# Patient Record
Sex: MALE | Race: WHITE | NOT HISPANIC OR LATINO | Employment: OTHER | URBAN - METROPOLITAN AREA
[De-identification: names, ages, dates, MRNs, and addresses within clinical notes are randomized per-mention and may not be internally consistent; named-entity substitution may affect disease eponyms.]

---

## 2017-12-11 ENCOUNTER — OFFICE VISIT (OUTPATIENT)
Dept: CARDIOLOGY | Facility: CLINIC | Age: 82
End: 2017-12-11
Payer: MEDICARE

## 2017-12-11 VITALS
WEIGHT: 173.31 LBS | BODY MASS INDEX: 24.81 KG/M2 | DIASTOLIC BLOOD PRESSURE: 70 MMHG | HEART RATE: 72 BPM | SYSTOLIC BLOOD PRESSURE: 138 MMHG | HEIGHT: 70 IN

## 2017-12-11 DIAGNOSIS — I10 HYPERTENSION, UNSPECIFIED TYPE: ICD-10-CM

## 2017-12-11 DIAGNOSIS — E78.00 HYPERCHOLESTEREMIA: Primary | ICD-10-CM

## 2017-12-11 DIAGNOSIS — E11.8 TYPE 2 DIABETES MELLITUS WITH COMPLICATION, WITHOUT LONG-TERM CURRENT USE OF INSULIN: ICD-10-CM

## 2017-12-11 PROCEDURE — 99213 OFFICE O/P EST LOW 20 MIN: CPT | Mod: S$GLB,,, | Performed by: INTERNAL MEDICINE

## 2017-12-11 PROCEDURE — 99999 PR PBB SHADOW E&M-EST. PATIENT-LVL II: CPT | Mod: PBBFAC,,, | Performed by: INTERNAL MEDICINE

## 2017-12-11 PROCEDURE — 93000 ELECTROCARDIOGRAM COMPLETE: CPT | Mod: S$GLB,,, | Performed by: INTERNAL MEDICINE

## 2017-12-11 RX ORDER — GLIMEPIRIDE 2 MG/1
2 TABLET ORAL
COMMUNITY
End: 2018-06-14 | Stop reason: ALTCHOICE

## 2017-12-11 NOTE — PROGRESS NOTES
Subjective:   Patient ID:  Husam Connolly is a 86 y.o. male     Chief Complaint   Patient presents with    Follow-up       HPI: Exercises for 35 minutes 3 days a week.  Maximum heart rat 120 to 138  bpm.  Keeps heart rate in range for 25 minutes each session.  Still has chronic easy fatiguability. Fingernails split easily.     Review of Systems   Cardiovascular: Positive for dyspnea on exertion (Mild, when climbing stairs. ). Negative for chest pain, claudication, irregular heartbeat, leg swelling, near-syncope, orthopnea, palpitations and syncope.     Recent     Occasional to rare epigastric cramping attributed to diverticulosis.  BM;'s OK with fiber tabs from [facility]'s    Saw endocrinologist--Dr Mcginnis.     C/o post nasal drip causing a raspy voice  Objective:   Physical Exam   Constitutional: He is oriented to person, place, and time. He appears well-developed and well-nourished. No distress.   HENT:   Head: Normocephalic.   Eyes: No scleral icterus.   Neck: No JVD present.   Cardiovascular: Normal rate, regular rhythm and normal heart sounds.  Exam reveals no gallop and no friction rub.    No murmur heard.  Pulmonary/Chest: Effort normal and breath sounds normal. No stridor.   Musculoskeletal: He exhibits no edema.   Neurological: He is alert and oriented to person, place, and time.   Skin: Skin is warm and dry. He is not diaphoretic.   Psychiatric: He has a normal mood and affect. His behavior is normal. Judgment and thought content normal.   Vitals reviewed.    ECG: NSR, WNL    Wt down 4.5 lbs  Assessment:     1. Hypercholesteremia    2. Hypertension, unspecified type    3. Type 2 diabetes mellitus with complication, without long-term current use of insulin         Plan:   Continue same medical regimen.  Return to clinic in 6 months with ECG.  F/u with Dr Ivy  C/o raspy voice--pt referred to ENT Dr Bettina Romero.  Discussed trial of fluticasone nasal spray. .

## 2018-04-11 ENCOUNTER — OFFICE VISIT (OUTPATIENT)
Dept: OTOLARYNGOLOGY | Facility: CLINIC | Age: 83
End: 2018-04-11
Payer: MEDICARE

## 2018-04-11 VITALS
HEART RATE: 74 BPM | DIASTOLIC BLOOD PRESSURE: 71 MMHG | HEIGHT: 70 IN | WEIGHT: 173.06 LBS | SYSTOLIC BLOOD PRESSURE: 139 MMHG | BODY MASS INDEX: 24.78 KG/M2 | TEMPERATURE: 98 F

## 2018-04-11 DIAGNOSIS — J34.2 NASAL SEPTAL DEVIATION: ICD-10-CM

## 2018-04-11 DIAGNOSIS — R49.0 HOARSENESS OF VOICE: ICD-10-CM

## 2018-04-11 DIAGNOSIS — J30.89 CHRONIC NON-SEASONAL ALLERGIC RHINITIS, UNSPECIFIED TRIGGER: ICD-10-CM

## 2018-04-11 DIAGNOSIS — R09.A2 GLOBUS SENSATION: ICD-10-CM

## 2018-04-11 DIAGNOSIS — K21.9 LARYNGOPHARYNGEAL REFLUX (LPR): Primary | ICD-10-CM

## 2018-04-11 PROCEDURE — 99203 OFFICE O/P NEW LOW 30 MIN: CPT | Mod: 25,S$GLB,, | Performed by: OTOLARYNGOLOGY

## 2018-04-11 PROCEDURE — 99999 PR PBB SHADOW E&M-EST. PATIENT-LVL III: CPT | Mod: PBBFAC,,, | Performed by: OTOLARYNGOLOGY

## 2018-04-11 PROCEDURE — 31575 DIAGNOSTIC LARYNGOSCOPY: CPT | Mod: S$GLB,,, | Performed by: OTOLARYNGOLOGY

## 2018-04-11 RX ORDER — FLUTICASONE PROPIONATE 50 MCG
2 SPRAY, SUSPENSION (ML) NASAL DAILY
Qty: 1 BOTTLE | Refills: 12 | Status: SHIPPED | OUTPATIENT
Start: 2018-04-11 | End: 2019-05-14

## 2018-04-11 NOTE — PROCEDURES
"Husam Connolly is a 86 y.o. male patient.    Temp: 97.7 °F (36.5 °C) (04/11/18 0859)  Pulse: 74 (04/11/18 0859)  BP: 139/71 (04/11/18 0859)  Weight: 78.5 kg (173 lb 1 oz) (04/11/18 0859)  Height: 5' 10" (177.8 cm) (04/11/18 0859)       Procedures     Due to indication in patient's history, presentation or risk factors,  a fiber optic exam was performed.    SEPARATE PROCEDURE NOTE:    ANESTHESIA:  Topical xylocaine with salma-synephrine    FINDINGS:  Moderate inaterarytenoid erythema and edema    PROCEDURE:  After verbal consent was obtained, the flexible scope was passed through the patient's nasal cavity without difficulty.  The nasopharynx (adenoid pad) and eustachian tube orifices were first visualized and were found to be normal, without masses or irregularity.  The posterior pharyngeal wall and base of tongue were then examined and no mass or irregular tissue was seen.  The scope was then advanced to the larynx, and the epiglottis, valleculae, and piriform sinuses were normal, without masses or mucosal irregularity.  The false vocal folds and true vocal folds were then examined and were found to have normal mobility (full abduction and adduction) and no masses or mucosal irregularity was seen.  The interartyenoid area had moderate edema and erythema consistent with reflux.    Bettina Byers  4/11/2018    "

## 2018-04-11 NOTE — LETTER
April 11, 2018      Daren Lassiter MD  [address] [place] [address]  [address]  [facility] [address]           [facility]  [address] [place] [address] [address]  [facility] [address]  Phone: [phone]  Fax: [phone]          Patient: Husam Connolly   MR Number: [id number]   YOB: 1931   Date of Visit: 4/11/2018       Dear Dr. Daren Lassiter:    Thank you for referring Husam Connolly to me for evaluation. Attached you will find relevant portions of my assessment and plan of care.    If you have questions, please do not hesitate to call me. I look forward to following Husam Connolly along with you.    Sincerely,    Bettina Byers MD    Enclosure  CC:  No Recipients    If you would like to receive this communication electronically, please contact [email or web address] or [phone] to request more information on [employer] Link access.    For providers and/or their staff who would like to refer a patient to [facility], please contact us through our one-stop-shop provider referral line, [facility], at [phone].    If you feel you have received this communication in error or would no longer like to receive these types of communications, please e-mail [email or web address]

## 2018-04-11 NOTE — PROGRESS NOTES
Chief Complaint   Patient presents with    Hoarse     pt reports in the morning he gets post nasal drip    Nasal Lacrimal Obstruction     right nasal   .     HPI:Husam Connolly is a 86 y.o. male who has been referred by Dr. Cox for a 6 months history of hoarseness. His voice is progressively worsening over this time. There are pitch breaks or cracks. There is vocal fatigue. He denies dysphagia, odynophagia, throat pain, and otalgia.  There is no hemoptysis or hematemesis. He is breathing well.     He admits to throat clearing and cough. He denies heartburn and reflux.    He notes that he has post-nasal drip and nasal congestion.  This has been present for years. He states that he has not been on medication for this.  He states that he has not been on medication for this as of yet.     Past Medical History:   Diagnosis Date    Anticoagulant long-term use     Bowel obstruction     BPH (benign prostatic hyperplasia)     Diabetes mellitus     Diverticulitis     Hyperlipidemia     Hypertension      Social History     Social History    Marital status:      Spouse name: N/A    Number of children: N/A    Years of education: N/A     Occupational History    Not on file.     Social History Main Topics    Smoking status: Former Smoker     Packs/day: 1.00     Years: 40.00    Smokeless tobacco: Never Used    Alcohol use 1.2 oz/week     2 Glasses of wine per week      Comment: drinks occassionally    Drug use: No    Sexual activity: Yes     Partners: Female     Other Topics Concern    Not on file     Social History Narrative    No narrative on file     Past Surgical History:   Procedure Laterality Date    EYE SURGERY      TONSILLECTOMY       Family History   Problem Relation Age of Onset    Heart disease Father     Hypertension Father     Cancer Mother     Diabetes Paternal Aunt            Review of Systems  General: negative for chills, fever or weight loss  Psychological: negative for  mood changes or depression  Ophthalmic: negative for blurry vision, photophobia or eye pain  ENT: see HPI  Respiratory: no cough, shortness of breath, or wheezing  Cardiovascular: no chest pain or dyspnea on exertion  Gastrointestinal: no abdominal pain, change in bowel habits, or black/ bloody stools  Musculoskeletal: negative for gait disturbance or muscular weakness  Neurological: no syncope or seizures; no ataxia  Dermatological: negative for puritis,  rash and jaundice  Hematologic/lymphatic: no easy bruising, no new lumps or bumps      Physical Exam:    Vitals:    04/11/18 0859   BP: 139/71   Pulse: 74   Temp: 97.7 °F (36.5 °C)       Constitutional: Well appearing / communicating without difficutly.  NAD.  Eyes: EOM I Bilaterally  Head/Face: Normocephalic.  Negative paranasal sinus pressure/tenderness.  Salivary glands WNL.  House Brackmann I Bilaterally.    Right Ear: Auricle normal appearance. External Auditory Canal within normal limits no lesions or masses,TM w/o masses/lesions/perforations. TM mobility noted.   Left Ear: Auricle normal appearance. External Auditory Canal within normal limits no lesions or masses,TM w/o masses/lesions/perforations. TM mobility noted.  Nose: Nasal septal deviation to the right with nasal septal spur. Inferior Turbinates 3+ bilaterally. No septal perforation. No masses/lesions. External nasal skin appears normal without masses/lesions.  Oral Cavity: Gingiva/lips within normal limits.  Dentition/gingiva healthy appearing. Mucus membranes moist. Floor of mouth soft, no masses palpated. Oral Tongue mobile. Hard Palate appears normal.    Oropharynx: Base of tongue appears normal. No masses/lesions noted. Tonsillar fossa/pharyngeal wall without lesions. Posterior oropharynx WNL.  Soft palate without masses. Midline uvula.   Neck/Lymphatic: No LAD I-VI bilaterally.  No thyromegaly.  No masses noted on exam.    Mirror laryngoscopy/nasopharyngoscopy: Active gag reflex.  Unable to  perform.    Neuro/Psychiatric: AOx3.  Normal mood and affect.   Cardiovascular: Normal carotid pulses bilaterally, no increasing jugular venous distention noted at cervical region bilaterally.    Respiratory: Normal respiratory effort, no stridor, no retractions noted.    See separate procedure note for FFL.     Assessment:    ICD-10-CM ICD-9-CM    1. Laryngopharyngeal reflux (LPR) K21.9 478.79    2. Hoarseness of voice R49.0 784.42    3. Globus sensation F45.8 306.4    4. Nasal septal deviation J34.2 470    5. Chronic non-seasonal allergic rhinitis, unspecified trigger J30.89 477.9      The primary encounter diagnosis was Laryngopharyngeal reflux (LPR). Diagnoses of Hoarseness of voice, Globus sensation, Nasal septal deviation, and Chronic non-seasonal allergic rhinitis, unspecified trigger were also pertinent to this visit.      Plan:  No orders of the defined types were placed in this encounter.    AR:  Flonase daily.       The patient has the physical findings of chronic laryngopharyngeal reflux, which I believe is the cause of the hoarseness. I explained to the patient how it is common to experience throat discomfort, a foreign body sensation, problems swallowing, chronic cough and hoarseness among other things due to reflux, even in the absence of heartburn. Smaller volumes of gastric contents are required to irritate the pharynx than the lower esophagus. Often patients with significant reflux and heartburn will seek medical treatment earlier.     I recommended that the patient start taking Zantac 300mg qhs and provided a prescription.     I also recommended that the patient refrain from eating within 3 hours of going to bed at night and that the patient place a 2 x 4 underneath the head board of the bed to elevate the head of bed very subtly and optimize the impact of gravity on the potential reflux.  I recommended that the patient avoid alcohol, caffeine, tobacco, tomato sauce, spicy foods, fried food, and  chocolate.     There is a potential long-term risk of reflux for the development of esophageal cancer, and the data on head and neck malignancy is unclear. Therefore it is important for the patient to be compliant with these recommendations. The patient may benefit from an evaluation by Gastroenterology if the symptoms fail to improve or worsen.     Follow up in 6 weeks to reassess progress with treatment regimen.     Bettina Byers MD

## 2018-05-07 ENCOUNTER — TELEPHONE (OUTPATIENT)
Dept: OTOLARYNGOLOGY | Facility: CLINIC | Age: 83
End: 2018-05-07

## 2018-05-07 NOTE — TELEPHONE ENCOUNTER
"Patient came into clinic today he states " I tried calling the call center to schedule an appointment today with Dr Romero, I was told she doesn't have anything available today until Wednesday. I said I can't wait until then and they would not transfer me to her office or left a message for me to talk to someone in Dr Romero's office. I feel someone needs to let [facility] know how the switchboard is working if they don't correct this problem [facility] will lose their patients". I told patient Dr Romero is in surgery today and the best way to contact us is through patient portal this message goes straight to us and you wont have to worry about calling through the call center. Patient states " I forgot my password to the patient portal". I provided the technical support card with the phone number he can call to get his portal working again. Told patient call center should have left a message for the nurse to call patient back. Offered a sooner appointment for the patient but he states "i've seen my PCP for my symptoms everything is taking care of". I let the patient know to contact us through the portal, also if patient has anymore questions or concerns to let the office know. Patient verbalized understanding.   "

## 2018-05-23 ENCOUNTER — OFFICE VISIT (OUTPATIENT)
Dept: OTOLARYNGOLOGY | Facility: CLINIC | Age: 83
End: 2018-05-23
Payer: MEDICARE

## 2018-05-23 ENCOUNTER — PATIENT MESSAGE (OUTPATIENT)
Dept: OTOLARYNGOLOGY | Facility: CLINIC | Age: 83
End: 2018-05-23

## 2018-05-23 VITALS
BODY MASS INDEX: 23.74 KG/M2 | HEART RATE: 71 BPM | WEIGHT: 165.44 LBS | TEMPERATURE: 97 F | SYSTOLIC BLOOD PRESSURE: 124 MMHG | DIASTOLIC BLOOD PRESSURE: 69 MMHG

## 2018-05-23 DIAGNOSIS — R49.0 HOARSENESS OF VOICE: ICD-10-CM

## 2018-05-23 DIAGNOSIS — K21.9 LARYNGOPHARYNGEAL REFLUX (LPR): Primary | ICD-10-CM

## 2018-05-23 DIAGNOSIS — J34.2 NASAL SEPTAL DEVIATION: ICD-10-CM

## 2018-05-23 DIAGNOSIS — J30.89 NON-SEASONAL ALLERGIC RHINITIS DUE TO OTHER ALLERGIC TRIGGER: ICD-10-CM

## 2018-05-23 PROCEDURE — 99214 OFFICE O/P EST MOD 30 MIN: CPT | Mod: 25,S$GLB,, | Performed by: OTOLARYNGOLOGY

## 2018-05-23 PROCEDURE — 31575 DIAGNOSTIC LARYNGOSCOPY: CPT | Mod: S$GLB,,, | Performed by: OTOLARYNGOLOGY

## 2018-05-23 PROCEDURE — 99999 PR PBB SHADOW E&M-EST. PATIENT-LVL III: CPT | Mod: PBBFAC,,, | Performed by: OTOLARYNGOLOGY

## 2018-05-23 RX ORDER — OMEPRAZOLE 20 MG/1
20 CAPSULE, DELAYED RELEASE ORAL EVERY MORNING
Qty: 30 CAPSULE | Refills: 11 | Status: SHIPPED | OUTPATIENT
Start: 2018-05-23 | End: 2018-11-29

## 2018-05-23 RX ORDER — LEVOCETIRIZINE DIHYDROCHLORIDE 5 MG/1
5 TABLET, FILM COATED ORAL NIGHTLY
Qty: 30 TABLET | Refills: 11 | Status: SHIPPED | OUTPATIENT
Start: 2018-05-23 | End: 2019-03-18 | Stop reason: SDUPTHER

## 2018-05-23 NOTE — PROGRESS NOTES
Chief Complaint   Patient presents with    Other     having to blow his nose frequently   .     HPI:Husam Connolly is a 86 y.o. male who has been referred by Dr. Cox for a 6 months history of hoarseness. His voice is progressively worsening over this time. There are pitch breaks or cracks. There is vocal fatigue. He denies dysphagia, odynophagia, throat pain, and otalgia.  There is no hemoptysis or hematemesis. He is breathing well.     He admits to throat clearing and cough. He denies heartburn and reflux.    He notes that he has post-nasal drip and nasal congestion.  This has been present for years. He states that he has not been on medication for this.  He states that he has not been on medication for this as of yet.     Interval HPI:  F/u LPR/chronic rhinitis.  He states that he has been doing somewhat better since last visit but does note increased rhinorrhea and nasal congestion. He states it is worse when outdoors. He notes that his hoarseness is improved but still present intermittently and worse in the mornings. He states that the throat clearing and cough are improved.     Past Medical History:   Diagnosis Date    Anticoagulant long-term use     Bowel obstruction     BPH (benign prostatic hyperplasia)     Diabetes mellitus     Diverticulitis     Hyperlipidemia     Hypertension      Social History     Social History    Marital status:      Spouse name: N/A    Number of children: N/A    Years of education: N/A     Occupational History    Not on file.     Social History Main Topics    Smoking status: Former Smoker     Packs/day: 1.00     Years: 40.00    Smokeless tobacco: Never Used    Alcohol use 1.2 oz/week     2 Glasses of wine per week      Comment: drinks occassionally    Drug use: No    Sexual activity: Yes     Partners: Female     Other Topics Concern    Not on file     Social History Narrative    No narrative on file     Past Surgical History:   Procedure Laterality  Date    EYE SURGERY      TONSILLECTOMY       Family History   Problem Relation Age of Onset    Heart disease Father     Hypertension Father     Cancer Mother     Diabetes Paternal Aunt            Review of Systems  General: negative for chills, fever or weight loss  Psychological: negative for mood changes or depression  Ophthalmic: negative for blurry vision, photophobia or eye pain  ENT: see HPI  Respiratory: no cough, shortness of breath, or wheezing  Cardiovascular: no chest pain or dyspnea on exertion  Gastrointestinal: no abdominal pain, change in bowel habits, or black/ bloody stools  Musculoskeletal: negative for gait disturbance or muscular weakness  Neurological: no syncope or seizures; no ataxia  Dermatological: negative for puritis,  rash and jaundice  Hematologic/lymphatic: no easy bruising, no new lumps or bumps      Physical Exam:    Vitals:    05/23/18 1019   BP: 124/69   Pulse: 71   Temp: 97.1 °F (36.2 °C)       Constitutional: Well appearing / communicating without difficutly.  NAD.  Eyes: EOM I Bilaterally  Head/Face: Normocephalic.  Negative paranasal sinus pressure/tenderness.  Salivary glands WNL.  House Brackmann I Bilaterally.    Right Ear: Auricle normal appearance. External Auditory Canal within normal limits no lesions or masses,TM w/o masses/lesions/perforations. TM mobility noted.   Left Ear: Auricle normal appearance. External Auditory Canal within normal limits no lesions or masses,TM w/o masses/lesions/perforations. TM mobility noted.  Nose: Nasal septal deviation to the right with nasal septal spur. Inferior Turbinates 3+ bilaterally. No septal perforation. No masses/lesions. External nasal skin appears normal without masses/lesions.  Oral Cavity: Gingiva/lips within normal limits.  Dentition/gingiva healthy appearing. Mucus membranes moist. Floor of mouth soft, no masses palpated. Oral Tongue mobile. Hard Palate appears normal.    Oropharynx: Base of tongue appears normal. No  masses/lesions noted. Tonsillar fossa/pharyngeal wall without lesions. Posterior oropharynx WNL.  Soft palate without masses. Midline uvula.   Neck/Lymphatic: No LAD I-VI bilaterally.  No thyromegaly.  No masses noted on exam.    Mirror laryngoscopy/nasopharyngoscopy: Active gag reflex.  Unable to perform.    Neuro/Psychiatric: AOx3.  Normal mood and affect.   Cardiovascular: Normal carotid pulses bilaterally, no increasing jugular venous distention noted at cervical region bilaterally.    Respiratory: Normal respiratory effort, no stridor, no retractions noted.    See separate procedure note for FFL.     Assessment:    ICD-10-CM ICD-9-CM    1. Laryngopharyngeal reflux (LPR) K21.9 478.79    2. Hoarseness of voice R49.0 784.42    3. Nasal septal deviation J34.2 470    4. Non-seasonal allergic rhinitis due to other allergic trigger J30.89 477.8      The primary encounter diagnosis was Laryngopharyngeal reflux (LPR). Diagnoses of Hoarseness of voice, Nasal septal deviation, and Non-seasonal allergic rhinitis due to other allergic trigger were also pertinent to this visit.      Plan:  No orders of the defined types were placed in this encounter.    AR:  Continue Flonase and Astelin; add Xyzal.      I recommended that the patient start taking Zantac 300mg qhs add Prilosec 20mg every morning and provided a prescription.  I also recommended that the patient refrain from eating within 3 hours of going to bed at night and that the patient place a 2 x 4 underneath the head board of the bed to elevate the head of bed very subtly and optimize the impact of gravity on the potential reflux.  I recommended that the patient avoid alcohol, caffeine, tobacco, tomato sauce, spicy foods, fried food, and chocolate.     Follow up in 6 weeks to reassess progress with treatment regimen.     Bettina Byers MD

## 2018-05-25 NOTE — PROCEDURES
Husam Connolly is a 86 y.o. male patient.    Temp: 97.1 °F (36.2 °C) (05/23/18 1019)  Pulse: 71 (05/23/18 1019)  BP: 124/69 (05/23/18 1019)  Weight: 75 kg (165 lb 7.3 oz) (05/23/18 1019)       Procedures       Due to indication in patient's history, presentation or risk factors,  a fiber optic exam was performed.    SEPARATE PROCEDURE NOTE:    ANESTHESIA:  Topical xylocaine with salma-synephrine    FINDINGS:  Moderate inaterarytenoid erythema and edema    PROCEDURE:  After verbal consent was obtained, the flexible scope was passed through the patient's nasal cavity without difficulty.  The nasopharynx (adenoid pad) and eustachian tube orifices were first visualized and were found to be normal, without masses or irregularity.  The posterior pharyngeal wall and base of tongue were then examined and no mass or irregular tissue was seen.  The scope was then advanced to the larynx, and the epiglottis, valleculae, and piriform sinuses were normal, without masses or mucosal irregularity.  The false vocal folds and true vocal folds were then examined and were found to have normal mobility (full abduction and adduction) and no masses or mucosal irregularity was seen.  The interartyenoid area had moderate edema and erythema consistent with reflux. Slight improvement compared to last examination.     Bettina Byers  5/25/2018

## 2018-06-14 ENCOUNTER — OFFICE VISIT (OUTPATIENT)
Dept: CARDIOLOGY | Facility: CLINIC | Age: 83
End: 2018-06-14
Payer: MEDICARE

## 2018-06-14 VITALS
WEIGHT: 168.63 LBS | HEIGHT: 70 IN | BODY MASS INDEX: 24.14 KG/M2 | HEART RATE: 68 BPM | SYSTOLIC BLOOD PRESSURE: 138 MMHG | DIASTOLIC BLOOD PRESSURE: 70 MMHG

## 2018-06-14 DIAGNOSIS — J30.1 ALLERGIC RHINITIS DUE TO POLLEN, UNSPECIFIED SEASONALITY: ICD-10-CM

## 2018-06-14 DIAGNOSIS — E11.8 TYPE 2 DIABETES MELLITUS WITH COMPLICATION, WITHOUT LONG-TERM CURRENT USE OF INSULIN: ICD-10-CM

## 2018-06-14 DIAGNOSIS — I10 ESSENTIAL HYPERTENSION: Primary | ICD-10-CM

## 2018-06-14 DIAGNOSIS — E78.5 HYPERLIPIDEMIA, UNSPECIFIED HYPERLIPIDEMIA TYPE: ICD-10-CM

## 2018-06-14 DIAGNOSIS — R06.2 WHEEZING: ICD-10-CM

## 2018-06-14 PROCEDURE — 93000 ELECTROCARDIOGRAM COMPLETE: CPT | Mod: S$GLB,,, | Performed by: INTERNAL MEDICINE

## 2018-06-14 PROCEDURE — 99999 PR PBB SHADOW E&M-EST. PATIENT-LVL III: CPT | Mod: PBBFAC,,, | Performed by: INTERNAL MEDICINE

## 2018-06-14 PROCEDURE — 99213 OFFICE O/P EST LOW 20 MIN: CPT | Mod: S$GLB,,, | Performed by: INTERNAL MEDICINE

## 2018-06-14 RX ORDER — ALBUTEROL SULFATE 90 UG/1
2 AEROSOL, METERED RESPIRATORY (INHALATION) EVERY 6 HOURS PRN
Qty: 1 INHALER | Refills: 11 | Status: SHIPPED | OUTPATIENT
Start: 2018-06-14 | End: 2019-07-01

## 2018-06-14 NOTE — PROGRESS NOTES
Subjective:      Patient ID: Husam Connolly is a 87 y.o. male.    Chief Complaint: Follow-up (Hypertension)    HPI:  Stopped exercising in May due to cough and insomnia.  Back to exercising at the gym.  Pt sustains heart rate at about 135 bpm      Had labs recently with Dr Ivy.  Glimeperide was discontinued.     Review of Systems   Cardiovascular: Positive for dyspnea on exertion. Negative for chest pain, claudication, irregular heartbeat, leg swelling, near-syncope, orthopnea, palpitations and syncope.      Sees Dr Romero    Some easy fatiguability    Gets an annual eye exam with Dr tam  Past Medical History:   Diagnosis Date    Anticoagulant long-term use     Bowel obstruction     BPH (benign prostatic hyperplasia)     Diabetes mellitus     Diverticulitis     Hyperlipidemia     Hypertension         Past Surgical History:   Procedure Laterality Date    EYE SURGERY      TONSILLECTOMY         Family History   Problem Relation Age of Onset    Heart disease Father     Hypertension Father     Cancer Mother     Diabetes Paternal Aunt        Social History     Social History    Marital status:      Spouse name: N/A    Number of children: N/A    Years of education: N/A     Social History Main Topics    Smoking status: Former Smoker     Packs/day: 1.00     Years: 40.00    Smokeless tobacco: Never Used    Alcohol use 1.2 oz/week     2 Glasses of wine per week      Comment: drinks occassionally    Drug use: No    Sexual activity: Yes     Partners: Female     Other Topics Concern    None     Social History Narrative    None       Current Outpatient Prescriptions on File Prior to Visit   Medication Sig Dispense Refill    aspirin (ECOTRIN) 81 MG EC tablet Take 81 mg by mouth once daily.      atorvastatin (LIPITOR) 40 MG tablet TAKE 1 TABLET BY MOUTH DAILY 90 tablet 3    finasteride (PROSCAR) 5 mg tablet TAKE 1 TABLET(5 MG) BY MOUTH EVERY DAY 90 tablet 3    fluticasone (FLONASE) 50  "mcg/actuation nasal spray 2 sprays (100 mcg total) by Each Nare route once daily. 1 Bottle 12    levocetirizine (XYZAL) 5 MG tablet Take 1 tablet (5 mg total) by mouth every evening. 30 tablet 11    multivitamin capsule Take 1 capsule by mouth once daily.      omeprazole (PRILOSEC) 20 MG capsule Take 1 capsule (20 mg total) by mouth every morning. 30 capsule 11    ranitidine (ZANTAC) 300 MG tablet Take 1 tablet (300 mg total) by mouth every evening. 30 tablet 11    valsartan-hydrochlorothiazide (DIOVAN-HCT) 320-12.5 mg per tablet TAKE 1 TABLET BY MOUTH EVERY DAY 90 tablet 3    [DISCONTINUED] glimepiride (AMARYL) 2 MG tablet Take 2 mg by mouth before breakfast.       No current facility-administered medications on file prior to visit.        Review of patient's allergies indicates:   Allergen Reactions    Solarcaine [benzocaine-triclosan]     Sulfa (sulfonamide antibiotics)     Hytrin [terazosin] Rash    Smz-tmp ds [sulfamethoxazole-trimethoprim] Rash     Objective:     Vitals:    06/14/18 1323 06/14/18 1346   BP: (!) 145/74 138/70   BP Location: Left arm Left arm   Patient Position: Sitting Sitting   BP Method: Medium (Automatic)    Pulse: 68    Weight: 76.5 kg (168 lb 9.6 oz)    Height: 5' 10" (1.778 m)         Physical Exam   Constitutional: He is oriented to person, place, and time. He appears well-developed and well-nourished. No distress.   Eyes: No scleral icterus.   Neck: No JVD present. Carotid bruit is not present.   Cardiovascular: Regular rhythm and normal heart sounds.  Exam reveals no gallop and no friction rub.    No murmur heard.  Pulmonary/Chest: Effort normal. No respiratory distress. He has wheezes (few scattered).   Musculoskeletal: He exhibits no edema.   Neurological: He is alert and oriented to person, place, and time.   Skin: Skin is warm and dry. He is not diaphoretic.   Psychiatric: He has a normal mood and affect. His behavior is normal. Judgment and thought content normal. "   Vitals reviewed.     ECG: NSR, slightly flattened ST segment V6, similar to old tracing  Assessment:     1. Essential hypertension    2. Hyperlipidemia, unspecified hyperlipidemia type    3. Type 2 diabetes mellitus with complication, without long-term current use of insulin    4. Allergic rhinitis due to pollen, unspecified seasonality    5. Wheezing      Plan:   Husam was seen today for follow-up.    Diagnoses and all orders for this visit:    Essential hypertension  -     EKG 12-lead    Hyperlipidemia, unspecified hyperlipidemia type    Type 2 diabetes mellitus with complication, without long-term current use of insulin    Allergic rhinitis due to pollen, unspecified seasonality    Wheezing    Other orders  -     albuterol 90 mcg/actuation inhaler; Inhale 2 puffs into the lungs every 6 (six) hours as needed for Wheezing. Rescue     Wheezing following recent respiratory tract infection.  Will add albuterol MDI prn    Rest of meds the same    RTC 6 months  Follow-up in about 6 months (around 12/14/2018).

## 2018-07-24 ENCOUNTER — OFFICE VISIT (OUTPATIENT)
Dept: OTOLARYNGOLOGY | Facility: CLINIC | Age: 83
End: 2018-07-24
Payer: MEDICARE

## 2018-07-24 VITALS
TEMPERATURE: 97 F | HEART RATE: 65 BPM | HEIGHT: 70 IN | SYSTOLIC BLOOD PRESSURE: 146 MMHG | WEIGHT: 170.63 LBS | DIASTOLIC BLOOD PRESSURE: 66 MMHG | BODY MASS INDEX: 24.43 KG/M2

## 2018-07-24 DIAGNOSIS — R49.0 HOARSENESS OF VOICE: ICD-10-CM

## 2018-07-24 DIAGNOSIS — K21.9 LARYNGOPHARYNGEAL REFLUX (LPR): Primary | ICD-10-CM

## 2018-07-24 DIAGNOSIS — J30.89 NON-SEASONAL ALLERGIC RHINITIS DUE TO OTHER ALLERGIC TRIGGER: ICD-10-CM

## 2018-07-24 DIAGNOSIS — J34.2 NASAL SEPTAL DEVIATION: ICD-10-CM

## 2018-07-24 PROCEDURE — 31575 DIAGNOSTIC LARYNGOSCOPY: CPT | Mod: S$GLB,,, | Performed by: OTOLARYNGOLOGY

## 2018-07-24 PROCEDURE — 99213 OFFICE O/P EST LOW 20 MIN: CPT | Mod: 25,S$GLB,, | Performed by: OTOLARYNGOLOGY

## 2018-07-24 PROCEDURE — 99999 PR PBB SHADOW E&M-EST. PATIENT-LVL III: CPT | Mod: PBBFAC,,, | Performed by: OTOLARYNGOLOGY

## 2018-07-24 RX ORDER — FAMCICLOVIR 500 MG/1
TABLET ORAL
Refills: 11 | COMMUNITY
Start: 2018-07-07 | End: 2018-07-24

## 2018-07-24 NOTE — PROCEDURES
Procedures       Due to indication in patient's history, presentation or risk factors,  a fiber optic exam was performed.    SEPARATE PROCEDURE NOTE:    ANESTHESIA:  Topical xylocaine with salma-synephrine    FINDINGS:  Mild inaterarytenoid erythema and edema    PROCEDURE:  After verbal consent was obtained, the flexible scope was passed through the patient's nasal cavity without difficulty.  The nasopharynx (adenoid pad) and eustachian tube orifices were first visualized and were found to be normal, without masses or irregularity.  The posterior pharyngeal wall and base of tongue were then examined and no mass or irregular tissue was seen.  The scope was then advanced to the larynx, and the epiglottis, valleculae, and piriform sinuses were normal, without masses or mucosal irregularity.  The false vocal folds and true vocal folds were then examined and were found to have normal mobility (full abduction and adduction) and no masses or mucosal irregularity was seen.  The interartyenoid area had mild edema and erythema consistent with reflux. Improvement compared to last examination.     Bettina Byers  7/24/2018

## 2018-07-24 NOTE — PROGRESS NOTES
Chief Complaint   Patient presents with    Follow-up    Cough     dry cough    Hoarse         .     HPI:Husam Connolly is a 87 y.o. male who has been referred by Dr. Cox for a 6 months history of hoarseness. His voice is progressively worsening over this time. There are pitch breaks or cracks. There is vocal fatigue. He denies dysphagia, odynophagia, throat pain, and otalgia.  There is no hemoptysis or hematemesis. He is breathing well.     He admits to throat clearing and cough. He denies heartburn and reflux.    He notes that he has post-nasal drip and nasal congestion.  This has been present for years. He states that he has not been on medication for this.  He states that he has not been on medication for this as of yet.     Interval HPI 7/24/2018:  F/u LPR/chronic rhinitis.  He states that many of his symptoms have been improved since last visit.  He has had less nasal congestion and rhinorrhea but still notes some in the morning.  He states it is worse when outdoors. He notes that his hoarseness is improved but still present intermittently and worse in the mornings. He states that the throat clearing and cough continue to improved.He states that since last visit, he was diagnosed with shingles in the left superior orbital region.      Past Medical History:   Diagnosis Date    Anticoagulant long-term use     Bowel obstruction     BPH (benign prostatic hyperplasia)     Diabetes mellitus     Diverticulitis     Hyperlipidemia     Hypertension      Social History     Social History    Marital status:      Spouse name: N/A    Number of children: N/A    Years of education: N/A     Occupational History    Not on file.     Social History Main Topics    Smoking status: Former Smoker     Packs/day: 1.00     Years: 40.00    Smokeless tobacco: Never Used    Alcohol use 1.2 oz/week     2 Glasses of wine per week      Comment: drinks occassionally    Drug use: No    Sexual activity: Yes      Partners: Female     Other Topics Concern    Not on file     Social History Narrative    No narrative on file     Past Surgical History:   Procedure Laterality Date    EYE SURGERY      TONSILLECTOMY       Family History   Problem Relation Age of Onset    Heart disease Father     Hypertension Father     Cancer Mother     Diabetes Paternal Aunt            Review of Systems  General: negative for chills, fever or weight loss  Psychological: negative for mood changes or depression  Ophthalmic: negative for blurry vision, photophobia or eye pain  ENT: see HPI  Respiratory: no cough, shortness of breath, or wheezing  Cardiovascular: no chest pain or dyspnea on exertion  Gastrointestinal: no abdominal pain, change in bowel habits, or black/ bloody stools  Musculoskeletal: negative for gait disturbance or muscular weakness  Neurological: no syncope or seizures; no ataxia  Dermatological: negative for puritis,  rash and jaundice  Hematologic/lymphatic: no easy bruising, no new lumps or bumps      Physical Exam:    Vitals:    07/24/18 0920   BP: (!) 146/66   Pulse: 65   Temp: 97 °F (36.1 °C)       Constitutional: Well appearing / communicating without difficutly.  NAD.  Eyes: EOM I Bilaterally  Head/Face: Normocephalic.  Negative paranasal sinus pressure/tenderness.  Salivary glands WNL.  House Brackmann I Bilaterally.    Right Ear: Auricle normal appearance. External Auditory Canal within normal limits no lesions or masses,TM w/o masses/lesions/perforations. TM mobility noted.   Left Ear: Auricle normal appearance. External Auditory Canal within normal limits no lesions or masses,TM w/o masses/lesions/perforations. TM mobility noted.  Nose: Nasal septal deviation to the right with nasal septal spur. Inferior Turbinates 3+ bilaterally. No septal perforation. No masses/lesions. External nasal skin appears normal without masses/lesions.  Oral Cavity: Gingiva/lips within normal limits.  Dentition/gingiva healthy  appearing. Mucus membranes moist. Floor of mouth soft, no masses palpated. Oral Tongue mobile. Hard Palate appears normal.    Oropharynx: Base of tongue appears normal. No masses/lesions noted. Tonsillar fossa/pharyngeal wall without lesions. Posterior oropharynx WNL.  Soft palate without masses. Midline uvula.   Neck/Lymphatic: No LAD I-VI bilaterally.  No thyromegaly.  No masses noted on exam.    Mirror laryngoscopy/nasopharyngoscopy: Active gag reflex.  Unable to perform.    Neuro/Psychiatric: AOx3.  Normal mood and affect.   Cardiovascular: Normal carotid pulses bilaterally, no increasing jugular venous distention noted at cervical region bilaterally.    Respiratory: Normal respiratory effort, no stridor, no retractions noted.    See separate procedure note for FFL.     Assessment:    ICD-10-CM ICD-9-CM    1. Laryngopharyngeal reflux (LPR) K21.9 478.79    2. Hoarseness of voice R49.0 784.42    3. Nasal septal deviation J34.2 470    4. Non-seasonal allergic rhinitis due to other allergic trigger J30.89 477.8      The primary encounter diagnosis was Laryngopharyngeal reflux (LPR). Diagnoses of Hoarseness of voice, Nasal septal deviation, and Non-seasonal allergic rhinitis due to other allergic trigger were also pertinent to this visit.      Plan:  No orders of the defined types were placed in this encounter.    AR:  Continue Flonase and Astelin; add Xyzal.   Continue taking Zantac 300mg qhs add Prilosec 20mg every morning and provided a prescription.  I also reitereated that the patient avoid eating within 3 hours of going to bed at night and that the patient place a 2 x 4 underneath the head board of the bed to elevate the head of bed very subtly and optimize the impact of gravity on the potential reflux. Avoid alcohol, caffeine, tobacco, tomato sauce, spicy foods, fried food, and chocolate.     Follow up in 6 weeks to reassess progress with treatment regimen.     Bettina Byers MD

## 2018-10-25 DIAGNOSIS — N40.0 BENIGN NON-NODULAR PROSTATIC HYPERPLASIA WITHOUT LOWER URINARY TRACT SYMPTOMS: ICD-10-CM

## 2018-10-25 RX ORDER — FINASTERIDE 5 MG/1
TABLET, FILM COATED ORAL
Qty: 90 TABLET | Refills: 1 | Status: SHIPPED | OUTPATIENT
Start: 2018-10-25 | End: 2019-04-15 | Stop reason: SDUPTHER

## 2018-11-29 ENCOUNTER — OFFICE VISIT (OUTPATIENT)
Dept: OTOLARYNGOLOGY | Facility: CLINIC | Age: 83
End: 2018-11-29
Payer: MEDICARE

## 2018-11-29 VITALS
HEART RATE: 81 BPM | SYSTOLIC BLOOD PRESSURE: 139 MMHG | WEIGHT: 175.69 LBS | HEIGHT: 70 IN | DIASTOLIC BLOOD PRESSURE: 76 MMHG | BODY MASS INDEX: 25.15 KG/M2

## 2018-11-29 DIAGNOSIS — J38.7 PRESBYLARYNX: Primary | ICD-10-CM

## 2018-11-29 DIAGNOSIS — K21.9 LARYNGOPHARYNGEAL REFLUX (LPR): ICD-10-CM

## 2018-11-29 DIAGNOSIS — J34.2 NASAL SEPTAL DEVIATION: ICD-10-CM

## 2018-11-29 DIAGNOSIS — J30.89 NON-SEASONAL ALLERGIC RHINITIS DUE TO OTHER ALLERGIC TRIGGER: ICD-10-CM

## 2018-11-29 DIAGNOSIS — R49.0 HOARSENESS OF VOICE: ICD-10-CM

## 2018-11-29 PROCEDURE — 31575 DIAGNOSTIC LARYNGOSCOPY: CPT | Mod: S$GLB,,, | Performed by: OTOLARYNGOLOGY

## 2018-11-29 PROCEDURE — 99214 OFFICE O/P EST MOD 30 MIN: CPT | Mod: 25,S$GLB,, | Performed by: OTOLARYNGOLOGY

## 2018-11-29 PROCEDURE — 99999 PR PBB SHADOW E&M-EST. PATIENT-LVL III: CPT | Mod: PBBFAC,,, | Performed by: OTOLARYNGOLOGY

## 2018-11-29 PROCEDURE — 1101F PT FALLS ASSESS-DOCD LE1/YR: CPT | Mod: CPTII,S$GLB,, | Performed by: OTOLARYNGOLOGY

## 2018-11-29 RX ORDER — IPRATROPIUM BROMIDE 21 UG/1
2 SPRAY, METERED NASAL 2 TIMES DAILY
Qty: 30 ML | Refills: 3 | Status: SHIPPED | OUTPATIENT
Start: 2018-11-29 | End: 2018-12-13

## 2018-11-29 NOTE — PROGRESS NOTES
Chief Complaint   Patient presents with    Follow-up    Hoarse     comes and goes   .     HPI:Husam oCnnolly is a 87 y.o. male who has been referred by Dr. Cox for a 6 months history of hoarseness. His voice is progressively worsening over this time. There are pitch breaks or cracks. There is vocal fatigue. He denies dysphagia, odynophagia, throat pain, and otalgia.  There is no hemoptysis or hematemesis. He is breathing well.     He admits to throat clearing and cough. He denies heartburn and reflux.    He notes that he has post-nasal drip and nasal congestion.  This has been present for years. He states that he has not been on medication for this.  He states that he has not been on medication for this as of yet.       Interval HPI 11/29/2018:   F/u LPR/Chronic rhinitis. He has been on Flonase, Astelin, Xyzal. He has been using Prilosec and Ranitidine as prescribed. He states that he has intermittent nasal congestion and rhinorrhea which is worse in the mornings when he wakes.  He states that he does have continued intermittent hoarseness.  He notes that he has diminished throat clearing and cough.       Past Medical History:   Diagnosis Date    Anticoagulant long-term use     Bowel obstruction     BPH (benign prostatic hyperplasia)     Diabetes mellitus     Diverticulitis     Hyperlipidemia     Hypertension      Social History     Socioeconomic History    Marital status:      Spouse name: Not on file    Number of children: Not on file    Years of education: Not on file    Highest education level: Not on file   Social Needs    Financial resource strain: Not on file    Food insecurity - worry: Not on file    Food insecurity - inability: Not on file    Transportation needs - medical: Not on file    Transportation needs - non-medical: Not on file   Occupational History    Not on file   Tobacco Use    Smoking status: Former Smoker     Packs/day: 1.00     Years: 40.00     Pack years:  40.00    Smokeless tobacco: Never Used   Substance and Sexual Activity    Alcohol use: Yes     Alcohol/week: 1.2 oz     Types: 2 Glasses of wine per week     Comment: drinks occassionally    Drug use: No    Sexual activity: Yes     Partners: Female   Other Topics Concern    Not on file   Social History Narrative    Not on file     Past Surgical History:   Procedure Laterality Date    EYE SURGERY      TONSILLECTOMY       Family History   Problem Relation Age of Onset    Heart disease Father     Hypertension Father     Cancer Mother     Diabetes Paternal Aunt            Review of Systems  General: negative for chills, fever or weight loss  Psychological: negative for mood changes or depression  Ophthalmic: negative for blurry vision, photophobia or eye pain  ENT: see HPI  Respiratory: no cough, shortness of breath, or wheezing  Cardiovascular: no chest pain or dyspnea on exertion  Gastrointestinal: no abdominal pain, change in bowel habits, or black/ bloody stools  Musculoskeletal: negative for gait disturbance or muscular weakness  Neurological: no syncope or seizures; no ataxia  Dermatological: negative for puritis,  rash and jaundice  Hematologic/lymphatic: no easy bruising, no new lumps or bumps      Physical Exam:    Vitals:    11/29/18 1046   BP: 139/76   Pulse: 81       Constitutional: Well appearing / communicating without difficutly.  NAD.  Eyes: EOM I Bilaterally  Head/Face: Normocephalic.  Negative paranasal sinus pressure/tenderness.  Salivary glands WNL.  House Brackmann I Bilaterally.    Right Ear: Auricle normal appearance. External Auditory Canal within normal limits no lesions or masses,TM w/o masses/lesions/perforations. TM mobility noted.   Left Ear: Auricle normal appearance. External Auditory Canal within normal limits no lesions or masses,TM w/o masses/lesions/perforations. TM mobility noted.  Nose: Nasal septal deviation to the right with nasal septal spur. Inferior Turbinates 3+  bilaterally. No septal perforation. No masses/lesions. External nasal skin appears normal without masses/lesions.  Oral Cavity: Gingiva/lips within normal limits.  Dentition/gingiva healthy appearing. Mucus membranes moist. Floor of mouth soft, no masses palpated. Oral Tongue mobile. Hard Palate appears normal.    Oropharynx: Base of tongue appears normal. No masses/lesions noted. Tonsillar fossa/pharyngeal wall without lesions. Posterior oropharynx WNL.  Soft palate without masses. Midline uvula.   Neck/Lymphatic: No LAD I-VI bilaterally.  No thyromegaly.  No masses noted on exam.    Mirror laryngoscopy/nasopharyngoscopy: Active gag reflex.  Unable to perform.    Neuro/Psychiatric: AOx3.  Normal mood and affect.   Cardiovascular: Normal carotid pulses bilaterally, no increasing jugular venous distention noted at cervical region bilaterally.    Respiratory: Normal respiratory effort, no stridor, no retractions noted.    See separate procedure note for FFL.     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 Hoarseness of voice R49.0 784.42    3. Laryngopharyngeal reflux (LPR) K21.9 478.79    4. Nasal septal deviation J34.2 470    5. 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 Diagnoses of Hoarseness of voice, Laryngopharyngeal reflux (LPR), Nasal septal deviation, and Non-seasonal allergic rhinitis due to other allergic trigger were also pertinent to this visit.      Plan:  No orders of the defined types were placed in this encounter.    AR:  Continue Flonase and Xyzal. Add Atrovent nasal prn.     Discontinue Prilosec 20 mg qam. Continue taking Zantac 300mg qhs .  I also reitereated that the patient avoid eating within 3 hours of going to bed at night and that the patient place a 2 x 4 underneath the head board of the bed to elevate the head of bed very subtly and optimize the impact of gravity on the  potential reflux. Avoid alcohol, caffeine, tobacco, tomato sauce, spicy foods, fried food, and chocolate.     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  Presbylarynx describes age-related changes to the soft tissues of the larynx including the loss of vocal fold tone and elasticity and bowing of the vocal cords.  This can affect voice quality including hoarseness, breathy voice, reduced voice volume, and unstable pitch.  I would recommend speech therapy when the patient is motivated.    Follow up in 3 months to reassess progress with treatment regimen.     This visit was 25 minutes in duration, with over 50% of the time spent in direct face-to-face counseling regarding the issues outlined above.      Bettina Byers MD

## 2018-11-29 NOTE — PROCEDURES
Procedures       Due to indication in patient's history, presentation or risk factors,  a fiber optic exam was performed.    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     PROCEDURE:  After verbal consent was obtained, the flexible scope was passed through the patient's nasal cavity without difficulty.  The nasopharynx (adenoid pad) and eustachian tube orifices were first visualized and were found to be normal, without masses or irregularity.  The posterior pharyngeal wall and base of tongue were then examined and no mass or irregular tissue was seen.  The scope was then advanced to the larynx, and the epiglottis, valleculae, and piriform sinuses were normal, without masses or mucosal irregularity.  The false vocal folds and true vocal folds were then examined and were found to have normal mobility (full abduction and adduction) and no masses or mucosal irregularity was seen. 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  The interartyenoid area had mild edema and erythema consistent with reflux.     Bettina Byers  11/29/2018

## 2018-12-13 ENCOUNTER — OFFICE VISIT (OUTPATIENT)
Dept: CARDIOLOGY | Facility: CLINIC | Age: 83
End: 2018-12-13
Payer: MEDICARE

## 2018-12-13 VITALS
WEIGHT: 174.19 LBS | HEART RATE: 65 BPM | BODY MASS INDEX: 24.94 KG/M2 | DIASTOLIC BLOOD PRESSURE: 65 MMHG | HEIGHT: 70 IN | SYSTOLIC BLOOD PRESSURE: 131 MMHG

## 2018-12-13 DIAGNOSIS — E78.00 HYPERCHOLESTEREMIA: ICD-10-CM

## 2018-12-13 DIAGNOSIS — E11.8 TYPE 2 DIABETES MELLITUS WITH COMPLICATION, WITHOUT LONG-TERM CURRENT USE OF INSULIN: ICD-10-CM

## 2018-12-13 DIAGNOSIS — I10 ESSENTIAL HYPERTENSION: Primary | ICD-10-CM

## 2018-12-13 DIAGNOSIS — N18.30 CKD (CHRONIC KIDNEY DISEASE) STAGE 3, GFR 30-59 ML/MIN: ICD-10-CM

## 2018-12-13 PROCEDURE — 99213 OFFICE O/P EST LOW 20 MIN: CPT | Mod: S$GLB,,, | Performed by: INTERNAL MEDICINE

## 2018-12-13 PROCEDURE — 99999 PR PBB SHADOW E&M-EST. PATIENT-LVL II: CPT | Mod: PBBFAC,,, | Performed by: INTERNAL MEDICINE

## 2018-12-13 PROCEDURE — 93000 ELECTROCARDIOGRAM COMPLETE: CPT | Mod: S$GLB,,, | Performed by: INTERNAL MEDICINE

## 2018-12-13 PROCEDURE — 1101F PT FALLS ASSESS-DOCD LE1/YR: CPT | Mod: CPTII,S$GLB,, | Performed by: INTERNAL MEDICINE

## 2018-12-13 NOTE — PROGRESS NOTES
Subjective:      Patient ID: Husam Connolly is a 87 y.o. male.    Chief Complaint: Follow-up (Hypertension) and Shortness of Breath (SOB with little activity)    HPI:  No exercise for the past 2 or 3 months.    Has a family reunion scheduled    Review of Systems   Cardiovascular: Positive for dyspnea on exertion (chronic, stable,  infrequently uses MDI prn). Negative for chest pain, claudication, irregular heartbeat, leg swelling, near-syncope, orthopnea, palpitations and syncope.      Has a little shortness of breath after climbing 16 steps chronically    Glucose 100-130    Urinates frequently  Nocturia times one.  Past Medical History:   Diagnosis Date    Anticoagulant long-term use     Bowel obstruction     BPH (benign prostatic hyperplasia)     Diabetes mellitus     Diverticulitis     Hyperlipidemia     Hypertension         Past Surgical History:   Procedure Laterality Date    EYE SURGERY      TONSILLECTOMY         Family History   Problem Relation Age of Onset    Heart disease Father     Hypertension Father     Cancer Mother     Diabetes Paternal Aunt        Social History     Socioeconomic History    Marital status:      Spouse name: None    Number of children: None    Years of education: None    Highest education level: None   Social Needs    Financial resource strain: None    Food insecurity - worry: None    Food insecurity - inability: None    Transportation needs - medical: None    Transportation needs - non-medical: None   Occupational History    None   Tobacco Use    Smoking status: Former Smoker     Packs/day: 1.00     Years: 40.00     Pack years: 40.00    Smokeless tobacco: Never Used   Substance and Sexual Activity    Alcohol use: Yes     Alcohol/week: 1.2 oz     Types: 2 Glasses of wine per week     Comment: drinks occassionally    Drug use: No    Sexual activity: Yes     Partners: Female   Other Topics Concern    None   Social History Narrative    None  "      Current Outpatient Medications on File Prior to Visit   Medication Sig Dispense Refill    albuterol 90 mcg/actuation inhaler Inhale 2 puffs into the lungs every 6 (six) hours as needed for Wheezing. Rescue 1 Inhaler 11    aspirin (ECOTRIN) 81 MG EC tablet Take 81 mg by mouth once daily.      atorvastatin (LIPITOR) 40 MG tablet TAKE 1 TABLET BY MOUTH DAILY 90 tablet 3    finasteride (PROSCAR) 5 mg tablet TAKE 1 TABLET BY MOUTH EVERY DAY 90 tablet 1    fluticasone (FLONASE) 50 mcg/actuation nasal spray 2 sprays (100 mcg total) by Each Nare route once daily. 1 Bottle 12    levocetirizine (XYZAL) 5 MG tablet Take 1 tablet (5 mg total) by mouth every evening. 30 tablet 11    multivitamin capsule Take 1 capsule by mouth once daily.      ranitidine (ZANTAC) 300 MG tablet Take 1 tablet (300 mg total) by mouth every evening. 30 tablet 11    valsartan-hydrochlorothiazide (DIOVAN-HCT) 320-12.5 mg per tablet TAKE 1 TABLET BY MOUTH EVERY DAY 90 tablet 3    [DISCONTINUED] ipratropium (ATROVENT) 0.03 % nasal spray 2 sprays by Nasal route 2 (two) times daily. 30 mL 3     No current facility-administered medications on file prior to visit.        Review of patient's allergies indicates:   Allergen Reactions    Solarcaine [benzocaine-triclosan]     Sulfa (sulfonamide antibiotics)     Hytrin [terazosin] Rash    Smz-tmp ds [sulfamethoxazole-trimethoprim] Rash     Objective:     Vitals:    12/13/18 1338   BP: 131/65   BP Location: Right arm   Patient Position: Sitting   BP Method: Large (Automatic)   Pulse: 65   Weight: 79 kg (174 lb 3.2 oz)   Height: 5' 10" (1.778 m)        Physical Exam   Constitutional: He is oriented to person, place, and time. He appears well-developed and well-nourished. No distress.   Eyes: No scleral icterus.   Neck: No JVD present. Carotid bruit is not present.   Cardiovascular: Regular rhythm. Exam reveals no gallop and no friction rub.   Murmur (II/VI systolic) heard.  Pulmonary/Chest: " Effort normal and breath sounds normal. No respiratory distress.   Musculoskeletal: He exhibits no edema.   Neurological: He is alert and oriented to person, place, and time.   Skin: Skin is warm and dry. He is not diaphoretic.   Psychiatric: He has a normal mood and affect. His behavior is normal. Judgment and thought content normal.   Vitals reviewed.     March lab--creat 1.48; HgbA1C 5.8    Wt down 1 lb    HDL 70      LDL 81  In March    ECG--NSR, WNL  Assessment:     1. Essential hypertension    2. Hypercholesteremia    3. Type 2 diabetes mellitus with complication, without long-term current use of insulin    4. CKD (chronic kidney disease) stage 3, GFR 30-59 ml/min      Plan:   Husam was seen today for follow-up and shortness of breath.    Diagnoses and all orders for this visit:    Essential hypertension  -     IN OFFICE EKG 12-LEAD (to Muse)    Hypercholesteremia    Type 2 diabetes mellitus with complication, without long-term current use of insulin    CKD (chronic kidney disease) stage 3, GFR 30-59 ml/min     Same meds    F/u with urologist    RTC 6 months    Consider adding tamsulosin    Consider repeating ultrasound of abdomen    Follow-up in about 6 months (around 6/13/2019).

## 2019-03-06 ENCOUNTER — OFFICE VISIT (OUTPATIENT)
Dept: OTOLARYNGOLOGY | Facility: CLINIC | Age: 84
End: 2019-03-06
Payer: MEDICARE

## 2019-03-06 VITALS
DIASTOLIC BLOOD PRESSURE: 76 MMHG | SYSTOLIC BLOOD PRESSURE: 157 MMHG | BODY MASS INDEX: 24.67 KG/M2 | HEART RATE: 74 BPM | TEMPERATURE: 97 F | WEIGHT: 171.94 LBS

## 2019-03-06 DIAGNOSIS — J38.7 PRESBYLARYNX: Primary | ICD-10-CM

## 2019-03-06 DIAGNOSIS — R49.0 HOARSENESS OF VOICE: ICD-10-CM

## 2019-03-06 DIAGNOSIS — J31.0 CHRONIC RHINITIS: ICD-10-CM

## 2019-03-06 DIAGNOSIS — K21.9 LARYNGOPHARYNGEAL REFLUX (LPR): ICD-10-CM

## 2019-03-06 PROCEDURE — 31575 PR LARYNGOSCOPY, FLEXIBLE; DIAGNOSTIC: ICD-10-PCS | Mod: S$GLB,,, | Performed by: OTOLARYNGOLOGY

## 2019-03-06 PROCEDURE — 99999 PR PBB SHADOW E&M-EST. PATIENT-LVL IV: CPT | Mod: PBBFAC,,, | Performed by: OTOLARYNGOLOGY

## 2019-03-06 PROCEDURE — 99213 OFFICE O/P EST LOW 20 MIN: CPT | Mod: 25,S$GLB,, | Performed by: OTOLARYNGOLOGY

## 2019-03-06 PROCEDURE — 1101F PR PT FALLS ASSESS DOC 0-1 FALLS W/OUT INJ PAST YR: ICD-10-PCS | Mod: CPTII,S$GLB,, | Performed by: OTOLARYNGOLOGY

## 2019-03-06 PROCEDURE — 99999 PR PBB SHADOW E&M-EST. PATIENT-LVL IV: ICD-10-PCS | Mod: PBBFAC,,, | Performed by: OTOLARYNGOLOGY

## 2019-03-06 PROCEDURE — 31575 DIAGNOSTIC LARYNGOSCOPY: CPT | Mod: S$GLB,,, | Performed by: OTOLARYNGOLOGY

## 2019-03-06 PROCEDURE — 99213 PR OFFICE/OUTPT VISIT, EST, LEVL III, 20-29 MIN: ICD-10-PCS | Mod: 25,S$GLB,, | Performed by: OTOLARYNGOLOGY

## 2019-03-06 PROCEDURE — 1101F PT FALLS ASSESS-DOCD LE1/YR: CPT | Mod: CPTII,S$GLB,, | Performed by: OTOLARYNGOLOGY

## 2019-03-06 RX ORDER — FAMCICLOVIR 500 MG/1
500 TABLET ORAL 3 TIMES DAILY PRN
Refills: 11 | COMMUNITY
Start: 2019-01-20 | End: 2020-03-16

## 2019-03-06 NOTE — PROGRESS NOTES
Chief Complaint   Patient presents with    Follow-up   .     HPI:Husam Connolly is a 87 y.o. male who has been referred by Dr. Cox for a 6 months history of hoarseness. His voice is progressively worsening over this time. There are pitch breaks or cracks. There is vocal fatigue. He denies dysphagia, odynophagia, throat pain, and otalgia.  There is no hemoptysis or hematemesis. He is breathing well.     He admits to throat clearing and cough. He denies heartburn and reflux.    He notes that he has post-nasal drip and nasal congestion.  This has been present for years. He states that he has not been on medication for this.  He states that he has not been on medication for this as of yet.       Interval HPI 3/6/2019:   F/u LPR/Chronic rhinitis. He has been on Flonase, Xyzal, and atrovent. He finds that this has helped improved his rhinorrhea.   He states that he has improved nasal congestion and rhinorrhea which used to be worse in the mornings. This is now much better.   He has been using Ranitidine as prescribed. He notes  that he does have continued intermittent hoarseness.  He notes that he has diminished throat clearing and cough.       Past Medical History:   Diagnosis Date    Anticoagulant long-term use     Bowel obstruction     BPH (benign prostatic hyperplasia)     Diabetes mellitus     Diverticulitis     Hyperlipidemia     Hypertension      Social History     Socioeconomic History    Marital status:      Spouse name: Not on file    Number of children: Not on file    Years of education: Not on file    Highest education level: Not on file   Social Needs    Financial resource strain: Not on file    Food insecurity - worry: Not on file    Food insecurity - inability: Not on file    Transportation needs - medical: Not on file    Transportation needs - non-medical: Not on file   Occupational History    Not on file   Tobacco Use    Smoking status: Former Smoker     Packs/day: 1.00      Years: 40.00     Pack years: 40.00    Smokeless tobacco: Never Used   Substance and Sexual Activity    Alcohol use: Yes     Alcohol/week: 1.2 oz     Types: 2 Glasses of wine per week     Comment: drinks occassionally    Drug use: No    Sexual activity: Yes     Partners: Female   Other Topics Concern    Not on file   Social History Narrative    Not on file     Past Surgical History:   Procedure Laterality Date    EYE SURGERY      TONSILLECTOMY       Family History   Problem Relation Age of Onset    Heart disease Father     Hypertension Father     Cancer Mother     Diabetes Paternal Aunt            Review of Systems  General: negative for chills, fever or weight loss  Psychological: negative for mood changes or depression  Ophthalmic: negative for blurry vision, photophobia or eye pain  ENT: see HPI  Respiratory: no cough, shortness of breath, or wheezing  Cardiovascular: no chest pain or dyspnea on exertion  Gastrointestinal: no abdominal pain, change in bowel habits, or black/ bloody stools  Musculoskeletal: negative for gait disturbance or muscular weakness  Neurological: no syncope or seizures; no ataxia  Dermatological: negative for puritis,  rash and jaundice  Hematologic/lymphatic: no easy bruising, no new lumps or bumps      Physical Exam:    Vitals:    03/06/19 1001   BP: (!) 157/76   Pulse: 74   Temp: 97 °F (36.1 °C)       Constitutional: Well appearing / communicating without difficutly.  NAD.  Eyes: EOM I Bilaterally  Head/Face: Normocephalic.  Negative paranasal sinus pressure/tenderness.  Salivary glands WNL.  House Brackmann I Bilaterally.    Right Ear: Auricle normal appearance. External Auditory Canal within normal limits no lesions or masses,TM w/o masses/lesions/perforations. TM mobility noted.   Left Ear: Auricle normal appearance. External Auditory Canal within normal limits no lesions or masses,TM w/o masses/lesions/perforations. TM mobility noted.  Nose: Nasal septal deviation to  the right with nasal septal spur. Inferior Turbinates 3+ bilaterally. No septal perforation. No masses/lesions. External nasal skin appears normal without masses/lesions.  Oral Cavity: Gingiva/lips within normal limits.  Dentition/gingiva healthy appearing. Mucus membranes moist. Floor of mouth soft, no masses palpated. Oral Tongue mobile. Hard Palate appears normal.    Oropharynx: Base of tongue appears normal. No masses/lesions noted. Tonsillar fossa/pharyngeal wall without lesions. Posterior oropharynx WNL.  Soft palate without masses. Midline uvula.   Neck/Lymphatic: No LAD I-VI bilaterally.  No thyromegaly.  No masses noted on exam.    Mirror laryngoscopy/nasopharyngoscopy: Active gag reflex.  Unable to perform.    Neuro/Psychiatric: AOx3.  Normal mood and affect.   Cardiovascular: Normal carotid pulses bilaterally, no increasing jugular venous distention noted at cervical region bilaterally.    Respiratory: Normal respiratory effort, no stridor, no retractions noted.    See separate procedure note for FFL.     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 Hoarseness of voice R49.0 784.42    4. Chronic rhinitis J31.0 472.0      The primary encounter diagnosis was Presbylarynx. Diagnoses of Laryngopharyngeal reflux (LPR), Hoarseness of voice, and Chronic rhinitis were also pertinent to this visit.      Plan:  No orders of the defined types were placed in this encounter.    Continue Flonase, Xyzal, and Atrovent nasal prn.     Continue taking Zantac 300mg qhs .  I also reitereated that the patient avoid eating within 3 hours of going to bed at night and that the patient place a 2 x 4 underneath the head board of the bed to elevate the head of bed very subtly and optimize the impact of gravity on the potential reflux. Avoid alcohol, caffeine, tobacco, tomato sauce, spicy foods, fried food, and chocolate.     We reviewed the findings from  his recent scope examination and that the findings are consistent with presbylarynx.  .  I would recommend speech therapy when the patient is motivated. He declines at this time.     Follow up in 4-6 months to reassess progress with treatment regimen.     This visit was 25 minutes in duration, with over 50% of the time spent in direct face-to-face counseling regarding the issues outlined above.      Bettina Byers MD

## 2019-03-06 NOTE — PROCEDURES
Procedures       Due to indication in patient's history, presentation or risk factors,  a fiber optic exam was performed.    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     PROCEDURE:  After verbal consent was obtained, the flexible scope was passed through the patient's nasal cavity without difficulty.  The nasopharynx (adenoid pad) and eustachian tube orifices were first visualized and were found to be normal, without masses or irregularity.  The posterior pharyngeal wall and base of tongue were then examined and no mass or irregular tissue was seen.  The scope was then advanced to the larynx, and the epiglottis, valleculae, and piriform sinuses were normal, without masses or mucosal irregularity.  The false vocal folds and true vocal folds were then examined and were found to have normal mobility (full abduction and adduction) and no masses or mucosal irregularity was seen. 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  The interartyenoid area had mild edema and erythema consistent with reflux.     Bettina Byers  3/6/2019

## 2019-03-18 RX ORDER — LEVOCETIRIZINE DIHYDROCHLORIDE 5 MG/1
5 TABLET, FILM COATED ORAL NIGHTLY
Qty: 30 TABLET | Refills: 11 | Status: SHIPPED | OUTPATIENT
Start: 2019-03-18 | End: 2019-07-29

## 2019-03-18 NOTE — TELEPHONE ENCOUNTER
----- Message from Keri Navarro sent at 3/18/2019  2:36 PM CDT -----  Contact: [phone]/self  Patient is requesting to have a refill of   ranitidine (ZANTAC) 300 MG tablet. Take 1 tablet (300 mg total) by mouth every evening. - Oral  levocetirizine (XYZAL) 5 MG tablet.  Take 1 tablet (5 mg total) by mouth every evening. - Oral    sent to [id number] - [address] [address] AT [facility]. Please advise.      Your strep test is negative. Your symptoms are likely caused by a viral syndrome.  Increase your water intake in order to keep the secretions/mucous in your upper respiratory tract thin. Get plenty of rest and wash your hands well. Follow up if symptoms fail to improve or worsen.

## 2019-04-07 DIAGNOSIS — E78.5 HLD (HYPERLIPIDEMIA): ICD-10-CM

## 2019-04-08 RX ORDER — ATORVASTATIN CALCIUM 40 MG/1
TABLET, FILM COATED ORAL
Qty: 90 TABLET | Refills: 3 | Status: SHIPPED | OUTPATIENT
Start: 2019-04-08 | End: 2020-04-23

## 2019-04-15 DIAGNOSIS — N40.0 BENIGN NON-NODULAR PROSTATIC HYPERPLASIA WITHOUT LOWER URINARY TRACT SYMPTOMS: ICD-10-CM

## 2019-04-15 RX ORDER — FINASTERIDE 5 MG/1
TABLET, FILM COATED ORAL
Qty: 90 TABLET | Refills: 1 | Status: SHIPPED | OUTPATIENT
Start: 2019-04-15 | End: 2019-10-12 | Stop reason: SDUPTHER

## 2019-04-29 DIAGNOSIS — I10 ESSENTIAL HYPERTENSION: ICD-10-CM

## 2019-04-29 RX ORDER — VALSARTAN AND HYDROCHLOROTHIAZIDE 320; 12.5 MG/1; MG/1
TABLET, FILM COATED ORAL
Qty: 90 TABLET | Refills: 3 | Status: SHIPPED | OUTPATIENT
Start: 2019-04-29 | End: 2019-11-19 | Stop reason: DRUGHIGH

## 2019-05-14 ENCOUNTER — OFFICE VISIT (OUTPATIENT)
Dept: CARDIOLOGY | Facility: CLINIC | Age: 84
End: 2019-05-14
Payer: MEDICARE

## 2019-05-14 VITALS
DIASTOLIC BLOOD PRESSURE: 65 MMHG | HEIGHT: 70 IN | HEART RATE: 73 BPM | WEIGHT: 175.63 LBS | BODY MASS INDEX: 25.14 KG/M2 | SYSTOLIC BLOOD PRESSURE: 130 MMHG

## 2019-05-14 DIAGNOSIS — E78.00 HYPERCHOLESTEREMIA: ICD-10-CM

## 2019-05-14 DIAGNOSIS — N18.30 CKD (CHRONIC KIDNEY DISEASE) STAGE 3, GFR 30-59 ML/MIN: ICD-10-CM

## 2019-05-14 DIAGNOSIS — R60.0 LOCALIZED EDEMA: ICD-10-CM

## 2019-05-14 DIAGNOSIS — I10 ESSENTIAL HYPERTENSION: Primary | ICD-10-CM

## 2019-05-14 PROCEDURE — 93000 ELECTROCARDIOGRAM COMPLETE: CPT | Mod: S$GLB,,, | Performed by: INTERNAL MEDICINE

## 2019-05-14 PROCEDURE — 99213 PR OFFICE/OUTPT VISIT, EST, LEVL III, 20-29 MIN: ICD-10-PCS | Mod: S$GLB,,, | Performed by: INTERNAL MEDICINE

## 2019-05-14 PROCEDURE — 99999 PR PBB SHADOW E&M-EST. PATIENT-LVL III: CPT | Mod: PBBFAC,,, | Performed by: INTERNAL MEDICINE

## 2019-05-14 PROCEDURE — 93000 EKG 12-LEAD: ICD-10-PCS | Mod: S$GLB,,, | Performed by: INTERNAL MEDICINE

## 2019-05-14 PROCEDURE — 99213 OFFICE O/P EST LOW 20 MIN: CPT | Mod: S$GLB,,, | Performed by: INTERNAL MEDICINE

## 2019-05-14 PROCEDURE — 1101F PT FALLS ASSESS-DOCD LE1/YR: CPT | Mod: CPTII,S$GLB,, | Performed by: INTERNAL MEDICINE

## 2019-05-14 PROCEDURE — 1101F PR PT FALLS ASSESS DOC 0-1 FALLS W/OUT INJ PAST YR: ICD-10-PCS | Mod: CPTII,S$GLB,, | Performed by: INTERNAL MEDICINE

## 2019-05-14 PROCEDURE — 99999 PR PBB SHADOW E&M-EST. PATIENT-LVL III: ICD-10-PCS | Mod: PBBFAC,,, | Performed by: INTERNAL MEDICINE

## 2019-05-14 RX ORDER — TAMSULOSIN HYDROCHLORIDE 0.4 MG/1
0.4 CAPSULE ORAL NIGHTLY
Refills: 3 | COMMUNITY
Start: 2019-04-07 | End: 2024-03-04

## 2019-05-14 NOTE — PROGRESS NOTES
Subjective:      Patient ID: Husam Connolly is a 87 y.o. male.    Chief Complaint: Follow-up (Hypertension)    HPI:  Chronically short of breath with exertion such as climbing stairs.    Dr Ivy changed the MDI to [place] with some improvement.    Review of Systems   Cardiovascular: Positive for dyspnea on exertion and leg swelling (mild, intermittent). Negative for chest pain, claudication, irregular heartbeat, near-syncope, orthopnea, palpitations and syncope.      Pt feels a little dizziness when moving his head quicly.    Pt saw a urologist who prescribed tamsulosin which has helped    Past Medical History:   Diagnosis Date    Anticoagulant long-term use     Bowel obstruction     BPH (benign prostatic hyperplasia)     Diabetes mellitus     Diverticulitis     Hyperlipidemia     Hypertension         Past Surgical History:   Procedure Laterality Date    EYE SURGERY      TONSILLECTOMY         Family History   Problem Relation Age of Onset    Heart disease Father     Hypertension Father     Cancer Mother     Diabetes Paternal Aunt        Social History     Socioeconomic History    Marital status:      Spouse name: Not on file    Number of children: Not on file    Years of education: Not on file    Highest education level: Not on file   Occupational History    Not on file   Social Needs    Financial resource strain: Not on file    Food insecurity:     Worry: Not on file     Inability: Not on file    Transportation needs:     Medical: Not on file     Non-medical: Not on file   Tobacco Use    Smoking status: Former Smoker     Packs/day: 1.00     Years: 40.00     Pack years: 40.00    Smokeless tobacco: Never Used   Substance and Sexual Activity    Alcohol use: Yes     Alcohol/week: 1.2 oz     Types: 2 Glasses of wine per week     Comment: drinks occassionally    Drug use: No    Sexual activity: Yes     Partners: Female   Lifestyle    Physical activity:     Days per week: Not on file      Minutes per session: Not on file    Stress: Not on file   Relationships    Social connections:     Talks on phone: Not on file     Gets together: Not on file     Attends [employer] service: Not on file     Active member of club or organization: Not on file     Attends meetings of clubs or organizations: Not on file     Relationship status: Not on file   Other Topics Concern    Not on file   Social History Narrative    Not on file       Current Outpatient Medications on File Prior to Visit   Medication Sig Dispense Refill    albuterol 90 mcg/actuation inhaler Inhale 2 puffs into the lungs every 6 (six) hours as needed for Wheezing. Rescue 1 Inhaler 11    aspirin (ECOTRIN) 81 MG EC tablet Take 81 mg by mouth once daily.      atorvastatin (LIPITOR) 40 MG tablet TAKE 1 TABLET BY MOUTH EVERY DAY 90 tablet 3    famciclovir (FAMVIR) 500 MG tablet Take 500 mg by mouth 3 (three) times daily as needed.  11    finasteride (PROSCAR) 5 mg tablet TAKE 1 TABLET BY MOUTH EVERY DAY 90 tablet 1    ipratropium (ATROVENT HFA) 17 mcg/actuation inhaler Inhale 2 sprays into the lungs once daily.      levocetirizine (XYZAL) 5 MG tablet Take 1 tablet (5 mg total) by mouth every evening. 30 tablet 11    multivitamin capsule Take 1 capsule by mouth once daily.      ranitidine (ZANTAC) 300 MG tablet Take 1 tablet (300 mg total) by mouth every evening. 30 tablet 11    tamsulosin (FLOMAX) 0.4 mg Cap Take 1 capsule by mouth nightly.  3    umeclidinium-vilanterol (ANORO ELLIPTA) 62.5-25 mcg/actuation DsDv       valsartan-hydrochlorothiazide (DIOVAN-HCT) 320-12.5 mg per tablet TAKE 1 TABLET BY MOUTH EVERY DAY 90 tablet 3    [DISCONTINUED] fluticasone (FLONASE) 50 mcg/actuation nasal spray 2 sprays (100 mcg total) by Each Nare route once daily. 1 Bottle 12     No current facility-administered medications on file prior to visit.        Review of patient's allergies indicates:   Allergen Reactions    Solarcaine  "[benzocaine-triclosan]     Sulfa (sulfonamide antibiotics)     Hytrin [terazosin] Rash    Smz-tmp ds [sulfamethoxazole-trimethoprim] Rash     Objective:     Vitals:    05/14/19 1305 05/14/19 1337   BP: (!) 150/75 130/65   BP Location: Left arm Left arm   Patient Position: Sitting Sitting   BP Method: Medium (Automatic)    Pulse: 73    Weight: 79.6 kg (175 lb 9.5 oz)    Height: 5' 10" (1.778 m)         Physical Exam   Constitutional: He is oriented to person, place, and time. He appears well-developed and well-nourished. No distress.   Eyes: No scleral icterus.   Neck: No JVD present. Carotid bruit is not present.   Cardiovascular: Regular rhythm and normal heart sounds. Exam reveals no gallop and no friction rub.   No murmur heard.  Pulmonary/Chest: Effort normal and breath sounds normal. No respiratory distress.   Musculoskeletal: He exhibits edema (trace bilateral).   Neurological: He is alert and oriented to person, place, and time.   Skin: Skin is warm and dry. He is not diaphoretic.   Psychiatric: He has a normal mood and affect. His behavior is normal. Judgment and thought content normal.   Vitals reviewed.   wt up 10 lbs over past year    ECG: NSR, WNL    LDL 93  HDL 64  glu 136  Creat 1.49  BUN 28  Na 140  K 4.3  Assessment:     1. Essential hypertension    2. Hypercholesteremia    3. Localized edema    4. CKD (chronic kidney disease) stage 3, GFR 30-59 ml/min      Plan:   Husam was seen today for follow-up.    Diagnoses and all orders for this visit:    Essential hypertension  -     IN OFFICE EKG 12-LEAD (to Muse)    Hypercholesteremia    Localized edema    CKD (chronic kidney disease) stage 3, GFR 30-59 ml/min     Same meds    F/u with Dr Biju BRYSON 6 months    ADA diet discussed    Walk more    Follow up in about 6 months (around 11/14/2019).  "

## 2019-06-13 ENCOUNTER — HOSPITAL ENCOUNTER (INPATIENT)
Facility: HOSPITAL | Age: 84
LOS: 11 days | Discharge: NURSING, REHAB OR LONG-TERM CARE | DRG: 330 | End: 2019-06-24
Attending: EMERGENCY MEDICINE | Admitting: SURGERY
Payer: MEDICARE

## 2019-06-13 DIAGNOSIS — E11.8 TYPE 2 DIABETES MELLITUS WITH COMPLICATION, WITHOUT LONG-TERM CURRENT USE OF INSULIN: ICD-10-CM

## 2019-06-13 DIAGNOSIS — K56.609 INTESTINAL OBSTRUCTION, UNSPECIFIED CAUSE, UNSPECIFIED WHETHER PARTIAL OR COMPLETE: ICD-10-CM

## 2019-06-13 DIAGNOSIS — K57.32 DIVERTICULITIS OF LARGE INTESTINE WITHOUT PERFORATION OR ABSCESS WITHOUT BLEEDING: ICD-10-CM

## 2019-06-13 DIAGNOSIS — R06.2 WHEEZING: ICD-10-CM

## 2019-06-13 DIAGNOSIS — J30.1 ALLERGIC RHINITIS DUE TO POLLEN, UNSPECIFIED SEASONALITY: ICD-10-CM

## 2019-06-13 DIAGNOSIS — K56.609 SMALL BOWEL OBSTRUCTION: Primary | ICD-10-CM

## 2019-06-13 DIAGNOSIS — Z79.01 ANTICOAGULANT LONG-TERM USE: ICD-10-CM

## 2019-06-13 DIAGNOSIS — K57.92 DIVERTICULITIS: ICD-10-CM

## 2019-06-13 DIAGNOSIS — R60.0 LOCALIZED EDEMA: ICD-10-CM

## 2019-06-13 DIAGNOSIS — R10.84 GENERALIZED ABDOMINAL PAIN: ICD-10-CM

## 2019-06-13 DIAGNOSIS — S43.422A SPRAIN OF LEFT ROTATOR CUFF CAPSULE, INITIAL ENCOUNTER: ICD-10-CM

## 2019-06-13 DIAGNOSIS — N40.0 BENIGN PROSTATIC HYPERPLASIA, UNSPECIFIED WHETHER LOWER URINARY TRACT SYMPTOMS PRESENT: ICD-10-CM

## 2019-06-13 DIAGNOSIS — N18.30 CKD (CHRONIC KIDNEY DISEASE) STAGE 3, GFR 30-59 ML/MIN: ICD-10-CM

## 2019-06-13 DIAGNOSIS — E78.00 HYPERCHOLESTEREMIA: ICD-10-CM

## 2019-06-13 DIAGNOSIS — I10 ESSENTIAL HYPERTENSION: ICD-10-CM

## 2019-06-13 LAB
ALBUMIN SERPL BCP-MCNC: 4.2 G/DL (ref 3.5–5.2)
ALP SERPL-CCNC: 75 U/L (ref 55–135)
ALT SERPL W/O P-5'-P-CCNC: 19 U/L (ref 10–44)
ANION GAP SERPL CALC-SCNC: 16 MMOL/L (ref 8–16)
AST SERPL-CCNC: 23 U/L (ref 10–40)
BACTERIA #/AREA URNS HPF: ABNORMAL /HPF
BASOPHILS # BLD AUTO: 0.01 K/UL (ref 0–0.2)
BASOPHILS NFR BLD: 0.1 % (ref 0–1.9)
BILIRUB SERPL-MCNC: 1.3 MG/DL (ref 0.1–1)
BILIRUB UR QL STRIP: ABNORMAL
BUN SERPL-MCNC: 22 MG/DL (ref 8–23)
CALCIUM SERPL-MCNC: 10.5 MG/DL (ref 8.7–10.5)
CHLORIDE SERPL-SCNC: 99 MMOL/L (ref 95–110)
CLARITY UR: CLEAR
CO2 SERPL-SCNC: 25 MMOL/L (ref 23–29)
COLOR UR: YELLOW
CREAT SERPL-MCNC: 1.7 MG/DL (ref 0.5–1.4)
DIFFERENTIAL METHOD: ABNORMAL
EOSINOPHIL # BLD AUTO: 0.1 K/UL (ref 0–0.5)
EOSINOPHIL NFR BLD: 0.5 % (ref 0–8)
ERYTHROCYTE [DISTWIDTH] IN BLOOD BY AUTOMATED COUNT: 13.4 % (ref 11.5–14.5)
EST. GFR  (AFRICAN AMERICAN): 41 ML/MIN/1.73 M^2
EST. GFR  (NON AFRICAN AMERICAN): 35 ML/MIN/1.73 M^2
GLUCOSE SERPL-MCNC: 168 MG/DL (ref 70–110)
GLUCOSE UR QL STRIP: NEGATIVE
HCT VFR BLD AUTO: 50.3 % (ref 40–54)
HGB BLD-MCNC: 16.7 G/DL (ref 14–18)
HGB UR QL STRIP: ABNORMAL
HYALINE CASTS #/AREA URNS LPF: ABNORMAL /LPF
KETONES UR QL STRIP: ABNORMAL
LEUKOCYTE ESTERASE UR QL STRIP: NEGATIVE
LIPASE SERPL-CCNC: 13 U/L (ref 4–60)
LYMPHOCYTES # BLD AUTO: 1.3 K/UL (ref 1–4.8)
LYMPHOCYTES NFR BLD: 11.7 % (ref 18–48)
MCH RBC QN AUTO: 28.8 PG (ref 27–31)
MCHC RBC AUTO-ENTMCNC: 33.2 G/DL (ref 32–36)
MCV RBC AUTO: 87 FL (ref 82–98)
MICROSCOPIC COMMENT: ABNORMAL
MONOCYTES # BLD AUTO: 0.8 K/UL (ref 0.3–1)
MONOCYTES NFR BLD: 6.7 % (ref 4–15)
NEUTROPHILS # BLD AUTO: 9.3 K/UL (ref 1.8–7.7)
NEUTROPHILS NFR BLD: 80.8 % (ref 38–73)
NITRITE UR QL STRIP: NEGATIVE
PH UR STRIP: 5 [PH] (ref 5–8)
PLATELET # BLD AUTO: 220 K/UL (ref 150–350)
PMV BLD AUTO: 11.2 FL (ref 9.2–12.9)
POTASSIUM SERPL-SCNC: 4 MMOL/L (ref 3.5–5.1)
PROT SERPL-MCNC: 7.7 G/DL (ref 6–8.4)
PROT UR QL STRIP: ABNORMAL
RBC # BLD AUTO: 5.8 M/UL (ref 4.6–6.2)
RBC #/AREA URNS HPF: 1 /HPF (ref 0–4)
SODIUM SERPL-SCNC: 140 MMOL/L (ref 136–145)
SP GR UR STRIP: >=1.03 (ref 1–1.03)
SQUAMOUS #/AREA URNS HPF: 2 /HPF
URN SPEC COLLECT METH UR: ABNORMAL
UROBILINOGEN UR STRIP-ACNC: NEGATIVE EU/DL
WBC # BLD AUTO: 11.48 K/UL (ref 3.9–12.7)
WBC #/AREA URNS HPF: 3 /HPF (ref 0–5)

## 2019-06-13 PROCEDURE — 96361 HYDRATE IV INFUSION ADD-ON: CPT

## 2019-06-13 PROCEDURE — 99285 EMERGENCY DEPT VISIT HI MDM: CPT | Mod: 25

## 2019-06-13 PROCEDURE — 83690 ASSAY OF LIPASE: CPT

## 2019-06-13 PROCEDURE — 25000003 PHARM REV CODE 250: Performed by: EMERGENCY MEDICINE

## 2019-06-13 PROCEDURE — 11000001 HC ACUTE MED/SURG PRIVATE ROOM

## 2019-06-13 PROCEDURE — 81000 URINALYSIS NONAUTO W/SCOPE: CPT

## 2019-06-13 PROCEDURE — 94761 N-INVAS EAR/PLS OXIMETRY MLT: CPT

## 2019-06-13 PROCEDURE — 80053 COMPREHEN METABOLIC PANEL: CPT

## 2019-06-13 PROCEDURE — 63600175 PHARM REV CODE 636 W HCPCS: Performed by: EMERGENCY MEDICINE

## 2019-06-13 PROCEDURE — 96374 THER/PROPH/DIAG INJ IV PUSH: CPT

## 2019-06-13 PROCEDURE — 85025 COMPLETE CBC W/AUTO DIFF WBC: CPT

## 2019-06-13 PROCEDURE — 25000003 PHARM REV CODE 250: Performed by: SURGERY

## 2019-06-13 RX ORDER — SODIUM CHLORIDE, SODIUM LACTATE, POTASSIUM CHLORIDE, CALCIUM CHLORIDE 600; 310; 30; 20 MG/100ML; MG/100ML; MG/100ML; MG/100ML
1000 INJECTION, SOLUTION INTRAVENOUS
Status: COMPLETED | OUTPATIENT
Start: 2019-06-13 | End: 2019-06-13

## 2019-06-13 RX ORDER — SODIUM CHLORIDE 0.9 % (FLUSH) 0.9 %
10 SYRINGE (ML) INJECTION
Status: DISCONTINUED | OUTPATIENT
Start: 2019-06-13 | End: 2019-06-24 | Stop reason: HOSPADM

## 2019-06-13 RX ORDER — METOCLOPRAMIDE HYDROCHLORIDE 5 MG/ML
10 INJECTION INTRAMUSCULAR; INTRAVENOUS
Status: COMPLETED | OUTPATIENT
Start: 2019-06-13 | End: 2019-06-13

## 2019-06-13 RX ORDER — MORPHINE SULFATE 2 MG/ML
2 INJECTION, SOLUTION INTRAMUSCULAR; INTRAVENOUS EVERY 4 HOURS PRN
Status: DISCONTINUED | OUTPATIENT
Start: 2019-06-13 | End: 2019-06-24 | Stop reason: HOSPADM

## 2019-06-13 RX ORDER — LIDOCAINE HYDROCHLORIDE 20 MG/ML
JELLY TOPICAL
Status: COMPLETED | OUTPATIENT
Start: 2019-06-13 | End: 2019-06-13

## 2019-06-13 RX ORDER — SODIUM CHLORIDE, SODIUM LACTATE, POTASSIUM CHLORIDE, CALCIUM CHLORIDE 600; 310; 30; 20 MG/100ML; MG/100ML; MG/100ML; MG/100ML
INJECTION, SOLUTION INTRAVENOUS CONTINUOUS
Status: DISCONTINUED | OUTPATIENT
Start: 2019-06-13 | End: 2019-06-20

## 2019-06-13 RX ORDER — METOCLOPRAMIDE HYDROCHLORIDE 5 MG/ML
10 INJECTION INTRAMUSCULAR; INTRAVENOUS EVERY 6 HOURS PRN
Status: DISCONTINUED | OUTPATIENT
Start: 2019-06-13 | End: 2019-06-24 | Stop reason: HOSPADM

## 2019-06-13 RX ORDER — MORPHINE SULFATE 2 MG/ML
4 INJECTION, SOLUTION INTRAMUSCULAR; INTRAVENOUS EVERY 4 HOURS PRN
Status: DISCONTINUED | OUTPATIENT
Start: 2019-06-13 | End: 2019-06-24 | Stop reason: HOSPADM

## 2019-06-13 RX ORDER — BUTALBITAL, ACETAMINOPHEN AND CAFFEINE 50; 325; 40 MG/1; MG/1; MG/1
1 TABLET ORAL
Status: DISCONTINUED | OUTPATIENT
Start: 2019-06-13 | End: 2019-06-13

## 2019-06-13 RX ADMIN — SODIUM CHLORIDE, SODIUM LACTATE, POTASSIUM CHLORIDE, AND CALCIUM CHLORIDE: .6; .31; .03; .02 INJECTION, SOLUTION INTRAVENOUS at 09:06

## 2019-06-13 RX ADMIN — METOCLOPRAMIDE 10 MG: 5 INJECTION, SOLUTION INTRAMUSCULAR; INTRAVENOUS at 11:06

## 2019-06-13 RX ADMIN — SODIUM CHLORIDE 1000 ML: 0.9 INJECTION, SOLUTION INTRAVENOUS at 11:06

## 2019-06-13 RX ADMIN — LIDOCAINE HYDROCHLORIDE 5 ML: 20 JELLY TOPICAL at 06:06

## 2019-06-13 RX ADMIN — SODIUM CHLORIDE, SODIUM LACTATE, POTASSIUM CHLORIDE, AND CALCIUM CHLORIDE 1000 ML: .6; .31; .03; .02 INJECTION, SOLUTION INTRAVENOUS at 02:06

## 2019-06-13 NOTE — ED NOTES
Pt. Reports abdomen feels less distended and denies pain or nausea presently. Wife at bedside with pt..

## 2019-06-13 NOTE — ED PROVIDER NOTES
Encounter Date: 6/13/2019    SCRIBE #1 NOTE: I, Kwame Zheng, am scribing for, and in the presence of,  Dr. Gaines. I have scribed the entire note.       History     Chief Complaint   Patient presents with    Abdominal Cramping     c/o abdominal discomfort, intermittent cramping, and abdominal distention. States he last ate yesterday morning and last had a bm yesterday morning. Concerned for obstruction. History of obstruction in 2016. Called Dr. Gardner's office and was told to come to the ER     89 y/o M presents to the ER c/o abdominal pain, nausea. Onset yesterday morning, notes some distension and discomfort to his mid abdomen. States his symptoms have worsened, and had one episode of nonbloody, nonbilious emesis last night. None today. Reports he has had some pain and discomfort, as well as somewhat worsening distension today. No exacerbating, eliciting, or alleviating factors noted. Denies any fever. Reports last BM was yesterday morning, small. Reports decreased appetite. States the symptoms are similar to a prior SBO that was managed with an NG tube, has not had prior abdominal surgeries.     The history is provided by the patient.     Review of patient's allergies indicates:   Allergen Reactions    Solarcaine [benzocaine-triclosan]     Sulfa (sulfonamide antibiotics)     Hytrin [terazosin] Rash    Smz-tmp ds [sulfamethoxazole-trimethoprim] Rash     Past Medical History:   Diagnosis Date    Anticoagulant long-term use     Bowel obstruction     BPH (benign prostatic hyperplasia)     Diabetes mellitus     Diverticulitis     Hyperlipidemia     Hypertension      Past Surgical History:   Procedure Laterality Date    EYE SURGERY      TONSILLECTOMY       Family History   Problem Relation Age of Onset    Heart disease Father     Hypertension Father     Cancer Mother     Diabetes Paternal Aunt      Social History     Tobacco Use    Smoking status: Former Smoker     Packs/day: 1.00     Years:  40.00     Pack years: 40.00    Smokeless tobacco: Never Used   Substance Use Topics    Alcohol use: Yes     Alcohol/week: 1.2 oz     Types: 2 Glasses of wine per week     Comment: drinks occassionally    Drug use: No     Review of Systems   Constitutional: Negative for chills, fatigue and fever.   HENT: Negative for facial swelling, trouble swallowing and voice change.    Eyes: Negative for photophobia, pain and redness.   Respiratory: Negative for cough, choking and shortness of breath.    Cardiovascular: Negative for chest pain, palpitations and leg swelling.   Gastrointestinal: Positive for abdominal distention, abdominal pain, nausea and vomiting. Negative for diarrhea.   Genitourinary: Negative for dysuria, frequency and urgency.   Musculoskeletal: Negative for back pain, neck pain and neck stiffness.   Neurological: Negative for seizures, speech difficulty, light-headedness, numbness and headaches.   All other systems reviewed and are negative.      Physical Exam     Initial Vitals [06/13/19 1059]   BP Pulse Resp Temp SpO2   129/64 102 20 97.2 °F (36.2 °C) 95 %      MAP       --         Physical Exam    Nursing note and vitals reviewed.  Constitutional: He appears well-developed and well-nourished. No distress.   HENT:   Head: Normocephalic and atraumatic.   Dry mucous membranes. Oropharynx clear;    Eyes: Conjunctivae and EOM are normal. Pupils are equal, round, and reactive to light.   Neck: Normal range of motion. Neck supple.   Cardiovascular: Normal rate, regular rhythm, normal heart sounds and intact distal pulses.   Pulmonary/Chest: Breath sounds normal. No respiratory distress. He has no wheezes. He has no rhonchi. He has no rales.   Abdominal: Soft. He exhibits no distension. There is tenderness (Diffuse). There is no rebound and no guarding.   Hyperactive bowel sounds.    Musculoskeletal: Normal range of motion. He exhibits no edema or tenderness.   Neurological: He is alert and oriented to  person, place, and time. He has normal strength. No cranial nerve deficit.   Skin: Skin is warm and dry. Capillary refill takes less than 2 seconds.         ED Course   Procedures  Labs Reviewed   CBC W/ AUTO DIFFERENTIAL - Abnormal; Notable for the following components:       Result Value    Gran # (ANC) 9.3 (*)     Gran% 80.8 (*)     Lymph% 11.7 (*)     All other components within normal limits   COMPREHENSIVE METABOLIC PANEL - Abnormal; Notable for the following components:    Glucose 168 (*)     Creatinine 1.7 (*)     Total Bilirubin 1.3 (*)     eGFR if  41 (*)     eGFR if non  35 (*)     All other components within normal limits   URINALYSIS - Abnormal; Notable for the following components:    Specific Gravity, UA >=1.030 (*)     Protein, UA 2+ (*)     Ketones, UA 1+ (*)     Bilirubin (UA) 1+ (*)     Occult Blood UA Trace (*)     All other components within normal limits   URINALYSIS MICROSCOPIC - Abnormal; Notable for the following components:    Hyaline Casts, UA MANY (*)     All other components within normal limits   LIPASE            X-Rays:   Independently Interpreted Readings:   Other Readings:  Imaging interpreted by radiologist and visualized by me    Imaging Results          CT Abdomen Pelvis  Without Contrast (Final result)  Result time 06/13/19 13:13:52    Final result by Rc Liu MD (06/13/19 13:13:52)                 Impression:      1. Small bowel obstruction, possibly progressing to high-grade noting degree of transition, findings suggest adhesions as mechanism although correlation is advised.  No pneumatosis or free air at this time.  2. Bilateral renal cysts, ultrasound could be performed for further evaluation on a nonemergent basis as warranted.  3. Colonic diverticulosis without findings to suggest diverticulitis.  4. Several additional findings above.      Electronically signed by: Rc Liu MD  Date:    06/13/2019  Time:    13:13              Narrative:    EXAMINATION:  CT ABDOMEN PELVIS WITHOUT CONTRAST    CLINICAL HISTORY:  Abdominal pain, unspecified;Bowel obstruction, low-grade;    TECHNIQUE:  Low dose axial images, sagittal and coronal reformations were obtained from the lung bases to the pubic symphysis.  Oral contrast was not administered.    COMPARISON:  Images of the lower thorax are remarkable for bilateral dependent atelectasis.    The spleen, pancreas, gallbladder and adrenal glands have a grossly unremarkable noncontrast appearance.  There is a low attenuating lesion within the left hepatic dome, too small for characterization although attenuation suggests cyst.  The pancreatic duct is not dilated.  The stomach is relatively decompressed.  No significant abdominal lymphadenopathy.    There is cortical thinning of the left kidney, left kidney is smaller than the right.  A cyst arises from the interpolar region of the left kidney measuring 3.4 cm.  A 1.4 cm cyst arises from the interpolar region of the left kidney.  An additional subcentimeter lesion is noted within the interpolar region, too small for characterization.  There is left renal vascular calcification, no left hydronephrosis, the left ureter is unremarkable without calculi seen.  Several cysts arise from the right kidney, largest arising from the lower pole measures 6.8 cm.  There is a cyst arising from the interpolar region measuring 4.9 cm with peripheral calcification.  There is right renal vascular calcification.  The right ureter is unremarkable without calculi seen.  The urinary bladder is unremarkable.  The prostate is enlarged.    There are several scattered colonic diverticula without inflammation.  The terminal ileum is decompressed, the appendix is unremarkable.    There are several dilated proximal to mid small bowel loops, fluid-filled, without significant wall thickening.  No pneumatosis.  There is transition to decompressed bowel within the right lower quadrant,  suggesting adhesion.  The suspected transition point is noted on series 2, image 81 anteriorly.  Distally, the small bowel is decompressed to the level of the terminal ileum.  No focal organized pelvic fluid collection.  There are bilateral fat containing inguinal hernias.    There is osteopenia.  Degenerative changes are noted of the sacroiliac joints, spine, and pubic symphysis.  There is atherosclerotic calcification of the aorta and its branches.  No significant inguinal lymphadenopathy.                                X-Ray Abdomen Flat And Erect (Final result)  Result time 06/13/19 11:31:14    Final result by Heladio Knight MD (06/13/19 11:31:14)                 Impression:      Findings suggestive partial small bowel obstruction or ileus.    This report was flagged in Epic as abnormal.      Electronically signed by: Heladio Knight MD  Date:    06/13/2019  Time:    11:31             Narrative:    EXAMINATION:  XR ABDOMEN FLAT AND ERECT    CLINICAL HISTORY:  Abdominal pain;    TECHNIQUE:  Flat and erect AP views of the abdomen were performed.    COMPARISON:  None    FINDINGS:  Differential air-fluid levels with mildly dilated loops of air-filled small bowel suggestive of small bowel obstruction or ileus.  Air and stool within the colon.  No free air under the diaphragm.  Lung bases are clear.  Degenerative changes of the spine.                               X-Ray Chest PA And Lateral (Final result)  Result time 06/13/19 11:31:35    Final result by Heladio Knight MD (06/13/19 11:31:35)                 Impression:      No acute abnormality.      Electronically signed by: Heladio Knight MD  Date:    06/13/2019  Time:    11:31             Narrative:    EXAMINATION:  XR CHEST PA AND LATERAL    CLINICAL HISTORY:  Abdominal pain;    TECHNIQUE:  PA and lateral views of the chest were performed.    COMPARISON:  Chest radiograph from 01/07/2016    FINDINGS:  The lungs are clear, with normal appearance of  pulmonary vasculature and no pleural effusion or pneumothorax.    The cardiac silhouette is normal in size. The hilar and mediastinal contours are unremarkable.    Bones are intact.                                Medical Decision Making:   Initial Assessment:   88 y.o male presents to the ED due to abdominal pain.   Differential Diagnosis:   Diverticulitis, cholecystitis, pancreatitis, appendicitis, obstruction, constipation, UTI, pyelonephritis, kidney stone, gastritis  Independently Interpreted Test(s):   I have ordered and independently interpreted X-rays - see prior notes.  Clinical Tests:   Lab Tests: Reviewed       <> Summary of Lab: Benign  ED Management:  Patient given IV fluid and Reglan.  Vital signs stable, reports some improvement in symptoms. Discussed with Dr. Gardner, who agrees with admission for observation at this time, request admit orders and IV fluid and NPO status.  Informed patient and family of plan and they are comfortable with plan at this time. I have placed courtesy admit orders                      Clinical Impression:       ICD-10-CM ICD-9-CM   1. Small bowel obstruction K56.609 560.9   2. Generalized abdominal pain R10.84 789.07         Disposition:   Disposition: Placed in Observation  Condition: Stable    Scribe Attestation I, Dr. Manuel Gaines, personally performed the services described in this documentation. All medical record entries made by the scribe were at my direction and in my presence.  I have reviewed the chart and agree that the record reflects my personal performance and is accurate and complete. Manuel Gaines MD.  2:17 PM 06/13/2019                        Manuel Gaines MD  06/13/19 1412

## 2019-06-13 NOTE — ED NOTES
Pt. Resting quietly without distress. No complaints presently. Updated on awaiting ready room. Pt. Given pillow and repositioned in bed for comfort.

## 2019-06-13 NOTE — ED NOTES
Pt. Tolerated ntg insertion well. Placement confirmed via auscultation and gastric ph testing. Immediate return of approx. 150cc green bile stomach contents.

## 2019-06-13 NOTE — ED NOTES
Pt. Reports onset of abdominal distention, bloating, nausea since yesterday with hx. Of SBO. Describes as an uncomfortable feeling but essentially no pain. Intermittent mild nausea, no vomiting. Decreased appetite and last p.o. Intake yesterday morning. Last bm yesterday morning and describes as small and firm. Abdomen is distended and  Moderately firm. No visible distress. Pt. Is also currently being treated for uti with cipro, denies urinary symptoms.

## 2019-06-14 LAB
ANION GAP SERPL CALC-SCNC: 11 MMOL/L (ref 8–16)
BASOPHILS # BLD AUTO: 0.01 K/UL (ref 0–0.2)
BASOPHILS NFR BLD: 0.1 % (ref 0–1.9)
BUN SERPL-MCNC: 20 MG/DL (ref 8–23)
CALCIUM SERPL-MCNC: 9.9 MG/DL (ref 8.7–10.5)
CHLORIDE SERPL-SCNC: 101 MMOL/L (ref 95–110)
CO2 SERPL-SCNC: 29 MMOL/L (ref 23–29)
CREAT SERPL-MCNC: 1.5 MG/DL (ref 0.5–1.4)
DIFFERENTIAL METHOD: ABNORMAL
EOSINOPHIL # BLD AUTO: 0.3 K/UL (ref 0–0.5)
EOSINOPHIL NFR BLD: 2.8 % (ref 0–8)
ERYTHROCYTE [DISTWIDTH] IN BLOOD BY AUTOMATED COUNT: 13.3 % (ref 11.5–14.5)
EST. GFR  (AFRICAN AMERICAN): 47 ML/MIN/1.73 M^2
EST. GFR  (NON AFRICAN AMERICAN): 41 ML/MIN/1.73 M^2
GLUCOSE SERPL-MCNC: 134 MG/DL (ref 70–110)
HCT VFR BLD AUTO: 47.5 % (ref 40–54)
HGB BLD-MCNC: 15.9 G/DL (ref 14–18)
LYMPHOCYTES # BLD AUTO: 1.1 K/UL (ref 1–4.8)
LYMPHOCYTES NFR BLD: 11.3 % (ref 18–48)
MCH RBC QN AUTO: 28.7 PG (ref 27–31)
MCHC RBC AUTO-ENTMCNC: 33.5 G/DL (ref 32–36)
MCV RBC AUTO: 86 FL (ref 82–98)
MONOCYTES # BLD AUTO: 1.1 K/UL (ref 0.3–1)
MONOCYTES NFR BLD: 11.9 % (ref 4–15)
NEUTROPHILS # BLD AUTO: 6.9 K/UL (ref 1.8–7.7)
NEUTROPHILS NFR BLD: 73.8 % (ref 38–73)
PLATELET # BLD AUTO: 186 K/UL (ref 150–350)
PMV BLD AUTO: 11.2 FL (ref 9.2–12.9)
POCT GLUCOSE: 147 MG/DL (ref 70–110)
POTASSIUM SERPL-SCNC: 3.8 MMOL/L (ref 3.5–5.1)
RBC # BLD AUTO: 5.54 M/UL (ref 4.6–6.2)
SODIUM SERPL-SCNC: 141 MMOL/L (ref 136–145)
WBC # BLD AUTO: 9.35 K/UL (ref 3.9–12.7)

## 2019-06-14 PROCEDURE — 63600175 PHARM REV CODE 636 W HCPCS: Performed by: SURGERY

## 2019-06-14 PROCEDURE — 11000001 HC ACUTE MED/SURG PRIVATE ROOM

## 2019-06-14 PROCEDURE — 94761 N-INVAS EAR/PLS OXIMETRY MLT: CPT

## 2019-06-14 PROCEDURE — 80048 BASIC METABOLIC PNL TOTAL CA: CPT

## 2019-06-14 PROCEDURE — 36415 COLL VENOUS BLD VENIPUNCTURE: CPT

## 2019-06-14 PROCEDURE — 25500020 PHARM REV CODE 255: Performed by: SURGERY

## 2019-06-14 PROCEDURE — 63600175 PHARM REV CODE 636 W HCPCS: Performed by: EMERGENCY MEDICINE

## 2019-06-14 PROCEDURE — 85025 COMPLETE CBC W/AUTO DIFF WBC: CPT

## 2019-06-14 PROCEDURE — 25000003 PHARM REV CODE 250: Performed by: EMERGENCY MEDICINE

## 2019-06-14 RX ORDER — HYDRALAZINE HYDROCHLORIDE 20 MG/ML
10 INJECTION INTRAMUSCULAR; INTRAVENOUS EVERY 6 HOURS PRN
Status: DISCONTINUED | OUTPATIENT
Start: 2019-06-14 | End: 2019-06-24 | Stop reason: HOSPADM

## 2019-06-14 RX ADMIN — DIATRIZOATE MEGLUMINE AND DIATRIZOATE SODIUM 100 ML: 660; 100 LIQUID ORAL; RECTAL at 04:06

## 2019-06-14 RX ADMIN — SODIUM CHLORIDE, SODIUM LACTATE, POTASSIUM CHLORIDE, AND CALCIUM CHLORIDE: .6; .31; .03; .02 INJECTION, SOLUTION INTRAVENOUS at 05:06

## 2019-06-14 RX ADMIN — SODIUM CHLORIDE, SODIUM LACTATE, POTASSIUM CHLORIDE, AND CALCIUM CHLORIDE: .6; .31; .03; .02 INJECTION, SOLUTION INTRAVENOUS at 06:06

## 2019-06-14 RX ADMIN — DIATRIZOATE MEGLUMINE AND DIATRIZOATE SODIUM 50 ML: 660; 100 LIQUID ORAL; RECTAL at 06:06

## 2019-06-14 RX ADMIN — METOCLOPRAMIDE 10 MG: 5 INJECTION, SOLUTION INTRAMUSCULAR; INTRAVENOUS at 09:06

## 2019-06-14 RX ADMIN — SODIUM CHLORIDE, SODIUM LACTATE, POTASSIUM CHLORIDE, AND CALCIUM CHLORIDE: .6; .31; .03; .02 INJECTION, SOLUTION INTRAVENOUS at 09:06

## 2019-06-14 RX ADMIN — HYDRALAZINE HYDROCHLORIDE 10 MG: 20 INJECTION INTRAMUSCULAR; INTRAVENOUS at 09:06

## 2019-06-14 NOTE — H&P
Chief Complaint   Patient presents with    Abdominal Cramping       c/o abdominal discomfort, intermittent cramping, and abdominal distention. States he last ate yesterday morning and last had a bm yesterday morning. Concerned for obstruction. History of obstruction in 2016.       87 y/o M presents to the ER c/o abdominal pain, nausea. Onset yesterday morning, notes some distension and discomfort to his mid abdomen. States his symptoms have worsened, and had one episode of nonbloody, nonbilious emesis last night. None today. Reports he has had some pain and discomfort, as well as somewhat worsening distension today. No exacerbating, eliciting, or alleviating factors noted. Denies any fever. Reports last BM was yesterday morning, small. Reports decreased appetite. States the symptoms are similar to a prior SBO that was managed with an NG tube, has not had prior abdominal surgeries.      The history is provided by the patient.           Review of patient's allergies indicates:   Allergen Reactions    Solarcaine [benzocaine-triclosan]      Sulfa (sulfonamide antibiotics)      Hytrin [terazosin] Rash    Smz-tmp ds [sulfamethoxazole-trimethoprim] Rash           Past Medical History:   Diagnosis Date    Anticoagulant long-term use      Bowel obstruction      BPH (benign prostatic hyperplasia)      Diabetes mellitus      Diverticulitis      Hyperlipidemia      Hypertension              Past Surgical History:   Procedure Laterality Date    EYE SURGERY        TONSILLECTOMY                Family History   Problem Relation Age of Onset    Heart disease Father      Hypertension Father      Cancer Mother      Diabetes Paternal Aunt        Social History            Tobacco Use    Smoking status: Former Smoker       Packs/day: 1.00       Years: 40.00       Pack years: 40.00    Smokeless tobacco: Never Used   Substance Use Topics    Alcohol use: Yes       Alcohol/week: 1.2 oz       Types: 2 Glasses of wine per  week       Comment: drinks occassionally    Drug use: No      Review of Systems   Constitutional: Negative for chills, fatigue and fever.   HENT: Negative for facial swelling, trouble swallowing and voice change.    Eyes: Negative for photophobia, pain and redness.   Respiratory: Negative for cough, choking and shortness of breath.    Cardiovascular: Negative for chest pain, palpitations and leg swelling.   Gastrointestinal: Positive for abdominal distention, abdominal pain, nausea and vomiting. Negative for diarrhea.   Genitourinary: Negative for dysuria, frequency and urgency.   Musculoskeletal: Negative for back pain, neck pain and neck stiffness.   Neurological: Negative for seizures, speech difficulty, light-headedness, numbness and headaches.   All other systems reviewed and are negative.        Physical Exam             Initial Vitals [06/13/19 1059]   BP Pulse Resp Temp SpO2   129/64 102 20 97.2 °F (36.2 °C) 95 %       MAP           --              Physical Exam     Nursing note and vitals reviewed.  Constitutional: He appears well-developed and well-nourished. No distress.   HENT:   Head: Normocephalic and atraumatic.   Dry mucous membranes. Oropharynx clear;    Eyes: Conjunctivae and EOM are normal. Pupils are equal, round, and reactive to light.   Neck: Normal range of motion. Neck supple.   Cardiovascular: Normal rate, regular rhythm, normal heart sounds and intact distal pulses.   Pulmonary/Chest: Breath sounds normal. No respiratory distress. He has no wheezes. He has no rhonchi. He has no rales.   Abdominal: Soft. He exhibits no distension. There is tenderness (Diffuse). There is no rebound and no guarding.   Hyperactive bowel sounds.    Musculoskeletal: Normal range of motion. He exhibits no edema or tenderness.   Neurological: He is alert and oriented to person, place, and time. He has normal strength. No cranial nerve deficit.   Skin: Skin is warm and dry. Capillary refill takes less than 2  seconds.       Imp: Small Bowel Partial obstruction, obesity, htn    Plan: npo , ng tube to low wall suction.,iv fluids  Repeat xrays, cbc, bmp daily

## 2019-06-14 NOTE — PLAN OF CARE
This  put name on white board and explained blue discharge folder to patient. Discharge planning brochure and/or business card given to patient.  Patient verbalized understanding.    TN met with patient. Wife currently in room at bedside. Pt states prior to arrival he was living at home with wife. Pt denies any previous needs for HH or DME. Wife states that they have a rolling walker in home from one of her previous surgeries but pt does not use it. Pt able to drive self to and from appts but wife will bring him home. Will continue to monitor needs.    Future Appointments   Date Time Provider Department Center   11/19/2019  1:00 PM Daren Lassiter MD [facility]        06/14/19 4005   Discharge Assessment   Assessment Type Discharge Planning Assessment   Confirmed/corrected address and phone number on facesheet? Yes   Assessment information obtained from? Patient;Caregiver;Medical Record   Expected Length of Stay (days) 3   Communicated expected length of stay with patient/caregiver yes   Prior to hospitilization cognitive status: Alert/Oriented   Prior to hospitalization functional status: Independent   Current cognitive status: Alert/Oriented   Current Functional Status: Independent   Facility Arrived From: ED   Lives With spouse   Able to Return to Prior Arrangements yes   Is patient able to care for self after discharge? Yes   Who are your caregiver(s) and their phone number(s)? Aicha (wife) [phone]   Patient's perception of discharge disposition home or selfcare   Readmission Within the Last 30 Days no previous admission in last 30 days   Patient currently being followed by outpatient case management? No   Patient currently receives any other outside agency services? No   Equipment Currently Used at Home walker, rolling   Do you have any problems affording any of your prescribed medications? No   Is the patient taking medications as prescribed? yes   Does the patient have  transportation home? Yes   Transportation Anticipated family or friend will provide   Does the patient receive services at the [facility]? No   Discharge Plan A Home;Home with family   DME Needed Upon Discharge  none   Patient/Family in Agreement with Plan yes

## 2019-06-14 NOTE — PLAN OF CARE
Pt AAO x 4.  VSS.  Pt remained afebrile throughout this shift.   IV fluids administered per order.   Pt remained free of falls this shift.   Pt had no c/o pain this shift.  L nare NG tube c/d/i, connected to low intermittent wall suction.  Pt educated on NPO status and verbalizes understanding.   Plan of care reviewed. Patient verbalizes understanding.   Pt moving/turing independently. Frequent weight shifting encouraged. Pt voiding per urinal.   Bed low, side rails up x 2, wheels locked, call light in reach.   Bed alarm maintained for safety.   Patient instructed to call for assistance.   Hourly rounding completed.   24 hour chart check completed.  Will continue to monitor.

## 2019-06-14 NOTE — PROGRESS NOTES
"Surgery follow up  BP (!) 145/65   Pulse 68   Temp 98 °F (36.7 °C)   Resp 20   Ht 5' 10" (1.778 m)   Wt 76 kg (167 lb 8.8 oz)   SpO2 (!) 92%   BMI 24.04 kg/m²   I/O last 3 completed shifts:  In: 2004.2 [I.V.:1004.2; IV Piggyback:1000]  Out: 1700 [Urine:800; Drains:900]  I/O this shift:  In: 743.8 [I.V.:743.8]  Out: 450 [Urine:450]      Recent Results (from the past 336 hour(s))   CBC auto differential    Collection Time: 06/14/19  6:40 AM   Result Value Ref Range    WBC 9.35 3.90 - 12.70 K/uL    Hemoglobin 15.9 14.0 - 18.0 g/dL    Hematocrit 47.5 40.0 - 54.0 %    Platelets 186 150 - 350 K/uL   CBC auto differential    Collection Time: 06/13/19 11:41 AM   Result Value Ref Range    WBC 11.48 3.90 - 12.70 K/uL    Hemoglobin 16.7 14.0 - 18.0 g/dL    Hematocrit 50.3 40.0 - 54.0 %    Platelets 220 150 - 350 K/uL     Recent Results (from the past 336 hour(s))   Basic metabolic panel    Collection Time: 06/14/19  6:40 AM   Result Value Ref Range    Sodium 141 136 - 145 mmol/L    Potassium 3.8 3.5 - 5.1 mmol/L    Chloride 101 95 - 110 mmol/L    CO2 29 23 - 29 mmol/L    BUN, Bld 20 8 - 23 mg/dL    Creatinine 1.5 (H) 0.5 - 1.4 mg/dL    Calcium 9.9 8.7 - 10.5 mg/dL    Anion Gap 11 8 - 16 mmol/L   Abdomen softly distended , passing some gas , no bowel move,ment   Plan : check Gastrogaffin enema.  "

## 2019-06-14 NOTE — PLAN OF CARE
Problem: Adult Inpatient Plan of Care  Goal: Plan of Care Review  Outcome: Ongoing (interventions implemented as appropriate)  Pt is AAOx3 with occasional complaints of abd cramping with no pain medicine needed. Pt received gastrograffin through NG tube with xrays being taken. Pt had small formed BM today. Pt receiving IV fluids as ordered.

## 2019-06-14 NOTE — ED NOTES
Pt. Transferred to rm [id number] per wheelchair with wife and daughter. Pt. Has no complaints and no visible distress.

## 2019-06-14 NOTE — NURSING
Pt arrived to floor via wheelchair. Not distress noted. Elevated blood pressure. Other vitals stable. Continuous IV fluids restarted. NG tube suction placed in room. Will continue to monitor.

## 2019-06-15 LAB
ANION GAP SERPL CALC-SCNC: 13 MMOL/L (ref 8–16)
BASOPHILS # BLD AUTO: 0.01 K/UL (ref 0–0.2)
BASOPHILS NFR BLD: 0.1 % (ref 0–1.9)
BUN SERPL-MCNC: 21 MG/DL (ref 8–23)
CALCIUM SERPL-MCNC: 9.7 MG/DL (ref 8.7–10.5)
CHLORIDE SERPL-SCNC: 101 MMOL/L (ref 95–110)
CO2 SERPL-SCNC: 29 MMOL/L (ref 23–29)
CREAT SERPL-MCNC: 1.4 MG/DL (ref 0.5–1.4)
DIFFERENTIAL METHOD: NORMAL
EOSINOPHIL # BLD AUTO: 0.1 K/UL (ref 0–0.5)
EOSINOPHIL NFR BLD: 1.1 % (ref 0–8)
ERYTHROCYTE [DISTWIDTH] IN BLOOD BY AUTOMATED COUNT: 13.4 % (ref 11.5–14.5)
EST. GFR  (AFRICAN AMERICAN): 51 ML/MIN/1.73 M^2
EST. GFR  (NON AFRICAN AMERICAN): 45 ML/MIN/1.73 M^2
GLUCOSE SERPL-MCNC: 137 MG/DL (ref 70–110)
HCT VFR BLD AUTO: 47 % (ref 40–54)
HGB BLD-MCNC: 15.9 G/DL (ref 14–18)
LYMPHOCYTES # BLD AUTO: 2 K/UL (ref 1–4.8)
LYMPHOCYTES NFR BLD: 21.7 % (ref 18–48)
MCH RBC QN AUTO: 28.9 PG (ref 27–31)
MCHC RBC AUTO-ENTMCNC: 33.8 G/DL (ref 32–36)
MCV RBC AUTO: 86 FL (ref 82–98)
MONOCYTES # BLD AUTO: 0.5 K/UL (ref 0.3–1)
MONOCYTES NFR BLD: 5.8 % (ref 4–15)
NEUTROPHILS # BLD AUTO: 6.4 K/UL (ref 1.8–7.7)
NEUTROPHILS NFR BLD: 71.1 % (ref 38–73)
PLATELET # BLD AUTO: 189 K/UL (ref 150–350)
PMV BLD AUTO: 11.2 FL (ref 9.2–12.9)
POTASSIUM SERPL-SCNC: 3.6 MMOL/L (ref 3.5–5.1)
RBC # BLD AUTO: 5.5 M/UL (ref 4.6–6.2)
SODIUM SERPL-SCNC: 143 MMOL/L (ref 136–145)
WBC # BLD AUTO: 8.97 K/UL (ref 3.9–12.7)

## 2019-06-15 PROCEDURE — 11000001 HC ACUTE MED/SURG PRIVATE ROOM

## 2019-06-15 PROCEDURE — 80048 BASIC METABOLIC PNL TOTAL CA: CPT

## 2019-06-15 PROCEDURE — 85025 COMPLETE CBC W/AUTO DIFF WBC: CPT

## 2019-06-15 PROCEDURE — 36415 COLL VENOUS BLD VENIPUNCTURE: CPT

## 2019-06-15 PROCEDURE — 94761 N-INVAS EAR/PLS OXIMETRY MLT: CPT

## 2019-06-15 PROCEDURE — 25000003 PHARM REV CODE 250: Performed by: EMERGENCY MEDICINE

## 2019-06-15 PROCEDURE — 63600175 PHARM REV CODE 636 W HCPCS: Performed by: EMERGENCY MEDICINE

## 2019-06-15 RX ADMIN — SODIUM CHLORIDE, SODIUM LACTATE, POTASSIUM CHLORIDE, AND CALCIUM CHLORIDE: .6; .31; .03; .02 INJECTION, SOLUTION INTRAVENOUS at 05:06

## 2019-06-15 RX ADMIN — SODIUM CHLORIDE, SODIUM LACTATE, POTASSIUM CHLORIDE, AND CALCIUM CHLORIDE: .6; .31; .03; .02 INJECTION, SOLUTION INTRAVENOUS at 09:06

## 2019-06-15 RX ADMIN — METOCLOPRAMIDE 10 MG: 5 INJECTION, SOLUTION INTRAMUSCULAR; INTRAVENOUS at 12:06

## 2019-06-15 NOTE — PLAN OF CARE
Problem: Adult Inpatient Plan of Care  Goal: Plan of Care Review  Outcome: Ongoing (interventions implemented as appropriate)  Pt is AAOx3 with no complaints of pain. Pt had an episode of emesis this am. Intermittent nausea throughout shift. NG tube to LCWS. NG tube advanced about 1 and a half inches. Xray done after advancement. Frequent flushing of NG tube required to get tube to pull drainage. 1500cc green drainage from NG this shift. Pt up ad eloisa in room.

## 2019-06-15 NOTE — PROGRESS NOTES
Pt nauseated, 200cc brown emesis noted. Notified irina Aguilar to hook pt's NGT back up to wall suction. Also notified of SBP in 180s. Order to give hydralazine PRN. Will reassess.

## 2019-06-15 NOTE — PLAN OF CARE
Problem: Adult Inpatient Plan of Care  Goal: Plan of Care Review  Outcome: Ongoing (interventions implemented as appropriate)  Pt AAOx4, no family members at bedside throughout shift. Pt complains of intermittent nausea with one episode of emesis. No complaints of pain or SOB. No BM on shift. Pt remains NPO per order. NGT put back to wall suction per MD d/t nausea, initial output of 550cc upon initiation of suction. IVF infusing per MAR to PIV. Safety maintained, bed alarm on - will cont to monitor.

## 2019-06-16 ENCOUNTER — ANESTHESIA EVENT (OUTPATIENT)
Dept: SURGERY | Facility: HOSPITAL | Age: 84
DRG: 330 | End: 2019-06-16
Payer: MEDICARE

## 2019-06-16 ENCOUNTER — ANESTHESIA (OUTPATIENT)
Dept: SURGERY | Facility: HOSPITAL | Age: 84
DRG: 330 | End: 2019-06-16
Payer: MEDICARE

## 2019-06-16 LAB
ANION GAP SERPL CALC-SCNC: 14 MMOL/L (ref 8–16)
ANION GAP SERPL CALC-SCNC: 15 MMOL/L (ref 8–16)
BASOPHILS # BLD AUTO: 0.01 K/UL (ref 0–0.2)
BASOPHILS # BLD AUTO: 0.01 K/UL (ref 0–0.2)
BASOPHILS NFR BLD: 0.1 % (ref 0–1.9)
BASOPHILS NFR BLD: 0.1 % (ref 0–1.9)
BUN SERPL-MCNC: 28 MG/DL (ref 8–23)
BUN SERPL-MCNC: 34 MG/DL (ref 8–23)
CALCIUM SERPL-MCNC: 10 MG/DL (ref 8.7–10.5)
CALCIUM SERPL-MCNC: 9.5 MG/DL (ref 8.7–10.5)
CHLORIDE SERPL-SCNC: 97 MMOL/L (ref 95–110)
CHLORIDE SERPL-SCNC: 99 MMOL/L (ref 95–110)
CO2 SERPL-SCNC: 29 MMOL/L (ref 23–29)
CO2 SERPL-SCNC: 33 MMOL/L (ref 23–29)
CREAT SERPL-MCNC: 1.5 MG/DL (ref 0.5–1.4)
CREAT SERPL-MCNC: 1.6 MG/DL (ref 0.5–1.4)
DIFFERENTIAL METHOD: ABNORMAL
DIFFERENTIAL METHOD: ABNORMAL
EOSINOPHIL # BLD AUTO: 0 K/UL (ref 0–0.5)
EOSINOPHIL # BLD AUTO: 0.1 K/UL (ref 0–0.5)
EOSINOPHIL NFR BLD: 0.1 % (ref 0–8)
EOSINOPHIL NFR BLD: 1 % (ref 0–8)
ERYTHROCYTE [DISTWIDTH] IN BLOOD BY AUTOMATED COUNT: 13.5 % (ref 11.5–14.5)
ERYTHROCYTE [DISTWIDTH] IN BLOOD BY AUTOMATED COUNT: 13.5 % (ref 11.5–14.5)
EST. GFR  (AFRICAN AMERICAN): 44 ML/MIN/1.73 M^2
EST. GFR  (AFRICAN AMERICAN): 47 ML/MIN/1.73 M^2
EST. GFR  (NON AFRICAN AMERICAN): 38 ML/MIN/1.73 M^2
EST. GFR  (NON AFRICAN AMERICAN): 41 ML/MIN/1.73 M^2
GLUCOSE SERPL-MCNC: 148 MG/DL (ref 70–110)
GLUCOSE SERPL-MCNC: 207 MG/DL (ref 70–110)
HCT VFR BLD AUTO: 47.7 % (ref 40–54)
HCT VFR BLD AUTO: 49.2 % (ref 40–54)
HGB BLD-MCNC: 15.5 G/DL (ref 14–18)
HGB BLD-MCNC: 16.4 G/DL (ref 14–18)
LYMPHOCYTES # BLD AUTO: 1 K/UL (ref 1–4.8)
LYMPHOCYTES # BLD AUTO: 1.1 K/UL (ref 1–4.8)
LYMPHOCYTES NFR BLD: 14.6 % (ref 18–48)
LYMPHOCYTES NFR BLD: 16.9 % (ref 18–48)
MCH RBC QN AUTO: 28.9 PG (ref 27–31)
MCH RBC QN AUTO: 28.9 PG (ref 27–31)
MCHC RBC AUTO-ENTMCNC: 32.5 G/DL (ref 32–36)
MCHC RBC AUTO-ENTMCNC: 33.3 G/DL (ref 32–36)
MCV RBC AUTO: 87 FL (ref 82–98)
MCV RBC AUTO: 89 FL (ref 82–98)
MONOCYTES # BLD AUTO: 0.4 K/UL (ref 0.3–1)
MONOCYTES # BLD AUTO: 1.8 K/UL (ref 0.3–1)
MONOCYTES NFR BLD: 25.6 % (ref 4–15)
MONOCYTES NFR BLD: 5.6 % (ref 4–15)
NEUTROPHILS # BLD AUTO: 4 K/UL (ref 1.8–7.7)
NEUTROPHILS # BLD AUTO: 5.2 K/UL (ref 1.8–7.7)
NEUTROPHILS NFR BLD: 58.6 % (ref 38–73)
NEUTROPHILS NFR BLD: 77.2 % (ref 38–73)
PLATELET # BLD AUTO: 180 K/UL (ref 150–350)
PLATELET # BLD AUTO: 189 K/UL (ref 150–350)
PMV BLD AUTO: 10.9 FL (ref 9.2–12.9)
PMV BLD AUTO: 11.8 FL (ref 9.2–12.9)
POTASSIUM SERPL-SCNC: 3.8 MMOL/L (ref 3.5–5.1)
POTASSIUM SERPL-SCNC: 3.9 MMOL/L (ref 3.5–5.1)
RBC # BLD AUTO: 5.37 M/UL (ref 4.6–6.2)
RBC # BLD AUTO: 5.68 M/UL (ref 4.6–6.2)
SODIUM SERPL-SCNC: 143 MMOL/L (ref 136–145)
SODIUM SERPL-SCNC: 144 MMOL/L (ref 136–145)
WBC # BLD AUTO: 6.75 K/UL (ref 3.9–12.7)
WBC # BLD AUTO: 6.83 K/UL (ref 3.9–12.7)

## 2019-06-16 PROCEDURE — 36000709 HC OR TIME LEV III EA ADD 15 MIN: Performed by: SURGERY

## 2019-06-16 PROCEDURE — 63600175 PHARM REV CODE 636 W HCPCS: Performed by: SURGERY

## 2019-06-16 PROCEDURE — 88307 TISSUE EXAM BY PATHOLOGIST: CPT | Performed by: PATHOLOGY

## 2019-06-16 PROCEDURE — 25000003 PHARM REV CODE 250: Performed by: STUDENT IN AN ORGANIZED HEALTH CARE EDUCATION/TRAINING PROGRAM

## 2019-06-16 PROCEDURE — 25000003 PHARM REV CODE 250: Performed by: SURGERY

## 2019-06-16 PROCEDURE — 37000009 HC ANESTHESIA EA ADD 15 MINS: Performed by: SURGERY

## 2019-06-16 PROCEDURE — 85025 COMPLETE CBC W/AUTO DIFF WBC: CPT | Mod: 91

## 2019-06-16 PROCEDURE — 20000000 HC ICU ROOM

## 2019-06-16 PROCEDURE — 88307 TISSUE SPECIMEN TO PATHOLOGY - SURGERY: ICD-10-PCS | Mod: 26,,, | Performed by: PATHOLOGY

## 2019-06-16 PROCEDURE — 27000221 HC OXYGEN, UP TO 24 HOURS

## 2019-06-16 PROCEDURE — 27201423 OPTIME MED/SURG SUP & DEVICES STERILE SUPPLY: Performed by: SURGERY

## 2019-06-16 PROCEDURE — 36415 COLL VENOUS BLD VENIPUNCTURE: CPT

## 2019-06-16 PROCEDURE — 88307 TISSUE EXAM BY PATHOLOGIST: CPT | Mod: 26,,, | Performed by: PATHOLOGY

## 2019-06-16 PROCEDURE — 93005 ELECTROCARDIOGRAM TRACING: CPT

## 2019-06-16 PROCEDURE — 36000708 HC OR TIME LEV III 1ST 15 MIN: Performed by: SURGERY

## 2019-06-16 PROCEDURE — 25000003 PHARM REV CODE 250: Performed by: EMERGENCY MEDICINE

## 2019-06-16 PROCEDURE — 94761 N-INVAS EAR/PLS OXIMETRY MLT: CPT

## 2019-06-16 PROCEDURE — P9045 ALBUMIN (HUMAN), 5%, 250 ML: HCPCS | Mod: JG | Performed by: STUDENT IN AN ORGANIZED HEALTH CARE EDUCATION/TRAINING PROGRAM

## 2019-06-16 PROCEDURE — 80048 BASIC METABOLIC PNL TOTAL CA: CPT | Mod: 91

## 2019-06-16 PROCEDURE — 63600175 PHARM REV CODE 636 W HCPCS: Performed by: STUDENT IN AN ORGANIZED HEALTH CARE EDUCATION/TRAINING PROGRAM

## 2019-06-16 PROCEDURE — 37000008 HC ANESTHESIA 1ST 15 MINUTES: Performed by: SURGERY

## 2019-06-16 RX ORDER — PHENYLEPHRINE HYDROCHLORIDE 10 MG/ML
INJECTION INTRAVENOUS
Status: DISCONTINUED | OUTPATIENT
Start: 2019-06-16 | End: 2019-06-16

## 2019-06-16 RX ORDER — BACITRACIN 50000 [IU]/1
INJECTION, POWDER, FOR SOLUTION INTRAMUSCULAR
Status: DISCONTINUED | OUTPATIENT
Start: 2019-06-16 | End: 2019-06-16 | Stop reason: HOSPADM

## 2019-06-16 RX ORDER — GLYCOPYRROLATE 0.2 MG/ML
INJECTION INTRAMUSCULAR; INTRAVENOUS
Status: DISCONTINUED | OUTPATIENT
Start: 2019-06-16 | End: 2019-06-16

## 2019-06-16 RX ORDER — BUPIVACAINE HYDROCHLORIDE 2.5 MG/ML
INJECTION, SOLUTION EPIDURAL; INFILTRATION; INTRACAUDAL
Status: DISCONTINUED | OUTPATIENT
Start: 2019-06-16 | End: 2019-06-16 | Stop reason: HOSPADM

## 2019-06-16 RX ORDER — CEFAZOLIN SODIUM 2 G/50ML
2 SOLUTION INTRAVENOUS
Status: COMPLETED | OUTPATIENT
Start: 2019-06-16 | End: 2019-06-16

## 2019-06-16 RX ORDER — HYDROMORPHONE HYDROCHLORIDE 2 MG/ML
0.5 INJECTION, SOLUTION INTRAMUSCULAR; INTRAVENOUS; SUBCUTANEOUS EVERY 5 MIN PRN
Status: DISCONTINUED | OUTPATIENT
Start: 2019-06-16 | End: 2019-06-18

## 2019-06-16 RX ORDER — ONDANSETRON 2 MG/ML
INJECTION INTRAMUSCULAR; INTRAVENOUS
Status: DISCONTINUED | OUTPATIENT
Start: 2019-06-16 | End: 2019-06-16

## 2019-06-16 RX ORDER — FENTANYL CITRATE 50 UG/ML
INJECTION, SOLUTION INTRAMUSCULAR; INTRAVENOUS
Status: DISCONTINUED | OUTPATIENT
Start: 2019-06-16 | End: 2019-06-16

## 2019-06-16 RX ORDER — HYDROMORPHONE HYDROCHLORIDE 2 MG/ML
INJECTION, SOLUTION INTRAMUSCULAR; INTRAVENOUS; SUBCUTANEOUS
Status: DISCONTINUED | OUTPATIENT
Start: 2019-06-16 | End: 2019-06-16

## 2019-06-16 RX ORDER — ROCURONIUM BROMIDE 10 MG/ML
INJECTION, SOLUTION INTRAVENOUS
Status: DISCONTINUED | OUTPATIENT
Start: 2019-06-16 | End: 2019-06-16

## 2019-06-16 RX ORDER — SODIUM CHLORIDE, SODIUM LACTATE, POTASSIUM CHLORIDE, CALCIUM CHLORIDE 600; 310; 30; 20 MG/100ML; MG/100ML; MG/100ML; MG/100ML
INJECTION, SOLUTION INTRAVENOUS CONTINUOUS PRN
Status: DISCONTINUED | OUTPATIENT
Start: 2019-06-16 | End: 2019-06-16

## 2019-06-16 RX ORDER — NEOSTIGMINE METHYLSULFATE 1 MG/ML
INJECTION, SOLUTION INTRAVENOUS
Status: DISCONTINUED | OUTPATIENT
Start: 2019-06-16 | End: 2019-06-16

## 2019-06-16 RX ORDER — CEFAZOLIN SODIUM 2 G/50ML
2 SOLUTION INTRAVENOUS
Status: COMPLETED | OUTPATIENT
Start: 2019-06-16 | End: 2019-06-18

## 2019-06-16 RX ORDER — DIPHENHYDRAMINE HYDROCHLORIDE 50 MG/ML
25 INJECTION INTRAMUSCULAR; INTRAVENOUS EVERY 6 HOURS PRN
Status: DISCONTINUED | OUTPATIENT
Start: 2019-06-16 | End: 2019-06-18

## 2019-06-16 RX ORDER — SUCCINYLCHOLINE CHLORIDE 20 MG/ML
INJECTION INTRAMUSCULAR; INTRAVENOUS
Status: DISCONTINUED | OUTPATIENT
Start: 2019-06-16 | End: 2019-06-16

## 2019-06-16 RX ORDER — HYDRALAZINE HYDROCHLORIDE 20 MG/ML
INJECTION INTRAMUSCULAR; INTRAVENOUS
Status: DISCONTINUED | OUTPATIENT
Start: 2019-06-16 | End: 2019-06-16

## 2019-06-16 RX ORDER — OXYCODONE AND ACETAMINOPHEN 5; 325 MG/1; MG/1
1 TABLET ORAL
Status: DISCONTINUED | OUTPATIENT
Start: 2019-06-16 | End: 2019-06-18

## 2019-06-16 RX ORDER — ACETAMINOPHEN 10 MG/ML
INJECTION, SOLUTION INTRAVENOUS
Status: DISCONTINUED | OUTPATIENT
Start: 2019-06-16 | End: 2019-06-16

## 2019-06-16 RX ORDER — HYDROMORPHONE HYDROCHLORIDE 2 MG/ML
0.2 INJECTION, SOLUTION INTRAMUSCULAR; INTRAVENOUS; SUBCUTANEOUS EVERY 5 MIN PRN
Status: DISCONTINUED | OUTPATIENT
Start: 2019-06-16 | End: 2019-06-18

## 2019-06-16 RX ORDER — PROPOFOL 10 MG/ML
VIAL (ML) INTRAVENOUS
Status: DISCONTINUED | OUTPATIENT
Start: 2019-06-16 | End: 2019-06-16

## 2019-06-16 RX ORDER — DEXAMETHASONE SODIUM PHOSPHATE 4 MG/ML
INJECTION, SOLUTION INTRA-ARTICULAR; INTRALESIONAL; INTRAMUSCULAR; INTRAVENOUS; SOFT TISSUE
Status: DISCONTINUED | OUTPATIENT
Start: 2019-06-16 | End: 2019-06-16

## 2019-06-16 RX ORDER — LIDOCAINE HYDROCHLORIDE 10 MG/ML
1 INJECTION INFILTRATION; PERINEURAL ONCE
Status: DISCONTINUED | OUTPATIENT
Start: 2019-06-16 | End: 2019-06-16

## 2019-06-16 RX ORDER — LIDOCAINE HCL/PF 100 MG/5ML
SYRINGE (ML) INTRAVENOUS
Status: DISCONTINUED | OUTPATIENT
Start: 2019-06-16 | End: 2019-06-16

## 2019-06-16 RX ORDER — ALBUMIN HUMAN 50 G/1000ML
SOLUTION INTRAVENOUS CONTINUOUS PRN
Status: DISCONTINUED | OUTPATIENT
Start: 2019-06-16 | End: 2019-06-16

## 2019-06-16 RX ADMIN — MORPHINE SULFATE 2 MG: 2 INJECTION, SOLUTION INTRAMUSCULAR; INTRAVENOUS at 05:06

## 2019-06-16 RX ADMIN — NEOSTIGMINE METHYLSULFATE 3.5 MG: 1 INJECTION INTRAVENOUS at 03:06

## 2019-06-16 RX ADMIN — DEXAMETHASONE SODIUM PHOSPHATE 4 MG: 4 INJECTION, SOLUTION INTRAMUSCULAR; INTRAVENOUS at 02:06

## 2019-06-16 RX ADMIN — PROPOFOL 100 MG: 10 INJECTION, EMULSION INTRAVENOUS at 02:06

## 2019-06-16 RX ADMIN — SUCCINYLCHOLINE CHLORIDE 160 MG: 20 INJECTION, SOLUTION INTRAMUSCULAR; INTRAVENOUS at 02:06

## 2019-06-16 RX ADMIN — FENTANYL CITRATE 50 MCG: 50 INJECTION, SOLUTION INTRAMUSCULAR; INTRAVENOUS at 02:06

## 2019-06-16 RX ADMIN — PHENYLEPHRINE HYDROCHLORIDE 200 MCG: 10 INJECTION INTRAVENOUS at 02:06

## 2019-06-16 RX ADMIN — ROCURONIUM BROMIDE 10 MG: 10 INJECTION, SOLUTION INTRAVENOUS at 02:06

## 2019-06-16 RX ADMIN — HYDROMORPHONE HYDROCHLORIDE 0.5 MG: 2 INJECTION INTRAMUSCULAR; INTRAVENOUS; SUBCUTANEOUS at 04:06

## 2019-06-16 RX ADMIN — CEFAZOLIN SODIUM 2 G: 2 SOLUTION INTRAVENOUS at 02:06

## 2019-06-16 RX ADMIN — HYDROMORPHONE HYDROCHLORIDE 0.5 MG: 2 INJECTION INTRAMUSCULAR; INTRAVENOUS; SUBCUTANEOUS at 03:06

## 2019-06-16 RX ADMIN — LIDOCAINE HYDROCHLORIDE 80 MG: 20 INJECTION, SOLUTION INTRAVENOUS at 02:06

## 2019-06-16 RX ADMIN — GLYCOPYRROLATE 0.7 MG: 0.2 INJECTION, SOLUTION INTRAMUSCULAR; INTRAVENOUS at 03:06

## 2019-06-16 RX ADMIN — PHENYLEPHRINE HYDROCHLORIDE 100 MCG: 10 INJECTION INTRAVENOUS at 02:06

## 2019-06-16 RX ADMIN — HYDRALAZINE HYDROCHLORIDE 6 MG: 20 INJECTION INTRAMUSCULAR; INTRAVENOUS at 03:06

## 2019-06-16 RX ADMIN — CALCIUM CHLORIDE 200 MG: 100 INJECTION, SOLUTION INTRAVENOUS at 02:06

## 2019-06-16 RX ADMIN — FENTANYL CITRATE 150 MCG: 50 INJECTION, SOLUTION INTRAMUSCULAR; INTRAVENOUS at 02:06

## 2019-06-16 RX ADMIN — SODIUM CHLORIDE, SODIUM LACTATE, POTASSIUM CHLORIDE, AND CALCIUM CHLORIDE: .6; .31; .03; .02 INJECTION, SOLUTION INTRAVENOUS at 05:06

## 2019-06-16 RX ADMIN — SODIUM CHLORIDE, SODIUM LACTATE, POTASSIUM CHLORIDE, AND CALCIUM CHLORIDE: .6; .31; .03; .02 INJECTION, SOLUTION INTRAVENOUS at 11:06

## 2019-06-16 RX ADMIN — CEFAZOLIN SODIUM 2 G: 2 SOLUTION INTRAVENOUS at 11:06

## 2019-06-16 RX ADMIN — ACETAMINOPHEN 1000 MG: 10 INJECTION, SOLUTION INTRAVENOUS at 02:06

## 2019-06-16 RX ADMIN — SODIUM CHLORIDE, SODIUM LACTATE, POTASSIUM CHLORIDE, AND CALCIUM CHLORIDE: .6; .31; .03; .02 INJECTION, SOLUTION INTRAVENOUS at 03:06

## 2019-06-16 RX ADMIN — ALBUMIN (HUMAN): 2.5 SOLUTION INTRAVENOUS at 02:06

## 2019-06-16 RX ADMIN — ONDANSETRON 4 MG: 2 INJECTION, SOLUTION INTRAMUSCULAR; INTRAVENOUS at 03:06

## 2019-06-16 RX ADMIN — SODIUM CHLORIDE, SODIUM LACTATE, POTASSIUM CHLORIDE, AND CALCIUM CHLORIDE: .6; .31; .03; .02 INJECTION, SOLUTION INTRAVENOUS at 04:06

## 2019-06-16 RX ADMIN — CALCIUM CHLORIDE 100 MG: 100 INJECTION, SOLUTION INTRAVENOUS at 02:06

## 2019-06-16 RX ADMIN — SODIUM CHLORIDE, SODIUM LACTATE, POTASSIUM CHLORIDE, AND CALCIUM CHLORIDE: .6; .31; .03; .02 INJECTION, SOLUTION INTRAVENOUS at 02:06

## 2019-06-16 NOTE — ANESTHESIA PREPROCEDURE EVALUATION
06/16/2019  Husam Connolly is a 88 y.o., male with SBO here for ex-lap    Past Medical History:   Diagnosis Date    Anticoagulant long-term use     Bowel obstruction     BPH (benign prostatic hyperplasia)     Diabetes mellitus     Diverticulitis     Hyperlipidemia     Hypertension      Past Surgical History:   Procedure Laterality Date    EYE SURGERY      TONSILLECTOMY       Review of patient's allergies indicates:   Allergen Reactions    Solarcaine [benzocaine-triclosan]     Sulfa (sulfonamide antibiotics)     Hytrin [terazosin] Rash    Smz-tmp ds [sulfamethoxazole-trimethoprim] Rash       Lab Results   Component Value Date    WBC 6.83 06/16/2019    HGB 16.4 06/16/2019    HCT 49.2 06/16/2019     06/16/2019    CHOL 121 05/31/2013    TRIG 77 05/31/2013    HDL 53 05/31/2013    ALT 19 06/13/2019    AST 23 06/13/2019     06/16/2019    K 3.8 06/16/2019    CL 97 06/16/2019    CREATININE 1.5 (H) 06/16/2019    BUN 28 (H) 06/16/2019    CO2 33 (H) 06/16/2019    TSH 0.452 05/31/2013    PSA 2.2 10/12/2011    INR 1.1 04/18/2015    HGBA1C 6.3 (H) 05/31/2013         Anesthesia Evaluation    I have reviewed the Patient Summary Reports.    I have reviewed the Nursing Notes.   I have reviewed the Medications.     Review of Systems  Anesthesia Hx:  No problems with previous Anesthesia Denies Hx of Anesthetic complications  Denies Family Hx of Anesthesia complications.   Denies Personal Hx of Anesthesia complications.   Social:  Non-Smoker, No Alcohol Use    Hematology/Oncology:     Oncology Normal     EENT/Dental:EENT/Dental Normal   Cardiovascular:  Cardiovascular Normal Exercise tolerance: good     Pulmonary:  Pulmonary Normal    Renal/:  Renal/ Normal     Hepatic/GI:  Hepatic/GI Normal    Neurological:  Neurology Normal    Endocrine:  Endocrine Normal    Psych:  Psychiatric Normal            Physical Exam  General:  Well nourished    Airway/Jaw/Neck:  Airway Findings: Mouth Opening: Normal Tongue: Normal  Mallampati: III  TM Distance: Normal, at least 6 cm  Jaw/Neck Findings:  Neck ROM: Normal ROM      Dental:  Dental Findings: Periodontal disease, Mild   Chest/Lungs:  Chest/Lungs Findings: Clear to auscultation     Heart/Vascular:  Heart Findings: Rate: Normal  Rhythm: Regular Rhythm  Sounds: Normal        Mental Status:  Mental Status Findings:  Alert and Oriented, Cooperative         Anesthesia Plan  Type of Anesthesia, risks & benefits discussed:  Anesthesia Type:  general  Patient's Preference:   Intra-op Monitoring Plan: standard ASA monitors  Intra-op Monitoring Plan Comments:   Post Op Pain Control Plan: per primary service following discharge from PACU  Post Op Pain Control Plan Comments:   Induction:   IV  Beta Blocker:  Patient is not currently on a Beta-Blocker (No further documentation required).       Informed Consent: Patient understands risks and agrees with Anesthesia plan.  Questions answered. Anesthesia consent signed with patient.  ASA Score: 2     Day of Surgery Review of History & Physical:            Ready For Surgery From Anesthesia Perspective.         No current facility-administered medications on file prior to encounter.      Current Outpatient Medications on File Prior to Encounter   Medication Sig Dispense Refill    albuterol 90 mcg/actuation inhaler Inhale 2 puffs into the lungs every 6 (six) hours as needed for Wheezing. Rescue 1 Inhaler 11    aspirin (ECOTRIN) 81 MG EC tablet Take 81 mg by mouth once daily.      atorvastatin (LIPITOR) 40 MG tablet TAKE 1 TABLET BY MOUTH EVERY DAY 90 tablet 3    famciclovir (FAMVIR) 500 MG tablet Take 500 mg by mouth 3 (three) times daily as needed.  11    finasteride (PROSCAR) 5 mg tablet TAKE 1 TABLET BY MOUTH EVERY DAY 90 tablet 1    ipratropium (ATROVENT HFA) 17 mcg/actuation inhaler Inhale 2 sprays into the lungs once  daily.      levocetirizine (XYZAL) 5 MG tablet Take 1 tablet (5 mg total) by mouth every evening. 30 tablet 11    multivitamin capsule Take 1 capsule by mouth once daily.      ranitidine (ZANTAC) 300 MG tablet Take 1 tablet (300 mg total) by mouth every evening. 30 tablet 11    tamsulosin (FLOMAX) 0.4 mg Cap Take 1 capsule by mouth nightly.  3    umeclidinium-vilanterol (ANORO ELLIPTA) 62.5-25 mcg/actuation DsDv       valsartan-hydrochlorothiazide (DIOVAN-HCT) 320-12.5 mg per tablet TAKE 1 TABLET BY MOUTH EVERY DAY 90 tablet 3

## 2019-06-16 NOTE — OP NOTE
[facility]  Brief Operative Note         Surgery Date: 6/16/2019     Surgeon(s) and Role:     * Fouzia Gardner MD - Primary    Assisting Surgeon: None    Pre-op Diagnosis:  Small bowel obstruction [K56.609]  Generalized abdominal pain [R10.84]  Diverticulitis of large intestine without perforation or abscess without bleeding [K57.32]  Essential hypertension [I10]  Diverticulitis [K57.92]  Benign prostatic hyperplasia, unspecified whether lower urinary tract symptoms present [N40.0]    Post-op Diagnosis:  Post-Op Diagnosis Codes:     * Small bowel obstruction [K56.609]     * Generalized abdominal pain [R10.84]     * Diverticulitis of large intestine without perforation or abscess without bleeding [K57.32]     * Essential hypertension [I10]     * Diverticulitis [K57.92]     * Benign prostatic hyperplasia, unspecified whether lower urinary tract symptoms present [N40.0]    Procedure(s) (LRB):  LAPAROTOMY, EXPLORATORY (N/A)  GISELA PROCEDURE  CREATION, COLOSTOMY    Anesthesia: General    Description of Procedure: dictated DATE OF PROCEDURE:  06/16/2019    PREOPERATIVE DIAGNOSES:  Small bowel obstruction, diverticulitis, hypertension   and diabetes.    POSTOPERATIVE DIAGNOSES:  Small bowel obstruction, diverticulitis, hypertension   and diabetes.    OPERATION:  Exploratory lap, lysis of adhesions, release small-bowel obstruction   and left colon segment resection of the strictured colon with colostomy and   Gisela procedure.    SURGEON:  Fouzia Gardner M.D.    ANESTHESIA:  General.    PROCEDURE IN DETAIL:  After satisfactory general anesthesia, the patient in   supine position, timeout was called.  The patient's identity was confirmed.    Abdomen was prepped and draped in normal sterile manner using ChloraPrep.    Midline incision was made from midway between the umbilicus and epigastric area   up to the hypogastric area, was taken down deep subcutaneous tissues.    Subcutaneous bleeders  clamped and bovied.  Linea alba was incised.  Abdominal   cavity was entered.  The patient was found to have omental adhesion to the   abdominal wall and a section of upper jejunal area where dense adhesion and   scarring were noted along with the omentum to the small bowel.  Extensive lysis   of adhesion was performed releasing the point of obstruction at that site and   releasing the obstruction.  Care was taken not to injure the bowel.  The patient   had good flow of the intestinal contents into the small bowel.  Then, small   bowel was run up to the ileocecal valve.  Extensive lysis of remaining adhesion   was performed up to the ileocecal valve area.  Then, the area of the left side   of the abdominal wall was examined, the patient was found to have a dense   adhesion in the left flank area where left colon and omentum was stuck to one   area of diffuse diverticulitis with a small mass was palpated.  Complete   mobilization of the left colon was performed starting at the white line.  The   obstructing area was found to be strictured because of recurrent diverticulitis.    Appropriate area was then selected above and below the area of stricture with   a huge palate of stool palpable at the area.  ANA was applied in both area and   mesentery of the bowel was treated with LigaSure.  Since it was unprepped bowel and    it was considered dirty case, it was decided not to do primary anastomosis at   this point then do a temporary colostomy and a David procedure and then come   back after six weeks a couple of months to the anastomosed area after the   patient heels well.  Abdominal cavity was thoroughly irrigated with normal   saline solution.  A small colostomy incision was made in the left upper   quadrant.  The proximal colon was then brought into the left upper quadrant   area.  Abdominal cavity was then thoroughly irrigated with antibiotic solution   and the distal segment was left as a David procedure and  was marked with   Prolene sutures.  Abdomen was then closed using running #1 Prolene suture and   subcutaneous tissue approximated with 3-0 Vicryl, skin was closed using skin   staple.  Sterile gauze dressing was applied.  Then, maturation of the colostomy   was performed.  The colon was attached at 3, 9, 6, and 12 o'clock position.    Colostomy was then opened and edges were closed with the colon using interrupted   2-0 chromic catgut.  No stricture was noted.  Hemostasis satisfactorily   maintained.  Colostomy dressing was applied and instrument count, sponge count   and needle count was correct.  Estimated blood loss was 50 mL.  Specimen removed   was left colonic segment with stricture.    POSTOPERATIVE DIAGNOSES:  Small bowel obstruction, diverticulitis, hypertension   and diabetes.    No intraoperative complications.  Intraoperative finding was then small   adhesions, small-bowel obstruction and chronic diverticulitis with a stricture,   left upper colonic area.        MS/HN  dd: 06/16/2019 16:05:29 (CDT)  td: 06/16/2019 19:22:31 (CDT)  Doc ID   #[id number]  Job ID #[id number]    CC:     #[id number]    Description of the findings of the procedure: Release small bowel obstruction , colostomy and heartman procedure  Done under general anaesthesia , Patient tolerated well,    Estimated Blood Loss: 50 cc       Specimens: colon segment  Specimen (12h ago, onward)    Start     Ordered    06/16/19 1531  Specimen to Pathology - Surgery  Once     Comments:  Pre-op Diagnosis: Small bowel obstruction [K56.609]Generalized abdominal pain [R10.84]Diverticulitis of large intestine without perforation or abscess without bleeding [K57.32]Essential hypertension [I10]Diverticulitis [K57.92]Benign prostatic hyperplasia, unspecified whether lower urinary tract symptoms present [N40.0]Procedure(s):LAPAROTOMY, EXPLORATORYEXCISION, SMALL INTESTINE Number of specimens: 1.Name of specimens: left colon     Start Status     06/16/19 1531 Collected  (06/16/19 1531) Order ID: [id number]       06/16/19 1531    06/16/19 1514  Specimen to Pathology - Surgery  Once     Comments:  Pre-op Diagnosis: Small bowel obstruction [K56.609]Generalized abdominal pain [R10.84]Diverticulitis of large intestine without perforation or abscess without bleeding [K57.32]Essential hypertension [I10]Diverticulitis [K57.92]Benign prostatic hyperplasia, unspecified whether lower urinary tract symptoms present [N40.0]Procedure(s):LAPAROTOMY, EXPLORATORYEXCISION, SMALL INTESTINE Number of specimens: 1.Name of specimens: 1. Left colon     Start Status     06/16/19 1514 Collected (06/16/19 1514) Order ID: [id number]       06/16/19 1514

## 2019-06-16 NOTE — PLAN OF CARE
Problem: Adult Inpatient Plan of Care  Goal: Plan of Care Review  Outcome: Ongoing (interventions implemented as appropriate)  Pt is resting and AAOx4 with no complaints of pain N/V during shift. NG tube to LCWS. . Frequent flushing of NG tube required to get tube to pull drainage. 1500cc green drainage from NG this shift. Safety maintained. Instructed patient to call about concerns, needs, or assistance. Will continue to monitor.

## 2019-06-16 NOTE — PLAN OF CARE
"Cued into patient's room.  Permission received per patient to turn camera to view patient.  Introduced as VN for night shift that will be working with floor nurse and nursing assistant.  Family member at bedside.  Educated patient on VN's role in patient care. Plan of care reviewed with patient. Education per flowsheet.  Opportunity given for questions and questions answered.  Denies pain, n/v, and sob.  States having a very small bowel movement today; "about the size of the tip of my pinkie finger".  Instructed to call for assistance.  Will cont to monitor.  "

## 2019-06-16 NOTE — ANESTHESIA POSTPROCEDURE EVALUATION
Anesthesia Post Evaluation    Patient: Husam Cononlly    Procedure(s) Performed: Procedure(s) (LRB):  LAPAROTOMY, EXPLORATORY (N/A)  GISELA PROCEDURE  CREATION, COLOSTOMY    Final Anesthesia Type: general  Patient location during evaluation: ICU  Patient participation: Yes- Able to Participate  Level of consciousness: awake and alert  Post-procedure vital signs: reviewed and stable  Pain management: adequate  Airway patency: patent  PONV status at discharge: No PONV  Anesthetic complications: no      Cardiovascular status: blood pressure returned to baseline  Respiratory status: unassisted  Hydration status: euvolemic  Follow-up not needed.          Vitals Value Taken Time   /60 6/16/2019  5:16 PM   Temp 36.5 °C (97.7 °F) 6/16/2019  4:30 PM   Pulse 84 6/16/2019  5:30 PM   Resp 15 6/16/2019  5:30 PM   SpO2 98 % 6/16/2019  5:30 PM   Vitals shown include unvalidated device data.      No case tracking events are documented in the log.      Pain/Keanu Score: Pain Rating Prior to Med Admin: 8 (6/16/2019  5:23 PM)

## 2019-06-16 NOTE — PLAN OF CARE
Problem: Adult Inpatient Plan of Care  Goal: Plan of Care Review  Pt s/p exploratory lap with release of SBO w/ colostomy placed to LUQ. Pt stable and NAD noted. Colostomy bag in place with scant amount of bright red blood. Stoma pink/moist. Pt stable and vss.  Safety maintained, will continue to monitor.

## 2019-06-16 NOTE — TRANSFER OF CARE
"Anesthesia Transfer of Care Note    Patient: Husam Connolly    Procedure(s) Performed: Procedure(s) (LRB):  LAPAROTOMY, EXPLORATORY (N/A)  GISELA PROCEDURE  CREATION, COLOSTOMY    Patient location: ICU    Anesthesia Type: general    Transport from OR: Transported from OR on 6-10 L/min O2 by face mask with adequate spontaneous ventilation    Post pain: adequate analgesia    Post assessment: no apparent anesthetic complications    Post vital signs: stable    Level of consciousness: awake, alert and oriented    Nausea/Vomiting: no nausea/vomiting    Complications: none          Last vitals:   Visit Vitals  /60   Pulse 76   Temp 36.5 °C (97.7 °F) (Oral)   Resp 19   Ht 5' 10" (1.778 m)   Wt 77.2 kg (170 lb 3.1 oz)   SpO2 98%   BMI 24.42 kg/m²     "

## 2019-06-16 NOTE — PROGRESS NOTES
"Surgery follow up  /62 (Patient Position: Lying)   Pulse 89   Temp 96.6 °F (35.9 °C) (Oral)   Resp 20   Ht 5' 10" (1.778 m)   Wt 73.4 kg (161 lb 13.1 oz)   SpO2 (!) 92%   BMI 23.22 kg/m²   I/O last 3 completed shifts:  In: 4475 [I.V.:4375; NG/GT:100]  Out: 6650 [Urine:1900; Emesis/NG output:500; Drains:4250]  No intake/output data recorded.  Recent Results (from the past 336 hour(s))   CBC auto differential    Collection Time: 06/16/19  5:23 AM   Result Value Ref Range    WBC 6.83 3.90 - 12.70 K/uL    Hemoglobin 16.4 14.0 - 18.0 g/dL    Hematocrit 49.2 40.0 - 54.0 %    Platelets 180 150 - 350 K/uL   CBC auto differential    Collection Time: 06/15/19  5:50 AM   Result Value Ref Range    WBC 8.97 3.90 - 12.70 K/uL    Hemoglobin 15.9 14.0 - 18.0 g/dL    Hematocrit 47.0 40.0 - 54.0 %    Platelets 189 150 - 350 K/uL   CBC auto differential    Collection Time: 06/14/19  6:40 AM   Result Value Ref Range    WBC 9.35 3.90 - 12.70 K/uL    Hemoglobin 15.9 14.0 - 18.0 g/dL    Hematocrit 47.5 40.0 - 54.0 %    Platelets 186 150 - 350 K/uL     Recent Results (from the past 336 hour(s))   Basic metabolic panel    Collection Time: 06/16/19  5:23 AM   Result Value Ref Range    Sodium 144 136 - 145 mmol/L    Potassium 3.8 3.5 - 5.1 mmol/L    Chloride 97 95 - 110 mmol/L    CO2 33 (H) 23 - 29 mmol/L    BUN, Bld 28 (H) 8 - 23 mg/dL    Creatinine 1.5 (H) 0.5 - 1.4 mg/dL    Calcium 10.0 8.7 - 10.5 mg/dL    Anion Gap 14 8 - 16 mmol/L   Basic metabolic panel    Collection Time: 06/15/19  5:50 AM   Result Value Ref Range    Sodium 143 136 - 145 mmol/L    Potassium 3.6 3.5 - 5.1 mmol/L    Chloride 101 95 - 110 mmol/L    CO2 29 23 - 29 mmol/L    BUN, Bld 21 8 - 23 mg/dL    Creatinine 1.4 0.5 - 1.4 mg/dL    Calcium 9.7 8.7 - 10.5 mg/dL    Anion Gap 13 8 - 16 mmol/L   Basic metabolic panel    Collection Time: 06/14/19  6:40 AM   Result Value Ref Range    Sodium 141 136 - 145 mmol/L    Potassium 3.8 3.5 - 5.1 mmol/L    Chloride 101 95 " - 110 mmol/L    CO2 29 23 - 29 mmol/L    BUN, Bld 20 8 - 23 mg/dL    Creatinine 1.5 (H) 0.5 - 1.4 mg/dL    Calcium 9.9 8.7 - 10.5 mg/dL    Anion Gap 11 8 - 16 mmol/L   abdomen still distended and tender ,xrays still distended small bowel  Hypoactive bowel sound  Discussed with patient , he is not opening up will do exploratory lap today, explained the procedure and possible complications to the patient he consents and agrees , along with the family.

## 2019-06-17 LAB
ANION GAP SERPL CALC-SCNC: 13 MMOL/L (ref 8–16)
BASOPHILS # BLD AUTO: 0 K/UL (ref 0–0.2)
BASOPHILS NFR BLD: 0 % (ref 0–1.9)
BUN SERPL-MCNC: 36 MG/DL (ref 8–23)
CALCIUM SERPL-MCNC: 9 MG/DL (ref 8.7–10.5)
CHLORIDE SERPL-SCNC: 100 MMOL/L (ref 95–110)
CO2 SERPL-SCNC: 30 MMOL/L (ref 23–29)
CREAT SERPL-MCNC: 1.6 MG/DL (ref 0.5–1.4)
DIFFERENTIAL METHOD: NORMAL
EOSINOPHIL # BLD AUTO: 0 K/UL (ref 0–0.5)
EOSINOPHIL NFR BLD: 0 % (ref 0–8)
ERYTHROCYTE [DISTWIDTH] IN BLOOD BY AUTOMATED COUNT: 13.6 % (ref 11.5–14.5)
EST. GFR  (AFRICAN AMERICAN): 44 ML/MIN/1.73 M^2
EST. GFR  (NON AFRICAN AMERICAN): 38 ML/MIN/1.73 M^2
GLUCOSE SERPL-MCNC: 187 MG/DL (ref 70–110)
HCT VFR BLD AUTO: 47.8 % (ref 40–54)
HGB BLD-MCNC: 15.7 G/DL (ref 14–18)
LYMPHOCYTES # BLD AUTO: 1.5 K/UL (ref 1–4.8)
LYMPHOCYTES NFR BLD: 19.7 % (ref 18–48)
MCH RBC QN AUTO: 29 PG (ref 27–31)
MCHC RBC AUTO-ENTMCNC: 32.8 G/DL (ref 32–36)
MCV RBC AUTO: 88 FL (ref 82–98)
MONOCYTES # BLD AUTO: 0.6 K/UL (ref 0.3–1)
MONOCYTES NFR BLD: 7.6 % (ref 4–15)
NEUTROPHILS # BLD AUTO: 5.4 K/UL (ref 1.8–7.7)
NEUTROPHILS NFR BLD: 72.6 % (ref 38–73)
PLATELET # BLD AUTO: 163 K/UL (ref 150–350)
PMV BLD AUTO: 11.4 FL (ref 9.2–12.9)
POTASSIUM SERPL-SCNC: 4.3 MMOL/L (ref 3.5–5.1)
RBC # BLD AUTO: 5.41 M/UL (ref 4.6–6.2)
SODIUM SERPL-SCNC: 143 MMOL/L (ref 136–145)
WBC # BLD AUTO: 7.48 K/UL (ref 3.9–12.7)

## 2019-06-17 PROCEDURE — 97165 OT EVAL LOW COMPLEX 30 MIN: CPT

## 2019-06-17 PROCEDURE — 80048 BASIC METABOLIC PNL TOTAL CA: CPT

## 2019-06-17 PROCEDURE — 85025 COMPLETE CBC W/AUTO DIFF WBC: CPT

## 2019-06-17 PROCEDURE — 36415 COLL VENOUS BLD VENIPUNCTURE: CPT

## 2019-06-17 PROCEDURE — 25000003 PHARM REV CODE 250: Performed by: ANESTHESIOLOGY

## 2019-06-17 PROCEDURE — 20000000 HC ICU ROOM

## 2019-06-17 PROCEDURE — 27000221 HC OXYGEN, UP TO 24 HOURS

## 2019-06-17 PROCEDURE — 25000003 PHARM REV CODE 250: Performed by: SURGERY

## 2019-06-17 PROCEDURE — 63600175 PHARM REV CODE 636 W HCPCS: Performed by: ANESTHESIOLOGY

## 2019-06-17 PROCEDURE — 97161 PT EVAL LOW COMPLEX 20 MIN: CPT | Performed by: PHYSICAL THERAPIST

## 2019-06-17 PROCEDURE — 94761 N-INVAS EAR/PLS OXIMETRY MLT: CPT

## 2019-06-17 PROCEDURE — 94799 UNLISTED PULMONARY SVC/PX: CPT

## 2019-06-17 PROCEDURE — 63600175 PHARM REV CODE 636 W HCPCS: Performed by: SURGERY

## 2019-06-17 RX ADMIN — MORPHINE SULFATE 2 MG: 2 INJECTION, SOLUTION INTRAMUSCULAR; INTRAVENOUS at 09:06

## 2019-06-17 RX ADMIN — PROMETHAZINE HYDROCHLORIDE 6.25 MG: 25 INJECTION INTRAMUSCULAR; INTRAVENOUS at 07:06

## 2019-06-17 RX ADMIN — MORPHINE SULFATE 2 MG: 2 INJECTION, SOLUTION INTRAMUSCULAR; INTRAVENOUS at 05:06

## 2019-06-17 RX ADMIN — CEFAZOLIN SODIUM 2 G: 2 SOLUTION INTRAVENOUS at 05:06

## 2019-06-17 RX ADMIN — SODIUM CHLORIDE, SODIUM LACTATE, POTASSIUM CHLORIDE, AND CALCIUM CHLORIDE: .6; .31; .03; .02 INJECTION, SOLUTION INTRAVENOUS at 05:06

## 2019-06-17 RX ADMIN — CEFAZOLIN SODIUM 2 G: 2 SOLUTION INTRAVENOUS at 07:06

## 2019-06-17 RX ADMIN — SODIUM CHLORIDE, SODIUM LACTATE, POTASSIUM CHLORIDE, AND CALCIUM CHLORIDE: .6; .31; .03; .02 INJECTION, SOLUTION INTRAVENOUS at 12:06

## 2019-06-17 RX ADMIN — SODIUM CHLORIDE, SODIUM LACTATE, POTASSIUM CHLORIDE, AND CALCIUM CHLORIDE: .6; .31; .03; .02 INJECTION, SOLUTION INTRAVENOUS at 07:06

## 2019-06-17 NOTE — PLAN OF CARE
Problem: Physical Therapy Goal  Goal: Physical Therapy Goal  1.  Supine to/from sit with Mod Ind  2.  Ambulate 400' without AD Ind  3.  Independent HEP  Outcome: Ongoing (interventions implemented as appropriate)  Pt would benefit from skilled PT to progress functional mobility.

## 2019-06-17 NOTE — PLAN OF CARE
Results for WINNIE GARNICA (MRN [id number]) as of 6/16/2019 20:01   Ref. Range 6/16/2019 05:23 6/16/2019 18:12   BUN, Bld Latest Ref Range: 8 - 23 mg/dL 28 (H) 34 (H)   Creatinine Latest Ref Range: 0.5 - 1.4 mg/dL 1.5 (H) 1.6 (H)       Notified Dr. Galicia about the trending up BUN and creatinine and distended abdomen withour any drainage from NG tube. As per X-ray NG tube is coiled in duodenum. Dr. Galicia ordered to keep the LR at 150 ml/hr and pull the NG tube out 3- 4 inches, Ok to flush  and order KUB. Orders implemented.    2000: NG tube pulled 4 inches. Drainage started, green and thin, X ray at bedside.

## 2019-06-17 NOTE — PLAN OF CARE
Problem: Occupational Therapy Goal  Goal: Occupational Therapy Goal  Goals to be met by: 07/17     Patient will increase functional independence with ADLs by performing:    UE Dressing with Supervision.  LE Dressing with Supervision.  Grooming while standing at sink with Supervision.  Toileting from toilet with Supervision for hygiene and clothing management.   Toilet transfer to toilet with Supervision.  Increased functional strength to WFL for ADLs.    Outcome: Ongoing (interventions implemented as appropriate)  Pt found [facility] & agreeable to OT/PT co-eval this PM. Pt lives w/ spouse in 2SH w/ 2STE; bedroom & WIS w/ blt in seat/GBs on 1st level. PLOF: (I) w/o DME w/ all fxnl tasks incl IADLs & driving. Currently, pt perf standing x 2 attempts w/ CGA x 1-2 & april'd ~3-4 min w/ SBA & no DME. Perf various UB TE w/ emph on deep/PLBing & muscle guarding reduction. Edu/tx re: abd bracing tech, general , deep/PLBing, HEP & logrolling techs. Pt/fly verbalized understanding.    Pt presents w/ decreased overall endurance/conditioning, balance/mobility & coordination w/ subsequent decline in (I)/safety w/ BADLs, fxnl mobility & fxnl t/f's.  OT 5x/wk to increase phys/fxnl status & maximize potential to achieve established goals for d/c-->home w/ HHOT/PT & DME TBD pending progress.

## 2019-06-17 NOTE — PLAN OF CARE
Problem: Adult Inpatient Plan of Care  Goal: Plan of Care Review  Pt on documented O2. Mild respiratory distress noted. Nurse aware. Will continue to monitor.

## 2019-06-17 NOTE — PT/OT/SLP EVAL
Occupational Therapy   Evaluation/tx    Name: Husam Connolly  MRN: [id number]  Admitting Diagnosis:  Small bowel obstruction 1 Day Post-Op    Recommendations:     Discharge Recommendations: home health PT, home health OT  Discharge Equipment Recommendations:  (TBD pending progress)  Barriers to discharge:  None    Assessment:   Pt presents w/ decreased overall endurance/conditioning, balance/mobility & coordination w/ subsequent decline in (I)/safety w/ BADLs, fxnl mobility & fxnl t/f's.  OT 5x/wk to increase phys/fxnl status & maximize potential to achieve established goals for d/c-->home w/ HHOT/PT & DME TBD pending progress.    Husam Connolly is a 88 y.o. male with a medical diagnosis of Small bowel obstruction.  He presents with . Performance deficits affecting function: weakness, impaired endurance, gait instability, impaired functional mobilty, impaired self care skills, impaired balance, decreased lower extremity function, decreased upper extremity function, decreased coordination, decreased safety awareness, pain, decreased ROM, impaired cardiopulmonary response to activity, edema, impaired skin.      Rehab Prognosis: Good; patient would benefit from acute skilled OT services to address these deficits and reach maximum level of function.       Plan:     Patient to be seen 5 x/week to address the above listed problems via self-care/home management, therapeutic exercises, therapeutic activities  · Plan of Care Expires: 07/17/19  · Plan of Care Reviewed with: patient, spouse, son    Subjective     Chief Complaint: abd pain  Patient/Family Comments/goals: return to PLOF    Occupational Profile:  Living Environment: w/ spouse in 2SH w/ 2STE; bedroom & WIS on 1st level  Previous level of function: (I) w/o DMe  Roles and Routines: IADLs, driving  Equipment Used at Home:  none  Assistance upon Discharge: spouse    Pain/Comfort:  · Pain Rating 1: 2/10  · Location 1: abdomen  · Pain Addressed 1: Pre-medicate for  activity, Reposition, Distraction  · Pain Rating Post-Intervention 1: 2/10    Patients cultural, spiritual, Rastafari conflicts given the current situation:      Objective:     Communicated with: nsg prior to session.  Patient found up in chair with blood pressure cuff, oxygen, peripheral IV, pulse ox (continuous), telemetry, NG tube, collado catheter, colostomy upon OT entry to room.    General Precautions: Standard, fall   Orthopedic Precautions:N/A   Braces: N/A     Occupational Performance:    Bed Mobility:    ·     Functional Mobility/Transfers:  · Patient completed Sit <> Stand Transfer with contact guard assistance and of 1-2 persons  with  hand-held assist   · Functional Mobility:     Activities of Daily Living:  ·     Cognitive/Visual Perceptual:  Grossly WFl    Physical Exam:  BUEs WFL at 4/5    Sit balance: F+  Stand balance: F to F+    AMPAC 6 Click ADL:  AMPAC Total Score: 17    Treatment & Education:  Pt found [facility] & agreeable to OT/PT co-eval this PM. Pt lives w/ spouse in 2SH w/ 2STE; bedroom & WIS w/ blt in seat/GBs on 1st level. PLOF: (I) w/o DME w/ all fxnl tasks incl IADLs & driving. Currently, pt perf standing x 2 attempts w/ CGA x 1-2 & april'd ~3-4 min w/ SBA & no DME. Perf various UB TE w/ emph on deep/PLBing & muscle guarding reduction. Edu/tx re: abd bracing tech, general , deep/PLBing, HEP & logrolling techs. Pt/fly verbalized understanding.    Patient left up in chair with all lines intact, call button in reach, nsg notified and fly present    GOALS:   Multidisciplinary Problems     Occupational Therapy Goals        Problem: Occupational Therapy Goal    Goal Priority Disciplines Outcome Interventions   Occupational Therapy Goal     OT, PT/OT Ongoing (interventions implemented as appropriate)    Description:  Goals to be met by: 07/17     Patient will increase functional independence with ADLs by performing:    UE Dressing with Supervision.  LE Dressing with Supervision.  Grooming  while standing at sink with Supervision.  Toileting from toilet with Supervision for hygiene and clothing management.   Toilet transfer to toilet with Supervision.  Increased functional strength to WFL for ADLs.                      History:     Past Medical History:   Diagnosis Date    Anticoagulant long-term use     Bowel obstruction     BPH (benign prostatic hyperplasia)     Diabetes mellitus     Diverticulitis     Hyperlipidemia     Hypertension        Past Surgical History:   Procedure Laterality Date    CREATION, COLOSTOMY  6/16/2019    [facility]    EYE SURGERY      GISELA PROCEDURE  6/16/2019    [facility]    LAPAROTOMY, EXPLORATORY N/A 6/16/2019    [facility]    TONSILLECTOMY         Time Tracking:     OT Date of Treatment: 06/17/19  OT Start Time: 1412  OT Stop Time: 1428  OT Total Time (min): 16 min    Billable Minutes:Evaluation 16  Total Time 16    SIOMN Gonzalez  6/17/2019

## 2019-06-17 NOTE — PROGRESS NOTES
"POD#1  S/p colon resection , colostomy and repaur and release small bowel obstruction  /71   Pulse 100   Temp 98.1 °F (36.7 °C) (Oral)   Resp 20   Ht 5' 10" (1.778 m)   Wt 77.2 kg (170 lb 3.1 oz)   SpO2 100%   BMI 24.42 kg/m²   I/O last 3 completed shifts:  In: 4477.5 [I.V.:4387.5; NG/GT:40; IV Piggyback:50]  Out: 3025 [Urine:1775; Drains:1250]  I/O this shift:  In: -   Out: 240 [Urine:240]  I/O last 3 completed shifts:  In: 4477.5 [I.V.:4387.5; NG/GT:40; IV Piggyback:50]  Out: 3025 [Urine:1775; Drains:1250]  I/O this shift:  In: -   Out: 240 [Urine:240]  Recent Results (from the past 336 hour(s))   CBC auto differential    Collection Time: 06/17/19  3:14 AM   Result Value Ref Range    WBC 7.48 3.90 - 12.70 K/uL    Hemoglobin 15.7 14.0 - 18.0 g/dL    Hematocrit 47.8 40.0 - 54.0 %    Platelets 163 150 - 350 K/uL   CBC auto differential    Collection Time: 06/16/19  6:12 PM   Result Value Ref Range    WBC 6.75 3.90 - 12.70 K/uL    Hemoglobin 15.5 14.0 - 18.0 g/dL    Hematocrit 47.7 40.0 - 54.0 %    Platelets 189 150 - 350 K/uL   CBC auto differential    Collection Time: 06/16/19  5:23 AM   Result Value Ref Range    WBC 6.83 3.90 - 12.70 K/uL    Hemoglobin 16.4 14.0 - 18.0 g/dL    Hematocrit 49.2 40.0 - 54.0 %    Platelets 180 150 - 350 K/uL     Recent Results (from the past 336 hour(s))   Basic metabolic panel    Collection Time: 06/17/19  3:13 AM   Result Value Ref Range    Sodium 143 136 - 145 mmol/L    Potassium 4.3 3.5 - 5.1 mmol/L    Chloride 100 95 - 110 mmol/L    CO2 30 (H) 23 - 29 mmol/L    BUN, Bld 36 (H) 8 - 23 mg/dL    Creatinine 1.6 (H) 0.5 - 1.4 mg/dL    Calcium 9.0 8.7 - 10.5 mg/dL    Anion Gap 13 8 - 16 mmol/L   Basic metabolic panel    Collection Time: 06/16/19  6:12 PM   Result Value Ref Range    Sodium 143 136 - 145 mmol/L    Potassium 3.9 3.5 - 5.1 mmol/L    Chloride 99 95 - 110 mmol/L    CO2 29 23 - 29 mmol/L    BUN, Bld 34 (H) 8 - 23 mg/dL    Creatinine 1.6 (H) 0.5 - 1.4 mg/dL    " Calcium 9.5 8.7 - 10.5 mg/dL    Anion Gap 15 8 - 16 mmol/L   Basic metabolic panel    Collection Time: 06/16/19  5:23 AM   Result Value Ref Range    Sodium 144 136 - 145 mmol/L    Potassium 3.8 3.5 - 5.1 mmol/L    Chloride 97 95 - 110 mmol/L    CO2 33 (H) 23 - 29 mmol/L    BUN, Bld 28 (H) 8 - 23 mg/dL    Creatinine 1.5 (H) 0.5 - 1.4 mg/dL    Calcium 10.0 8.7 - 10.5 mg/dL    Anion Gap 14 8 - 16 mmol/L   mild discomfort abdomen , no nausea or vomiting, abdomen soft non tender  Colostomy color ok     Plan: consult pt/ot, incentive spirometry, our of bed today

## 2019-06-17 NOTE — PT/OT/SLP EVAL
Physical Therapy Evaluation    Patient Name:  Husam Connolly   MRN:  [id number]    Recommendations:     Discharge Recommendations:  home   Discharge Equipment Recommendations: none   Barriers to discharge: None    Assessment:     Husam Connolly is a 88 y.o. male admitted with a medical diagnosis of Small bowel obstruction.  He presents with the following impairments/functional limitations:  weakness, impaired functional mobilty, impaired cardiopulmonary response to activity, decreased lower extremity function, impaired self care skills, impaired endurance.  Pt ambulated in place x 40 with CG/HHA, limited by NG Tube.    Rehab Prognosis: Good; patient would benefit from acute skilled PT services to address these deficits and reach maximum level of function.    Recent Surgery: Procedure(s) (LRB):  LAPAROTOMY, EXPLORATORY (N/A)  GISELA PROCEDURE  CREATION, COLOSTOMY 1 Day Post-Op    Plan:     During this hospitalization, patient to be seen 5 x/week to address the identified rehab impairments via gait training, therapeutic activities, therapeutic exercises, neuromuscular re-education and progress toward the following goals:    · Plan of Care Expires:  07/17/19    Subjective     Chief Complaint: minimal belly discomfort  Patient/Family Comments/goals: go home soon  Pain/Comfort:  · Pain Rating 1: 2/10  · Location 1: abdomen  · Pain Addressed 1: Pre-medicate for activity, Reposition, Distraction  · Pain Rating Post-Intervention 1: 2/10    Patients cultural, spiritual, Caodaism conflicts given the current situation: no    Living Environment:  Pt lives with his wife in a 2SH with bedroom and bathroom on 1st floor.  Pt has a WIS with built in seat and grab bars.    Prior to admission, patients level of function was Independent community ambulation.  Equipment used at home: none.  DME owned (not currently used): rolling walker and RW was previously used by his wife.  Upon discharge, patient will have assistance from his  wife.    Objective:     Communicated with nurse prior to session.  Patient found up in chair with telemetry, peripheral IV, oxygen, pulse ox (continuous), blood pressure cuff, collado catheter, NG tube  upon PT entry to room.    General Precautions: Standard, fall   Orthopedic Precautions:    Braces:       Exams:  · Pt is oriented x 4.  Pt has BLE ROM WFL.  Pt has 4 to 5/5 BLE strength.    Functional Mobility:  · Transfers:     · Sit to Stand:  contact guard assistance with hand-held assist  · Gait:  Pt ambulated in place x 40 with CG/HHA, limited by NG Tube.  · Balance: Pt has G- dynamic standing balance      Therapeutic Activities and Exercises:   Pt stood for 4+ minutes x 1 with CG to supervision required, marching x 40 with CG/HHA.    AM-PAC 6 CLICK MOBILITY  Total Score:21     Patient left up in chair with all lines intact and call button in reach wife and son in room.    GOALS:   Multidisciplinary Problems     Physical Therapy Goals        Problem: Physical Therapy Goal    Goal Priority Disciplines Outcome Goal Variances Interventions   Physical Therapy Goal     PT, PT/OT Ongoing (interventions implemented as appropriate)     Description:  1.  Supine to/from sit with Mod Ind  2.  Ambulate 400' without AD Ind  3.  Independent HEP                    History:     Past Medical History:   Diagnosis Date    Anticoagulant long-term use     Bowel obstruction     BPH (benign prostatic hyperplasia)     Diabetes mellitus     Diverticulitis     Hyperlipidemia     Hypertension        Past Surgical History:   Procedure Laterality Date    CREATION, COLOSTOMY  6/16/2019    [facility]    EYE SURGERY      GISELA PROCEDURE  6/16/2019    [facility]    LAPAROTOMY, EXPLORATORY N/A 6/16/2019    [facility] at [facility]    TONSILLECTOMY         Time Tracking:     PT Received On: 06/17/19  PT Start Time: 1404     PT Stop Time:  1431  PT Total Time (min): 27 min     Billable Minutes: Evaluation 27      Maylin Marte, PT  06/17/2019

## 2019-06-18 LAB
ANION GAP SERPL CALC-SCNC: 11 MMOL/L (ref 8–16)
BASOPHILS # BLD AUTO: 0 K/UL (ref 0–0.2)
BASOPHILS NFR BLD: 0 % (ref 0–1.9)
BUN SERPL-MCNC: 34 MG/DL (ref 8–23)
CALCIUM SERPL-MCNC: 8.8 MG/DL (ref 8.7–10.5)
CHLORIDE SERPL-SCNC: 102 MMOL/L (ref 95–110)
CO2 SERPL-SCNC: 31 MMOL/L (ref 23–29)
CREAT SERPL-MCNC: 1.3 MG/DL (ref 0.5–1.4)
DIFFERENTIAL METHOD: ABNORMAL
EOSINOPHIL # BLD AUTO: 0.1 K/UL (ref 0–0.5)
EOSINOPHIL NFR BLD: 0.8 % (ref 0–8)
ERYTHROCYTE [DISTWIDTH] IN BLOOD BY AUTOMATED COUNT: 13.8 % (ref 11.5–14.5)
EST. GFR  (AFRICAN AMERICAN): 56 ML/MIN/1.73 M^2
EST. GFR  (NON AFRICAN AMERICAN): 49 ML/MIN/1.73 M^2
GLUCOSE SERPL-MCNC: 144 MG/DL (ref 70–110)
HCT VFR BLD AUTO: 45.2 % (ref 40–54)
HGB BLD-MCNC: 14.7 G/DL (ref 14–18)
LYMPHOCYTES # BLD AUTO: 2 K/UL (ref 1–4.8)
LYMPHOCYTES NFR BLD: 23.9 % (ref 18–48)
MCH RBC QN AUTO: 28.9 PG (ref 27–31)
MCHC RBC AUTO-ENTMCNC: 32.5 G/DL (ref 32–36)
MCV RBC AUTO: 89 FL (ref 82–98)
MONOCYTES # BLD AUTO: 0.5 K/UL (ref 0.3–1)
MONOCYTES NFR BLD: 6.4 % (ref 4–15)
NEUTROPHILS # BLD AUTO: 5.8 K/UL (ref 1.8–7.7)
NEUTROPHILS NFR BLD: 68.7 % (ref 38–73)
PLATELET # BLD AUTO: 146 K/UL (ref 150–350)
PMV BLD AUTO: 11 FL (ref 9.2–12.9)
POTASSIUM SERPL-SCNC: 3.6 MMOL/L (ref 3.5–5.1)
RBC # BLD AUTO: 5.08 M/UL (ref 4.6–6.2)
SODIUM SERPL-SCNC: 144 MMOL/L (ref 136–145)
WBC # BLD AUTO: 8.45 K/UL (ref 3.9–12.7)

## 2019-06-18 PROCEDURE — 63600175 PHARM REV CODE 636 W HCPCS: Performed by: SURGERY

## 2019-06-18 PROCEDURE — 27000221 HC OXYGEN, UP TO 24 HOURS

## 2019-06-18 PROCEDURE — 94761 N-INVAS EAR/PLS OXIMETRY MLT: CPT

## 2019-06-18 PROCEDURE — 99900035 HC TECH TIME PER 15 MIN (STAT)

## 2019-06-18 PROCEDURE — 97110 THERAPEUTIC EXERCISES: CPT

## 2019-06-18 PROCEDURE — 36415 COLL VENOUS BLD VENIPUNCTURE: CPT

## 2019-06-18 PROCEDURE — 85025 COMPLETE CBC W/AUTO DIFF WBC: CPT

## 2019-06-18 PROCEDURE — 25000003 PHARM REV CODE 250: Performed by: SURGERY

## 2019-06-18 PROCEDURE — 80048 BASIC METABOLIC PNL TOTAL CA: CPT

## 2019-06-18 PROCEDURE — 11000001 HC ACUTE MED/SURG PRIVATE ROOM

## 2019-06-18 RX ADMIN — SODIUM CHLORIDE, SODIUM LACTATE, POTASSIUM CHLORIDE, AND CALCIUM CHLORIDE: .6; .31; .03; .02 INJECTION, SOLUTION INTRAVENOUS at 02:06

## 2019-06-18 RX ADMIN — CEFAZOLIN SODIUM 2 G: 2 SOLUTION INTRAVENOUS at 02:06

## 2019-06-18 RX ADMIN — CEFAZOLIN SODIUM 2 G: 2 SOLUTION INTRAVENOUS at 06:06

## 2019-06-18 RX ADMIN — CEFAZOLIN SODIUM 2 G: 2 SOLUTION INTRAVENOUS at 09:06

## 2019-06-18 RX ADMIN — SODIUM CHLORIDE, SODIUM LACTATE, POTASSIUM CHLORIDE, AND CALCIUM CHLORIDE: .6; .31; .03; .02 INJECTION, SOLUTION INTRAVENOUS at 09:06

## 2019-06-18 NOTE — PLAN OF CARE
VN cued into pt's room for introduction with pt's permission. Pt transferred from ICU. NGT in place. Family at bedside. VN role explained and informed pt that VN would be working with bedside nurse and the rest of the care team. Fall risk and bed alarm protocol education provided. Instructed pt to call for assistance. Pt aware and agreeable. Allowed time for questions. NAD noted. Will cont to be available as needed.        06/18/19 7974   Type of Frequent Check   Type Patient Rounds;Other (see comments)  (VN rounds)   Safety/Activity   Patient Rounds visualized patient;call light in patient/parent reach   Safety Promotion/Fall Prevention Fall Risk reviewed with patient/family;assistive device/personal item within reach;family to remain at bedside;instructed to call staff for mobility   Positioning   Body Position supine   Pain/Comfort/Sleep   Preferred Pain Scale word (verbal rating pain scale)   Pain Rating: Rest 0 - no pain   Sleep/Rest/Relaxation awake;no problem identified   Assessments (Pre/Post)   Level of Consciousness (AVPU) alert

## 2019-06-18 NOTE — PLAN OF CARE
Problem: Adult Inpatient Plan of Care  Goal: Plan of Care Review  Outcome: Ongoing (interventions implemented as appropriate)  AAOx4, no c/o pain, offered PRN pain medication but refused, midabdominal incision CDI, staples intact, dressing CDI, abdomen soft but tender, distension improving, NG to LCWS, UOP adequate, on LR at 150 ml/hr, SCD's in place, cell light within reach, instructed to call for assist, fall precautions maintained.

## 2019-06-18 NOTE — PT/OT/SLP PROGRESS
Physical Therapy      Patient Name:  Husam Connolly   MRN:  [id number]    Pt reports he is too fatigued- he has been up since 5:30 this morning, OOB all morning, had gone down for x-ray and had OT. He is not having any pain or nausea and would like to defer PT until tomorrow. Will follow-up .    Karine Plascencia, PT   6/18/2019

## 2019-06-18 NOTE — PT/OT/SLP PROGRESS
Occupational Therapy   Treatment    Name: Husam Connolly  MRN: [id number]  Admitting Diagnosis:  Small bowel obstruction  2 Days Post-Op    Recommendations:     Discharge Recommendations: home health PT, home health OT  Discharge Equipment Recommendations:  other (see comments)(TBD)  Barriers to discharge:  None    Assessment:     Husam Connolly is a 88 y.o. male with a medical diagnosis of Small bowel obstruction.   Performance deficits affecting function are weakness, impaired endurance, impaired self care skills, impaired functional mobilty, gait instability, impaired balance, decreased upper extremity function, decreased lower extremity function, decreased safety awareness, pain, impaired skin, impaired cardiopulmonary response to activity. Pt found in bed, agreeable only to bed therex secondary to fatigue. Pt tolerated therex well with no complaints of pain. Continue OT services to address functional goals, progressing as able.      Rehab Prognosis:  Good; patient would benefit from acute skilled OT services to address these deficits and reach maximum level of function.       Plan:     Patient to be seen 5 x/week to address the above listed problems via self-care/home management, therapeutic activities, therapeutic exercises  · Plan of Care Expires: 07/17/19  · Plan of Care Reviewed with: patient, family    Subjective     Pain/Comfort:  · Pain Rating 1: (minimal pain only with cough)    Objective:     Communicated with: RN prior to session.  Patient found HOB elevated with oxygen, colostomy, collado catheter, NG tube, telemetry, peripheral IV upon OT entry to room.    General Precautions: Standard, fall   Orthopedic Precautions:N/A   Braces: N/A     Occupational Performance:     Bed Mobility:    · Declined    Functional Mobility/Transfers:  · Declined    Activities of Daily Living:  · Declined      [facility] 6 Click ADL: 17    Treatment & Education:  Pt performed BUE AROM ex 2 x 10 reps all jts/planes and BLE AROM  ex 1-2 x 10 reps all jts/planes. Pt tolerated well with short rest breaks between sets.     Patient left HOB elevated with all lines intact, call button in reach, bed alarm on and family presentEducation:      GOALS:   Multidisciplinary Problems     Occupational Therapy Goals        Problem: Occupational Therapy Goal    Goal Priority Disciplines Outcome Interventions   Occupational Therapy Goal     OT, PT/OT Ongoing (interventions implemented as appropriate)    Description:  Goals to be met by: 07/17     Patient will increase functional independence with ADLs by performing:    UE Dressing with Supervision.  LE Dressing with Supervision.  Grooming while standing at sink with Supervision.  Toileting from toilet with Supervision for hygiene and clothing management.   Toilet transfer to toilet with Supervision.  Increased functional strength to WFL for ADLs.                      Time Tracking:     OT Date of Treatment: 06/18/19  OT Start Time: 1325  OT Stop Time: 1348  OT Total Time (min): 23 min    Billable Minutes:Therapeutic Exercise 23    HETAL Garcia  6/18/2019

## 2019-06-18 NOTE — PT/OT/SLP PROGRESS
Physical Therapy      Patient Name:  Husam Connolly   MRN:  [id number]    Patient not seen at this time secondary to pt just finished with OT who reports pt is fatigued and only performed UE/LE ex with her. Will follow-up later.    Karine Plascencia, PT   6/18/2019

## 2019-06-18 NOTE — PLAN OF CARE
Problem: Adult Inpatient Plan of Care  Goal: Plan of Care Review  Outcome: Ongoing (interventions implemented as appropriate)     Recommendation/Intervention:   1. Advance diet when medically able to clear liquids.   2. If unable to initate PO diet, consider Clinimix of 4.25/10 @ 80mL/hr to provide 981kcal, 82g pro, 1920mL/d fluid with a GIR of 1.7 mg/kg/min.       Goals: Diet or PPN will be started within 24 hours  Nutrition Goal Status: new  Communication of RD Recs: other (comment)(POC)

## 2019-06-18 NOTE — PT/OT/SLP PROGRESS
Physical Therapy      Patient Name:  Husam Connolly   MRN:  [id number]    Patient not seen at this time secondary to 2/2 nurse preparing pt to transfer to floor. Will follow-up .    Karine Plascencia, PT   6/18/2019

## 2019-06-18 NOTE — PLAN OF CARE
Problem: Occupational Therapy Goal  Goal: Occupational Therapy Goal  Goals to be met by: 07/17     Patient will increase functional independence with ADLs by performing:    UE Dressing with Supervision.  LE Dressing with Supervision.  Grooming while standing at sink with Supervision.  Toileting from toilet with Supervision for hygiene and clothing management.   Toilet transfer to toilet with Supervision.  Increased functional strength to WFL for ADLs.     Outcome: Ongoing (interventions implemented as appropriate)  Husam Connolly is a 88 y.o. male with a medical diagnosis of Small bowel obstruction.   Performance deficits affecting function are weakness, impaired endurance, impaired self care skills, impaired functional mobilty, gait instability, impaired balance, decreased upper extremity function, decreased lower extremity function, decreased safety awareness, pain, impaired skin, impaired cardiopulmonary response to activity. Pt found in bed, agreeable only to bed therex secondary to fatigue. Pt tolerated therex well with no complaints of pain. Continue OT services to address functional goals, progressing as able.    MARK ANTHONY Garcia/L

## 2019-06-18 NOTE — NURSING TRANSFER
Nursing Transfer Note       6/18/2019     Transfer To: 531A From:[id number]    Transfer via stretcher    Transfer with cardiac monitoring    Transported by [facility]    Medicines sent: N/A    Chart send with patient: Yes    Notified: spouse, son    Patient reassessed at: 6/18/2019,1130 (date, time)    Upon arrival to floor: cardiac monitor applied, patient oriented to room, call bell in reach and bed in lowest position. RN @ bedside. All questions answered.

## 2019-06-18 NOTE — PROGRESS NOTES
"POD#2  BP (!) 152/71 (BP Location: Right arm, Patient Position: Lying)   Pulse 83   Temp 97.5 °F (36.4 °C) (Oral)   Resp 20   Ht 5' 10" (1.778 m)   Wt 77.2 kg (170 lb 3.1 oz)   SpO2 100%   BMI 24.42 kg/m²   I/O last 3 completed shifts:  In: 3837.5 [P.O.:200; I.V.:3487.5; IV Piggyback:150]  Out: 2480 [Urine:1980; Drains:500]  I/O this shift:  In: -   Out: 100 [Urine:100]      Recent Results (from the past 336 hour(s))   CBC auto differential    Collection Time: 06/18/19  4:35 AM   Result Value Ref Range    WBC 8.45 3.90 - 12.70 K/uL    Hemoglobin 14.7 14.0 - 18.0 g/dL    Hematocrit 45.2 40.0 - 54.0 %    Platelets 146 (L) 150 - 350 K/uL   CBC auto differential    Collection Time: 06/17/19  3:14 AM   Result Value Ref Range    WBC 7.48 3.90 - 12.70 K/uL    Hemoglobin 15.7 14.0 - 18.0 g/dL    Hematocrit 47.8 40.0 - 54.0 %    Platelets 163 150 - 350 K/uL   CBC auto differential    Collection Time: 06/16/19  6:12 PM   Result Value Ref Range    WBC 6.75 3.90 - 12.70 K/uL    Hemoglobin 15.5 14.0 - 18.0 g/dL    Hematocrit 47.7 40.0 - 54.0 %    Platelets 189 150 - 350 K/uL     Recent Results (from the past 336 hour(s))   Basic metabolic panel    Collection Time: 06/18/19  4:35 AM   Result Value Ref Range    Sodium 144 136 - 145 mmol/L    Potassium 3.6 3.5 - 5.1 mmol/L    Chloride 102 95 - 110 mmol/L    CO2 31 (H) 23 - 29 mmol/L    BUN, Bld 34 (H) 8 - 23 mg/dL    Creatinine 1.3 0.5 - 1.4 mg/dL    Calcium 8.8 8.7 - 10.5 mg/dL    Anion Gap 11 8 - 16 mmol/L   Basic metabolic panel    Collection Time: 06/17/19  3:13 AM   Result Value Ref Range    Sodium 143 136 - 145 mmol/L    Potassium 4.3 3.5 - 5.1 mmol/L    Chloride 100 95 - 110 mmol/L    CO2 30 (H) 23 - 29 mmol/L    BUN, Bld 36 (H) 8 - 23 mg/dL    Creatinine 1.6 (H) 0.5 - 1.4 mg/dL    Calcium 9.0 8.7 - 10.5 mg/dL    Anion Gap 13 8 - 16 mmol/L   Basic metabolic panel    Collection Time: 06/16/19  6:12 PM   Result Value Ref Range    Sodium 143 136 - 145 mmol/L    Potassium " 3.9 3.5 - 5.1 mmol/L    Chloride 99 95 - 110 mmol/L    CO2 29 23 - 29 mmol/L    BUN, Bld 34 (H) 8 - 23 mg/dL    Creatinine 1.6 (H) 0.5 - 1.4 mg/dL    Calcium 9.5 8.7 - 10.5 mg/dL    Anion Gap 15 8 - 16 mmol/L     Abdomen soft , out of bed ,colostomy  Transfer to floor today.

## 2019-06-18 NOTE — PROGRESS NOTES
"[facility]  Adult Nutrition  Progress Note    SUMMARY       Recommendations    Recommendation/Intervention:   1. Advance diet when medically able to clear liquids.   2. If unable to initate PO diet, consider Clinimix of 4.25/10 @ 80mL/hr to provide 981kcal, 82g pro, 1920mL/d fluid with a GIR of 1.7 mg/kg/min.      Goals: Diet or PPN will be started within 24 hours  Nutrition Goal Status: new  Communication of YENY Recs: other (comment)(POC)    Reason for Assessment    Reason For Assessment: NPO/clear liquids x 5 days  Diagnosis: other (see comments)(SBO)  Relevant Medical History: CKD3, DM, edema, HTN, HLF, Diverticulities, BPH  Interdisciplinary Rounds: did not attend  General Information Comments: Pt s/p release of SBO w/ colostomy Day 2 reports poor appetite.  Saw pt in ICU and was just moved to [facility]. Pt hopes to be eating tomorrow. Spoke with nurse, states they are waiting for bowel movement to initiate diet.  Pt currently has NG tube for drainage.  Pt denies N/V/D/C.  Noted no weight loss, pt appears well nourished.  Nutrition Discharge Planning: Unable to determine at this time    Nutrition Risk Screen  Nutrition Risk Screen: no indicators present    Nutrition/Diet History  Spiritual, Cultural Beliefs, Voodoo Practices, Values that Affect Care: no  Factors Affecting Nutritional Intake: NPO    Anthropometrics  Temp: 97.7 °F (36.5 °C)  Height Method: Stated  Height: 5' 10" (177.8 cm)  Height (inches): 70 in  Weight Method: Bed Scale  Weight: 77.2 kg (170 lb 3.1 oz)  Weight (lb): 170.2 lb  Ideal Body Weight (IBW), Male: 166 lb  % Ideal Body Weight, Male (lb): 102.53 lb  BMI (Calculated): 24.5  BMI Grade: 18.5-24.9 - normal    Lab/Procedures/Meds  Pertinent Labs Reviewed: reviewed  Pertinent Labs Comments: BUN 34, Glu 144, A1C 6.3   Pertinent Medications Reviewed: reviewed  Pertinent Medications Comments: cefazolin    Estimated/Assessed Needs  Weight Used For Calorie Calculations: 77.2 kg " (170 lb 3.1 oz)  Energy Calorie Requirements (kcal): 1738 kcal/d  Energy Need Method: [place]-St Jeor(PAL 1.2)  Protein Requirements: 77g/d (1.0g/kg)  Weight Used For Protein Calculations: 77.2 kg (170 lb 3.1 oz)  Fluid Requirements (mL): 1mL/kcal or Per MD   RDA Method (mL): 1738  CHO Requirement: 50%     Nutrition Prescription Ordered  Current Diet Order: NPO    Evaluation of Received Nutrient/Fluid Intake  Energy Calories Required: not meeting needs  Protein Required: not meeting needs  Fluid Required: meeting needs  Tolerance: tolerating  % Intake of Estimated Energy Needs: 0 - 25 %   % Meal Intake: NPO     Nutrition Risk  Level of Risk/Frequency of Follow-up: high(2x/week)     Assessment and Plan  Nutrition Problem  Inadequate oral intake    Related to (etiology):   Day 2 s/p release of SBO and colostomy    Signs and Symptoms (as evidenced by):   NPO x 5 days      Interventions/Recommendations (treatment strategy):  Collaboration of care   Parenteral nutrition     Nutrition Diagnosis Status:   New    Monitor and Evaluation  Food and Nutrient Intake: energy intake, food and beverage intake  Food and Nutrient Adminstration: diet order  Physical Activity and Function: nutrition-related ADLs and IADLs  Anthropometric Measurements: height/length, weight, weight change  Biochemical Data, Medical Tests and Procedures: electrolyte and renal panel, gastrointestinal profile, glucose/endocrine profile, inflammatory profile, lipid profile  Nutrition-Focused Physical Findings: overall appearance     Nutrition Follow-Up    RD Follow-up?: Yes    I certify that I, Estelle Gomez RD,  directed the dietetic intern in service delivery and guided them using my skilled judgment. As the cosigning dietitian, I have reviewed the dietetic interns documentation and am responsible for the treatment, assessment, and plan.    Amina Ferreira, Dietetic Intern

## 2019-06-19 LAB
ANION GAP SERPL CALC-SCNC: 12 MMOL/L (ref 8–16)
BASOPHILS # BLD AUTO: 0.02 K/UL (ref 0–0.2)
BASOPHILS NFR BLD: 0.2 % (ref 0–1.9)
BUN SERPL-MCNC: 28 MG/DL (ref 8–23)
CALCIUM SERPL-MCNC: 8.5 MG/DL (ref 8.7–10.5)
CHLORIDE SERPL-SCNC: 105 MMOL/L (ref 95–110)
CO2 SERPL-SCNC: 29 MMOL/L (ref 23–29)
CREAT SERPL-MCNC: 0.9 MG/DL (ref 0.5–1.4)
DIFFERENTIAL METHOD: ABNORMAL
EOSINOPHIL # BLD AUTO: 0.3 K/UL (ref 0–0.5)
EOSINOPHIL NFR BLD: 3.1 % (ref 0–8)
ERYTHROCYTE [DISTWIDTH] IN BLOOD BY AUTOMATED COUNT: 14.1 % (ref 11.5–14.5)
EST. GFR  (AFRICAN AMERICAN): >60 ML/MIN/1.73 M^2
EST. GFR  (NON AFRICAN AMERICAN): >60 ML/MIN/1.73 M^2
GLUCOSE SERPL-MCNC: 127 MG/DL (ref 70–110)
HCT VFR BLD AUTO: 44 % (ref 40–54)
HGB BLD-MCNC: 14.1 G/DL (ref 14–18)
LYMPHOCYTES # BLD AUTO: 1.2 K/UL (ref 1–4.8)
LYMPHOCYTES NFR BLD: 12 % (ref 18–48)
MCH RBC QN AUTO: 28.8 PG (ref 27–31)
MCHC RBC AUTO-ENTMCNC: 32 G/DL (ref 32–36)
MCV RBC AUTO: 90 FL (ref 82–98)
MONOCYTES # BLD AUTO: 1.2 K/UL (ref 0.3–1)
MONOCYTES NFR BLD: 12.3 % (ref 4–15)
NEUTROPHILS # BLD AUTO: 7 K/UL (ref 1.8–7.7)
NEUTROPHILS NFR BLD: 72.4 % (ref 38–73)
PLATELET # BLD AUTO: 175 K/UL (ref 150–350)
PMV BLD AUTO: 12.2 FL (ref 9.2–12.9)
POTASSIUM SERPL-SCNC: 3.3 MMOL/L (ref 3.5–5.1)
RBC # BLD AUTO: 4.9 M/UL (ref 4.6–6.2)
SODIUM SERPL-SCNC: 146 MMOL/L (ref 136–145)
WBC # BLD AUTO: 9.68 K/UL (ref 3.9–12.7)

## 2019-06-19 PROCEDURE — 36415 COLL VENOUS BLD VENIPUNCTURE: CPT

## 2019-06-19 PROCEDURE — 27000221 HC OXYGEN, UP TO 24 HOURS

## 2019-06-19 PROCEDURE — 97110 THERAPEUTIC EXERCISES: CPT | Performed by: PHYSICAL THERAPIST

## 2019-06-19 PROCEDURE — 80048 BASIC METABOLIC PNL TOTAL CA: CPT

## 2019-06-19 PROCEDURE — 97116 GAIT TRAINING THERAPY: CPT | Performed by: PHYSICAL THERAPIST

## 2019-06-19 PROCEDURE — 63600175 PHARM REV CODE 636 W HCPCS: Performed by: SURGERY

## 2019-06-19 PROCEDURE — 85025 COMPLETE CBC W/AUTO DIFF WBC: CPT

## 2019-06-19 PROCEDURE — 11000001 HC ACUTE MED/SURG PRIVATE ROOM

## 2019-06-19 PROCEDURE — 94761 N-INVAS EAR/PLS OXIMETRY MLT: CPT

## 2019-06-19 PROCEDURE — 25000003 PHARM REV CODE 250: Performed by: SURGERY

## 2019-06-19 RX ADMIN — SODIUM CHLORIDE, SODIUM LACTATE, POTASSIUM CHLORIDE, AND CALCIUM CHLORIDE: .6; .31; .03; .02 INJECTION, SOLUTION INTRAVENOUS at 12:06

## 2019-06-19 RX ADMIN — HYDRALAZINE HYDROCHLORIDE 10 MG: 20 INJECTION INTRAMUSCULAR; INTRAVENOUS at 12:06

## 2019-06-19 NOTE — CONSULTS
Colostomy education completed with daughter and wife present. Pt appeared accepting of new colostomy and was eager to learn the information. Faxed pt info sheet to [employer] for free supplies to be sent home. Tomorrow this nurse will review discharge instructions with pt and family.

## 2019-06-19 NOTE — PLAN OF CARE
Problem: Adult Inpatient Plan of Care  Goal: Plan of Care Review  Outcome: Ongoing (interventions implemented as appropriate)     06/19/19 0342   Plan of Care Review   Plan of Care Reviewed With patient   AAO,denies pain,NGT maintained,clearish green  gastric contents returned, mostly appears to be from ice chips pt is consuming ,no N/V , required IV hydralazine prn dose for elevated BP with good result,IVF in progress,abd dressing dry intact,colostomy with scant drainage seen in bag, voiding per urinal,no BM or flatus at this entry,assessment ongoing,safety and comfort maintained.

## 2019-06-19 NOTE — PLAN OF CARE
TN met with patient. Wife and daughter in room at bedside. Pt sitting in chair in room. States that he is feeling better. TN discussed continued discharge planning. Pt has DME needed at home due to wife having previous surgery. Will continue to follow pt needs throughout admission.     Future Appointments   Date Time Provider Department Center   11/19/2019  1:00 PM Daren Lassiter MD [facility]      06/19/19 2937   Discharge Reassessment   Assessment Type Discharge Planning Reassessment   Provided patient/caregiver education on the expected discharge date and the discharge plan Yes   Do you have any problems affording any of your prescribed medications? TBD   Discharge Plan A Home;Home with family;Home Health   DME Needed Upon Discharge  none   Patient choice form signed by patient/caregiver N/A   Anticipated Discharge Disposition Home-Health   Can the patient answer the patient profile reliably? Yes, cognitively intact   How does the patient rate their overall health at the present time? Good   Describe the patient's ability to walk at the present time. Walks with the help of equipment   How often would a person be available to care for the patient? Whenever needed   Number of comorbid conditions (as recorded on the chart) Five or more

## 2019-06-19 NOTE — PLAN OF CARE
Problem: Adult Inpatient Plan of Care  Goal: Plan of Care Review  Outcome: Ongoing (interventions implemented as appropriate)  Pt is AAOx3 with mild complaints of pain with patient refusing pain meds. No complaints of N/V. Pt with NG tube in place putting out 500cc clear green drainage. Pt NPO with ice chips. Pt receiving IV fluids. Pt given instruction on how to empty colostomy. Consult placed to wound care for further colostomy education. Midline incision with drsg C/D/I. Pt worked with PT today and was up in the chair for part of day. Bowman d/c'd today and patient is urinating in urinal.

## 2019-06-19 NOTE — PLAN OF CARE
VN rounds:  VN cued into pt's room with pt's permission.  Pt sitting up in bedside chair with wife at bedside,  Pt has jaime hose, nonskid socks and call bell in hand.   Fall risk protocol discussed with pt.  VN instructed to call for assistance.  Pt aware and agreeable.   No acute distress noted.  Allowed time for questions.  Will continue to be available and intervene as needed.

## 2019-06-19 NOTE — PT/OT/SLP PROGRESS
Physical Therapy Treatment    Patient Name:  Husam Connolly   MRN:  [id number]    Recommendations:     Discharge Recommendations:  home health OT, home health PT   Discharge Equipment Recommendations: (TBD pending progress)   Barriers to discharge: Decreased caregiver support and decreased functional mobility    Assessment:     Husam Connolly is a 88 y.o. male admitted with a medical diagnosis of Small bowel obstruction.  He presents with the following impairments/functional limitations:  weakness, impaired self care skills, impaired endurance, impaired functional mobilty, gait instability, decreased lower extremity function, impaired cardiopulmonary response to activity.  Pt ambulated 15' x 2 with CG with 2 Lit O2, NG tube in and IV pole.    Rehab Prognosis: Good; patient would benefit from acute skilled PT services to address these deficits and reach maximum level of function.    Recent Surgery: Procedure(s) (LRB):  LAPAROTOMY, EXPLORATORY (N/A)  GISELA PROCEDURE  CREATION, COLOSTOMY 3 Days Post-Op    Plan:     During this hospitalization, patient to be seen 5 x/week to address the identified rehab impairments via gait training, therapeutic activities, therapeutic exercises, neuromuscular re-education and progress toward the following goals:    · Plan of Care Expires:  07/17/19    Subjective     Chief Complaint: none  Patient/Family Comments/goals: eat something  Pain/Comfort:  · Pain Rating 1: 0/10      Objective:     Communicated with nurse prior to session.  Patient found up in chair with peripheral IV, telemetry, oxygen, NG tube, colostomy upon PT entry to room.     General Precautions: Standard, fall   Orthopedic Precautions:    Braces:       Functional Mobility:  · Transfers:     · Sit to Stand:  supervision with no AD  · Gait:   Pt ambulated 15' x 2 with CG with 2 Lit O2, NG tube in and IV pole.  · Balance: Pt has F dynamic standing balance      AM-PAC 6 CLICK MOBILITY  Turning over in bed (including  adjusting bedclothes, sheets and blankets)?: 4  Sitting down on and standing up from a chair with arms (e.g., wheelchair, bedside commode, etc.): 4  Moving from lying on back to sitting on the side of the bed?: 4  Moving to and from a bed to a chair (including a wheelchair)?: 3  Need to walk in hospital room?: 3  Climbing 3-5 steps with a railing?: 3  Basic Mobility Total Score: 21       Therapeutic Activities and Exercises:   Pt performed marching in place 10 x 2, partial squat x 10, standing hip abd x 10 all while holding bed rail with supervision.  Pt performed BLE seated SAQ, hip flexion and hip abd and AP x 20.    Patient left up in chair with all lines intact, call button in reach, nurse notified and wife and daughter present..    GOALS:   Multidisciplinary Problems     Physical Therapy Goals        Problem: Physical Therapy Goal    Goal Priority Disciplines Outcome Goal Variances Interventions   Physical Therapy Goal     PT, PT/OT Ongoing (interventions implemented as appropriate)     Description:  1.  Supine to/from sit with Mod Ind  2.  Ambulate 400' without AD Ind  3.  Independent HEP                    Time Tracking:     PT Received On: 06/19/19  PT Start Time: 0948     PT Stop Time: 1015  PT Total Time (min): 27 min     Billable Minutes: Gait Training 14 and Therapeutic Exercise 13    Treatment Type: Treatment  PT/PTA: PT     PTA Visit Number: 0     Maylin Marte, PT  06/19/2019

## 2019-06-20 LAB
ANION GAP SERPL CALC-SCNC: 13 MMOL/L (ref 8–16)
BASOPHILS # BLD AUTO: 0.01 K/UL (ref 0–0.2)
BASOPHILS NFR BLD: 0.1 % (ref 0–1.9)
BUN SERPL-MCNC: 24 MG/DL (ref 8–23)
CALCIUM SERPL-MCNC: 8.3 MG/DL (ref 8.7–10.5)
CHLORIDE SERPL-SCNC: 106 MMOL/L (ref 95–110)
CO2 SERPL-SCNC: 27 MMOL/L (ref 23–29)
CREAT SERPL-MCNC: 0.8 MG/DL (ref 0.5–1.4)
DIFFERENTIAL METHOD: ABNORMAL
EOSINOPHIL # BLD AUTO: 0.3 K/UL (ref 0–0.5)
EOSINOPHIL NFR BLD: 3.3 % (ref 0–8)
ERYTHROCYTE [DISTWIDTH] IN BLOOD BY AUTOMATED COUNT: 13.6 % (ref 11.5–14.5)
EST. GFR  (AFRICAN AMERICAN): >60 ML/MIN/1.73 M^2
EST. GFR  (NON AFRICAN AMERICAN): >60 ML/MIN/1.73 M^2
GLUCOSE SERPL-MCNC: 120 MG/DL (ref 70–110)
HCT VFR BLD AUTO: 43.7 % (ref 40–54)
HGB BLD-MCNC: 13.8 G/DL (ref 14–18)
LYMPHOCYTES # BLD AUTO: 1.1 K/UL (ref 1–4.8)
LYMPHOCYTES NFR BLD: 13.3 % (ref 18–48)
MCH RBC QN AUTO: 28 PG (ref 27–31)
MCHC RBC AUTO-ENTMCNC: 31.6 G/DL (ref 32–36)
MCV RBC AUTO: 89 FL (ref 82–98)
MONOCYTES # BLD AUTO: 0.9 K/UL (ref 0.3–1)
MONOCYTES NFR BLD: 10.5 % (ref 4–15)
NEUTROPHILS # BLD AUTO: 6.1 K/UL (ref 1.8–7.7)
NEUTROPHILS NFR BLD: 72.3 % (ref 38–73)
PLATELET # BLD AUTO: 171 K/UL (ref 150–350)
PMV BLD AUTO: 11 FL (ref 9.2–12.9)
POTASSIUM SERPL-SCNC: 3.2 MMOL/L (ref 3.5–5.1)
RBC # BLD AUTO: 4.93 M/UL (ref 4.6–6.2)
SODIUM SERPL-SCNC: 146 MMOL/L (ref 136–145)
WBC # BLD AUTO: 8.44 K/UL (ref 3.9–12.7)

## 2019-06-20 PROCEDURE — 85025 COMPLETE CBC W/AUTO DIFF WBC: CPT

## 2019-06-20 PROCEDURE — 97530 THERAPEUTIC ACTIVITIES: CPT

## 2019-06-20 PROCEDURE — 97116 GAIT TRAINING THERAPY: CPT

## 2019-06-20 PROCEDURE — 11000001 HC ACUTE MED/SURG PRIVATE ROOM

## 2019-06-20 PROCEDURE — 97535 SELF CARE MNGMENT TRAINING: CPT

## 2019-06-20 PROCEDURE — 80048 BASIC METABOLIC PNL TOTAL CA: CPT

## 2019-06-20 PROCEDURE — 94761 N-INVAS EAR/PLS OXIMETRY MLT: CPT

## 2019-06-20 PROCEDURE — 63600175 PHARM REV CODE 636 W HCPCS: Performed by: SURGERY

## 2019-06-20 PROCEDURE — 36415 COLL VENOUS BLD VENIPUNCTURE: CPT

## 2019-06-20 PROCEDURE — 25000003 PHARM REV CODE 250: Performed by: SURGERY

## 2019-06-20 RX ADMIN — SODIUM CHLORIDE, SODIUM LACTATE, POTASSIUM CHLORIDE, AND CALCIUM CHLORIDE: .6; .31; .03; .02 INJECTION, SOLUTION INTRAVENOUS at 09:06

## 2019-06-20 RX ADMIN — HYDRALAZINE HYDROCHLORIDE 10 MG: 20 INJECTION INTRAMUSCULAR; INTRAVENOUS at 08:06

## 2019-06-20 RX ADMIN — HYDRALAZINE HYDROCHLORIDE 10 MG: 20 INJECTION INTRAMUSCULAR; INTRAVENOUS at 01:06

## 2019-06-20 RX ADMIN — POTASSIUM CHLORIDE: 2 INJECTION, SOLUTION, CONCENTRATE INTRAVENOUS at 03:06

## 2019-06-20 RX ADMIN — SODIUM CHLORIDE, SODIUM LACTATE, POTASSIUM CHLORIDE, AND CALCIUM CHLORIDE: .6; .31; .03; .02 INJECTION, SOLUTION INTRAVENOUS at 01:06

## 2019-06-20 NOTE — PLAN OF CARE
Problem: Adult Inpatient Plan of Care  Goal: Plan of Care Review  Outcome: Ongoing (interventions implemented as appropriate)  Pt is AAOx3 with no complaints of pain or n/v. Pt with NG tube in place draining clear green drainage. Pt taking in ice chips. Pt receiving IV fluids. Small amount of thin red colored drainage seen coming from colostomy. No flatus or stool in colostomy. Pt has been up in the chair for most of day and worked with PT/OT.

## 2019-06-20 NOTE — PLAN OF CARE
Problem: Occupational Therapy Goal  Goal: Occupational Therapy Goal  Goals to be met by: 07/17     Patient will increase functional independence with ADLs by performing:    UE Dressing with Supervision.  LE Dressing with Supervision.  Grooming while standing at sink with Supervision.--MET 06/20  Toileting from toilet with Supervision for hygiene and clothing management.   Toilet transfer to toilet with Supervision.  Increased functional strength to WFL for ADLs.     Outcome: Ongoing (interventions implemented as appropriate)  Pt found UIC & agreeable to OT this AM. Pt reports 0/10 pain & perf the following: standing & amb w/o DME around room for ADLs w/ SBA; G/H in standing w/ Sup; don undergarments w/ SBA; doff/don socks via [employer] cross tech at b/s chair level w/ Min A for B hand tasks; UB/LB TE in standing w/ emph on cervical ROM/vert stretching & deep breathing techs 1x10 reps. Edu/tx re: edema/pain mgmt, general safety techs, deep/PLBing, HEP. Pt verbalized understanding.    Pt w/ SOB after mod intensity tx tasks, but able to control w/ PLBing techs. Good motivation/safety throughout. Pt w/ excellent potential for return to PLOF w/ cont OT per POC.

## 2019-06-20 NOTE — PLAN OF CARE
Problem: Adult Inpatient Plan of Care  Goal: Plan of Care Review  Outcome: Ongoing (interventions implemented as appropriate)  Patient is resting and AAOx4. PRN medications given per orders in MAR for HTN. No complaints of pain/N/V. NG tube to LCWS. Output can be found in Epic. IVF infusing. Safety maintained. Instructed patient to call about concerns, needs, or assistance. Will continue to monitor.

## 2019-06-20 NOTE — PLAN OF CARE
Problem: Physical Therapy Goal  Goal: Physical Therapy Goal  1.  Supine to/from sit with Mod Ind  2.  Ambulate 400' without AD Ind  3.  Independent HEP   Outcome: Ongoing (interventions implemented as appropriate)  Continue working toward goals.

## 2019-06-20 NOTE — PROGRESS NOTES
"Surgery follow up  BP (!) 154/67 (BP Location: Right arm, Patient Position: Lying)   Pulse 71   Temp 97.8 °F (36.6 °C) (Oral)   Resp 19   Ht 5' 10" (1.778 m)   Wt 80.9 kg (178 lb 5.6 oz)   SpO2 (!) 92%   BMI 25.59 kg/m²   I/O last 3 completed shifts:  In: 6586.5 [P.O.:555; I.V.:5981.5; IV Piggyback:50]  Out: 4045 [Urine:2170; Drains:1850; Stool:25]  No intake/output data recorded.  No nausea or vomiting, no bowel gas yet, colostomy not function yet  Continue present treatment.  "

## 2019-06-20 NOTE — PT/OT/SLP PROGRESS
Physical Therapy Treatment    Patient Name:  Husam Connolly   MRN:  [id number]    Recommendations:     Discharge Recommendations:  home health PT, home health OT   Discharge Equipment Recommendations: (TBD)   Barriers to discharge: assist needed at this time for increased stability with gait.    Assessment:     Husam Connolly is a 88 y.o. male admitted with a medical diagnosis of Small bowel obstruction.  He presents with the following impairments/functional limitations:  weakness, impaired endurance, impaired functional mobilty, impaired self care skills, gait instability, impaired balance, decreased lower extremity function, decreased ROM, impaired skin, impaired cardiopulmonary response to activity.  Pt demonstrated an increase in ambulation distance today without LOB, but two bouts of .    Rehab Prognosis: Fair; patient would benefit from acute skilled PT services to address these deficits and reach maximum level of function.    Recent Surgery: Procedure(s) (LRB):  LAPAROTOMY, EXPLORATORY (N/A)  GISELA PROCEDURE  CREATION, COLOSTOMY 4 Days Post-Op    Plan:     During this hospitalization, patient to be seen 5 x/week to address the identified rehab impairments via gait training, therapeutic activities, therapeutic exercises, neuromuscular re-education and progress toward the following goals:    · Plan of Care Expires:  07/17/19    Subjective     Patient/Family Comments/goals: Pt agreed to tx.  Pain/Comfort:  · Pain Rating 1: 0/10  · Pain Rating Post-Intervention 1: 0/10      Objective:     Communicated with RN(Sabrina) prior to session.  Patient found up in chair with NG tube, colostomy upon PT entry to room.     General Precautions: Standard, fall   Orthopedic Precautions:    Braces:       Functional Mobility:  · Transfers:     · Sit to Stand:  supervision with no AD  · Gait: ~400ft without A.D. and CGA/SBA.  VC's to decreased sade for improved stability.  No LOB  · Balance: sitting good, standing  fair+/good-, gait fair/fair+      AM-PAC 6 CLICK MOBILITY  Turning over in bed (including adjusting bedclothes, sheets and blankets)?: 4  Sitting down on and standing up from a chair with arms (e.g., wheelchair, bedside commode, etc.): 4  Moving from lying on back to sitting on the side of the bed?: 4  Moving to and from a bed to a chair (including a wheelchair)?: 4  Need to walk in hospital room?: 3  Climbing 3-5 steps with a railing?: 3  Basic Mobility Total Score: 22       Therapeutic Activities and Exercises:   STanding therex: marching in place with one UE support x 15 reps, mini squats with BUE support x 10 reps, and heel raises BUE support x 10 reps.  Seated AP, LAQ with 5 sec hold, and hip ABD/ADd with knees extended x 15 reps.  Static standing to use urinal with SBA.    Patient left up in chair with all lines intact, call button in reach, chair alarm on and RN notified..    GOALS:   Multidisciplinary Problems     Physical Therapy Goals        Problem: Physical Therapy Goal    Goal Priority Disciplines Outcome Goal Variances Interventions   Physical Therapy Goal     PT, PT/OT Ongoing (interventions implemented as appropriate)     Description:  1.  Supine to/from sit with Mod Ind  2.  Ambulate 400' without AD Ind  3.  Independent HEP                    Time Tracking:     PT Received On: 06/20/19  PT Start Time: 1253     PT Stop Time: 1318  PT Total Time (min): 25 min     Billable Minutes: Gait Training 14 and Therapeutic Activity 11    Treatment Type: Treatment  PT/PTA: PTA     PTA Visit Number: 1     Lilian Parr PTA  06/20/2019

## 2019-06-20 NOTE — PT/OT/SLP PROGRESS
Occupational Therapy   Treatment    Name: Husam Connolly  MRN: [id number]  Admitting Diagnosis:  Small bowel obstruction  4 Days Post-Op    Recommendations:     Discharge Recommendations: home health PT, home health OT  Discharge Equipment Recommendations:  (TBD pending progress)  Barriers to discharge:  None    Assessment:   Pt found UIC & agreeable to OT this AM. Pt reports 0/10 pain & perf the following: standing & amb w/o DME around room for ADLs w/ SBA; G/H & urinal use in standing w/ Sup; don undergarments w/ SBA; doff/don socks via [employer] tech at b/s chair level w/ Min A for B hand tasks; UB/LB TE in standing w/ emph on cervical ROM/vert stretching & deep breathing techs 1x10 reps. Edu/tx re: edema/pain mgmt, general safety techs, deep/PLBing, HEP. Pt verbalized understanding.    Pt w/ SOB after mod intensity tx tasks, but able to control w/ PLBing techs. Good motivation/safety throughout. Pt w/ excellent potential for return to PLOF w/ cont OT per POC.    Husam Connolly is a 88 y.o. male with a medical diagnosis of Small bowel obstruction.  He presents with . Performance deficits affecting function are weakness, impaired endurance, impaired self care skills, impaired functional mobilty, gait instability, decreased lower extremity function, pain, decreased ROM, impaired skin, edema, impaired cardiopulmonary response to activity.     Rehab Prognosis:  Good; patient would benefit from acute skilled OT services to address these deficits and reach maximum level of function.       Plan:     Patient to be seen 5 x/week to address the above listed problems via self-care/home management, therapeutic activities, therapeutic exercises  · Plan of Care Expires: 07/17/19  · Plan of Care Reviewed with: patient    Subjective     Pain/Comfort:  · Pain Rating 1: 0/10  · Pain Rating Post-Intervention 1: 0/10    Objective:     Communicated with: charbel prior to session.  Patient found up in chair with NG tube, colostomy upon OT  entry to room.    General Precautions: Standard, fall   Orthopedic Precautions:N/A   Braces: N/A     Occupational Performance:     Bed Mobility:    ·      Functional Mobility/Transfers:  · Patient completed Sit <> Stand Transfer with stand by assistance  with  no assistive device   · Functional Mobility: SBA w/o DME around room for ADLs    Activities of Daily Living:  · Grooming: supervision standing at sink  · Lower Body Dressing: minimum assistance for B hand tasks donning socks  · Toileting: supervision urinal use in standing      AMPAC 6 Click ADL: 20    Treatment & Education:  Pt found UIC & agreeable to OT this AM. Pt reports 0/10 pain & perf the following: standing & amb w/o DME around room for ADLs w/ SBA; G/H in standing w/ Sup; don undergarments w/ SBA; doff/don socks via [employer] cross tech at b/s chair level w/ Min A for B hand tasks; UB/LB TE in standing w/ emph on cervical ROM/vert stretching & deep breathing techs 1x10 reps. Edu/tx re: edema/pain mgmt, general safety techs, deep/PLBing, HEP. Pt verbalized understanding.    Patient left up in chair with all lines intact, call button in reach and nsg notifiedEducation:      GOALS:   Multidisciplinary Problems     Occupational Therapy Goals        Problem: Occupational Therapy Goal    Goal Priority Disciplines Outcome Interventions   Occupational Therapy Goal     OT, PT/OT Ongoing (interventions implemented as appropriate)    Description:  Goals to be met by: 07/17     Patient will increase functional independence with ADLs by performing:    UE Dressing with Supervision.  LE Dressing with Supervision.  Grooming while standing at sink with Supervision.--MET 06/20  Toileting from toilet with Supervision for hygiene and clothing management.   Toilet transfer to toilet with Supervision.  Increased functional strength to WFL for ADLs.                       Time Tracking:     OT Date of Treatment: 06/20/19  OT Start Time: 0835  OT Stop Time: 0906  OT Total Time  (min): 31 min    Billable Minutes:Self Care/Home Management 16  Therapeutic Activity 15  Total Time 31    SIMON Gonzalez  6/20/2019

## 2019-06-21 LAB
ANION GAP SERPL CALC-SCNC: 13 MMOL/L (ref 8–16)
BASOPHILS # BLD AUTO: 0.05 K/UL (ref 0–0.2)
BASOPHILS NFR BLD: 0.5 % (ref 0–1.9)
BUN SERPL-MCNC: 22 MG/DL (ref 8–23)
CALCIUM SERPL-MCNC: 8.4 MG/DL (ref 8.7–10.5)
CHLORIDE SERPL-SCNC: 107 MMOL/L (ref 95–110)
CO2 SERPL-SCNC: 25 MMOL/L (ref 23–29)
CREAT SERPL-MCNC: 0.8 MG/DL (ref 0.5–1.4)
DIFFERENTIAL METHOD: NORMAL
EOSINOPHIL # BLD AUTO: 0.4 K/UL (ref 0–0.5)
EOSINOPHIL NFR BLD: 3.5 % (ref 0–8)
ERYTHROCYTE [DISTWIDTH] IN BLOOD BY AUTOMATED COUNT: 13.6 % (ref 11.5–14.5)
EST. GFR  (AFRICAN AMERICAN): >60 ML/MIN/1.73 M^2
EST. GFR  (NON AFRICAN AMERICAN): >60 ML/MIN/1.73 M^2
GLUCOSE SERPL-MCNC: 120 MG/DL (ref 70–110)
HCT VFR BLD AUTO: 45.8 % (ref 40–54)
HGB BLD-MCNC: 14.9 G/DL (ref 14–18)
LYMPHOCYTES # BLD AUTO: 2.4 K/UL (ref 1–4.8)
LYMPHOCYTES NFR BLD: 22.6 % (ref 18–48)
MCH RBC QN AUTO: 28.9 PG (ref 27–31)
MCHC RBC AUTO-ENTMCNC: 32.5 G/DL (ref 32–36)
MCV RBC AUTO: 89 FL (ref 82–98)
MONOCYTES # BLD AUTO: 0.5 K/UL (ref 0.3–1)
MONOCYTES NFR BLD: 4.7 % (ref 4–15)
NEUTROPHILS # BLD AUTO: 7.2 K/UL (ref 1.8–7.7)
NEUTROPHILS NFR BLD: 67.2 % (ref 38–73)
PLATELET # BLD AUTO: 205 K/UL (ref 150–350)
PMV BLD AUTO: 11.5 FL (ref 9.2–12.9)
POTASSIUM SERPL-SCNC: 4.2 MMOL/L (ref 3.5–5.1)
RBC # BLD AUTO: 5.16 M/UL (ref 4.6–6.2)
SODIUM SERPL-SCNC: 145 MMOL/L (ref 136–145)
WBC # BLD AUTO: 10.68 K/UL (ref 3.9–12.7)

## 2019-06-21 PROCEDURE — 97110 THERAPEUTIC EXERCISES: CPT

## 2019-06-21 PROCEDURE — 80048 BASIC METABOLIC PNL TOTAL CA: CPT

## 2019-06-21 PROCEDURE — 97535 SELF CARE MNGMENT TRAINING: CPT

## 2019-06-21 PROCEDURE — 97116 GAIT TRAINING THERAPY: CPT

## 2019-06-21 PROCEDURE — 36415 COLL VENOUS BLD VENIPUNCTURE: CPT

## 2019-06-21 PROCEDURE — 85025 COMPLETE CBC W/AUTO DIFF WBC: CPT

## 2019-06-21 PROCEDURE — 11000001 HC ACUTE MED/SURG PRIVATE ROOM

## 2019-06-21 PROCEDURE — 25000003 PHARM REV CODE 250: Performed by: SURGERY

## 2019-06-21 PROCEDURE — 63600175 PHARM REV CODE 636 W HCPCS: Performed by: SURGERY

## 2019-06-21 PROCEDURE — 94761 N-INVAS EAR/PLS OXIMETRY MLT: CPT

## 2019-06-21 RX ORDER — VALSARTAN AND HYDROCHLOROTHIAZIDE 320; 12.5 MG/1; MG/1
1 TABLET, FILM COATED ORAL DAILY
Status: DISCONTINUED | OUTPATIENT
Start: 2019-06-21 | End: 2019-06-24 | Stop reason: HOSPADM

## 2019-06-21 RX ADMIN — HYDRALAZINE HYDROCHLORIDE 10 MG: 20 INJECTION INTRAMUSCULAR; INTRAVENOUS at 02:06

## 2019-06-21 RX ADMIN — POTASSIUM CHLORIDE: 2 INJECTION, SOLUTION, CONCENTRATE INTRAVENOUS at 05:06

## 2019-06-21 RX ADMIN — POTASSIUM CHLORIDE: 2 INJECTION, SOLUTION, CONCENTRATE INTRAVENOUS at 12:06

## 2019-06-21 RX ADMIN — VALSARTAN AND HYDROCHLOROTHIAZIDE 1 TABLET: 320; 12.5 TABLET, FILM COATED ORAL at 01:06

## 2019-06-21 RX ADMIN — HYDRALAZINE HYDROCHLORIDE 10 MG: 20 INJECTION INTRAMUSCULAR; INTRAVENOUS at 08:06

## 2019-06-21 RX ADMIN — POTASSIUM CHLORIDE: 2 INJECTION, SOLUTION, CONCENTRATE INTRAVENOUS at 09:06

## 2019-06-21 NOTE — PLAN OF CARE
Problem: Occupational Therapy Goal  Goal: Occupational Therapy Goal  Goals to be met by: 07/17     Patient will increase functional independence with ADLs by performing:    UE Dressing with Supervision.  LE Dressing with Supervision.  Grooming while standing at sink with Supervision.--MET 06/20  Toileting from toilet with Supervision for hygiene and clothing management.   Toilet transfer to toilet with Supervision.  Increased functional strength to WFL for ADLs.      Outcome: Ongoing (interventions implemented as appropriate)  Pt found UIC & agreeable to OT this AM. Pt w/o c/o pain & perf the following: standing & amb w/o DME around room for ADLs w/ Sup-SBA; G/H in standing x~5min w/ Sup. Pt remained standing after G/H tasks to perf UB/LB TE in standing w/ emph on proper breathing/resp strengthening, standing tolerance & overall endurance 5x5-10 reps all major jts/planes. Edu/tx re: endurance tx, energy conservation, general safety, deep/PLBing techs & HEP. Pt/fly verbalized understanding.    Pt demo'ed increased overall endurance. SOB after ~15 min static standing, but easily recovered normal breathing rate using PLBing x ~1min. Pt w/ good potential w/ cont OT per POC.

## 2019-06-21 NOTE — PLAN OF CARE
Problem: Adult Inpatient Plan of Care  Goal: Plan of Care Review  Outcome: Ongoing (interventions implemented as appropriate)  Pt AAo x 4. VSS. NAD. Tolerating clear liquid diet. IV fluids infusing per mar. Ng tube clamped per Dr. Gardner.  IV fluids infusing per mar. No complaints of pain or nausea this shift. Colostomy in place with 10 cc of liquid brown output. Safety maintained. Will continue to monitor.

## 2019-06-21 NOTE — PT/OT/SLP PROGRESS
Occupational Therapy   Treatment    Name: Husam Connolly  MRN: [id number]  Admitting Diagnosis:  Small bowel obstruction  5 Days Post-Op    Recommendations:     Discharge Recommendations: home health OT, home health PT  Discharge Equipment Recommendations:  (TBD)  Barriers to discharge:  None    Assessment:   Pt demo'ed increased overall endurance. SOB after ~15 min static standing, but easily recovered normal breathing rate using PLBing x ~1min. Pt w/ good potential w/ cont OT per POC.    Husam Connolly is a 88 y.o. male with a medical diagnosis of Small bowel obstruction.  He presents with . Performance deficits affecting function are weakness, impaired endurance, impaired self care skills, impaired balance, impaired functional mobilty, decreased upper extremity function, decreased ROM, impaired skin, edema, impaired cardiopulmonary response to activity.     Rehab Prognosis:  Good; patient would benefit from acute skilled OT services to address these deficits and reach maximum level of function.       Plan:     Patient to be seen 5 x/week to address the above listed problems via self-care/home management, therapeutic activities, therapeutic exercises  · Plan of Care Expires: 07/17/19  · Plan of Care Reviewed with: patient, spouse, grandchild(siobhan)    Subjective     Pain/Comfort:  · Pain Rating 1: 0/10  · Pain Rating Post-Intervention 1: 0/10    Objective:     Communicated with: nsg prior to session.  Patient found up in chair with NG tube, colostomy upon OT entry to room.    General Precautions: Standard, fall   Orthopedic Precautions:N/A   Braces: N/A     Occupational Performance:     Bed Mobility:    ·      Functional Mobility/Transfers:  · Patient completed Sit <> Stand Transfer with supervision  with  no assistive device   · Patient completed Bed <> Chair Transfer using Step Transfer technique with supervision with no assistive device  · Functional Mobility: w/o amb DME around room for ADLs w/  Sup    Activities of Daily Living:  · Grooming: supervision standing at sink  · Toileting: stand by assistance using urinal in standing      AMPA 6 Click ADL: 20    Treatment & Education:  Pt found [facility] & agreeable to OT this AM. Pt w/o c/o pain & perf the following: standing & amb w/o DME around room for ADLs w/ Sup-SBA; G/H in standing x~5min w/ Sup. Pt remained standing after G/H tasks to perf UB/LB TE in standing w/ emph on proper breathing/resp strengthening, standing tolerance & overall endurance 5x5-10 reps all major jts/planes. Edu/tx re: endurance tx, energy conservation, general safety, deep/PLBing techs & HEP. Pt/fly verbalized understanding.    Patient left up in chair with all lines intact, call button in reach and fly presentEducation:      GOALS:   Multidisciplinary Problems     Occupational Therapy Goals        Problem: Occupational Therapy Goal    Goal Priority Disciplines Outcome Interventions   Occupational Therapy Goal     OT, PT/OT Ongoing (interventions implemented as appropriate)    Description:  Goals to be met by: 07/17     Patient will increase functional independence with ADLs by performing:    UE Dressing with Supervision.  LE Dressing with Supervision.  Grooming while standing at sink with Supervision.--MET 06/20  Toileting from toilet with Supervision for hygiene and clothing management.   Toilet transfer to toilet with Supervision.  Increased functional strength to WFL for ADLs.                       Time Tracking:     OT Date of Treatment: 06/21/19  OT Start Time: 1043  OT Stop Time: 1114  OT Total Time (min): 31 min    Billable Minutes:Self Care/Home Management 16  Therapeutic Exercise 15  Total Time 31    SIMON Gonzalez  6/21/2019

## 2019-06-21 NOTE — PT/OT/SLP PROGRESS
"Physical Therapy Treatment    Patient Name:  Husam Connolly   MRN:  [id number]    Recommendations:     Discharge Recommendations:  home health PT, home health OT   Discharge Equipment Recommendations: (TBD)   Barriers to discharge: None    Assessment:     Husam Connolly is a 88 y.o. male admitted with a medical diagnosis of Small bowel obstruction.  He presents with the following impairments/functional limitations:  weakness, impaired endurance, impaired functional mobilty, gait instability, impaired balance, decreased lower extremity function, decreased ROM, impaired cardiopulmonary response to activity, edema.  Pt would continue to benefit from OP P.T. To improved strength and balance deficits after hospitalization.    Rehab Prognosis: Good; patient would benefit from acute skilled PT services to address these deficits and reach maximum level of function.    Recent Surgery: Procedure(s) (LRB):  LAPAROTOMY, EXPLORATORY (N/A)  GISELA PROCEDURE  CREATION, COLOSTOMY 5 Days Post-Op    Plan:     During this hospitalization, patient to be seen 5 x/week to address the identified rehab impairments via gait training, therapeutic activities, therapeutic exercises, neuromuscular re-education and progress toward the following goals:    · Plan of Care Expires:  07/17/19    Subjective       Patient/Family Comments/goals: Pt agreed to tx.  Pain/Comfort:  · Pain Rating 1: 0/10  · Pain Rating Post-Intervention 1: 0/10      Objective:     Communicated with RN (Sabrina) prior to session.  Patient found up in chair with NG tube, colostomy upon PT entry to room.     General Precautions: Standard, fall   Orthopedic Precautions:    Braces:       Functional Mobility:  · Transfers:     · Sit to Stand:  stand by assistance with no AD  · Gait: ~400ft without A.D. and CGA.  VC's for pt to slow down candence for energy conservation secondary to mild SOB MCFP through gait training.  Brief standing rest break secondary SOB.  Pt stated, "This " "is how i walk."  PTA  reinforced slowing down at this time while recovering secondary to SOB noted and for increased safety.  Mild unsteadiness x 2 bouts without LOB.  · Balance: sitting good, standing good-, gait fair/fair+      AM-PAC 6 CLICK MOBILITY  Turning over in bed (including adjusting bedclothes, sheets and blankets)?: 4  Sitting down on and standing up from a chair with arms (e.g., wheelchair, bedside commode, etc.): 4  Moving from lying on back to sitting on the side of the bed?: 4  Moving to and from a bed to a chair (including a wheelchair)?: 4  Need to walk in hospital room?: 3  Climbing 3-5 steps with a railing?: 3  Basic Mobility Total Score: 22       Therapeutic Activities and Exercises:   Seated BLE therex: AP, LAQ, hip ABd/ADD x 10 reps.  Standing marching in place and mini squats x 10 reps  Static standing while using urinal with SBA.    Patient left up in chair with all lines intact, call button in reach, chair alarm on and RN notified..    GOALS:   Multidisciplinary Problems     Physical Therapy Goals        Problem: Physical Therapy Goal    Goal Priority Disciplines Outcome Goal Variances Interventions   Physical Therapy Goal     PT, PT/OT Ongoing (interventions implemented as appropriate)     Description:  1.  Supine to/from sit with Mod Ind  2.  Ambulate 400' without AD Ind  3.  Independent HEP                    Time Tracking:     PT Received On: 06/21/19  PT Start Time: 1537     PT Stop Time: 1602  PT Total Time (min): 25 min     Billable Minutes: Gait Training 14 and Therapeutic Exercise 11    Treatment Type: Treatment  PT/PTA: PTA     PTA Visit Number: 2     Lilian Parr PTA  06/21/2019  "

## 2019-06-21 NOTE — PLAN OF CARE
Problem: Adult Inpatient Plan of Care  Goal: Plan of Care Review  Outcome: Ongoing (interventions implemented as appropriate)  Patient is resting and AAOx4. PRN medications given per orders in MAR for HTN. No complaints of pain/N/V. NG tube clamped.Ostomy bag was changed because of leakage. Ostomy output can be found in Epic. Dressing was soil from stool from ostomy bag. Dressing changed was completed. Patient stated that he has not be receiving schedule HTN medication; MD notified. IVF infusing. Safety maintained. Instructed patient to call about concerns, needs, or assistance. Will continue to monitor.

## 2019-06-21 NOTE — PLAN OF CARE
Problem: Adult Inpatient Plan of Care  Goal: Plan of Care Review  Outcome: Ongoing (interventions implemented as appropriate)  Pt AAo x 4. VSS. NAD. IV fluids infusing per mar. Clamped NG tube in place. Pt upin chair all shift. Tolerating clear liquid diet, urinal @ bedside. No complaints of pain or nausea. Colostomy bag changed this shift due to leakage, new bag intact. Safety maintained. Will continue to monitor.

## 2019-06-21 NOTE — PLAN OF CARE
TN met with patient and informed him on continued discharge planning. Pt wife in room at bedside. Pt informed TN that he has been eating little food since last night and keeping it down. NG tube still in place. Pt has no needs or concerns about plan at this time. Will continue to monitor needs.    Future Appointments   Date Time Provider Department Center   11/19/2019  1:00 PM Daren Lassiter MD [facility]        06/21/19 5479   Discharge Reassessment   Assessment Type Discharge Planning Reassessment   Provided patient/caregiver education on the expected discharge date and the discharge plan Yes   Do you have any problems affording any of your prescribed medications? TBD   Discharge Plan A Home with family;Home Health;Home   DME Needed Upon Discharge  none   Patient choice form signed by patient/caregiver N/A   Anticipated Discharge Disposition Home-Health   Can the patient answer the patient profile reliably? Yes, cognitively intact   How does the patient rate their overall health at the present time? Good   Describe the patient's ability to walk at the present time. Walks with the help of equipment   How often would a person be available to care for the patient? Whenever needed   Number of comorbid conditions (as recorded on the chart) Five or more

## 2019-06-22 LAB
ANION GAP SERPL CALC-SCNC: 8 MMOL/L (ref 8–16)
BASOPHILS # BLD AUTO: 0.03 K/UL (ref 0–0.2)
BASOPHILS NFR BLD: 0.3 % (ref 0–1.9)
BUN SERPL-MCNC: 16 MG/DL (ref 8–23)
CALCIUM SERPL-MCNC: 8 MG/DL (ref 8.7–10.5)
CHLORIDE SERPL-SCNC: 105 MMOL/L (ref 95–110)
CO2 SERPL-SCNC: 25 MMOL/L (ref 23–29)
CREAT SERPL-MCNC: 0.8 MG/DL (ref 0.5–1.4)
DIFFERENTIAL METHOD: ABNORMAL
EOSINOPHIL # BLD AUTO: 0.4 K/UL (ref 0–0.5)
EOSINOPHIL NFR BLD: 3.6 % (ref 0–8)
ERYTHROCYTE [DISTWIDTH] IN BLOOD BY AUTOMATED COUNT: 13.6 % (ref 11.5–14.5)
EST. GFR  (AFRICAN AMERICAN): >60 ML/MIN/1.73 M^2
EST. GFR  (NON AFRICAN AMERICAN): >60 ML/MIN/1.73 M^2
GLUCOSE SERPL-MCNC: 142 MG/DL (ref 70–110)
HCT VFR BLD AUTO: 41.3 % (ref 40–54)
HGB BLD-MCNC: 13.6 G/DL (ref 14–18)
LYMPHOCYTES # BLD AUTO: 1.3 K/UL (ref 1–4.8)
LYMPHOCYTES NFR BLD: 12.5 % (ref 18–48)
MCH RBC QN AUTO: 28.8 PG (ref 27–31)
MCHC RBC AUTO-ENTMCNC: 32.9 G/DL (ref 32–36)
MCV RBC AUTO: 87 FL (ref 82–98)
MONOCYTES # BLD AUTO: 1.5 K/UL (ref 0.3–1)
MONOCYTES NFR BLD: 13.9 % (ref 4–15)
NEUTROPHILS # BLD AUTO: 7.1 K/UL (ref 1.8–7.7)
NEUTROPHILS NFR BLD: 68 % (ref 38–73)
PLATELET # BLD AUTO: 214 K/UL (ref 150–350)
PMV BLD AUTO: 10.7 FL (ref 9.2–12.9)
POTASSIUM SERPL-SCNC: 3.7 MMOL/L (ref 3.5–5.1)
RBC # BLD AUTO: 4.73 M/UL (ref 4.6–6.2)
SODIUM SERPL-SCNC: 138 MMOL/L (ref 136–145)
WBC # BLD AUTO: 10.41 K/UL (ref 3.9–12.7)

## 2019-06-22 PROCEDURE — 99900035 HC TECH TIME PER 15 MIN (STAT)

## 2019-06-22 PROCEDURE — 80048 BASIC METABOLIC PNL TOTAL CA: CPT

## 2019-06-22 PROCEDURE — 11000001 HC ACUTE MED/SURG PRIVATE ROOM

## 2019-06-22 PROCEDURE — 25000003 PHARM REV CODE 250: Performed by: SURGERY

## 2019-06-22 PROCEDURE — 85025 COMPLETE CBC W/AUTO DIFF WBC: CPT

## 2019-06-22 PROCEDURE — 94761 N-INVAS EAR/PLS OXIMETRY MLT: CPT

## 2019-06-22 PROCEDURE — 36415 COLL VENOUS BLD VENIPUNCTURE: CPT

## 2019-06-22 PROCEDURE — 63600175 PHARM REV CODE 636 W HCPCS: Performed by: SURGERY

## 2019-06-22 RX ADMIN — POTASSIUM CHLORIDE: 2 INJECTION, SOLUTION, CONCENTRATE INTRAVENOUS at 01:06

## 2019-06-22 RX ADMIN — VALSARTAN AND HYDROCHLOROTHIAZIDE 1 TABLET: 320; 12.5 TABLET, FILM COATED ORAL at 10:06

## 2019-06-22 NOTE — PROGRESS NOTES
"Surgery follow up  BP (!) 166/80 (BP Location: Right arm, Patient Position: Lying)   Pulse 80   Temp 98.4 °F (36.9 °C) (Oral)   Resp 18   Ht 5' 10" (1.778 m)   Wt 75.2 kg (165 lb 12.6 oz)   SpO2 96%   BMI 23.79 kg/m²   I/O last 3 completed shifts:  In: 1510 [P.O.:510; IV Piggyback:1000]  Out: 2150 [Urine:1575; Stool:575]  No intake/output data recorded.  Colostomy working well   Wound ok , possible d/c Monday.  "

## 2019-06-22 NOTE — PROGRESS NOTES
"Surgery follow up.  BP (!) 167/78   Pulse 80   Temp 97.7 °F (36.5 °C) (Oral)   Resp 18   Ht 5' 10" (1.778 m)   Wt 82.4 kg (181 lb 10.5 oz)   SpO2 98%   BMI 26.07 kg/m²   I/O last 3 completed shifts:  In: 1710 [P.O.:710; IV Piggyback:1000]  Out: 2085 [Urine:1350; Drains:225; Stool:510]  No intake/output data recorded.  Recent Results (from the past 336 hour(s))   CBC auto differential    Collection Time: 06/21/19  3:51 AM   Result Value Ref Range    WBC 10.68 3.90 - 12.70 K/uL    Hemoglobin 14.9 14.0 - 18.0 g/dL    Hematocrit 45.8 40.0 - 54.0 %    Platelets 205 150 - 350 K/uL   CBC auto differential    Collection Time: 06/20/19  6:11 AM   Result Value Ref Range    WBC 8.44 3.90 - 12.70 K/uL    Hemoglobin 13.8 (L) 14.0 - 18.0 g/dL    Hematocrit 43.7 40.0 - 54.0 %    Platelets 171 150 - 350 K/uL   CBC auto differential    Collection Time: 06/19/19  6:55 AM   Result Value Ref Range    WBC 9.68 3.90 - 12.70 K/uL    Hemoglobin 14.1 14.0 - 18.0 g/dL    Hematocrit 44.0 40.0 - 54.0 %    Platelets 175 150 - 350 K/uL     Recent Results (from the past 336 hour(s))   Basic metabolic panel    Collection Time: 06/21/19  3:51 AM   Result Value Ref Range    Sodium 145 136 - 145 mmol/L    Potassium 4.2 3.5 - 5.1 mmol/L    Chloride 107 95 - 110 mmol/L    CO2 25 23 - 29 mmol/L    BUN, Bld 22 8 - 23 mg/dL    Creatinine 0.8 0.5 - 1.4 mg/dL    Calcium 8.4 (L) 8.7 - 10.5 mg/dL    Anion Gap 13 8 - 16 mmol/L   Basic metabolic panel    Collection Time: 06/20/19  6:10 AM   Result Value Ref Range    Sodium 146 (H) 136 - 145 mmol/L    Potassium 3.2 (L) 3.5 - 5.1 mmol/L    Chloride 106 95 - 110 mmol/L    CO2 27 23 - 29 mmol/L    BUN, Bld 24 (H) 8 - 23 mg/dL    Creatinine 0.8 0.5 - 1.4 mg/dL    Calcium 8.3 (L) 8.7 - 10.5 mg/dL    Anion Gap 13 8 - 16 mmol/L   Basic metabolic panel    Collection Time: 06/19/19  6:55 AM   Result Value Ref Range    Sodium 146 (H) 136 - 145 mmol/L    Potassium 3.3 (L) 3.5 - 5.1 mmol/L    Chloride 105 95 - 110 " mmol/L    CO2 29 23 - 29 mmol/L    BUN, Bld 28 (H) 8 - 23 mg/dL    Creatinine 0.9 0.5 - 1.4 mg/dL    Calcium 8.5 (L) 8.7 - 10.5 mg/dL    Anion Gap 12 8 - 16 mmol/L   abdomen soft non tender , colostomy functioning well,   Plan: d/c ng tube , advance diet, consult ostomy nurse.

## 2019-06-23 LAB
ANION GAP SERPL CALC-SCNC: 7 MMOL/L (ref 8–16)
BASOPHILS # BLD AUTO: 0.02 K/UL (ref 0–0.2)
BASOPHILS NFR BLD: 0.2 % (ref 0–1.9)
BUN SERPL-MCNC: 17 MG/DL (ref 8–23)
CALCIUM SERPL-MCNC: 8.1 MG/DL (ref 8.7–10.5)
CHLORIDE SERPL-SCNC: 103 MMOL/L (ref 95–110)
CO2 SERPL-SCNC: 27 MMOL/L (ref 23–29)
CREAT SERPL-MCNC: 0.9 MG/DL (ref 0.5–1.4)
DIFFERENTIAL METHOD: ABNORMAL
EOSINOPHIL # BLD AUTO: 0.5 K/UL (ref 0–0.5)
EOSINOPHIL NFR BLD: 5.8 % (ref 0–8)
ERYTHROCYTE [DISTWIDTH] IN BLOOD BY AUTOMATED COUNT: 13.6 % (ref 11.5–14.5)
EST. GFR  (AFRICAN AMERICAN): >60 ML/MIN/1.73 M^2
EST. GFR  (NON AFRICAN AMERICAN): >60 ML/MIN/1.73 M^2
GLUCOSE SERPL-MCNC: 175 MG/DL (ref 70–110)
HCT VFR BLD AUTO: 40.4 % (ref 40–54)
HGB BLD-MCNC: 13.2 G/DL (ref 14–18)
LYMPHOCYTES # BLD AUTO: 1.6 K/UL (ref 1–4.8)
LYMPHOCYTES NFR BLD: 18.2 % (ref 18–48)
MCH RBC QN AUTO: 28.4 PG (ref 27–31)
MCHC RBC AUTO-ENTMCNC: 32.7 G/DL (ref 32–36)
MCV RBC AUTO: 87 FL (ref 82–98)
MONOCYTES # BLD AUTO: 1.3 K/UL (ref 0.3–1)
MONOCYTES NFR BLD: 15.1 % (ref 4–15)
NEUTROPHILS # BLD AUTO: 5 K/UL (ref 1.8–7.7)
NEUTROPHILS NFR BLD: 58.1 % (ref 38–73)
PLATELET # BLD AUTO: 226 K/UL (ref 150–350)
PMV BLD AUTO: 11 FL (ref 9.2–12.9)
POTASSIUM SERPL-SCNC: 3.4 MMOL/L (ref 3.5–5.1)
RBC # BLD AUTO: 4.65 M/UL (ref 4.6–6.2)
SODIUM SERPL-SCNC: 137 MMOL/L (ref 136–145)
WBC # BLD AUTO: 8.52 K/UL (ref 3.9–12.7)

## 2019-06-23 PROCEDURE — 11000001 HC ACUTE MED/SURG PRIVATE ROOM

## 2019-06-23 PROCEDURE — 80048 BASIC METABOLIC PNL TOTAL CA: CPT

## 2019-06-23 PROCEDURE — 94761 N-INVAS EAR/PLS OXIMETRY MLT: CPT

## 2019-06-23 PROCEDURE — 85025 COMPLETE CBC W/AUTO DIFF WBC: CPT

## 2019-06-23 PROCEDURE — 36415 COLL VENOUS BLD VENIPUNCTURE: CPT

## 2019-06-23 RX ADMIN — VALSARTAN AND HYDROCHLOROTHIAZIDE 1 TABLET: 320; 12.5 TABLET, FILM COATED ORAL at 10:06

## 2019-06-23 NOTE — PROGRESS NOTES
"surgeru follow up  BP (!) 179/82 (BP Location: Left arm, Patient Position: Sitting)   Pulse 85   Temp 98 °F (36.7 °C) (Oral)   Resp 18   Ht 5' 10" (1.778 m)   Wt 84.5 kg (186 lb 4.6 oz)   SpO2 (!) 93%   BMI 26.73 kg/m²   I/O last 3 completed shifts:  In: 940 [P.O.:940]  Out: 1400 [Urine:1325; Stool:75]  I/O this shift:  In: 380 [P.O.:380]  Out: 400 [Urine:400]  Recent Results (from the past 336 hour(s))   CBC auto differential    Collection Time: 06/23/19  3:43 AM   Result Value Ref Range    WBC 8.52 3.90 - 12.70 K/uL    Hemoglobin 13.2 (L) 14.0 - 18.0 g/dL    Hematocrit 40.4 40.0 - 54.0 %    Platelets 226 150 - 350 K/uL   CBC auto differential    Collection Time: 06/22/19  6:05 AM   Result Value Ref Range    WBC 10.41 3.90 - 12.70 K/uL    Hemoglobin 13.6 (L) 14.0 - 18.0 g/dL    Hematocrit 41.3 40.0 - 54.0 %    Platelets 214 150 - 350 K/uL   CBC auto differential    Collection Time: 06/21/19  3:51 AM   Result Value Ref Range    WBC 10.68 3.90 - 12.70 K/uL    Hemoglobin 14.9 14.0 - 18.0 g/dL    Hematocrit 45.8 40.0 - 54.0 %    Platelets 205 150 - 350 K/uL     Recent Results (from the past 336 hour(s))   Basic metabolic panel    Collection Time: 06/23/19  3:43 AM   Result Value Ref Range    Sodium 137 136 - 145 mmol/L    Potassium 3.4 (L) 3.5 - 5.1 mmol/L    Chloride 103 95 - 110 mmol/L    CO2 27 23 - 29 mmol/L    BUN, Bld 17 8 - 23 mg/dL    Creatinine 0.9 0.5 - 1.4 mg/dL    Calcium 8.1 (L) 8.7 - 10.5 mg/dL    Anion Gap 7 (L) 8 - 16 mmol/L   Basic metabolic panel    Collection Time: 06/22/19  6:05 AM   Result Value Ref Range    Sodium 138 136 - 145 mmol/L    Potassium 3.7 3.5 - 5.1 mmol/L    Chloride 105 95 - 110 mmol/L    CO2 25 23 - 29 mmol/L    BUN, Bld 16 8 - 23 mg/dL    Creatinine 0.8 0.5 - 1.4 mg/dL    Calcium 8.0 (L) 8.7 - 10.5 mg/dL    Anion Gap 8 8 - 16 mmol/L   Basic metabolic panel    Collection Time: 06/21/19  3:51 AM   Result Value Ref Range    Sodium 145 136 - 145 mmol/L    Potassium 4.2 3.5 - 5.1 " mmol/L    Chloride 107 95 - 110 mmol/L    CO2 25 23 - 29 mmol/L    BUN, Bld 22 8 - 23 mg/dL    Creatinine 0.8 0.5 - 1.4 mg/dL    Calcium 8.4 (L) 8.7 - 10.5 mg/dL    Anion Gap 13 8 - 16 mmol/L   minimal action colostomy , will observe for now.

## 2019-06-23 NOTE — NURSING
Unable to restart iv access.  Attempted on floor x 3 by nurses and er with 2 attempts. Dr whitlock gave order  to discontinue iv and iv fluids.  He stated anesthesia should not be requested to attempt iv access placement

## 2019-06-24 VITALS
HEIGHT: 70 IN | SYSTOLIC BLOOD PRESSURE: 156 MMHG | HEART RATE: 79 BPM | RESPIRATION RATE: 20 BRPM | TEMPERATURE: 96 F | BODY MASS INDEX: 26.04 KG/M2 | DIASTOLIC BLOOD PRESSURE: 69 MMHG | WEIGHT: 181.88 LBS | OXYGEN SATURATION: 97 %

## 2019-06-24 LAB
ANION GAP SERPL CALC-SCNC: 9 MMOL/L (ref 8–16)
BASOPHILS # BLD AUTO: 0.02 K/UL (ref 0–0.2)
BASOPHILS NFR BLD: 0.2 % (ref 0–1.9)
BUN SERPL-MCNC: 14 MG/DL (ref 8–23)
CALCIUM SERPL-MCNC: 8.5 MG/DL (ref 8.7–10.5)
CHLORIDE SERPL-SCNC: 102 MMOL/L (ref 95–110)
CO2 SERPL-SCNC: 25 MMOL/L (ref 23–29)
CREAT SERPL-MCNC: 1 MG/DL (ref 0.5–1.4)
DIFFERENTIAL METHOD: ABNORMAL
EOSINOPHIL # BLD AUTO: 0.4 K/UL (ref 0–0.5)
EOSINOPHIL NFR BLD: 4.3 % (ref 0–8)
ERYTHROCYTE [DISTWIDTH] IN BLOOD BY AUTOMATED COUNT: 13.6 % (ref 11.5–14.5)
EST. GFR  (AFRICAN AMERICAN): >60 ML/MIN/1.73 M^2
EST. GFR  (NON AFRICAN AMERICAN): >60 ML/MIN/1.73 M^2
GLUCOSE SERPL-MCNC: 155 MG/DL (ref 70–110)
HCT VFR BLD AUTO: 40.9 % (ref 40–54)
HGB BLD-MCNC: 13.6 G/DL (ref 14–18)
LYMPHOCYTES # BLD AUTO: 1.5 K/UL (ref 1–4.8)
LYMPHOCYTES NFR BLD: 17.5 % (ref 18–48)
MCH RBC QN AUTO: 28.5 PG (ref 27–31)
MCHC RBC AUTO-ENTMCNC: 33.3 G/DL (ref 32–36)
MCV RBC AUTO: 86 FL (ref 82–98)
MONOCYTES # BLD AUTO: 1.1 K/UL (ref 0.3–1)
MONOCYTES NFR BLD: 13.5 % (ref 4–15)
NEUTROPHILS # BLD AUTO: 5.2 K/UL (ref 1.8–7.7)
NEUTROPHILS NFR BLD: 62.6 % (ref 38–73)
PLATELET # BLD AUTO: 230 K/UL (ref 150–350)
PMV BLD AUTO: 10.8 FL (ref 9.2–12.9)
POTASSIUM SERPL-SCNC: 3.5 MMOL/L (ref 3.5–5.1)
RBC # BLD AUTO: 4.77 M/UL (ref 4.6–6.2)
SODIUM SERPL-SCNC: 136 MMOL/L (ref 136–145)
WBC # BLD AUTO: 8.29 K/UL (ref 3.9–12.7)

## 2019-06-24 PROCEDURE — 97535 SELF CARE MNGMENT TRAINING: CPT

## 2019-06-24 PROCEDURE — 85025 COMPLETE CBC W/AUTO DIFF WBC: CPT

## 2019-06-24 PROCEDURE — 36415 COLL VENOUS BLD VENIPUNCTURE: CPT

## 2019-06-24 PROCEDURE — 80048 BASIC METABOLIC PNL TOTAL CA: CPT

## 2019-06-24 PROCEDURE — 97110 THERAPEUTIC EXERCISES: CPT

## 2019-06-24 PROCEDURE — 97803 MED NUTRITION INDIV SUBSEQ: CPT

## 2019-06-24 PROCEDURE — 94761 N-INVAS EAR/PLS OXIMETRY MLT: CPT

## 2019-06-24 RX ADMIN — VALSARTAN AND HYDROCHLOROTHIAZIDE 1 TABLET: 320; 12.5 TABLET, FILM COATED ORAL at 09:06

## 2019-06-24 NOTE — PLAN OF CARE
Problem: Adult Inpatient Plan of Care  Goal: Plan of Care Review  Outcome: Ongoing (interventions implemented as appropriate)  Pt AAOx4, VSS, NAD. No complaints of headache or N/V. No complaints of pain. Ostomy site clear and intact. Stoma red, rosebud appearance. Producing liquid stool and flatulence. Tolerating diet. Bed in lowest position, call light in reach. Safety maintained. Will continue to monitor.

## 2019-06-24 NOTE — PLAN OF CARE
TN met with pt and family prior to d/c   Discharge rounds on patient. Discussed followup appointments, blue discharge folder, discharge nurse will go over home medications and reasons for medications and final discharge instructions. All patient/caregiver questions answered. Patient verbalized understanding.      pt assigned to Family  per [facility] to contact pt's daughter Kellie [phone] re:  delivery of hospital bed     per wound care nurse Deanna - pt has undergone ostomy care education - will be sent home with extra supplies.          06/24/19 1651   Final Note   Assessment Type Final Discharge Note   Anticipated Discharge Disposition Home-Health  ([facility] )   What phone number can be called within the next 1-3 days to see how you are doing after discharge? [phone]   Hospital Follow Up  Appt(s) scheduled? Yes   Discharge plans and expectations educations in teach back method with documentation complete? Yes   Right Care Referral Info   Post Acute Recommendation SNF / Sub-Acute Rehab   Referral Type   (home care )   Facility Name   ([facility] )

## 2019-06-24 NOTE — PROGRESS NOTES
Follow-up With  Details  Why  Contact Info   Eleuterio Ivy MD  On 7/2/2019  8:45 am   [address]  [place] [address]  [phone]   Fouzia Gardner MD  On 7/1/2019  1:20 pm   [address]  [address]  [place] [address]  [phone]

## 2019-06-24 NOTE — ASSESSMENT & PLAN NOTE
Contributing Nutrition Diagnosis  Altered GI Function    Related to (etiology):   SBO    Signs and Symptoms (as evidenced by):   S/p colostomy    Interventions:  Fiber modified diet    Nutrition Diagnosis Status:   New

## 2019-06-24 NOTE — PROGRESS NOTES
"[facility]  Adult Nutrition  Progress Note    SUMMARY       Recommendations    Recommendation:   1. Change diet to Cardiac low fiber low residue.   2. Encourage intake at meals as tolerated,    Goals:   Pt to consume at least 50-75% intake at meals  Nutrition Goal Status: new  Communication of RD Recs: reviewed with RN(Maria Luz)    Reason for Assessment  Reason For Assessment: RD follow-up  Diagnosis: other (see comments)(SBO)  Relevant Medical History: CKD3, DM, edema, HTN, HLF, Diverticulities, BPH  Interdisciplinary Rounds: did not attend  General Information Comments: Pt on Cardiac Soft diet. Tolerating diet with good intake at meals. Educated pt on colostomy diet. NFPE not warranted-pt nourished.  Nutrition Discharge Planning: pt to d/c on low fiber Cardiac diet    Nutrition Risk Screen  Nutrition Risk Screen: no indicators present    Nutrition/Diet History  Food Preferences: no Roman Catholic or cultural food prefs identified  Spiritual, Cultural Beliefs, Caodaism Practices, Values that Affect Care: no  Factors Affecting Nutritional Intake: altered gastrointestinal function    Anthropometrics  Temp: 96.6 °F (35.9 °C)  Height Method: Stated  Height: 5' 10" (177.8 cm)  Height (inches): 70 in  Weight Method: Bed Scale  Weight: 82.5 kg (181 lb 14.1 oz)  Weight (lb): 181.88 lb  Ideal Body Weight (IBW), Male: 166 lb  % Ideal Body Weight, Male (lb): 102.53 lb  BMI (Calculated): 24.5  BMI Grade: 18.5-24.9 - normal     Lab/Procedures/Meds  Pertinent Labs Reviewed: reviewed  Pertinent Labs Comments: Glu 155H, Ca 8.5L  Pertinent Medications Reviewed: reviewed  Pertinent Medications Comments: cefazolin    Estimated/Assessed Needs  Weight Used For Calorie Calculations: 75.4 kg (166 lb 3.6 oz)(IBW)  Energy Calorie Requirements (kcal): 2262 (30 kcal/kg)  Energy Need Method: Kcal/kg  Protein Requirements: 75g (1.0g/kg)  Weight Used For Protein Calculations: 75.4 kg (166 lb 3.6 oz)(IBW)  Fluid Requirements (mL): " 1mL/kcal or Per MD   Estimated Fluid Requirement Method: RDA Method  RDA Method (mL): 2262  CHO Requirement: 50%     Nutrition Prescription Ordered  Current Diet Order: Cardiac Soft    Evaluation of Received Nutrient/Fluid Intake  I/O: 1080/2425  Energy Calories Required: meeting needs  Protein Required: meeting needs  Fluid Required: meeting needs  Comments: LBM 6/24  Tolerance: tolerating  % Intake of Estimated Energy Needs: 50 - 75 %  % Meal Intake: 50 - 75 %    Nutrition Risk  Level of Risk/Frequency of Follow-up: high(2x/week)     Assessment and Plan    * Small bowel obstruction  Contributing Nutrition Diagnosis  Altered GI Function    Related to (etiology):   SBO    Signs and Symptoms (as evidenced by):   S/p colostomy    Interventions:  Fiber modified diet    Nutrition Diagnosis Status:   New         Monitor and Evaluation  Food and Nutrient Intake: energy intake, food and beverage intake  Food and Nutrient Adminstration: diet order  Physical Activity and Function: nutrition-related ADLs and IADLs  Anthropometric Measurements: height/length, weight, weight change  Biochemical Data, Medical Tests and Procedures: electrolyte and renal panel, gastrointestinal profile, glucose/endocrine profile, inflammatory profile, lipid profile  Nutrition-Focused Physical Findings: overall appearance     Malnutrition Assessment  Subcutaneous Fat Loss (Final Summary): well nourished  Muscle Loss Evaluation (Final Summary): well nourished       Nutrition Follow-Up  RD Follow-up?: Yes

## 2019-06-24 NOTE — PLAN OF CARE
Problem: Adult Inpatient Plan of Care  Goal: Plan of Care Review  Outcome: Ongoing (interventions implemented as appropriate)  Colostomy care performed, patient watched while emptying. Small amount liquid stool output. Fluids discontinued No complaints of pain or nausea. No signs of infection noted, WBC's 8.29. Patient remains free from falls, bed alarm in use.

## 2019-06-24 NOTE — PLAN OF CARE
Problem: Occupational Therapy Goal  Goal: Occupational Therapy Goal  Goals to be met by: 07/17     Patient will increase functional independence with ADLs by performing:    UE Dressing with Supervision.  LE Dressing with Supervision.--MET 06/24  Grooming while standing at sink with Supervision.--MET 06/20  Toileting from toilet with Supervision for hygiene and clothing management.--MET 06/24   Toilet transfer to toilet with Supervision--MET 06/24.  Increased functional strength to WFL for ADLs.      Outcome: Ongoing (interventions implemented as appropriate)  Pt found [facility] & agreeable to OT this AM. Pt perf the following: chair pushups w/ 10 sec hold 1x10 reps ea; doff/don socks at b/s chair level via [employer] w/ Sup-MI. [facility]/tx re: HEP, BSC use at home, general safety techs, energy conservation techs, endurance tx. Pt/fly verbalized understanding. Provided red Tband w/ handout.    Pt has achieved most established goals & appropriate for d/c-->home upon medical stability. Cont w/ OT per POC.

## 2019-06-24 NOTE — PLAN OF CARE
VN went in and went over  the d/c instructions with the patient and his family.   Patient advised there are no newly prescribed medication.  Patient will keep all follow up appointments and schedule any needed appointments.    Patient verbalized understanding of the D/C instructions. Education given, refer to clinical reference for education.   Per the patient and his family Dr Gardner is going to come in and see him before he leaves

## 2019-06-24 NOTE — PROGRESS NOTES
"Surgery follow up  BP (!) 156/69   Pulse 79   Temp 96 °F (35.6 °C)   Resp 20   Ht 5' 10" (1.778 m)   Wt 82.5 kg (181 lb 14.1 oz)   SpO2 97%   BMI 26.10 kg/m²   I/O last 3 completed shifts:  In: 1080 [P.O.:1080]  Out: 2425 [Urine:2400; Stool:25]  I/O this shift:  In: -   Out: 500 [Urine:500]  Recent Results (from the past 336 hour(s))   CBC auto differential    Collection Time: 06/24/19  4:01 AM   Result Value Ref Range    WBC 8.29 3.90 - 12.70 K/uL    Hemoglobin 13.6 (L) 14.0 - 18.0 g/dL    Hematocrit 40.9 40.0 - 54.0 %    Platelets 230 150 - 350 K/uL   CBC auto differential    Collection Time: 06/23/19  3:43 AM   Result Value Ref Range    WBC 8.52 3.90 - 12.70 K/uL    Hemoglobin 13.2 (L) 14.0 - 18.0 g/dL    Hematocrit 40.4 40.0 - 54.0 %    Platelets 226 150 - 350 K/uL   CBC auto differential    Collection Time: 06/22/19  6:05 AM   Result Value Ref Range    WBC 10.41 3.90 - 12.70 K/uL    Hemoglobin 13.6 (L) 14.0 - 18.0 g/dL    Hematocrit 41.3 40.0 - 54.0 %    Platelets 214 150 - 350 K/uL     Recent Results (from the past 336 hour(s))   Basic metabolic panel    Collection Time: 06/24/19  4:01 AM   Result Value Ref Range    Sodium 136 136 - 145 mmol/L    Potassium 3.5 3.5 - 5.1 mmol/L    Chloride 102 95 - 110 mmol/L    CO2 25 23 - 29 mmol/L    BUN, Bld 14 8 - 23 mg/dL    Creatinine 1.0 0.5 - 1.4 mg/dL    Calcium 8.5 (L) 8.7 - 10.5 mg/dL    Anion Gap 9 8 - 16 mmol/L   Basic metabolic panel    Collection Time: 06/23/19  3:43 AM   Result Value Ref Range    Sodium 137 136 - 145 mmol/L    Potassium 3.4 (L) 3.5 - 5.1 mmol/L    Chloride 103 95 - 110 mmol/L    CO2 27 23 - 29 mmol/L    BUN, Bld 17 8 - 23 mg/dL    Creatinine 0.9 0.5 - 1.4 mg/dL    Calcium 8.1 (L) 8.7 - 10.5 mg/dL    Anion Gap 7 (L) 8 - 16 mmol/L   Basic metabolic panel    Collection Time: 06/22/19  6:05 AM   Result Value Ref Range    Sodium 138 136 - 145 mmol/L    Potassium 3.7 3.5 - 5.1 mmol/L    Chloride 105 95 - 110 mmol/L    CO2 25 23 - 29 mmol/L    " BUN, Bld 16 8 - 23 mg/dL    Creatinine 0.8 0.5 - 1.4 mg/dL    Calcium 8.0 (L) 8.7 - 10.5 mg/dL    Anion Gap 8 8 - 16 mmol/L   abdomen soft , non tender , colostomy functions well continue present treatment discharge home with home health and hospital bed.

## 2019-06-24 NOTE — PROGRESS NOTES
Pt AAOx4, VSS, NAD. No complaints of headache or N/V. No complaints of pain. Ostomy site clear and intact. Stoma red, rosebud appearance. Producing liquid stool and flatulence. Tolerating diet. Bed in lowest position, call light in reach. Safety maintained. Will continue to monitor.

## 2019-06-24 NOTE — PLAN OF CARE
Problem: Adult Inpatient Plan of Care  Goal: Plan of Care Review  Outcome: Ongoing (interventions implemented as appropriate)  Recommendation:   1. Change diet to Cardiac low fiber low residue.   2. Encourage intake at meals as tolerated,    Goals:   Pt to consume at least 50-75% intake at meals  Nutrition Goal Status: new  Communication of RD Recs: reviewed with RN(Maria Luz)

## 2019-06-25 ENCOUNTER — PATIENT OUTREACH (OUTPATIENT)
Dept: ADMINISTRATIVE | Facility: CLINIC | Age: 84
End: 2019-06-25

## 2019-06-25 NOTE — DISCHARGE SUMMARY
DATE OF ADMISSION:  06/13/2019.    DATE OF DISCHARGE:  06/24/2019.    HISTORY OF PRESENT ILLNESS:  This 88-year-old male admitted to the Emergency   Room with history of sudden onset of diffuse abdominal pain associated with   nausea and vomiting.  The patient had similar episodes 4 or 5 years ago.  The   patient was diagnosed to have chronic diverticulitis with partial bowel   obstruction.  The patient was treated conservatively.  The patient had initially   treated with n.p.o., NG tube and IV fluids.  The patient underwent repeat   x-rays, which showed persistent dilatation of small bowel in the upper jejunal   area and  possible colonic obstruction.  The patient was then taken to the   Operating Room and underwent exploratory lap, lysis of adhesions, release small   bowel obstruction.  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  The patient underwent left colon resection with colostomy and David   procedure.  The patient admitted for continued care with n.p.o., NG tube, IV   fluids, was observed in ICU and then was transferred to the floor.  The patient   gradually started getting better.  NG tube was discontinued.  The patient was   started on a diet.  The patient started moving colostomy.  Minimal abdominal   pain.  No nausea or vomiting.  Colostomy was working well.  Wound was healing   well.  The patient was discharged home on 06/24/2019 on p.o. pain pill, advised   not to do any heavy lifting.  The patient is to come and see me in the office on   Monday to have the stitches taken out and to call me if she had any problems.    Arrangements were made for home health care for colostomy care and also a   hospital bed to prevent any decubitus.  The patient is to call if he had any   other problems.        MS/HN  dd: 06/25/2019 18:01:59 (CDT)  td: 06/26/2019 06:53:31 (CDT)  Doc ID   #[id number]  Job ID #[id number]    CC:     [id number]

## 2019-06-25 NOTE — PATIENT INSTRUCTIONS
Small Bowel Obstruction     Small bowel obstruction can lead to tissue damage and even tissue death.   A small bowel obstruction occurs when part or all of the small intestine (bowel) is blocked. As a result, digestive contents cant move through the bowel properly and out of the body. Treatment is needed right away to remove the blockage. This can ease painful symptoms. It can also prevent serious problems, such as tissue death or bursting (rupture) of the small bowel. Without treatment, a small bowel obstruction can be fatal.  Causes of small bowel obstruction  A small bowel obstruction can be caused by:  · Scar tissue (adhesions). These may form after belly (abdominal) surgery or an infection.  · Hernia. A hernia is when an organ pushes through a weak spot or tear in the abdomen wall. Part of the small bowel can push out and be seen as a bulge under the belly. Hernias can also occur internally.  · Certain health problems. These include when part of the bowel slides inside another part (intussusception). Other causes include irritable bowel disease such as Crohns disease, and inflammation and sores in the intestine (ulcerative colitis).  · Abnormal tissue growths (tumors). These can form on the inside or outside of the small bowel. They are usually due to cancer.  Symptoms of small bowel obstruction  Common symptoms include:  · Belly cramping and pain  · Belly swelling and bloating  · Upset stomach (nausea) and vomiting  · Can't  pass gas  · Can't pass stool (constipation)  · Diarrhea  Diagnosing small bowel obstruction  Your provider will ask about your symptoms and health history. Youll also have a physical exam. Tests may also be done to confirm the problem. These can include:  · Imaging tests. These provide pictures of the small bowel. Common tests include X-rays and a CT scan.  · Blood tests. These check for infection and other problems, such as excess fluid loss (dehydration).  · Upper GI  (gastrointestinal) series with a small bowel follow-through. This test takes X-rays of the upper digestive tract from the mouth through the small bowel. An X-ray dye (contrast fluid) is used. The dye coats the inside of your upper digestive tract so it will show up clearly on X-rays.  Treating small bowel obstruction  Treatment takes place in a hospital. As part of your care, the following may be done:  · No food or drink is given by mouth. This allows your bowels to rest.  · An IV (intravenous) line is placed in a vein in your arm or hand. The IV line is used to give fluids. It may also be used to give medicines. These may be needed to ease pain, nausea, and other symptoms. They may also be needed to treat or prevent infections.  · A soft, thin, flexible tube (nasogastric tube) is inserted through your nose and into your stomach. The tube is used to remove extra gas and fluid in your stomach and bowels. This helps to ease symptoms such as pain and swelling.  · In severe cases, surgery is done. This may be needed if the small bowel is almost or totally blocked, or there is a hole in the bowel (bowel perforation). During surgery, the blockage is removed. Parts of the bowel may also be removed if there is tissue death. Other repair may be done as well, depending on what caused the blockage. Your healthcare provider will give you more information about surgery, if needed.  · Youll be watched closely in the hospital until your symptoms improve. Your provider will tell you when you can go home.  Long-term concerns   After treatment, most people recover with no lasting effects. If a long part of the bowel is removed, there is a greater chance for lifelong digestive problems. Bowel movements may become irregular. Work with your provider to learn the best ways to manage any symptoms you may have, and to protect your health.  When to call your healthcare provider  Call your provider right away if you have any of the  following:  · Severe pain (Call 911)  · Belly swelling or cramping that wont go away  · Cant pass stool or gas  · Nausea or vomiting (especially if the vomit looks or smells like stool)   Date Last Reviewed: 7/1/2016  © 2261-7354 [employer]. [address] [place] [address], [place] [address]. All rights reserved. This information is not intended as a substitute for professional medical care. Always follow your healthcare professional's instructions.

## 2019-06-25 NOTE — TELEPHONE ENCOUNTER
C3 nurse attempted to contact patient. The following occurred:   C3 nurse attempted to contact Husam Connolly  for a TCC post hospital discharge follow up call. The patient is unable to conduct the call @ this time. The patient requested a callback.    The patient has a scheduled HOSFU appointment with Eleuterio Ivy MD on 7/2/2019 AT 8:45 am

## 2019-06-28 ENCOUNTER — PATIENT MESSAGE (OUTPATIENT)
Dept: CARDIOLOGY | Facility: CLINIC | Age: 84
End: 2019-06-28

## 2019-07-01 PROBLEM — Z93.3 S/P COLOSTOMY: Status: ACTIVE | Noted: 2019-07-01

## 2019-07-09 ENCOUNTER — EXTERNAL HOME HEALTH (OUTPATIENT)
Dept: HOME HEALTH SERVICES | Facility: HOSPITAL | Age: 84
End: 2019-07-09

## 2019-08-13 RX ORDER — OMEPRAZOLE 20 MG/1
20 CAPSULE, DELAYED RELEASE ORAL DAILY
Qty: 30 CAPSULE | Refills: 2 | Status: SHIPPED | OUTPATIENT
Start: 2019-08-13 | End: 2020-03-16

## 2019-09-06 ENCOUNTER — ANESTHESIA (OUTPATIENT)
Dept: SURGERY | Facility: HOSPITAL | Age: 84
End: 2019-09-06
Payer: MEDICARE

## 2019-09-06 ENCOUNTER — ANESTHESIA EVENT (OUTPATIENT)
Dept: SURGERY | Facility: HOSPITAL | Age: 84
End: 2019-09-06
Payer: MEDICARE

## 2019-09-06 NOTE — ANESTHESIA PREPROCEDURE EVALUATION
09/06/2019  Husam Connolly is a 88 y.o., male with SBO here for ex-lap    Past Medical History:   Diagnosis Date    Anticoagulant long-term use     Bowel obstruction     BPH (benign prostatic hyperplasia)     Diabetes mellitus     Diverticulitis     Hyperlipidemia     Hypertension      Past Surgical History:   Procedure Laterality Date    CREATION, COLOSTOMY  6/16/2019    [facility]    EYE SURGERY      GISELA PROCEDURE  6/16/2019    [facility] at [facility]    LAPAROTOMY, EXPLORATORY N/A 6/16/2019    [facility] at [facility]    TONSILLECTOMY       Review of patient's allergies indicates:   Allergen Reactions    Solarcaine [benzocaine-triclosan]     Sulfa (sulfonamide antibiotics)     Hytrin [terazosin] Rash    Smz-tmp ds [sulfamethoxazole-trimethoprim] Rash       Lab Results   Component Value Date    WBC 8.29 06/24/2019    HGB 13.6 (L) 06/24/2019    HCT 40.9 06/24/2019     06/24/2019    CHOL 121 05/31/2013    TRIG 77 05/31/2013    HDL 53 05/31/2013    ALT 19 06/13/2019    AST 23 06/13/2019     06/24/2019    K 3.5 06/24/2019     06/24/2019    CREATININE 1.0 06/24/2019    BUN 14 06/24/2019    CO2 25 06/24/2019    TSH 0.452 05/31/2013    PSA 2.2 10/12/2011    INR 1.1 04/18/2015    HGBA1C 6.3 (H) 05/31/2013         Pre-op Assessment    I have reviewed the Patient Summary Reports.     I have reviewed the Nursing Notes.   I have reviewed the Medications.     Review of Systems  Anesthesia Hx:  No problems with previous Anesthesia Denies Hx of Anesthetic complications  Denies Family Hx of Anesthesia complications.   Denies Personal Hx of Anesthesia complications.   Social:  Non-Smoker, No Alcohol Use    Hematology/Oncology:     Oncology Normal     EENT/Dental:EENT/Dental Normal    Cardiovascular:  Cardiovascular Normal Exercise tolerance: good     Pulmonary:  Pulmonary Normal    Renal/:  Renal/ Normal     Hepatic/GI:  Hepatic/GI Normal    Neurological:  Neurology Normal    Endocrine:  Endocrine Normal    Psych:  Psychiatric Normal           Physical Exam  General:  Well nourished    Airway/Jaw/Neck:  Airway Findings: Mouth Opening: Normal Tongue: Normal  Mallampati: III  TM Distance: Normal, at least 6 cm  Jaw/Neck Findings:  Neck ROM: Normal ROM      Dental:  Dental Findings: Periodontal disease, Mild   Chest/Lungs:  Chest/Lungs Findings: Clear to auscultation     Heart/Vascular:  Heart Findings: Rate: Normal  Rhythm: Regular Rhythm  Sounds: Normal        Mental Status:  Mental Status Findings:  Alert and Oriented, Cooperative         Anesthesia Plan  Type of Anesthesia, risks & benefits discussed:  Anesthesia Type:  general  Patient's Preference:   Intra-op Monitoring Plan: standard ASA monitors  Intra-op Monitoring Plan Comments:   Post Op Pain Control Plan: per primary service following discharge from PACU  Post Op Pain Control Plan Comments:   Induction:   IV  Beta Blocker:  Patient is not currently on a Beta-Blocker (No further documentation required).       Informed Consent: Patient understands risks and agrees with Anesthesia plan.  Questions answered. Anesthesia consent signed with patient.  ASA Score: 2     Day of Surgery Review of History & Physical:            Ready For Surgery From Anesthesia Perspective.         No current facility-administered medications on file prior to visit.      Current Outpatient Medications on File Prior to Visit   Medication Sig Dispense Refill    aspirin (ECOTRIN) 81 MG EC tablet Take 81 mg by mouth once daily.      atorvastatin (LIPITOR) 40 MG tablet TAKE 1 TABLET BY MOUTH EVERY DAY 90 tablet 3    azelastine (ASTELIN) 137 mcg (0.1 %) nasal spray 1-2 SPRAYS INTO EACH NOSTRIL TWICE A DAY  5    famciclovir (FAMVIR) 500 MG tablet Take 500 mg by  mouth 3 (three) times daily as needed.  11    finasteride (PROSCAR) 5 mg tablet TAKE 1 TABLET BY MOUTH EVERY DAY 90 tablet 1    metFORMIN (GLUCOPHAGE) 500 MG tablet Take 500 mg by mouth once daily.  3    multivitamin capsule Take 1 capsule by mouth once daily.      omeprazole (PRILOSEC) 20 MG capsule Take 1 capsule (20 mg total) by mouth once daily. 30 capsule 2    tamsulosin (FLOMAX) 0.4 mg Cap Take 1 capsule by mouth nightly.  3    valsartan-hydrochlorothiazide (DIOVAN-HCT) 320-12.5 mg per tablet TAKE 1 TABLET BY MOUTH EVERY DAY 90 tablet 3

## 2019-09-09 ENCOUNTER — ANESTHESIA (OUTPATIENT)
Dept: SURGERY | Facility: HOSPITAL | Age: 84
DRG: 331 | End: 2019-09-09
Payer: MEDICARE

## 2019-09-09 ENCOUNTER — HOSPITAL ENCOUNTER (INPATIENT)
Facility: HOSPITAL | Age: 84
LOS: 8 days | Discharge: HOME HEALTH | DRG: 331 | End: 2019-09-17
Attending: SURGERY | Admitting: SURGERY
Payer: MEDICARE

## 2019-09-09 ENCOUNTER — ANESTHESIA EVENT (OUTPATIENT)
Dept: SURGERY | Facility: HOSPITAL | Age: 84
DRG: 331 | End: 2019-09-09
Payer: MEDICARE

## 2019-09-09 DIAGNOSIS — J30.1 ALLERGIC RHINITIS DUE TO POLLEN, UNSPECIFIED SEASONALITY: ICD-10-CM

## 2019-09-09 DIAGNOSIS — R60.0 LOCALIZED EDEMA: ICD-10-CM

## 2019-09-09 DIAGNOSIS — N40.0 BENIGN PROSTATIC HYPERPLASIA, UNSPECIFIED WHETHER LOWER URINARY TRACT SYMPTOMS PRESENT: ICD-10-CM

## 2019-09-09 DIAGNOSIS — N18.30 CKD (CHRONIC KIDNEY DISEASE) STAGE 3, GFR 30-59 ML/MIN: ICD-10-CM

## 2019-09-09 DIAGNOSIS — K56.609 INTESTINAL OBSTRUCTION, UNSPECIFIED CAUSE, UNSPECIFIED WHETHER PARTIAL OR COMPLETE: ICD-10-CM

## 2019-09-09 DIAGNOSIS — Z79.01 ANTICOAGULANT LONG-TERM USE: ICD-10-CM

## 2019-09-09 DIAGNOSIS — K56.609 SMALL BOWEL OBSTRUCTION: ICD-10-CM

## 2019-09-09 DIAGNOSIS — K57.32 DIVERTICULITIS OF LARGE INTESTINE WITHOUT PERFORATION OR ABSCESS WITHOUT BLEEDING: Primary | ICD-10-CM

## 2019-09-09 DIAGNOSIS — R06.09 DOE (DYSPNEA ON EXERTION): ICD-10-CM

## 2019-09-09 DIAGNOSIS — I10 ESSENTIAL HYPERTENSION: ICD-10-CM

## 2019-09-09 DIAGNOSIS — E78.00 HYPERCHOLESTEREMIA: ICD-10-CM

## 2019-09-09 DIAGNOSIS — E78.5 HYPERLIPIDEMIA, UNSPECIFIED HYPERLIPIDEMIA TYPE: ICD-10-CM

## 2019-09-09 DIAGNOSIS — E11.8 TYPE 2 DIABETES MELLITUS WITH COMPLICATION, WITHOUT LONG-TERM CURRENT USE OF INSULIN: ICD-10-CM

## 2019-09-09 DIAGNOSIS — Z93.3 S/P COLOSTOMY: ICD-10-CM

## 2019-09-09 DIAGNOSIS — E87.6 HYPOKALEMIA: ICD-10-CM

## 2019-09-09 DIAGNOSIS — R06.2 WHEEZING: ICD-10-CM

## 2019-09-09 LAB
ANION GAP SERPL CALC-SCNC: 15 MMOL/L (ref 8–16)
BASOPHILS # BLD AUTO: 0 K/UL (ref 0–0.2)
BASOPHILS NFR BLD: 0 % (ref 0–1.9)
BUN SERPL-MCNC: 21 MG/DL (ref 8–23)
CALCIUM SERPL-MCNC: 8.1 MG/DL (ref 8.7–10.5)
CHLORIDE SERPL-SCNC: 108 MMOL/L (ref 95–110)
CO2 SERPL-SCNC: 17 MMOL/L (ref 23–29)
CREAT SERPL-MCNC: 1.3 MG/DL (ref 0.5–1.4)
DIFFERENTIAL METHOD: ABNORMAL
EOSINOPHIL # BLD AUTO: 0 K/UL (ref 0–0.5)
EOSINOPHIL NFR BLD: 0.3 % (ref 0–8)
ERYTHROCYTE [DISTWIDTH] IN BLOOD BY AUTOMATED COUNT: 13.6 % (ref 11.5–14.5)
EST. GFR  (AFRICAN AMERICAN): 56 ML/MIN/1.73 M^2
EST. GFR  (NON AFRICAN AMERICAN): 49 ML/MIN/1.73 M^2
GLUCOSE SERPL-MCNC: 166 MG/DL (ref 70–110)
HCT VFR BLD AUTO: 38.1 % (ref 40–54)
HGB BLD-MCNC: 12.7 G/DL (ref 14–18)
LYMPHOCYTES # BLD AUTO: 1.3 K/UL (ref 1–4.8)
LYMPHOCYTES NFR BLD: 12.3 % (ref 18–48)
MCH RBC QN AUTO: 28.9 PG (ref 27–31)
MCHC RBC AUTO-ENTMCNC: 33.3 G/DL (ref 32–36)
MCV RBC AUTO: 87 FL (ref 82–98)
MONOCYTES # BLD AUTO: 1 K/UL (ref 0.3–1)
MONOCYTES NFR BLD: 8.8 % (ref 4–15)
NEUTROPHILS # BLD AUTO: 8.5 K/UL (ref 1.8–7.7)
NEUTROPHILS NFR BLD: 78.6 % (ref 38–73)
PLATELET # BLD AUTO: 177 K/UL (ref 150–350)
PMV BLD AUTO: 11.3 FL (ref 9.2–12.9)
POCT GLUCOSE: 162 MG/DL (ref 70–110)
POCT GLUCOSE: 276 MG/DL (ref 70–110)
POTASSIUM SERPL-SCNC: 3.5 MMOL/L (ref 3.5–5.1)
RBC # BLD AUTO: 4.39 M/UL (ref 4.6–6.2)
SODIUM SERPL-SCNC: 140 MMOL/L (ref 136–145)
WBC # BLD AUTO: 10.76 K/UL (ref 3.9–12.7)

## 2019-09-09 PROCEDURE — 85025 COMPLETE CBC W/AUTO DIFF WBC: CPT

## 2019-09-09 PROCEDURE — 63600175 PHARM REV CODE 636 W HCPCS: Performed by: ANESTHESIOLOGY

## 2019-09-09 PROCEDURE — 25000003 PHARM REV CODE 250: Performed by: SURGERY

## 2019-09-09 PROCEDURE — 36000708 HC OR TIME LEV III 1ST 15 MIN: Performed by: SURGERY

## 2019-09-09 PROCEDURE — 63600175 PHARM REV CODE 636 W HCPCS: Performed by: SURGERY

## 2019-09-09 PROCEDURE — P9045 ALBUMIN (HUMAN), 5%, 250 ML: HCPCS | Mod: JG | Performed by: NURSE ANESTHETIST, CERTIFIED REGISTERED

## 2019-09-09 PROCEDURE — 88304 TISSUE EXAM BY PATHOLOGIST: CPT | Mod: 26,,, | Performed by: PATHOLOGY

## 2019-09-09 PROCEDURE — 71000039 HC RECOVERY, EACH ADD'L HOUR: Performed by: SURGERY

## 2019-09-09 PROCEDURE — 63600175 PHARM REV CODE 636 W HCPCS: Performed by: NURSE ANESTHETIST, CERTIFIED REGISTERED

## 2019-09-09 PROCEDURE — 37000009 HC ANESTHESIA EA ADD 15 MINS: Performed by: SURGERY

## 2019-09-09 PROCEDURE — 37000008 HC ANESTHESIA 1ST 15 MINUTES: Performed by: SURGERY

## 2019-09-09 PROCEDURE — 88305 TISSUE SPECIMEN TO PATHOLOGY - SURGERY: ICD-10-PCS | Mod: 26,,, | Performed by: PATHOLOGY

## 2019-09-09 PROCEDURE — 71000033 HC RECOVERY, INTIAL HOUR: Performed by: SURGERY

## 2019-09-09 PROCEDURE — 88305 TISSUE EXAM BY PATHOLOGIST: CPT | Performed by: PATHOLOGY

## 2019-09-09 PROCEDURE — S5010 5% DEXTROSE AND 0.45% SALINE: HCPCS | Performed by: SURGERY

## 2019-09-09 PROCEDURE — 36000709 HC OR TIME LEV III EA ADD 15 MIN: Performed by: SURGERY

## 2019-09-09 PROCEDURE — 88305 TISSUE EXAM BY PATHOLOGIST: CPT | Mod: 26,,, | Performed by: PATHOLOGY

## 2019-09-09 PROCEDURE — 20000000 HC ICU ROOM

## 2019-09-09 PROCEDURE — 25000003 PHARM REV CODE 250: Performed by: NURSE ANESTHETIST, CERTIFIED REGISTERED

## 2019-09-09 PROCEDURE — 80048 BASIC METABOLIC PNL TOTAL CA: CPT

## 2019-09-09 PROCEDURE — 36415 COLL VENOUS BLD VENIPUNCTURE: CPT

## 2019-09-09 PROCEDURE — 27201423 OPTIME MED/SURG SUP & DEVICES STERILE SUPPLY: Performed by: SURGERY

## 2019-09-09 PROCEDURE — 88304 TISSUE SPECIMEN TO PATHOLOGY - SURGERY: ICD-10-PCS | Mod: 26,,, | Performed by: PATHOLOGY

## 2019-09-09 RX ORDER — ONDANSETRON 8 MG/1
8 TABLET, ORALLY DISINTEGRATING ORAL EVERY 8 HOURS PRN
Status: DISCONTINUED | OUTPATIENT
Start: 2019-09-09 | End: 2019-09-17 | Stop reason: HOSPADM

## 2019-09-09 RX ORDER — ACETAMINOPHEN 325 MG/1
650 TABLET ORAL EVERY 8 HOURS PRN
Status: DISCONTINUED | OUTPATIENT
Start: 2019-09-09 | End: 2019-09-17 | Stop reason: HOSPADM

## 2019-09-09 RX ORDER — ROCURONIUM BROMIDE 10 MG/ML
INJECTION, SOLUTION INTRAVENOUS
Status: DISCONTINUED | OUTPATIENT
Start: 2019-09-09 | End: 2019-09-09

## 2019-09-09 RX ORDER — SODIUM CHLORIDE 9 MG/ML
INJECTION, SOLUTION INTRAVENOUS CONTINUOUS PRN
Status: DISCONTINUED | OUTPATIENT
Start: 2019-09-09 | End: 2019-09-09

## 2019-09-09 RX ORDER — LIDOCAINE HCL/PF 100 MG/5ML
SYRINGE (ML) INTRAVENOUS
Status: DISCONTINUED | OUTPATIENT
Start: 2019-09-09 | End: 2019-09-09

## 2019-09-09 RX ORDER — EPHEDRINE SULFATE 50 MG/ML
INJECTION, SOLUTION INTRAVENOUS
Status: DISCONTINUED | OUTPATIENT
Start: 2019-09-09 | End: 2019-09-09

## 2019-09-09 RX ORDER — BACITRACIN 50000 [IU]/1
INJECTION, POWDER, FOR SOLUTION INTRAMUSCULAR
Status: DISCONTINUED | OUTPATIENT
Start: 2019-09-09 | End: 2019-09-09 | Stop reason: HOSPADM

## 2019-09-09 RX ORDER — ACETAMINOPHEN 10 MG/ML
1000 INJECTION, SOLUTION INTRAVENOUS EVERY 8 HOURS
Status: DISCONTINUED | OUTPATIENT
Start: 2019-09-09 | End: 2019-09-09

## 2019-09-09 RX ORDER — ONDANSETRON 2 MG/ML
4 INJECTION INTRAMUSCULAR; INTRAVENOUS EVERY 12 HOURS PRN
Status: DISCONTINUED | OUTPATIENT
Start: 2019-09-09 | End: 2019-09-17 | Stop reason: HOSPADM

## 2019-09-09 RX ORDER — BUPIVACAINE HYDROCHLORIDE 2.5 MG/ML
INJECTION, SOLUTION EPIDURAL; INFILTRATION; INTRACAUDAL
Status: DISCONTINUED | OUTPATIENT
Start: 2019-09-09 | End: 2019-09-09 | Stop reason: HOSPADM

## 2019-09-09 RX ORDER — DEXTROSE MONOHYDRATE AND SODIUM CHLORIDE 5; .45 G/100ML; G/100ML
INJECTION, SOLUTION INTRAVENOUS CONTINUOUS
Status: DISCONTINUED | OUTPATIENT
Start: 2019-09-09 | End: 2019-09-10

## 2019-09-09 RX ORDER — FENTANYL CITRATE 50 UG/ML
INJECTION, SOLUTION INTRAMUSCULAR; INTRAVENOUS
Status: DISCONTINUED | OUTPATIENT
Start: 2019-09-09 | End: 2019-09-09

## 2019-09-09 RX ORDER — PROPOFOL 10 MG/ML
VIAL (ML) INTRAVENOUS
Status: DISCONTINUED | OUTPATIENT
Start: 2019-09-09 | End: 2019-09-09

## 2019-09-09 RX ORDER — ONDANSETRON HYDROCHLORIDE 2 MG/ML
INJECTION, SOLUTION INTRAMUSCULAR; INTRAVENOUS
Status: DISCONTINUED | OUTPATIENT
Start: 2019-09-09 | End: 2019-09-09

## 2019-09-09 RX ORDER — CEFAZOLIN SODIUM 1 G/3ML
INJECTION, POWDER, FOR SOLUTION INTRAMUSCULAR; INTRAVENOUS
Status: DISCONTINUED | OUTPATIENT
Start: 2019-09-09 | End: 2019-09-09

## 2019-09-09 RX ORDER — CEFAZOLIN SODIUM 2 G/50ML
2 SOLUTION INTRAVENOUS
Status: DISCONTINUED | OUTPATIENT
Start: 2019-09-09 | End: 2019-09-09 | Stop reason: RX

## 2019-09-09 RX ORDER — SODIUM CHLORIDE 0.9 % (FLUSH) 0.9 %
10 SYRINGE (ML) INJECTION
Status: DISCONTINUED | OUTPATIENT
Start: 2019-09-09 | End: 2019-09-17 | Stop reason: HOSPADM

## 2019-09-09 RX ORDER — AZELASTINE 1 MG/ML
1 SPRAY, METERED NASAL 2 TIMES DAILY
Status: DISCONTINUED | OUTPATIENT
Start: 2019-09-09 | End: 2019-09-17 | Stop reason: HOSPADM

## 2019-09-09 RX ORDER — ONDANSETRON 2 MG/ML
4 INJECTION INTRAMUSCULAR; INTRAVENOUS ONCE AS NEEDED
Status: DISCONTINUED | OUTPATIENT
Start: 2019-09-09 | End: 2019-09-09 | Stop reason: HOSPADM

## 2019-09-09 RX ORDER — LIDOCAINE HYDROCHLORIDE 10 MG/ML
1 INJECTION, SOLUTION EPIDURAL; INFILTRATION; INTRACAUDAL; PERINEURAL ONCE
Status: DISCONTINUED | OUTPATIENT
Start: 2019-09-09 | End: 2019-09-17 | Stop reason: HOSPADM

## 2019-09-09 RX ORDER — GLYCOPYRROLATE 0.2 MG/ML
INJECTION INTRAMUSCULAR; INTRAVENOUS
Status: DISCONTINUED | OUTPATIENT
Start: 2019-09-09 | End: 2019-09-09

## 2019-09-09 RX ORDER — ACETAMINOPHEN 10 MG/ML
1000 INJECTION, SOLUTION INTRAVENOUS ONCE
Status: COMPLETED | OUTPATIENT
Start: 2019-09-09 | End: 2019-09-09

## 2019-09-09 RX ORDER — HYDROMORPHONE HYDROCHLORIDE 2 MG/ML
0.5 INJECTION, SOLUTION INTRAMUSCULAR; INTRAVENOUS; SUBCUTANEOUS EVERY 5 MIN PRN
Status: DISCONTINUED | OUTPATIENT
Start: 2019-09-09 | End: 2019-09-09 | Stop reason: HOSPADM

## 2019-09-09 RX ORDER — INSULIN ASPART 100 [IU]/ML
0-5 INJECTION, SOLUTION INTRAVENOUS; SUBCUTANEOUS EVERY 6 HOURS PRN
Status: DISCONTINUED | OUTPATIENT
Start: 2019-09-09 | End: 2019-09-17 | Stop reason: HOSPADM

## 2019-09-09 RX ORDER — PHENYLEPHRINE HYDROCHLORIDE 10 MG/ML
INJECTION INTRAVENOUS
Status: DISCONTINUED | OUTPATIENT
Start: 2019-09-09 | End: 2019-09-09

## 2019-09-09 RX ORDER — NEOSTIGMINE METHYLSULFATE 1 MG/ML
INJECTION, SOLUTION INTRAVENOUS
Status: DISCONTINUED | OUTPATIENT
Start: 2019-09-09 | End: 2019-09-09

## 2019-09-09 RX ORDER — ATORVASTATIN CALCIUM 40 MG/1
40 TABLET, FILM COATED ORAL DAILY
Status: DISCONTINUED | OUTPATIENT
Start: 2019-09-10 | End: 2019-09-17 | Stop reason: HOSPADM

## 2019-09-09 RX ORDER — SODIUM CHLORIDE 9 MG/ML
INJECTION, SOLUTION INTRAVENOUS CONTINUOUS
Status: DISCONTINUED | OUTPATIENT
Start: 2019-09-09 | End: 2019-09-09

## 2019-09-09 RX ORDER — MORPHINE SULFATE 2 MG/ML
2 INJECTION, SOLUTION INTRAMUSCULAR; INTRAVENOUS EVERY 4 HOURS PRN
Status: DISCONTINUED | OUTPATIENT
Start: 2019-09-09 | End: 2019-09-11

## 2019-09-09 RX ORDER — ALBUMIN HUMAN 50 G/1000ML
SOLUTION INTRAVENOUS CONTINUOUS PRN
Status: DISCONTINUED | OUTPATIENT
Start: 2019-09-09 | End: 2019-09-09

## 2019-09-09 RX ORDER — GLUCAGON 1 MG
1 KIT INJECTION
Status: DISCONTINUED | OUTPATIENT
Start: 2019-09-09 | End: 2019-09-17 | Stop reason: HOSPADM

## 2019-09-09 RX ADMIN — ROCURONIUM BROMIDE 50 MG: 10 INJECTION, SOLUTION INTRAVENOUS at 10:09

## 2019-09-09 RX ADMIN — FENTANYL CITRATE 50 MCG: 50 INJECTION, SOLUTION INTRAMUSCULAR; INTRAVENOUS at 11:09

## 2019-09-09 RX ADMIN — FENTANYL CITRATE 100 MCG: 50 INJECTION, SOLUTION INTRAMUSCULAR; INTRAVENOUS at 10:09

## 2019-09-09 RX ADMIN — HYDROMORPHONE HYDROCHLORIDE 0.5 MG: 2 INJECTION, SOLUTION INTRAMUSCULAR; INTRAVENOUS; SUBCUTANEOUS at 03:09

## 2019-09-09 RX ADMIN — FENTANYL CITRATE 25 MCG: 50 INJECTION, SOLUTION INTRAMUSCULAR; INTRAVENOUS at 01:09

## 2019-09-09 RX ADMIN — ROCURONIUM BROMIDE 10 MG: 10 INJECTION, SOLUTION INTRAVENOUS at 11:09

## 2019-09-09 RX ADMIN — HYDROMORPHONE HYDROCHLORIDE 0.5 MG: 2 INJECTION, SOLUTION INTRAMUSCULAR; INTRAVENOUS; SUBCUTANEOUS at 02:09

## 2019-09-09 RX ADMIN — INSULIN ASPART 1 UNITS: 100 INJECTION, SOLUTION INTRAVENOUS; SUBCUTANEOUS at 10:09

## 2019-09-09 RX ADMIN — GLYCOPYRROLATE 0.6 MG: 0.2 INJECTION, SOLUTION INTRAMUSCULAR; INTRAVENOUS at 01:09

## 2019-09-09 RX ADMIN — EPHEDRINE SULFATE 5 MG: 50 INJECTION, SOLUTION INTRAMUSCULAR; INTRAVENOUS; SUBCUTANEOUS at 11:09

## 2019-09-09 RX ADMIN — EPHEDRINE SULFATE 5 MG: 50 INJECTION, SOLUTION INTRAMUSCULAR; INTRAVENOUS; SUBCUTANEOUS at 01:09

## 2019-09-09 RX ADMIN — DEXTROSE 2 G: 50 INJECTION, SOLUTION INTRAVENOUS at 06:09

## 2019-09-09 RX ADMIN — ACETAMINOPHEN 1000 MG: 10 INJECTION, SOLUTION INTRAVENOUS at 03:09

## 2019-09-09 RX ADMIN — PHENYLEPHRINE HYDROCHLORIDE 100 MCG: 10 INJECTION INTRAVENOUS at 12:09

## 2019-09-09 RX ADMIN — ONDANSETRON 8 MG: 2 INJECTION, SOLUTION INTRAMUSCULAR; INTRAVENOUS at 01:09

## 2019-09-09 RX ADMIN — PROPOFOL 30 MG: 10 INJECTION, EMULSION INTRAVENOUS at 11:09

## 2019-09-09 RX ADMIN — DEXTROSE AND SODIUM CHLORIDE: 5; .45 INJECTION, SOLUTION INTRAVENOUS at 05:09

## 2019-09-09 RX ADMIN — AZELASTINE HYDROCHLORIDE 137 MCG: 137 SPRAY, METERED NASAL at 11:09

## 2019-09-09 RX ADMIN — NEOSTIGMINE METHYLSULFATE 5 MG: 1 INJECTION INTRAVENOUS at 01:09

## 2019-09-09 RX ADMIN — CEFAZOLIN 2 G: 330 INJECTION, POWDER, FOR SOLUTION INTRAMUSCULAR; INTRAVENOUS at 11:09

## 2019-09-09 RX ADMIN — PROPOFOL 50 MG: 10 INJECTION, EMULSION INTRAVENOUS at 01:09

## 2019-09-09 RX ADMIN — PHENYLEPHRINE HYDROCHLORIDE 100 MCG: 10 INJECTION INTRAVENOUS at 10:09

## 2019-09-09 RX ADMIN — SODIUM CHLORIDE: 0.9 INJECTION, SOLUTION INTRAVENOUS at 10:09

## 2019-09-09 RX ADMIN — SODIUM CHLORIDE, SODIUM LACTATE, POTASSIUM CHLORIDE, AND CALCIUM CHLORIDE: .6; .31; .03; .02 INJECTION, SOLUTION INTRAVENOUS at 10:09

## 2019-09-09 RX ADMIN — ROCURONIUM BROMIDE 10 MG: 10 INJECTION, SOLUTION INTRAVENOUS at 12:09

## 2019-09-09 RX ADMIN — LIDOCAINE HYDROCHLORIDE 100 MG: 20 INJECTION, SOLUTION INTRAVENOUS at 10:09

## 2019-09-09 RX ADMIN — ALBUMIN (HUMAN): 2.5 SOLUTION INTRAVENOUS at 12:09

## 2019-09-09 RX ADMIN — PROPOFOL 150 MG: 10 INJECTION, EMULSION INTRAVENOUS at 10:09

## 2019-09-09 RX ADMIN — SODIUM CHLORIDE: 0.9 INJECTION, SOLUTION INTRAVENOUS at 11:09

## 2019-09-09 RX ADMIN — GLYCOPYRROLATE 0.2 MG: 0.2 INJECTION, SOLUTION INTRAMUSCULAR; INTRAVENOUS at 11:09

## 2019-09-09 RX ADMIN — FENTANYL CITRATE 50 MCG: 50 INJECTION, SOLUTION INTRAMUSCULAR; INTRAVENOUS at 01:09

## 2019-09-09 NOTE — ANESTHESIA RELEASE NOTE
"Anesthesia Release from PACU Note    Patient: Husam Connolly    Procedure(s) Performed: Procedure(s) (LRB):  LAPAROTOMY, EXPLORATORY (N/A)  ANASTOMOSIS, COLO-RECTAL    Anesthesia type: general    Post pain: Adequate analgesia    Post assessment: no apparent anesthetic complications, tolerated procedure well and no evidence of recall    Last Vitals:   Visit Vitals  BP (!) 107/52   Pulse 72   Temp 36.2 °C (97.2 °F)   Resp 12   Ht 5' 10" (1.778 m)   Wt 72.6 kg (160 lb)   SpO2 97%   BMI 22.96 kg/m²       Post vital signs: stable    Level of consciousness: awake, alert  and oriented    Nausea/Vomiting: no nausea/no vomiting    Complications: none    Airway Patency: patent    Respiratory: unassisted    Cardiovascular: stable and blood pressure at baseline    Hydration: euvolemic  "

## 2019-09-09 NOTE — OP NOTE
DATE OF PROCEDURE:  09/09/2019    PREOPERATIVE DIAGNOSES:  Status post colostomy, diabetes, hypertension, and   coronary artery disease.    POSTOPERATIVE DIAGNOSES:  Status post colostomy, diabetes, hypertension, and   coronary artery disease.    OPERATION PERFORMED:  Exploratory laparotomy, takedown of colostomy, colorectal   anastomosis, and extensive lysis of adhesion.    SURGEON:  Fouzia Gardner M.D.    ASSISTANT:  Frank.    ANESTHESIA:  General.    PROCEDURE IN DETAIL:  After satisfactory general endotracheal anesthesia, with   the patient in semi-lithotomy position, timeout was called.  The patient was   confirmed.  Colostomy in the left upper quadrant area was oversewn using running   2-0 silk suture and then abdomen was prepped and draped in normal sterile   manner using Betadine scrub solution.  Midline incision was made from xiphoid up   to suprapubic area and was taken down to deep subcutaneous tissue.    Subcutaneous bleeders were clamped and bovied, further taken down.  Linea alba   was incised.  Abdominal cavity was entered.  The patient was found to have   extensive adhesions of the omentum to the anterior abdominal wall.  Extensive   lysis of adhesion was performed.  Then, distal segment of rectosigmoid was   mobilized.  With these sutures in place, complete mobilization was performed.    Extensive lysis of adhesion was performed in the process to expose the area.  A   couple of serosal tears were closed using running 3-0 silk suture.  Then,   colostomy, left colon and splenic flexure was mobilized.  Adequate length was   obtained.  Some bleeding was noted in the left upper quadrant area from the   spleen, which was treated with Surgicel and other hemostatic equipment.    Hemostasis was satisfactorily maintained.  Then, side-to-side anastomosis was   created between the proximal colon and the distal colon using ANA.  Area of the   colotomy was closed using a TA 60.  A suture was placed at distal end  of the   anastomosis.  Abdominal cavity was thoroughly irrigated with antibiotic   solution.  The colostomy segment in the left upper quadrant area was then   removed by making an incision at the skin ____ junction.  Complete removal was   performed excising the short segment.  Bleeders were clamped and bovied.    Hemostasis was satisfactorily maintained.  Fascia was then closed using running   #1 Prolene suture.  Subcutaneous tissue approximated with 3-0 Vicryl, skin was   closed using interrupted 2-0 nylon and wound was packed using iodoform packing.    Abdominal cavity was then closed using running #1 Prolene in 2 steps,   subcutaneous tissues approximated with 3-0 Vicryl, skin was closed using skin   staple.  Xeroform, 4 x 4s, dressing was applied.  Instrument count, sponge   count, and needle count was correct.  The patient tolerated it well.  Estimated   blood loss was 150 mL.  Specimen removed was part of the colon and anastomotic   line.  No intraoperative complications.  Postoperative diagnoses were status   post colostomy, diabetes, hypertension and coronary artery disease.      MS/IN  dd: 09/09/2019 14:10:25 (CDT)  td: 09/09/2019 16:44:19 (CDT)  Doc ID   #[id number]  Job ID #[id number]    CC:

## 2019-09-09 NOTE — ANESTHESIA POSTPROCEDURE EVALUATION
Anesthesia Post Evaluation    Patient: Husam Connolly    Procedure(s) Performed: Procedure(s) (LRB):  LAPAROTOMY, EXPLORATORY (N/A)  ANASTOMOSIS, COLO-RECTAL    Final Anesthesia Type: general  Patient location during evaluation: PACU  Patient participation: Yes- Able to Participate  Level of consciousness: awake and alert and oriented  Post-procedure vital signs: reviewed and stable  Pain management: adequate  Airway patency: patent  PONV status at discharge: No PONV  Anesthetic complications: no      Cardiovascular status: blood pressure returned to baseline and hemodynamically stable  Respiratory status: unassisted, spontaneous ventilation and room air  Hydration status: euvolemic  Follow-up not needed.          Vitals Value Taken Time   /52 9/9/2019  4:27 PM   Temp 36.2 °C (97.2 °F) 9/9/2019  2:10 PM   Pulse 78 9/9/2019  4:30 PM   Resp 12 9/9/2019  4:30 PM   SpO2 96 % 9/9/2019  4:30 PM   Vitals shown include unvalidated device data.      No case tracking events are documented in the log.      Pain/Keanu Score: Pain Rating Prior to Med Admin: 6 (9/9/2019  3:34 PM)  Keanu Score: 8 (9/9/2019  3:20 PM)

## 2019-09-09 NOTE — ANESTHESIA PREPROCEDURE EVALUATION
09/09/2019  Husam Connolly is a 88 y.o., male with SBO here for ex-lap    Past Medical History:   Diagnosis Date    Anticoagulant long-term use     Bowel obstruction     BPH (benign prostatic hyperplasia)     Diabetes mellitus     Diverticulitis     Hyperlipidemia     Hypertension      Past Surgical History:   Procedure Laterality Date    CREATION, COLOSTOMY  6/16/2019    Performed by [facility] at [facility] OR    EYE SURGERY      GISELA PROCEDURE  6/16/2019    Performed by [facility] at [facility] OR    LAPAROTOMY, EXPLORATORY N/A 6/16/2019    Performed by [facility] at [facility] OR    TONSILLECTOMY       Review of patient's allergies indicates:   Allergen Reactions    Solarcaine [benzocaine-triclosan]     Sulfa (sulfonamide antibiotics)     Hytrin [terazosin] Rash    Smz-tmp ds [sulfamethoxazole-trimethoprim] Rash       Lab Results   Component Value Date    WBC 8.29 06/24/2019    HGB 13.6 (L) 06/24/2019    HCT 40.9 06/24/2019     06/24/2019    CHOL 121 05/31/2013    TRIG 77 05/31/2013    HDL 53 05/31/2013    ALT 19 06/13/2019    AST 23 06/13/2019     06/24/2019    K 3.5 06/24/2019     06/24/2019    CREATININE 1.0 06/24/2019    BUN 14 06/24/2019    CO2 25 06/24/2019    TSH 0.452 05/31/2013    PSA 2.2 10/12/2011    INR 1.1 04/18/2015    HGBA1C 6.3 (H) 05/31/2013         Pre-op Assessment    I have reviewed the Patient Summary Reports.     I have reviewed the Nursing Notes.   I have reviewed the Medications.     Review of Systems  Anesthesia Hx:  No problems with previous Anesthesia Denies Hx of Anesthetic complications  Denies Family Hx of Anesthesia complications.   Denies Personal Hx of Anesthesia complications.   Social:  Non-Smoker, No Alcohol Use    Hematology/Oncology:     Oncology Normal     EENT/Dental:EENT/Dental Normal    Cardiovascular:  Cardiovascular Normal Exercise tolerance: good     Pulmonary:  Pulmonary Normal    Renal/:  Renal/ Normal     Hepatic/GI:  Hepatic/GI Normal    Neurological:  Neurology Normal    Endocrine:  Endocrine Normal    Psych:  Psychiatric Normal           Physical Exam  General:  Well nourished    Airway/Jaw/Neck:  Airway Findings: Mouth Opening: Normal Tongue: Normal  Mallampati: III  TM Distance: Normal, at least 6 cm  Jaw/Neck Findings:  Neck ROM: Normal ROM      Dental:  Dental Findings: Periodontal disease, Mild   Chest/Lungs:  Chest/Lungs Findings: Clear to auscultation     Heart/Vascular:  Heart Findings: Rate: Normal  Rhythm: Regular Rhythm  Sounds: Normal        Mental Status:  Mental Status Findings:  Alert and Oriented, Cooperative         Anesthesia Plan  Type of Anesthesia, risks & benefits discussed:  Anesthesia Type:  general  Patient's Preference:   Intra-op Monitoring Plan: standard ASA monitors  Intra-op Monitoring Plan Comments:   Post Op Pain Control Plan: per primary service following discharge from PACU  Post Op Pain Control Plan Comments:   Induction:   IV  Beta Blocker:  Patient is not currently on a Beta-Blocker (No further documentation required).       Informed Consent: Patient understands risks and agrees with Anesthesia plan.  Questions answered. Anesthesia consent signed with patient.  ASA Score: 2     Day of Surgery Review of History & Physical:            Ready For Surgery From Anesthesia Perspective.         Current Facility-Administered Medications on File Prior to Visit   Medication Dose Route Frequency Provider Last Rate Last Dose    0.9%  NaCl infusion   Intravenous Continuous Fouzia Gardner MD         Current Outpatient Medications on File Prior to Visit   Medication Sig Dispense Refill    aspirin (ECOTRIN) 81 MG EC tablet Take 81 mg by mouth once daily.      atorvastatin (LIPITOR) 40 MG tablet TAKE 1 TABLET BY MOUTH EVERY DAY 90 tablet 3    azelastine  (ASTELIN) 137 mcg (0.1 %) nasal spray 1-2 SPRAYS INTO EACH NOSTRIL TWICE A DAY  5    famciclovir (FAMVIR) 500 MG tablet Take 500 mg by mouth 3 (three) times daily as needed.  11    finasteride (PROSCAR) 5 mg tablet TAKE 1 TABLET BY MOUTH EVERY DAY 90 tablet 1    metFORMIN (GLUCOPHAGE) 500 MG tablet Take 500 mg by mouth once daily.  3    multivitamin capsule Take 1 capsule by mouth once daily.      omeprazole (PRILOSEC) 20 MG capsule Take 1 capsule (20 mg total) by mouth once daily. 30 capsule 2    tamsulosin (FLOMAX) 0.4 mg Cap Take 1 capsule by mouth nightly.  3    valsartan-hydrochlorothiazide (DIOVAN-HCT) 320-12.5 mg per tablet TAKE 1 TABLET BY MOUTH EVERY DAY 90 tablet 3

## 2019-09-09 NOTE — TRANSFER OF CARE
"Anesthesia Transfer of Care Note    Patient: Husam Connolly    Procedure(s) Performed: Procedure(s) (LRB):  LAPAROTOMY, EXPLORATORY (N/A)  ANASTOMOSIS, COLO-RECTAL    Patient location: PACU    Anesthesia Type: general    Transport from OR: Transported from OR on 100% O2 by closed face mask with adequate spontaneous ventilation    Post pain: adequate analgesia    Post assessment: no apparent anesthetic complications    Post vital signs: stable    Level of consciousness: awake and alert    Nausea/Vomiting: no nausea/vomiting    Complications: none    Transfer of care protocol was followed      Last vitals:   Visit Vitals  /65 (BP Location: Right arm, Patient Position: Sitting)   Pulse 83   Temp 36.9 °C (98.4 °F) (Temporal)   Resp 18   Ht 5' 10" (1.778 m)   Wt 72.6 kg (160 lb)   SpO2 96%   BMI 22.96 kg/m²     "

## 2019-09-09 NOTE — H&P
Today`s Date: 9/9/2019     Admit Date: 9/9/2019    Admitting Physician: Fouzia Gardner MD    Patient`s Name: Husam Connolly , 88 y.o. male    HISTORY AND CHIEF COMPLAINT  Here for closure of clostomy   Patient Active Problem List   Diagnosis    Diverticulitis, intestine large    Bowel obstruction    Rotator cuff sprain    Hypercholesteremia    Hypertension    Hyperlipidemia    Diverticulitis    Anticoagulant long-term use    BPH (benign prostatic hyperplasia)    Edema    Type 2 diabetes mellitus with complication    Allergic rhinitis    Wheezing    CKD (chronic kidney disease) stage 3, GFR 30-59 ml/min    Small bowel obstruction    S/P colostomy       Past Medical History:   Diagnosis Date    Anticoagulant long-term use     Bowel obstruction     BPH (benign prostatic hyperplasia)     Diabetes mellitus     Diverticulitis     Hyperlipidemia     Hypertension        Past Surgical History:   Procedure Laterality Date    CREATION, COLOSTOMY  6/16/2019    [facility] OR    EYE SURGERY      GISELA PROCEDURE  6/16/2019    [facility] at [facility]    LAPAROTOMY, EXPLORATORY N/A 6/16/2019    [facility] at [facility]    TONSILLECTOMY         Prior to Admission medications    Medication Sig Start Date End Date Taking? Authorizing Provider   aspirin (ECOTRIN) 81 MG EC tablet Take 81 mg by mouth once daily.    Historical Provider, MD   atorvastatin (LIPITOR) 40 MG tablet TAKE 1 TABLET BY MOUTH EVERY DAY 4/8/19   Daren Lassiter MD   azelastine (ASTELIN) 137 mcg (0.1 %) nasal spray 1-2 SPRAYS INTO EACH NOSTRIL TWICE A DAY 7/8/19   Historical Provider, MD   famciclovir (FAMVIR) 500 MG tablet Take 500 mg by mouth 3 (three) times daily as needed. 1/20/19   Historical Provider, MD   finasteride (PROSCAR) 5 mg tablet TAKE 1 TABLET BY MOUTH EVERY DAY 4/15/19   Daren Lassiter MD   metFORMIN (GLUCOPHAGE) 500 MG  tablet Take 500 mg by mouth once daily. 7/19/19   Historical Provider, MD   multivitamin capsule Take 1 capsule by mouth once daily.    Historical Provider, MD   omeprazole (PRILOSEC) 20 MG capsule Take 1 capsule (20 mg total) by mouth once daily. 8/13/19   Bettina Byers MD   tamsulosin (FLOMAX) 0.4 mg Cap Take 1 capsule by mouth nightly. 4/7/19   Historical Provider, MD   valsartan-hydrochlorothiazide (DIOVAN-HCT) 320-12.5 mg per tablet TAKE 1 TABLET BY MOUTH EVERY DAY 4/29/19   Daren Lassiter MD     No current facility-administered medications on file prior to encounter.      Current Outpatient Medications on File Prior to Encounter   Medication Sig Dispense Refill    aspirin (ECOTRIN) 81 MG EC tablet Take 81 mg by mouth once daily.      atorvastatin (LIPITOR) 40 MG tablet TAKE 1 TABLET BY MOUTH EVERY DAY 90 tablet 3    azelastine (ASTELIN) 137 mcg (0.1 %) nasal spray 1-2 SPRAYS INTO EACH NOSTRIL TWICE A DAY  5    famciclovir (FAMVIR) 500 MG tablet Take 500 mg by mouth 3 (three) times daily as needed.  11    finasteride (PROSCAR) 5 mg tablet TAKE 1 TABLET BY MOUTH EVERY DAY 90 tablet 1    metFORMIN (GLUCOPHAGE) 500 MG tablet Take 500 mg by mouth once daily.  3    multivitamin capsule Take 1 capsule by mouth once daily.      omeprazole (PRILOSEC) 20 MG capsule Take 1 capsule (20 mg total) by mouth once daily. 30 capsule 2    tamsulosin (FLOMAX) 0.4 mg Cap Take 1 capsule by mouth nightly.  3    valsartan-hydrochlorothiazide (DIOVAN-HCT) 320-12.5 mg per tablet TAKE 1 TABLET BY MOUTH EVERY DAY 90 tablet 3        Review of patient's allergies indicates:   Allergen Reactions    Solarcaine [benzocaine-triclosan]     Sulfa (sulfonamide antibiotics)     Hytrin [terazosin] Rash    Smz-tmp ds [sulfamethoxazole-trimethoprim] Rash       Social History:   reports that he has quit smoking. He has a 40.00 pack-year smoking history. He has never used smokeless tobacco. He reports that he drinks about  1.2 oz of alcohol per week. He reports that he does not use drugs.     Family History   Problem Relation Age of Onset    Heart disease Father     Hypertension Father     Cancer Mother     Diabetes Paternal Aunt        PHYSICAL EXAMINATION  Temp:  [98.4 °F (36.9 °C)] 98.4 °F (36.9 °C)  Pulse:  [83] 83  Resp:  [18] 18  SpO2:  [96 %] 96 %  BP: (124)/(65) 124/65    General Condition:   alert x 3    Head & Neck  Anemia: None  Jaundice: None  Neck vein: Not distended  Carotid Bruits: none  Lymph nodes: none palpable  Thyroid: normal    Chest: normal    Heart: normal    Rt. Breast: not examined  Lt. Breast: not examined  Axillary lymph nodes: none    Abdomen: Soft,  None tender with no palpable mass or organ, colostomy left upper quadrant , clear stool now in the bag  Hernia: none    Rectal: Defered    Extremities: normal    Vascular: normal    Specific focus Examination    Imp: s/p colostomy, htn dm,cad    Plan: exploratory lap , takedown colostomy colon resection and closure colostomy.

## 2019-09-09 NOTE — OP NOTE
[facility]  Brief Operative Note    SUMMARY     Surgery Date: 9/9/2019     Surgeon(s) and Role:     * Fouzia Gardner MD - Primary    Assisting Surgeon:   Pre-op Diagnosis:  S/P colostomy [Z93.3]  Dm, htn, cad  Post-op Diagnosis:  Post-Op Diagnosis Codes:     * S/P colostomy [Z93.3]  S/p colostomy , dm , htn , cad  Procedure(s) (LRB):  LAPAROTOMY, EXPLORATORY (N/A)  ANASTOMOSIS, COLO-RECTAL  Extensive lysis of adhesions, take down colostomy  Anesthesia: General    Description of Procedure: dictated    Description of the findings of the procedure: exploratory laparotomy , lysis of adhesion , take down colostomy , colo rectal anastomosis    Estimated Blood Loss: 200 mL         Specimens:  Colostomy stump  Specimen (12h ago, onward)    None

## 2019-09-10 LAB
BASOPHILS # BLD AUTO: 0.02 K/UL (ref 0–0.2)
BASOPHILS NFR BLD: 0.1 % (ref 0–1.9)
DIFFERENTIAL METHOD: ABNORMAL
EOSINOPHIL # BLD AUTO: 0 K/UL (ref 0–0.5)
EOSINOPHIL NFR BLD: 0.1 % (ref 0–8)
ERYTHROCYTE [DISTWIDTH] IN BLOOD BY AUTOMATED COUNT: 13.9 % (ref 11.5–14.5)
ESTIMATED AVG GLUCOSE: 137 MG/DL (ref 68–131)
HBA1C MFR BLD HPLC: 6.4 % (ref 4–5.6)
HCT VFR BLD AUTO: 39.7 % (ref 40–54)
HGB BLD-MCNC: 13.1 G/DL (ref 14–18)
INR PPP: 1.1 (ref 0.8–1.2)
LYMPHOCYTES # BLD AUTO: 1.4 K/UL (ref 1–4.8)
LYMPHOCYTES NFR BLD: 10.2 % (ref 18–48)
MCH RBC QN AUTO: 28.7 PG (ref 27–31)
MCHC RBC AUTO-ENTMCNC: 33 G/DL (ref 32–36)
MCV RBC AUTO: 87 FL (ref 82–98)
MONOCYTES # BLD AUTO: 1.9 K/UL (ref 0.3–1)
MONOCYTES NFR BLD: 13.9 % (ref 4–15)
NEUTROPHILS # BLD AUTO: 10.2 K/UL (ref 1.8–7.7)
NEUTROPHILS NFR BLD: 75.7 % (ref 38–73)
PLATELET # BLD AUTO: 198 K/UL (ref 150–350)
PMV BLD AUTO: 11.4 FL (ref 9.2–12.9)
POCT GLUCOSE: 143 MG/DL (ref 70–110)
POCT GLUCOSE: 144 MG/DL (ref 70–110)
POCT GLUCOSE: 148 MG/DL (ref 70–110)
POCT GLUCOSE: 159 MG/DL (ref 70–110)
POCT GLUCOSE: 237 MG/DL (ref 70–110)
PROTHROMBIN TIME: 11.5 SEC (ref 9–12.5)
RBC # BLD AUTO: 4.57 M/UL (ref 4.6–6.2)
WBC # BLD AUTO: 13.52 K/UL (ref 3.9–12.7)

## 2019-09-10 PROCEDURE — S0028 INJECTION, FAMOTIDINE, 20 MG: HCPCS | Performed by: SURGERY

## 2019-09-10 PROCEDURE — 85025 COMPLETE CBC W/AUTO DIFF WBC: CPT

## 2019-09-10 PROCEDURE — 83036 HEMOGLOBIN GLYCOSYLATED A1C: CPT

## 2019-09-10 PROCEDURE — 85610 PROTHROMBIN TIME: CPT

## 2019-09-10 PROCEDURE — 25000003 PHARM REV CODE 250: Performed by: SURGERY

## 2019-09-10 PROCEDURE — 63600175 PHARM REV CODE 636 W HCPCS: Performed by: SURGERY

## 2019-09-10 PROCEDURE — 94761 N-INVAS EAR/PLS OXIMETRY MLT: CPT

## 2019-09-10 PROCEDURE — 27000221 HC OXYGEN, UP TO 24 HOURS

## 2019-09-10 PROCEDURE — 36415 COLL VENOUS BLD VENIPUNCTURE: CPT

## 2019-09-10 PROCEDURE — 20000000 HC ICU ROOM

## 2019-09-10 RX ORDER — FAMOTIDINE 10 MG/ML
20 INJECTION INTRAVENOUS DAILY
Status: DISCONTINUED | OUTPATIENT
Start: 2019-09-10 | End: 2019-09-16

## 2019-09-10 RX ORDER — SODIUM CHLORIDE, SODIUM LACTATE, POTASSIUM CHLORIDE, CALCIUM CHLORIDE 600; 310; 30; 20 MG/100ML; MG/100ML; MG/100ML; MG/100ML
INJECTION, SOLUTION INTRAVENOUS CONTINUOUS
Status: DISCONTINUED | OUTPATIENT
Start: 2019-09-09 | End: 2019-09-15

## 2019-09-10 RX ADMIN — FAMOTIDINE 20 MG: 10 INJECTION, SOLUTION INTRAVENOUS at 03:09

## 2019-09-10 RX ADMIN — SODIUM CHLORIDE, SODIUM LACTATE, POTASSIUM CHLORIDE, AND CALCIUM CHLORIDE: .6; .31; .03; .02 INJECTION, SOLUTION INTRAVENOUS at 07:09

## 2019-09-10 RX ADMIN — DEXTROSE 2 G: 50 INJECTION, SOLUTION INTRAVENOUS at 06:09

## 2019-09-10 RX ADMIN — MORPHINE SULFATE 2 MG: 2 INJECTION, SOLUTION INTRAMUSCULAR; INTRAVENOUS at 07:09

## 2019-09-10 RX ADMIN — DEXTROSE 2 G: 50 INJECTION, SOLUTION INTRAVENOUS at 10:09

## 2019-09-10 RX ADMIN — MORPHINE SULFATE 2 MG: 2 INJECTION, SOLUTION INTRAMUSCULAR; INTRAVENOUS at 11:09

## 2019-09-10 RX ADMIN — AZELASTINE HYDROCHLORIDE 137 MCG: 137 SPRAY, METERED NASAL at 09:09

## 2019-09-10 RX ADMIN — SODIUM CHLORIDE, SODIUM LACTATE, POTASSIUM CHLORIDE, AND CALCIUM CHLORIDE: .6; .31; .03; .02 INJECTION, SOLUTION INTRAVENOUS at 04:09

## 2019-09-10 RX ADMIN — INSULIN ASPART 1 UNITS: 100 INJECTION, SOLUTION INTRAVENOUS; SUBCUTANEOUS at 03:09

## 2019-09-10 RX ADMIN — MORPHINE SULFATE 2 MG: 2 INJECTION, SOLUTION INTRAMUSCULAR; INTRAVENOUS at 03:09

## 2019-09-10 RX ADMIN — DEXTROSE 2 G: 50 INJECTION, SOLUTION INTRAVENOUS at 03:09

## 2019-09-10 RX ADMIN — MORPHINE SULFATE 2 MG: 2 INJECTION, SOLUTION INTRAMUSCULAR; INTRAVENOUS at 01:09

## 2019-09-10 RX ADMIN — ATORVASTATIN CALCIUM 40 MG: 40 TABLET, FILM COATED ORAL at 09:09

## 2019-09-10 NOTE — PLAN OF CARE
Pt reports he lives with his spouse who can provide help at home and transportation as needed. Pt informed Tn he has used [facility] in the past but not currently. Pt voices no needs at this time.  Tn gave d/c brochure and card and encouraged to call for any needs.  Tn to follow for post op needs.       09/10/19 1050   Discharge Assessment   Confirmed/corrected address and phone number on facesheet? Yes   Assessment information obtained from? Patient   Expected Length of Stay (days) 2   Communicated expected length of stay with patient/caregiver yes   Prior to hospitilization cognitive status: Alert/Oriented   Prior to hospitalization functional status: Independent   Current cognitive status: Alert/Oriented   Current Functional Status: Needs Assistance   Lives With spouse   Able to Return to Prior Arrangements yes   Is patient able to care for self after discharge? Yes   Who are your caregiver(s) and their phone number(s)? Aicha(spouse) [phone]   Patient's perception of discharge disposition home or selfcare   Readmission Within the Last 30 Days no previous admission in last 30 days   Patient currently being followed by outpatient case management? No   Patient currently receives any other outside agency services? No   Equipment Currently Used at Home colostomy/ostomy supplies   Do you have any problems affording any of your prescribed medications? No   Is the patient taking medications as prescribed? yes   Does the patient have transportation home? Yes   Transportation Anticipated family or friend will provide   Does the patient receive services at the [facility]? No   Discharge Plan A Home with family   Discharge Plan B Home Health   DME Needed Upon Discharge  none   Patient/Family in Agreement with Plan yes

## 2019-09-10 NOTE — PLAN OF CARE
Problem: Adult Inpatient Plan of Care  Goal: Plan of Care Review  Outcome: Ongoing (interventions implemented as appropriate)  Patient is resting in bed. Awakens to verbal stimuli. Oriented x4. Moves all extremities and follows commands. See flowsheets for details. All lines and drains are intact and fluids are infusing without difficulty. See MAR for gtts. Bed in lowest position. Bowman to gravity drainage. Siderails x3. Will continue to monitor

## 2019-09-10 NOTE — PROGRESS NOTES
"Surgery follow up  /60   Pulse 89   Temp 98.1 °F (36.7 °C) (Oral)   Resp (!) 6   Ht 5' 10" (1.778 m)   Wt 74.5 kg (164 lb 3.9 oz)   SpO2 (!) 94%   BMI 23.57 kg/m²   I/O last 3 completed shifts:  In: 4134.6 [I.V.:3874.6; NG/GT:60; IV Piggyback:200]  Out: 1330 [Urine:830; Drains:200; Blood:300]  No intake/output data recorded.  Recent Results (from the past 336 hour(s))   CBC auto differential    Collection Time: 09/10/19  6:46 AM   Result Value Ref Range    WBC 13.52 (H) 3.90 - 12.70 K/uL    Hemoglobin 13.1 (L) 14.0 - 18.0 g/dL    Hematocrit 39.7 (L) 40.0 - 54.0 %    Platelets 198 150 - 350 K/uL   CBC auto differential    Collection Time: 09/09/19  2:29 PM   Result Value Ref Range    WBC 10.76 3.90 - 12.70 K/uL    Hemoglobin 12.7 (L) 14.0 - 18.0 g/dL    Hematocrit 38.1 (L) 40.0 - 54.0 %    Platelets 177 150 - 350 K/uL     Recent Results (from the past 336 hour(s))   Basic metabolic panel    Collection Time: 09/09/19  2:28 PM   Result Value Ref Range    Sodium 140 136 - 145 mmol/L    Potassium 3.5 3.5 - 5.1 mmol/L    Chloride 108 95 - 110 mmol/L    CO2 17 (L) 23 - 29 mmol/L    BUN, Bld 21 8 - 23 mg/dL    Creatinine 1.3 0.5 - 1.4 mg/dL    Calcium 8.1 (L) 8.7 - 10.5 mg/dL    Anion Gap 15 8 - 16 mmol/L   abdomen soft , incisional pain ,   Continue present treatment.  "

## 2019-09-11 LAB
ACANTHOCYTES BLD QL SMEAR: ABNORMAL
ANION GAP SERPL CALC-SCNC: 10 MMOL/L (ref 8–16)
ANISOCYTOSIS BLD QL SMEAR: ABNORMAL
AUER BODIES BLD QL SMEAR: ABNORMAL
BASO STIPL BLD QL SMEAR: ABNORMAL
BASOPHILS # BLD AUTO: 0.01 K/UL (ref 0–0.2)
BASOPHILS # BLD AUTO: ABNORMAL K/UL (ref 0–0.2)
BASOPHILS NFR BLD: 0.1 % (ref 0–1.9)
BASOPHILS NFR BLD: ABNORMAL % (ref 0–1.9)
BLASTS NFR BLD MANUAL: ABNORMAL %
BUN SERPL-MCNC: 20 MG/DL (ref 8–23)
BURR CELLS BLD QL SMEAR: ABNORMAL
CABOT RINGS BLD QL SMEAR: ABNORMAL
CALCIUM SERPL-MCNC: 8.3 MG/DL (ref 8.7–10.5)
CHLORIDE SERPL-SCNC: 106 MMOL/L (ref 95–110)
CO2 SERPL-SCNC: 21 MMOL/L (ref 23–29)
CREAT SERPL-MCNC: 1.4 MG/DL (ref 0.5–1.4)
DACRYOCYTES BLD QL SMEAR: ABNORMAL
DIFFERENTIAL METHOD: ABNORMAL
DIFFERENTIAL METHOD: ABNORMAL
DOHLE BOD BLD QL SMEAR: ABNORMAL
EOSINOPHIL # BLD AUTO: 0.1 K/UL (ref 0–0.5)
EOSINOPHIL # BLD AUTO: ABNORMAL K/UL (ref 0–0.5)
EOSINOPHIL NFR BLD: 0.8 % (ref 0–8)
EOSINOPHIL NFR BLD: ABNORMAL % (ref 0–8)
ERYTHROCYTE [DISTWIDTH] IN BLOOD BY AUTOMATED COUNT: 14 % (ref 11.5–14.5)
ERYTHROCYTE [DISTWIDTH] IN BLOOD BY AUTOMATED COUNT: ABNORMAL % (ref 11.5–14.5)
EST. GFR  (AFRICAN AMERICAN): 51 ML/MIN/1.73 M^2
EST. GFR  (NON AFRICAN AMERICAN): 45 ML/MIN/1.73 M^2
GIANT PLATELETS BLD QL SMEAR: ABNORMAL
GLUCOSE SERPL-MCNC: 151 MG/DL (ref 70–110)
HCT VFR BLD AUTO: 33.9 % (ref 40–54)
HCT VFR BLD AUTO: ABNORMAL % (ref 40–54)
HEINZ BOD BLD QL SMEAR: ABNORMAL
HGB BLD-MCNC: 11.4 G/DL (ref 14–18)
HGB BLD-MCNC: ABNORMAL G/DL (ref 14–18)
HGB C CRY RBC QL MICRO: ABNORMAL
HOWELL-JOLLY BOD BLD QL SMEAR: ABNORMAL
HYPOCHROMIA BLD QL SMEAR: ABNORMAL
LYMPHOCYTES # BLD AUTO: 1 K/UL (ref 1–4.8)
LYMPHOCYTES # BLD AUTO: ABNORMAL K/UL (ref 1–4.8)
LYMPHOCYTES NFR BLD: 6.9 % (ref 18–48)
LYMPHOCYTES NFR BLD: ABNORMAL % (ref 18–48)
MCH RBC QN AUTO: 29.2 PG (ref 27–31)
MCH RBC QN AUTO: ABNORMAL PG (ref 27–31)
MCHC RBC AUTO-ENTMCNC: 33.6 G/DL (ref 32–36)
MCHC RBC AUTO-ENTMCNC: ABNORMAL G/DL (ref 32–36)
MCV RBC AUTO: 87 FL (ref 82–98)
MCV RBC AUTO: ABNORMAL FL (ref 82–98)
MEGAKARYOCYTIC FRAGMENTS: ABNORMAL
METAMYELOCYTES NFR BLD MANUAL: ABNORMAL %
MONOCYTES # BLD AUTO: 1.8 K/UL (ref 0.3–1)
MONOCYTES # BLD AUTO: ABNORMAL K/UL (ref 0.3–1)
MONOCYTES NFR BLD: 12.1 % (ref 4–15)
MONOCYTES NFR BLD: ABNORMAL % (ref 4–15)
MYELOCYTES NFR BLD MANUAL: ABNORMAL %
NEUTROPHILS # BLD AUTO: 12.1 K/UL (ref 1.8–7.7)
NEUTROPHILS # BLD AUTO: ABNORMAL K/UL (ref 1.8–7.7)
NEUTROPHILS NFR BLD: 80.1 % (ref 38–73)
NEUTROPHILS NFR BLD: ABNORMAL % (ref 38–73)
NEUTS BAND NFR BLD MANUAL: ABNORMAL %
NRBC BLD-RTO: ABNORMAL /100 WBC
OVALOCYTES BLD QL SMEAR: ABNORMAL
PAPPENHEIMER BOD BLD QL SMEAR: ABNORMAL
PLASMODIUM BLD QL SMEAR: ABNORMAL
PLATELET # BLD AUTO: 163 K/UL (ref 150–350)
PLATELET # BLD AUTO: ABNORMAL K/UL (ref 150–350)
PLATELET BLD QL SMEAR: ABNORMAL
PMV BLD AUTO: 11.5 FL (ref 9.2–12.9)
PMV BLD AUTO: ABNORMAL FL (ref 9.2–12.9)
POCT GLUCOSE: 134 MG/DL (ref 70–110)
POCT GLUCOSE: 136 MG/DL (ref 70–110)
POCT GLUCOSE: 143 MG/DL (ref 70–110)
POCT GLUCOSE: 153 MG/DL (ref 70–110)
POIKILOCYTOSIS BLD QL SMEAR: ABNORMAL
POLYCHROMASIA BLD QL SMEAR: ABNORMAL
POTASSIUM SERPL-SCNC: 4.3 MMOL/L (ref 3.5–5.1)
PROMYELOCYTES NFR BLD MANUAL: ABNORMAL %
RBC # BLD AUTO: 3.91 M/UL (ref 4.6–6.2)
RBC # BLD AUTO: ABNORMAL M/UL (ref 4.6–6.2)
RBC AGGLUT BLD QL: ABNORMAL
ROULEAUX BLD QL SMEAR: ABNORMAL
SCHISTOCYTES BLD QL SMEAR: ABNORMAL
SCHISTOCYTES BLD QL SMEAR: ABNORMAL
SICKLE CELLS BLD QL SMEAR: ABNORMAL
SMUDGE CELLS BLD QL SMEAR: ABNORMAL
SODIUM SERPL-SCNC: 137 MMOL/L (ref 136–145)
SPHEROCYTES BLD QL SMEAR: ABNORMAL
STOMATOCYTES BLD QL SMEAR: ABNORMAL
TARGETS BLD QL SMEAR: ABNORMAL
TOXIC GRANULES BLD QL SMEAR: ABNORMAL
WBC # BLD AUTO: 15.14 K/UL (ref 3.9–12.7)
WBC # BLD AUTO: ABNORMAL K/UL (ref 3.9–12.7)
WBC NRBC COR # BLD: ABNORMAL K/UL (ref 3.9–12.7)
WBC OTHER NFR BLD MANUAL: ABNORMAL %
WBC TOXIC VACUOLES BLD QL SMEAR: ABNORMAL

## 2019-09-11 PROCEDURE — 99900035 HC TECH TIME PER 15 MIN (STAT)

## 2019-09-11 PROCEDURE — 27000221 HC OXYGEN, UP TO 24 HOURS

## 2019-09-11 PROCEDURE — 11000001 HC ACUTE MED/SURG PRIVATE ROOM

## 2019-09-11 PROCEDURE — 25000003 PHARM REV CODE 250: Performed by: SURGERY

## 2019-09-11 PROCEDURE — 80048 BASIC METABOLIC PNL TOTAL CA: CPT

## 2019-09-11 PROCEDURE — 97530 THERAPEUTIC ACTIVITIES: CPT

## 2019-09-11 PROCEDURE — 97165 OT EVAL LOW COMPLEX 30 MIN: CPT

## 2019-09-11 PROCEDURE — 94761 N-INVAS EAR/PLS OXIMETRY MLT: CPT

## 2019-09-11 PROCEDURE — 63600175 PHARM REV CODE 636 W HCPCS: Performed by: SURGERY

## 2019-09-11 PROCEDURE — 27000646 HC AEROBIKA DEVICE

## 2019-09-11 PROCEDURE — 97161 PT EVAL LOW COMPLEX 20 MIN: CPT

## 2019-09-11 PROCEDURE — 94664 DEMO&/EVAL PT USE INHALER: CPT

## 2019-09-11 PROCEDURE — 85025 COMPLETE CBC W/AUTO DIFF WBC: CPT

## 2019-09-11 PROCEDURE — 36415 COLL VENOUS BLD VENIPUNCTURE: CPT

## 2019-09-11 PROCEDURE — S0028 INJECTION, FAMOTIDINE, 20 MG: HCPCS | Performed by: SURGERY

## 2019-09-11 RX ORDER — MORPHINE SULFATE 4 MG/ML
2 INJECTION, SOLUTION INTRAMUSCULAR; INTRAVENOUS EVERY 4 HOURS PRN
Status: DISCONTINUED | OUTPATIENT
Start: 2019-09-11 | End: 2019-09-17 | Stop reason: HOSPADM

## 2019-09-11 RX ADMIN — MORPHINE SULFATE 2 MG: 2 INJECTION, SOLUTION INTRAMUSCULAR; INTRAVENOUS at 12:09

## 2019-09-11 RX ADMIN — AZELASTINE HYDROCHLORIDE 137 MCG: 137 SPRAY, METERED NASAL at 09:09

## 2019-09-11 RX ADMIN — DEXTROSE 2 G: 50 INJECTION, SOLUTION INTRAVENOUS at 10:09

## 2019-09-11 RX ADMIN — DEXTROSE 2 G: 50 INJECTION, SOLUTION INTRAVENOUS at 03:09

## 2019-09-11 RX ADMIN — MORPHINE SULFATE 2 MG: 2 INJECTION, SOLUTION INTRAMUSCULAR; INTRAVENOUS at 03:09

## 2019-09-11 RX ADMIN — FAMOTIDINE 20 MG: 10 INJECTION, SOLUTION INTRAVENOUS at 09:09

## 2019-09-11 RX ADMIN — ATORVASTATIN CALCIUM 40 MG: 40 TABLET, FILM COATED ORAL at 09:09

## 2019-09-11 RX ADMIN — MORPHINE SULFATE 2 MG: 4 INJECTION INTRAVENOUS at 10:09

## 2019-09-11 RX ADMIN — MORPHINE SULFATE 2 MG: 2 INJECTION, SOLUTION INTRAMUSCULAR; INTRAVENOUS at 07:09

## 2019-09-11 RX ADMIN — SODIUM CHLORIDE, SODIUM LACTATE, POTASSIUM CHLORIDE, AND CALCIUM CHLORIDE: .6; .31; .03; .02 INJECTION, SOLUTION INTRAVENOUS at 10:09

## 2019-09-11 NOTE — NURSING
Pt w/ fatigue, skin flushing, low grade fever (99.7), tachycardia HR , sluggish urine output 20-30ml/hr, onofre urine, dry MM. Called Dr. Gardner w/ concern for volume depletion, placed order for 500ml LR bolus. HR improves to 85-95 following bolus. NAD will continue to monitor.

## 2019-09-11 NOTE — NURSING
Patient notified and verbalized understanding.     Spoke to Dr. Gardner about patient transfer order. MD wants to hold transfer until tomorrow.

## 2019-09-11 NOTE — PROGRESS NOTES
"POD#2  Surgery follow up  BP (!) 143/65   Pulse 82   Temp 98.3 °F (36.8 °C) (Oral)   Resp 20   Ht 5' 10" (1.778 m)   Wt 74.5 kg (164 lb 3.9 oz)   SpO2 97%   BMI 23.57 kg/m²   I/O last 3 completed shifts:  In: 4631.3 [I.V.:3841.3; Other:500; NG/GT:90; IV Piggyback:200]  Out: 1725 [Urine:1385; Drains:340]  I/O this shift:  In: 50 [NG/GT:50]  Out: 700 [Urine:650; Drains:50]  Recent Results (from the past 336 hour(s))   CBC auto differential    Collection Time: 09/11/19  7:05 AM   Result Value Ref Range    WBC 15.14 (H) 3.90 - 12.70 K/uL    Hemoglobin 11.4 (L) 14.0 - 18.0 g/dL    Hematocrit 33.9 (L) 40.0 - 54.0 %    Platelets 163 150 - 350 K/uL   CBC auto differential    Collection Time: 09/11/19  3:53 AM   Result Value Ref Range    WBC SEE COMMENT 3.90 - 12.70 K/uL    Hemoglobin SEE COMMENT 14.0 - 18.0 g/dL    Hematocrit SEE COMMENT 40.0 - 54.0 %    Platelets SEE COMMENT 150 - 350 K/uL   CBC auto differential    Collection Time: 09/10/19  6:46 AM   Result Value Ref Range    WBC 13.52 (H) 3.90 - 12.70 K/uL    Hemoglobin 13.1 (L) 14.0 - 18.0 g/dL    Hematocrit 39.7 (L) 40.0 - 54.0 %    Platelets 198 150 - 350 K/uL     Recent Results (from the past 336 hour(s))   Basic metabolic panel    Collection Time: 09/11/19  3:53 AM   Result Value Ref Range    Sodium 137 136 - 145 mmol/L    Potassium 4.3 3.5 - 5.1 mmol/L    Chloride 106 95 - 110 mmol/L    CO2 21 (L) 23 - 29 mmol/L    BUN, Bld 20 8 - 23 mg/dL    Creatinine 1.4 0.5 - 1.4 mg/dL    Calcium 8.3 (L) 8.7 - 10.5 mg/dL    Anion Gap 10 8 - 16 mmol/L   Basic metabolic panel    Collection Time: 09/09/19  2:28 PM   Result Value Ref Range    Sodium 140 136 - 145 mmol/L    Potassium 3.5 3.5 - 5.1 mmol/L    Chloride 108 95 - 110 mmol/L    CO2 17 (L) 23 - 29 mmol/L    BUN, Bld 21 8 - 23 mg/dL    Creatinine 1.3 0.5 - 1.4 mg/dL    Calcium 8.1 (L) 8.7 - 10.5 mg/dL    Anion Gap 15 8 - 16 mmol/L   abdomen soft non tender , no gas per rectum  Doing better , consult pt ot. Transfer " to floor today.

## 2019-09-11 NOTE — PLAN OF CARE
Problem: Occupational Therapy Goal  Goal: Occupational Therapy Goal  Goals to be met by: 10/11     Patient will increase functional independence with ADLs by performing:    UE Dressing with Supervision.  LE Dressing with Stand-by Assistance.  Grooming while standing at sink with Supervision.  Toileting from toilet with Supervision for hygiene and clothing management.   Toilet transfer to toilet with Stand-by Assistance.  Increased functional strength to WFL for ADLs.    Outcome: Ongoing (interventions implemented as appropriate)  Pt found in supine & agreeable to OT/PT eval/tx this PM. Pt lives w/ spouse in 2SH w/ 2STE; bedroom & WIS on 1st level. PLOF: (I) w/o DME w/ all fxnl tasks incl IADLs & driving. Currently, pt w/ 1/10 abd pain & perf the following: rolling R w/ Mod A & abd bracing--->EOB w/ Mod A x 2; standing via CGA; amb via RW for short dist w/in room w/ CGA. Pt returned to supine w/ Mod A for LEs 2/2 HR in 120s. Edu/tx re: deep/PLBing, abd bracing, general safety techs & HEP. Pt/dgtr verbalized understanding.  BP at rest in supine 169/72; 155/67 (after 3min)  BP supine at tx termination 170/73    Pt presents w/ decreased overall endurance/conditioning, balance/mobility & coordination w/ subsequent decline in (I)/safety w/ BADLs, fxnl mobility & fxnl t/f's.  OT 5x/wk to increase phys/fxnl status & maximize potential to achieve established goals for d/c-->home w/ HHOT/PT & no DME needs.

## 2019-09-11 NOTE — PT/OT/SLP EVAL
Occupational Therapy   Evaluation/tx    Name: Husam Connolly  MRN: [id number]  Admitting Diagnosis:  Diverticulitis, intestine large 2 Days Post-Op    Recommendations:     Discharge Recommendations: home with home health, home health PT, home health OT  Discharge Equipment Recommendations:  (TBD pending progress)  Barriers to discharge:  None    Assessment:   Pt presents w/ decreased overall endurance/conditioning, balance/mobility & coordination w/ subsequent decline in (I)/safety w/ BADLs, fxnl mobility & fxnl t/f's.  OT 5x/wk to increase phys/fxnl status & maximize potential to achieve established goals for d/c-->home w/ HHOT/PT & no DME needs.    Husam Connolly is a 88 y.o. male with a medical diagnosis of Diverticulitis, intestine large.  He presents with . Performance deficits affecting function: weakness, impaired endurance, gait instability, impaired functional mobilty, impaired self care skills, impaired balance, decreased safety awareness, pain, decreased ROM, impaired cardiopulmonary response to activity, edema, impaired skin.      Rehab Prognosis: Good; patient would benefit from acute skilled OT services to address these deficits and reach maximum level of function.       Plan:     Patient to be seen 5 x/week to address the above listed problems via self-care/home management, therapeutic activities, therapeutic exercises  · Plan of Care Expires: 10/11/19  · Plan of Care Reviewed with: patient, daughter    Subjective     Chief Complaint: abd pain  Patient/Family Comments/goals: return home at [facility]    Occupational Profile:  Living Environment: w/ spouse in 2SH w/ 2STE; bedroom & WIS on 1st level  Previous level of function: (I) w/o DME  Braces: N/A     Occupational Performance:    Bed Mobility:    · Patient completed Rolling/Turning to Right with moderate assistance  · Patient completed Supine to Sit with moderate assistance and 2 persons  · Patient completed Sit to Supine with moderate assistance and  with leg lift    Functional Mobility/Transfers:  · Patient completed Sit <> Stand Transfer with contact guard assistance  with  hand-held assist   · Functional Mobility: via RW for short dist w/in room x 4 w/ CGA    Activities of Daily Living:  ·     Cognitive/Visual Perceptual:  Grossly WFL    Physical Exam:  BUEs WFL at least 3+/5    Sit balance: F  Stand balance: F-    AMPAC 6 Click ADL:  AMPAC Total Score: 15    Treatment & Education:  Pt found in supine & agreeable to OT/PT eval/tx this PM. Pt lives w/ spouse in 2SH w/ 2STE; bedroom & WIS on 1st level. PLOF: (I) w/o DME w/ all fxnl tasks incl IADLs & driving. Currently, pt w/ 1/10 abd pain & perf the following: rolling R w/ Mod A & abd bracing--->EOB w/ Mod A x 2; standing via CGA; amb via RW for short dist w/in room w/ CGA. Pt returned to supine w/ Mod A for LEs 2/2 HR in 120s. Edu/tx re: deep/PLBing, abd bracing, general safety techs & HEP. Pt/dgtr verbalized understanding.  BP at rest in supine 169/72; 155/67 (after 3min)  BP supine at tx termination 170/73    Patient left supine with all lines intact, call button in reach, nsg notified and dgtr present    GOALS:   Multidisciplinary Problems     Occupational Therapy Goals        Problem: Occupational Therapy Goal    Goal Priority Disciplines Outcome Interventions   Occupational Therapy Goal     OT, PT/OT Ongoing (interventions implemented as appropriate)    Description:  Goals to be met by: 10/11     Patient will increase functional independence with ADLs by performing:    UE Dressing with Supervision.  LE Dressing with Stand-by Assistance.  Grooming while standing at sink with Supervision.  Toileting from toilet with Supervision for hygiene and clothing management.   Toilet transfer to toilet with Stand-by Assistance.  Increased functional strength to WFL for ADLs.                      History:     Past Medical History:   Diagnosis Date    Anticoagulant long-term use     Bowel obstruction     BPH (benign  prostatic hyperplasia)     Diabetes mellitus     Diverticulitis     Hyperlipidemia     Hypertension        Past Surgical History:   Procedure Laterality Date    ANASTOMOSIS, COLO-RECTAL  9/9/2019    [facility]    CREATION, COLOSTOMY  6/16/2019    [facility] at [facility]    EYE SURGERY      GISELA PROCEDURE  6/16/2019    [facility] at [facility]    LAPAROTOMY, EXPLORATORY N/A 9/9/2019    [facility] at [facility]    LAPAROTOMY, EXPLORATORY N/A 6/16/2019    [facility] by [facility] at [facility]    TONSILLECTOMY         Time Tracking:     OT Date of Treatment: 09/11/19  OT Start Time: 1515  OT Stop Time: 1545  OT Total Time (min): 30 min    Billable Minutes:Evaluation 10  Therapeutic Activity 20  Total Time 30    SIMON Gonzalez  9/11/2019

## 2019-09-11 NOTE — PLAN OF CARE
Spoke to Dr. Gardner regarding out put of N-G tube bloody sanguineous drainage at 200 mL. New orders given to irrigate with sterile normal saline .

## 2019-09-11 NOTE — PLAN OF CARE
Problem: Adult Inpatient Plan of Care  Goal: Plan of Care Review  Outcome: Ongoing (interventions implemented as appropriate)  POD 1-2. No acute events overnight, see assessment and vital sign flow sheets for complete details. Pt progressing towards goals of care as discussed, pt verbalizes no concerns related to plan of care. Updated family at bedside. Free from falls or signs of injury. Summary as follows:    Neuro: intact, pain well controlled w/ q4 IV morphine and non-pharmacological interventions    CV: NSR, no further tachycardia, no edema, low grade temps persist, BP WNL, 2+ pulses throughout    Resp: loose cough prod thick white sputum, encouraging TCDB, IS and splinting w/ cough, 2L02 NC maintained     GI/U: UO improving 40-60ml/hr clear yellow, no BT's audible, no flatus, abd tender but soft, minimal dark red NG drainage     Skin: Incision dressings and packing CDI without drainage. Turning Q2 and floating heels to maintain skin integrity    Px: SCD's     Lines: collado, PIVx2, NG    Dispo: step-down today

## 2019-09-11 NOTE — PLAN OF CARE
Problem: Physical Therapy Goal  Goal: Physical Therapy Goal  Goals to be met by: 10/11/2019     Patient will increase functional independence with mobility by performin. Supine to sit with Modified [place]  2. Sit to supine with Modified [place]  3. Rolling with Modified [place].  4. Sit to stand transfer with Stand-by Assistance/S  5. Bed to chair transfer with Stand-by Assistance/S with or without AD  6. Gait  x 400ft feet with SBA/S with or without AD  7. Ascend/descend 2 stair with 0 Handrails Stand-by Assistance/S with or without AD   8. Lower extremity exercise program x10 reps with independence     Outcome: Ongoing (interventions implemented as appropriate)  PT eval complete; full report to follow; recommend home with HH PT/OT; no DME needs.

## 2019-09-12 LAB
BASOPHILS # BLD AUTO: 0.02 K/UL (ref 0–0.2)
BASOPHILS NFR BLD: 0.2 % (ref 0–1.9)
DIFFERENTIAL METHOD: ABNORMAL
EOSINOPHIL # BLD AUTO: 0.2 K/UL (ref 0–0.5)
EOSINOPHIL NFR BLD: 1.7 % (ref 0–8)
ERYTHROCYTE [DISTWIDTH] IN BLOOD BY AUTOMATED COUNT: 13.8 % (ref 11.5–14.5)
HCT VFR BLD AUTO: 31.7 % (ref 40–54)
HGB BLD-MCNC: 10.4 G/DL (ref 14–18)
LYMPHOCYTES # BLD AUTO: 1 K/UL (ref 1–4.8)
LYMPHOCYTES NFR BLD: 7.8 % (ref 18–48)
MCH RBC QN AUTO: 28.4 PG (ref 27–31)
MCHC RBC AUTO-ENTMCNC: 32.8 G/DL (ref 32–36)
MCV RBC AUTO: 87 FL (ref 82–98)
MONOCYTES # BLD AUTO: 1.4 K/UL (ref 0.3–1)
MONOCYTES NFR BLD: 10.8 % (ref 4–15)
NEUTROPHILS # BLD AUTO: 10.2 K/UL (ref 1.8–7.7)
NEUTROPHILS NFR BLD: 79.5 % (ref 38–73)
PLATELET # BLD AUTO: 165 K/UL (ref 150–350)
PMV BLD AUTO: 11.3 FL (ref 9.2–12.9)
POCT GLUCOSE: 104 MG/DL (ref 70–110)
POCT GLUCOSE: 113 MG/DL (ref 70–110)
POCT GLUCOSE: 115 MG/DL (ref 70–110)
POCT GLUCOSE: 122 MG/DL (ref 70–110)
RBC # BLD AUTO: 3.66 M/UL (ref 4.6–6.2)
WBC # BLD AUTO: 12.91 K/UL (ref 3.9–12.7)

## 2019-09-12 PROCEDURE — 97530 THERAPEUTIC ACTIVITIES: CPT

## 2019-09-12 PROCEDURE — 97116 GAIT TRAINING THERAPY: CPT

## 2019-09-12 PROCEDURE — 27000646 HC AEROBIKA DEVICE

## 2019-09-12 PROCEDURE — 99900035 HC TECH TIME PER 15 MIN (STAT)

## 2019-09-12 PROCEDURE — 97535 SELF CARE MNGMENT TRAINING: CPT

## 2019-09-12 PROCEDURE — 36415 COLL VENOUS BLD VENIPUNCTURE: CPT

## 2019-09-12 PROCEDURE — 85025 COMPLETE CBC W/AUTO DIFF WBC: CPT

## 2019-09-12 PROCEDURE — 94664 DEMO&/EVAL PT USE INHALER: CPT

## 2019-09-12 PROCEDURE — 11000001 HC ACUTE MED/SURG PRIVATE ROOM

## 2019-09-12 PROCEDURE — 63600175 PHARM REV CODE 636 W HCPCS: Performed by: SURGERY

## 2019-09-12 PROCEDURE — 94761 N-INVAS EAR/PLS OXIMETRY MLT: CPT

## 2019-09-12 PROCEDURE — 25000003 PHARM REV CODE 250: Performed by: SURGERY

## 2019-09-12 PROCEDURE — 97110 THERAPEUTIC EXERCISES: CPT

## 2019-09-12 PROCEDURE — S0028 INJECTION, FAMOTIDINE, 20 MG: HCPCS | Performed by: SURGERY

## 2019-09-12 PROCEDURE — 27000221 HC OXYGEN, UP TO 24 HOURS

## 2019-09-12 RX ADMIN — AZELASTINE HYDROCHLORIDE 137 MCG: 137 SPRAY, METERED NASAL at 08:09

## 2019-09-12 RX ADMIN — SODIUM CHLORIDE, SODIUM LACTATE, POTASSIUM CHLORIDE, AND CALCIUM CHLORIDE: .6; .31; .03; .02 INJECTION, SOLUTION INTRAVENOUS at 05:09

## 2019-09-12 RX ADMIN — AZELASTINE HYDROCHLORIDE 137 MCG: 137 SPRAY, METERED NASAL at 09:09

## 2019-09-12 RX ADMIN — FAMOTIDINE 20 MG: 10 INJECTION, SOLUTION INTRAVENOUS at 09:09

## 2019-09-12 RX ADMIN — SODIUM CHLORIDE, SODIUM LACTATE, POTASSIUM CHLORIDE, AND CALCIUM CHLORIDE: .6; .31; .03; .02 INJECTION, SOLUTION INTRAVENOUS at 01:09

## 2019-09-12 NOTE — PLAN OF CARE
Problem: Occupational Therapy Goal  Goal: Occupational Therapy Goal  Goals to be met by: 10/11     Patient will increase functional independence with ADLs by performing:    UE Dressing with Supervision.  LE Dressing with Stand-by Assistance.  Grooming while standing at sink with Supervision.  Toileting from toilet with Supervision for hygiene and clothing management.   Toilet transfer to toilet with Stand-by Assistance.  Increased functional strength to WFL for ADLs.     Outcome: Ongoing (interventions implemented as appropriate)  Pt found in supine & agreeable to OT this PM. Pt c/o 1/10 abd pain & perf the following: sup-->EOB via logroll w/ Min A & april'd ~3min w/ Sup; standing via RW w/ CGA; amb via RW w/in & for ext distance outside room w/ CGA for safety; urinal use in standing w/ SB-CGA. Edu/tx re: general safety techs, HEP, deep/PLBing, abd bracing & pain mgmt techs. Pt verbalized understanding. Pt left UIC w/ alarm, nsg notified & dgtr present.    Pt w/ signif improvements in overall phys status from yesterday & has excellent potential for return to PLOF w/ cont OT per POC.

## 2019-09-12 NOTE — PT/OT/SLP EVAL
Physical Therapy Evaluation    Patient Name:  Husam Connolly   MRN:  [id number]    Recommendations:     Discharge Recommendations:  home with home health, home health OT, home health PT   Discharge Equipment Recommendations: (TBD pending progress)   Barriers to discharge: None    Assessment:     Husam Connolly is a 88 y.o. male admitted with a medical diagnosis of Diverticulitis, intestine large.  He presents with the following impairments/functional limitations:  weakness, impaired endurance, gait instability, impaired functional mobilty, impaired self care skills, impaired balance, decreased safety awareness, pain, decreased ROM, impaired cardiopulmonary response to activity, impaired skin, edema     Rehab Prognosis: Good; patient would benefit from acute skilled PT services to address these deficits and reach maximum level of function.    Recent Surgery: Procedure(s) (LRB):  LAPAROTOMY, EXPLORATORY (N/A)  ANASTOMOSIS, COLO-RECTAL 2 Days Post-Op    Plan:     During this hospitalization, patient to be seen 6 x/week to address the identified rehab impairments via gait training, therapeutic activities, therapeutic exercises and progress toward the following goals:    · Plan of Care Expires:  10/11/19    Subjective     Chief Complaint: abdominal pain  Patient/Family Comments/goals: return to PLOF  Pain/Comfort:  · Pain Rating 1: 1/10  · Location - Orientation 1: generalized  · Location 1: abdomen  · Pain Addressed 1: Pre-medicate for activity, Reposition, Distraction  · Pain Rating Post-Intervention 1: 0/10    Patients cultural, spiritual, Orthodoxy conflicts given the current situation: no    Living Environment:  Patient lives with wife in 2SH with 2 small DESTINEE and no rails; bedroom and WIS with built in seat and grab bars on first level  Prior to admission, patients level of function was independent without use of DME; has access to RW previously used by wife Upon discharge, patient will have assistance from  family.    Objective:     Communicated with nurse prior to session.  Patient found with blood pressure cuff, collado catheter, oxygen, NG tube, peripheral IV, telemetry, SCD  upon PT entry to room.    General Precautions: Standard, fall   Orthopedic Precautions:N/A   Braces: N/A     Exams:  · Cognitive Exam:  Patient is oriented to Person, Place, Time, Situation and follows multistep commands  · Gross Motor Coordination:  WFL  · RLE ROM: WFL  · RLE Strength: WFL  · LLE ROM: WFL  · LLE Strength: WFL    Functional Mobility:  · Bed Mobility:     · Rolling Right: moderate assistance and of 2 persons  · Supine to Sit: moderate assistance and with leg lift  · Sit to Supine: moderate assistance and with leg lift  · Transfers:     · Sit to Stand:  contact guard assistance with hand-held assist  · Gait: Patient amb 5ft forward/5ft backward x 4 using RW with CGA; slow sade, decreased foot clearance, minimal forward trunk  · Balance:  Sit~Fair; stand~fair- without AD, fair with RW; amb~fair      Therapeutic Activities and Exercises:   educated on role of PT/POC, pursed lip breathing, log rolling; use of pillow to brace abdomen; preformed skill as described above; increased HR in 120's post amb; elevated /73 post session    AM-PAC 6 CLICK MOBILITY  Total Score:14     Patient left HOB elevated with all lines intact, call button in reach, bed alarm on and restraints reapplied at end of session.    GOALS:   Multidisciplinary Problems     Physical Therapy Goals        Problem: Physical Therapy Goal    Goal Priority Disciplines Outcome Goal Variances Interventions   Physical Therapy Goal     PT, PT/OT Ongoing (interventions implemented as appropriate)     Description:  Goals to be met by: 10/11/2019     Patient will increase functional independence with mobility by performin. Supine to sit with Modified [place]  2. Sit to supine with Modified [place]  3. Rolling with Modified [place].  4. Sit to stand  transfer with Stand-by Assistance/S  5. Bed to chair transfer with Stand-by Assistance/S with or without AD  6. Gait  x 400ft feet with SBA/S with or without AD  7. Ascend/descend 2 stair with 0 Handrails Stand-by Assistance/S with or without AD   8. Lower extremity exercise program x10 reps with independence                      History:     Past Medical History:   Diagnosis Date    Anticoagulant long-term use     Bowel obstruction     BPH (benign prostatic hyperplasia)     Diabetes mellitus     Diverticulitis     Hyperlipidemia     Hypertension        Past Surgical History:   Procedure Laterality Date    ANASTOMOSIS, COLO-RECTAL  9/9/2019    [facility]    CREATION, COLOSTOMY  6/16/2019    [facility] at [facility]    EYE SURGERY      GISELA PROCEDURE  6/16/2019    [facility] at [facility]    LAPAROTOMY, EXPLORATORY N/A 9/9/2019    [facility] at [facility]    LAPAROTOMY, EXPLORATORY N/A 6/16/2019    Per[facility] by [facility] at [facility]    TONSILLECTOMY         Time Tracking:     PT Received On: 09/11/19  PT Start Time: 1519     PT Stop Time: 1545  PT Total Time (min): 26 min with OT    Billable Minutes: Evaluation 10 minutes      Karine Plascencia, PT  09/11/2019

## 2019-09-12 NOTE — PT/OT/SLP PROGRESS
Occupational Therapy   Treatment    Name: Husam Connolly  MRN: [id number]  Admitting Diagnosis:  Diverticulitis, intestine large  3 Days Post-Op    Recommendations:     Discharge Recommendations: home health PT, home health OT  Discharge Equipment Recommendations:  shower chair  Barriers to discharge:  None    Assessment:   Pt w/ signif improvements in overall phys status from yesterday & has excellent potential for return to PLOF w/ cont OT per POC.    Husam Connolly is a 88 y.o. male with a medical diagnosis of Diverticulitis, intestine large.  He presents with . Performance deficits affecting function are weakness, impaired endurance, gait instability, impaired functional mobilty, impaired self care skills, impaired balance, decreased safety awareness, pain, decreased ROM, impaired cardiopulmonary response to activity, edema, impaired skin.     Rehab Prognosis:  Good; patient would benefit from acute skilled OT services to address these deficits and reach maximum level of function.       Plan:     Patient to be seen 5 x/week to address the above listed problems via self-care/home management, therapeutic exercises, therapeutic activities  · Plan of Care Expires: 10/11/19  · Plan of Care Reviewed with: patient, daughter    Subjective     Pain/Comfort:  · Pain Rating 1: 1/10  · Location - Orientation 1: generalized  · Location 1: abdomen  · Pain Addressed 1: Pre-medicate for activity, Reposition, Distraction  · Pain Rating Post-Intervention 1: 0/10    Objective:     Communicated with: nsroberto carlos prior to session.  Patient found HOB elevated with bed alarm, blood pressure cuff, peripheral IV, oxygen, SCD upon OT entry to room.    General Precautions: Standard, fall   Orthopedic Precautions:N/A   Braces: N/A     Occupational Performance:     Bed Mobility:    · Patient completed Rolling/Turning to Right with minimum assistance  · Patient completed Supine to Sit with minimum assistance     Functional  Mobility/Transfers:  · Patient completed Sit <> Stand Transfer with contact guard assistance  with  rolling walker   · Patient completed Bed <> Chair Transfer using Step Transfer technique with contact guard assistance with rolling walker  · Functional Mobility: via RW w/in room & for extended dist outside room w/ CGA    Activities of Daily Living:  · Upper Body Dressing: minimum assistance don aleta  · Toileting: stand by assistance and contact guard assistance urinal use in standing      AMPA 6 Click ADL: 17    Treatment & Education:  Pt found in supine & agreeable to OT this PM. Pt c/o 1/10 abd pain & perf the following: sup-->EOB via logroll w/ Min A & april'd ~3min w/ Sup; standing via RW w/ CGA; amb via RW w/in & for ext distance outside room w/ CGA for safety; urinal use in standing w/ SB-CGA. Edu/tx re: general safety techs, HEP, deep/PLBing, abd bracing & pain mgmt techs. Pt verbalized understanding. Pt left UIC w/ alarm, nsg notified & dgtr present.    Patient left up in chair with all lines intact, call button in reach, chair alarm on, nsg notified and dgtr presentEducation:      GOALS:   Multidisciplinary Problems     Occupational Therapy Goals        Problem: Occupational Therapy Goal    Goal Priority Disciplines Outcome Interventions   Occupational Therapy Goal     OT, PT/OT Ongoing (interventions implemented as appropriate)    Description:  Goals to be met by: 10/11     Patient will increase functional independence with ADLs by performing:    UE Dressing with Supervision.  LE Dressing with Stand-by Assistance.  Grooming while standing at sink with Supervision.  Toileting from toilet with Supervision for hygiene and clothing management.   Toilet transfer to toilet with Stand-by Assistance.  Increased functional strength to WFL for ADLs.                      Time Tracking:     OT Date of Treatment: 09/12/19  OT Start Time: 1505  OT Stop Time: 1550  OT Total Time (min): 45 min    Billable Minutes:Self  Care/Home Management 15  Therapeutic Activity 30  Total Time 45    SIMON Gonzalez  9/12/2019

## 2019-09-12 NOTE — PLAN OF CARE
Problem: Adult Inpatient Plan of Care  Goal: Plan of Care Review  Outcome: Ongoing (interventions implemented as appropriate)  Patient is awake and orientedx4. Care plan explained to patient; he verbalized understanding. On 2L via nasal cannula, O2 saturation at 95%. Hooked to heart monitor running normal sinus rhythm. Urinal in reach. No complaints of n/v/d, prn medication given for incisional pain. Due medications given. NG tube hooked to low intermittent suction. Accuchecks done every 6 hours. Encouraged to turn every 2 hours as tolerated. Maintained on fall risk precaution. Bed in lowest position, bed alarm on, call light/personal items within reach and instructed to call for help when needed. Will continue to monitor.

## 2019-09-12 NOTE — NURSING
Patient accidentally pulled out NG tube while asleep, hypoactive lower quadrant bowel sounds heard. Was notified in report it was okay to pull out NG tube once bowel sounds are heard.

## 2019-09-12 NOTE — PLAN OF CARE
Problem: Physical Therapy Goal  Goal: Physical Therapy Goal  Goals to be met by: 10/11/2019     Patient will increase functional independence with mobility by performin. Supine to sit with Modified [place]  2. Sit to supine with Modified [place]  3. Rolling with Modified [place].  4. Sit to stand transfer with Stand-by Assistance/S  5. Bed to chair transfer with Stand-by Assistance/S with or without AD  6. Gait  x 400ft feet with SBA/S with or without AD  7. Ascend/descend 2 stair with 0 Handrails Stand-by Assistance/S with or without AD   8. Lower extremity exercise program x10 reps with independence     Outcome: Ongoing (interventions implemented as appropriate)  Continue working toward goals.

## 2019-09-12 NOTE — PROGRESS NOTES
Pharmacy New Medication Education    Patient and/or Caregiver ACCEPTED medication education.    Pharmacy has provided education on the name, indication, and possible side effects of the medication(s) prescribed, using teach-back method.     Learners of pharmacy medication education includes:  patient, spouse and son    Medication Indication Side Effects   lipitor Manage cholesterol diarrhea and muscle cramps    morphine pain nausea and rash       The following medications have also been discussed, during this admission.     Current Facility-Administered Medications   Medication Frequency    acetaminophen tablet 650 mg Q8H PRN    atorvastatin tablet 40 mg Daily    azelastine 137 mcg (0.1 %) nasal spray 137 mcg BID    dextrose 10% (D10W) Bolus PRN    dextrose 10% (D10W) Bolus PRN    famotidine (PF) injection 20 mg Daily    glucagon (human recombinant) injection 1 mg PRN    insulin aspart U-100 pen 0-5 Units Q6H PRN    lactated ringers infusion Continuous    lidocaine (PF) 10 mg/ml (1%) injection 10 mg Once    morphine injection 2 mg Q4H PRN    ondansetron disintegrating tablet 8 mg Q8H PRN    ondansetron injection 4 mg Q12H PRN    sodium chloride 0.9% flush 10 mL PRN    sodium chloride 0.9% flush 10 mL PRN    sodium chloride 0.9% flush 10 mL PRN          Thank you  Ti Marrufo, PharmD

## 2019-09-12 NOTE — PT/OT/SLP PROGRESS
Physical Therapy Treatment    Patient Name:  Husam Connolly   MRN:  [id number]    Recommendations:     Discharge Recommendations:  home with home health, home health PT, home health OT   Discharge Equipment Recommendations: (TBD pending progress)   Barriers to discharge: None    Assessment:     Husam Connolly is a 88 y.o. male admitted with a medical diagnosis of Diverticulitis, intestine large.  He presents with the following impairments/functional limitations:  weakness, impaired endurance, impaired functional mobilty, impaired self care skills, gait instability, impaired balance, decreased lower extremity function, decreased safety awareness, pain, decreased ROM, impaired cardiopulmonary response to activity.  Pt would continue to benefit from P.T. To address impairments listed above.    Rehab Prognosis: Fair+; patient would benefit from acute skilled PT services to address these deficits and reach maximum level of function.    Recent Surgery: Procedure(s) (LRB):  LAPAROTOMY, EXPLORATORY (N/A)  ANASTOMOSIS, COLO-RECTAL 3 Days Post-Op    Plan:     During this hospitalization, patient to be seen 6 x/week to address the identified rehab impairments via gait training, therapeutic activities, therapeutic exercises and progress toward the following goals:    · Plan of Care Expires:  10/11/19    Subjective     Patient/Family Comments/goals: Pt agreed to tx.  Pain/Comfort:  · Pain Rating 1: 1/10  · Location - Orientation 1: generalized  · Location 1: abdomen  · Pain Addressed 1: Reposition, Distraction, Nurse notified  · Pain Rating Post-Intervention 1: 0/10      Objective:     Communicated with RN(Reema) prior to session.  Patient found up in chair with peripheral IV, oxygen, SCD upon PT entry to room.     General Precautions: Standard, fall   Orthopedic Precautions:N/A   Braces:       Functional Mobility:  · Transfers:   · Sit <> Stand with Rw and CGA with vc's for hand placement  · Gait: Pt ambulated 60ft x 4 with  Rw and close CGA off O2 with O2 sats monitoring.  Pt's sats remained between 94% and 88%.  Pt stopped and took a standing rest break and was instructed in pursed lip breathing.  O2 sats returned above 90% withing 5 to 10 secs.  Pt given vc's to slow sade for energy conservation secondary to O2 sats staying above 90%.  · Balance: sitting good, standing fair/fair+ Rw, gait fair RW      AM-PAC 6 CLICK MOBILITY  Turning over in bed (including adjusting bedclothes, sheets and blankets)?: 2  Sitting down on and standing up from a chair with arms (e.g., wheelchair, bedside commode, etc.): 3  Moving from lying on back to sitting on the side of the bed?: 2  Moving to and from a bed to a chair (including a wheelchair)?: 3  Need to walk in hospital room?: 3  Climbing 3-5 steps with a railing?: 1  Basic Mobility Total Score: 14       Therapeutic Activities and Exercises:   Nsg okayed ambulation without O2 and O2 sats monitored.  Seated BLE therex: AP, LAQ, hip flexion/ABD/ADD x 15 reps  Pt education on energy conservation techniques with gait, but pt had difficulty maintaining a slower pace for gait that assisted in keeping O2 sats above 90% without O2.  Wall unit O2 NC set at  1lpm was donned back on pt after tx.  Nsg notified.    Patient left up in chair with all lines intact, call button in reach, chair alarm on and RN notified..    GOALS:   Multidisciplinary Problems     Physical Therapy Goals        Problem: Physical Therapy Goal    Goal Priority Disciplines Outcome Goal Variances Interventions   Physical Therapy Goal     PT, PT/OT Ongoing (interventions implemented as appropriate)     Description:  Goals to be met by: 10/11/2019     Patient will increase functional independence with mobility by performin. Supine to sit with Modified [place]  2. Sit to supine with Modified [place]  3. Rolling with Modified [place].  4. Sit to stand transfer with Stand-by Assistance/S  5. Bed to chair transfer with  Stand-by Assistance/S with or without AD  6. Gait  x 400ft feet with SBA/S with or without AD  7. Ascend/descend 2 stair with 0 Handrails Stand-by Assistance/S with or without AD   8. Lower extremity exercise program x10 reps with independence                      Time Tracking:     PT Received On: 09/12/19  PT Start Time: 1235     PT Stop Time: 1300  PT Total Time (min): 25 min     Billable Minutes: Gait Training 15 and Therapeutic Exercise 9    Treatment Type: Treatment  PT/PTA: PTA     PTA Visit Number: 1     Lilian Parr, LYLA  09/12/2019

## 2019-09-12 NOTE — PLAN OF CARE
Pt's LUQ dressing was draining a dark reddish-brown colored drainage under the transparent dressing.  Notified Dr. Gardner and he verbalized orders for dressing change.  Dressing to be packed, painted w/iodine, 4x4 gauze and tape.  Will follow orders.

## 2019-09-12 NOTE — PLAN OF CARE
Rounded on patient  Patient AAOx3  Wife at bedside    S/p POD #3 LAPAROTOMY, EXPLORATORY (N/A)  ANASTOMOSIS, COLO-RECTAL  Extensive lysis of adhesions, take down colostomy    Patient stepdown from ICU last night      PT/OT recommending Home Health upon discharge    This  put name on white board and explained blue discharge folder to patient. Discharge planning brochure and/or business card given to patient.  Patient verbalized understanding.       09/12/19 1015   Discharge Reassessment   Assessment Type Discharge Planning Reassessment   Provided patient/caregiver education on the expected discharge date and the discharge plan Yes   Do you have any problems affording any of your prescribed medications? No   Discharge Plan A Home;Home with family;Home Health   DME Needed Upon Discharge  none   Patient choice form signed by patient/caregiver No   Anticipated Discharge Disposition Home-Health     Mandy Ko RN, CCM, CMSRN  RN Transition Navigator  [phone]

## 2019-09-13 LAB
BASOPHILS # BLD AUTO: 0.01 K/UL (ref 0–0.2)
BASOPHILS NFR BLD: 0.1 % (ref 0–1.9)
DIFFERENTIAL METHOD: ABNORMAL
EOSINOPHIL # BLD AUTO: 0.3 K/UL (ref 0–0.5)
EOSINOPHIL NFR BLD: 2.9 % (ref 0–8)
ERYTHROCYTE [DISTWIDTH] IN BLOOD BY AUTOMATED COUNT: 14 % (ref 11.5–14.5)
HCT VFR BLD AUTO: 31.7 % (ref 40–54)
HGB BLD-MCNC: 10.4 G/DL (ref 14–18)
LYMPHOCYTES # BLD AUTO: 1.3 K/UL (ref 1–4.8)
LYMPHOCYTES NFR BLD: 12.9 % (ref 18–48)
MCH RBC QN AUTO: 28.5 PG (ref 27–31)
MCHC RBC AUTO-ENTMCNC: 32.8 G/DL (ref 32–36)
MCV RBC AUTO: 87 FL (ref 82–98)
MONOCYTES # BLD AUTO: 1.2 K/UL (ref 0.3–1)
MONOCYTES NFR BLD: 11.8 % (ref 4–15)
NEUTROPHILS # BLD AUTO: 7.5 K/UL (ref 1.8–7.7)
NEUTROPHILS NFR BLD: 72.3 % (ref 38–73)
PLATELET # BLD AUTO: 198 K/UL (ref 150–350)
PMV BLD AUTO: 10.4 FL (ref 9.2–12.9)
POCT GLUCOSE: 104 MG/DL (ref 70–110)
POCT GLUCOSE: 114 MG/DL (ref 70–110)
POCT GLUCOSE: 116 MG/DL (ref 70–110)
POCT GLUCOSE: 84 MG/DL (ref 70–110)
POCT GLUCOSE: 91 MG/DL (ref 70–110)
RBC # BLD AUTO: 3.65 M/UL (ref 4.6–6.2)
WBC # BLD AUTO: 10.35 K/UL (ref 3.9–12.7)

## 2019-09-13 PROCEDURE — 63600175 PHARM REV CODE 636 W HCPCS: Performed by: SURGERY

## 2019-09-13 PROCEDURE — 25000003 PHARM REV CODE 250: Performed by: SURGERY

## 2019-09-13 PROCEDURE — 85025 COMPLETE CBC W/AUTO DIFF WBC: CPT

## 2019-09-13 PROCEDURE — 97535 SELF CARE MNGMENT TRAINING: CPT

## 2019-09-13 PROCEDURE — 94664 DEMO&/EVAL PT USE INHALER: CPT

## 2019-09-13 PROCEDURE — 97530 THERAPEUTIC ACTIVITIES: CPT

## 2019-09-13 PROCEDURE — 97110 THERAPEUTIC EXERCISES: CPT

## 2019-09-13 PROCEDURE — 27000646 HC AEROBIKA DEVICE

## 2019-09-13 PROCEDURE — 27000221 HC OXYGEN, UP TO 24 HOURS

## 2019-09-13 PROCEDURE — 36415 COLL VENOUS BLD VENIPUNCTURE: CPT

## 2019-09-13 PROCEDURE — 99900035 HC TECH TIME PER 15 MIN (STAT)

## 2019-09-13 PROCEDURE — S0028 INJECTION, FAMOTIDINE, 20 MG: HCPCS | Performed by: SURGERY

## 2019-09-13 PROCEDURE — 94761 N-INVAS EAR/PLS OXIMETRY MLT: CPT

## 2019-09-13 PROCEDURE — 97116 GAIT TRAINING THERAPY: CPT

## 2019-09-13 PROCEDURE — 11000001 HC ACUTE MED/SURG PRIVATE ROOM

## 2019-09-13 RX ADMIN — SODIUM CHLORIDE, SODIUM LACTATE, POTASSIUM CHLORIDE, AND CALCIUM CHLORIDE: .6; .31; .03; .02 INJECTION, SOLUTION INTRAVENOUS at 08:09

## 2019-09-13 RX ADMIN — SODIUM CHLORIDE, SODIUM LACTATE, POTASSIUM CHLORIDE, AND CALCIUM CHLORIDE: .6; .31; .03; .02 INJECTION, SOLUTION INTRAVENOUS at 12:09

## 2019-09-13 RX ADMIN — FAMOTIDINE 20 MG: 10 INJECTION, SOLUTION INTRAVENOUS at 10:09

## 2019-09-13 RX ADMIN — AZELASTINE HYDROCHLORIDE 137 MCG: 137 SPRAY, METERED NASAL at 09:09

## 2019-09-13 RX ADMIN — AZELASTINE HYDROCHLORIDE 137 MCG: 137 SPRAY, METERED NASAL at 08:09

## 2019-09-13 RX ADMIN — SODIUM CHLORIDE, SODIUM LACTATE, POTASSIUM CHLORIDE, AND CALCIUM CHLORIDE: .6; .31; .03; .02 INJECTION, SOLUTION INTRAVENOUS at 06:09

## 2019-09-13 RX ADMIN — ATORVASTATIN CALCIUM 40 MG: 40 TABLET, FILM COATED ORAL at 08:09

## 2019-09-13 NOTE — PLAN OF CARE
Problem: Adult Inpatient Plan of Care  Goal: Plan of Care Review  Outcome: Ongoing (interventions implemented as appropriate)     09/13/19 1500   Plan of Care Review   Plan of Care Reviewed With patient   Plan of care reviewed with patient. Verbal reported of understanding. AAOx4. NSR on monitor with no red alarms noted. Free of falls throughout the shift. Blood glucose closely monitored. LR infusing 125mL/hr as ordered. No acute distress noted. Bed in  lowest position. Wheels locked. Head of bed elevated. Side rails x 2 are up. Call bell within reach and educated to call for assistance. Patient will be monitored overnight.

## 2019-09-13 NOTE — PROGRESS NOTES
Pharmacy New Medication Education    Patient and/or Caregiver ACCEPTED medication education.    Pharmacy has provided education on the name, indication, and possible side effects of the medication(s) prescribed, using teach-back method.     Learners of pharmacy medication education includes:  patient    Medication Indication Side Effects   atorvastatin Manage choelsterol diarrhea and muscle cramps             The following medications have also been discussed, during this admission.     Current Facility-Administered Medications   Medication Frequency    acetaminophen tablet 650 mg Q8H PRN    atorvastatin tablet 40 mg Daily    azelastine 137 mcg (0.1 %) nasal spray 137 mcg BID    dextrose 10% (D10W) Bolus PRN    dextrose 10% (D10W) Bolus PRN    famotidine (PF) injection 20 mg Daily    glucagon (human recombinant) injection 1 mg PRN    insulin aspart U-100 pen 0-5 Units Q6H PRN    lactated ringers infusion Continuous    lidocaine (PF) 10 mg/ml (1%) injection 10 mg Once    morphine injection 2 mg Q4H PRN    ondansetron disintegrating tablet 8 mg Q8H PRN    ondansetron injection 4 mg Q12H PRN    sodium chloride 0.9% flush 10 mL PRN    sodium chloride 0.9% flush 10 mL PRN    sodium chloride 0.9% flush 10 mL PRN          Thank you  Ti Marrufo, PharmD

## 2019-09-13 NOTE — PT/OT/SLP PROGRESS
"Physical Therapy      Patient Name:  Husam Connolly   MRN:  [id number]    Patient declined tx at this time secondary to just returning to bed.  Pt requested PTA return "in about 35 mins.". Will follow-up later this afternoon.    Lilian Parr, LYLA    "

## 2019-09-13 NOTE — PROGRESS NOTES
"Surgery follow up  BP (!) 145/67 (BP Location: Right arm, Patient Position: Lying)   Pulse 69   Temp 98.8 °F (37.1 °C) (Oral)   Resp 18   Ht 5' 10" (1.778 m)   Wt 75.1 kg (165 lb 9.1 oz)   SpO2 98%   BMI 23.76 kg/m²   I/O last 3 completed shifts:  In: 5933.3 [I.V.:5908.3; NG/GT:25]  Out: 1250 [Urine:1225; Drains:25]  No intake/output data recorded.  Recent Results (from the past 336 hour(s))   CBC auto differential    Collection Time: 09/13/19  5:50 AM   Result Value Ref Range    WBC 10.35 3.90 - 12.70 K/uL    Hemoglobin 10.4 (L) 14.0 - 18.0 g/dL    Hematocrit 31.7 (L) 40.0 - 54.0 %    Platelets 198 150 - 350 K/uL   CBC auto differential    Collection Time: 09/12/19  3:51 AM   Result Value Ref Range    WBC 12.91 (H) 3.90 - 12.70 K/uL    Hemoglobin 10.4 (L) 14.0 - 18.0 g/dL    Hematocrit 31.7 (L) 40.0 - 54.0 %    Platelets 165 150 - 350 K/uL   CBC auto differential    Collection Time: 09/11/19  7:05 AM   Result Value Ref Range    WBC 15.14 (H) 3.90 - 12.70 K/uL    Hemoglobin 11.4 (L) 14.0 - 18.0 g/dL    Hematocrit 33.9 (L) 40.0 - 54.0 %    Platelets 163 150 - 350 K/uL     Recent Results (from the past 336 hour(s))   Basic metabolic panel    Collection Time: 09/11/19  3:53 AM   Result Value Ref Range    Sodium 137 136 - 145 mmol/L    Potassium 4.3 3.5 - 5.1 mmol/L    Chloride 106 95 - 110 mmol/L    CO2 21 (L) 23 - 29 mmol/L    BUN, Bld 20 8 - 23 mg/dL    Creatinine 1.4 0.5 - 1.4 mg/dL    Calcium 8.3 (L) 8.7 - 10.5 mg/dL    Anion Gap 10 8 - 16 mmol/L   Basic metabolic panel    Collection Time: 09/09/19  2:28 PM   Result Value Ref Range    Sodium 140 136 - 145 mmol/L    Potassium 3.5 3.5 - 5.1 mmol/L    Chloride 108 95 - 110 mmol/L    CO2 17 (L) 23 - 29 mmol/L    BUN, Bld 21 8 - 23 mg/dL    Creatinine 1.3 0.5 - 1.4 mg/dL    Calcium 8.1 (L) 8.7 - 10.5 mg/dL    Anion Gap 15 8 - 16 mmol/L   abdomen soft non tender , no gas per rectum yet , npo now.  "

## 2019-09-13 NOTE — PLAN OF CARE
Problem: Occupational Therapy Goal  Goal: Occupational Therapy Goal  Goals to be met by: 10/11     Patient will increase functional independence with ADLs by performing:    UE Dressing with Supervision.  LE Dressing with Stand-by Assistance.  Grooming while standing at sink with Supervision.--MET 09/13  Toileting from toilet with Supervision for hygiene and clothing management.   Toilet transfer to toilet with Stand-by Assistance.  Increased functional strength to WFL for ADLs.     Outcome: Ongoing (interventions implemented as appropriate)  Pt found in supine & agreeable to OT this AM. Pt w/o c/o pain & perf the following: rolling L-->EOB w/ CGA; standing via RW w/ CGA; amb via RW w/in & for extended distance outside room w/ CGA & standing RB (~30 sec per) x 3 2/2 mild SOB; static standing via RW w/ SBA x ~15min; G/H standing at sink w/ Sup-SBA. Edu/tx re: abd bracing, deep/PLBing, general safety techs & HEP. Pt left UIC w/ alarm & nsg notified.    Pt w/ increased mobility skills, however demo'ed decreased respiratory status. Cont w/ good potential to achieve established goals w/ cont OT per POC.

## 2019-09-13 NOTE — PLAN OF CARE
Problem: Adult Inpatient Plan of Care  Goal: Plan of Care Review  Outcome: Ongoing (interventions implemented as appropriate)  Patient is awake and orientedx4. Care plan explained to patient; he verbalized understanding. On 1L via nasal cannula, O2 saturation at 96%. Hooked to heart monitor running normal sinus rhythm. Urinal in reach. No complaints of pain/n/v/d. Due medications given. Accuchecks completed Q6. LR going at 125mL/hr.  Encouraged to turn every 2 hours as tolerated. Maintained on fall risk precaution. Bed in lowest position, bed alarm on, call light/personal items within reach and instructed to call for help when needed. Will continue to monitor.

## 2019-09-13 NOTE — PLAN OF CARE
Problem: Physical Therapy Goal  Goal: Physical Therapy Goal  Goals to be met by: 10/11/2019     Patient will increase functional independence with mobility by performin. Supine to sit with Modified [place]  2. Sit to supine with Modified [place]  3. Rolling with Modified [place].  4. Sit to stand transfer with Stand-by Assistance/S  5. Bed to chair transfer with Stand-by Assistance/S with or without AD  6. Gait  x 400ft feet with SBA/S with or without AD  7. Ascend/descend 2 stair with 0 Handrails Stand-by Assistance/S with or without AD   8. Lower extremity exercise program x10 reps with independence     Outcome: Ongoing (interventions implemented as appropriate)  Continue working toward goals.

## 2019-09-13 NOTE — PT/OT/SLP PROGRESS
Occupational Therapy   Treatment    Name: Husam Connolly  MRN: [id number]  Admitting Diagnosis:  Diverticulitis, intestine large  4 Days Post-Op    Recommendations:     Discharge Recommendations: home health PT, home health OT  Discharge Equipment Recommendations:  (TBD)  Barriers to discharge:  None    Assessment:   Pt w/ increased mobility skills, however demo'ed decreased respiratory status. Cont w/ good potential to achieve established goals w/ cont OT per POC.    Husam Connolly is a 88 y.o. male with a medical diagnosis of Diverticulitis, intestine large.  He presents with . Performance deficits affecting function are weakness, impaired endurance, gait instability, impaired functional mobilty, impaired self care skills, impaired balance, decreased safety awareness, decreased ROM, impaired skin, edema, impaired cardiopulmonary response to activity.     Rehab Prognosis:  Good; patient would benefit from acute skilled OT services to address these deficits and reach maximum level of function.       Plan:     Patient to be seen 5 x/week to address the above listed problems via self-care/home management, therapeutic exercises, therapeutic activities  · Plan of Care Expires: 10/11/19  · Plan of Care Reviewed with: patient    Subjective     Pain/Comfort:  · Pain Rating 1: 0/10  · Pain Rating Post-Intervention 1: 0/10    Objective:     Communicated with: nsg prior to session.  Patient found supine with peripheral IV, bed alarm, SCD upon OT entry to room.    General Precautions: Standard, fall   Orthopedic Precautions:N/A   Braces: N/A     Occupational Performance:     Bed Mobility:    · Patient completed Rolling/Turning to Right with contact guard assistance  · Patient completed Supine to Sit with contact guard assistance     Functional Mobility/Transfers:  · Patient completed Sit <> Stand Transfer with contact guard assistance  with  rolling walker   · Patient completed Bed <> Chair Transfer using Step Transfer  technique with contact guard assistance with rolling walker  · Functional Mobility: via RW w/in & for extended dist outside room w/ CGA & standing RB x 3 for ~30sec per    Activities of Daily Living:  · Grooming: supervision and stand by assistance standing at sink      AMPAC 6 Click ADL: 17    Treatment & Education:  Pt found in supine & agreeable to OT this AM. Pt w/o c/o pain & perf the following: rolling L-->EOB w/ CGA; standing via RW w/ CGA; amb via RW w/in & for extended distance outside room w/ CGA & standing RB (~30 sec per) x 3 2/2 mild SOB; static standing via RW w/ SBA x ~15min; G/H standing at sink w/ Sup-SBA. Edu/tx re: abd bracing, deep/PLBing, general safety techs & HEP. Pt left UIC w/ alarm & nsg notified.    Patient left up in chair with all lines intact, call button in reach, chair alarm on and nsg notifiedEducation:      GOALS:   Multidisciplinary Problems     Occupational Therapy Goals        Problem: Occupational Therapy Goal    Goal Priority Disciplines Outcome Interventions   Occupational Therapy Goal     OT, PT/OT Ongoing (interventions implemented as appropriate)    Description:  Goals to be met by: 10/11     Patient will increase functional independence with ADLs by performing:    UE Dressing with Supervision.  LE Dressing with Stand-by Assistance.  Grooming while standing at sink with Supervision.--MET 09/13  Toileting from toilet with Supervision for hygiene and clothing management.   Toilet transfer to toilet with Stand-by Assistance.  Increased functional strength to WFL for ADLs.                       Time Tracking:     OT Date of Treatment: 09/13/19  OT Start Time: 0900  OT Stop Time: 0955  OT Total Time (min): 55 min    Billable Minutes:Self Care/Home Management 30  Therapeutic Activity 25  Total Time 55    SIMON Gonzalez  9/13/2019

## 2019-09-13 NOTE — PT/OT/SLP PROGRESS
Physical Therapy Treatment    Patient Name:  Husam Connolly   MRN:  [id number]    Recommendations:     Discharge Recommendations:  home health PT, home health OT   Discharge Equipment Recommendations: (TBD pending progress)   Barriers to discharge: none    Assessment:     Husam Connolly is a 88 y.o. male admitted with a medical diagnosis of Diverticulitis, intestine large.  He presents with the following impairments/functional limitations:  weakness, impaired endurance, impaired functional mobilty, impaired self care skills, impaired cardiopulmonary response to activity, gait instability, impaired balance, decreased safety awareness, decreased ROM.  Pt requires vc's to slow down sade for energy conservation secondary to becoming SOB and decreasing O2 sats.  Pt would continue to benefit from P.T. To assist with impairments listed above.    Rehab Prognosis: Good; patient would benefit from acute skilled PT services to address these deficits and reach maximum level of function.    Recent Surgery: Procedure(s) (LRB):  LAPAROTOMY, EXPLORATORY (N/A)  ANASTOMOSIS, COLO-RECTAL 4 Days Post-Op    Plan:     During this hospitalization, patient to be seen 6 x/week to address the identified rehab impairments via gait training, therapeutic activities, therapeutic exercises and progress toward the following goals:    · Plan of Care Expires:  10/11/19    Subjective       Patient/Family Comments/goals: Pt agreed to tx.  Pain/Comfort:  Pain Rating 1: 0/10  Pain Rating Post-Intervention 1: 0/10      Objective:     Communicated with RN (Gregoria) prior to session.  Patient found up in chair with peripheral IV, SCD upon PT entry to room.     General Precautions: Standard, fall   Orthopedic Precautions:N/A   Braces:       Functional Mobility:  · Transfers:     · Sit to Stand:  stand by assistance with rolling walker and vc's for hand placement  · Gait: 100ft x 3 with RW and CGA.  Pt ambulates with increased sade and becomes SOB.  O2  sats 95 to 96 at rest and with gait 94 to 87%.  Pt's sats return to above 90 quickly with standing rest breaks and pt instructed in performing PLB.  Pt maintains O2 sats in the 90s with a slower gait, but has difficulty maintaining this and wants to ambulate faster.  · Balance: sitting good, standing good-/fair+ Rw, gait fair Rw      AM-PAC 6 CLICK MOBILITY  Turning over in bed (including adjusting bedclothes, sheets and blankets)?: 3  Sitting down on and standing up from a chair with arms (e.g., wheelchair, bedside commode, etc.): 3  Moving from lying on back to sitting on the side of the bed?: 3  Moving to and from a bed to a chair (including a wheelchair)?: 3  Need to walk in hospital room?: 3  Climbing 3-5 steps with a railing?: 1  Basic Mobility Total Score: 16       Therapeutic Activities and Exercises:   Seated BLE therex: AP, LAQ, hip flexion/ABD/ADD and glut sets x 15 reps.    Patient left up in chair with all lines intact, call button in reach, chair alarm on and Rjn notified..    GOALS:   Multidisciplinary Problems     Physical Therapy Goals        Problem: Physical Therapy Goal    Goal Priority Disciplines Outcome Goal Variances Interventions   Physical Therapy Goal     PT, PT/OT Ongoing (interventions implemented as appropriate)     Description:  Goals to be met by: 10/11/2019     Patient will increase functional independence with mobility by performin. Supine to sit with Modified [place]  2. Sit to supine with Modified [place]  3. Rolling with Modified [place].  4. Sit to stand transfer with Stand-by Assistance/S  5. Bed to chair transfer with Stand-by Assistance/S with or without AD  6. Gait  x 400ft feet with SBA/S with or without AD  7. Ascend/descend 2 stair with 0 Handrails Stand-by Assistance/S with or without AD   8. Lower extremity exercise program x10 reps with independence                      Time Tracking:     PT Received On: 19  PT Start Time: 1413     PT Stop  Time: 1438  PT Total Time (min): 25 min     Billable Minutes: Gait Training 15 and Therapeutic Exercise 10    Treatment Type: Treatment  PT/PTA: PTA     PTA Visit Number: 2     Lilian Parr PTA  09/13/2019

## 2019-09-13 NOTE — PLAN OF CARE
S/p POD #4 LAPAROTOMY, EXPLORATORY (N/A)  ANASTOMOSIS, COLO-RECTAL  Extensive lysis of adhesions, take down colostomy    Needs home health with PT/OT set up at discharge    Future Appointments   Date Time Provider Department Center   9/26/2019  1:15 PM Fouzia Gardner MD [facility]   11/19/2019  1:00 PM Daren Lassiter MD [facility]        09/13/19 1417   Discharge Reassessment   Assessment Type Discharge Planning Reassessment   Discharge Plan A Home;Home with family;Home Health     Mandy Ko, RN, [facility], CMSRN  RN Transition Navigator  [phone]

## 2019-09-14 LAB
BASOPHILS # BLD AUTO: 0.01 K/UL (ref 0–0.2)
BASOPHILS NFR BLD: 0.1 % (ref 0–1.9)
DIFFERENTIAL METHOD: ABNORMAL
EOSINOPHIL # BLD AUTO: 0.4 K/UL (ref 0–0.5)
EOSINOPHIL NFR BLD: 3.6 % (ref 0–8)
ERYTHROCYTE [DISTWIDTH] IN BLOOD BY AUTOMATED COUNT: 14.2 % (ref 11.5–14.5)
HCT VFR BLD AUTO: 30.3 % (ref 40–54)
HGB BLD-MCNC: 10 G/DL (ref 14–18)
LYMPHOCYTES # BLD AUTO: 1 K/UL (ref 1–4.8)
LYMPHOCYTES NFR BLD: 10 % (ref 18–48)
MCH RBC QN AUTO: 28.4 PG (ref 27–31)
MCHC RBC AUTO-ENTMCNC: 33 G/DL (ref 32–36)
MCV RBC AUTO: 86 FL (ref 82–98)
MONOCYTES # BLD AUTO: 1.5 K/UL (ref 0.3–1)
MONOCYTES NFR BLD: 15.2 % (ref 4–15)
NEUTROPHILS # BLD AUTO: 6.9 K/UL (ref 1.8–7.7)
NEUTROPHILS NFR BLD: 71.1 % (ref 38–73)
PLATELET # BLD AUTO: 233 K/UL (ref 150–350)
PMV BLD AUTO: 10 FL (ref 9.2–12.9)
POCT GLUCOSE: 101 MG/DL (ref 70–110)
POCT GLUCOSE: 141 MG/DL (ref 70–110)
POCT GLUCOSE: 147 MG/DL (ref 70–110)
RBC # BLD AUTO: 3.52 M/UL (ref 4.6–6.2)
WBC # BLD AUTO: 9.68 K/UL (ref 3.9–12.7)

## 2019-09-14 PROCEDURE — 25000003 PHARM REV CODE 250: Performed by: SURGERY

## 2019-09-14 PROCEDURE — 99900035 HC TECH TIME PER 15 MIN (STAT)

## 2019-09-14 PROCEDURE — 97530 THERAPEUTIC ACTIVITIES: CPT

## 2019-09-14 PROCEDURE — 63600175 PHARM REV CODE 636 W HCPCS: Performed by: SURGERY

## 2019-09-14 PROCEDURE — S0028 INJECTION, FAMOTIDINE, 20 MG: HCPCS | Performed by: SURGERY

## 2019-09-14 PROCEDURE — 94761 N-INVAS EAR/PLS OXIMETRY MLT: CPT

## 2019-09-14 PROCEDURE — 97110 THERAPEUTIC EXERCISES: CPT

## 2019-09-14 PROCEDURE — 85025 COMPLETE CBC W/AUTO DIFF WBC: CPT

## 2019-09-14 PROCEDURE — 36415 COLL VENOUS BLD VENIPUNCTURE: CPT

## 2019-09-14 PROCEDURE — 27000221 HC OXYGEN, UP TO 24 HOURS

## 2019-09-14 PROCEDURE — 94664 DEMO&/EVAL PT USE INHALER: CPT

## 2019-09-14 PROCEDURE — 11000001 HC ACUTE MED/SURG PRIVATE ROOM

## 2019-09-14 RX ORDER — LOSARTAN POTASSIUM 50 MG/1
100 TABLET ORAL DAILY
Status: DISCONTINUED | OUTPATIENT
Start: 2019-09-15 | End: 2019-09-15

## 2019-09-14 RX ORDER — POLYETHYLENE GLYCOL 3350 17 G/17G
17 POWDER, FOR SOLUTION ORAL DAILY
Status: DISCONTINUED | OUTPATIENT
Start: 2019-09-14 | End: 2019-09-17 | Stop reason: HOSPADM

## 2019-09-14 RX ORDER — HYDROCHLOROTHIAZIDE 12.5 MG/1
12.5 TABLET ORAL DAILY
Status: DISCONTINUED | OUTPATIENT
Start: 2019-09-14 | End: 2019-09-15

## 2019-09-14 RX ADMIN — SODIUM CHLORIDE, SODIUM LACTATE, POTASSIUM CHLORIDE, AND CALCIUM CHLORIDE: .6; .31; .03; .02 INJECTION, SOLUTION INTRAVENOUS at 02:09

## 2019-09-14 RX ADMIN — SODIUM CHLORIDE, SODIUM LACTATE, POTASSIUM CHLORIDE, AND CALCIUM CHLORIDE: .6; .31; .03; .02 INJECTION, SOLUTION INTRAVENOUS at 07:09

## 2019-09-14 RX ADMIN — AZELASTINE HYDROCHLORIDE 137 MCG: 137 SPRAY, METERED NASAL at 08:09

## 2019-09-14 RX ADMIN — AZELASTINE HYDROCHLORIDE 137 MCG: 137 SPRAY, METERED NASAL at 09:09

## 2019-09-14 RX ADMIN — HYDROCHLOROTHIAZIDE 12.5 MG: 12.5 TABLET ORAL at 06:09

## 2019-09-14 RX ADMIN — POLYETHYLENE GLYCOL 3350 17 G: 17 POWDER, FOR SOLUTION ORAL at 02:09

## 2019-09-14 RX ADMIN — ATORVASTATIN CALCIUM 40 MG: 40 TABLET, FILM COATED ORAL at 09:09

## 2019-09-14 RX ADMIN — FAMOTIDINE 20 MG: 10 INJECTION, SOLUTION INTRAVENOUS at 09:09

## 2019-09-14 NOTE — PLAN OF CARE
Problem: Adult Inpatient Plan of Care  Goal: Plan of Care Review  Outcome: Ongoing (interventions implemented as appropriate)  Pt on room air in no apparent distress.  Aerobika tx. Given with good pt. Effort.  Will cont. To monitor.

## 2019-09-14 NOTE — PROGRESS NOTES
VN cued into patient's room with patient's permission. Patient AAOx4 and resting in bed. Noted IVF infusing. Patient denies any pain at this time. POC reviewed with patient. Patient states that he is tolerating full liquids well and denies any nausea. Education provided on signs/symptoms of infection. Patient verbalizes clear understanding. Fall risk protocol discussed with patient. VN instructed patient to call for assistance. Patient aware and agreeable. NAD noted. Allowed time for questions. Will continue to be available and intervene as needed.        09/14/19 1026   Type of Frequent Check   Type Patient Rounds  (VN rounds)   Safety/Activity   Patient Rounds bed in low position;bed wheels locked;call light in patient/parent reach;clutter free environment maintained;ID band on;placement of personal items at bedside;visualized patient   Safety Promotion/Fall Prevention assistive device/personal item within reach;bed alarm set;Fall Risk reviewed with patient/family;Fall Risk signage in place;side rails raised x 2;instructed to call staff for mobility   Positioning   Body Position supine   Head of Bed (HOB) HOB elevated   Positioning/Transfer Devices pillows;in use   Pain/Comfort/Sleep   Preferred Pain Scale number (Numeric Rating Pain Scale)   Comfort/Acceptable Pain Level 2   Pain Rating (0-10): Rest 0   Pain Rating (0-10): Activity 0   Sleep/Rest/Relaxation no problem identified;awake   Assessments (Pre/Post)   Level of Consciousness (AVPU) alert

## 2019-09-14 NOTE — PLAN OF CARE
Problem: Adult Inpatient Plan of Care  Goal: Plan of Care Review  Outcome: Ongoing (interventions implemented as appropriate)  Patient on RA, no respiratory distress noted. Patient perform CPT via Aerobika flutter pep device. Will continue to monitor.

## 2019-09-14 NOTE — NURSING
called Dr. Gardner to notify him that patient's BP has been running in the 170s systolic today. Manual BP is 158/72. Physician states to order patient's home blood pressure medication (valsartan 320 mg-hydrochlorothiazide 12.5 mg). Telephone order taken. Called pharmacy to verify if this medication is in stock since order was not coming up in facility list. Pharmacy stated they do not have valsartan anymore so losartan 100mg would have to be ordered to replace it, and that the two medications would have to be ordered separately since they do not carry them together. Dr. Gardner stated to administer dose of hydrochlorothiazide now and then resume it and the losartan again in the morning. Orders placed.

## 2019-09-14 NOTE — PT/OT/SLP PROGRESS
Occupational Therapy   Treatment    Name: Husam Connolly  MRN: [id number]  Admitting Diagnosis:  Diverticulitis, intestine large  5 Days Post-Op    Recommendations:     Discharge Recommendations: home health PT, home health OT  Discharge Equipment Recommendations:  shower chair(Defer ambulatory DME rec to PT)  Barriers to discharge:  None    Assessment:     Husam Connolly is a 88 y.o. male with a medical diagnosis of Diverticulitis, intestine large.  He presents with improving ability to self pace activities to ease SOB but continues to require some v/c to reduce pacing. Performance deficits affecting function are weakness, impaired endurance, impaired self care skills, impaired functional mobilty, gait instability, impaired balance, impaired skin, impaired cardiopulmonary response to activity.     Rehab Prognosis:  Good; patient would benefit from acute skilled OT services to address these deficits and reach maximum level of function.       Plan:     Patient to be seen 5 x/week to address the above listed problems via self-care/home management, therapeutic exercises, therapeutic activities  · Plan of Care Expires: 10/11/19  · Plan of Care Reviewed with: patient, family    Subjective     Pain/Comfort:  · Pain Rating 1: 0/10    Objective:     Communicated with: nsg prior to session.  Patient found HOB elevated with peripheral IV upon OT entry to room.    General Precautions: Standard, fall   Orthopedic Precautions:N/A   Braces: N/A     Occupational Performance:     Bed Mobility:    · Patient completed Rolling/Turning to Right with supervision  · Patient completed Scooting/Bridging with supervision  · Patient completed Supine to Sit with supervision  · Patient completed Sit to Supine with supervision     Functional Mobility/Transfers:  · Patient completed Sit <> Stand Transfer with modified independence and supervision  with  rolling walker   Functional Mobility: Pt with fair + to good dynamic seated and standing  "balance.  ·  Pt ambulated throughout hallway >200 ft with x3 standing rest breaks, requiring x2 v/c to take standing rest breaks 2/2 pt with increasing shakiness, though pt able to identify that he should have taken a break sooner. Pt ambulated ~25 ft no AD back to room after ambulation in hallway with RW. Pt with fair+ balance with no AD.        [facility] 6 Click ADL: 17    Treatment & Education:  Pt educated on role of OT and POC.   Pt performing skills as listed above.   Pt educated on gentle abdominal exercises to promote strengthening and scar formation. Pt complete 3x10 reps abdominal squeezes. Pt completed 3x10 reps B crossbody reaches with each hand, trunk flex/ext minimally outside SONIA and B overhead reaches. Pt educated to attempt lying supine in bed for scar tissue management and toleration as pt reports he has a flat bed at home. Pt laid supine in bed fully flattened ~1.5 minutes and reported some "pulling" in abdomen but tolerated well. Pt reported "discomfort" but no pain during session      Patient left seated EOB with all lines intact, call button in reach, nsg notified, family and nursing present      GOALS:   Multidisciplinary Problems     Occupational Therapy Goals        Problem: Occupational Therapy Goal    Goal Priority Disciplines Outcome Interventions   Occupational Therapy Goal     OT, PT/OT Ongoing (interventions implemented as appropriate)    Description:  Goals to be met by: 10/11     Patient will increase functional independence with ADLs by performing:    UE Dressing with Supervision.  LE Dressing with Stand-by Assistance.  Grooming while standing at sink with Supervision.--MET 09/13  Toileting from toilet with Supervision for hygiene and clothing management.   Toilet transfer to toilet with Stand-by Assistance.  Increased functional strength to WFL for ADLs.                       Time Tracking:     OT Date of Treatment: 09/14/19  OT Start Time: 1115  OT Stop Time: 1138  OT Total Time " (min): 23 min    Billable Minutes:Therapeutic Activity 13  Therapeutic Exercise 10    Katia Amador OT  9/14/2019

## 2019-09-14 NOTE — PROGRESS NOTES
Vitals:    09/14/19 0743 09/14/19 0755 09/14/19 0800 09/14/19 1157   BP: (!) 179/74   (!) 161/75   BP Location:    Right arm   Patient Position: Sitting   Sitting   Pulse: 86 81 83 95   Resp: 19  17    Temp: 98.3 °F (36.8 °C)   98 °F (36.7 °C)   TempSrc: Oral   Oral   SpO2: (!) 94%  (!) 94%    Weight:       Height:           toleraingclears    abd softhadflatus    willobserve    Labs stabnle

## 2019-09-14 NOTE — NURSING
Received patient upon rounds at 1910H, patient seen in bed on semi-soto's position. Conscious , coherent to time, date, place, person and situation. GCS 15. No subjective complaint of any pain. With saline lock left arm gauge 20, infusing well 1L  cc/hr, with clean and dry dressing. With 95% oxygen saturation on room air. Abdominal dressing clean d ry and intact. With audible bowel sounds, kept NPO with ice chips, no complaints of nausea and vomiting. Advised patient to call for any assistance. CAll bell within reach. SCD On.  Bed alarm ON. Safety fall precaution maintained.  Will continue to monitor patient.  0310H- Patient called the nurse to inform that he was finally able to pass gas. Abdomen soft non- tender, no nausea, no vomiting. Will inform Team in the morning. Will continue to monitor patient. Will follow up.   0646H- Patient stable VS over the night, afebrile. No episodes of nausea and vomiting. Able to pass gas last night. IV fluids ongoing at 150 cc/hr. Free from fall. IV line patent.Will endorse patient to day shift Nurse.

## 2019-09-14 NOTE — PLAN OF CARE
Problem: Occupational Therapy Goal  Goal: Occupational Therapy Goal  Goals to be met by: 10/11     Patient will increase functional independence with ADLs by performing:    UE Dressing with Supervision.  LE Dressing with Stand-by Assistance.  Grooming while standing at sink with Supervision.--MET 09/13  Toileting from toilet with Supervision for hygiene and clothing management.   Toilet transfer to toilet with Stand-by Assistance.  Increased functional strength to WFL for ADLs.      Outcome: Ongoing (interventions implemented as appropriate)  Pt progressing well towards goals, cont OT POC

## 2019-09-14 NOTE — PLAN OF CARE
Problem: Adult Inpatient Plan of Care  Goal: Plan of Care Review  Outcome: Ongoing (interventions implemented as appropriate)  Plan of care reviewed with patient. Patient verbalized understanding. Fall precautions maintained. Bed in lowest position, call light within reach, 2x bed rails, pt wearing slip resistant socks. Dressing to mid abd CDI, dressing to LLQ changed, pt has no c/o pain.Patient notified to ask staff for assistance and pt verbalized complete understanding. Pt on telemetry with no ectopy noted. Will continue to monitor.

## 2019-09-15 LAB
ALBUMIN SERPL BCP-MCNC: 2.4 G/DL (ref 3.5–5.2)
ALP SERPL-CCNC: 80 U/L (ref 55–135)
ALT SERPL W/O P-5'-P-CCNC: 11 U/L (ref 10–44)
ANION GAP SERPL CALC-SCNC: 9 MMOL/L (ref 8–16)
AST SERPL-CCNC: 25 U/L (ref 10–40)
BASOPHILS # BLD AUTO: 0.03 K/UL (ref 0–0.2)
BASOPHILS # BLD AUTO: 0.03 K/UL (ref 0–0.2)
BASOPHILS NFR BLD: 0.3 % (ref 0–1.9)
BASOPHILS NFR BLD: 0.3 % (ref 0–1.9)
BILIRUB SERPL-MCNC: 1.5 MG/DL (ref 0.1–1)
BUN SERPL-MCNC: 14 MG/DL (ref 8–23)
CALCIUM SERPL-MCNC: 8.5 MG/DL (ref 8.7–10.5)
CHLORIDE SERPL-SCNC: 105 MMOL/L (ref 95–110)
CO2 SERPL-SCNC: 26 MMOL/L (ref 23–29)
CREAT SERPL-MCNC: 0.8 MG/DL (ref 0.5–1.4)
DIFFERENTIAL METHOD: ABNORMAL
DIFFERENTIAL METHOD: ABNORMAL
EOSINOPHIL # BLD AUTO: 0.3 K/UL (ref 0–0.5)
EOSINOPHIL # BLD AUTO: 0.3 K/UL (ref 0–0.5)
EOSINOPHIL NFR BLD: 2.5 % (ref 0–8)
EOSINOPHIL NFR BLD: 2.5 % (ref 0–8)
ERYTHROCYTE [DISTWIDTH] IN BLOOD BY AUTOMATED COUNT: 14.4 % (ref 11.5–14.5)
ERYTHROCYTE [DISTWIDTH] IN BLOOD BY AUTOMATED COUNT: 14.4 % (ref 11.5–14.5)
EST. GFR  (AFRICAN AMERICAN): >60 ML/MIN/1.73 M^2
EST. GFR  (NON AFRICAN AMERICAN): >60 ML/MIN/1.73 M^2
GLUCOSE SERPL-MCNC: 149 MG/DL (ref 70–110)
HCT VFR BLD AUTO: 31.9 % (ref 40–54)
HCT VFR BLD AUTO: 31.9 % (ref 40–54)
HGB BLD-MCNC: 10.4 G/DL (ref 14–18)
HGB BLD-MCNC: 10.4 G/DL (ref 14–18)
LYMPHOCYTES # BLD AUTO: 1.3 K/UL (ref 1–4.8)
LYMPHOCYTES # BLD AUTO: 1.3 K/UL (ref 1–4.8)
LYMPHOCYTES NFR BLD: 11.3 % (ref 18–48)
LYMPHOCYTES NFR BLD: 11.3 % (ref 18–48)
MCH RBC QN AUTO: 28.5 PG (ref 27–31)
MCH RBC QN AUTO: 28.5 PG (ref 27–31)
MCHC RBC AUTO-ENTMCNC: 32.6 G/DL (ref 32–36)
MCHC RBC AUTO-ENTMCNC: 32.6 G/DL (ref 32–36)
MCV RBC AUTO: 87 FL (ref 82–98)
MCV RBC AUTO: 87 FL (ref 82–98)
MONOCYTES # BLD AUTO: 1.8 K/UL (ref 0.3–1)
MONOCYTES # BLD AUTO: 1.8 K/UL (ref 0.3–1)
MONOCYTES NFR BLD: 15.9 % (ref 4–15)
MONOCYTES NFR BLD: 15.9 % (ref 4–15)
NEUTROPHILS # BLD AUTO: 7.7 K/UL (ref 1.8–7.7)
NEUTROPHILS # BLD AUTO: 7.7 K/UL (ref 1.8–7.7)
NEUTROPHILS NFR BLD: 70 % (ref 38–73)
NEUTROPHILS NFR BLD: 70 % (ref 38–73)
PLATELET # BLD AUTO: 233 K/UL (ref 150–350)
PLATELET # BLD AUTO: 233 K/UL (ref 150–350)
PMV BLD AUTO: 10 FL (ref 9.2–12.9)
PMV BLD AUTO: 10 FL (ref 9.2–12.9)
POCT GLUCOSE: 140 MG/DL (ref 70–110)
POCT GLUCOSE: 158 MG/DL (ref 70–110)
POCT GLUCOSE: 183 MG/DL (ref 70–110)
POCT GLUCOSE: 184 MG/DL (ref 70–110)
POCT GLUCOSE: 201 MG/DL (ref 70–110)
POTASSIUM SERPL-SCNC: 3.9 MMOL/L (ref 3.5–5.1)
PROT SERPL-MCNC: 5.4 G/DL (ref 6–8.4)
RBC # BLD AUTO: 3.65 M/UL (ref 4.6–6.2)
RBC # BLD AUTO: 3.65 M/UL (ref 4.6–6.2)
SODIUM SERPL-SCNC: 140 MMOL/L (ref 136–145)
WBC # BLD AUTO: 11.11 K/UL (ref 3.9–12.7)
WBC # BLD AUTO: 11.11 K/UL (ref 3.9–12.7)

## 2019-09-15 PROCEDURE — 63600175 PHARM REV CODE 636 W HCPCS: Performed by: SURGERY

## 2019-09-15 PROCEDURE — 85025 COMPLETE CBC W/AUTO DIFF WBC: CPT

## 2019-09-15 PROCEDURE — 11000001 HC ACUTE MED/SURG PRIVATE ROOM

## 2019-09-15 PROCEDURE — 94761 N-INVAS EAR/PLS OXIMETRY MLT: CPT

## 2019-09-15 PROCEDURE — 25000003 PHARM REV CODE 250: Performed by: SURGERY

## 2019-09-15 PROCEDURE — 99900035 HC TECH TIME PER 15 MIN (STAT)

## 2019-09-15 PROCEDURE — S0028 INJECTION, FAMOTIDINE, 20 MG: HCPCS | Performed by: SURGERY

## 2019-09-15 PROCEDURE — 80053 COMPREHEN METABOLIC PANEL: CPT

## 2019-09-15 PROCEDURE — 36415 COLL VENOUS BLD VENIPUNCTURE: CPT

## 2019-09-15 PROCEDURE — S0171 BUMETANIDE 0.5 MG: HCPCS | Performed by: SURGERY

## 2019-09-15 PROCEDURE — 94664 DEMO&/EVAL PT USE INHALER: CPT

## 2019-09-15 RX ORDER — BUMETANIDE 0.25 MG/ML
1 INJECTION INTRAMUSCULAR; INTRAVENOUS DAILY
Status: DISCONTINUED | OUTPATIENT
Start: 2019-09-15 | End: 2019-09-17 | Stop reason: HOSPADM

## 2019-09-15 RX ORDER — VALSARTAN AND HYDROCHLOROTHIAZIDE 320; 12.5 MG/1; MG/1
1 TABLET, FILM COATED ORAL DAILY
Status: DISCONTINUED | OUTPATIENT
Start: 2019-09-15 | End: 2019-09-17 | Stop reason: HOSPADM

## 2019-09-15 RX ORDER — ENOXAPARIN SODIUM 100 MG/ML
40 INJECTION SUBCUTANEOUS EVERY 24 HOURS
Status: DISCONTINUED | OUTPATIENT
Start: 2019-09-15 | End: 2019-09-17 | Stop reason: HOSPADM

## 2019-09-15 RX ADMIN — ENOXAPARIN SODIUM 40 MG: 100 INJECTION SUBCUTANEOUS at 05:09

## 2019-09-15 RX ADMIN — FAMOTIDINE 20 MG: 10 INJECTION, SOLUTION INTRAVENOUS at 09:09

## 2019-09-15 RX ADMIN — VALSARTAN AND HYDROCHLOROTHIAZIDE 1 TABLET: 320; 12.5 TABLET, FILM COATED ORAL at 12:09

## 2019-09-15 RX ADMIN — POLYETHYLENE GLYCOL 3350 17 G: 17 POWDER, FOR SOLUTION ORAL at 09:09

## 2019-09-15 RX ADMIN — BUMETANIDE 1 MG: 0.25 INJECTION INTRAMUSCULAR; INTRAVENOUS at 12:09

## 2019-09-15 RX ADMIN — SODIUM CHLORIDE, SODIUM LACTATE, POTASSIUM CHLORIDE, AND CALCIUM CHLORIDE: .6; .31; .03; .02 INJECTION, SOLUTION INTRAVENOUS at 05:09

## 2019-09-15 RX ADMIN — AZELASTINE HYDROCHLORIDE 137 MCG: 137 SPRAY, METERED NASAL at 09:09

## 2019-09-15 RX ADMIN — ATORVASTATIN CALCIUM 40 MG: 40 TABLET, FILM COATED ORAL at 09:09

## 2019-09-15 NOTE — PLAN OF CARE
Problem: Adult Inpatient Plan of Care  Goal: Plan of Care Review  Outcome: Ongoing (interventions implemented as appropriate)  Received patient upon rounds at 1901H, patient seen in bed on semi-soto's position. Conscious , coherent to time, date, place, person and situation. GCS 15. No subjective complaint of any pain. With saline lock left arm gauge 20, infusing well 1L  cc/hr, with clean and dry dressing. With 96% oxygen saturation on room air. Abdominal dressing clean dry and intact. With audible bowel sounds, tolerating full liquid diet, no complaints of nausea and vomiting, no abdominal pain. Advised patient to call for any assistance. CAll bell within reach. SCD On.  Bed alarm ON. Safety fall precaution maintained.Blood sugar monitored. Will continue to monitor patient.  0539H- Patient stable VS over the night, afebrile. No episodes of nausea and vomiting. Tolerated liquid diet, fluids ongoing at 150 cc/hr.  Free from fall. IV line patent.No BM yet. Will endorse patient to day shift Nurse.

## 2019-09-15 NOTE — PROGRESS NOTES
Vitals:    09/15/19 0728 09/15/19 0750 09/15/19 0813 09/15/19 1126   BP:   (!) 173/78 (!) 151/65   BP Location:       Patient Position:   Sitting Sitting   Pulse: 76 88 97 87   Resp:  17 19 19   Temp:   98.1 °F (36.7 °C) 98.6 °F (37 °C)   TempSrc:   Oral Oral   SpO2:  (!) 94%     Weight:       Height:         Labs stable  pasibng flatus    abd soft wound looks good    Will continue full liquid  Stop fluids will do diuresis

## 2019-09-15 NOTE — PLAN OF CARE
Problem: Adult Inpatient Plan of Care  Goal: Plan of Care Review  Outcome: Ongoing (interventions implemented as appropriate)  Plan of care reviewed with patient and family. Patient verbalized understanding. Fall precautions maintained. Bed in lowest position, call light within reach, 2x bed rails, pt wearing slip resistant socks, midline incision with staples open to air, LLQ dressing CDI dressing changed. Fluids discontinued. Patient notified to ask staff for assistance and pt verbalized complete understanding. Pt has no c/o pain at this time.Will continue to monitor.

## 2019-09-15 NOTE — NURSING
Informed by bedside nurse that patient is refusing to take BP medication and states that he would like to take his medication from home. Dr. Gardner notified and telephone order for non formulary placed. Bedside nurse notified.

## 2019-09-16 PROBLEM — E87.6 HYPOKALEMIA: Status: ACTIVE | Noted: 2019-09-16

## 2019-09-16 PROBLEM — R06.09 DOE (DYSPNEA ON EXERTION): Status: ACTIVE | Noted: 2019-09-16

## 2019-09-16 LAB
ALBUMIN SERPL BCP-MCNC: 2.1 G/DL (ref 3.5–5.2)
ALP SERPL-CCNC: 73 U/L (ref 55–135)
ALT SERPL W/O P-5'-P-CCNC: 10 U/L (ref 10–44)
ANION GAP SERPL CALC-SCNC: 6 MMOL/L (ref 8–16)
AST SERPL-CCNC: 20 U/L (ref 10–40)
BASOPHILS # BLD AUTO: 0.01 K/UL (ref 0–0.2)
BASOPHILS # BLD AUTO: 0.01 K/UL (ref 0–0.2)
BASOPHILS NFR BLD: 0.1 % (ref 0–1.9)
BASOPHILS NFR BLD: 0.1 % (ref 0–1.9)
BILIRUB SERPL-MCNC: 1.1 MG/DL (ref 0.1–1)
BUN SERPL-MCNC: 12 MG/DL (ref 8–23)
CALCIUM SERPL-MCNC: 8 MG/DL (ref 8.7–10.5)
CHLORIDE SERPL-SCNC: 100 MMOL/L (ref 95–110)
CO2 SERPL-SCNC: 33 MMOL/L (ref 23–29)
CREAT SERPL-MCNC: 0.9 MG/DL (ref 0.5–1.4)
DIFFERENTIAL METHOD: ABNORMAL
DIFFERENTIAL METHOD: ABNORMAL
EOSINOPHIL # BLD AUTO: 0.3 K/UL (ref 0–0.5)
EOSINOPHIL # BLD AUTO: 0.3 K/UL (ref 0–0.5)
EOSINOPHIL NFR BLD: 3.1 % (ref 0–8)
EOSINOPHIL NFR BLD: 3.1 % (ref 0–8)
ERYTHROCYTE [DISTWIDTH] IN BLOOD BY AUTOMATED COUNT: 14.6 % (ref 11.5–14.5)
ERYTHROCYTE [DISTWIDTH] IN BLOOD BY AUTOMATED COUNT: 14.6 % (ref 11.5–14.5)
EST. GFR  (AFRICAN AMERICAN): >60 ML/MIN/1.73 M^2
EST. GFR  (NON AFRICAN AMERICAN): >60 ML/MIN/1.73 M^2
GLUCOSE SERPL-MCNC: 161 MG/DL (ref 70–110)
HCT VFR BLD AUTO: 29.8 % (ref 40–54)
HCT VFR BLD AUTO: 29.8 % (ref 40–54)
HGB BLD-MCNC: 9.7 G/DL (ref 14–18)
HGB BLD-MCNC: 9.7 G/DL (ref 14–18)
LYMPHOCYTES # BLD AUTO: 1.2 K/UL (ref 1–4.8)
LYMPHOCYTES # BLD AUTO: 1.2 K/UL (ref 1–4.8)
LYMPHOCYTES NFR BLD: 11.7 % (ref 18–48)
LYMPHOCYTES NFR BLD: 11.7 % (ref 18–48)
MCH RBC QN AUTO: 28.1 PG (ref 27–31)
MCH RBC QN AUTO: 28.1 PG (ref 27–31)
MCHC RBC AUTO-ENTMCNC: 32.6 G/DL (ref 32–36)
MCHC RBC AUTO-ENTMCNC: 32.6 G/DL (ref 32–36)
MCV RBC AUTO: 86 FL (ref 82–98)
MCV RBC AUTO: 86 FL (ref 82–98)
MONOCYTES # BLD AUTO: 1.4 K/UL (ref 0.3–1)
MONOCYTES # BLD AUTO: 1.4 K/UL (ref 0.3–1)
MONOCYTES NFR BLD: 13.6 % (ref 4–15)
MONOCYTES NFR BLD: 13.6 % (ref 4–15)
NEUTROPHILS # BLD AUTO: 7.5 K/UL (ref 1.8–7.7)
NEUTROPHILS # BLD AUTO: 7.5 K/UL (ref 1.8–7.7)
NEUTROPHILS NFR BLD: 71.5 % (ref 38–73)
NEUTROPHILS NFR BLD: 71.5 % (ref 38–73)
PLATELET # BLD AUTO: 256 K/UL (ref 150–350)
PLATELET # BLD AUTO: 256 K/UL (ref 150–350)
PMV BLD AUTO: 10 FL (ref 9.2–12.9)
PMV BLD AUTO: 10 FL (ref 9.2–12.9)
POCT GLUCOSE: 154 MG/DL (ref 70–110)
POCT GLUCOSE: 171 MG/DL (ref 70–110)
POCT GLUCOSE: 221 MG/DL (ref 70–110)
POTASSIUM SERPL-SCNC: 3.1 MMOL/L (ref 3.5–5.1)
PROT SERPL-MCNC: 4.8 G/DL (ref 6–8.4)
RBC # BLD AUTO: 3.45 M/UL (ref 4.6–6.2)
RBC # BLD AUTO: 3.45 M/UL (ref 4.6–6.2)
SODIUM SERPL-SCNC: 139 MMOL/L (ref 136–145)
WBC # BLD AUTO: 10.61 K/UL (ref 3.9–12.7)
WBC # BLD AUTO: 10.61 K/UL (ref 3.9–12.7)

## 2019-09-16 PROCEDURE — 99222 PR INITIAL HOSPITAL CARE,LEVL II: ICD-10-PCS | Mod: ,,, | Performed by: INTERNAL MEDICINE

## 2019-09-16 PROCEDURE — 97110 THERAPEUTIC EXERCISES: CPT

## 2019-09-16 PROCEDURE — 80053 COMPREHEN METABOLIC PANEL: CPT

## 2019-09-16 PROCEDURE — 25000003 PHARM REV CODE 250: Performed by: SURGERY

## 2019-09-16 PROCEDURE — 63600175 PHARM REV CODE 636 W HCPCS: Performed by: SURGERY

## 2019-09-16 PROCEDURE — 25000003 PHARM REV CODE 250: Performed by: NURSE PRACTITIONER

## 2019-09-16 PROCEDURE — 11000001 HC ACUTE MED/SURG PRIVATE ROOM

## 2019-09-16 PROCEDURE — 85025 COMPLETE CBC W/AUTO DIFF WBC: CPT

## 2019-09-16 PROCEDURE — 99900035 HC TECH TIME PER 15 MIN (STAT)

## 2019-09-16 PROCEDURE — 97530 THERAPEUTIC ACTIVITIES: CPT

## 2019-09-16 PROCEDURE — S0028 INJECTION, FAMOTIDINE, 20 MG: HCPCS | Performed by: SURGERY

## 2019-09-16 PROCEDURE — 97116 GAIT TRAINING THERAPY: CPT

## 2019-09-16 PROCEDURE — 94761 N-INVAS EAR/PLS OXIMETRY MLT: CPT

## 2019-09-16 PROCEDURE — 99222 1ST HOSP IP/OBS MODERATE 55: CPT | Mod: ,,, | Performed by: INTERNAL MEDICINE

## 2019-09-16 PROCEDURE — 94664 DEMO&/EVAL PT USE INHALER: CPT

## 2019-09-16 RX ORDER — FAMOTIDINE 20 MG/1
20 TABLET, FILM COATED ORAL 2 TIMES DAILY
Status: DISCONTINUED | OUTPATIENT
Start: 2019-09-16 | End: 2019-09-17 | Stop reason: HOSPADM

## 2019-09-16 RX ORDER — FAMOTIDINE 10 MG/ML
20 INJECTION INTRAVENOUS 2 TIMES DAILY
Status: DISCONTINUED | OUTPATIENT
Start: 2019-09-16 | End: 2019-09-16

## 2019-09-16 RX ORDER — POTASSIUM CHLORIDE 20 MEQ/1
20 TABLET, EXTENDED RELEASE ORAL ONCE
Status: COMPLETED | OUTPATIENT
Start: 2019-09-16 | End: 2019-09-16

## 2019-09-16 RX ORDER — POTASSIUM CHLORIDE 20 MEQ/1
20 TABLET, EXTENDED RELEASE ORAL DAILY
Status: DISCONTINUED | OUTPATIENT
Start: 2019-09-16 | End: 2019-09-17 | Stop reason: HOSPADM

## 2019-09-16 RX ADMIN — POLYETHYLENE GLYCOL 3350 17 G: 17 POWDER, FOR SOLUTION ORAL at 08:09

## 2019-09-16 RX ADMIN — AZELASTINE HYDROCHLORIDE 137 MCG: 137 SPRAY, METERED NASAL at 08:09

## 2019-09-16 RX ADMIN — ENOXAPARIN SODIUM 40 MG: 100 INJECTION SUBCUTANEOUS at 05:09

## 2019-09-16 RX ADMIN — FAMOTIDINE 20 MG: 20 TABLET, FILM COATED ORAL at 08:09

## 2019-09-16 RX ADMIN — POTASSIUM CHLORIDE 20 MEQ: 20 TABLET, EXTENDED RELEASE ORAL at 01:09

## 2019-09-16 RX ADMIN — ATORVASTATIN CALCIUM 40 MG: 40 TABLET, FILM COATED ORAL at 08:09

## 2019-09-16 RX ADMIN — INSULIN ASPART 2 UNITS: 100 INJECTION, SOLUTION INTRAVENOUS; SUBCUTANEOUS at 05:09

## 2019-09-16 RX ADMIN — FAMOTIDINE 20 MG: 10 INJECTION, SOLUTION INTRAVENOUS at 08:09

## 2019-09-16 RX ADMIN — VALSARTAN AND HYDROCHLOROTHIAZIDE 1 TABLET: 320; 12.5 TABLET, FILM COATED ORAL at 08:09

## 2019-09-16 NOTE — PLAN OF CARE
Problem: Occupational Therapy Goal  Goal: Occupational Therapy Goal  Goals to be met by: 10/11     Patient will increase functional independence with ADLs by performing:    UE Dressing with Supervision.  LE Dressing with Stand-by Assistance.  Grooming while standing at sink with Supervision.--MET 09/13  Toileting from toilet with Supervision for hygiene and clothing management.   Toilet transfer to toilet with Stand-by Assistance.  Increased functional strength to WFL for ADLs.      Outcome: Ongoing (interventions implemented as appropriate)  Pt found UIC & agreeable to OT this AM. Pt perf the following: standing & amb w/in room & for extended distance outside room w/ SB-CGA; UB/LB TE in sitting/standing w/ & w/o 10sec isometric hold at mid-ROM w/ emph on deep/PLBing & scar stretching 1-2x5-20 reps ea. Edu/tx re: deep/PLBing, general safety tx, HEP. Pt verbalized understanding.    Pt w/ decreased overall endurance & increased SOB w/ mod exertion this date, but cont w/ good fxnl progress w/ cont OT per POC.

## 2019-09-16 NOTE — ASSESSMENT & PLAN NOTE
-chronic  -occurs with heavy exertion ie walking up stairs but never with minimal exertion  -denies orthopnea, PND or edema  -lungs clear on exam and no marked JVD present  -on IV Bumex daily but given chronicity of SOB with lack of acute decompensation on PE and advanced age would continue with HCTZ use only  -follow up with Dr. Lassiter after discharge

## 2019-09-16 NOTE — ASSESSMENT & PLAN NOTE
-K+ 3.1 this AM  -likely related to IV Bumex use  -on KDur 20mEq po daily; will give additional 20mEq this AM for total of 40mEq

## 2019-09-16 NOTE — PLAN OF CARE
Problem: Adult Inpatient Plan of Care  Goal: Plan of Care Review  Outcome: Revised  Chart review completed.

## 2019-09-16 NOTE — PLAN OF CARE
Problem: Adult Inpatient Plan of Care  Goal: Plan of Care Review  Outcome: Ongoing (interventions implemented as appropriate)  POC discussed with pt and family.Pt is awake and alert,oriented X4. No distress noted. Denies any pain. Continues to be NSR/ST on tele with HR in 's. Staples to abdomen dry and intact. Dressing changed to abdomen, patient tolerated well.Bed in lowest position. Safety/Fall precautions maintained. Call bell in reach. All questions answered. Verbalized understanding. Will continue to monitor.

## 2019-09-16 NOTE — PLAN OF CARE
Problem: Adult Inpatient Plan of Care  Goal: Plan of Care Review  Outcome: Ongoing (interventions implemented as appropriate)  Received patient upon rounds at 1901H, patient seen in bed sleeping, Easy to arouse, coherent to time, date, place, person and situation. GCS 15. No subjective complaint of any pain. With saline lock left arm gauge 20 with clean and dry dressing. With 96% oxygen saturation on room air. Abdominal dressing clean dry and intact. Midline post op site open to air, clean and dry. With audible bowel sounds, tolerating full liquid diet, no complaints of nausea and vomiting, no abdominal pain. Advised patient to call for any assistance. CAll bell within reach.. SCD On.  Bed alarm ON. Safety fall precaution maintained.Blood sugar monitored. Will continue to monitor patient  0620H- Patient stable VS over the night, afebrile. No episodes of nausea and vomiting. Tolerated liquid diet. Abdominal wound staples intact, clean and dry. left abdomen dressing clean dry intact.Free from fall. IV line patent.PAtient would like to have clarification from the Doctors regarding his medications on board anti-hypertensive and diuretic medication, will update main team in the morning. Will endorse patient to day shift Nurse.

## 2019-09-16 NOTE — PT/OT/SLP PROGRESS
Occupational Therapy   Treatment    Name: Husam Connolly  MRN: [id number]  Admitting Diagnosis:  Diverticulitis, intestine large  7 Days Post-Op    Recommendations:     Discharge Recommendations: home health PT, home health OT  Discharge Equipment Recommendations:  shower chair  Barriers to discharge:  None    Assessment:   Pt w/ decreased overall endurance & increased SOB w/ mod exertion this date, but cont w/ good fxnl progress w/ cont OT per POC.    Husam Connolly is a 88 y.o. male with a medical diagnosis of Diverticulitis, intestine large.  He presents with . Performance deficits affecting function are weakness, impaired endurance, impaired self care skills, gait instability, impaired functional mobilty, decreased upper extremity function, decreased ROM, impaired skin, edema, impaired cardiopulmonary response to activity.     Rehab Prognosis:  Good; patient would benefit from acute skilled OT services to address these deficits and reach maximum level of function.       Plan:     Patient to be seen 5 x/week to address the above listed problems via self-care/home management, therapeutic activities, therapeutic exercises  · Plan of Care Expires: 10/11/19  · Plan of Care Reviewed with: patient    Subjective     Pain/Comfort:  · Pain Rating 1: 0/10  · Pain Rating Post-Intervention 1: 0/10    Objective:     Communicated with: nsg prior to session.  Patient found up in chair with (chair alarm) upon OT entry to room.    General Precautions: Standard, fall   Orthopedic Precautions:N/A   Braces: N/A     Occupational Performance:     Bed Mobility:    ·      Functional Mobility/Transfers:  · Patient completed Sit <> Stand Transfer with supervision and stand by assistance  with  no assistive device   · Functional Mobility: w/in & for extended distance outside room w/o DME w/ SB-CGA    Activities of Daily Living:  ·       AMPAC 6 Click ADL: 19    Treatment & Education:  Pt found [facility] & agreeable to OT this AM. Pt perf the  following: standing & amb w/in room & for extended distance outside room w/ SB-CGA; UB/LB TE in sitting/standing w/ & w/o 10sec isometric hold at mid-ROM w/ emph on deep/PLBing & scar stretching 1-2x5-20 reps ea. [facility]/tx re: deep/PLBing, general safety tx, HEP. Pt verbalized understanding.    Patient left up in chair with all lines intact, call button in reach, chair alarm on and nsg notifiedEducation:      GOALS:   Multidisciplinary Problems     Occupational Therapy Goals        Problem: Occupational Therapy Goal    Goal Priority Disciplines Outcome Interventions   Occupational Therapy Goal     OT, PT/OT Ongoing (interventions implemented as appropriate)    Description:  Goals to be met by: 10/11     Patient will increase functional independence with ADLs by performing:    UE Dressing with Supervision.  LE Dressing with Stand-by Assistance.  Grooming while standing at sink with Supervision.--MET 09/13  Toileting from toilet with Supervision for hygiene and clothing management.   Toilet transfer to toilet with Stand-by Assistance.  Increased functional strength to WFL for ADLs.                       Time Tracking:     OT Date of Treatment: 09/16/19  OT Start Time: 0908  OT Stop Time: 0953  OT Total Time (min): 45 min    Billable Minutes:Therapeutic Activity 15  Therapeutic Exercise 15  Total Time 45--(time out for visit w/ Dr Gardner)    SIMON Gonzalez  9/16/2019

## 2019-09-16 NOTE — PT/OT/SLP PROGRESS
Physical Therapy Treatment    Patient Name:  Husam Connolly   MRN:  [id number]    Recommendations:     Discharge Recommendations:  home health PT, home health OT   Discharge Equipment Recommendations: shower chair   Barriers to discharge: mild instability with gait.    Assessment:     Husam Connolly is a 88 y.o. male admitted with a medical diagnosis of Diverticulitis, intestine large.  He presents with the following impairments/functional limitations:  weakness, impaired endurance, impaired functional mobilty, impaired self care skills, gait instability, impaired balance, decreased ROM, decreased lower extremity function, impaired cardiopulmonary response to activity, impaired skin.  Pt would continue to benefit from P.T. To address impairments listed above.    Rehab Prognosis: Good; patient would benefit from acute skilled PT services to address these deficits and reach maximum level of function.    Recent Surgery: Procedure(s) (LRB):  LAPAROTOMY, EXPLORATORY (N/A)  ANASTOMOSIS, COLO-RECTAL 7 Days Post-Op    Plan:     During this hospitalization, patient to be seen 6 x/week to address the identified rehab impairments via gait training, therapeutic activities, therapeutic exercises and progress toward the following goals:    · Plan of Care Expires:  10/11/19    Subjective       Patient/Family Comments/goals: Pt agreed to tx.  Pain/Comfort:  Pain Rating 1: 0/10  Pain Rating Post-Intervention 1: 0/10      Objective:     Communicated with RN prior to session.  Patient found up in chair with (chair alarm) upon PT entry to room.     General Precautions: Standard, fall   Orthopedic Precautions:N/A   Braces:       Functional Mobility:  · Transfers:     · Sit to Stand:  stand by assistance and contact guard assistance with no AD  · Gait: 100ft x 2 and 150ft x 2 with CGA.  Pt had one bout of instability to the right, but was able to self correct with close CGA.  Pt ambulated today with a safer, slower pace.  Rest breaks  between bouts secondary to mild SOB.  PTA attempted 3x to get an O2 sat reading on three different machines, but was unable to secondary to pt's fingers being cold.  PTA tried to warm pt's hands/fingers, but was still unable to get a reading. Nsg was informed that an O2 sat reading would be helpful with gait secondary to pt demonstrating mild/moderate SOB during rest breaks.  · Balance: sitting good, standing fair, gait fair      AM-PAC 6 CLICK MOBILITY  Turning over in bed (including adjusting bedclothes, sheets and blankets)?: 3  Sitting down on and standing up from a chair with arms (e.g., wheelchair, bedside commode, etc.): 3  Moving from lying on back to sitting on the side of the bed?: 3  Moving to and from a bed to a chair (including a wheelchair)?: 3  Need to walk in hospital room?: 3  Climbing 3-5 steps with a railing?: 1  Basic Mobility Total Score: 16       Therapeutic Activities and Exercises:   Seated BLE therex: AP, LAQ, hip flexion/ABd/ADD with pillow x 15 reps.  Marching in place holding onto PTA's hands out in front x 15 reps with rest break after exercise secondary to mild SOB.  Static standing while attempting to get O2 sat reading with CGA/SBA.    Patient left up in chair with all lines intact, call button in reach, chair alarm on, RN notified and family present..    GOALS:   Multidisciplinary Problems     Physical Therapy Goals        Problem: Physical Therapy Goal    Goal Priority Disciplines Outcome Goal Variances Interventions   Physical Therapy Goal     PT, PT/OT Ongoing (interventions implemented as appropriate)     Description:  Goals to be met by: 10/11/2019     Patient will increase functional independence with mobility by performin. Supine to sit with Modified [place]  2. Sit to supine with Modified [place]  3. Rolling with Modified [place].  4. Sit to stand transfer with Stand-by Assistance/S  5. Bed to chair transfer with Stand-by Assistance/S with or without  AD  6. Gait  x 400ft feet with SBA/S with or without AD  7. Ascend/descend 2 stair with 0 Handrails Stand-by Assistance/S with or without AD   8. Lower extremity exercise program x10 reps with independence                      Time Tracking:     PT Received On: 09/16/19  PT Start Time: 1230     PT Stop Time: 1257  PT Total Time (min): 27 min     Billable Minutes: Gait Training 15 and Therapeutic Activity 12      Treatment Type: Treatment  PT/PTA: PTA     PTA Visit Number: 3     Lilian Parr PTA  09/16/2019

## 2019-09-16 NOTE — SUBJECTIVE & OBJECTIVE
Past Medical History:   Diagnosis Date    Anticoagulant long-term use     Bowel obstruction     BPH (benign prostatic hyperplasia)     Diabetes mellitus     Diverticulitis     Hyperlipidemia     Hypertension        Past Surgical History:   Procedure Laterality Date    ANASTOMOSIS, COLO-RECTAL  9/9/2019    [facility]    CREATION, COLOSTOMY  6/16/2019    [facility]    EYE SURGERY      GISELA PROCEDURE  6/16/2019    [facility]    LAPAROTOMY, EXPLORATORY N/A 9/9/2019    [facility]    LAPAROTOMY, EXPLORATORY N/A 6/16/2019    [facility]    TONSILLECTOMY         Review of patient's allergies indicates:   Allergen Reactions    Solarcaine [benzocaine-triclosan]     Sulfa (sulfonamide antibiotics)     Hytrin [terazosin] Rash    Smz-tmp ds [sulfamethoxazole-trimethoprim] Rash       No current facility-administered medications on file prior to encounter.      Current Outpatient Medications on File Prior to Encounter   Medication Sig    aspirin (ECOTRIN) 81 MG EC tablet Take 81 mg by mouth once daily.    atorvastatin (LIPITOR) 40 MG tablet TAKE 1 TABLET BY MOUTH EVERY DAY    azelastine (ASTELIN) 137 mcg (0.1 %) nasal spray 1-2 SPRAYS INTO EACH NOSTRIL TWICE A DAY    famciclovir (FAMVIR) 500 MG tablet Take 500 mg by mouth 3 (three) times daily as needed.    finasteride (PROSCAR) 5 mg tablet TAKE 1 TABLET BY MOUTH EVERY DAY    metFORMIN (GLUCOPHAGE) 500 MG tablet Take 500 mg by mouth once daily.    multivitamin capsule Take 1 capsule by mouth once daily.    omeprazole (PRILOSEC) 20 MG capsule Take 1 capsule (20 mg total) by mouth once daily.    tamsulosin (FLOMAX) 0.4 mg Cap Take 1 capsule by mouth nightly.    valsartan-hydrochlorothiazide (DIOVAN-HCT) 320-12.5 mg per tablet TAKE 1 TABLET BY MOUTH EVERY DAY     Family  History     Problem Relation (Age of Onset)    Cancer Mother    Diabetes Paternal Aunt    Heart disease Father    Hypertension Father        Tobacco Use    Smoking status: Former Smoker     Packs/day: 1.00     Years: 40.00     Pack years: 40.00    Smokeless tobacco: Never Used   Substance and Sexual Activity    Alcohol use: Yes     Alcohol/week: 1.2 oz     Types: 2 Glasses of wine per week     Comment: drinks occassionally    Drug use: No    Sexual activity: Yes     Partners: Female     Review of Systems   Constitution: Negative for chills, decreased appetite, diaphoresis and fever.   Cardiovascular: Positive for dyspnea on exertion (chronic ). Negative for chest pain, claudication, cyanosis, irregular heartbeat, leg swelling, near-syncope, orthopnea, palpitations, paroxysmal nocturnal dyspnea and syncope.   Respiratory: Negative for cough, hemoptysis, shortness of breath and wheezing.    Gastrointestinal: Negative for bloating, abdominal pain, constipation, diarrhea, melena, nausea and vomiting.   Neurological: Negative for dizziness and weakness.     Objective:     Vital Signs (Most Recent):  Temp: 96.4 °F (35.8 °C) (09/16/19 1120)  Pulse: 96 (09/16/19 1120)  Resp: 19 (09/16/19 1120)  BP: (!) 148/65 (09/16/19 1120)  SpO2: (!) 90 % (09/16/19 1120) Vital Signs (24h Range):  Temp:  [96.4 °F (35.8 °C)-99.9 °F (37.7 °C)] 96.4 °F (35.8 °C)  Pulse:  [77-99] 96  Resp:  [16-19] 19  SpO2:  [90 %-94 %] 90 %  BP: (134-149)/(61-67) 148/65     Weight: 77.7 kg (171 lb 4.8 oz)  Body mass index is 24.58 kg/m².    SpO2: (!) 90 %  O2 Device (Oxygen Therapy): room air      Intake/Output Summary (Last 24 hours) at 9/16/2019 1242  Last data filed at 9/16/2019 1015  Gross per 24 hour   Intake 200 ml   Output 1425 ml   Net -1225 ml       Lines/Drains/Airways     Peripheral Intravenous Line                 Peripheral IV - Single Lumen 09/13/19 1107 22 G Anterior;Left Forearm 3 days                Physical Exam   Constitutional: He is  oriented to person, place, and time. He appears well-developed and well-nourished. No distress.   Cardiovascular: Normal rate and regular rhythm. Exam reveals no gallop.   No murmur heard.  Pulmonary/Chest: Effort normal and breath sounds normal. No respiratory distress. He has no wheezes.   Abdominal: Soft. Bowel sounds are normal. He exhibits no distension. There is no tenderness.   Neurological: He is alert and oriented to person, place, and time.   Skin: Skin is warm and dry.       Significant Labs:     Recent Labs   Lab 09/16/19  0433      K 3.1*      CO2 33*   BUN 12   CREATININE 0.9     Recent Labs   Lab 09/16/19  0433   WBC 10.61  10.61   RBC 3.45*  3.45*   HGB 9.7*  9.7*   HCT 29.8*  29.8*     256   MCV 86  86   MCH 28.1  28.1   MCHC 32.6  32.6

## 2019-09-16 NOTE — PLAN OF CARE
Problem: Physical Therapy Goal  Goal: Physical Therapy Goal  Goals to be met by: 10/11/2019     Patient will increase functional independence with mobility by performin. Supine to sit with Modified [place]  2. Sit to supine with Modified [place]  3. Rolling with Modified [place].  4. Sit to stand transfer with Stand-by Assistance/S  5. Bed to chair transfer with Stand-by Assistance/S with or without AD  6. Gait  x 400ft feet with SBA/S with or without AD  7. Ascend/descend 2 stair with 0 Handrails Stand-by Assistance/S with or without AD   8. Lower extremity exercise program x10 reps with independence     Outcome: Ongoing (interventions implemented as appropriate)  Continue working toward goals.

## 2019-09-16 NOTE — PROGRESS NOTES
Pharmacist Intervention IV to PO Note    Husam Connolly is a 88 y.o. male being treated with IV medication famotidine    Patient Data:    Vital Signs (Most Recent):  Temp: 98.8 °F (37.1 °C) (09/16/19 0800)  Pulse: 84 (09/16/19 0800)  Resp: 18 (09/16/19 0800)  BP: (!) 149/66 (09/16/19 0800)  SpO2: (!) 92 % (09/16/19 0855)   Vital Signs (72h Range):  Temp:  [97.2 °F (36.2 °C)-100.1 °F (37.8 °C)]   Pulse:  []   Resp:  [16-19]   BP: (127-180)/(58-79)   SpO2:  [92 %-95 %]      CBC:  Recent Labs   Lab 09/14/19  0551 09/15/19  0419 09/16/19  0433   WBC 9.68 11.11  11.11 10.61  10.61   RBC 3.52* 3.65*  3.65* 3.45*  3.45*   HGB 10.0* 10.4*  10.4* 9.7*  9.7*   HCT 30.3* 31.9*  31.9* 29.8*  29.8*    233  233 256  256   MCV 86 87  87 86  86   MCH 28.4 28.5  28.5 28.1  28.1   MCHC 33.0 32.6  32.6 32.6  32.6     CMP:     Recent Labs   Lab 09/11/19  0353 09/15/19  0419 09/16/19  0433   * 149* 161*   CALCIUM 8.3* 8.5* 8.0*   ALBUMIN  --  2.4* 2.1*   PROT  --  5.4* 4.8*    140 139   K 4.3 3.9 3.1*   CO2 21* 26 33*    105 100   BUN 20 14 12   CREATININE 1.4 0.8 0.9   ALKPHOS  --  80 73   ALT  --  11 10   AST  --  25 20   BILITOT  --  1.5* 1.1*       Dietary Orders:  Diet Orders            Diet full liquid: Full Liquid starting at 09/14 0730            Based on the following criteria, this patient qualifies for intravenous to oral conversion:  [x] The patients gastrointestinal tract is functioning (tolerating medications via oral or enteral route for 24 hours and tolerating food or enteral feeds for 24 hours).  [x] The patient is hemodynamically stable for 24 hours (heart rate <100 beats per minute, systolic blood pressure >99 mm Hg, and respiratory rate <20 breaths per minute).  [x] The patient shows clinical improvement (afebrile for at least 24 hours and white blood cell count downtrending or normalized). Additionally, the patient must be non-neutropenic (absolute neutrophil count  >500 cells/mm3).  [x] For antimicrobials, the patient has received IV therapy for at least 24 hours.    IV medication famotidine will be changed to oral medication famotidine    Pharmacist's Name: Farhad Marrufo  Pharmacist's Extension: [phone]

## 2019-09-16 NOTE — PROGRESS NOTES
"Surgery follow up  BP (!) 149/66   Pulse 84   Temp 98.8 °F (37.1 °C) (Oral)   Resp 18   Ht 5' 10" (1.778 m)   Wt 77.7 kg (171 lb 4.8 oz)   SpO2 (!) 92%   BMI 24.58 kg/m²   I/O last 3 completed shifts:  In: 1072.9 [P.O.:450; I.V.:622.9]  Out: 2125 [Urine:2125]  No intake/output data recorded.  Recent Results (from the past 336 hour(s))   CBC auto differential    Collection Time: 09/16/19  4:33 AM   Result Value Ref Range    WBC 10.61 3.90 - 12.70 K/uL    Hemoglobin 9.7 (L) 14.0 - 18.0 g/dL    Hematocrit 29.8 (L) 40.0 - 54.0 %    Platelets 256 150 - 350 K/uL   CBC auto differential    Collection Time: 09/16/19  4:33 AM   Result Value Ref Range    WBC 10.61 3.90 - 12.70 K/uL    Hemoglobin 9.7 (L) 14.0 - 18.0 g/dL    Hematocrit 29.8 (L) 40.0 - 54.0 %    Platelets 256 150 - 350 K/uL   CBC auto differential    Collection Time: 09/15/19  4:19 AM   Result Value Ref Range    WBC 11.11 3.90 - 12.70 K/uL    Hemoglobin 10.4 (L) 14.0 - 18.0 g/dL    Hematocrit 31.9 (L) 40.0 - 54.0 %    Platelets 233 150 - 350 K/uL     Recent Results (from the past 336 hour(s))   Basic metabolic panel    Collection Time: 09/11/19  3:53 AM   Result Value Ref Range    Sodium 137 136 - 145 mmol/L    Potassium 4.3 3.5 - 5.1 mmol/L    Chloride 106 95 - 110 mmol/L    CO2 21 (L) 23 - 29 mmol/L    BUN, Bld 20 8 - 23 mg/dL    Creatinine 1.4 0.5 - 1.4 mg/dL    Calcium 8.3 (L) 8.7 - 10.5 mg/dL    Anion Gap 10 8 - 16 mmol/L   Basic metabolic panel    Collection Time: 09/09/19  2:28 PM   Result Value Ref Range    Sodium 140 136 - 145 mmol/L    Potassium 3.5 3.5 - 5.1 mmol/L    Chloride 108 95 - 110 mmol/L    CO2 17 (L) 23 - 29 mmol/L    BUN, Bld 21 8 - 23 mg/dL    Creatinine 1.3 0.5 - 1.4 mg/dL    Calcium 8.1 (L) 8.7 - 10.5 mg/dL    Anion Gap 15 8 - 16 mmol/L   k low this am, will start on po kdur  Start soft diet  Possible d/c in am, will consult DR Lassiter , patient known to him    "

## 2019-09-16 NOTE — PLAN OF CARE
Rounded on patient  Patient sitting up in chair  Diet being advanced    Future Appointments   Date Time Provider Department Center   9/26/2019  1:15 PM [facility]   11/19/2019  1:00 PM Daren Lassiter MD [facility]        09/16/19 1010   Discharge Reassessment   Assessment Type Discharge Planning Reassessment   Provided patient/caregiver education on the expected discharge date and the discharge plan Yes   Do you have any problems affording any of your prescribed medications? No   Discharge Plan A Home;Home with family   Discharge Plan B Home;Home with family;Home Health     Mandy Ko, RN, CCM, CMSRN  RN Transition Navigator  [phone]

## 2019-09-16 NOTE — PROGRESS NOTES
Pharmacist Renal Dose Adjustment Note    Husam Connolly is a 88 y.o. male being treated with the medication famotidine    Patient Data:    Vital Signs (Most Recent):  Temp: 98.5 °F (36.9 °C) (09/16/19 0442)  Pulse: 77 (09/16/19 0442)  Resp: 17 (09/16/19 0442)  BP: 134/61 (09/16/19 0442)  SpO2: (!) 92 % (09/16/19 0402)   Vital Signs (72h Range):  Temp:  [97.2 °F (36.2 °C)-100.1 °F (37.8 °C)]   Pulse:  []   Resp:  [16-19]   BP: (127-180)/(58-79)   SpO2:  [92 %-98 %]      Recent Labs   Lab 09/11/19  0353 09/15/19  0419 09/16/19 0433   CREATININE 1.4 0.8 0.9     Serum creatinine: 0.9 mg/dL 09/16/19 0433  Estimated creatinine clearance: 58.6 mL/min    Medication:famotidine dose: 20mg frequency daily will be changed to medication:famotidine  dose:20mg frequency:bid    Pharmacist's Name: Rogelio Gentile  Pharmacist's Extension: [phone]

## 2019-09-16 NOTE — CONSULTS
[facility]  Cardiology  Consult Note    Patient Name: Husam Connolly  MRN: [id number]  Admission Date: 9/9/2019  Hospital Length of Stay: 7 days  Code Status: Full Code   Attending Provider: Fouzia Gardner MD   Consulting Provider: FIDEL Luna ANP  Primary Care Physician: Toro Seaman MD  Principal Problem:Diverticulitis, intestine large    Patient information was obtained from patient, spouse/SO, relative(s) and past medical records.     Inpatient consult to [facility]  Consult performed by: FIDEL Jensen, ADALBERTO  Consult ordered by: Fouzia Gardner MD  Reason for consult: HTN and HLP         Subjective:     Chief Complaint:  Elective colostomy reversal      HPI:   87yo male with history of HTN, HLP, DMII and CKD stage III who was admitted for elective reversal of colostomy on 9/9/2019. He was admitted in June with abdominal pain and was diagnosed with SBP and underwent left colon resection of strictured colon with colostomy by Dr. Gardner. He did well post operatively and was discharged home with plans for reversal at later date. He has been doing well since his recent surgery and is tolerating a full diet and is hopeful to be discharged tomorrow. He complains of SOB with heavy exertion ie walking up 16 stairs at home that will resolve after about 45 seconds. He reports the SOB does not occur everyday and he has good and bad days. He denies any worsening of symptoms and denies any SOB with minimal exertion ie walking around his house. He denies any orthopnea, PND or edema. He takes Diovan HCTZ at home with good tolerance. He was given a dose of Bumex while admitted and reports he refused any further doses due to excessive/increased urine output. Cardiology was consulted since he is familiar to Dr. Lassiter and to determine if further work up needed. His labs today show CBC with H&H 9.7&29.8 and CMP with K+ 3.1 BUN 12 creatinine .9 (down from 1.4 9/11)   GFR >60 (up from 45 9/11) total bili 1.1 down from 1.5 with alk phos 73 AST 20 ALT 10. HIs HR is 70s-90s with BP 130s-150s/60s. He is positive 11.5 liters since admisison     Hospital Course: see HPI   Past Medical History:   Diagnosis Date    Anticoagulant long-term use     Bowel obstruction     BPH (benign prostatic hyperplasia)     Diabetes mellitus     Diverticulitis     Hyperlipidemia     Hypertension        Past Surgical History:   Procedure Laterality Date    ANASTOMOSIS, COLO-RECTAL  9/9/2019    [facility]    CREATION, COLOSTOMY  6/16/2019    [facility]    EYE SURGERY      GISELA PROCEDURE  6/16/2019    [facility]    LAPAROTOMY, EXPLORATORY N/A 9/9/2019    [facility]    LAPAROTOMY, EXPLORATORY N/A 6/16/2019    [facility]    TONSILLECTOMY         Review of patient's allergies indicates:   Allergen Reactions    Solarcaine [benzocaine-triclosan]     Sulfa (sulfonamide antibiotics)     Hytrin [terazosin] Rash    Smz-tmp ds [sulfamethoxazole-trimethoprim] Rash       No current facility-administered medications on file prior to encounter.      Current Outpatient Medications on File Prior to Encounter   Medication Sig    aspirin (ECOTRIN) 81 MG EC tablet Take 81 mg by mouth once daily.    atorvastatin (LIPITOR) 40 MG tablet TAKE 1 TABLET BY MOUTH EVERY DAY    azelastine (ASTELIN) 137 mcg (0.1 %) nasal spray 1-2 SPRAYS INTO EACH NOSTRIL TWICE A DAY    famciclovir (FAMVIR) 500 MG tablet Take 500 mg by mouth 3 (three) times daily as needed.    finasteride (PROSCAR) 5 mg tablet TAKE 1 TABLET BY MOUTH EVERY DAY    metFORMIN (GLUCOPHAGE) 500 MG tablet Take 500 mg by mouth once daily.    multivitamin capsule Take 1 capsule by mouth once daily.    omeprazole (PRILOSEC) 20 MG capsule Take 1 capsule (20 mg total) by  mouth once daily.    tamsulosin (FLOMAX) 0.4 mg Cap Take 1 capsule by mouth nightly.    valsartan-hydrochlorothiazide (DIOVAN-HCT) 320-12.5 mg per tablet TAKE 1 TABLET BY MOUTH EVERY DAY     Family History     Problem Relation (Age of Onset)    Cancer Mother    Diabetes Paternal Aunt    Heart disease Father    Hypertension Father        Tobacco Use    Smoking status: Former Smoker     Packs/day: 1.00     Years: 40.00     Pack years: 40.00    Smokeless tobacco: Never Used   Substance and Sexual Activity    Alcohol use: Yes     Alcohol/week: 1.2 oz     Types: 2 Glasses of wine per week     Comment: drinks occassionally    Drug use: No    Sexual activity: Yes     Partners: Female     Review of Systems   Constitution: Negative for chills, decreased appetite, diaphoresis and fever.   Cardiovascular: Positive for dyspnea on exertion (chronic ). Negative for chest pain, claudication, cyanosis, irregular heartbeat, leg swelling, near-syncope, orthopnea, palpitations, paroxysmal nocturnal dyspnea and syncope.   Respiratory: Negative for cough, hemoptysis, shortness of breath and wheezing.    Gastrointestinal: Negative for bloating, abdominal pain, constipation, diarrhea, melena, nausea and vomiting.   Neurological: Negative for dizziness and weakness.     Objective:     Vital Signs (Most Recent):  Temp: 96.4 °F (35.8 °C) (09/16/19 1120)  Pulse: 96 (09/16/19 1120)  Resp: 19 (09/16/19 1120)  BP: (!) 148/65 (09/16/19 1120)  SpO2: (!) 90 % (09/16/19 1120) Vital Signs (24h Range):  Temp:  [96.4 °F (35.8 °C)-99.9 °F (37.7 °C)] 96.4 °F (35.8 °C)  Pulse:  [77-99] 96  Resp:  [16-19] 19  SpO2:  [90 %-94 %] 90 %  BP: (134-149)/(61-67) 148/65     Weight: 77.7 kg (171 lb 4.8 oz)  Body mass index is 24.58 kg/m².    SpO2: (!) 90 %  O2 Device (Oxygen Therapy): room air      Intake/Output Summary (Last 24 hours) at 9/16/2019 1242  Last data filed at 9/16/2019 1015  Gross per 24 hour   Intake 200 ml   Output 1425 ml   Net -1225 ml        Lines/Drains/Airways     Peripheral Intravenous Line                 Peripheral IV - Single Lumen 09/13/19 1107 22 G Anterior;Left Forearm 3 days                Physical Exam   Constitutional: He is oriented to person, place, and time. He appears well-developed and well-nourished. No distress.   Cardiovascular: Normal rate and regular rhythm. Exam reveals no gallop.   No murmur heard.  Pulmonary/Chest: Effort normal and breath sounds normal. No respiratory distress. He has no wheezes.   Abdominal: Soft. Bowel sounds are normal. He exhibits no distension. There is no tenderness.   Neurological: He is alert and oriented to person, place, and time.   Skin: Skin is warm and dry.       Significant Labs:     Recent Labs   Lab 09/16/19  0433      K 3.1*      CO2 33*   BUN 12   CREATININE 0.9     Recent Labs   Lab 09/16/19  0433   WBC 10.61  10.61   RBC 3.45*  3.45*   HGB 9.7*  9.7*   HCT 29.8*  29.8*     256   MCV 86  86   MCH 28.1  28.1   MCHC 32.6  32.6           Assessment and Plan:     ARREDONDO (dyspnea on exertion)  -chronic  -occurs with heavy exertion ie walking up stairs but never with minimal exertion  -denies orthopnea, PND or edema  -lungs clear on exam and no marked JVD present  -on IV Bumex daily but given chronicity of SOB with lack of acute decompensation on PE and advanced age would continue with HCTZ use only  -follow up with Dr. Lassiter after discharge     Hypokalemia  -K+ 3.1 this AM  -likely related to IV Bumex use  -on KDur 20mEq po daily; will give additional 20mEq this AM for total of 40mEq     Anticoagulant long-term use  -on ASA only   -no history of PE/DVT, CAD/PCI or afib  -resume ASA when cleared by surgery     Hypertension  -stable  -SBP 130s-150s  -on Diovan HCTZ at home and recommend continuation upon discharge         VTE Risk Mitigation (From admission, onward)        Ordered     enoxaparin injection 40 mg  Daily      09/15/19 1145     IP VTE HIGH RISK PATIENT   Once      09/09/19 1647     Place AUSTEN hose  Until discontinued      09/09/19 1647     Place sequential compression device  Until discontinued      09/09/19 1647          Thank you for your consult.     FIDEL Luna, ANP  Cardiology   [facility]

## 2019-09-16 NOTE — HPI
87yo male with history of HTN, HLP, DMII and CKD stage III who was admitted for elective reversal of colostomy on 9/9/2019. He was admitted in June with abdominal pain and was diagnosed with SBP and underwent left colon resection of strictured colon with colostomy by Dr. Gardner. He did well post operatively and was discharged home with plans for reversal at later date. He has been doing well since his recent surgery and is tolerating a full diet and is hopeful to be discharged tomorrow. He complains of SOB with heavy exertion ie walking up 16 stairs at home that will resolve after about 45 seconds. He reports the SOB does not occur everyday and he has good and bad days. He denies any worsening of symptoms and denies any SOB with minimal exertion ie walking around his house. He denies any orthopnea, PND or edema. He takes Diovan HCTZ at home with good tolerance. He was given a dose of Bumex while admitted and reports he refused any further doses due to excessive/increased urine output. Cardiology was consulted since he is familiar to Dr. Lassiter and to determine if further work up needed. His labs today show CBC with H&H 9.7&29.8 and CMP with K+ 3.1 BUN 12 creatinine .9 (down from 1.4 9/11)  GFR >60 (up from 45 9/11) total bili 1.1 down from 1.5 with alk phos 73 AST 20 ALT 10. HIs HR is 70s-90s with BP 130s-150s/60s. He is positive 11.5 liters since admisison

## 2019-09-17 VITALS
HEIGHT: 70 IN | DIASTOLIC BLOOD PRESSURE: 59 MMHG | BODY MASS INDEX: 24.34 KG/M2 | WEIGHT: 170 LBS | SYSTOLIC BLOOD PRESSURE: 129 MMHG | HEART RATE: 80 BPM | OXYGEN SATURATION: 95 % | TEMPERATURE: 99 F | RESPIRATION RATE: 18 BRPM

## 2019-09-17 LAB
ALBUMIN SERPL BCP-MCNC: 2.2 G/DL (ref 3.5–5.2)
ALP SERPL-CCNC: 81 U/L (ref 55–135)
ALT SERPL W/O P-5'-P-CCNC: 9 U/L (ref 10–44)
ANION GAP SERPL CALC-SCNC: 8 MMOL/L (ref 8–16)
AST SERPL-CCNC: 20 U/L (ref 10–40)
BASOPHILS # BLD AUTO: 0.02 K/UL (ref 0–0.2)
BASOPHILS # BLD AUTO: 0.02 K/UL (ref 0–0.2)
BASOPHILS NFR BLD: 0.2 % (ref 0–1.9)
BASOPHILS NFR BLD: 0.2 % (ref 0–1.9)
BILIRUB SERPL-MCNC: 1 MG/DL (ref 0.1–1)
BUN SERPL-MCNC: 15 MG/DL (ref 8–23)
CALCIUM SERPL-MCNC: 8.4 MG/DL (ref 8.7–10.5)
CHLORIDE SERPL-SCNC: 100 MMOL/L (ref 95–110)
CO2 SERPL-SCNC: 32 MMOL/L (ref 23–29)
CREAT SERPL-MCNC: 1 MG/DL (ref 0.5–1.4)
DIFFERENTIAL METHOD: ABNORMAL
DIFFERENTIAL METHOD: ABNORMAL
EOSINOPHIL # BLD AUTO: 0.2 K/UL (ref 0–0.5)
EOSINOPHIL # BLD AUTO: 0.2 K/UL (ref 0–0.5)
EOSINOPHIL NFR BLD: 1.9 % (ref 0–8)
EOSINOPHIL NFR BLD: 1.9 % (ref 0–8)
ERYTHROCYTE [DISTWIDTH] IN BLOOD BY AUTOMATED COUNT: 14.5 % (ref 11.5–14.5)
ERYTHROCYTE [DISTWIDTH] IN BLOOD BY AUTOMATED COUNT: 14.5 % (ref 11.5–14.5)
EST. GFR  (AFRICAN AMERICAN): >60 ML/MIN/1.73 M^2
EST. GFR  (NON AFRICAN AMERICAN): >60 ML/MIN/1.73 M^2
GLUCOSE SERPL-MCNC: 164 MG/DL (ref 70–110)
HCT VFR BLD AUTO: 30.8 % (ref 40–54)
HCT VFR BLD AUTO: 30.8 % (ref 40–54)
HGB BLD-MCNC: 9.9 G/DL (ref 14–18)
HGB BLD-MCNC: 9.9 G/DL (ref 14–18)
LYMPHOCYTES # BLD AUTO: 1.4 K/UL (ref 1–4.8)
LYMPHOCYTES # BLD AUTO: 1.4 K/UL (ref 1–4.8)
LYMPHOCYTES NFR BLD: 11.9 % (ref 18–48)
LYMPHOCYTES NFR BLD: 11.9 % (ref 18–48)
MCH RBC QN AUTO: 28 PG (ref 27–31)
MCH RBC QN AUTO: 28 PG (ref 27–31)
MCHC RBC AUTO-ENTMCNC: 32.1 G/DL (ref 32–36)
MCHC RBC AUTO-ENTMCNC: 32.1 G/DL (ref 32–36)
MCV RBC AUTO: 87 FL (ref 82–98)
MCV RBC AUTO: 87 FL (ref 82–98)
MONOCYTES # BLD AUTO: 1.4 K/UL (ref 0.3–1)
MONOCYTES # BLD AUTO: 1.4 K/UL (ref 0.3–1)
MONOCYTES NFR BLD: 11.7 % (ref 4–15)
MONOCYTES NFR BLD: 11.7 % (ref 4–15)
NEUTROPHILS # BLD AUTO: 8.7 K/UL (ref 1.8–7.7)
NEUTROPHILS # BLD AUTO: 8.7 K/UL (ref 1.8–7.7)
NEUTROPHILS NFR BLD: 74.3 % (ref 38–73)
NEUTROPHILS NFR BLD: 74.3 % (ref 38–73)
PLATELET # BLD AUTO: 297 K/UL (ref 150–350)
PLATELET # BLD AUTO: 297 K/UL (ref 150–350)
PMV BLD AUTO: 10.4 FL (ref 9.2–12.9)
PMV BLD AUTO: 10.4 FL (ref 9.2–12.9)
POCT GLUCOSE: 158 MG/DL (ref 70–110)
POCT GLUCOSE: 176 MG/DL (ref 70–110)
POTASSIUM SERPL-SCNC: 3.3 MMOL/L (ref 3.5–5.1)
PROT SERPL-MCNC: 4.9 G/DL (ref 6–8.4)
RBC # BLD AUTO: 3.54 M/UL (ref 4.6–6.2)
RBC # BLD AUTO: 3.54 M/UL (ref 4.6–6.2)
SODIUM SERPL-SCNC: 140 MMOL/L (ref 136–145)
WBC # BLD AUTO: 11.8 K/UL (ref 3.9–12.7)
WBC # BLD AUTO: 11.8 K/UL (ref 3.9–12.7)

## 2019-09-17 PROCEDURE — 36415 COLL VENOUS BLD VENIPUNCTURE: CPT

## 2019-09-17 PROCEDURE — 97110 THERAPEUTIC EXERCISES: CPT

## 2019-09-17 PROCEDURE — 25000003 PHARM REV CODE 250: Performed by: SURGERY

## 2019-09-17 PROCEDURE — 80053 COMPREHEN METABOLIC PANEL: CPT

## 2019-09-17 PROCEDURE — 85025 COMPLETE CBC W/AUTO DIFF WBC: CPT

## 2019-09-17 PROCEDURE — 94664 DEMO&/EVAL PT USE INHALER: CPT

## 2019-09-17 PROCEDURE — 97116 GAIT TRAINING THERAPY: CPT

## 2019-09-17 PROCEDURE — 99900035 HC TECH TIME PER 15 MIN (STAT)

## 2019-09-17 PROCEDURE — 94761 N-INVAS EAR/PLS OXIMETRY MLT: CPT

## 2019-09-17 PROCEDURE — 97535 SELF CARE MNGMENT TRAINING: CPT

## 2019-09-17 RX ORDER — POTASSIUM CHLORIDE 20 MEQ/1
20 TABLET, EXTENDED RELEASE ORAL DAILY
Qty: 20 TABLET | Refills: 1 | Status: SHIPPED | OUTPATIENT
Start: 2019-09-18 | End: 2019-11-19

## 2019-09-17 RX ADMIN — AZELASTINE HYDROCHLORIDE 137 MCG: 137 SPRAY, METERED NASAL at 08:09

## 2019-09-17 RX ADMIN — FAMOTIDINE 20 MG: 20 TABLET, FILM COATED ORAL at 08:09

## 2019-09-17 RX ADMIN — ATORVASTATIN CALCIUM 40 MG: 40 TABLET, FILM COATED ORAL at 08:09

## 2019-09-17 RX ADMIN — POTASSIUM CHLORIDE 20 MEQ: 20 TABLET, EXTENDED RELEASE ORAL at 08:09

## 2019-09-17 RX ADMIN — VALSARTAN AND HYDROCHLOROTHIAZIDE 1 TABLET: 320; 12.5 TABLET, FILM COATED ORAL at 08:09

## 2019-09-17 NOTE — PT/OT/SLP PROGRESS
"Occupational Therapy   Treatment    Name: Husam Connolly  MRN: [id number]  Admitting Diagnosis:  Diverticulitis, intestine large  8 Days Post-Op    Recommendations:     Discharge Recommendations: home health PT, home health OT  Discharge Equipment Recommendations:  shower chair  Barriers to discharge:  None    Assessment:   Pt w/ signif SOB at tx termination 2/2 "stomach quivering" reducing ability for deep inspiration per pt report. Unable to obtain O2 sat. Rec nsg to call RT.  Cont w/ OT per POC.     Husam Connolly is a 88 y.o. male with a medical diagnosis of Diverticulitis, intestine large.  He presents with . Performance deficits affecting function are weakness, impaired endurance, impaired self care skills, decreased coordination, decreased safety awareness, impaired skin, edema, impaired cardiopulmonary response to activity.     Rehab Prognosis:  Good; patient would benefit from acute skilled OT services to address these deficits and reach maximum level of function.       Plan:     Patient to be seen 5 x/week to address the above listed problems via self-care/home management, therapeutic groups, therapeutic activities  · Plan of Care Expires: 10/11/19  · Plan of Care Reviewed with: patient    Subjective     Pain/Comfort:  · Pain Rating 1: 0/10  · Pain Rating Post-Intervention 1: 0/10    Objective:     Communicated with: nsg prior to session.  Patient found on toilet with (chair alarm) upon OT entry to room.    General Precautions: Standard, fall, respiratory   Orthopedic Precautions:N/A   Braces: N/A     Occupational Performance:     Bed Mobility:    ·      Functional Mobility/Transfers:  · Patient completed Sit <> Stand Transfer with supervision  with  no assistive device   · Patient completed Bed <> Chair Transfer using Step Transfer technique with supervision with no assistive device  · Patient completed Toilet Transfer Step Transfer technique with supervision with  no AD  · Functional Mobility: w/o DME " w/in & for extended distance outside room w/ Sup    Activities of Daily Living:  · Upper Body Dressing: minimum assistance [place]  · Toileting: supervision in supine      [facility] 6 Click ADL: 21    Treatment & Education:  Pt found on toilet & agreeable to OT this AM. Pt w/o c/o pain & perf the following: toilet t/f w/ Sup; toileting tasks w/ Sup; amb w/o DME w/in room &for ext distance outside room w/ Sup-SBA. Pt returned to b/s chair w/ Sup. Edu/tx re: general safety techs, deep/PLBing & HEP. Pt verbalized understanding.      Patient left up in chair with all lines intact, call button in reach, chair alarm on and nsg notifiedEducation:      GOALS:   Multidisciplinary Problems     Occupational Therapy Goals        Problem: Occupational Therapy Goal    Goal Priority Disciplines Outcome Interventions   Occupational Therapy Goal     OT, PT/OT Ongoing (interventions implemented as appropriate)    Description:  Goals to be met by: 10/11     Patient will increase functional independence with ADLs by performing:    UE Dressing with Supervision.  LE Dressing with Stand-by Assistance.  Grooming while standing at sink with Supervision.--MET 09/13  Toileting from toilet with Supervision for hygiene and clothing management. MET 09/17  Toilet transfer to toilet with Stand-by Assistance.--MET 09/17  Increased functional strength to WFL for ADLs.                        Time Tracking:     OT Date of Treatment: 09/17/19  OT Start Time: 0857  OT Stop Time: 0915  OT Total Time (min): 18 min    Billable Minutes:Self Care/Home Management 18  Total Time 18    SIMON Gonzalez  9/17/2019

## 2019-09-17 NOTE — PLAN OF CARE
"Problem: Occupational Therapy Goal  Goal: Occupational Therapy Goal  Goals to be met by: 10/11     Patient will increase functional independence with ADLs by performing:    UE Dressing with Supervision.  LE Dressing with Stand-by Assistance.  Grooming while standing at sink with Supervision.--MET 09/13  Toileting from toilet with Supervision for hygiene and clothing management. MET 09/17  Toilet transfer to toilet with Stand-by Assistance.--MET 09/17  Increased functional strength to WFL for ADLs.      Outcome: Ongoing (interventions implemented as appropriate)  Pt found on toilet & agreeable to OT this AM. Pt w/o c/o pain & perf the following: toilet t/f w/ Sup; toileting tasks w/ Sup; amb w/o DME w/in room &for ext distance outside room w/ Sup-SBA. Pt returned to b/s chair w/ Sup. Edu/tx re: general safety techs, deep/PLBing & HEP. Pt verbalized understanding.    Pt w/ signif SOB at tx termination 2/2 "stomach quivering" reducing ability for deep inspiration per pt report. Unable to obtain O2 sat. Rec nsg to call RT.  Cont w/ OT per POC.      "

## 2019-09-17 NOTE — PLAN OF CARE
Problem: Physical Therapy Goal  Goal: Physical Therapy Goal  Goals to be met by: 10/11/2019     Patient will increase functional independence with mobility by performin. Supine to sit with Modified [place]  2. Sit to supine with Modified [place]  3. Rolling with Modified [place].  4. Sit to stand transfer with Stand-by Assistance/S Met  19  5. Bed to chair transfer with Stand-by Assistance/S with or without AD  6. Gait  x 400ft feet with SBA/S with or without AD  7. Ascend/descend 2 stair with 0 Handrails Stand-by Assistance/S with or without AD   8. Lower extremity exercise program x10 reps with independence     Outcome: Ongoing (interventions implemented as appropriate)  Continue working toward remaining goals.

## 2019-09-17 NOTE — PT/OT/SLP PROGRESS
Physical Therapy Treatment    Patient Name:  Husam Connolly   MRN:  [id number]    Recommendations:     Discharge Recommendations:  home health PT, home health OT   Discharge Equipment Recommendations: shower chair   Barriers to discharge: mild gait instability    Assessment:     Husam Connolly is a 88 y.o. male admitted with a medical diagnosis of Diverticulitis, intestine large.  He presents with the following impairments/functional limitations:  weakness, impaired endurance, impaired self care skills, impaired functional mobilty, gait instability, impaired balance, decreased coordination, impaired cardiopulmonary response to activity.  Pt would continue to benefit from P.T. To address impairments listed above.    Rehab Prognosis: Good; patient would benefit from acute skilled PT services to address these deficits and reach maximum level of function.    Recent Surgery: Procedure(s) (LRB):  LAPAROTOMY, EXPLORATORY (N/A)  ANASTOMOSIS, COLO-RECTAL 8 Days Post-Op    Plan:     During this hospitalization, patient to be seen 6 x/week to address the identified rehab impairments via gait training, therapeutic activities, therapeutic exercises and progress toward the following goals:    · Plan of Care Expires:  10/11/19    Subjective       Patient/Family Comments/goals: Pt agreed to tx.  Pain/Comfort:  · Pain Rating 1: 0/10  · Pain Rating Post-Intervention 1: 0/10      Objective:     Communicated with RN (Silvestre) prior to session.  Patient found up in chair with peripheral IV(chair alarm) upon PT entry to room.     General Precautions: Standard, fall   Orthopedic Precautions:N/A   Braces:       Functional Mobility:  · Transfers:     · Sit to Stand:  stand by assistance with no AD  · Gait: 125ft x 2 without A.D. and CGA and 100ft x 2 without A.D. and CGA.  Pt required standing rest breaks between bouts secondary to SOB.  Pt ambulated at a safe pace with improved balance.    · Balance: sitting good, standing fair+, gait  fair      AM-PAC 6 CLICK MOBILITY  Turning over in bed (including adjusting bedclothes, sheets and blankets)?: 3  Sitting down on and standing up from a chair with arms (e.g., wheelchair, bedside commode, etc.): 3  Moving from lying on back to sitting on the side of the bed?: 3  Moving to and from a bed to a chair (including a wheelchair)?: 3  Need to walk in hospital room?: 3  Climbing 3-5 steps with a railing?: 3  Basic Mobility Total Score: 18       Therapeutic Activities and Exercises:   Seated BLE therex: AP, LAQ, hip flexion/ABD/ADD x 15 reps.  Standing therex: marching in place x 15 reps  And mini squats x 10 reps with B hand held assist holding PTA's hands out in front.  Standing rest break between exercises secondary to mild SOB.  O2 sats prior to tx 96% at rest sitting up in b/s chair.  O2 sats with gait 90%-93% with one bout of sats dropping to 87% and back up >90% within 5 secs with a standing rest performing PLB.  Pt left sitting up in b/s chair after tx with O2 sats 93%.  Pt was given vc's to stop and perform PLB when he felt SO, and to ambulate at a slower pace for increased safety and decreased bouts of SOB.  Pt tends to ambulate with increased sade with occasional mild instability.  Even though pt's O2 sats remained above 90%, pt was noted to have increased respirations at times with increased rise and fall of chest/stomach.  Nsg notified of above.    Patient left up in chair with all lines intact, call button in reach, chair alarm on and RN notified..    GOALS:   Multidisciplinary Problems     Physical Therapy Goals        Problem: Physical Therapy Goal    Goal Priority Disciplines Outcome Goal Variances Interventions   Physical Therapy Goal     PT, PT/OT Ongoing (interventions implemented as appropriate)     Description:  Goals to be met by: 10/11/2019     Patient will increase functional independence with mobility by performin. Supine to sit with Modified [place]  2. Sit to supine  with Modified [place]  3. Rolling with Modified [place].  4. Sit to stand transfer with Stand-by Assistance/S Met  9/17/19  5. Bed to chair transfer with Stand-by Assistance/S with or without AD  6. Gait  x 400ft feet with SBA/S with or without AD  7. Ascend/descend 2 stair with 0 Handrails Stand-by Assistance/S with or without AD   8. Lower extremity exercise program x10 reps with independence                       Time Tracking:     PT Received On: 09/17/19  PT Start Time: 1312     PT Stop Time: 1339  PT Total Time (min): 27 min     Billable Minutes: Gait Training 16 and Therapeutic Exercise 11    Treatment Type: Treatment  PT/PTA: PTA     PTA Visit Number: 4     Lilian Parr PTA  09/17/2019

## 2019-09-17 NOTE — PROGRESS NOTES
"Surgery follow up  BP (!) 129/59   Pulse 80   Temp 98.6 °F (37 °C)   Resp 18   Ht 5' 10" (1.778 m)   Wt 77.1 kg (169 lb 15.6 oz)   SpO2 95%   BMI 24.39 kg/m²   I/O last 3 completed shifts:  In: 800 [P.O.:800]  Out: 1165 [Urine:1165]  No intake/output data recorded.      .lst3c  Recent Results (from the past 336 hour(s))   Basic metabolic panel    Collection Time: 09/11/19  3:53 AM   Result Value Ref Range    Sodium 137 136 - 145 mmol/L    Potassium 4.3 3.5 - 5.1 mmol/L    Chloride 106 95 - 110 mmol/L    CO2 21 (L) 23 - 29 mmol/L    BUN, Bld 20 8 - 23 mg/dL    Creatinine 1.4 0.5 - 1.4 mg/dL    Calcium 8.3 (L) 8.7 - 10.5 mg/dL    Anion Gap 10 8 - 16 mmol/L   Basic metabolic panel    Collection Time: 09/09/19  2:28 PM   Result Value Ref Range    Sodium 140 136 - 145 mmol/L    Potassium 3.5 3.5 - 5.1 mmol/L    Chloride 108 95 - 110 mmol/L    CO2 17 (L) 23 - 29 mmol/L    BUN, Bld 21 8 - 23 mg/dL    Creatinine 1.3 0.5 - 1.4 mg/dL    Calcium 8.1 (L) 8.7 - 10.5 mg/dL    Anion Gap 15 8 - 16 mmol/L   doing better moving bowels   Discharge home today with home health. rtc office one week.  "

## 2019-09-17 NOTE — PLAN OF CARE
Discharge Phone Number        Discharge rounds on patient. Discussed followup appointments, blue discharge folder, discharge nurse will go over home medications and reasons for medications and final discharge instructions. All patient/caregiver questions answered. Patient verbalized understanding.                                 Follow-up With  Details  Why  Contact Info   Toro Seaman MD  On 10/7/2019  10:30am  [address]  [address]  [place] [address]  [phone]   Fouzia Gardner MD  On 9/23/2019  2:00pm  [address]  [address]  [place] [id number]  [phone]          09/17/19 1533   Final Note   Assessment Type Final Discharge Note   Anticipated Discharge Disposition Home-Health   Hospital Follow Up  Appt(s) scheduled? Yes   Discharge plans and expectations educations in teach back method with documentation complete? Yes   Right Care Referral Info   Post Acute Recommendation Home-care   Referral Type home health   Facility Name [facility]     Mandy Ko, RN, CCM, CMSRN  RN Transition Navigator  [phone]

## 2019-09-17 NOTE — PROGRESS NOTES
Pharmacy New Medication Education    Patient and/or Caregiver ACCEPTED medication education.    Pharmacy has provided education on the name, indication, and possible side effects of the medication(s) prescribed, using teach-back method.     Learners of pharmacy medication education includes:  patient    Medication Indication Side Effects   miralax constipation GI distress            The following medications have also been discussed, during this admission.     Current Facility-Administered Medications   Medication Frequency    acetaminophen tablet 650 mg Q8H PRN    atorvastatin tablet 40 mg Daily    azelastine 137 mcg (0.1 %) nasal spray 137 mcg BID    bumetanide injection 1 mg Daily    dextrose 10% (D10W) Bolus PRN    dextrose 10% (D10W) Bolus PRN    enoxaparin injection 40 mg Daily    famotidine tablet 20 mg BID    glucagon (human recombinant) injection 1 mg PRN    insulin aspart U-100 pen 0-5 Units Q6H PRN    lidocaine (PF) 10 mg/ml (1%) injection 10 mg Once    morphine injection 2 mg Q4H PRN    ondansetron disintegrating tablet 8 mg Q8H PRN    ondansetron injection 4 mg Q12H PRN    polyethylene glycol packet 17 g Daily    potassium chloride SA CR tablet 20 mEq Daily    sodium chloride 0.9% flush 10 mL PRN    sodium chloride 0.9% flush 10 mL PRN    sodium chloride 0.9% flush 10 mL PRN    valsartan-hydrochlorothiazide 320-12.5 mg per tablet 1 tablet Daily          Thank you  Rogelio Gentile, PharmD

## 2019-09-17 NOTE — PLAN OF CARE
Problem: Adult Inpatient Plan of Care  Goal: Plan of Care Review  Outcome: Ongoing (interventions implemented as appropriate)  Received patient upon rounds at 1905H, walking towards the bed, had one bowel movement, used the toilet, ambulatory. GCS 15. No subjective complaint of any pain. With saline lock left arm gauge 20 with clean and dry dressing. With 96% oxygen saturation on room air. Abdominal dressing clean dry and intact. Midline post op site open to air, clean and dry. With audible bowel sounds, tolerated soft diet dinner, no complaints of nausea and vomiting, no abdominal pain. Advised patient to call for any assistance. CAll bell within reach.SCD refused to wear last night.  Bed alarm ON. Safety fall precaution maintained.Blood sugar monitored. Will continue to monitor patient.   0531H  No untoward signs and symptoms noted over the night. Stable VS, afebrile. No complaintsof any pain, abdominal sutures intact, clean and dry,    left abdominal dressing dry and intact. One bowel movement last night.Soft diet tolerated. Will endorse to day shift nurse.

## 2019-09-17 NOTE — PLAN OF CARE
VN reviewed discharge instructions with pt/family. AVS printed and handed to pt/family by bedside nurse. Reviewed follow-up appointments, medications, diet, and importance of medication compliance. Reviewed home care instructions, treatment plan, self-management, and when to seek medical attention. Allowed time for questions. All questions answered. Patient/family verbalized complete understanding of discharge instructions and voices no concerns.    Discharge instructions complete. Bedside delivery done. Transport/wheelchair requested. Bedside nurse notified.

## 2019-09-17 NOTE — PLAN OF CARE
VN rounds: VN cued into pt's room with pt's permission.  Pt sitting up in chair. Family at bedside. Fall risk protocol discussed with pt. VN instructed pt to call for assistance. Pt aware and agreeable. Allowed time for questions. Will cont to be available and intervene as needed.        09/17/19 1035   Type of Frequent Check   Type Patient Rounds;Other (see comments)  (VN rounds)   Safety/Activity   Patient Rounds visualized patient;call light in patient/parent reach   Safety Promotion/Fall Prevention Fall Risk reviewed with patient/family;assistive device/personal item within reach;instructed to call staff for mobility;family to remain at bedside   Activity Management up in chair   Pain/Comfort/Sleep   Preferred Pain Scale word (verbal rating pain scale)   Pain Rating: Rest 0 - no pain   Sleep/Rest/Relaxation awake;no problem identified   Assessments (Pre/Post)   Level of Consciousness (AVPU) alert

## 2019-09-19 ENCOUNTER — PATIENT OUTREACH (OUTPATIENT)
Dept: ADMINISTRATIVE | Facility: CLINIC | Age: 84
End: 2019-09-19

## 2019-09-19 PROBLEM — Z98.890 S/P EXPLORATORY LAPAROTOMY: Status: ACTIVE | Noted: 2019-07-01

## 2019-09-19 PROBLEM — L03.90 WOUND CELLULITIS: Status: ACTIVE | Noted: 2019-09-19

## 2019-09-19 NOTE — PATIENT INSTRUCTIONS
Wound Check After Surgery, No Complication  Surgery involves cutting through layers of skin, fatty tissue, muscle, and sometimes bone and cartilage. Sutures (stitches) or staples are used to close all layers of the wound. The sutures on the inside will dissolve in about 2 to 3 weeks. Any sutures or staples used on the outside need to be removed in about 7 to 14 days, depending on the location.  It is normal to have some clear or bloody discharge on the wound covering or bandage (dressing) for the first few days after surgery. If your wound was sutured (sewn) closed, you should not have to change the dressing more than twice a day in the first few days. Bleeding or discharge requiring more frequent dressing changes can be a sign of a problem.  It is normal to feel pain at the incision site. The pain decreases as the wound heals. Most of the pain and soreness from the skin incision should go away by the time the sutures or staples are removed. Soreness and pain from deeper tissues may last another week or two.  Pain that continues more than a few weeks after surgery or pain that worsens anytime after surgery can be a sign of a problem, such as:  · Infection  · Separation of wound edges  · Collection of blood or other below the skin  Home care  Different types of surgery require different types of care and dressing changes. It is important to follow all instructions and advice from your surgeon, as well as other members of your healthcare team.  Wound care  · Keep the wound clean, as directed by your healthcare provider.  · Change the dressing as directed. Change the dressing sooner if it becomes wet or stained with blood or fluid from the wound.  · Bathe with a sponge (no shower or tub baths) for the first few days after surgery, or until there is no more drainage from the wound. Unless you received different instructions from your surgeon, you can then shower. Do not soak the area in water (no baths or swimming)  until the tape, sutures, or staples are removed and any wound opening has dried out and healed.  Changing the dressing  · Wash your hands before changing the dressings.  · Carefully remove the dressing and tape; dont just yank it off. If it sticks to the wound, you may need to wet it a little to remove it, unless your healthcare provider told you not to wet it.  · Wash your hands again before putting on a new, clean dressing.  · Gently clean the wound with clean water (or saline) using gauze or a clean washcloth. Do not rub it or pick at it.  · Do not use soap, alcohol, hydrogen peroxide, or any other cleanser.  · If you were told to dry the wound before putting on a new dressing, gently pat it dry. Do not rub.  · Throw out the old dressing. Do not reuse it!  · Wash your hands again when you are done.  Types of dressings  Your healthcare team will tell you what type of dressing to put on your wound. Follow your healthcare teams instructions carefully, and contact them if you have any questions. Two common types of dressings are described below. You may have one of these or another type.  · Dry dressing. Use dry gauze. If the wound is still draining, use a nonadherent dressing, which shouldnt stick to the wound.  · Wet-to-dry dressing. Wet the gauze, and squeeze out the excess water (or saline), before putting it on. Then, cover this with a dry pad.  Medicines  · If you were given antibiotics, take them until they are used up or your healthcare provider tells you to stop. It is important to finish the antibiotics even though you feel better, to make sure the infection has cleared.  · You can take acetaminophen or ibuprofen for pain, unless you were given a different pain medicine to use. (Note: If you have chronic liver or kidney disease, or have ever had a stomach ulcer or gastrointestinal bleeding, or are taking blood thinner medicines, talk with your healthcare provider before using these  medicines.)  · Aspirin should never be used in anyone under 18 years of age who is ill with a fever. It may cause severe liver damage.  Follow-up care  Follow up with your healthcare provider, or as advised, for your next wound check or removal of your sutures, staples, or tape.  · If a culture was done, you will be notified if the results will affect your treatment. You can call as directed for the results.  · If imaging tests, such as X-rays, an ultrasound, or CT scan were done, they will be reviewed by a specialist. You will be notified of the results, especially if they affect treatment.  Call 911  Call emergency services right away if any of these occur:  · Trouble breathing or swallowing, wheezing  · Hoarse voice or trouble speaking  · Extreme confusion  · Extreme drowsiness or trouble awakening  · Fainting or loss of consciousness  · Rapid heart rate or very slow heart rate  · Vomiting blood, or large amounts of blood in stool  · Discomfort in the center of the chest that feels like pressure, squeezing, a sense of fullness, or pain.  · Discomfort or pain in other upper body areas, such as the back, one or both arms, neck, jaw, or stomach  · Stroke symptoms (spot a stroke FAST)  ¨ F: Face drooping. One side of the face is numb or droops.  ¨ A: Arm weakness. One arm feels weak or numb.  ¨ S: Speech difficulty: Speech is slurred, or the person is unable to speak.  ¨ T: Time to call 911. Even if symptoms go away, call 911.  When to seek medical advice  Call your healthcare provider right away if any of the following occur:  · Increasing pain at the site of surgery  · Fever over 100.4º F (38º C)  · Redness around the wound  · Fluid, pus, or blood draining from the wound  · Vomiting, constipation, or diarrhea  Date Last Reviewed: 9/27/2015  © 9344-1817 [employer]. [address] [place] [address], [place] [address]. All rights reserved. This information is not intended as a substitute for professional  medical care. Always follow your healthcare professional's instructions.

## 2019-09-19 NOTE — DISCHARGE SUMMARY
DATE OF ADMISSION:  09/09/2019.    DATE OF DISCHARGE:  09/17/2019.    HISTORY OF PRESENT ILLNESS:  This 88-year-old male known status post colostomy   for colonic obstruction and diverticulitis, small-bowel obstruction who was two   months postop.  Colostomy left upper quadrant area.  The patient was put on   bowel prep and was admitted electively for takedown of the colostomy, lysis of   adhesion, colon resection and colorectal anastomosis.  The patient was admitted   on 09/17/2019 and underwent exploratory lap and the procedure, exploratory lap,   lysis of adhesion, take down colostomy, colorectal anastomosis, colon resection.    The patient was admitted to ICU postop with n.p.o., NG tube, IV fluids and IV   antibiotics.  The patient continued on conservative treatment with postop   followup.  The patient was then transferred to the floor.  The patient was   continued on IV antibiotics.  The patient started doing better.  NG tube was   discontinued.  The patient started moving bowels and IV fluid was discontinued.    The patient started having bowel movements, was doing well, afebrile.  White   count was normal.  The patient was doing better, was discharged home on   09/17/2019 on p.o. pain pill.  Advised not to do any heavy lifting.    Arrangements were made for home health care for physical therapy, wound care.    No antibiotics were given.  The patient advised to see me in the office in a   week to have the stitches taken out.  The patient is to come to the Emergency   Room for any problems and call me if he had any other concerns.      MS/HN  dd: 09/18/2019 11:27:23 (CDT)  td: 09/18/2019 23:05:59 (CDT)  Doc ID   #[id number]  Job ID #[id number]    CC:

## 2019-10-09 ENCOUNTER — TELEPHONE (OUTPATIENT)
Dept: HOME HEALTH SERVICES | Facility: HOSPITAL | Age: 84
End: 2019-10-09

## 2019-10-12 DIAGNOSIS — N40.0 BENIGN NON-NODULAR PROSTATIC HYPERPLASIA WITHOUT LOWER URINARY TRACT SYMPTOMS: ICD-10-CM

## 2019-10-14 RX ORDER — FINASTERIDE 5 MG/1
TABLET, FILM COATED ORAL
Qty: 90 TABLET | Refills: 1 | Status: SHIPPED | OUTPATIENT
Start: 2019-10-14

## 2019-10-18 ENCOUNTER — EXTERNAL HOME HEALTH (OUTPATIENT)
Dept: HOME HEALTH SERVICES | Facility: HOSPITAL | Age: 84
End: 2019-10-18

## 2019-11-07 ENCOUNTER — TELEPHONE (OUTPATIENT)
Dept: HOME HEALTH SERVICES | Facility: HOSPITAL | Age: 84
End: 2019-11-07

## 2019-11-07 PROBLEM — T81.30XA WOUND DEHISCENCE: Status: ACTIVE | Noted: 2019-11-07

## 2019-11-11 ENCOUNTER — TELEPHONE (OUTPATIENT)
Dept: HOME HEALTH SERVICES | Facility: HOSPITAL | Age: 84
End: 2019-11-11

## 2019-11-19 ENCOUNTER — OFFICE VISIT (OUTPATIENT)
Dept: CARDIOLOGY | Facility: CLINIC | Age: 84
End: 2019-11-19
Payer: MEDICARE

## 2019-11-19 VITALS
SYSTOLIC BLOOD PRESSURE: 154 MMHG | BODY MASS INDEX: 22.77 KG/M2 | WEIGHT: 159.06 LBS | DIASTOLIC BLOOD PRESSURE: 84 MMHG | HEART RATE: 73 BPM | OXYGEN SATURATION: 96 % | HEIGHT: 70 IN

## 2019-11-19 DIAGNOSIS — I10 ESSENTIAL HYPERTENSION: Primary | ICD-10-CM

## 2019-11-19 DIAGNOSIS — E78.00 PURE HYPERCHOLESTEROLEMIA: ICD-10-CM

## 2019-11-19 DIAGNOSIS — E11.8 TYPE 2 DIABETES MELLITUS WITH COMPLICATION: ICD-10-CM

## 2019-11-19 PROBLEM — N18.30 CKD (CHRONIC KIDNEY DISEASE) STAGE 3, GFR 30-59 ML/MIN: Status: RESOLVED | Noted: 2018-12-13 | Resolved: 2019-11-19

## 2019-11-19 PROCEDURE — 1159F MED LIST DOCD IN RCRD: CPT | Mod: S$GLB,,, | Performed by: INTERNAL MEDICINE

## 2019-11-19 PROCEDURE — 1101F PR PT FALLS ASSESS DOC 0-1 FALLS W/OUT INJ PAST YR: ICD-10-PCS | Mod: CPTII,S$GLB,, | Performed by: INTERNAL MEDICINE

## 2019-11-19 PROCEDURE — 99999 PR PBB SHADOW E&M-EST. PATIENT-LVL III: ICD-10-PCS | Mod: PBBFAC,,, | Performed by: INTERNAL MEDICINE

## 2019-11-19 PROCEDURE — 99214 PR OFFICE/OUTPT VISIT, EST, LEVL IV, 30-39 MIN: ICD-10-PCS | Mod: 25,S$GLB,, | Performed by: INTERNAL MEDICINE

## 2019-11-19 PROCEDURE — 1159F PR MEDICATION LIST DOCUMENTED IN MEDICAL RECORD: ICD-10-PCS | Mod: S$GLB,,, | Performed by: INTERNAL MEDICINE

## 2019-11-19 PROCEDURE — 1101F PT FALLS ASSESS-DOCD LE1/YR: CPT | Mod: CPTII,S$GLB,, | Performed by: INTERNAL MEDICINE

## 2019-11-19 PROCEDURE — 93000 ELECTROCARDIOGRAM COMPLETE: CPT | Mod: S$GLB,,, | Performed by: INTERNAL MEDICINE

## 2019-11-19 PROCEDURE — 99999 PR PBB SHADOW E&M-EST. PATIENT-LVL III: CPT | Mod: PBBFAC,,, | Performed by: INTERNAL MEDICINE

## 2019-11-19 PROCEDURE — 93000 EKG 12-LEAD: ICD-10-PCS | Mod: S$GLB,,, | Performed by: INTERNAL MEDICINE

## 2019-11-19 PROCEDURE — 99214 OFFICE O/P EST MOD 30 MIN: CPT | Mod: 25,S$GLB,, | Performed by: INTERNAL MEDICINE

## 2019-11-19 RX ORDER — VALSARTAN 80 MG/1
80 TABLET ORAL DAILY
Qty: 90 TABLET | Refills: 3 | Status: SHIPPED | OUTPATIENT
Start: 2019-11-19 | End: 2020-10-08 | Stop reason: DRUGHIGH

## 2019-11-19 NOTE — PROGRESS NOTES
"  Subjective:      Patient ID: Husam Connolly is a 88 y.o. male.    Chief Complaint: Follow-up (Hypertension)    HPI:  Pt required a temporary colostomy in June due to SBO.  Pt had colostomy closure in September.  After surgery in September pt developed a right drop foot which pt attributes to compression stockings.  Pt is still receiving wound care due to drainage which is improving.   Dr Gardner is surgeon.     Pt has a brace on the right foot and sees Dr Nayak, orthopedic surgeon on [facility].    Pt had an MRI and x rays.    Pt had had nerve testing by a neurologist on [facility].    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 Negative for chest pain, claudication, irregular heartbeat, near-syncope, orthopnea, palpitations and syncope.      Eating OK now    Past Medical History:   Diagnosis Date    Anticoagulant long-term use     Bowel obstruction     BPH (benign prostatic hyperplasia)     Diabetes mellitus     Diverticulitis     Hyperlipidemia     Hypertension         Past Surgical History:   Procedure Laterality Date    ANASTOMOSIS OF ILEUM TO RECTUM  9/9/2019    Procedure: ANASTOMOSIS, COLO-RECTAL;  Surgeon: Fouzia Gardner MD;  Location: [facility];  Service: General;;    COLOSTOMY  6/16/2019    Procedure: CREATION, COLOSTOMY;  Surgeon: Fouzia Gardner MD;  Location: [facility] OR;  Service: General;;    EYE SURGERY      GISELA PROCEDURE  6/16/2019    Procedure: GISELA PROCEDURE;  Surgeon: Fouzia Gardner MD;  Location: [facility] OR;  Service: General;;  Colon " resection    TONSILLECTOMY         Family History   Problem Relation Age of Onset    Heart disease Father     Hypertension Father     Cancer Mother     Diabetes Paternal Aunt        Social History     Socioeconomic History    Marital status:      Spouse name: Not on file    Number of children: Not on file    Years of education: Not on file    Highest education level: Not on file   Occupational History    Not on file   Social Needs    Financial resource strain: Not on file    Food insecurity:     Worry: Not on file     Inability: Not on file    Transportation needs:     Medical: Not on file     Non-medical: Not on file   Tobacco Use    Smoking status: Former Smoker     Packs/day: 1.00     Years: 40.00     Pack years: 40.00    Smokeless tobacco: Never Used   Substance and Sexual Activity    Alcohol use: Yes     Alcohol/week: 2.0 standard drinks     Types: 2 Glasses of wine per week     Comment: drinks occassionally    Drug use: No    Sexual activity: Yes     Partners: Female   Lifestyle    Physical activity:     Days per week: Not on file     Minutes per session: Not on file    Stress: Not on file   Relationships    Social connections:     Talks on phone: Not on file     Gets together: Not on file     Attends Anabaptism service: Not on file     Active member of club or organization: Not on file     Attends meetings of clubs or organizations: Not on file     Relationship status: Not on file   Other Topics Concern    Not on file   Social History Narrative    Not on file       Current Outpatient Medications on File Prior to Visit   Medication Sig Dispense Refill    amoxicillin-clavulanate 875-125mg (AUGMENTIN) 875-125 mg per tablet Take 1 tablet by mouth 2 (two) times daily. 30 tablet 2    aspirin (ECOTRIN) 81 MG EC tablet Take 81 mg by mouth once daily.      atorvastatin (LIPITOR) 40 MG tablet TAKE 1 TABLET BY MOUTH EVERY DAY 90 tablet 3    azelastine (ASTELIN) 137 mcg (0.1 %) nasal  spray 1-2 SPRAYS INTO EACH NOSTRIL TWICE A DAY  5    famciclovir (FAMVIR) 500 MG tablet Take 500 mg by mouth 3 (three) times daily as needed.  11    finasteride (PROSCAR) 5 mg tablet TAKE 1 TABLET BY MOUTH EVERY DAY 90 tablet 1    metFORMIN (GLUCOPHAGE) 500 MG tablet Take 500 mg by mouth once daily.  3    multivitamin capsule Take 1 capsule by mouth once daily.      nut.tx.glucose intolerance,soy (GLUCERNA ORAL) Take by mouth.      omeprazole (PRILOSEC) 20 MG capsule Take 1 capsule (20 mg total) by mouth once daily. 30 capsule 2    tamsulosin (FLOMAX) 0.4 mg Cap Take 1 capsule by mouth nightly.  3    arginine/glutamine/calcium bmb (LUCIEN ORAL) Take by mouth.      bisacodyl (DULCOLAX) 10 mg Supp UNWRAP AND INSERT RECTALLY TWICE ON SUNDAY  0    docusate sodium (COLACE) 100 MG capsule TAKE 2 CAPSULE BY MOUTH ON SEPT 5TH  0    erythromycin base (E-MYCIN) 500 MG tablet Take 500 mg by mouth 3 (three) times daily.  0    levocetirizine (XYZAL) 5 MG tablet Take 5 mg by mouth every evening.      neomycin (MYCIFRADIN) 500 mg Tab Take 500 mg by mouth 3 (three) times daily.  0    potassium chloride (MICRO-K) 10 MEQ CpSR       [DISCONTINUED] potassium chloride SA (K-DUR,KLOR-CON) 20 MEQ tablet Take 1 tablet (20 mEq total) by mouth once daily. (Patient not taking: Reported on 10/24/2019) 20 tablet 1    [DISCONTINUED] valsartan (DIOVAN) 160 MG tablet       [DISCONTINUED] valsartan-hydrochlorothiazide (DIOVAN-HCT) 320-12.5 mg per tablet TAKE 1 TABLET BY MOUTH EVERY DAY (Patient not taking: Reported on 10/10/2019) 90 tablet 3     No current facility-administered medications on file prior to visit.        Review of patient's allergies indicates:   Allergen Reactions    Solarcaine [benzocaine-triclosan]     Sulfa (sulfonamide antibiotics)     Sulfamethoxazole     Trimethoprim     Hytrin [terazosin] Rash    Smz-tmp ds [sulfamethoxazole-trimethoprim] Rash     Objective:     Vitals:    11/19/19 1258 11/19/19 1323  "  BP: (!) 181/75 (!) 154/84   BP Location: Left arm Left arm   Patient Position: Sitting Sitting   BP Method: Large (Automatic)    Pulse: 73    SpO2: 96%    Weight: 72.1 kg (159 lb 1 oz)    Height: 5' 10" (1.778 m)         Physical Exam   Constitutional: He is oriented to person, place, and time. He appears well-developed and well-nourished. No distress.   Eyes: No scleral icterus.   Neck: No JVD present. Carotid bruit is not present.   Cardiovascular: Regular rhythm. Exam reveals no gallop and no friction rub.   Murmur (II/VI systolic) heard.  Pulmonary/Chest: Effort normal and breath sounds normal. No respiratory distress.   Musculoskeletal: He exhibits edema (one plus edema right leg, trace edema left leg).   Neurological: He is alert and oriented to person, place, and time.   Skin: Skin is warm and dry. He is not diaphoretic.   Psychiatric: He has a normal mood and affect. His behavior is normal. Judgment and thought content normal.   Vitals reviewed.     ECG--NSR, WNL      Lab 10/24/19:  hgb 13.1  WBC 9.2  Glu 117  BUN 35  Creat 1.08  Na 142  K 4.5  Alb 3.8    Wt down 22 lbs since July 2019  Assessment:     1. Essential hypertension    2. Pure hypercholesterolemia    3. Type 2 diabetes mellitus with complication      Plan:   Husam was seen today for follow-up.    Diagnoses and all orders for this visit:    Essential hypertension  -     IN OFFICE EKG 12-LEAD (to Muse)    Pure hypercholesterolemia    Type 2 diabetes mellitus with complication    Other orders  -     valsartan (DIOVAN) 80 MG tablet; Take 1 tablet (80 mg total) by mouth once daily.     f/u with neurologist regarding etiology of drop foot.    Same meds plus resume valsartan at a lower dose of 80 mg   (one-half of 160 mg or one 80 mg daily)    F/u with Dr Seaman next month for BP check.    F/u with Dr Gardner    Follow up in about 6 months (around 5/19/2020).  "

## 2019-12-23 ENCOUNTER — EXTERNAL HOME HEALTH (OUTPATIENT)
Dept: HOME HEALTH SERVICES | Facility: HOSPITAL | Age: 84
End: 2019-12-23

## 2020-02-27 PROBLEM — K43.9 VENTRAL HERNIA: Status: ACTIVE | Noted: 2020-02-27

## 2020-03-16 ENCOUNTER — OFFICE VISIT (OUTPATIENT)
Dept: CARDIOLOGY | Facility: CLINIC | Age: 85
End: 2020-03-16
Payer: MEDICARE

## 2020-03-16 VITALS
WEIGHT: 162.69 LBS | SYSTOLIC BLOOD PRESSURE: 138 MMHG | DIASTOLIC BLOOD PRESSURE: 70 MMHG | HEIGHT: 70 IN | BODY MASS INDEX: 23.29 KG/M2 | HEART RATE: 82 BPM

## 2020-03-16 DIAGNOSIS — E78.00 PURE HYPERCHOLESTEROLEMIA: ICD-10-CM

## 2020-03-16 DIAGNOSIS — E11.8 TYPE 2 DIABETES MELLITUS WITH COMPLICATION: ICD-10-CM

## 2020-03-16 DIAGNOSIS — I10 ESSENTIAL HYPERTENSION: Primary | ICD-10-CM

## 2020-03-16 DIAGNOSIS — R60.0 LOCALIZED EDEMA: ICD-10-CM

## 2020-03-16 PROCEDURE — 99999 PR PBB SHADOW E&M-EST. PATIENT-LVL III: ICD-10-PCS | Mod: PBBFAC,,, | Performed by: INTERNAL MEDICINE

## 2020-03-16 PROCEDURE — 99999 PR PBB SHADOW E&M-EST. PATIENT-LVL III: CPT | Mod: PBBFAC,,, | Performed by: INTERNAL MEDICINE

## 2020-03-16 PROCEDURE — 1159F PR MEDICATION LIST DOCUMENTED IN MEDICAL RECORD: ICD-10-PCS | Mod: S$GLB,,, | Performed by: INTERNAL MEDICINE

## 2020-03-16 PROCEDURE — 99214 PR OFFICE/OUTPT VISIT, EST, LEVL IV, 30-39 MIN: ICD-10-PCS | Mod: 25,S$GLB,, | Performed by: INTERNAL MEDICINE

## 2020-03-16 PROCEDURE — 1101F PR PT FALLS ASSESS DOC 0-1 FALLS W/OUT INJ PAST YR: ICD-10-PCS | Mod: CPTII,S$GLB,, | Performed by: INTERNAL MEDICINE

## 2020-03-16 PROCEDURE — 93000 ELECTROCARDIOGRAM COMPLETE: CPT | Mod: S$GLB,,, | Performed by: INTERNAL MEDICINE

## 2020-03-16 PROCEDURE — 93000 EKG 12-LEAD: ICD-10-PCS | Mod: S$GLB,,, | Performed by: INTERNAL MEDICINE

## 2020-03-16 PROCEDURE — 99214 OFFICE O/P EST MOD 30 MIN: CPT | Mod: 25,S$GLB,, | Performed by: INTERNAL MEDICINE

## 2020-03-16 PROCEDURE — 1101F PT FALLS ASSESS-DOCD LE1/YR: CPT | Mod: CPTII,S$GLB,, | Performed by: INTERNAL MEDICINE

## 2020-03-16 PROCEDURE — 1159F MED LIST DOCD IN RCRD: CPT | Mod: S$GLB,,, | Performed by: INTERNAL MEDICINE

## 2020-03-16 NOTE — PROGRESS NOTES
Subjective:      Patient ID: Husam Connolly is a 88 y.o. male.    Chief Complaint: Hypertension (follow up) and Shortness of Breath (going up stairs)    HPI:   Goes to PT.  Right foot drop is improving.  Has new PCP.    Does 16 minutes on a stairstepper without difficulty.    Does other leg exercises.    Review of Systems   Cardiovascular: Positive for dyspnea on exertion (Chronic, when climbing 16 steps flight of stairs.). Negative for chest pain, claudication, irregular heartbeat, leg swelling, near-syncope, orthopnea, palpitations and syncope.        Past Medical History:   Diagnosis Date    Anticoagulant long-term use     Bowel obstruction     BPH (benign prostatic hyperplasia)     Diabetes mellitus     Diverticulitis     Hyperlipidemia     Hypertension         Past Surgical History:   Procedure Laterality Date    ANASTOMOSIS OF ILEUM TO RECTUM  9/9/2019    Procedure: ANASTOMOSIS, COLO-RECTAL;  Surgeon: Fouzia Gardner MD;  Location: [facility] OR;  Service: General;;    COLOSTOMY  6/16/2019    Procedure: CREATION, COLOSTOMY;  Surgeon: Fouzia Gardner MD;  Location: [facility] OR;  Service: General;;    EYE SURGERY      GISELA PROCEDURE  6/16/2019    Procedure: GISELA PROCEDURE;  Surgeon: Fouzia Gardner MD;  Location: [facility] OR;  Service: General;;  Colon resection    TONSILLECTOMY         Family History   Problem Relation Age of Onset    Heart disease Father     Hypertension Father     Cancer Mother     Diabetes Paternal Aunt        Social History     Socioeconomic History    Marital status:      Spouse name: Not on file    Number of children: Not on file    Years of education: Not on file    Highest education level: Not on file   Occupational History    Not on file   Social Needs    Financial resource strain: Not on file    Food insecurity:     Worry: Not on file     Inability: Not on file    Transportation needs:     Medical: Not on file     Non-medical: Not on file    Tobacco Use    Smoking status: Former Smoker     Packs/day: 1.00     Years: 40.00     Pack years: 40.00    Smokeless tobacco: Never Used   Substance and Sexual Activity    Alcohol use: Yes     Alcohol/week: 2.0 standard drinks     Types: 2 Glasses of wine per week     Comment: drinks occassionally    Drug use: No    Sexual activity: Yes     Partners: Female   Lifestyle    Physical activity:     Days per week: Not on file     Minutes per session: Not on file    Stress: Not on file   Relationships    Social connections:     Talks on phone: Not on file     Gets together: Not on file     Attends Voodoo service: Not on file     Active member of club or organization: Not on file     Attends meetings of clubs or organizations: Not on file     Relationship status: Not on file   Other Topics Concern    Not on file   Social History Narrative    Not on file       Current Outpatient Medications on File Prior to Visit   Medication Sig Dispense Refill    aspirin (ECOTRIN) 81 MG EC tablet Take 81 mg by mouth once daily.      atorvastatin (LIPITOR) 40 MG tablet TAKE 1 TABLET BY MOUTH EVERY DAY 90 tablet 3    azelastine (ASTELIN) 137 mcg (0.1 %) nasal spray 1-2 SPRAYS INTO EACH NOSTRIL TWICE A DAY  5    finasteride (PROSCAR) 5 mg tablet TAKE 1 TABLET BY MOUTH EVERY DAY 90 tablet 1    metFORMIN (GLUCOPHAGE) 500 MG tablet Take 500 mg by mouth 2 (two) times daily with meals.   3    multivitamin capsule Take 1 capsule by mouth once daily.      tamsulosin (FLOMAX) 0.4 mg Cap Take 1 capsule by mouth nightly.  3    valsartan (DIOVAN) 80 MG tablet Take 1 tablet (80 mg total) by mouth once daily. 90 tablet 3    nut.tx.glucose intolerance,soy (GLUCERNA ORAL) Take by mouth.      [DISCONTINUED] amoxicillin-clavulanate 875-125mg (AUGMENTIN) 875-125 mg per tablet TAKE 1 TABLET BY MOUTH TWICE DAILY (Patient not taking: Reported on 12/30/2019) 30 tablet 0    [DISCONTINUED] amoxicillin-clavulanate 875-125mg (AUGMENTIN)  "875-125 mg per tablet amoxicillin 875 mg-potassium clavulanate 125 mg tablet      [DISCONTINUED] aspirin (ECOTRIN) 81 MG EC tablet as directed      [DISCONTINUED] famciclovir (FAMVIR) 500 MG tablet Take 500 mg by mouth 3 (three) times daily as needed.  11    [DISCONTINUED] finasteride (PROSCAR) 5 mg tablet 1 tablet      [DISCONTINUED] metFORMIN (GLUCOPHAGE) 500 MG tablet 1 tablet with a meal      [DISCONTINUED] omeprazole (PRILOSEC) 20 MG capsule Take 1 capsule (20 mg total) by mouth once daily. (Patient not taking: Reported on 12/12/2019) 30 capsule 2    [DISCONTINUED] tamsulosin (FLOMAX) 0.4 mg Cap 1 capsule at bedtime      [DISCONTINUED] valsartan (DIOVAN) 80 MG tablet 1 tablet       No current facility-administered medications on file prior to visit.        Review of patient's allergies indicates:   Allergen Reactions    Solarcaine [benzocaine-triclosan]     Sulfa (sulfonamide antibiotics)     Sulfamethoxazole     Trimethoprim     Hytrin [terazosin] Rash    Smz-tmp ds [sulfamethoxazole-trimethoprim] Rash     Objective:     Vitals:    03/16/20 1449 03/16/20 1518   BP: (!) 147/70 138/70   BP Location: Left arm Left arm   Patient Position: Sitting Sitting   BP Method: Large (Automatic)    Pulse: 82    Weight: 73.8 kg (162 lb 11.2 oz)    Height: 5' 10" (1.778 m)         Physical Exam   Constitutional: He is oriented to person, place, and time. He appears well-developed and well-nourished. No distress.   Eyes: No scleral icterus.   Neck: No JVD present. Carotid bruit is not present.   Cardiovascular: Regular rhythm and normal heart sounds. Exam reveals no gallop and no friction rub.   No murmur heard.  Pulmonary/Chest: Effort normal and breath sounds normal. No respiratory distress.   Musculoskeletal: He exhibits edema (one plus edema bilaterally).   Neurological: He is alert and oriented to person, place, and time.   Skin: Skin is warm and dry. He is not diaphoretic.   Psychiatric: He has a normal mood " and affect. His behavior is normal. Judgment and thought content normal.   Vitals reviewed.       ECG: NSR, WNL    Lab 10/24/19:  Hgb 13.1  BUN 35  Creat 1.08  Glucose 117  Rest of CMP WNL    Assessment:     1. Essential hypertension    2. Pure hypercholesterolemia    3. Type 2 diabetes mellitus with complication    4. Localized edema      Plan:   Husam was seen today for hypertension and shortness of breath.    Diagnoses and all orders for this visit:    Essential hypertension    Pure hypercholesterolemia    Type 2 diabetes mellitus with complication    Localized edema    Other orders  -     IN OFFICE EKG 12-LEAD (to Muse)     Same meds    Limit salt    Elevate feet    Note that pt was instructed to no longer wear venous compression stockings since they may have contributed to the foot drop.  Pt instructed to try snug knee high socks.    Follow up in about 6 months (around 9/16/2020).

## 2020-04-01 ENCOUNTER — PATIENT MESSAGE (OUTPATIENT)
Dept: CARDIOLOGY | Facility: CLINIC | Age: 85
End: 2020-04-01

## 2020-04-23 DIAGNOSIS — E78.5 HLD (HYPERLIPIDEMIA): ICD-10-CM

## 2020-04-23 RX ORDER — ATORVASTATIN CALCIUM 40 MG/1
TABLET, FILM COATED ORAL
Qty: 90 TABLET | Refills: 3 | Status: SHIPPED | OUTPATIENT
Start: 2020-04-23 | End: 2021-04-12 | Stop reason: SDUPTHER

## 2020-05-13 NOTE — PLAN OF CARE
[facility]    PATIENT'S NAME: Elza Watson   ATTENDING PHYSICIAN: Wong Tello M.D. OPERATING PHYSICIAN: Geraldo Linares M.D.    PATIENT ACCOUNT#:   [de-identified]    LOCATION:  [address] [place] [address]  MEDICAL RECORD #:   [id number]       DATE OF BIRTH:  [birth year] Problem: Adult Inpatient Plan of Care  Goal: Plan of Care Review  Pt on RA. Pt with no apparent respiratory distress noted. Will continue to monitor.         incision overlying the lateral aspect of the proximal femur was performed. Dissection was taken through the soft tissue and full-thickness skin flaps were raised.   The iliotibial fascia, as well as, the fascia overlying the lateral quadriceps muscle was t

## 2020-06-17 ENCOUNTER — TELEPHONE (OUTPATIENT)
Dept: CARDIOLOGY | Facility: CLINIC | Age: 85
End: 2020-06-17

## 2020-06-17 NOTE — TELEPHONE ENCOUNTER
September appointment with Dr Lassiter due to schedule change has been rescheduled.  Appointment letter mailed to patient.

## 2020-08-26 PROBLEM — M21.371 RIGHT FOOT DROP: Status: ACTIVE | Noted: 2020-08-26

## 2020-08-26 PROBLEM — E87.6 HYPOKALEMIA: Status: RESOLVED | Noted: 2019-09-16 | Resolved: 2020-08-26

## 2020-08-26 PROBLEM — E78.00 HIGH CHOLESTEROL: Status: ACTIVE | Noted: 2020-08-26

## 2020-08-26 PROBLEM — K56.609 SMALL BOWEL OBSTRUCTION: Status: RESOLVED | Noted: 2019-06-13 | Resolved: 2020-08-26

## 2020-08-26 PROBLEM — R06.09 DOE (DYSPNEA ON EXERTION): Status: RESOLVED | Noted: 2019-09-16 | Resolved: 2020-08-26

## 2020-08-26 PROBLEM — L03.90 WOUND CELLULITIS: Status: RESOLVED | Noted: 2019-09-19 | Resolved: 2020-08-26

## 2020-08-26 PROBLEM — T81.30XA WOUND DEHISCENCE: Status: RESOLVED | Noted: 2019-11-07 | Resolved: 2020-08-26

## 2020-08-26 PROBLEM — E11.69 TYPE 2 DIABETES MELLITUS WITH OTHER SPECIFIED COMPLICATION: Status: ACTIVE | Noted: 2020-08-26

## 2020-08-26 PROBLEM — R06.2 WHEEZING: Status: RESOLVED | Noted: 2018-06-14 | Resolved: 2020-08-26

## 2020-08-27 PROBLEM — E78.00 HIGH CHOLESTEROL: Chronic | Status: ACTIVE | Noted: 2020-08-26

## 2020-10-08 ENCOUNTER — OFFICE VISIT (OUTPATIENT)
Dept: CARDIOLOGY | Facility: CLINIC | Age: 85
End: 2020-10-08
Payer: MEDICARE

## 2020-10-08 VITALS
DIASTOLIC BLOOD PRESSURE: 70 MMHG | HEIGHT: 70 IN | HEART RATE: 70 BPM | WEIGHT: 168.31 LBS | OXYGEN SATURATION: 97 % | BODY MASS INDEX: 24.09 KG/M2 | SYSTOLIC BLOOD PRESSURE: 170 MMHG

## 2020-10-08 DIAGNOSIS — E11.69 TYPE 2 DIABETES MELLITUS WITH OTHER SPECIFIED COMPLICATION, WITHOUT LONG-TERM CURRENT USE OF INSULIN: ICD-10-CM

## 2020-10-08 DIAGNOSIS — N40.1 BENIGN PROSTATIC HYPERPLASIA WITH LOWER URINARY TRACT SYMPTOMS, SYMPTOM DETAILS UNSPECIFIED: ICD-10-CM

## 2020-10-08 DIAGNOSIS — R06.09 DYSPNEA ON EXERTION: ICD-10-CM

## 2020-10-08 DIAGNOSIS — N18.30 STAGE 3 CHRONIC KIDNEY DISEASE, UNSPECIFIED WHETHER STAGE 3A OR 3B CKD: ICD-10-CM

## 2020-10-08 DIAGNOSIS — R07.9 CHEST PAIN OF UNCERTAIN ETIOLOGY: ICD-10-CM

## 2020-10-08 DIAGNOSIS — I10 ESSENTIAL HYPERTENSION: Primary | Chronic | ICD-10-CM

## 2020-10-08 DIAGNOSIS — E78.00 HIGH CHOLESTEROL: Chronic | ICD-10-CM

## 2020-10-08 DIAGNOSIS — R60.0 LOCALIZED EDEMA: ICD-10-CM

## 2020-10-08 PROCEDURE — 93000 ELECTROCARDIOGRAM COMPLETE: CPT | Mod: S$GLB,,, | Performed by: INTERNAL MEDICINE

## 2020-10-08 PROCEDURE — 99999 PR PBB SHADOW E&M-EST. PATIENT-LVL III: CPT | Mod: PBBFAC,,, | Performed by: INTERNAL MEDICINE

## 2020-10-08 PROCEDURE — 99214 PR OFFICE/OUTPT VISIT, EST, LEVL IV, 30-39 MIN: ICD-10-PCS | Mod: 25,S$GLB,, | Performed by: INTERNAL MEDICINE

## 2020-10-08 PROCEDURE — 99214 OFFICE O/P EST MOD 30 MIN: CPT | Mod: 25,S$GLB,, | Performed by: INTERNAL MEDICINE

## 2020-10-08 PROCEDURE — 1159F PR MEDICATION LIST DOCUMENTED IN MEDICAL RECORD: ICD-10-PCS | Mod: S$GLB,,, | Performed by: INTERNAL MEDICINE

## 2020-10-08 PROCEDURE — 1159F MED LIST DOCD IN RCRD: CPT | Mod: S$GLB,,, | Performed by: INTERNAL MEDICINE

## 2020-10-08 PROCEDURE — 99999 PR PBB SHADOW E&M-EST. PATIENT-LVL III: ICD-10-PCS | Mod: PBBFAC,,, | Performed by: INTERNAL MEDICINE

## 2020-10-08 PROCEDURE — 1101F PT FALLS ASSESS-DOCD LE1/YR: CPT | Mod: CPTII,S$GLB,, | Performed by: INTERNAL MEDICINE

## 2020-10-08 PROCEDURE — 1101F PR PT FALLS ASSESS DOC 0-1 FALLS W/OUT INJ PAST YR: ICD-10-PCS | Mod: CPTII,S$GLB,, | Performed by: INTERNAL MEDICINE

## 2020-10-08 PROCEDURE — 93000 EKG 12-LEAD: ICD-10-PCS | Mod: S$GLB,,, | Performed by: INTERNAL MEDICINE

## 2020-10-08 RX ORDER — VALSARTAN 160 MG/1
160 TABLET ORAL DAILY
Qty: 90 TABLET | Refills: 3 | Status: SHIPPED | OUTPATIENT
Start: 2020-10-08 | End: 2021-10-06

## 2020-10-08 NOTE — PROGRESS NOTES
Subjective:      Patient ID: Husam Connolly is a 89 y.o. male.    Chief Complaint: Follow-up    HPI:  Walks a mile and a half 5 days a week    Does exercises he learned in PT at the sink and in bed    Pt has a mild tightness in the chest when he walks in the morning.    Pt is chronically short of breath with exertion, worse when wearing a mask for Covid 19    Rarely lightheaded especially upon arising quickly.  No fall or faint    Wears support stockings to control edema    Pt urinates frequently    Nocturia times one or two      Review of Systems   Cardiovascular: Positive for chest pain, dyspnea on exertion and leg swelling. Negative for claudication, irregular heartbeat, near-syncope, orthopnea, palpitations and syncope.      Blood pressure at home this /67    BP at home is typically 130-140 mm Hg systolic over 70 diastolic      Past Medical History:   Diagnosis Date    Anticoagulant long-term use     Bowel obstruction     BPH (benign prostatic hyperplasia)     Diabetes mellitus     Diverticulitis     Hyperlipidemia     Hypertension         Past Surgical History:   Procedure Laterality Date    ANASTOMOSIS OF ILEUM TO RECTUM  9/9/2019    Procedure: ANASTOMOSIS, COLO-RECTAL;  Surgeon: Fouzia Gardner MD;  Location: [facility] OR;  Service: General;;    COLOSTOMY  6/16/2019    Procedure: CREATION, COLOSTOMY;  Surgeon: Fouzia Gardner MD;  Location: [facility] OR;  Service: General;;    EYE SURGERY      GISELA PROCEDURE  6/16/2019    Procedure: GISELA PROCEDURE;  Surgeon: Fouzia Gardner MD;  Location: [facility] OR;  Service: General;;  Colon resection    TONSILLECTOMY         Family History   Problem Relation Age of Onset    Heart disease Father     Hypertension Father     Cancer Mother     Diabetes Paternal Aunt        Social History     Socioeconomic History    Marital status:      Spouse name: Not on file    Number of children: Not on file    Years of education: Not on file     Highest education level: Not on file   Occupational History    Not on file   Social Needs    Financial resource strain: Not on file    Food insecurity     Worry: Not on file     Inability: Not on file    Transportation needs     Medical: Not on file     Non-medical: Not on file   Tobacco Use    Smoking status: Former Smoker     Packs/day: 1.00     Years: 40.00     Pack years: 40.00    Smokeless tobacco: Never Used   Substance and Sexual Activity    Alcohol use: Yes     Alcohol/week: 2.0 standard drinks     Types: 2 Glasses of wine per week     Comment: drinks occassionally    Drug use: No    Sexual activity: Yes     Partners: Female   Lifestyle    Physical activity     Days per week: Not on file     Minutes per session: Not on file    Stress: Not on file   Relationships    Social connections     Talks on phone: Not on file     Gets together: Not on file     Attends Catholic service: Not on file     Active member of club or organization: Not on file     Attends meetings of clubs or organizations: Not on file     Relationship status: Not on file   Other Topics Concern    Not on file   Social History Narrative    Not on file       Current Outpatient Medications on File Prior to Visit   Medication Sig Dispense Refill    aspirin (ECOTRIN) 81 MG EC tablet Take 81 mg by mouth once daily.      atorvastatin (LIPITOR) 40 MG tablet TAKE 1 TABLET BY MOUTH EVERY DAY 90 tablet 3    azelastine (ASTELIN) 137 mcg (0.1 %) nasal spray 2 SPRAYS INTO EACH NOSTRIL TWICE A DAY 30 mL 5    finasteride (PROSCAR) 5 mg tablet TAKE 1 TABLET BY MOUTH EVERY DAY 90 tablet 1    metFORMIN (GLUCOPHAGE) 500 MG tablet Take 500 mg by mouth 2 (two) times daily with meals.   3    multivitamin capsule Take 1 capsule by mouth once daily.      tamsulosin (FLOMAX) 0.4 mg Cap Take 1 capsule by mouth nightly.  3    [DISCONTINUED] valsartan (DIOVAN) 80 MG tablet Take 1 tablet (80 mg total) by mouth once daily. 90 tablet 3     No  "current facility-administered medications on file prior to visit.        Review of patient's allergies indicates:   Allergen Reactions    Solarcaine [benzocaine-triclosan]     Sulfa (sulfonamide antibiotics)     Sulfamethoxazole     Trimethoprim     Hytrin [terazosin] Rash    Smz-tmp ds [sulfamethoxazole-trimethoprim] Rash     Objective:     Vitals:    10/08/20 1404 10/08/20 1437   BP: (!) 170/81 (!) 170/70   BP Location: Left arm Left arm   Patient Position: Sitting Sitting   BP Method: Large (Automatic)    Pulse: 70    SpO2: 97%    Weight: 76.4 kg (168 lb 5.1 oz)    Height: 5' 10" (1.778 m)         Physical Exam   Constitutional: He is oriented to person, place, and time. He appears well-developed and well-nourished. No distress.   Eyes: No scleral icterus.   Neck: No JVD present. Carotid bruit is not present.   Cardiovascular: Regular rhythm and normal heart sounds. Exam reveals no gallop and no friction rub.   No murmur heard.  Pulmonary/Chest: Effort normal and breath sounds normal. No respiratory distress.   Musculoskeletal:         General: Edema (one plus pitting pedal edema bilaterally) present.   Neurological: He is alert and oriented to person, place, and time.   Skin: Skin is warm and dry. He is not diaphoretic.   Psychiatric: He has a normal mood and affect. His behavior is normal. Judgment and thought content normal.   Vitals reviewed.     Wt up 14 lbs over the past year    ECG: NSR WNL    Telephone on 08/16/2020   Component Date Value Ref Range Status    Glucose 08/20/2020 97  65 - 99 mg/dL Final    BUN, Bld 08/20/2020 25  7 - 25 mg/dL Final    Creatinine 08/20/2020 1.28* 0.70 - 1.11 mg/dL Final    eGFR if non African American 08/20/2020 49* > OR = 60 mL/min/1.73m2 Final    eGFR if  08/20/2020 57* > OR = 60 mL/min/1.73m2 Final    BUN/Creatinine Ratio 08/20/2020 20  6 - 22 (calc) Final    Sodium 08/20/2020 143  135 - 146 mmol/L Final    Potassium 08/20/2020 4.3  3.5 - 5.3 " mmol/L Final    Chloride 08/20/2020 104  98 - 110 mmol/L Final    CO2 08/20/2020 27  20 - 32 mmol/L Final    Calcium 08/20/2020 9.6  8.6 - 10.3 mg/dL Final    Hemoglobin A1C 08/20/2020 6.4* <5.7 % of total Hgb Final   (    Assessment:     1. Essential hypertension    2. High cholesterol    3. Benign prostatic hyperplasia with lower urinary tract symptoms, symptom details unspecified    4. Type 2 diabetes mellitus with other specified complication, without long-term current use of insulin    5. Stage 3 chronic kidney disease, unspecified whether stage 3a or 3b CKD    6. Chest pain of uncertain etiology    7. Dyspnea on exertion    8. Localized edema      Plan:   Husam was seen today for follow-up.    Diagnoses and all orders for this visit:    Essential hypertension  -     IN OFFICE EKG 12-LEAD (to Muse)  -     TSH; Future    High cholesterol  -     IN OFFICE EKG 12-LEAD (to Muse)  -     Lipid Panel; Future  -     TSH; Future    Benign prostatic hyperplasia with lower urinary tract symptoms, symptom details unspecified    Type 2 diabetes mellitus with other specified complication, without long-term current use of insulin  -     Comprehensive Metabolic Panel; Future    Stage 3 chronic kidney disease, unspecified whether stage 3a or 3b CKD  -     CBC auto differential; Future  -     Comprehensive Metabolic Panel; Future  -     BNP; Future    Chest pain of uncertain etiology  -     Stress Echo Which stress agent will be used? Treadmill Exercise; Color Flow Doppler? No; Future  -     CBC auto differential; Future  -     Comprehensive Metabolic Panel; Future  -     BNP; Future  -     X-Ray Chest PA And Lateral; Future    Dyspnea on exertion  -     Stress Echo Which stress agent will be used? Treadmill Exercise; Color Flow Doppler? No; Future  -     CBC auto differential; Future  -     Comprehensive Metabolic Panel; Future  -     BNP; Future  -     X-Ray Chest PA And Lateral; Future    Localized edema  -     Stress  Echo Which stress agent will be used? Treadmill Exercise; Color Flow Doppler? No; Future  -     CBC auto differential; Future  -     Comprehensive Metabolic Panel; Future  -     BNP; Future  -     X-Ray Chest PA And Lateral; Future    Other orders  -     valsartan (DIOVAN) 160 MG tablet; Take 1 tablet (160 mg total) by mouth once daily.     Pt knows to avoid NSAID's    The edema is due to venous insufficiency of the lower extremities    The chest tightness and shortness of breat with exertion may represent angina pectoris due to CAD    Will get a treadmill stress echo to risk stratify and evaluate pt for obstructive CAD;  John protocol    Check lab    Repeat CXR    Pt may have a component of white coat hypertension    Nevertheless will increase the valsartan to 160 mg daily    Limit salt intake    Keep BP log    Bring in home sphygmomanometer    Follow up in about 4 weeks (around 11/5/2020).

## 2020-10-19 ENCOUNTER — HOSPITAL ENCOUNTER (OUTPATIENT)
Dept: CARDIOLOGY | Facility: HOSPITAL | Age: 85
Discharge: HOME | End: 2020-10-19
Attending: INTERNAL MEDICINE
Payer: MEDICARE

## 2020-10-19 ENCOUNTER — TELEPHONE (OUTPATIENT)
Dept: CARDIOLOGY | Facility: CLINIC | Age: 85
End: 2020-10-19

## 2020-10-19 ENCOUNTER — HOSPITAL ENCOUNTER (OUTPATIENT)
Dept: RADIOLOGY | Facility: HOSPITAL | Age: 85
Discharge: HOME | End: 2020-10-19
Attending: INTERNAL MEDICINE
Payer: MEDICARE

## 2020-10-19 DIAGNOSIS — R07.9 CHEST PAIN OF UNCERTAIN ETIOLOGY: ICD-10-CM

## 2020-10-19 DIAGNOSIS — R06.09 DYSPNEA ON EXERTION: ICD-10-CM

## 2020-10-19 DIAGNOSIS — R60.0 LOCALIZED EDEMA: ICD-10-CM

## 2020-10-19 LAB
CV STRESS BASE HR: 76 BPM
DIASTOLIC BLOOD PRESSURE: 82 MMHG
OHS CV CPX 1 MINUTE RECOVERY HEART RATE: 101 BPM
OHS CV CPX 85 PERCENT MAX PREDICTED HEART RATE MALE: 111
OHS CV CPX ESTIMATED METS: 5
OHS CV CPX MAX PREDICTED HEART RATE: 131
OHS CV CPX PATIENT IS FEMALE: 0
OHS CV CPX PATIENT IS MALE: 1
OHS CV CPX PEAK DIASTOLIC BLOOD PRESSURE: 91 MMHG
OHS CV CPX PEAK HEAR RATE: 114 BPM
OHS CV CPX PEAK RATE PRESSURE PRODUCT: NORMAL
OHS CV CPX PEAK SYSTOLIC BLOOD PRESSURE: 211 MMHG
OHS CV CPX PERCENT MAX PREDICTED HEART RATE ACHIEVED: 87
OHS CV CPX RATE PRESSURE PRODUCT PRESENTING: NORMAL
STRESS ECHO POST EXERCISE DUR MIN: 10 MINUTES
STRESS ECHO POST EXERCISE DUR SEC: 2 SECONDS
SYSTOLIC BLOOD PRESSURE: 205 MMHG

## 2020-10-19 PROCEDURE — 71046 X-RAY EXAM CHEST 2 VIEWS: CPT | Mod: TC,FY

## 2020-10-19 PROCEDURE — 93351 STRESS TTE COMPLETE: CPT

## 2020-10-19 PROCEDURE — 71046 XR CHEST PA AND LATERAL: ICD-10-PCS | Mod: 26,,, | Performed by: RADIOLOGY

## 2020-10-19 PROCEDURE — 93351 STRESS TTE COMPLETE: CPT | Mod: 26,,, | Performed by: INTERNAL MEDICINE

## 2020-10-19 PROCEDURE — 71046 X-RAY EXAM CHEST 2 VIEWS: CPT | Mod: 26,,, | Performed by: RADIOLOGY

## 2020-10-19 PROCEDURE — 93351 STRESS ECHO (CUPID ONLY): ICD-10-PCS | Mod: 26,,, | Performed by: INTERNAL MEDICINE

## 2020-10-19 NOTE — TELEPHONE ENCOUNTER
Stress ECG and stress echocardiogram are normal.  Pt reassured.    Pt will monitor blood pressure.  If it remains elevated will increase the valsartan.

## 2020-11-10 ENCOUNTER — OFFICE VISIT (OUTPATIENT)
Dept: CARDIOLOGY | Facility: CLINIC | Age: 85
End: 2020-11-10
Payer: MEDICARE

## 2020-11-10 VITALS
SYSTOLIC BLOOD PRESSURE: 160 MMHG | BODY MASS INDEX: 24.14 KG/M2 | DIASTOLIC BLOOD PRESSURE: 70 MMHG | HEIGHT: 70 IN | WEIGHT: 168.63 LBS | HEART RATE: 71 BPM

## 2020-11-10 DIAGNOSIS — R06.09 DYSPNEA ON EXERTION: ICD-10-CM

## 2020-11-10 DIAGNOSIS — R07.9 CHEST PAIN OF UNCERTAIN ETIOLOGY: ICD-10-CM

## 2020-11-10 DIAGNOSIS — I10 WHITE COAT SYNDROME WITH DIAGNOSIS OF HYPERTENSION: ICD-10-CM

## 2020-11-10 DIAGNOSIS — E78.00 HIGH CHOLESTEROL: Chronic | ICD-10-CM

## 2020-11-10 DIAGNOSIS — R60.0 LOCALIZED EDEMA: ICD-10-CM

## 2020-11-10 DIAGNOSIS — I10 ESSENTIAL HYPERTENSION: Primary | Chronic | ICD-10-CM

## 2020-11-10 DIAGNOSIS — E11.69 TYPE 2 DIABETES MELLITUS WITH OTHER SPECIFIED COMPLICATION, WITHOUT LONG-TERM CURRENT USE OF INSULIN: ICD-10-CM

## 2020-11-10 PROCEDURE — 1101F PT FALLS ASSESS-DOCD LE1/YR: CPT | Mod: CPTII,S$GLB,, | Performed by: INTERNAL MEDICINE

## 2020-11-10 PROCEDURE — 1159F MED LIST DOCD IN RCRD: CPT | Mod: S$GLB,,, | Performed by: INTERNAL MEDICINE

## 2020-11-10 PROCEDURE — 99214 PR OFFICE/OUTPT VISIT, EST, LEVL IV, 30-39 MIN: ICD-10-PCS | Mod: S$GLB,,, | Performed by: INTERNAL MEDICINE

## 2020-11-10 PROCEDURE — 99999 PR PBB SHADOW E&M-EST. PATIENT-LVL III: ICD-10-PCS | Mod: PBBFAC,,, | Performed by: INTERNAL MEDICINE

## 2020-11-10 PROCEDURE — 99214 OFFICE O/P EST MOD 30 MIN: CPT | Mod: S$GLB,,, | Performed by: INTERNAL MEDICINE

## 2020-11-10 PROCEDURE — 1101F PR PT FALLS ASSESS DOC 0-1 FALLS W/OUT INJ PAST YR: ICD-10-PCS | Mod: CPTII,S$GLB,, | Performed by: INTERNAL MEDICINE

## 2020-11-10 PROCEDURE — 99999 PR PBB SHADOW E&M-EST. PATIENT-LVL III: CPT | Mod: PBBFAC,,, | Performed by: INTERNAL MEDICINE

## 2020-11-10 PROCEDURE — 1159F PR MEDICATION LIST DOCUMENTED IN MEDICAL RECORD: ICD-10-PCS | Mod: S$GLB,,, | Performed by: INTERNAL MEDICINE

## 2020-11-10 NOTE — PROGRESS NOTES
"  Subjective:      Patient ID: Husam Connolly is a 89 y.o. male.    Chief Complaint: Follow-up (Hypertension)    HPI:  No side effects from higher dose of valsartan.    Walks 5 days a week a mile and a half each day    "I feel very tired"    "The mask adds an additional problem with my breathing."    Pt feels a little tight in the chest when walking but pt is able to continue walking and it doesn't get any worse.    Review of Systems   Cardiovascular: Positive for chest pain and dyspnea on exertion. Negative for claudication, irregular heartbeat, leg swelling, near-syncope, orthopnea, palpitations and syncope.      Pt has chronic clear mucous nasal drip      Past Medical History:   Diagnosis Date    Anticoagulant long-term use     Bowel obstruction     BPH (benign prostatic hyperplasia)     Diabetes mellitus     Diverticulitis     Hyperlipidemia     Hypertension         Past Surgical History:   Procedure Laterality Date    ANASTOMOSIS OF ILEUM TO RECTUM  9/9/2019    Procedure: ANASTOMOSIS, COLO-RECTAL;  Surgeon: Fouzia Gardner MD;  Location: [facility] OR;  Service: General;;    COLOSTOMY  6/16/2019    Procedure: CREATION, COLOSTOMY;  Surgeon: Fouzia Gardner MD;  Location: [facility] OR;  Service: General;;    EYE SURGERY      GISELA PROCEDURE  6/16/2019    Procedure: GISELA PROCEDURE;  Surgeon: Fouzia Gardner MD;  Location: [facility] OR;  Service: General;;  Colon resection    TONSILLECTOMY         Family History   Problem Relation Age of Onset    Heart disease Father     Hypertension Father     Cancer Mother     Diabetes Paternal Aunt        Social History     Socioeconomic History    Marital status:      Spouse name: Not on file    Number of children: Not on file    Years of education: Not on file    Highest education level: Not on file   Occupational History    Not on file   Social Needs    Financial resource strain: Not on file    Food insecurity     Worry: Not on file     " Inability: Not on file    Transportation needs     Medical: Not on file     Non-medical: Not on file   Tobacco Use    Smoking status: Former Smoker     Packs/day: 1.00     Years: 40.00     Pack years: 40.00    Smokeless tobacco: Never Used   Substance and Sexual Activity    Alcohol use: Yes     Alcohol/week: 2.0 standard drinks     Types: 2 Glasses of wine per week     Comment: drinks occassionally    Drug use: No    Sexual activity: Yes     Partners: Female   Lifestyle    Physical activity     Days per week: Not on file     Minutes per session: Not on file    Stress: Not on file   Relationships    Social connections     Talks on phone: Not on file     Gets together: Not on file     Attends Congregational service: Not on file     Active member of club or organization: Not on file     Attends meetings of clubs or organizations: Not on file     Relationship status: Not on file   Other Topics Concern    Not on file   Social History Narrative    Not on file       Current Outpatient Medications on File Prior to Visit   Medication Sig Dispense Refill    aspirin (ECOTRIN) 81 MG EC tablet Take 81 mg by mouth once daily.      atorvastatin (LIPITOR) 40 MG tablet TAKE 1 TABLET BY MOUTH EVERY DAY 90 tablet 3    azelastine (ASTELIN) 137 mcg (0.1 %) nasal spray 2 SPRAYS INTO EACH NOSTRIL TWICE A DAY 30 mL 5    finasteride (PROSCAR) 5 mg tablet TAKE 1 TABLET BY MOUTH EVERY DAY 90 tablet 1    metFORMIN (GLUCOPHAGE) 500 MG tablet Take 500 mg by mouth 2 (two) times daily with meals.   3    multivitamin capsule Take 1 capsule by mouth once daily.      tamsulosin (FLOMAX) 0.4 mg Cap Take 1 capsule by mouth nightly.  3    valsartan (DIOVAN) 160 MG tablet Take 1 tablet (160 mg total) by mouth once daily. 90 tablet 3     No current facility-administered medications on file prior to visit.        Review of patient's allergies indicates:   Allergen Reactions    Solarcaine [benzocaine-triclosan]     Sulfa (sulfonamide  "antibiotics)     Sulfamethoxazole     Trimethoprim     Hytrin [terazosin] Rash    Smz-tmp ds [sulfamethoxazole-trimethoprim] Rash     Objective:     Vitals:    11/10/20 1133 11/10/20 1151   BP: (!) 159/67 (!) 160/70   BP Location: Left arm Left arm   Patient Position: Sitting Sitting   BP Method: Medium (Automatic)    Pulse: 71    Weight: 76.5 kg (168 lb 10.4 oz)    Height: 5' 10" (1.778 m)         Physical Exam   Constitutional: He is oriented to person, place, and time. He appears well-developed and well-nourished. No distress.   Eyes: No scleral icterus.   Neck: No JVD present. Carotid bruit is not present.   Cardiovascular: Regular rhythm and normal heart sounds. Exam reveals no gallop and no friction rub.   No murmur heard.  Pulmonary/Chest: Effort normal. No respiratory distress. He has rales (few crackles in bases).   Musculoskeletal:         General: Edema (trace pedal edema LLE>RLE) present.   Neurological: He is alert and oriented to person, place, and time.   Skin: Skin is warm and dry. He is not diaphoretic.   Psychiatric: He has a normal mood and affect. His behavior is normal. Judgment and thought content normal.          CBC and CMP and TSH and lipid profile WNL except     Stress ECG   WNL    Stress echocardiogram WNL    BP log from home reviewed:  116/79 to 167/76 ranged, usually 120's and 130's systolic.  Pt's home machine confirmed to be accurate    CXR: heart and lungs OK    Hospital Outpatient Visit on 10/19/2020   Component Date Value Ref Range Status    Systolic blood pressure 10/19/2020 205  mmHg Final    Diastolic blood pressure 10/19/2020 82  mmHg Final    HR at rest 10/19/2020 76  bpm Final    RPP 10/19/2020 15,580   Final    Peak HR 10/19/2020 114  bpm Final    Peak Systolic BP 10/19/2020 211  mmHg Final    Peak Diatolic BP 10/19/2020 91  mmHg Final    Peak RPP 10/19/2020 24,054   Final    Estimated METs 10/19/2020 5   Final    Max Predicted HR 10/19/2020 131   Final "    85% Max Predicted HR 10/19/2020 111   Final    % Max HR Achieved 10/19/2020 87   Final    1 Minute Recovery HR 10/19/2020 101  bpm Final    OHS CV CPX PATIENT IS MALE 10/19/2020 1   Final    OHS CV CPX PATIENT IS FEMALE 10/19/2020 0   Final    Exercise duration (sec) 10/19/2020 2  seconds Final    Exercise duration (min) 10/19/2020 10  minutes Final   Lab Visit on 10/19/2020   Component Date Value Ref Range Status    WBC 10/19/2020 6.77  3.90 - 12.70 K/uL Final    RBC 10/19/2020 5.61  4.60 - 6.20 M/uL Final    Hemoglobin 10/19/2020 15.8  14.0 - 18.0 g/dL Final    Hematocrit 10/19/2020 49.7  40.0 - 54.0 % Final    MCV 10/19/2020 89  82 - 98 fL Final    MCH 10/19/2020 28.2  27.0 - 31.0 pg Final    MCHC 10/19/2020 31.8* 32.0 - 36.0 g/dL Final    RDW 10/19/2020 13.5  11.5 - 14.5 % Final    Platelets 10/19/2020 201  150 - 350 K/uL Final    MPV 10/19/2020 11.9  9.2 - 12.9 fL Final    Immature Granulocytes 10/19/2020 0.1  0.0 - 0.5 % Final    Gran # (ANC) 10/19/2020 3.6  1.8 - 7.7 K/uL Final    Immature Grans (Abs) 10/19/2020 0.01  0.00 - 0.04 K/uL Final    Lymph # 10/19/2020 2.2  1.0 - 4.8 K/uL Final    Mono # 10/19/2020 0.7  0.3 - 1.0 K/uL Final    Eos # 10/19/2020 0.3  0.0 - 0.5 K/uL Final    Baso # 10/19/2020 0.04  0.00 - 0.20 K/uL Final    nRBC 10/19/2020 0  0 /100 WBC Final    Gran % 10/19/2020 52.9  38.0 - 73.0 % Final    Lymph % 10/19/2020 32.5  18.0 - 48.0 % Final    Mono % 10/19/2020 9.6  4.0 - 15.0 % Final    Eosinophil % 10/19/2020 4.3  0.0 - 8.0 % Final    Basophil % 10/19/2020 0.6  0.0 - 1.9 % Final    Differential Method 10/19/2020 Automated   Final    Sodium 10/19/2020 140  136 - 145 mmol/L Final    Potassium 10/19/2020 4.5  3.5 - 5.1 mmol/L Final    Chloride 10/19/2020 101  95 - 110 mmol/L Final    CO2 10/19/2020 30* 23 - 29 mmol/L Final    Glucose 10/19/2020 132* 70 - 110 mg/dL Final    BUN 10/19/2020 23  8 - 23 mg/dL Final    Creatinine 10/19/2020 1.3  0.5 - 1.4  mg/dL Final    Calcium 10/19/2020 9.8  8.7 - 10.5 mg/dL Final    Total Protein 10/19/2020 7.1  6.0 - 8.4 g/dL Final    Albumin 10/19/2020 3.9  3.5 - 5.2 g/dL Final    Total Bilirubin 10/19/2020 1.0  0.1 - 1.0 mg/dL Final    Alkaline Phosphatase 10/19/2020 98  55 - 135 U/L Final    AST 10/19/2020 23  10 - 40 U/L Final    ALT 10/19/2020 17  10 - 44 U/L Final    Anion Gap 10/19/2020 9  8 - 16 mmol/L Final    eGFR if  10/19/2020 56* >60 mL/min/1.73 m^2 Final    eGFR if non African American 10/19/2020 48* >60 mL/min/1.73 m^2 Final    BNP 10/19/2020 49  0 - 99 pg/mL Final    Cholesterol 10/19/2020 163  120 - 199 mg/dL Final    Triglycerides 10/19/2020 119  30 - 150 mg/dL Final    HDL 10/19/2020 65  40 - 75 mg/dL Final    LDL Cholesterol 10/19/2020 74.2  63.0 - 159.0 mg/dL Final    HDL/Cholesterol Ratio 10/19/2020 39.9  20.0 - 50.0 % Final    Total Cholesterol/HDL Ratio 10/19/2020 2.5  2.0 - 5.0 Final    Non-HDL Cholesterol 10/19/2020 98  mg/dL Final    TSH 10/19/2020 1.015  0.400 - 4.000 uIU/mL Final   Telephone on 08/16/2020   Component Date Value Ref Range Status    Glucose 08/20/2020 97  65 - 99 mg/dL Final    BUN 08/20/2020 25  7 - 25 mg/dL Final    Creatinine 08/20/2020 1.28* 0.70 - 1.11 mg/dL Final    eGFR if non African American 08/20/2020 49* > OR = 60 mL/min/1.73m2 Final    eGFR if  08/20/2020 57* > OR = 60 mL/min/1.73m2 Final    BUN/Creatinine Ratio 08/20/2020 20  6 - 22 (calc) Final    Sodium 08/20/2020 143  135 - 146 mmol/L Final    Potassium 08/20/2020 4.3  3.5 - 5.3 mmol/L Final    Chloride 08/20/2020 104  98 - 110 mmol/L Final    CO2 08/20/2020 27  20 - 32 mmol/L Final    Calcium 08/20/2020 9.6  8.6 - 10.3 mg/dL Final    Hemoglobin A1C 08/20/2020 6.4* <5.7 % of total Hgb Final   (    Assessment:     1. Essential hypertension    2. High cholesterol    3. Dyspnea on exertion    4. Chest pain of uncertain etiology    5. Type 2 diabetes mellitus  with other specified complication, without long-term current use of insulin    6. Localized edema    7. White coat syndrome with diagnosis of hypertension      Plan:   Husam was seen today for follow-up.    Diagnoses and all orders for this visit:    Essential hypertension    High cholesterol    Dyspnea on exertion    Chest pain of uncertain etiology    Type 2 diabetes mellitus with other specified complication, without long-term current use of insulin    Localized edema    White coat syndrome with diagnosis of hypertension     Pt may have a component of white coat hypertension    Discussed increasing the valsartan to bid.  However pt's BP at home is almost always normal    Therefore will continue current regimen and pt will monitor BP at home    The edema is venous insufficiency edema of LE    F/u with Dr Green    Follow up in about 6 months (around 5/10/2021).

## 2021-01-05 ENCOUNTER — NURSE TRIAGE (OUTPATIENT)
Dept: ADMINISTRATIVE | Facility: CLINIC | Age: 86
End: 2021-01-05

## 2021-01-05 ENCOUNTER — PATIENT MESSAGE (OUTPATIENT)
Dept: ADMINISTRATIVE | Facility: OTHER | Age: 86
End: 2021-01-05

## 2021-01-16 ENCOUNTER — IMMUNIZATION (OUTPATIENT)
Dept: INTERNAL MEDICINE | Facility: CLINIC | Age: 86
End: 2021-01-16
Payer: MEDICARE

## 2021-01-16 DIAGNOSIS — Z23 NEED FOR VACCINATION: Primary | ICD-10-CM

## 2021-01-16 PROCEDURE — 91300 COVID-19, MRNA, LNP-S, PF, 30 MCG/0.3 ML DOSE VACCINE: CPT | Mod: PBBFAC | Performed by: FAMILY MEDICINE

## 2021-02-06 ENCOUNTER — IMMUNIZATION (OUTPATIENT)
Dept: INTERNAL MEDICINE | Facility: CLINIC | Age: 86
End: 2021-02-06
Payer: MEDICARE

## 2021-02-06 DIAGNOSIS — Z23 NEED FOR VACCINATION: Primary | ICD-10-CM

## 2021-02-06 PROCEDURE — 91300 COVID-19, MRNA, LNP-S, PF, 30 MCG/0.3 ML DOSE VACCINE: CPT | Mod: PBBFAC | Performed by: FAMILY MEDICINE

## 2021-02-06 PROCEDURE — 0002A COVID-19, MRNA, LNP-S, PF, 30 MCG/0.3 ML DOSE VACCINE: CPT | Mod: PBBFAC | Performed by: FAMILY MEDICINE

## 2021-03-01 PROBLEM — R07.9 CHEST PAIN OF UNCERTAIN ETIOLOGY: Status: RESOLVED | Noted: 2020-10-08 | Resolved: 2021-03-01

## 2021-03-01 PROBLEM — J31.0 CHRONIC RHINITIS: Status: ACTIVE | Noted: 2021-03-01

## 2021-03-01 PROBLEM — I10 WHITE COAT SYNDROME WITH DIAGNOSIS OF HYPERTENSION: Status: RESOLVED | Noted: 2020-11-10 | Resolved: 2021-03-01

## 2021-03-01 PROBLEM — Z93.3 S/P COLOSTOMY: Status: ACTIVE | Noted: 2021-03-01

## 2021-04-12 DIAGNOSIS — E78.5 HLD (HYPERLIPIDEMIA): ICD-10-CM

## 2021-04-12 RX ORDER — ATORVASTATIN CALCIUM 40 MG/1
40 TABLET, FILM COATED ORAL DAILY
Qty: 90 TABLET | Refills: 3 | Status: SHIPPED | OUTPATIENT
Start: 2021-04-12 | End: 2022-04-11

## 2021-05-11 ENCOUNTER — OFFICE VISIT (OUTPATIENT)
Dept: CARDIOLOGY | Facility: CLINIC | Age: 86
End: 2021-05-11
Payer: MEDICARE

## 2021-05-11 VITALS
HEART RATE: 81 BPM | WEIGHT: 172.38 LBS | SYSTOLIC BLOOD PRESSURE: 169 MMHG | BODY MASS INDEX: 24.68 KG/M2 | HEIGHT: 70 IN | OXYGEN SATURATION: 96 % | DIASTOLIC BLOOD PRESSURE: 76 MMHG

## 2021-05-11 DIAGNOSIS — R06.09 DYSPNEA ON EXERTION: ICD-10-CM

## 2021-05-11 DIAGNOSIS — R60.0 LOCALIZED EDEMA: ICD-10-CM

## 2021-05-11 DIAGNOSIS — N40.1 BENIGN PROSTATIC HYPERPLASIA WITH LOWER URINARY TRACT SYMPTOMS, SYMPTOM DETAILS UNSPECIFIED: ICD-10-CM

## 2021-05-11 DIAGNOSIS — I45.10 NEW ONSET RIGHT BUNDLE BRANCH BLOCK (RBBB): ICD-10-CM

## 2021-05-11 DIAGNOSIS — I70.0 AORTIC ATHEROSCLEROSIS: ICD-10-CM

## 2021-05-11 DIAGNOSIS — I10 ESSENTIAL HYPERTENSION: ICD-10-CM

## 2021-05-11 DIAGNOSIS — E11.69 TYPE 2 DIABETES MELLITUS WITH OTHER SPECIFIED COMPLICATION, WITHOUT LONG-TERM CURRENT USE OF INSULIN: ICD-10-CM

## 2021-05-11 DIAGNOSIS — I10 ESSENTIAL HYPERTENSION: Primary | Chronic | ICD-10-CM

## 2021-05-11 DIAGNOSIS — N18.30 STAGE 3 CHRONIC KIDNEY DISEASE, UNSPECIFIED WHETHER STAGE 3A OR 3B CKD: ICD-10-CM

## 2021-05-11 DIAGNOSIS — E78.00 HIGH CHOLESTEROL: Chronic | ICD-10-CM

## 2021-05-11 PROCEDURE — 99214 PR OFFICE/OUTPT VISIT, EST, LEVL IV, 30-39 MIN: ICD-10-PCS | Mod: 25,S$GLB,, | Performed by: INTERNAL MEDICINE

## 2021-05-11 PROCEDURE — 3288F PR FALLS RISK ASSESSMENT DOCUMENTED: ICD-10-PCS | Mod: CPTII,S$GLB,, | Performed by: INTERNAL MEDICINE

## 2021-05-11 PROCEDURE — 1126F PR PAIN SEVERITY QUANTIFIED, NO PAIN PRESENT: ICD-10-PCS | Mod: S$GLB,,, | Performed by: INTERNAL MEDICINE

## 2021-05-11 PROCEDURE — 1159F MED LIST DOCD IN RCRD: CPT | Mod: S$GLB,,, | Performed by: INTERNAL MEDICINE

## 2021-05-11 PROCEDURE — 1126F AMNT PAIN NOTED NONE PRSNT: CPT | Mod: S$GLB,,, | Performed by: INTERNAL MEDICINE

## 2021-05-11 PROCEDURE — 1101F PR PT FALLS ASSESS DOC 0-1 FALLS W/OUT INJ PAST YR: ICD-10-PCS | Mod: CPTII,S$GLB,, | Performed by: INTERNAL MEDICINE

## 2021-05-11 PROCEDURE — 93000 ELECTROCARDIOGRAM COMPLETE: CPT | Mod: S$GLB,,, | Performed by: INTERNAL MEDICINE

## 2021-05-11 PROCEDURE — 1159F PR MEDICATION LIST DOCUMENTED IN MEDICAL RECORD: ICD-10-PCS | Mod: S$GLB,,, | Performed by: INTERNAL MEDICINE

## 2021-05-11 PROCEDURE — 99999 PR PBB SHADOW E&M-EST. PATIENT-LVL III: CPT | Mod: PBBFAC,,, | Performed by: INTERNAL MEDICINE

## 2021-05-11 PROCEDURE — 99999 PR PBB SHADOW E&M-EST. PATIENT-LVL III: ICD-10-PCS | Mod: PBBFAC,,, | Performed by: INTERNAL MEDICINE

## 2021-05-11 PROCEDURE — 93000 EKG 12-LEAD: ICD-10-PCS | Mod: S$GLB,,, | Performed by: INTERNAL MEDICINE

## 2021-05-11 PROCEDURE — 99214 OFFICE O/P EST MOD 30 MIN: CPT | Mod: 25,S$GLB,, | Performed by: INTERNAL MEDICINE

## 2021-05-11 PROCEDURE — 1101F PT FALLS ASSESS-DOCD LE1/YR: CPT | Mod: CPTII,S$GLB,, | Performed by: INTERNAL MEDICINE

## 2021-05-11 PROCEDURE — 3288F FALL RISK ASSESSMENT DOCD: CPT | Mod: CPTII,S$GLB,, | Performed by: INTERNAL MEDICINE

## 2021-05-11 RX ORDER — HYDROCHLOROTHIAZIDE 12.5 MG/1
12.5 CAPSULE ORAL DAILY
Qty: 90 CAPSULE | Refills: 3 | Status: ON HOLD | OUTPATIENT
Start: 2021-05-11 | End: 2021-06-03 | Stop reason: SDUPTHER

## 2021-05-19 ENCOUNTER — TELEPHONE (OUTPATIENT)
Dept: CARDIOLOGY | Facility: CLINIC | Age: 86
End: 2021-05-19

## 2021-05-31 ENCOUNTER — HOSPITAL ENCOUNTER (INPATIENT)
Facility: HOSPITAL | Age: 86
LOS: 3 days | Discharge: HOME | DRG: 390 | End: 2021-06-03
Attending: EMERGENCY MEDICINE | Admitting: SURGERY
Payer: MEDICARE

## 2021-05-31 DIAGNOSIS — J31.0 CHRONIC RHINITIS: ICD-10-CM

## 2021-05-31 DIAGNOSIS — N18.30 STAGE 3 CHRONIC KIDNEY DISEASE, UNSPECIFIED WHETHER STAGE 3A OR 3B CKD: ICD-10-CM

## 2021-05-31 DIAGNOSIS — Z79.01 ANTICOAGULANT LONG-TERM USE: ICD-10-CM

## 2021-05-31 DIAGNOSIS — N40.1 BENIGN PROSTATIC HYPERPLASIA WITH LOWER URINARY TRACT SYMPTOMS, SYMPTOM DETAILS UNSPECIFIED: ICD-10-CM

## 2021-05-31 DIAGNOSIS — I10 ESSENTIAL HYPERTENSION: ICD-10-CM

## 2021-05-31 DIAGNOSIS — E78.00 HIGH CHOLESTEROL: Chronic | ICD-10-CM

## 2021-05-31 DIAGNOSIS — K56.609 SBO (SMALL BOWEL OBSTRUCTION): Primary | ICD-10-CM

## 2021-05-31 DIAGNOSIS — E11.69 TYPE 2 DIABETES MELLITUS WITH OTHER SPECIFIED COMPLICATION, WITHOUT LONG-TERM CURRENT USE OF INSULIN: ICD-10-CM

## 2021-05-31 DIAGNOSIS — Z46.59 ENCOUNTER FOR NASOGASTRIC TUBE PLACEMENT: ICD-10-CM

## 2021-05-31 LAB
ALBUMIN SERPL BCP-MCNC: 4 G/DL (ref 3.5–5.2)
ALP SERPL-CCNC: 103 U/L (ref 55–135)
ALT SERPL W/O P-5'-P-CCNC: 18 U/L (ref 10–44)
ANION GAP SERPL CALC-SCNC: 12 MMOL/L (ref 8–16)
AST SERPL-CCNC: 18 U/L (ref 10–40)
BACTERIA #/AREA URNS HPF: NORMAL /HPF
BASOPHILS # BLD AUTO: 0.05 K/UL (ref 0–0.2)
BASOPHILS NFR BLD: 0.3 % (ref 0–1.9)
BILIRUB SERPL-MCNC: 1.4 MG/DL (ref 0.1–1)
BILIRUB UR QL STRIP: NEGATIVE
BUN SERPL-MCNC: 28 MG/DL (ref 8–23)
CALCIUM SERPL-MCNC: 10.1 MG/DL (ref 8.7–10.5)
CHLORIDE SERPL-SCNC: 96 MMOL/L (ref 95–110)
CLARITY UR: CLEAR
CO2 SERPL-SCNC: 32 MMOL/L (ref 23–29)
COLOR UR: YELLOW
CREAT SERPL-MCNC: 1.5 MG/DL (ref 0.5–1.4)
DIFFERENTIAL METHOD: ABNORMAL
EOSINOPHIL # BLD AUTO: 0 K/UL (ref 0–0.5)
EOSINOPHIL NFR BLD: 0.2 % (ref 0–8)
ERYTHROCYTE [DISTWIDTH] IN BLOOD BY AUTOMATED COUNT: 12.9 % (ref 11.5–14.5)
EST. GFR  (AFRICAN AMERICAN): 47 ML/MIN/1.73 M^2
EST. GFR  (NON AFRICAN AMERICAN): 40 ML/MIN/1.73 M^2
GLUCOSE SERPL-MCNC: 278 MG/DL (ref 70–110)
GLUCOSE UR QL STRIP: ABNORMAL
HCT VFR BLD AUTO: 52.1 % (ref 40–54)
HGB BLD-MCNC: 17.7 G/DL (ref 14–18)
HGB UR QL STRIP: NEGATIVE
HYALINE CASTS #/AREA URNS LPF: 0 /LPF
IMM GRANULOCYTES # BLD AUTO: 0.03 K/UL (ref 0–0.04)
IMM GRANULOCYTES NFR BLD AUTO: 0.2 % (ref 0–0.5)
KETONES UR QL STRIP: NEGATIVE
LACTATE SERPL-SCNC: 2.9 MMOL/L (ref 0.5–2.2)
LEUKOCYTE ESTERASE UR QL STRIP: NEGATIVE
LIPASE SERPL-CCNC: 15 U/L (ref 4–60)
LYMPHOCYTES # BLD AUTO: 0.7 K/UL (ref 1–4.8)
LYMPHOCYTES NFR BLD: 4.6 % (ref 18–48)
MAGNESIUM SERPL-MCNC: 2.1 MG/DL (ref 1.6–2.6)
MCH RBC QN AUTO: 29.3 PG (ref 27–31)
MCHC RBC AUTO-ENTMCNC: 34 G/DL (ref 32–36)
MCV RBC AUTO: 86 FL (ref 82–98)
MICROSCOPIC COMMENT: NORMAL
MONOCYTES # BLD AUTO: 1.3 K/UL (ref 0.3–1)
MONOCYTES NFR BLD: 8.6 % (ref 4–15)
NEUTROPHILS # BLD AUTO: 13.4 K/UL (ref 1.8–7.7)
NEUTROPHILS NFR BLD: 86.1 % (ref 38–73)
NITRITE UR QL STRIP: NEGATIVE
NRBC BLD-RTO: 0 /100 WBC
PH UR STRIP: 6 [PH] (ref 5–8)
PLATELET # BLD AUTO: 220 K/UL (ref 150–450)
PMV BLD AUTO: 12 FL (ref 9.2–12.9)
POCT GLUCOSE: 213 MG/DL (ref 70–110)
POTASSIUM SERPL-SCNC: 4.4 MMOL/L (ref 3.5–5.1)
PROT SERPL-MCNC: 7.7 G/DL (ref 6–8.4)
PROT UR QL STRIP: ABNORMAL
RBC # BLD AUTO: 6.04 M/UL (ref 4.6–6.2)
RBC #/AREA URNS HPF: 3 /HPF (ref 0–4)
SODIUM SERPL-SCNC: 140 MMOL/L (ref 136–145)
SP GR UR STRIP: 1.02 (ref 1–1.03)
TROPONIN I SERPL DL<=0.01 NG/ML-MCNC: 0.01 NG/ML (ref 0–0.03)
URN SPEC COLLECT METH UR: ABNORMAL
UROBILINOGEN UR STRIP-ACNC: NEGATIVE EU/DL
WBC # BLD AUTO: 15.56 K/UL (ref 3.9–12.7)
WBC #/AREA URNS HPF: 2 /HPF (ref 0–5)

## 2021-05-31 PROCEDURE — 83690 ASSAY OF LIPASE: CPT | Performed by: EMERGENCY MEDICINE

## 2021-05-31 PROCEDURE — 81000 URINALYSIS NONAUTO W/SCOPE: CPT | Performed by: EMERGENCY MEDICINE

## 2021-05-31 PROCEDURE — 96361 HYDRATE IV INFUSION ADD-ON: CPT

## 2021-05-31 PROCEDURE — 25500020 PHARM REV CODE 255: Performed by: EMERGENCY MEDICINE

## 2021-05-31 PROCEDURE — 85025 COMPLETE CBC W/AUTO DIFF WBC: CPT | Performed by: EMERGENCY MEDICINE

## 2021-05-31 PROCEDURE — 63600175 PHARM REV CODE 636 W HCPCS: Performed by: EMERGENCY MEDICINE

## 2021-05-31 PROCEDURE — 83735 ASSAY OF MAGNESIUM: CPT | Performed by: EMERGENCY MEDICINE

## 2021-05-31 PROCEDURE — 99900035 HC TECH TIME PER 15 MIN (STAT)

## 2021-05-31 PROCEDURE — 11000001 HC ACUTE MED/SURG PRIVATE ROOM

## 2021-05-31 PROCEDURE — 27000221 HC OXYGEN, UP TO 24 HOURS

## 2021-05-31 PROCEDURE — 99291 CRITICAL CARE FIRST HOUR: CPT | Mod: 25

## 2021-05-31 PROCEDURE — 96374 THER/PROPH/DIAG INJ IV PUSH: CPT

## 2021-05-31 PROCEDURE — 83605 ASSAY OF LACTIC ACID: CPT | Performed by: EMERGENCY MEDICINE

## 2021-05-31 PROCEDURE — 99292 CRITICAL CARE ADDL 30 MIN: CPT

## 2021-05-31 PROCEDURE — 80053 COMPREHEN METABOLIC PANEL: CPT | Performed by: EMERGENCY MEDICINE

## 2021-05-31 PROCEDURE — 25000003 PHARM REV CODE 250: Performed by: EMERGENCY MEDICINE

## 2021-05-31 PROCEDURE — 87040 BLOOD CULTURE FOR BACTERIA: CPT | Mod: 59 | Performed by: EMERGENCY MEDICINE

## 2021-05-31 PROCEDURE — 84484 ASSAY OF TROPONIN QUANT: CPT | Performed by: EMERGENCY MEDICINE

## 2021-05-31 RX ORDER — METOCLOPRAMIDE HYDROCHLORIDE 5 MG/ML
10 INJECTION INTRAMUSCULAR; INTRAVENOUS EVERY 6 HOURS
Status: DISCONTINUED | OUTPATIENT
Start: 2021-06-01 | End: 2021-06-01

## 2021-05-31 RX ORDER — SODIUM CHLORIDE 0.9 % (FLUSH) 0.9 %
10 SYRINGE (ML) INJECTION
Status: DISCONTINUED | OUTPATIENT
Start: 2021-05-31 | End: 2021-06-03 | Stop reason: HOSPADM

## 2021-05-31 RX ORDER — LIDOCAINE HYDROCHLORIDE 20 MG/ML
5 SOLUTION OROPHARYNGEAL
Status: COMPLETED | OUTPATIENT
Start: 2021-05-31 | End: 2021-05-31

## 2021-05-31 RX ORDER — SODIUM CHLORIDE 9 MG/ML
INJECTION, SOLUTION INTRAVENOUS
Status: COMPLETED | OUTPATIENT
Start: 2021-05-31 | End: 2021-05-31

## 2021-05-31 RX ORDER — SODIUM CHLORIDE AND POTASSIUM CHLORIDE 150; 900 MG/100ML; MG/100ML
INJECTION, SOLUTION INTRAVENOUS CONTINUOUS
Status: DISCONTINUED | OUTPATIENT
Start: 2021-05-31 | End: 2021-06-03 | Stop reason: HOSPADM

## 2021-05-31 RX ORDER — ONDANSETRON 2 MG/ML
4 INJECTION INTRAMUSCULAR; INTRAVENOUS
Status: COMPLETED | OUTPATIENT
Start: 2021-05-31 | End: 2021-05-31

## 2021-05-31 RX ADMIN — IOHEXOL 75 ML: 350 INJECTION, SOLUTION INTRAVENOUS at 12:05

## 2021-05-31 RX ADMIN — POTASSIUM CHLORIDE AND SODIUM CHLORIDE: 900; 150 INJECTION, SOLUTION INTRAVENOUS at 08:05

## 2021-05-31 RX ADMIN — LIDOCAINE HYDROCHLORIDE 5 ML: 20 SOLUTION ORAL; TOPICAL at 03:05

## 2021-05-31 RX ADMIN — ONDANSETRON 4 MG: 2 INJECTION INTRAMUSCULAR; INTRAVENOUS at 11:05

## 2021-05-31 RX ADMIN — SODIUM CHLORIDE: 0.9 INJECTION, SOLUTION INTRAVENOUS at 11:05

## 2021-06-01 LAB
ANION GAP SERPL CALC-SCNC: 10 MMOL/L (ref 8–16)
BASOPHILS # BLD AUTO: 0.04 K/UL (ref 0–0.2)
BASOPHILS NFR BLD: 0.4 % (ref 0–1.9)
BUN SERPL-MCNC: 26 MG/DL (ref 8–23)
CALCIUM SERPL-MCNC: 8.6 MG/DL (ref 8.7–10.5)
CHLORIDE SERPL-SCNC: 106 MMOL/L (ref 95–110)
CO2 SERPL-SCNC: 27 MMOL/L (ref 23–29)
CREAT SERPL-MCNC: 1.3 MG/DL (ref 0.5–1.4)
CREAT UR-MCNC: 64.2 MG/DL (ref 23–375)
DIFFERENTIAL METHOD: ABNORMAL
EOSINOPHIL # BLD AUTO: 0.1 K/UL (ref 0–0.5)
EOSINOPHIL NFR BLD: 1 % (ref 0–8)
ERYTHROCYTE [DISTWIDTH] IN BLOOD BY AUTOMATED COUNT: 13.2 % (ref 11.5–14.5)
EST. GFR  (AFRICAN AMERICAN): 56 ML/MIN/1.73 M^2
EST. GFR  (NON AFRICAN AMERICAN): 48 ML/MIN/1.73 M^2
ESTIMATED AVG GLUCOSE: 148 MG/DL (ref 68–131)
GLUCOSE SERPL-MCNC: 193 MG/DL (ref 70–110)
HBA1C MFR BLD: 6.8 % (ref 4–5.6)
HCT VFR BLD AUTO: 48.2 % (ref 40–54)
HGB BLD-MCNC: 16 G/DL (ref 14–18)
IMM GRANULOCYTES # BLD AUTO: 0.04 K/UL (ref 0–0.04)
IMM GRANULOCYTES NFR BLD AUTO: 0.4 % (ref 0–0.5)
LYMPHOCYTES # BLD AUTO: 1.6 K/UL (ref 1–4.8)
LYMPHOCYTES NFR BLD: 14.5 % (ref 18–48)
MCH RBC QN AUTO: 29.5 PG (ref 27–31)
MCHC RBC AUTO-ENTMCNC: 33.2 G/DL (ref 32–36)
MCV RBC AUTO: 89 FL (ref 82–98)
MONOCYTES # BLD AUTO: 1.2 K/UL (ref 0.3–1)
MONOCYTES NFR BLD: 10.8 % (ref 4–15)
NEUTROPHILS # BLD AUTO: 7.8 K/UL (ref 1.8–7.7)
NEUTROPHILS NFR BLD: 72.9 % (ref 38–73)
NRBC BLD-RTO: 0 /100 WBC
PLATELET # BLD AUTO: 176 K/UL (ref 150–450)
PMV BLD AUTO: 11.5 FL (ref 9.2–12.9)
POTASSIUM SERPL-SCNC: 4.8 MMOL/L (ref 3.5–5.1)
RBC # BLD AUTO: 5.43 M/UL (ref 4.6–6.2)
SODIUM SERPL-SCNC: 143 MMOL/L (ref 136–145)
SODIUM UR-SCNC: 197 MMOL/L (ref 20–250)
UUN UR-MCNC: 640 MG/DL (ref 140–1050)
WBC # BLD AUTO: 10.68 K/UL (ref 3.9–12.7)

## 2021-06-01 PROCEDURE — 36415 COLL VENOUS BLD VENIPUNCTURE: CPT | Performed by: SURGERY

## 2021-06-01 PROCEDURE — 85025 COMPLETE CBC W/AUTO DIFF WBC: CPT | Performed by: SURGERY

## 2021-06-01 PROCEDURE — 83036 HEMOGLOBIN GLYCOSYLATED A1C: CPT | Performed by: STUDENT IN AN ORGANIZED HEALTH CARE EDUCATION/TRAINING PROGRAM

## 2021-06-01 PROCEDURE — 63600175 PHARM REV CODE 636 W HCPCS: Performed by: STUDENT IN AN ORGANIZED HEALTH CARE EDUCATION/TRAINING PROGRAM

## 2021-06-01 PROCEDURE — 25000003 PHARM REV CODE 250: Performed by: STUDENT IN AN ORGANIZED HEALTH CARE EDUCATION/TRAINING PROGRAM

## 2021-06-01 PROCEDURE — 36415 COLL VENOUS BLD VENIPUNCTURE: CPT | Performed by: STUDENT IN AN ORGANIZED HEALTH CARE EDUCATION/TRAINING PROGRAM

## 2021-06-01 PROCEDURE — 84540 ASSAY OF URINE/UREA-N: CPT | Performed by: SURGERY

## 2021-06-01 PROCEDURE — 99900035 HC TECH TIME PER 15 MIN (STAT)

## 2021-06-01 PROCEDURE — 63600175 PHARM REV CODE 636 W HCPCS: Performed by: EMERGENCY MEDICINE

## 2021-06-01 PROCEDURE — 11000001 HC ACUTE MED/SURG PRIVATE ROOM

## 2021-06-01 PROCEDURE — 80048 BASIC METABOLIC PNL TOTAL CA: CPT | Performed by: SURGERY

## 2021-06-01 PROCEDURE — 27000221 HC OXYGEN, UP TO 24 HOURS

## 2021-06-01 PROCEDURE — 94761 N-INVAS EAR/PLS OXIMETRY MLT: CPT

## 2021-06-01 PROCEDURE — 82570 ASSAY OF URINE CREATININE: CPT | Performed by: SURGERY

## 2021-06-01 PROCEDURE — 84300 ASSAY OF URINE SODIUM: CPT | Performed by: SURGERY

## 2021-06-01 RX ORDER — TAMSULOSIN HYDROCHLORIDE 0.4 MG/1
0.4 CAPSULE ORAL DAILY
Status: DISCONTINUED | OUTPATIENT
Start: 2021-06-01 | End: 2021-06-03 | Stop reason: HOSPADM

## 2021-06-01 RX ORDER — LOSARTAN POTASSIUM 50 MG/1
100 TABLET ORAL DAILY
Status: DISCONTINUED | OUTPATIENT
Start: 2021-06-01 | End: 2021-06-03 | Stop reason: HOSPADM

## 2021-06-01 RX ORDER — GLUCAGON 1 MG
1 KIT INJECTION
Status: DISCONTINUED | OUTPATIENT
Start: 2021-06-01 | End: 2021-06-03 | Stop reason: HOSPADM

## 2021-06-01 RX ORDER — HYDROCHLOROTHIAZIDE 12.5 MG/1
12.5 TABLET ORAL DAILY
Status: DISCONTINUED | OUTPATIENT
Start: 2021-06-01 | End: 2021-06-02

## 2021-06-01 RX ORDER — FINASTERIDE 5 MG/1
5 TABLET, FILM COATED ORAL DAILY
Status: DISCONTINUED | OUTPATIENT
Start: 2021-06-01 | End: 2021-06-03 | Stop reason: HOSPADM

## 2021-06-01 RX ORDER — INSULIN ASPART 100 [IU]/ML
0-5 INJECTION, SOLUTION INTRAVENOUS; SUBCUTANEOUS EVERY 6 HOURS PRN
Status: DISCONTINUED | OUTPATIENT
Start: 2021-06-01 | End: 2021-06-03 | Stop reason: HOSPADM

## 2021-06-01 RX ADMIN — METOCLOPRAMIDE 10 MG: 5 INJECTION, SOLUTION INTRAMUSCULAR; INTRAVENOUS at 12:06

## 2021-06-01 RX ADMIN — POTASSIUM CHLORIDE AND SODIUM CHLORIDE: 900; 150 INJECTION, SOLUTION INTRAVENOUS at 06:06

## 2021-06-01 RX ADMIN — METOCLOPRAMIDE 10 MG: 5 INJECTION, SOLUTION INTRAMUSCULAR; INTRAVENOUS at 06:06

## 2021-06-01 RX ADMIN — POTASSIUM CHLORIDE AND SODIUM CHLORIDE: 900; 150 INJECTION, SOLUTION INTRAVENOUS at 12:06

## 2021-06-01 RX ADMIN — LOSARTAN POTASSIUM 100 MG: 50 TABLET, FILM COATED ORAL at 03:06

## 2021-06-01 RX ADMIN — HYDROCHLOROTHIAZIDE 12.5 MG: 12.5 TABLET ORAL at 03:06

## 2021-06-01 RX ADMIN — TAMSULOSIN HYDROCHLORIDE 0.4 MG: 0.4 CAPSULE ORAL at 03:06

## 2021-06-01 RX ADMIN — INSULIN ASPART 1 UNITS: 100 INJECTION, SOLUTION INTRAVENOUS; SUBCUTANEOUS at 10:06

## 2021-06-01 RX ADMIN — FINASTERIDE 5 MG: 5 TABLET, FILM COATED ORAL at 03:06

## 2021-06-02 LAB
ALBUMIN SERPL BCP-MCNC: 3 G/DL (ref 3.5–5.2)
ALP SERPL-CCNC: 69 U/L (ref 55–135)
ALT SERPL W/O P-5'-P-CCNC: 13 U/L (ref 10–44)
ANION GAP SERPL CALC-SCNC: 11 MMOL/L (ref 8–16)
AST SERPL-CCNC: 21 U/L (ref 10–40)
BASOPHILS # BLD AUTO: 0.03 K/UL (ref 0–0.2)
BASOPHILS NFR BLD: 0.3 % (ref 0–1.9)
BILIRUB SERPL-MCNC: 1.2 MG/DL (ref 0.1–1)
BUN SERPL-MCNC: 23 MG/DL (ref 8–23)
CALCIUM SERPL-MCNC: 8.6 MG/DL (ref 8.7–10.5)
CHLORIDE SERPL-SCNC: 107 MMOL/L (ref 95–110)
CO2 SERPL-SCNC: 28 MMOL/L (ref 23–29)
CREAT SERPL-MCNC: 1.2 MG/DL (ref 0.5–1.4)
DIFFERENTIAL METHOD: ABNORMAL
EOSINOPHIL # BLD AUTO: 0.2 K/UL (ref 0–0.5)
EOSINOPHIL NFR BLD: 2.1 % (ref 0–8)
ERYTHROCYTE [DISTWIDTH] IN BLOOD BY AUTOMATED COUNT: 13.1 % (ref 11.5–14.5)
EST. GFR  (AFRICAN AMERICAN): >60 ML/MIN/1.73 M^2
EST. GFR  (NON AFRICAN AMERICAN): 53 ML/MIN/1.73 M^2
GLUCOSE SERPL-MCNC: 166 MG/DL (ref 70–110)
HCT VFR BLD AUTO: 48.1 % (ref 40–54)
HGB BLD-MCNC: 15.5 G/DL (ref 14–18)
IMM GRANULOCYTES # BLD AUTO: 0.04 K/UL (ref 0–0.04)
IMM GRANULOCYTES NFR BLD AUTO: 0.4 % (ref 0–0.5)
LYMPHOCYTES # BLD AUTO: 1.3 K/UL (ref 1–4.8)
LYMPHOCYTES NFR BLD: 12.1 % (ref 18–48)
MAGNESIUM SERPL-MCNC: 2.1 MG/DL (ref 1.6–2.6)
MCH RBC QN AUTO: 29.1 PG (ref 27–31)
MCHC RBC AUTO-ENTMCNC: 32.2 G/DL (ref 32–36)
MCV RBC AUTO: 90 FL (ref 82–98)
MONOCYTES # BLD AUTO: 1.2 K/UL (ref 0.3–1)
MONOCYTES NFR BLD: 10.9 % (ref 4–15)
NEUTROPHILS # BLD AUTO: 7.9 K/UL (ref 1.8–7.7)
NEUTROPHILS NFR BLD: 74.2 % (ref 38–73)
NRBC BLD-RTO: 0 /100 WBC
PLATELET # BLD AUTO: 158 K/UL (ref 150–450)
PMV BLD AUTO: 11 FL (ref 9.2–12.9)
POCT GLUCOSE: 159 MG/DL (ref 70–110)
POCT GLUCOSE: 162 MG/DL (ref 70–110)
POCT GLUCOSE: 169 MG/DL (ref 70–110)
POCT GLUCOSE: 172 MG/DL (ref 70–110)
POCT GLUCOSE: 172 MG/DL (ref 70–110)
POTASSIUM SERPL-SCNC: 4.7 MMOL/L (ref 3.5–5.1)
PROT SERPL-MCNC: 6.1 G/DL (ref 6–8.4)
RBC # BLD AUTO: 5.32 M/UL (ref 4.6–6.2)
SODIUM SERPL-SCNC: 146 MMOL/L (ref 136–145)
WBC # BLD AUTO: 10.61 K/UL (ref 3.9–12.7)

## 2021-06-02 PROCEDURE — 97161 PT EVAL LOW COMPLEX 20 MIN: CPT

## 2021-06-02 PROCEDURE — 25000003 PHARM REV CODE 250: Performed by: STUDENT IN AN ORGANIZED HEALTH CARE EDUCATION/TRAINING PROGRAM

## 2021-06-02 PROCEDURE — 83735 ASSAY OF MAGNESIUM: CPT | Performed by: STUDENT IN AN ORGANIZED HEALTH CARE EDUCATION/TRAINING PROGRAM

## 2021-06-02 PROCEDURE — 85025 COMPLETE CBC W/AUTO DIFF WBC: CPT | Performed by: STUDENT IN AN ORGANIZED HEALTH CARE EDUCATION/TRAINING PROGRAM

## 2021-06-02 PROCEDURE — 94761 N-INVAS EAR/PLS OXIMETRY MLT: CPT

## 2021-06-02 PROCEDURE — 97165 OT EVAL LOW COMPLEX 30 MIN: CPT

## 2021-06-02 PROCEDURE — 11000001 HC ACUTE MED/SURG PRIVATE ROOM

## 2021-06-02 PROCEDURE — 63600175 PHARM REV CODE 636 W HCPCS: Performed by: EMERGENCY MEDICINE

## 2021-06-02 PROCEDURE — 36415 COLL VENOUS BLD VENIPUNCTURE: CPT | Performed by: STUDENT IN AN ORGANIZED HEALTH CARE EDUCATION/TRAINING PROGRAM

## 2021-06-02 PROCEDURE — 80053 COMPREHEN METABOLIC PANEL: CPT | Performed by: STUDENT IN AN ORGANIZED HEALTH CARE EDUCATION/TRAINING PROGRAM

## 2021-06-02 RX ORDER — HYDROCHLOROTHIAZIDE 25 MG/1
25 TABLET ORAL DAILY
Status: DISCONTINUED | OUTPATIENT
Start: 2021-06-03 | End: 2021-06-03 | Stop reason: HOSPADM

## 2021-06-02 RX ORDER — LABETALOL HYDROCHLORIDE 5 MG/ML
10 INJECTION, SOLUTION INTRAVENOUS EVERY 8 HOURS PRN
Status: DISCONTINUED | OUTPATIENT
Start: 2021-06-02 | End: 2021-06-03 | Stop reason: HOSPADM

## 2021-06-02 RX ORDER — HYDROCHLOROTHIAZIDE 12.5 MG/1
12.5 TABLET ORAL ONCE
Status: COMPLETED | OUTPATIENT
Start: 2021-06-02 | End: 2021-06-02

## 2021-06-02 RX ADMIN — POTASSIUM CHLORIDE AND SODIUM CHLORIDE: 900; 150 INJECTION, SOLUTION INTRAVENOUS at 10:06

## 2021-06-02 RX ADMIN — POTASSIUM CHLORIDE AND SODIUM CHLORIDE: 900; 150 INJECTION, SOLUTION INTRAVENOUS at 08:06

## 2021-06-02 RX ADMIN — TAMSULOSIN HYDROCHLORIDE 0.4 MG: 0.4 CAPSULE ORAL at 08:06

## 2021-06-02 RX ADMIN — HYDROCHLOROTHIAZIDE 12.5 MG: 12.5 TABLET ORAL at 10:06

## 2021-06-02 RX ADMIN — HYDROCHLOROTHIAZIDE 12.5 MG: 12.5 TABLET ORAL at 08:06

## 2021-06-02 RX ADMIN — FINASTERIDE 5 MG: 5 TABLET, FILM COATED ORAL at 08:06

## 2021-06-02 RX ADMIN — POTASSIUM CHLORIDE AND SODIUM CHLORIDE: 900; 150 INJECTION, SOLUTION INTRAVENOUS at 02:06

## 2021-06-02 RX ADMIN — LOSARTAN POTASSIUM 100 MG: 50 TABLET, FILM COATED ORAL at 08:06

## 2021-06-03 VITALS
WEIGHT: 172.19 LBS | OXYGEN SATURATION: 92 % | HEART RATE: 80 BPM | SYSTOLIC BLOOD PRESSURE: 152 MMHG | RESPIRATION RATE: 20 BRPM | TEMPERATURE: 98 F | DIASTOLIC BLOOD PRESSURE: 70 MMHG | HEIGHT: 70 IN | BODY MASS INDEX: 24.65 KG/M2

## 2021-06-03 LAB
ALBUMIN SERPL BCP-MCNC: 2.6 G/DL (ref 3.5–5.2)
ALP SERPL-CCNC: 68 U/L (ref 55–135)
ALT SERPL W/O P-5'-P-CCNC: 11 U/L (ref 10–44)
ANION GAP SERPL CALC-SCNC: 9 MMOL/L (ref 8–16)
AST SERPL-CCNC: 17 U/L (ref 10–40)
BASOPHILS # BLD AUTO: 0.04 K/UL (ref 0–0.2)
BASOPHILS NFR BLD: 0.5 % (ref 0–1.9)
BILIRUB SERPL-MCNC: 1.1 MG/DL (ref 0.1–1)
BUN SERPL-MCNC: 22 MG/DL (ref 8–23)
CALCIUM SERPL-MCNC: 8 MG/DL (ref 8.7–10.5)
CHLORIDE SERPL-SCNC: 108 MMOL/L (ref 95–110)
CO2 SERPL-SCNC: 25 MMOL/L (ref 23–29)
CREAT SERPL-MCNC: 1.1 MG/DL (ref 0.5–1.4)
DIFFERENTIAL METHOD: NORMAL
EOSINOPHIL # BLD AUTO: 0.4 K/UL (ref 0–0.5)
EOSINOPHIL NFR BLD: 4.9 % (ref 0–8)
ERYTHROCYTE [DISTWIDTH] IN BLOOD BY AUTOMATED COUNT: 12.9 % (ref 11.5–14.5)
EST. GFR  (AFRICAN AMERICAN): >60 ML/MIN/1.73 M^2
EST. GFR  (NON AFRICAN AMERICAN): 59 ML/MIN/1.73 M^2
GLUCOSE SERPL-MCNC: 149 MG/DL (ref 70–110)
HCT VFR BLD AUTO: 43.4 % (ref 40–54)
HGB BLD-MCNC: 14.1 G/DL (ref 14–18)
IMM GRANULOCYTES # BLD AUTO: 0.04 K/UL (ref 0–0.04)
IMM GRANULOCYTES NFR BLD AUTO: 0.5 % (ref 0–0.5)
LYMPHOCYTES # BLD AUTO: 1.6 K/UL (ref 1–4.8)
LYMPHOCYTES NFR BLD: 18.1 % (ref 18–48)
MAGNESIUM SERPL-MCNC: 1.9 MG/DL (ref 1.6–2.6)
MCH RBC QN AUTO: 29.4 PG (ref 27–31)
MCHC RBC AUTO-ENTMCNC: 32.5 G/DL (ref 32–36)
MCV RBC AUTO: 90 FL (ref 82–98)
MONOCYTES # BLD AUTO: 0.9 K/UL (ref 0.3–1)
MONOCYTES NFR BLD: 9.7 % (ref 4–15)
NEUTROPHILS # BLD AUTO: 5.8 K/UL (ref 1.8–7.7)
NEUTROPHILS NFR BLD: 66.3 % (ref 38–73)
NRBC BLD-RTO: 0 /100 WBC
PLATELET # BLD AUTO: 168 K/UL (ref 150–450)
PMV BLD AUTO: 11.9 FL (ref 9.2–12.9)
POCT GLUCOSE: 155 MG/DL (ref 70–110)
POCT GLUCOSE: 181 MG/DL (ref 70–110)
POTASSIUM SERPL-SCNC: 4.4 MMOL/L (ref 3.5–5.1)
PROT SERPL-MCNC: 5.5 G/DL (ref 6–8.4)
RBC # BLD AUTO: 4.8 M/UL (ref 4.6–6.2)
SODIUM SERPL-SCNC: 142 MMOL/L (ref 136–145)
WBC # BLD AUTO: 8.73 K/UL (ref 3.9–12.7)

## 2021-06-03 PROCEDURE — 85025 COMPLETE CBC W/AUTO DIFF WBC: CPT | Performed by: STUDENT IN AN ORGANIZED HEALTH CARE EDUCATION/TRAINING PROGRAM

## 2021-06-03 PROCEDURE — 83735 ASSAY OF MAGNESIUM: CPT | Performed by: STUDENT IN AN ORGANIZED HEALTH CARE EDUCATION/TRAINING PROGRAM

## 2021-06-03 PROCEDURE — 94761 N-INVAS EAR/PLS OXIMETRY MLT: CPT

## 2021-06-03 PROCEDURE — 36415 COLL VENOUS BLD VENIPUNCTURE: CPT | Performed by: STUDENT IN AN ORGANIZED HEALTH CARE EDUCATION/TRAINING PROGRAM

## 2021-06-03 PROCEDURE — 25000003 PHARM REV CODE 250: Performed by: STUDENT IN AN ORGANIZED HEALTH CARE EDUCATION/TRAINING PROGRAM

## 2021-06-03 PROCEDURE — 63600175 PHARM REV CODE 636 W HCPCS: Performed by: EMERGENCY MEDICINE

## 2021-06-03 PROCEDURE — 80053 COMPREHEN METABOLIC PANEL: CPT | Performed by: STUDENT IN AN ORGANIZED HEALTH CARE EDUCATION/TRAINING PROGRAM

## 2021-06-03 RX ORDER — HYDROCHLOROTHIAZIDE 12.5 MG/1
25 CAPSULE ORAL DAILY
Qty: 180 CAPSULE | Refills: 3 | Status: SHIPPED | OUTPATIENT
Start: 2021-06-03 | End: 2021-09-03 | Stop reason: SDUPTHER

## 2021-06-03 RX ADMIN — TAMSULOSIN HYDROCHLORIDE 0.4 MG: 0.4 CAPSULE ORAL at 08:06

## 2021-06-03 RX ADMIN — HYDROCHLOROTHIAZIDE 25 MG: 25 TABLET ORAL at 08:06

## 2021-06-03 RX ADMIN — FINASTERIDE 5 MG: 5 TABLET, FILM COATED ORAL at 08:06

## 2021-06-03 RX ADMIN — POTASSIUM CHLORIDE AND SODIUM CHLORIDE: 900; 150 INJECTION, SOLUTION INTRAVENOUS at 05:06

## 2021-06-03 RX ADMIN — LOSARTAN POTASSIUM 100 MG: 50 TABLET, FILM COATED ORAL at 08:06

## 2021-06-05 LAB
BACTERIA BLD CULT: NORMAL
BACTERIA BLD CULT: NORMAL

## 2021-06-09 ENCOUNTER — HOSPITAL ENCOUNTER (OUTPATIENT)
Dept: RADIOLOGY | Facility: HOSPITAL | Age: 86
Discharge: HOME | End: 2021-06-09
Attending: INTERNAL MEDICINE
Payer: MEDICARE

## 2021-06-09 ENCOUNTER — TELEPHONE (OUTPATIENT)
Dept: CARDIOLOGY | Facility: CLINIC | Age: 86
End: 2021-06-09

## 2021-06-09 ENCOUNTER — HOSPITAL ENCOUNTER (OUTPATIENT)
Dept: CARDIOLOGY | Facility: HOSPITAL | Age: 86
Discharge: HOME | End: 2021-06-09
Attending: INTERNAL MEDICINE
Payer: MEDICARE

## 2021-06-09 VITALS — BODY MASS INDEX: 24.48 KG/M2 | HEIGHT: 70 IN | WEIGHT: 171 LBS

## 2021-06-09 DIAGNOSIS — R06.09 DYSPNEA ON EXERTION: ICD-10-CM

## 2021-06-09 DIAGNOSIS — I10 ESSENTIAL HYPERTENSION: ICD-10-CM

## 2021-06-09 DIAGNOSIS — I45.10 NEW ONSET RIGHT BUNDLE BRANCH BLOCK (RBBB): ICD-10-CM

## 2021-06-09 DIAGNOSIS — I10 ESSENTIAL HYPERTENSION: Chronic | ICD-10-CM

## 2021-06-09 DIAGNOSIS — R60.0 LOCALIZED EDEMA: ICD-10-CM

## 2021-06-09 LAB
AORTIC ROOT ANNULUS: 3.06 CM
AORTIC VALVE CUSP SEPERATION: 1.59 CM
AV INDEX (PROSTH): 0.8
AV MEAN GRADIENT: 4 MMHG
AV PEAK GRADIENT: 6 MMHG
AV VALVE AREA: 2.46 CM2
AV VELOCITY RATIO: 0.79
BSA FOR ECHO PROCEDURE: 1.96 M2
CV ECHO LV RWT: 0.48 CM
DOP CALC AO PEAK VEL: 1.27 M/S
DOP CALC AO VTI: 24.18 CM
DOP CALC LVOT AREA: 3.1 CM2
DOP CALC LVOT DIAMETER: 1.98 CM
DOP CALC LVOT PEAK VEL: 1 M/S
DOP CALC LVOT STROKE VOLUME: 59.55 CM3
DOP CALCLVOT PEAK VEL VTI: 19.35 CM
E WAVE DECELERATION TIME: 346.52 MSEC
E/A RATIO: 0.44
E/E' RATIO: 7.23 M/S
ECHO LV POSTERIOR WALL: 0.97 CM (ref 0.6–1.1)
EJECTION FRACTION: 60 %
FRACTIONAL SHORTENING: 20 % (ref 28–44)
INTERVENTRICULAR SEPTUM: 1.08 CM (ref 0.6–1.1)
IVRT: 85.63 MSEC
LA MAJOR: 5.09 CM
LA MINOR: 4.78 CM
LA WIDTH: 3.54 CM
LEFT ATRIUM SIZE: 3.92 CM
LEFT ATRIUM VOLUME INDEX MOD: 15.7 ML/M2
LEFT ATRIUM VOLUME INDEX: 29.8 ML/M2
LEFT ATRIUM VOLUME MOD: 30.69 CM3
LEFT ATRIUM VOLUME: 58.15 CM3
LEFT INTERNAL DIMENSION IN SYSTOLE: 3.2 CM (ref 2.1–4)
LEFT VENTRICLE DIASTOLIC VOLUME INDEX: 36.43 ML/M2
LEFT VENTRICLE DIASTOLIC VOLUME: 71.03 ML
LEFT VENTRICLE MASS INDEX: 68 G/M2
LEFT VENTRICLE SYSTOLIC VOLUME INDEX: 21 ML/M2
LEFT VENTRICLE SYSTOLIC VOLUME: 41.04 ML
LEFT VENTRICULAR INTERNAL DIMENSION IN DIASTOLE: 4.02 CM (ref 3.5–6)
LEFT VENTRICULAR MASS: 132.62 G
LV LATERAL E/E' RATIO: 5.88 M/S
LV SEPTAL E/E' RATIO: 9.4 M/S
MV A" WAVE DURATION": 15.22 MSEC
MV MEAN GRADIENT: 1 MMHG
MV PEAK A VEL: 1.08 M/S
MV PEAK E VEL: 0.47 M/S
MV PEAK GRADIENT: 5 MMHG
MV STENOSIS PRESSURE HALF TIME: 100.49 MS
MV VALVE AREA P 1/2 METHOD: 2.19 CM2
PISA TR MAX VEL: 3.1 M/S
PULM VEIN S/D RATIO: 2.58
PV PEAK D VEL: 0.24 M/S
PV PEAK S VEL: 0.62 M/S
PV PEAK VELOCITY: 0.98 CM/S
RA MAJOR: 4.9 CM
RA PRESSURE: 3 MMHG
RA WIDTH: 4.43 CM
RIGHT VENTRICULAR END-DIASTOLIC DIMENSION: 2.71 CM
TDI LATERAL: 0.08 M/S
TDI SEPTAL: 0.05 M/S
TDI: 0.07 M/S
TR MAX PG: 38 MMHG
TV REST PULMONARY ARTERY PRESSURE: 41 MMHG

## 2021-06-09 PROCEDURE — 71046 X-RAY EXAM CHEST 2 VIEWS: CPT | Mod: TC,FY

## 2021-06-09 PROCEDURE — 71046 XR CHEST PA AND LATERAL: ICD-10-PCS | Mod: 26,,, | Performed by: RADIOLOGY

## 2021-06-09 PROCEDURE — 71046 X-RAY EXAM CHEST 2 VIEWS: CPT | Mod: 26,,, | Performed by: RADIOLOGY

## 2021-06-09 PROCEDURE — 93306 TTE W/DOPPLER COMPLETE: CPT

## 2021-06-09 PROCEDURE — 93306 ECHO (CUPID ONLY): ICD-10-PCS | Mod: 26,,, | Performed by: INTERNAL MEDICINE

## 2021-06-09 PROCEDURE — 93306 TTE W/DOPPLER COMPLETE: CPT | Mod: 26,,, | Performed by: INTERNAL MEDICINE

## 2021-06-22 ENCOUNTER — OFFICE VISIT (OUTPATIENT)
Dept: CARDIOLOGY | Facility: CLINIC | Age: 86
End: 2021-06-22
Payer: MEDICARE

## 2021-06-22 VITALS
HEART RATE: 86 BPM | WEIGHT: 168.63 LBS | BODY MASS INDEX: 24.14 KG/M2 | SYSTOLIC BLOOD PRESSURE: 131 MMHG | OXYGEN SATURATION: 91 % | DIASTOLIC BLOOD PRESSURE: 65 MMHG | HEIGHT: 70 IN

## 2021-06-22 DIAGNOSIS — I10 ESSENTIAL HYPERTENSION: Primary | Chronic | ICD-10-CM

## 2021-06-22 DIAGNOSIS — N40.0 BENIGN PROSTATIC HYPERPLASIA WITHOUT LOWER URINARY TRACT SYMPTOMS: ICD-10-CM

## 2021-06-22 DIAGNOSIS — J31.0 CHRONIC RHINITIS: ICD-10-CM

## 2021-06-22 DIAGNOSIS — E78.00 HIGH CHOLESTEROL: Chronic | ICD-10-CM

## 2021-06-22 DIAGNOSIS — R06.09 DYSPNEA ON EXERTION: ICD-10-CM

## 2021-06-22 DIAGNOSIS — K56.609 SBO (SMALL BOWEL OBSTRUCTION): ICD-10-CM

## 2021-06-22 DIAGNOSIS — I70.0 AORTIC ATHEROSCLEROSIS: ICD-10-CM

## 2021-06-22 DIAGNOSIS — N18.30 STAGE 3 CHRONIC KIDNEY DISEASE, UNSPECIFIED WHETHER STAGE 3A OR 3B CKD: ICD-10-CM

## 2021-06-22 DIAGNOSIS — I45.10 NEW ONSET RIGHT BUNDLE BRANCH BLOCK (RBBB): ICD-10-CM

## 2021-06-22 PROCEDURE — 1101F PR PT FALLS ASSESS DOC 0-1 FALLS W/OUT INJ PAST YR: ICD-10-PCS | Mod: CPTII,S$GLB,, | Performed by: INTERNAL MEDICINE

## 2021-06-22 PROCEDURE — 1126F AMNT PAIN NOTED NONE PRSNT: CPT | Mod: S$GLB,,, | Performed by: INTERNAL MEDICINE

## 2021-06-22 PROCEDURE — 1159F PR MEDICATION LIST DOCUMENTED IN MEDICAL RECORD: ICD-10-PCS | Mod: S$GLB,,, | Performed by: INTERNAL MEDICINE

## 2021-06-22 PROCEDURE — 3288F PR FALLS RISK ASSESSMENT DOCUMENTED: ICD-10-PCS | Mod: CPTII,S$GLB,, | Performed by: INTERNAL MEDICINE

## 2021-06-22 PROCEDURE — 1126F PR PAIN SEVERITY QUANTIFIED, NO PAIN PRESENT: ICD-10-PCS | Mod: S$GLB,,, | Performed by: INTERNAL MEDICINE

## 2021-06-22 PROCEDURE — 1111F DSCHRG MED/CURRENT MED MERGE: CPT | Mod: CPTII,S$GLB,, | Performed by: INTERNAL MEDICINE

## 2021-06-22 PROCEDURE — 3288F FALL RISK ASSESSMENT DOCD: CPT | Mod: CPTII,S$GLB,, | Performed by: INTERNAL MEDICINE

## 2021-06-22 PROCEDURE — 1111F PR DISCHARGE MEDS RECONCILED W/ CURRENT OUTPATIENT MED LIST: ICD-10-PCS | Mod: CPTII,S$GLB,, | Performed by: INTERNAL MEDICINE

## 2021-06-22 PROCEDURE — 99999 PR PBB SHADOW E&M-EST. PATIENT-LVL III: ICD-10-PCS | Mod: PBBFAC,,, | Performed by: INTERNAL MEDICINE

## 2021-06-22 PROCEDURE — 99999 PR PBB SHADOW E&M-EST. PATIENT-LVL III: CPT | Mod: PBBFAC,,, | Performed by: INTERNAL MEDICINE

## 2021-06-22 PROCEDURE — 99213 PR OFFICE/OUTPT VISIT, EST, LEVL III, 20-29 MIN: ICD-10-PCS | Mod: S$GLB,,, | Performed by: INTERNAL MEDICINE

## 2021-06-22 PROCEDURE — 1101F PT FALLS ASSESS-DOCD LE1/YR: CPT | Mod: CPTII,S$GLB,, | Performed by: INTERNAL MEDICINE

## 2021-06-22 PROCEDURE — 1159F MED LIST DOCD IN RCRD: CPT | Mod: S$GLB,,, | Performed by: INTERNAL MEDICINE

## 2021-06-22 PROCEDURE — 99213 OFFICE O/P EST LOW 20 MIN: CPT | Mod: S$GLB,,, | Performed by: INTERNAL MEDICINE

## 2021-09-05 RX ORDER — HYDROCHLOROTHIAZIDE 12.5 MG/1
12.5 CAPSULE ORAL 2 TIMES DAILY
Qty: 180 CAPSULE | Refills: 3 | Status: ON HOLD | OUTPATIENT
Start: 2021-09-05 | End: 2022-03-13 | Stop reason: HOSPADM

## 2021-09-22 PROBLEM — E11.22 TYPE 2 DIABETES MELLITUS WITH STAGE 3B CHRONIC KIDNEY DISEASE, WITHOUT LONG-TERM CURRENT USE OF INSULIN: Status: ACTIVE | Noted: 2021-09-22

## 2021-09-22 PROBLEM — R60.0 LOCALIZED EDEMA: Status: RESOLVED | Noted: 2020-10-08 | Resolved: 2021-09-22

## 2021-09-22 PROBLEM — E11.69 TYPE 2 DIABETES MELLITUS WITH OTHER SPECIFIED COMPLICATION: Chronic | Status: ACTIVE | Noted: 2020-08-26

## 2021-09-22 PROBLEM — N18.32 TYPE 2 DIABETES MELLITUS WITH STAGE 3B CHRONIC KIDNEY DISEASE, WITHOUT LONG-TERM CURRENT USE OF INSULIN: Status: ACTIVE | Noted: 2021-09-22

## 2021-09-22 PROBLEM — R06.09 DYSPNEA ON EXERTION: Status: RESOLVED | Noted: 2019-09-16 | Resolved: 2021-09-22

## 2021-09-22 PROBLEM — Z98.890 S/P EXPLORATORY LAPAROTOMY: Status: RESOLVED | Noted: 2019-07-01 | Resolved: 2021-09-22

## 2021-09-22 PROBLEM — K56.609 SBO (SMALL BOWEL OBSTRUCTION): Status: RESOLVED | Noted: 2021-05-31 | Resolved: 2021-09-22

## 2021-09-29 ENCOUNTER — TELEPHONE (OUTPATIENT)
Dept: CARDIOLOGY | Facility: CLINIC | Age: 86
End: 2021-09-29

## 2021-10-29 ENCOUNTER — IMMUNIZATION (OUTPATIENT)
Dept: PRIMARY CARE CLINIC | Facility: CLINIC | Age: 86
End: 2021-10-29
Payer: MEDICARE

## 2021-10-29 DIAGNOSIS — Z23 NEED FOR VACCINATION: Primary | ICD-10-CM

## 2021-10-29 PROCEDURE — 0003A COVID-19, MRNA, LNP-S, PF, 30 MCG/0.3 ML DOSE VACCINE: CPT | Mod: CV19,PBBFAC | Performed by: INTERNAL MEDICINE

## 2021-10-29 PROCEDURE — 91300 COVID-19, MRNA, LNP-S, PF, 30 MCG/0.3 ML DOSE VACCINE: CPT | Mod: PBBFAC | Performed by: INTERNAL MEDICINE

## 2021-11-30 ENCOUNTER — OFFICE VISIT (OUTPATIENT)
Dept: CARDIOLOGY | Facility: CLINIC | Age: 86
End: 2021-11-30
Payer: MEDICARE

## 2021-11-30 VITALS
HEIGHT: 70 IN | OXYGEN SATURATION: 100 % | DIASTOLIC BLOOD PRESSURE: 78 MMHG | WEIGHT: 169.88 LBS | SYSTOLIC BLOOD PRESSURE: 137 MMHG | BODY MASS INDEX: 24.32 KG/M2 | HEART RATE: 83 BPM

## 2021-11-30 DIAGNOSIS — N18.30 STAGE 3 CHRONIC KIDNEY DISEASE, UNSPECIFIED WHETHER STAGE 3A OR 3B CKD: ICD-10-CM

## 2021-11-30 DIAGNOSIS — E11.69 TYPE 2 DIABETES MELLITUS WITH OTHER SPECIFIED COMPLICATION, WITHOUT LONG-TERM CURRENT USE OF INSULIN: Chronic | ICD-10-CM

## 2021-11-30 DIAGNOSIS — N18.32 TYPE 2 DIABETES MELLITUS WITH STAGE 3B CHRONIC KIDNEY DISEASE, WITHOUT LONG-TERM CURRENT USE OF INSULIN: ICD-10-CM

## 2021-11-30 DIAGNOSIS — I45.10 NEW ONSET RIGHT BUNDLE BRANCH BLOCK (RBBB): ICD-10-CM

## 2021-11-30 DIAGNOSIS — N40.0 BENIGN PROSTATIC HYPERPLASIA, UNSPECIFIED WHETHER LOWER URINARY TRACT SYMPTOMS PRESENT: ICD-10-CM

## 2021-11-30 DIAGNOSIS — I70.0 AORTIC ATHEROSCLEROSIS: ICD-10-CM

## 2021-11-30 DIAGNOSIS — E78.00 HIGH CHOLESTEROL: Chronic | ICD-10-CM

## 2021-11-30 DIAGNOSIS — E11.22 TYPE 2 DIABETES MELLITUS WITH STAGE 3B CHRONIC KIDNEY DISEASE, WITHOUT LONG-TERM CURRENT USE OF INSULIN: ICD-10-CM

## 2021-11-30 DIAGNOSIS — I10 ESSENTIAL HYPERTENSION: Primary | Chronic | ICD-10-CM

## 2021-11-30 PROCEDURE — 93000 ELECTROCARDIOGRAM COMPLETE: CPT | Mod: S$GLB,,, | Performed by: INTERNAL MEDICINE

## 2021-11-30 PROCEDURE — 99213 PR OFFICE/OUTPT VISIT, EST, LEVL III, 20-29 MIN: ICD-10-PCS | Mod: 25,S$GLB,, | Performed by: INTERNAL MEDICINE

## 2021-11-30 PROCEDURE — 93000 EKG 12-LEAD: ICD-10-PCS | Mod: S$GLB,,, | Performed by: INTERNAL MEDICINE

## 2021-11-30 PROCEDURE — 99999 PR PBB SHADOW E&M-EST. PATIENT-LVL III: CPT | Mod: PBBFAC,,, | Performed by: INTERNAL MEDICINE

## 2021-11-30 PROCEDURE — 99213 OFFICE O/P EST LOW 20 MIN: CPT | Mod: 25,S$GLB,, | Performed by: INTERNAL MEDICINE

## 2021-11-30 PROCEDURE — 99999 PR PBB SHADOW E&M-EST. PATIENT-LVL III: ICD-10-PCS | Mod: PBBFAC,,, | Performed by: INTERNAL MEDICINE

## 2022-02-09 ENCOUNTER — HOSPITAL ENCOUNTER (INPATIENT)
Facility: HOSPITAL | Age: 87
LOS: 5 days | Discharge: HOME | DRG: 390 | End: 2022-02-14
Attending: EMERGENCY MEDICINE | Admitting: SURGERY
Payer: MEDICARE

## 2022-02-09 DIAGNOSIS — E11.69 TYPE 2 DIABETES MELLITUS WITH OTHER SPECIFIED COMPLICATION, WITHOUT LONG-TERM CURRENT USE OF INSULIN: ICD-10-CM

## 2022-02-09 DIAGNOSIS — E11.22 TYPE 2 DIABETES MELLITUS WITH STAGE 3B CHRONIC KIDNEY DISEASE, WITHOUT LONG-TERM CURRENT USE OF INSULIN: ICD-10-CM

## 2022-02-09 DIAGNOSIS — K56.50 SMALL BOWEL OBSTRUCTION DUE TO ADHESIONS: Primary | ICD-10-CM

## 2022-02-09 DIAGNOSIS — K56.609 SMALL BOWEL OBSTRUCTION: ICD-10-CM

## 2022-02-09 DIAGNOSIS — R53.1 WEAKNESS: ICD-10-CM

## 2022-02-09 DIAGNOSIS — I10 ESSENTIAL HYPERTENSION: Chronic | ICD-10-CM

## 2022-02-09 DIAGNOSIS — R10.9 ABDOMINAL PAIN: ICD-10-CM

## 2022-02-09 DIAGNOSIS — N18.32 TYPE 2 DIABETES MELLITUS WITH STAGE 3B CHRONIC KIDNEY DISEASE, WITHOUT LONG-TERM CURRENT USE OF INSULIN: ICD-10-CM

## 2022-02-09 LAB
ALBUMIN SERPL BCP-MCNC: 4.5 G/DL (ref 3.5–5.2)
ALP SERPL-CCNC: 100 U/L (ref 55–135)
ALT SERPL W/O P-5'-P-CCNC: 20 U/L (ref 10–44)
ANION GAP SERPL CALC-SCNC: 14 MMOL/L (ref 8–16)
AST SERPL-CCNC: 25 U/L (ref 10–40)
BASOPHILS # BLD AUTO: 0.03 K/UL (ref 0–0.2)
BASOPHILS NFR BLD: 0.2 % (ref 0–1.9)
BILIRUB SERPL-MCNC: 1 MG/DL (ref 0.1–1)
BUN SERPL-MCNC: 39 MG/DL (ref 8–23)
CALCIUM SERPL-MCNC: 10.8 MG/DL (ref 8.7–10.5)
CHLORIDE SERPL-SCNC: 99 MMOL/L (ref 95–110)
CO2 SERPL-SCNC: 28 MMOL/L (ref 23–29)
CREAT SERPL-MCNC: 1.6 MG/DL (ref 0.5–1.4)
DIFFERENTIAL METHOD: ABNORMAL
EOSINOPHIL # BLD AUTO: 0.1 K/UL (ref 0–0.5)
EOSINOPHIL NFR BLD: 0.8 % (ref 0–8)
ERYTHROCYTE [DISTWIDTH] IN BLOOD BY AUTOMATED COUNT: 12.8 % (ref 11.5–14.5)
EST. GFR  (AFRICAN AMERICAN): 43 ML/MIN/1.73 M^2
EST. GFR  (NON AFRICAN AMERICAN): 37 ML/MIN/1.73 M^2
GLUCOSE SERPL-MCNC: 238 MG/DL (ref 70–110)
HCT VFR BLD AUTO: 51.7 % (ref 40–54)
HGB BLD-MCNC: 17.2 G/DL (ref 14–18)
IMM GRANULOCYTES # BLD AUTO: 0.04 K/UL (ref 0–0.04)
IMM GRANULOCYTES NFR BLD AUTO: 0.3 % (ref 0–0.5)
LIPASE SERPL-CCNC: 43 U/L (ref 4–60)
LYMPHOCYTES # BLD AUTO: 1.9 K/UL (ref 1–4.8)
LYMPHOCYTES NFR BLD: 12.4 % (ref 18–48)
MCH RBC QN AUTO: 29.1 PG (ref 27–31)
MCHC RBC AUTO-ENTMCNC: 33.3 G/DL (ref 32–36)
MCV RBC AUTO: 87 FL (ref 82–98)
MONOCYTES # BLD AUTO: 1 K/UL (ref 0.3–1)
MONOCYTES NFR BLD: 6.9 % (ref 4–15)
NEUTROPHILS # BLD AUTO: 11.8 K/UL (ref 1.8–7.7)
NEUTROPHILS NFR BLD: 79.4 % (ref 38–73)
NRBC BLD-RTO: 0 /100 WBC
PLATELET # BLD AUTO: 199 K/UL (ref 150–450)
PMV BLD AUTO: 11.8 FL (ref 9.2–12.9)
POTASSIUM SERPL-SCNC: 4.3 MMOL/L (ref 3.5–5.1)
PROT SERPL-MCNC: 8 G/DL (ref 6–8.4)
RBC # BLD AUTO: 5.92 M/UL (ref 4.6–6.2)
SARS-COV-2 RDRP RESP QL NAA+PROBE: NEGATIVE
SODIUM SERPL-SCNC: 141 MMOL/L (ref 136–145)
WBC # BLD AUTO: 14.9 K/UL (ref 3.9–12.7)

## 2022-02-09 PROCEDURE — 99285 EMERGENCY DEPT VISIT HI MDM: CPT | Mod: 25

## 2022-02-09 PROCEDURE — 85025 COMPLETE CBC W/AUTO DIFF WBC: CPT | Performed by: EMERGENCY MEDICINE

## 2022-02-09 PROCEDURE — 93010 EKG 12-LEAD: ICD-10-PCS | Mod: ,,, | Performed by: INTERNAL MEDICINE

## 2022-02-09 PROCEDURE — 83690 ASSAY OF LIPASE: CPT | Performed by: EMERGENCY MEDICINE

## 2022-02-09 PROCEDURE — 25000003 PHARM REV CODE 250: Performed by: EMERGENCY MEDICINE

## 2022-02-09 PROCEDURE — 96374 THER/PROPH/DIAG INJ IV PUSH: CPT

## 2022-02-09 PROCEDURE — 96361 HYDRATE IV INFUSION ADD-ON: CPT

## 2022-02-09 PROCEDURE — U0002 COVID-19 LAB TEST NON-CDC: HCPCS | Performed by: EMERGENCY MEDICINE

## 2022-02-09 PROCEDURE — 93005 ELECTROCARDIOGRAM TRACING: CPT

## 2022-02-09 PROCEDURE — 93010 ELECTROCARDIOGRAM REPORT: CPT | Mod: ,,, | Performed by: INTERNAL MEDICINE

## 2022-02-09 PROCEDURE — 63600175 PHARM REV CODE 636 W HCPCS: Performed by: EMERGENCY MEDICINE

## 2022-02-09 PROCEDURE — 12000002 HC ACUTE/MED SURGE SEMI-PRIVATE ROOM

## 2022-02-09 PROCEDURE — 80053 COMPREHEN METABOLIC PANEL: CPT | Performed by: EMERGENCY MEDICINE

## 2022-02-09 RX ORDER — ONDANSETRON 2 MG/ML
4 INJECTION INTRAMUSCULAR; INTRAVENOUS
Status: COMPLETED | OUTPATIENT
Start: 2022-02-09 | End: 2022-02-09

## 2022-02-09 RX ORDER — DEXAMETHASONE SODIUM PHOSPHATE 4 MG/ML
8 INJECTION, SOLUTION INTRA-ARTICULAR; INTRALESIONAL; INTRAMUSCULAR; INTRAVENOUS; SOFT TISSUE ONCE
Status: DISCONTINUED | OUTPATIENT
Start: 2022-02-09 | End: 2022-02-09

## 2022-02-09 RX ORDER — SODIUM CHLORIDE 0.9 % (FLUSH) 0.9 %
10 SYRINGE (ML) INJECTION
Status: DISCONTINUED | OUTPATIENT
Start: 2022-02-09 | End: 2022-02-14 | Stop reason: HOSPADM

## 2022-02-09 RX ORDER — ACETAMINOPHEN 325 MG/1
650 TABLET ORAL EVERY 8 HOURS PRN
Status: DISCONTINUED | OUTPATIENT
Start: 2022-02-09 | End: 2022-02-14 | Stop reason: HOSPADM

## 2022-02-09 RX ORDER — ONDANSETRON 2 MG/ML
4 INJECTION INTRAMUSCULAR; INTRAVENOUS EVERY 8 HOURS PRN
Status: DISCONTINUED | OUTPATIENT
Start: 2022-02-09 | End: 2022-02-14 | Stop reason: HOSPADM

## 2022-02-09 RX ORDER — SODIUM CHLORIDE AND POTASSIUM CHLORIDE 150; 900 MG/100ML; MG/100ML
INJECTION, SOLUTION INTRAVENOUS CONTINUOUS
Status: DISCONTINUED | OUTPATIENT
Start: 2022-02-09 | End: 2022-02-14 | Stop reason: HOSPADM

## 2022-02-09 RX ORDER — FENTANYL CITRATE 50 UG/ML
75 INJECTION, SOLUTION INTRAMUSCULAR; INTRAVENOUS
Status: COMPLETED | OUTPATIENT
Start: 2022-02-09 | End: 2022-02-09

## 2022-02-09 RX ORDER — FENTANYL CITRATE 50 UG/ML
75 INJECTION, SOLUTION INTRAMUSCULAR; INTRAVENOUS
Status: DISCONTINUED | OUTPATIENT
Start: 2022-02-09 | End: 2022-02-09

## 2022-02-09 RX ADMIN — ONDANSETRON 4 MG: 2 INJECTION, SOLUTION INTRAMUSCULAR; INTRAVENOUS at 10:02

## 2022-02-09 RX ADMIN — SODIUM CHLORIDE 1000 ML: 0.9 INJECTION, SOLUTION INTRAVENOUS at 08:02

## 2022-02-09 RX ADMIN — FENTANYL CITRATE 75 MCG: 50 INJECTION INTRAMUSCULAR; INTRAVENOUS at 10:02

## 2022-02-09 RX ADMIN — SODIUM CHLORIDE AND POTASSIUM CHLORIDE: .9; .15 SOLUTION INTRAVENOUS at 09:02

## 2022-02-09 RX ADMIN — ONDANSETRON 4 MG: 2 INJECTION INTRAMUSCULAR; INTRAVENOUS at 08:02

## 2022-02-09 NOTE — Clinical Note
Diagnosis: Small bowel obstruction [[id number]]  Future Attending Provider: STACI MARINA [[id number]]  Is the patient being sent to ED Observation?: Yes  Admitting Provider:: STACI MARINA [[id number]]  Special Needs:: No Special Needs [1]

## 2022-02-10 LAB
ALBUMIN SERPL BCP-MCNC: 3.5 G/DL (ref 3.5–5.2)
ALP SERPL-CCNC: 74 U/L (ref 55–135)
ALT SERPL W/O P-5'-P-CCNC: 16 U/L (ref 10–44)
ANION GAP SERPL CALC-SCNC: 10 MMOL/L (ref 8–16)
AST SERPL-CCNC: 20 U/L (ref 10–40)
BASOPHILS # BLD AUTO: 0.04 K/UL (ref 0–0.2)
BASOPHILS NFR BLD: 0.3 % (ref 0–1.9)
BILIRUB SERPL-MCNC: 1 MG/DL (ref 0.1–1)
BUN SERPL-MCNC: 34 MG/DL (ref 8–23)
CALCIUM SERPL-MCNC: 9.5 MG/DL (ref 8.7–10.5)
CHLORIDE SERPL-SCNC: 103 MMOL/L (ref 95–110)
CO2 SERPL-SCNC: 27 MMOL/L (ref 23–29)
CREAT SERPL-MCNC: 1.4 MG/DL (ref 0.5–1.4)
DIFFERENTIAL METHOD: ABNORMAL
EOSINOPHIL # BLD AUTO: 0.1 K/UL (ref 0–0.5)
EOSINOPHIL NFR BLD: 0.9 % (ref 0–8)
ERYTHROCYTE [DISTWIDTH] IN BLOOD BY AUTOMATED COUNT: 12.9 % (ref 11.5–14.5)
EST. GFR  (AFRICAN AMERICAN): 51 ML/MIN/1.73 M^2
EST. GFR  (NON AFRICAN AMERICAN): 44 ML/MIN/1.73 M^2
GLUCOSE SERPL-MCNC: 230 MG/DL (ref 70–110)
HCT VFR BLD AUTO: 48.6 % (ref 40–54)
HGB BLD-MCNC: 15.8 G/DL (ref 14–18)
IMM GRANULOCYTES # BLD AUTO: 0.02 K/UL (ref 0–0.04)
IMM GRANULOCYTES NFR BLD AUTO: 0.2 % (ref 0–0.5)
LYMPHOCYTES # BLD AUTO: 0.6 K/UL (ref 1–4.8)
LYMPHOCYTES NFR BLD: 4.8 % (ref 18–48)
MCH RBC QN AUTO: 29.1 PG (ref 27–31)
MCHC RBC AUTO-ENTMCNC: 32.5 G/DL (ref 32–36)
MCV RBC AUTO: 90 FL (ref 82–98)
MONOCYTES # BLD AUTO: 1.4 K/UL (ref 0.3–1)
MONOCYTES NFR BLD: 10.9 % (ref 4–15)
NEUTROPHILS # BLD AUTO: 10.3 K/UL (ref 1.8–7.7)
NEUTROPHILS NFR BLD: 82.9 % (ref 38–73)
NRBC BLD-RTO: 0 /100 WBC
PLATELET # BLD AUTO: 180 K/UL (ref 150–450)
PMV BLD AUTO: 11.6 FL (ref 9.2–12.9)
POTASSIUM SERPL-SCNC: 4.5 MMOL/L (ref 3.5–5.1)
PROT SERPL-MCNC: 6.7 G/DL (ref 6–8.4)
RBC # BLD AUTO: 5.43 M/UL (ref 4.6–6.2)
SODIUM SERPL-SCNC: 140 MMOL/L (ref 136–145)
WBC # BLD AUTO: 12.45 K/UL (ref 3.9–12.7)

## 2022-02-10 PROCEDURE — 80053 COMPREHEN METABOLIC PANEL: CPT | Performed by: EMERGENCY MEDICINE

## 2022-02-10 PROCEDURE — 94761 N-INVAS EAR/PLS OXIMETRY MLT: CPT

## 2022-02-10 PROCEDURE — 85025 COMPLETE CBC W/AUTO DIFF WBC: CPT | Performed by: EMERGENCY MEDICINE

## 2022-02-10 PROCEDURE — 11000001 HC ACUTE MED/SURG PRIVATE ROOM

## 2022-02-10 PROCEDURE — 99900035 HC TECH TIME PER 15 MIN (STAT)

## 2022-02-10 PROCEDURE — 63600175 PHARM REV CODE 636 W HCPCS: Performed by: EMERGENCY MEDICINE

## 2022-02-10 RX ORDER — AMOXICILLIN 500 MG
1 CAPSULE ORAL DAILY
COMMUNITY

## 2022-02-10 RX ORDER — CHOLECALCIFEROL (VITAMIN D3) 25 MCG
1000 TABLET ORAL DAILY
COMMUNITY

## 2022-02-10 RX ADMIN — SODIUM CHLORIDE AND POTASSIUM CHLORIDE: .9; .15 SOLUTION INTRAVENOUS at 08:02

## 2022-02-10 NOTE — ED NOTES
NG tube placed.  Pt tolerated well.  Connected to the wall with low intermittent suction.  Draining appropriately.

## 2022-02-10 NOTE — ED PROVIDER NOTES
Encounter Date: 2/9/2022       History     Chief Complaint   Patient presents with    Abdominal Pain     Pt has hx of bowel obstruction, abd distended, + nausea, + abd cramping      An 80-year-old male with a history of small-bowel obstructions requiring laparotomie originally with diverting ostomy later with end-to-end anastomosis diabetes hypertension hyperlipidemia presents with abdominal distention feels like he has a small-bowel obstruction he had a bowel movement yesterday no bowel movement today no evidence of flatus some nausea no vomiting no other constitutional symptoms        Review of patient's allergies indicates:   Allergen Reactions    Solarcaine [benzocaine-triclosan]     Sulfa (sulfonamide antibiotics)     Sulfamethoxazole     Trimethoprim     Hytrin [terazosin] Rash    Smz-tmp ds [sulfamethoxazole-trimethoprim] Rash     Past Medical History:   Diagnosis Date    Anticoagulant long-term use     Bowel obstruction     BPH (benign prostatic hyperplasia)     Diabetes mellitus     Diverticulitis     Hyperlipidemia     Hypertension      Past Surgical History:   Procedure Laterality Date    ANASTOMOSIS OF ILEUM TO RECTUM  9/9/2019    Procedure: ANASTOMOSIS, COLO-RECTAL;  Surgeon: Fouzia Gardner MD;  Location: [facility] OR;  Service: General;;    COLOSTOMY  6/16/2019    Procedure: CREATION, COLOSTOMY;  Surgeon: Fouzia Gardner MD;  Location: [facility] OR;  Service: General;;    EYE SURGERY      GISELA PROCEDURE  6/16/2019    Procedure: GISELA PROCEDURE;  Surgeon: Fouzia Gardner MD;  Location: [facility] OR;  Service: General;;  Colon resection    TONSILLECTOMY       Family History   Problem Relation Age of Onset    Heart disease Father     Hypertension Father     Cancer Mother     Diabetes Paternal Aunt      Social History     Tobacco Use    Smoking status: Former Smoker     Packs/day: 1.00     Years: 25.00     Pack years: 25.00     Types: Cigarettes     Start date: 1946     Quit  date:      Years since quittin.1    Smokeless tobacco: Never Used   Substance Use Topics    Alcohol use: Yes     Alcohol/week: 2.0 standard drinks     Types: 2 Glasses of wine per week     Comment: occassionally    Drug use: No     Review of Systems   Gastrointestinal: Positive for abdominal distention, abdominal pain and nausea.   All other systems reviewed and are negative.      Physical Exam     Initial Vitals [22 1809]   BP Pulse Resp Temp SpO2   (!) 143/66 89 20 99.5 °F (37.5 °C) 97 %      MAP       --         Physical Exam    Nursing note and vitals reviewed.  Constitutional: He appears well-developed and well-nourished.   Eyes: Conjunctivae are normal. Pupils are equal, round, and reactive to light.   Neck: Neck supple.   Normal range of motion.  Cardiovascular: Normal rate and regular rhythm.   Pulmonary/Chest: Breath sounds normal.   Abdominal: He exhibits distension. There is abdominal tenderness.   No peritoneal signs hypoxia bowel sounds with distension   Musculoskeletal:         General: Normal range of motion.      Cervical back: Normal range of motion and neck supple.     Neurological: He is alert and oriented to person, place, and time.   Skin: Skin is warm and dry. Capillary refill takes less than 2 seconds.         ED Course   Procedures  Labs Reviewed   CBC W/ AUTO DIFFERENTIAL - Abnormal; Notable for the following components:       Result Value    WBC 14.90 (*)     Gran # (ANC) 11.8 (*)     Gran % 79.4 (*)     Lymph % 12.4 (*)     All other components within normal limits   COMPREHENSIVE METABOLIC PANEL - Abnormal; Notable for the following components:    Glucose 238 (*)     BUN 39 (*)     Creatinine 1.6 (*)     Calcium 10.8 (*)     eGFR if  43 (*)     eGFR if non  37 (*)     All other components within normal limits   SARS-COV-2 RNA AMPLIFICATION, QUAL   LIPASE   LIPASE          Imaging Results           CT Abdomen Pelvis  Without Contrast (Final  result)  Result time 02/09/22 20:12:07    Final result by Heladio Montejo MD (02/09/22 20:12:07)                 Impression:      1. Findings compatible with acute small bowel obstruction, with dilated fecalized small bowel loops measuring up to 4.7 cm, with suggested transition point within the right lower quadrant of the abdomen, as described.  No convincing evidence of pneumatosis, pneumoperitoneum, or portal venous gas at the present time.  2. Compared to prior CT imaging of 05/31/2021, relative stability of complicated right renal cyst with calcification and possible septations.  Further imaging surveillance with ultrasound and/or dedicated renal mass protocol CT or MR imaging would be recommended.  3. Additional details, as provided in the body of report.  This report was flagged in Epic as abnormal.      Electronically signed by: Heladio Montejo  Date:    02/09/2022  Time:    20:12             Narrative:    EXAMINATION:  CT ABDOMEN PELVIS WITHOUT CONTRAST    CLINICAL HISTORY:  Bowel obstruction suspected;    TECHNIQUE:  Low dose axial images, sagittal and coronal reformations were obtained from the lung bases to the pubic symphysis.    COMPARISON:  CT of the abdomen pelvis performed 05/31/2021.    FINDINGS:  This examination is limited due to lack of intravenous contrast.    Lower chest: Coronary artery and valvular calcifications are noted.  Emphysematous changes are noted in the lungs.  Scarring atelectasis is noted.  Nonspecific bronchial wall thickening is noted to be most pronounced in the lower lobes.  Tiny subpleural solid nodules are suggested.  These all measure below 4 mm.  They are most conspicuous in the right lower lobe.    Liver: Normal contour.  Benign-appearing cystic lesion in the left hepatic lobe measuring 15 mm (series 2, image 30).    Gallbladder and bile ducts: Unremarkable.    Pancreas: Normal contour.    Spleen: Normal contour.    Adrenals: Punctate coarse calcification associated  with the right adrenal gland could relate to prior hemorrhage, trauma, or infectious/inflammatory etiology.    Kidneys: Bilateral cortical atrophy is noted.  Redemonstrated numerous hypoattenuating lesions in the kidneys, the largest arising from the upper pole the right kidney.  There is a partially rim calcified, possibly septated lesion within the interpolar right kidney measuring up to 4-5 cm, with attenuation slightly higher than that would be expected for simple cyst, relatively similar configuration to prior CT imaging of 05/31/2021.  The remainder of the hypodense lesions appear to be compatible simple cyst.  Additional smaller subcentimeter hypoattenuating lesions in the kidneys are too small to definitely characterize.    Lymph nodes: No abdominal or pelvic lymphadenopathy.    Bowel and mesentery: Dilated fecalized small bowel loops appear to measure up to 4.7 cm, as measured in the right lower quadrant.  Transition to more decompressed distal small bowel loops are suggested in right lower quadrant (axial series 2 , image 92).  No convincing evidence of pneumatosis, pneumoperitoneum, or portal venous gas identified.    Extensive colonic diverticulosis without definite evidence of acute diverticulitis.    Abdominal aorta: Normal caliber.  Atherosclerotic calcification.    Inferior vena cava: Unremarkable.    Free fluid or free air: None.    Pelvis: Prostate enlarged.    Urinary bladder: Unremarkable.    Body wall: Postsurgical changes are noted in the anterior abdominal wall.  Bilateral fat and fluid containing inguinal hernias.  Rectus diastasis.    Bones: No definite acute change.  Osseous demineralization.  Degenerative changes are no spine and hips.                               X-Ray Abdomen Flat And Erect (Final result)  Result time 02/09/22 19:18:19    Final result by Heladio Montejo MD (02/09/22 19:18:19)                 Impression:      As above.      Electronically signed by: Heladio  Nikia  Date:    02/09/2022  Time:    19:18             Narrative:    EXAMINATION:  XR ABDOMEN FLAT AND ERECT    CLINICAL HISTORY:  Unspecified abdominal pain    TECHNIQUE:  Flat and erect AP views of the abdomen were performed.    COMPARISON:  Abdominal radiograph performed 06/01/2021    FINDINGS:  Relative paucity small bowel gas is noted, somewhat limiting evaluation.  Nonspecific fluid levels are noted within bowel loops on the upright view.  Findings could relate to infectious or inflammatory enteritis/colitis, developing small bowel obstruction or ileus.    No definite dilated loops of small bowel or colon are suggested.  A degree of air does appear to be present within the distal colon and rectum.  Large volume colonic stool is suggested.    Osseous demineralization.  Scoliotic degenerative findings are noted involving the spine.  Degenerative changes are additionally noted involving the sacroiliac joints, pubic symphysis, and hips.  Phleboliths appear to project within the pelvis.  Vascular calcifications are noted.                                 Medications   sodium chloride 0.9% bolus 1,000 mL (has no administration in time range)   ondansetron injection 4 mg (has no administration in time range)                 ED Course as of 02/09/22 2020 Wed Feb 09, 2022   1843 Patient also has history of stage 3 CKD [CG]   1921 EKG normal sinus rhythm rate of 84 nonspecific ST changes unchanged from previous EKG no ST segment elevation depression [CG]      ED Course User Index  [CG] Rickey Banuelos MD             Clinical Impression:   Final diagnoses:  [R10.9] Abdominal pain  [R53.1] Weakness  [K56.609] Small bowel obstruction (Primary)          ED Disposition Condition    Admit               Rickey Banuelos MD  02/09/22 2020

## 2022-02-10 NOTE — ED NOTES
Rec'd report from VENUS Larson RN. Pt is in semi-fowlers position in bed w/o complaint at this time. Pt denies pain/discomfort. Pt is A & O x 3, denies SOB, respiratory distress and respirations are even and unlabored. Skin is warm and dry w/ pink mucosa. VS. Bed is locked and in the low position w/ the side rails up and locked for safety. Call bell @ BS. Will continue to monitor closely.

## 2022-02-10 NOTE — ED TRIAGE NOTES
Husam Connolly, an 89 y.o. male presents to the ED with abd cramping, distention, and nausea. Pt has a hx of bowel obstruction and repair.       Review of patient's allergies indicates:   Allergen Reactions    Solarcaine [benzocaine-triclosan]     Sulfa (sulfonamide antibiotics)     Sulfamethoxazole     Trimethoprim     Hytrin [terazosin] Rash    Smz-tmp ds [sulfamethoxazole-trimethoprim] Rash     Chief Complaint   Patient presents with    Abdominal Pain     Pt has hx of bowel obstruction, abd distended, + nausea, + abd cramping      Past Medical History:   Diagnosis Date    Anticoagulant long-term use     Bowel obstruction     BPH (benign prostatic hyperplasia)     Diabetes mellitus     Diverticulitis     Hyperlipidemia     Hypertension        Patient identifiers verified and correct.     LOC: The patient is awake, alert and aware of environment with an appropriate affect, the patient is oriented x 3 and speaking appropriately.     APPEARANCE: Patient appears comfortable and in no acute distress, patient is clean and well groomed.    HEENT: Head symmetrical. Eyes bilateral.  Bilateral ears without drainage. Bilateral nares patent, throat clear.    SKIN: The skin is warm and dry, color consistent with ethnicity, patient has normal skin turgor and moist mucus membranes, skin intact, no breakdown or bruising noted.     MUSCULOSKELETAL: Patient moving all extremities spontaneously, no swelling noted.    RESPIRATORY: Airway is open and patent, respirations are spontaneous, patient has a normal effort and rate, no accessory muscle use noted.     CARDIAC: Patient has a normal rate and regular rhythm, no edema noted, capillary refill < 3 seconds.     GASTRO: Abdomen soft and distended.    NEURO: Pt opens eyes spontaneously pupils equal, round, and reactive. behavior appropriate to situation, follows commands, facial expression symmetrical,    NEUROVASCULAR: All extremities are warm and pink.       Will  continue to monitor.

## 2022-02-10 NOTE — ED NOTES
Care hand off from MARLYN Billy RN. Pt. Is a/o x4. Skin w/d. Respirations unlabored. ngt is patent to intermittent lws, reports abdominal pain/distentions has improved since placement. Pt. Reports hx. Of recurrent sbo. He does not appear in distress.

## 2022-02-10 NOTE — H&P
Today`s Date: 2/10/2022     Admit Date: 2/9/2022    Admitting Physician: No att. providers found    Patient`s Name: Husam Connolly , 89 y.o. male    HISTORY AND CHIEF COMPLAINT  Alert x 3 c/o cramps and abdominal pain   Patient Active Problem List   Diagnosis    Hypertension    BPH (benign prostatic hyperplasia)    Stage 3 chronic kidney disease    Ventral hernia    Right foot drop    High cholesterol    Type 2 diabetes mellitus with other specified complication    Chronic rhinitis    S/P colostomy    New onset right bundle branch block (RBBB)    Aortic atherosclerosis    Type 2 diabetes mellitus with stage 3b chronic kidney disease, without long-term current use of insulin       Past Medical History:   Diagnosis Date    Anticoagulant long-term use     Bowel obstruction     BPH (benign prostatic hyperplasia)     Diabetes mellitus     Diverticulitis     Hyperlipidemia     Hypertension        Past Surgical History:   Procedure Laterality Date    ANASTOMOSIS OF ILEUM TO RECTUM  9/9/2019    Procedure: ANASTOMOSIS, COLO-RECTAL;  Surgeon: Fouzia Gardner MD;  Location: [facility] OR;  Service: General;;    COLOSTOMY  6/16/2019    Procedure: CREATION, COLOSTOMY;  Surgeon: Fouzia Gardner MD;  Location: [facility] OR;  Service: General;;    EYE SURGERY      GISELA PROCEDURE  6/16/2019    Procedure: GISELA PROCEDURE;  Surgeon: Fouzia Gardner MD;  Location: [facility] OR;  Service: General;;  Colon resection    TONSILLECTOMY         Prior to Admission medications    Medication Sig Start Date End Date Taking? Authorizing Provider   aspirin (ECOTRIN) 81 MG EC tablet Take 81 mg by mouth once daily.    Historical Provider   atorvastatin (LIPITOR) 40 MG tablet Take 1 tablet (40 mg total) by mouth once daily. 4/12/21   Daren Lassiter MD   finasteride (PROSCAR) 5 mg tablet TAKE 1 TABLET BY MOUTH EVERY DAY 10/14/19   Daren Lassiter MD   fluticasone propionate (FLONASE) 50 mcg/actuation nasal  spray 2 sprays (100 mcg total) by Each Nostril route once daily. 3/1/21   Alverto Green MD   hydroCHLOROthiazide (MICROZIDE) 12.5 mg capsule Take 1 capsule (12.5 mg total) by mouth 2 (two) times daily. 9/5/21 9/5/22  Daren Lassiter MD   metFORMIN (GLUCOPHAGE) 500 MG tablet TAKE 1 TABLET BY MOUTH TWICE DAILY WITH FOOD 10/17/21   Alverto Green MD   multivitamin capsule Take 1 capsule by mouth once daily.    Historical Provider   tamsulosin (FLOMAX) 0.4 mg Cap Take 1 capsule by mouth nightly. 4/7/19   Historical Provider   valsartan (DIOVAN) 160 MG tablet TAKE 1 TABLET(160 MG) BY MOUTH EVERY DAY 10/6/21   Daren Lassiter MD     No current facility-administered medications on file prior to encounter.     Current Outpatient Medications on File Prior to Encounter   Medication Sig Dispense Refill    aspirin (ECOTRIN) 81 MG EC tablet Take 81 mg by mouth once daily.      atorvastatin (LIPITOR) 40 MG tablet Take 1 tablet (40 mg total) by mouth once daily. 90 tablet 3    finasteride (PROSCAR) 5 mg tablet TAKE 1 TABLET BY MOUTH EVERY DAY 90 tablet 1    fluticasone propionate (FLONASE) 50 mcg/actuation nasal spray 2 sprays (100 mcg total) by Each Nostril route once daily. 48 g 3    hydroCHLOROthiazide (MICROZIDE) 12.5 mg capsule Take 1 capsule (12.5 mg total) by mouth 2 (two) times daily. 180 capsule 3    metFORMIN (GLUCOPHAGE) 500 MG tablet TAKE 1 TABLET BY MOUTH TWICE DAILY WITH FOOD 180 tablet 3    multivitamin capsule Take 1 capsule by mouth once daily.      tamsulosin (FLOMAX) 0.4 mg Cap Take 1 capsule by mouth nightly.  3    valsartan (DIOVAN) 160 MG tablet TAKE 1 TABLET(160 MG) BY MOUTH EVERY DAY 90 tablet 3        Review of patient's allergies indicates:   Allergen Reactions    Solarcaine [benzocaine-triclosan]     Sulfa (sulfonamide antibiotics)     Sulfamethoxazole     Trimethoprim     Hytrin [terazosin] Rash    Smz-tmp ds [sulfamethoxazole-trimethoprim] Rash       Social  History:   reports that he quit smoking about 51 years ago. His smoking use included cigarettes. He started smoking about 76 years ago. He has a 25.00 pack-year smoking history. He has never used smokeless tobacco. He reports current alcohol use of about 2.0 standard drinks of alcohol per week. He reports that he does not use drugs.     Family History   Problem Relation Age of Onset    Heart disease Father     Hypertension Father     Cancer Mother     Diabetes Paternal Aunt        PHYSICAL EXAMINATION  Temp:  [98.6 °F (37 °C)-99.5 °F (37.5 °C)] 98.6 °F (37 °C)  Pulse:  [] 88  Resp:  [16-28] 17  SpO2:  [92 %-98 %] 95 %  BP: (143-197)/(66-88) 157/73    General Condition:   alert x 3 ,     Head & Neck  Anemia: None  Jaundice: None  Neck vein: Not distended  Carotid Bruits: none  Lymph nodes: none palpable  Thyroid: normal    Chest: normal    Heart: normal    Rt. Breast: not examined  Lt. Breast: not examined  Axillary lymph nodes: none    Abdomen: Soft,  Non tender with no palpable mass or organ  Hernia: ventral with distension , , scar jennifer healing better    Rectal: Defered    Extremities: normal    Vascular: normal    Specific focus Examination    Imp: recurrent small bowel obstruction , htn, , ventral hernia    Plan: NPO , ng tube , iv fluids , continue observation and present treatment , repeat xrays abdomen and check labs.

## 2022-02-11 PROBLEM — K56.50 SMALL BOWEL OBSTRUCTION DUE TO ADHESIONS: Status: ACTIVE | Noted: 2022-02-11

## 2022-02-11 PROCEDURE — 63600175 PHARM REV CODE 636 W HCPCS: Performed by: EMERGENCY MEDICINE

## 2022-02-11 PROCEDURE — 25000003 PHARM REV CODE 250: Performed by: SURGERY

## 2022-02-11 PROCEDURE — 11000001 HC ACUTE MED/SURG PRIVATE ROOM

## 2022-02-11 PROCEDURE — 99900035 HC TECH TIME PER 15 MIN (STAT)

## 2022-02-11 PROCEDURE — 94761 N-INVAS EAR/PLS OXIMETRY MLT: CPT

## 2022-02-11 RX ORDER — DICYCLOMINE HYDROCHLORIDE 10 MG/1
10 CAPSULE ORAL EVERY 8 HOURS PRN
Status: DISCONTINUED | OUTPATIENT
Start: 2022-02-11 | End: 2022-02-14 | Stop reason: HOSPADM

## 2022-02-11 RX ADMIN — SODIUM CHLORIDE AND POTASSIUM CHLORIDE: .9; .15 SOLUTION INTRAVENOUS at 10:02

## 2022-02-11 RX ADMIN — SODIUM CHLORIDE AND POTASSIUM CHLORIDE: .9; .15 SOLUTION INTRAVENOUS at 01:02

## 2022-02-11 RX ADMIN — DICYCLOMINE HYDROCHLORIDE 10 MG: 10 CAPSULE ORAL at 05:02

## 2022-02-11 RX ADMIN — SODIUM CHLORIDE AND POTASSIUM CHLORIDE: .9; .15 SOLUTION INTRAVENOUS at 04:02

## 2022-02-11 NOTE — PLAN OF CARE
TN went to meet with patient. No family at bedside. Patient reports he is independent and lives with his wife at home. He does not have any DME (besides a glucometer) or have HH. Patient still drives, but will have family transport on discharge. Patient current has a NGT in place. TN requested PCP and Dr. Gardner follow-up. Patient encouraged to call with any questions or concerns.  will continue to follow patient through transitions of care and assist with any discharge needs.     --Requested PCP and Dr. Gardner follow-up. Scheduled.    Patient Contacts    Name Relation Home Work Mobile   Aicha Connolly Spouse [phone]   Nina Carson Daughter   [phone]     Future Appointments   Date Time Provider Department Center   2/18/2022 10:00 AM Alan Longoria MD [facility]   2/24/2022  2:45 PM MD [facility]   3/24/2022  2:00 PM Alverto Green MD [facility]   5/31/2022  1:30 PM Daren Lassiter MD [facility] [place] [facility]         02/11/22 1151   Discharge Assessment   Assessment Type Discharge Planning Assessment   Confirmed/corrected address, phone number and insurance Yes   Confirmed Demographics Correct on Facesheet   Source of Information patient   Lives With significant other;spouse   Facility Arrived From: Home   Do you expect to return to your current living situation? Yes   Prior to hospitilization cognitive status: Alert/Oriented   Current cognitive status: Alert/Oriented   Walking or Climbing Stairs Difficulty none   Dressing/Bathing Difficulty none   Equipment Currently Used at Home glucometer   Readmission within 30 days? No   Do you have prescription coverage? Yes   Do you have any problems affording any of your prescribed medications? No   Is the patient taking medications as prescribed? yes   How do you get to doctors appointments? car, drives self;family or friend will provide   Are you on dialysis? No   Do you take  coumadin? No   Discharge Plan A Home with family   Discharge Plan B Home with family;Home Health   DME Needed Upon Discharge  none   Discharge Plan discussed with: Patient   Discharge Barriers Identified None   Relationship/Environment   Name(s) of Who Lives With Patient [id number]     Consuelo Jacobs RN    [phone]

## 2022-02-11 NOTE — PLAN OF CARE
Problem: Adult Inpatient Plan of Care  Goal: Admit completed, chart reviewed, care plan updated  Outcome: Ongoing, Progressing

## 2022-02-11 NOTE — PROGRESS NOTES
"Surgery follow up  BP (!) 162/74 (BP Location: Left arm, Patient Position: Lying)   Pulse 60   Temp 97.8 °F (36.6 °C) (Oral)   Resp 20   Ht 5' 10" (1.778 m)   Wt 74 kg (163 lb 2.3 oz)   SpO2 96%   BMI 23.41 kg/m²    Recent Results (from the past 336 hour(s))   CBC auto differential    Collection Time: 02/10/22  3:37 AM   Result Value Ref Range    WBC 12.45 3.90 - 12.70 K/uL    Hemoglobin 15.8 14.0 - 18.0 g/dL    Hematocrit 48.6 40.0 - 54.0 %    Platelets 180 150 - 450 K/uL   CBC auto differential    Collection Time: 02/09/22  6:30 PM   Result Value Ref Range    WBC 14.90 (H) 3.90 - 12.70 K/uL    Hemoglobin 17.2 14.0 - 18.0 g/dL    Hematocrit 51.7 40.0 - 54.0 %    Platelets 199 150 - 450 K/uL     No results found for this or any previous visit (from the past 336 hour(s)).  Abdomen distended, no gas per rectum yet  Will check KUB, try bentyl for pain.  "

## 2022-02-12 LAB
ESTIMATED AVG GLUCOSE: 171 MG/DL (ref 68–131)
HBA1C MFR BLD: 7.6 % (ref 4–5.6)
POCT GLUCOSE: 149 MG/DL (ref 70–110)
POCT GLUCOSE: 157 MG/DL (ref 70–110)
POCT GLUCOSE: 160 MG/DL (ref 70–110)
POCT GLUCOSE: 181 MG/DL (ref 70–110)

## 2022-02-12 PROCEDURE — 63600175 PHARM REV CODE 636 W HCPCS: Performed by: EMERGENCY MEDICINE

## 2022-02-12 PROCEDURE — 11000001 HC ACUTE MED/SURG PRIVATE ROOM

## 2022-02-12 PROCEDURE — 94761 N-INVAS EAR/PLS OXIMETRY MLT: CPT

## 2022-02-12 PROCEDURE — 99900035 HC TECH TIME PER 15 MIN (STAT)

## 2022-02-12 PROCEDURE — 63600175 PHARM REV CODE 636 W HCPCS: Performed by: SURGERY

## 2022-02-12 PROCEDURE — 25000003 PHARM REV CODE 250: Performed by: SURGERY

## 2022-02-12 PROCEDURE — 83036 HEMOGLOBIN GLYCOSYLATED A1C: CPT | Performed by: SURGERY

## 2022-02-12 PROCEDURE — 36415 COLL VENOUS BLD VENIPUNCTURE: CPT | Performed by: SURGERY

## 2022-02-12 RX ORDER — HYDRALAZINE HYDROCHLORIDE 20 MG/ML
10 INJECTION INTRAMUSCULAR; INTRAVENOUS EVERY 8 HOURS PRN
Status: DISCONTINUED | OUTPATIENT
Start: 2022-02-12 | End: 2022-02-14 | Stop reason: HOSPADM

## 2022-02-12 RX ORDER — INSULIN ASPART 100 [IU]/ML
0-5 INJECTION, SOLUTION INTRAVENOUS; SUBCUTANEOUS EVERY 6 HOURS PRN
Status: DISCONTINUED | OUTPATIENT
Start: 2022-02-12 | End: 2022-02-14 | Stop reason: HOSPADM

## 2022-02-12 RX ORDER — GLUCAGON 1 MG
1 KIT INJECTION
Status: DISCONTINUED | OUTPATIENT
Start: 2022-02-12 | End: 2022-02-14 | Stop reason: HOSPADM

## 2022-02-12 RX ORDER — BISACODYL 10 MG
10 SUPPOSITORY, RECTAL RECTAL DAILY PRN
Status: DISCONTINUED | OUTPATIENT
Start: 2022-02-12 | End: 2022-02-14 | Stop reason: HOSPADM

## 2022-02-12 RX ADMIN — SODIUM CHLORIDE AND POTASSIUM CHLORIDE: .9; .15 SOLUTION INTRAVENOUS at 02:02

## 2022-02-12 RX ADMIN — SODIUM CHLORIDE AND POTASSIUM CHLORIDE: .9; .15 SOLUTION INTRAVENOUS at 10:02

## 2022-02-12 RX ADMIN — SODIUM CHLORIDE AND POTASSIUM CHLORIDE: .9; .15 SOLUTION INTRAVENOUS at 06:02

## 2022-02-12 RX ADMIN — BISACODYL 10 MG: 10 SUPPOSITORY RECTAL at 11:02

## 2022-02-12 RX ADMIN — HYDRALAZINE HYDROCHLORIDE 10 MG: 20 INJECTION, SOLUTION INTRAMUSCULAR; INTRAVENOUS at 12:02

## 2022-02-12 NOTE — PROGRESS NOTES
"Surgery follow up  BP (!) 168/95 (BP Location: Left arm, Patient Position: Lying)   Pulse 82   Temp 98.6 °F (37 °C) (Oral)   Resp 18   Ht 5' 10" (1.778 m)   Wt 74 kg (163 lb 2.3 oz)   SpO2 (!) 93%   BMI 23.41 kg/m²   I/O last 3 completed shifts:  In: 2925 [P.O.:250; I.V.:2000; NG/GT:675]  Out: 3175 [Urine:1825; Drains:1350]  No intake/output data recorded.  Recent Results (from the past 336 hour(s))   CBC auto differential    Collection Time: 02/10/22  3:37 AM   Result Value Ref Range    WBC 12.45 3.90 - 12.70 K/uL    Hemoglobin 15.8 14.0 - 18.0 g/dL    Hematocrit 48.6 40.0 - 54.0 %    Platelets 180 150 - 450 K/uL   CBC auto differential    Collection Time: 02/09/22  6:30 PM   Result Value Ref Range    WBC 14.90 (H) 3.90 - 12.70 K/uL    Hemoglobin 17.2 14.0 - 18.0 g/dL    Hematocrit 51.7 40.0 - 54.0 %    Platelets 199 150 - 450 K/uL   passing gas, abdomen soft , less tender    Plan: fleet enema, check xrays and labs , continue present treatment.  "

## 2022-02-12 NOTE — NURSING
repositioned patient and changed gown and pads, tolerated well, placed on 1L NC as he c/o SOB with O2 sat of 90% on room air. call bell placed in reach.

## 2022-02-12 NOTE — PROGRESS NOTES
[facility]           Pharmacy        Current Drug Shortage     Due to national backorder and [facility] is critically low on inventory of Dextrose 50% (D50) Syringes and Vials, pharmacy has automatically switched from D50% to D10% IVPB at the equivalent dose until resolution of the shortage per P&T approved protocol.               Luis Alfredo Gamez PharmD, [facility]  [phone]

## 2022-02-12 NOTE — NURSING
Care plan explained & understood by pt. AAOx4. On O2 at 2L NC, Sats 96-99%.Pt not in distress. Meds given as per MAR. Safety maintained at all times. Call bell within reach. Bed on low position. Bed alarm on. Will continue to monitor.

## 2022-02-12 NOTE — NURSING
still has not produced any stool from rectum after suppository, did not feel any impaction or hard stool during insertion.

## 2022-02-12 NOTE — PLAN OF CARE
Problem: Adult Inpatient Plan of Care  Goal: Plan of Care Review  Outcome: Ongoing, Progressing  Goal: Absence of Hospital-Acquired Illness or Injury  Outcome: Ongoing, Progressing  Goal: Optimal Comfort and Wellbeing  Outcome: Ongoing, Progressing  Goal: Readiness for Transition of Care  Outcome: Ongoing, Progressing     Problem: Fall Injury Risk  Goal: Absence of Fall and Fall-Related Injury  Outcome: Ongoing, Progressing     Problem: Fluid Deficit (Intestinal Obstruction)  Goal: Fluid Balance  Outcome: Ongoing, Progressing     Problem: Infection (Intestinal Obstruction)  Goal: Absence of Infection Signs and Symptoms  Outcome: Ongoing, Progressing     Problem: Nausea and Vomiting (Intestinal Obstruction)  Goal: Nausea and Vomiting Relief  Outcome: Ongoing, Progressing     Problem: Pain (Intestinal Obstruction)  Goal: Acceptable Pain Control  Outcome: Ongoing, Progressing

## 2022-02-13 LAB
ANION GAP SERPL CALC-SCNC: 11 MMOL/L (ref 8–16)
BASOPHILS # BLD AUTO: 0.06 K/UL (ref 0–0.2)
BASOPHILS NFR BLD: 0.5 % (ref 0–1.9)
BUN SERPL-MCNC: 26 MG/DL (ref 8–23)
CALCIUM SERPL-MCNC: 8.9 MG/DL (ref 8.7–10.5)
CHLORIDE SERPL-SCNC: 116 MMOL/L (ref 95–110)
CO2 SERPL-SCNC: 27 MMOL/L (ref 23–29)
CREAT SERPL-MCNC: 1.2 MG/DL (ref 0.5–1.4)
DIFFERENTIAL METHOD: ABNORMAL
EOSINOPHIL # BLD AUTO: 0 K/UL (ref 0–0.5)
EOSINOPHIL NFR BLD: 0.3 % (ref 0–8)
ERYTHROCYTE [DISTWIDTH] IN BLOOD BY AUTOMATED COUNT: 13.1 % (ref 11.5–14.5)
EST. GFR  (AFRICAN AMERICAN): >60 ML/MIN/1.73 M^2
EST. GFR  (NON AFRICAN AMERICAN): 53 ML/MIN/1.73 M^2
GLUCOSE SERPL-MCNC: 167 MG/DL (ref 70–110)
HCT VFR BLD AUTO: 52 % (ref 40–54)
HGB BLD-MCNC: 16.8 G/DL (ref 14–18)
IMM GRANULOCYTES # BLD AUTO: 0.08 K/UL (ref 0–0.04)
IMM GRANULOCYTES NFR BLD AUTO: 0.7 % (ref 0–0.5)
LYMPHOCYTES # BLD AUTO: 1.2 K/UL (ref 1–4.8)
LYMPHOCYTES NFR BLD: 10.1 % (ref 18–48)
MCH RBC QN AUTO: 29.6 PG (ref 27–31)
MCHC RBC AUTO-ENTMCNC: 32.3 G/DL (ref 32–36)
MCV RBC AUTO: 92 FL (ref 82–98)
MONOCYTES # BLD AUTO: 1.5 K/UL (ref 0.3–1)
MONOCYTES NFR BLD: 12.2 % (ref 4–15)
NEUTROPHILS # BLD AUTO: 9.1 K/UL (ref 1.8–7.7)
NEUTROPHILS NFR BLD: 76.2 % (ref 38–73)
NRBC BLD-RTO: 0 /100 WBC
PLATELET # BLD AUTO: 163 K/UL (ref 150–450)
PMV BLD AUTO: 11.6 FL (ref 9.2–12.9)
POCT GLUCOSE: 148 MG/DL (ref 70–110)
POCT GLUCOSE: 174 MG/DL (ref 70–110)
POCT GLUCOSE: 217 MG/DL (ref 70–110)
POTASSIUM SERPL-SCNC: 4.3 MMOL/L (ref 3.5–5.1)
RBC # BLD AUTO: 5.67 M/UL (ref 4.6–6.2)
SODIUM SERPL-SCNC: 154 MMOL/L (ref 136–145)
WBC # BLD AUTO: 11.91 K/UL (ref 3.9–12.7)

## 2022-02-13 PROCEDURE — 11000001 HC ACUTE MED/SURG PRIVATE ROOM

## 2022-02-13 PROCEDURE — 80048 BASIC METABOLIC PNL TOTAL CA: CPT | Performed by: SURGERY

## 2022-02-13 PROCEDURE — 94761 N-INVAS EAR/PLS OXIMETRY MLT: CPT

## 2022-02-13 PROCEDURE — 36415 COLL VENOUS BLD VENIPUNCTURE: CPT | Performed by: SURGERY

## 2022-02-13 PROCEDURE — 63600175 PHARM REV CODE 636 W HCPCS: Performed by: SURGERY

## 2022-02-13 PROCEDURE — 85025 COMPLETE CBC W/AUTO DIFF WBC: CPT | Performed by: SURGERY

## 2022-02-13 PROCEDURE — 63600175 PHARM REV CODE 636 W HCPCS: Performed by: EMERGENCY MEDICINE

## 2022-02-13 RX ADMIN — HYDRALAZINE HYDROCHLORIDE 10 MG: 20 INJECTION, SOLUTION INTRAMUSCULAR; INTRAVENOUS at 05:02

## 2022-02-13 RX ADMIN — SODIUM CHLORIDE AND POTASSIUM CHLORIDE: .9; .15 SOLUTION INTRAVENOUS at 03:02

## 2022-02-13 RX ADMIN — SODIUM CHLORIDE AND POTASSIUM CHLORIDE: .9; .15 SOLUTION INTRAVENOUS at 06:02

## 2022-02-13 NOTE — PLAN OF CARE
Problem: Adult Inpatient Plan of Care  Goal: Plan of Care Review  Outcome: Ongoing, Progressing  Safety maintained at all times. Call bell within reach. Bed on low position. Bed alarm on. Will continue to monitor.

## 2022-02-13 NOTE — NURSING
Spoke with MD Gardner , communicated pt asking to see him and expressing desire to go home.  Charge nurse as well as house supervisor has spoken with pt , he is insistent  On going home.  MD gave verbal  To discontinue NG tube and place pt on full liquid diet.  MD Gardner also stated he would come by and see pt.

## 2022-02-13 NOTE — NURSING
Bedside report given by off going nurse, pt in bed aaox4 expressing concerns with his care from previous day. Pt denies any pain or concerns at present moment.  Contact board updated with direct nurse extension bed locked in lowest position alarm set and belongings within reach.  Also input nurse number into pt personal phone since there is a work order pending to fix pt call light, pt door left open as well for frequent checks and monitoring.

## 2022-02-13 NOTE — PROGRESS NOTES
"Surgery follow up  BP (!) 195/86   Pulse 91   Temp 98.6 °F (37 °C)   Resp 18   Ht 5' 10" (1.778 m)   Wt 74 kg (163 lb 2.3 oz)   SpO2 96%   BMI 23.41 kg/m²   I/O last 3 completed shifts:  In: 4883.8 [P.O.:590; I.V.:4293.8]  Out: 900 [Urine:400; Drains:500]  No intake/output data recorded.  Recent Results (from the past 336 hour(s))   CBC Auto Differential    Collection Time: 02/13/22 10:48 AM   Result Value Ref Range    WBC 11.91 3.90 - 12.70 K/uL    Hemoglobin 16.8 14.0 - 18.0 g/dL    Hematocrit 52.0 40.0 - 54.0 %    Platelets 163 150 - 450 K/uL   CBC auto differential    Collection Time: 02/10/22  3:37 AM   Result Value Ref Range    WBC 12.45 3.90 - 12.70 K/uL    Hemoglobin 15.8 14.0 - 18.0 g/dL    Hematocrit 48.6 40.0 - 54.0 %    Platelets 180 150 - 450 K/uL   CBC auto differential    Collection Time: 02/09/22  6:30 PM   Result Value Ref Range    WBC 14.90 (H) 3.90 - 12.70 K/uL    Hemoglobin 17.2 14.0 - 18.0 g/dL    Hematocrit 51.7 40.0 - 54.0 %    Platelets 199 150 - 450 K/uL   doing better moved bowels tolerating liquids   Possible d/c today will dd/c ng tube.  "

## 2022-02-14 ENCOUNTER — PATIENT OUTREACH (OUTPATIENT)
Dept: ADMINISTRATIVE | Facility: OTHER | Age: 87
End: 2022-02-14
Payer: MEDICARE

## 2022-02-14 VITALS
DIASTOLIC BLOOD PRESSURE: 75 MMHG | TEMPERATURE: 97 F | SYSTOLIC BLOOD PRESSURE: 166 MMHG | BODY MASS INDEX: 23.35 KG/M2 | RESPIRATION RATE: 20 BRPM | HEART RATE: 81 BPM | OXYGEN SATURATION: 97 % | WEIGHT: 163.13 LBS | HEIGHT: 70 IN

## 2022-02-14 LAB
ANION GAP SERPL CALC-SCNC: 7 MMOL/L (ref 8–16)
BASOPHILS # BLD AUTO: 0.05 K/UL (ref 0–0.2)
BASOPHILS NFR BLD: 0.5 % (ref 0–1.9)
BUN SERPL-MCNC: 25 MG/DL (ref 8–23)
CALCIUM SERPL-MCNC: 8 MG/DL (ref 8.7–10.5)
CHLORIDE SERPL-SCNC: 112 MMOL/L (ref 95–110)
CO2 SERPL-SCNC: 26 MMOL/L (ref 23–29)
CREAT SERPL-MCNC: 1.1 MG/DL (ref 0.5–1.4)
DIFFERENTIAL METHOD: ABNORMAL
EOSINOPHIL # BLD AUTO: 0.3 K/UL (ref 0–0.5)
EOSINOPHIL NFR BLD: 3.6 % (ref 0–8)
ERYTHROCYTE [DISTWIDTH] IN BLOOD BY AUTOMATED COUNT: 13.1 % (ref 11.5–14.5)
EST. GFR  (AFRICAN AMERICAN): >60 ML/MIN/1.73 M^2
EST. GFR  (NON AFRICAN AMERICAN): 59 ML/MIN/1.73 M^2
GLUCOSE SERPL-MCNC: 148 MG/DL (ref 70–110)
HCT VFR BLD AUTO: 45.3 % (ref 40–54)
HGB BLD-MCNC: 14.6 G/DL (ref 14–18)
IMM GRANULOCYTES # BLD AUTO: 0.09 K/UL (ref 0–0.04)
IMM GRANULOCYTES NFR BLD AUTO: 1 % (ref 0–0.5)
LYMPHOCYTES # BLD AUTO: 1.4 K/UL (ref 1–4.8)
LYMPHOCYTES NFR BLD: 15.1 % (ref 18–48)
MCH RBC QN AUTO: 29.6 PG (ref 27–31)
MCHC RBC AUTO-ENTMCNC: 32.2 G/DL (ref 32–36)
MCV RBC AUTO: 92 FL (ref 82–98)
MONOCYTES # BLD AUTO: 1 K/UL (ref 0.3–1)
MONOCYTES NFR BLD: 11.2 % (ref 4–15)
NEUTROPHILS # BLD AUTO: 6.4 K/UL (ref 1.8–7.7)
NEUTROPHILS NFR BLD: 68.6 % (ref 38–73)
NRBC BLD-RTO: 0 /100 WBC
PLATELET # BLD AUTO: 157 K/UL (ref 150–450)
PMV BLD AUTO: 11.8 FL (ref 9.2–12.9)
POCT GLUCOSE: 144 MG/DL (ref 70–110)
POCT GLUCOSE: 248 MG/DL (ref 70–110)
POTASSIUM SERPL-SCNC: 4.2 MMOL/L (ref 3.5–5.1)
RBC # BLD AUTO: 4.94 M/UL (ref 4.6–6.2)
SODIUM SERPL-SCNC: 145 MMOL/L (ref 136–145)
WBC # BLD AUTO: 9.28 K/UL (ref 3.9–12.7)

## 2022-02-14 PROCEDURE — 80048 BASIC METABOLIC PNL TOTAL CA: CPT | Performed by: SURGERY

## 2022-02-14 PROCEDURE — 63600175 PHARM REV CODE 636 W HCPCS: Performed by: EMERGENCY MEDICINE

## 2022-02-14 PROCEDURE — 85025 COMPLETE CBC W/AUTO DIFF WBC: CPT | Performed by: SURGERY

## 2022-02-14 PROCEDURE — 94761 N-INVAS EAR/PLS OXIMETRY MLT: CPT

## 2022-02-14 PROCEDURE — 36415 COLL VENOUS BLD VENIPUNCTURE: CPT | Performed by: SURGERY

## 2022-02-14 RX ADMIN — SODIUM CHLORIDE AND POTASSIUM CHLORIDE: .9; .15 SOLUTION INTRAVENOUS at 06:02

## 2022-02-14 NOTE — PHYSICIAN QUERY
PT Name: Husam Connolly  MR #: [id number]     DOCUMENTATION CLARIFICATION     CDS: Carline SHANKAR RN  Contact information: [email or web address]    This form is a permanent document in the medical record.     Query Date: February 14, 2022    By submitting this query, we are merely seeking further clarification of documentation to reflect the severity of illness of your patient. Please utilize your independent clinical judgment when addressing the question(s) below.    The medical record reflects the following:     Indicators   Supporting Clinical Findings Location in Medical Record   X Bowel obstruction, intestinal obstruction, LBO or SBO documented Imp: recurrent small bowel obstruction , htn, , ventral hernia    2/11/2022  2:34 PM New Diagnosis None Small bowel obstruction due to adhesions [K56.50] Fouzia Gardner MD General Surgery, Surgery H&P 2/10/2022      Problem Update History Encounter Summary    X Radiology findings Findings compatible with acute small bowel obstruction, with dilated fecalized small bowel loops measuring up to 4.7 cm, with suggested transition point within the right lower quadrant of the abdomen, as described. CT Abd Pelvis 2/9/2022   X Treatment/Medication Plan: NPO , ng tube , iv fluids , continue observation and present treatment , repeat xrays abdomen and check labs.    Plan: fleet enema, check xrays and labs , continue present treatment. H&P 2/10/2022      PN Surg 2/12/2022    Procedure/Surgery     X Other An 80-year-old male with a history of small-bowel obstructions requiring laparotomie originally with diverting ostomy later with end-to-end anastomosis diabetes hypertension hyperlipidemia presents with abdominal distention feels like he has a small-bowel obstruction he had a bowel movement yesterday no bowel movement today no evidence of flatus some nausea no vomiting no other constitutional symptoms ED Prov Notes 2/9/2022     Provider, please further specify the  bowel obstruction diagnosis:    [  x ] Partial or incomplete intestinal obstruction, due to adhesions   [   ] Complete intestinal obstruction, due to adhesions   [   ] Other intestinal condition (please specify): _____________________   [   ]  Clinically Undetermined         Please document in your progress notes daily for the duration of treatment until resolved, and include in your discharge summary.

## 2022-02-14 NOTE — PLAN OF CARE
Plan of care reviewed with the patient . Understanding verbalized.No acute distress observed.Safety Maintained.Bed at lowest position.Call bell at reach.Bed side table at reach.Instructed Patient to call for assistance whenever required.     Problem: Adult Inpatient Plan of Care  Goal: Plan of Care Review  Outcome: Ongoing, Progressing  Goal: Patient-Specific Goal (Individualized)  Outcome: Ongoing, Progressing  Goal: Absence of Hospital-Acquired Illness or Injury  Outcome: Ongoing, Progressing

## 2022-02-14 NOTE — PROGRESS NOTES
IP Liaison - Initial Visit Note    Patient: Husam Connolly  MRN:  [id number]  Date of Service:  2/14/2022  Completed by:  SONA Hardy    Reason for Visit   Patient presents with    IP Liaison Initial Visit       RSW met with patient at bedside in order to complete SDOH questionnaire and liaison assessment.  Pt has identified no social barriers to care.  Per pt, pt is not in need of resources at this time.    The following were addressed during this visit:  - Review SDOH Questions   - Complete patient assessment   - Complete initial visit with patient        Patient Summary     IP Liaison Patient Assessment    General  Level of Caregiver support: Member independent and does not need caregiver assistance  Have you had to make a decision between paying for any of the following in the last 2 months?: None  Transportation means: Self  Current symptoms: Sleep disturbances, Restlessness  Assessments  Was the PHQ Depression Screening completed this visit?: No  Was the MUMTAZ-7 Screening completed this visit?: No         SONA Hardy

## 2022-02-14 NOTE — PROGRESS NOTES
"Surgery follow up  BP (!) 166/75 (BP Location: Left arm, Patient Position: Lying)   Pulse 81   Temp 96.6 °F (35.9 °C) (Oral)   Resp 20   Ht 5' 10" (1.778 m)   Wt 74 kg (163 lb 2.3 oz)   SpO2 97%   BMI 23.41 kg/m²   I/O last 3 completed shifts:  In: 868.8 [P.O.:450; I.V.:418.8]  Out: 500 [Urine:400; Drains:100]  I/O this shift:  In: -   Out: 325 [Urine:325]  Recent Results (from the past 336 hour(s))   CBC Auto Differential    Collection Time: 02/14/22  5:01 AM   Result Value Ref Range    WBC 9.28 3.90 - 12.70 K/uL    Hemoglobin 14.6 14.0 - 18.0 g/dL    Hematocrit 45.3 40.0 - 54.0 %    Platelets 157 150 - 450 K/uL   CBC Auto Differential    Collection Time: 02/13/22 10:48 AM   Result Value Ref Range    WBC 11.91 3.90 - 12.70 K/uL    Hemoglobin 16.8 14.0 - 18.0 g/dL    Hematocrit 52.0 40.0 - 54.0 %    Platelets 163 150 - 450 K/uL   CBC auto differential    Collection Time: 02/10/22  3:37 AM   Result Value Ref Range    WBC 12.45 3.90 - 12.70 K/uL    Hemoglobin 15.8 14.0 - 18.0 g/dL    Hematocrit 48.6 40.0 - 54.0 %    Platelets 180 150 - 450 K/uL   doing better   Discharge home today rtc office one week.  "

## 2022-02-14 NOTE — PLAN OF CARE
Patient is refusing discharges instructions. Stated he's been through this many times and don't need that. Bedside nurse and VN both attempted discharge instructions. He refused. wheelchair requested

## 2022-02-14 NOTE — PLAN OF CARE
Future Appointments   Date Time Provider Department Center   2/18/2022 10:00 AM Alan Longoria MD [facility]   2/24/2022  2:45 PM Fouzia Gardner MD [facility]   3/24/2022  2:00 PM Alverto Green MD [facility]   5/31/2022  1:30 PM Daren Lassiter MD [facility] [place] [facility]     Consuelo Jacobs RN    [phone]

## 2022-02-14 NOTE — PLAN OF CARE
TN went to meet with patient. We discussed discharge planning. He is eager to discharge home. No needs identified. Patient is aware PCP and Dr. Gardner follow-up scheduled. Will continue to follow.    1533--TN sent secure message to Dr. Gardner and included bedside nurse, to inquire if patient will be discharged today. I told him as appointments are scheduled. Awaiting response.    1535--Per Dr. Gardner, patient will discharge today. TN requested he place discharge order and reconcile meds.    Discharge orders written. No needs identified. I reviewed follow-up information with patient. No other needs.    Future Appointments   Date Time Provider Department [place]   2/18/2022 10:00 AM Alan Longoria MD [facility]   2/24/2022  2:45 PM MD [facility]   3/24/2022  2:00 PM Alverto Green MD [facility]   5/31/2022  1:30 PM Daren Lassiter MD [facility] [place] [facility]      02/14/22 1535   Final Note   Assessment Type Final Discharge Note   Anticipated Discharge Disposition Home   Hospital Resources/Appts/Education Provided Appointments scheduled by Navigator/Coordinator   Post-Acute Status   Discharge Delays None known at this time     Consuelo Jacobs RN    [phone]

## 2022-02-15 ENCOUNTER — PATIENT OUTREACH (OUTPATIENT)
Dept: ADMINISTRATIVE | Facility: OTHER | Age: 87
End: 2022-02-15
Payer: MEDICARE

## 2022-02-15 ENCOUNTER — OFFICE VISIT (OUTPATIENT)
Dept: URGENT CARE | Facility: CLINIC | Age: 87
End: 2022-02-15
Payer: MEDICARE

## 2022-02-15 VITALS
HEIGHT: 70 IN | TEMPERATURE: 98 F | DIASTOLIC BLOOD PRESSURE: 80 MMHG | HEART RATE: 103 BPM | SYSTOLIC BLOOD PRESSURE: 132 MMHG | OXYGEN SATURATION: 96 % | BODY MASS INDEX: 23.34 KG/M2 | RESPIRATION RATE: 16 BRPM | WEIGHT: 163 LBS

## 2022-02-15 DIAGNOSIS — R06.02 SOB (SHORTNESS OF BREATH): Primary | ICD-10-CM

## 2022-02-15 LAB
CTP QC/QA: YES
SARS-COV-2 RDRP RESP QL NAA+PROBE: NEGATIVE

## 2022-02-15 PROCEDURE — 1159F PR MEDICATION LIST DOCUMENTED IN MEDICAL RECORD: ICD-10-PCS | Mod: CPTII,S$GLB,, | Performed by: FAMILY MEDICINE

## 2022-02-15 PROCEDURE — U0002: ICD-10-PCS | Mod: QW,S$GLB,, | Performed by: FAMILY MEDICINE

## 2022-02-15 PROCEDURE — 99203 PR OFFICE/OUTPT VISIT, NEW, LEVL III, 30-44 MIN: ICD-10-PCS | Mod: 25,S$GLB,, | Performed by: FAMILY MEDICINE

## 2022-02-15 PROCEDURE — 99203 OFFICE O/P NEW LOW 30 MIN: CPT | Mod: 25,S$GLB,, | Performed by: FAMILY MEDICINE

## 2022-02-15 PROCEDURE — U0002 COVID-19 LAB TEST NON-CDC: HCPCS | Mod: QW,S$GLB,, | Performed by: FAMILY MEDICINE

## 2022-02-15 PROCEDURE — 1159F MED LIST DOCD IN RCRD: CPT | Mod: CPTII,S$GLB,, | Performed by: FAMILY MEDICINE

## 2022-02-15 PROCEDURE — 94640 AIRWAY INHALATION TREATMENT: CPT | Mod: S$GLB,,, | Performed by: FAMILY MEDICINE

## 2022-02-15 PROCEDURE — 94640 PR INHAL RX, AIRWAY OBST/DX SPUTUM INDUCT: ICD-10-PCS | Mod: S$GLB,,, | Performed by: FAMILY MEDICINE

## 2022-02-15 RX ORDER — ALBUTEROL SULFATE 1.25 MG/3ML
1.25 SOLUTION RESPIRATORY (INHALATION) EVERY 6 HOURS PRN
Qty: 75 ML | Refills: 0 | Status: ON HOLD | OUTPATIENT
Start: 2022-02-15 | End: 2022-04-22 | Stop reason: HOSPADM

## 2022-02-15 RX ORDER — ALBUTEROL SULFATE 90 UG/1
2 AEROSOL, METERED RESPIRATORY (INHALATION) EVERY 6 HOURS PRN
Qty: 18 G | Refills: 3 | Status: SHIPPED | OUTPATIENT
Start: 2022-02-15 | End: 2022-05-24

## 2022-02-15 RX ORDER — ALBUTEROL SULFATE 0.83 MG/ML
2.5 SOLUTION RESPIRATORY (INHALATION)
Status: COMPLETED | OUTPATIENT
Start: 2022-02-15 | End: 2022-02-15

## 2022-02-15 RX ADMIN — ALBUTEROL SULFATE 2.5 MG: 0.83 SOLUTION RESPIRATORY (INHALATION) at 09:02

## 2022-02-15 NOTE — PROGRESS NOTES
"Subjective:       Patient ID: Husam Connolly is a 89 y.o. male.    Vitals:  height is 5' 10" (1.778 m) and weight is 73.9 kg (163 lb). His temperature is 97.6 °F (36.4 °C). His blood pressure is 132/80 and his pulse is 103. His respiration is 16 and oxygen saturation is 96%.     Chief Complaint: Shortness of Breath    Pt presents sob since last night. Pt states he got out of the hospital yesterday afternoon with a dx of a stomach virus/diverticulitis, pt does have a hx of ? Copd  Has not been using inhaler  Pt denies any SOB, denies n/v/or diarrhea  Just released from hospital yesterday,    Shortness of Breath  This is a new problem. The current episode started yesterday. The problem occurs constantly. The problem has been unchanged. Nothing aggravates the symptoms. He has tried nothing for the symptoms. The treatment provided no relief.       Respiratory: Positive for shortness of breath.        Objective:      Physical Exam   Constitutional: He is oriented to person, place, and time. He appears well-developed and well-nourished. He is cooperative.  Non-toxic appearance. He does not appear ill. No distress.   HENT:   Head: Normocephalic and atraumatic.   Ears:   Right Ear: Hearing, tympanic membrane, external ear and ear canal normal.   Left Ear: Hearing, tympanic membrane, external ear and ear canal normal.   Nose: Nose normal. No mucosal edema, rhinorrhea or nasal deformity. No epistaxis. Right sinus exhibits no maxillary sinus tenderness and no frontal sinus tenderness. Left sinus exhibits no maxillary sinus tenderness and no frontal sinus tenderness.   Mouth/Throat: Uvula is midline, oropharynx is clear and moist and mucous membranes are normal. Mucous membranes are moist. No trismus in the jaw. Normal dentition. No uvula swelling. No posterior oropharyngeal erythema. Oropharynx is clear.   Eyes: Conjunctivae and lids are normal. Pupils are equal, round, and reactive to light. Right eye exhibits no discharge. " Left eye exhibits no discharge. No scleral icterus.      extraocular movement intact   Neck: Trachea normal and phonation normal. Neck supple.   Cardiovascular: Normal rate, regular rhythm, normal heart sounds, intact distal pulses and normal pulses.   Pulmonary/Chest: Effort normal and breath sounds normal. No respiratory distress.   Abdominal: Normal appearance and bowel sounds are normal. He exhibits no distension, no pulsatile midline mass and no mass. Soft. There is no abdominal tenderness.   Musculoskeletal: Normal range of motion.         General: No deformity or edema. Normal range of motion.   Neurological: He is alert and oriented to person, place, and time. He exhibits normal muscle tone. Coordination normal.   Skin: Skin is warm, dry, intact, not diaphoretic and not pale.   Psychiatric: He has a normal mood and affect. His speech is normal and behavior is normal. Judgment and thought content normal. Cognition and memory  Nursing note and vitals reviewed.        Assessment:       1. SOB (shortness of breath)          Plan:         SOB (shortness of breath)  -     POCT COVID-19 Rapid Screening  -     X-Ray Chest PA And Lateral; Future; Expected date: 02/15/2022    Other orders  -     albuterol nebulizer solution 2.5 mg  -     albuterol (ACCUNEB) 1.25 mg/3 mL Nebu; Take 3 mLs (1.25 mg total) by nebulization every 6 (six) hours as needed. Rescue  Dispense: 75 mL; Refill: 0  -     albuterol (VENTOLIN HFA) 90 mcg/actuation inhaler; Inhale 2 puffs into the lungs every 6 (six) hours as needed for Wheezing. Rescue  Dispense: 18 g; Refill: 3             Pulse ox in the clinic 96%    Pt able to talk in full sentences and able to walk without SOB    If SOB persists advised to go to ER    Pt feeling much better after neb Tx

## 2022-02-15 NOTE — PATIENT INSTRUCTIONS
Patient Education       Shortness of Breath, Adult ED   General Information   You came to the Emergency Department (ED) for shortness of breath. The doctors feel the risk of a serious cause for your shortness of breath is low. Many things can make you feel short of breath. Lung problems, like asthma or chronic obstructive pulmonary disease (COPD) or a blood clot in your lungs, can cause shortness of breath. So can a heart attack or heart failure when your heart doesnt work as well as it should. A serious allergic reaction can also cause very bad breathing problems. You may be waiting on some test results to learn the cause of your shortness of breath. The staff will contact you if there are concerning results.  What care is needed at home?   · Call your regular doctor to let them know you were in the ED. Make a follow-up appointment if you were told to.  · Keep a diary of your signs. Write down when you have trouble breathing and what you were doing before it happened. This can help figure out what things affect your breathing. Then, you may be able to avoid them.  · Do not smoke or be in smoke-filled places. Avoid things that may cause breathing problems like fumes, pollution, dust, and other common allergens.  · Do coughing and deep breathing exercises to help keep your lungs clear.  · If you have medicines to take when you are feeling short of breath, be sure to carry them with you. Then, you can take them when needed.  When do I need to get emergency help?   · Call for an ambulance right away if:   ? You have signs of a heart attack, which may include:  § Severe chest pain, pressure, or discomfort with:  § Breathing trouble; sweating; upset stomach; or cold, clammy skin.  § Pain in your arms, back, or jaw.  § Worse pain with activity like walking up stairs.  § Fast or irregular heartbeat.  § Feeling dizzy, faint, or weak.  ? You are having so much trouble breathing that you can only say one or two words at a  time.  ? You need to sit upright at all times to be able to breathe or cannot lie down.  ? You develop swelling of your tongue, lips, or throat.  · Return to the ED if:   ? You feel your shortness of breath is slowly getting worse.  When do I need to call the doctor?   · You are feeling weak or more short of breath than usual when doing your activities.  · You have a fever of 100.4°F (38°C) or higher, are coughing up mucus, have leg swelling, or hives.  · You have new or worsening symptoms.  Last Reviewed Date   2020-09-16  Consumer Information Use and Disclaimer   This information is not specific medical advice and does not replace information you receive from your health care provider. This is only a brief summary of general information. It does NOT include all information about conditions, illnesses, injuries, tests, procedures, treatments, therapies, discharge instructions or life-style choices that may apply to you. You must talk with your health care provider for complete information about your health and treatment options. This information should not be used to decide whether or not to accept your health care providers advice, instructions or recommendations. Only your health care provider has the knowledge and training to provide advice that is right for you.  Copyright   Copyright © 2021 [employer] and its affiliates and/or licensors. All rights reserved.

## 2022-02-15 NOTE — DISCHARGE SUMMARY
[facility]  General Surgery  Discharge Summary      Patient Name: Husam Connolly  MRN: [id number]  Admission Date: 2/9/2022  Hospital Length of Stay: 5 days  Discharge Date and Time: 2/14/2022  4:39 PM  Attending Physician: No att. providers found   Discharging Provider: Fouzia Gardner MD  Primary Care Provider: Alverto Green MD     HPI:  89-year-old male known to from previous admissions to the hospital with recurrent partial small bowel obstruction presented with the same complaint of severe abdominal pain associated nausea and vomiting denied any fever chills no trouble urinating was bloated and vomiting and a few times and did not have a bowel movement for few days and no fever chills patient was worked up in emergency room and admitted for further workup        Hospital Course:  Patient admitted with diagnosis of small-bowel obstruction patient started on NPO NG tube IV fluids expectant treatment repeat x-rays and repeat labs which progressively improved patient abdominal distension diet better with a repeat x-ray showing resolution of the small-bowel obstruction patient was given enemas and laxatives to have bowel movement which patient started wound bowels patient was out of bed ambulating moving bowels sore throat improved after the NG tube was removed patient was eating well patient was discharged home on p.o. medicines which he was taking at home to come to the emergency if patient had any recurrent symptoms patient to come and see me in the in the office in 1 week.    Consults: none    Significant Diagnostic Studies: Labs:   BMP:   Recent Labs   Lab 02/14/22  0501   *      K 4.2   *   CO2 26   BUN 25*   CREATININE 1.1   CALCIUM 8.0*   , CMP   Recent Labs   Lab 02/14/22  0501      K 4.2   *   CO2 26   *   BUN 25*   CREATININE 1.1   CALCIUM 8.0*   ANIONGAP 7*   ESTGFRAFRICA >60   EGFRNONAA 59*   , CBC   Recent Labs   Lab 02/14/22  0501   WBC 9.28    HGB 14.6   HCT 45.3       and A1C:   Recent Labs   Lab 08/25/21  1159 02/12/22  1209   HGBA1C 7.8* 7.6*     Microbiology:   Blood Culture   Lab Results   Component Value Date    LABBLOO No growth after 5 days. 05/31/2021     Radiology: X-Ray: CXR: X-Ray Chest 1 View (CXR): No results found for this visit on 02/09/22. and KUB: X-Ray Abdomen AP 1 View (KUB):   Results for orders placed or performed during the hospital encounter of 02/09/22   X-Ray Abdomen AP 1 View    Narrative    EXAMINATION:  XR ABDOMEN AP 1 VIEW    CLINICAL HISTORY:  NG tube;    TECHNIQUE:  AP View(s) of the abdomen was performed.    COMPARISON:  Chest radiograph 02/10/2022 and abdominal radiographs 02/09/2022; CT abdomen and pelvis 02/09/2022    FINDINGS:  Lower most aspect of the pelvis not included in field of view.    Enteric tube side port and tip project over the left upper quadrant likely within the proximal stomach.  There are multiple dilated gas-filled loops of small bowel throughout the abdomen, slightly worse from 02/09/2022 studies.  Mild amount of stool noted throughout the colon.  No organomegaly or significant mass effect.  No large amount of free air or abnormal intra-abdominal calcification definitively seen.  Imaged lung bases are grossly clear.  No acute osseous process seen.  Otherwise no change.      Impression    As above.      Electronically signed by: Roscoe Napoles MD  Date:    02/11/2022  Time:    16:59     CT scan: CT ABDOMEN PELVIS WITH CONTRAST: No results found for this visit on 02/09/22.  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    Patient Instructions:      Activity as tolerated     Medications:  Reconciled Home Medications:      Medication List      CHANGE how you take these medications    finasteride 5 mg tablet  Commonly known as: PROSCAR  TAKE 1 TABLET BY MOUTH EVERY DAY  What changed: when to take this        CONTINUE taking these medications    aspirin 81 MG EC tablet  Commonly known as: ECOTRIN  Take 81 mg by mouth once daily.     atorvastatin 40 MG tablet  Commonly known as: LIPITOR  Take 1 tablet (40 mg total) by mouth once daily.     fish oil-omega-3 fatty acids 300-1,000 mg capsule  Take 1 capsule by mouth once daily.     fluticasone propionate 50 mcg/actuation nasal spray  Commonly known as: FLONASE  2 sprays (100 mcg total) by Each Nostril route once daily.     GARLIC ORAL  Take 1 capsule by mouth once daily.     hydroCHLOROthiazide 12.5 mg capsule  Commonly known as: MICROZIDE  Take 1 capsule (12.5 mg total) by mouth 2 (two) times daily.     metFORMIN 500 MG tablet  Commonly known as: GLUCOPHAGE  TAKE 1 TABLET BY MOUTH TWICE DAILY WITH FOOD     multivitamin capsule  Take 1 capsule by mouth once daily.     tamsulosin 0.4 mg Cap  Commonly known as: FLOMAX  Take 0.4 mg by mouth nightly.     vitamin D 1000 units Tab  Commonly known as: VITAMIN D3  Take 1,000 Units by mouth once daily.            Fouzia Gardner MD  General Surgery  [place] - Telemetry

## 2022-02-15 NOTE — PROGRESS NOTES
IP Liaison - Final Visit Note    Patient: Husam Connolly  MRN:  [id number]  Date of Service:  2/15/2022  Completed by:  SONA Hardy    Reason for Visit   Patient presents with    IP Liaison Chart Review     Patient discharged from hospital before SONA was able to complete follow-up visit.       Patient Summary     Discharge Date: 2/14/2022  Discharge telephone number/address: [phone] / [address] [facility] [address]  Follow up provider: Alan Longoria MD / Fouzia Gardner MD / Alverto Green MD  Follow up appointments: 2/18/2022 @ 10:00am / 2/24/2022 @ 2:45pm / 3/24/2022 @ 2:00pm  Home Health agency & telephone number: n/a  DME ordered &  name: n/a  Assigned OPCM RN/SW: n/a  Report sent to follow up team (PCP/OPCM) via in basket message: n/a  Community Resources arranged including agency name & contact info: n/a      SONA Hardy

## 2022-02-27 PROBLEM — N18.31 TYPE 2 DIABETES MELLITUS WITH STAGE 3A CHRONIC KIDNEY DISEASE, WITHOUT LONG-TERM CURRENT USE OF INSULIN: Status: ACTIVE | Noted: 2021-09-22

## 2022-02-27 PROBLEM — E11.22 TYPE 2 DIABETES MELLITUS WITH STAGE 3B CHRONIC KIDNEY DISEASE, WITHOUT LONG-TERM CURRENT USE OF INSULIN: Chronic | Status: ACTIVE | Noted: 2021-09-22

## 2022-02-27 PROBLEM — N18.31 TYPE 2 DIABETES MELLITUS WITH STAGE 3A CHRONIC KIDNEY DISEASE, WITHOUT LONG-TERM CURRENT USE OF INSULIN: Chronic | Status: ACTIVE | Noted: 2021-09-22

## 2022-02-27 PROBLEM — N18.32 TYPE 2 DIABETES MELLITUS WITH STAGE 3B CHRONIC KIDNEY DISEASE, WITHOUT LONG-TERM CURRENT USE OF INSULIN: Chronic | Status: ACTIVE | Noted: 2021-09-22

## 2022-02-27 PROBLEM — N18.30 STAGE 3 CHRONIC KIDNEY DISEASE: Chronic | Status: ACTIVE | Noted: 2018-12-13

## 2022-03-06 ENCOUNTER — HOSPITAL ENCOUNTER (INPATIENT)
Facility: HOSPITAL | Age: 87
LOS: 6 days | Discharge: HOME HEALTH | DRG: 416 | End: 2022-03-13
Attending: EMERGENCY MEDICINE | Admitting: SURGERY
Payer: MEDICARE

## 2022-03-06 DIAGNOSIS — N18.31 TYPE 2 DIABETES MELLITUS WITH STAGE 3A CHRONIC KIDNEY DISEASE, WITHOUT LONG-TERM CURRENT USE OF INSULIN: ICD-10-CM

## 2022-03-06 DIAGNOSIS — K56.50 SMALL BOWEL OBSTRUCTION DUE TO ADHESIONS: ICD-10-CM

## 2022-03-06 DIAGNOSIS — I45.10 NEW ONSET RIGHT BUNDLE BRANCH BLOCK (RBBB): ICD-10-CM

## 2022-03-06 DIAGNOSIS — R06.00 DYSPNEA, UNSPECIFIED TYPE: ICD-10-CM

## 2022-03-06 DIAGNOSIS — I10 PRIMARY HYPERTENSION: ICD-10-CM

## 2022-03-06 DIAGNOSIS — J43.2 CENTRILOBULAR EMPHYSEMA: ICD-10-CM

## 2022-03-06 DIAGNOSIS — E11.22 TYPE 2 DIABETES MELLITUS WITH STAGE 3A CHRONIC KIDNEY DISEASE, WITHOUT LONG-TERM CURRENT USE OF INSULIN: ICD-10-CM

## 2022-03-06 DIAGNOSIS — Z93.3 S/P COLOSTOMY: ICD-10-CM

## 2022-03-06 DIAGNOSIS — J31.0 CHRONIC RHINITIS: ICD-10-CM

## 2022-03-06 DIAGNOSIS — R06.02 SHORTNESS OF BREATH: ICD-10-CM

## 2022-03-06 DIAGNOSIS — R06.00 DYSPNEA: ICD-10-CM

## 2022-03-06 DIAGNOSIS — R06.02 SOB (SHORTNESS OF BREATH): ICD-10-CM

## 2022-03-06 DIAGNOSIS — K80.20 CHOLELITHIASIS: ICD-10-CM

## 2022-03-06 DIAGNOSIS — K43.9 VENTRAL HERNIA WITHOUT OBSTRUCTION OR GANGRENE: ICD-10-CM

## 2022-03-06 DIAGNOSIS — K80.20 CALCULUS OF GALLBLADDER WITHOUT CHOLECYSTITIS WITHOUT OBSTRUCTION: ICD-10-CM

## 2022-03-06 DIAGNOSIS — N18.31 STAGE 3A CHRONIC KIDNEY DISEASE: ICD-10-CM

## 2022-03-06 DIAGNOSIS — K80.12 CALCULUS OF GALLBLADDER WITH ACUTE ON CHRONIC CHOLECYSTITIS WITHOUT OBSTRUCTION: ICD-10-CM

## 2022-03-06 DIAGNOSIS — K81.9 CHOLECYSTITIS: Primary | ICD-10-CM

## 2022-03-06 LAB
BASOPHILS # BLD AUTO: 0.04 K/UL (ref 0–0.2)
BASOPHILS NFR BLD: 0.2 % (ref 0–1.9)
DIFFERENTIAL METHOD: ABNORMAL
EOSINOPHIL # BLD AUTO: 0 K/UL (ref 0–0.5)
EOSINOPHIL NFR BLD: 0.1 % (ref 0–8)
ERYTHROCYTE [DISTWIDTH] IN BLOOD BY AUTOMATED COUNT: 13.2 % (ref 11.5–14.5)
HCT VFR BLD AUTO: 47.9 % (ref 40–54)
HGB BLD-MCNC: 15.9 G/DL (ref 14–18)
IMM GRANULOCYTES # BLD AUTO: 0.1 K/UL (ref 0–0.04)
IMM GRANULOCYTES NFR BLD AUTO: 0.5 % (ref 0–0.5)
LYMPHOCYTES # BLD AUTO: 1.1 K/UL (ref 1–4.8)
LYMPHOCYTES NFR BLD: 5.6 % (ref 18–48)
MCH RBC QN AUTO: 29.3 PG (ref 27–31)
MCHC RBC AUTO-ENTMCNC: 33.2 G/DL (ref 32–36)
MCV RBC AUTO: 88 FL (ref 82–98)
MONOCYTES # BLD AUTO: 1.9 K/UL (ref 0.3–1)
MONOCYTES NFR BLD: 10 % (ref 4–15)
NEUTROPHILS # BLD AUTO: 15.9 K/UL (ref 1.8–7.7)
NEUTROPHILS NFR BLD: 83.6 % (ref 38–73)
NRBC BLD-RTO: 0 /100 WBC
PLATELET # BLD AUTO: 211 K/UL (ref 150–450)
PMV BLD AUTO: 11.4 FL (ref 9.2–12.9)
RBC # BLD AUTO: 5.42 M/UL (ref 4.6–6.2)
WBC # BLD AUTO: 19.04 K/UL (ref 3.9–12.7)

## 2022-03-06 PROCEDURE — 99285 EMERGENCY DEPT VISIT HI MDM: CPT | Mod: 25

## 2022-03-06 PROCEDURE — 96375 TX/PRO/DX INJ NEW DRUG ADDON: CPT

## 2022-03-06 PROCEDURE — 80053 COMPREHEN METABOLIC PANEL: CPT | Performed by: EMERGENCY MEDICINE

## 2022-03-06 PROCEDURE — 83880 ASSAY OF NATRIURETIC PEPTIDE: CPT | Performed by: EMERGENCY MEDICINE

## 2022-03-06 PROCEDURE — 96361 HYDRATE IV INFUSION ADD-ON: CPT

## 2022-03-06 PROCEDURE — 84484 ASSAY OF TROPONIN QUANT: CPT | Performed by: EMERGENCY MEDICINE

## 2022-03-06 PROCEDURE — 85025 COMPLETE CBC W/AUTO DIFF WBC: CPT | Performed by: EMERGENCY MEDICINE

## 2022-03-06 PROCEDURE — 96365 THER/PROPH/DIAG IV INF INIT: CPT

## 2022-03-06 NOTE — Clinical Note
Diagnosis: Cholecystitis [373539]   Admitting Provider:: STACI MARINA [[id number]]   Future Attending Provider: STACI MARINA [[id number]]   Reason for IP Medical Treatment  (Clinical interventions that can only be accomplished in the IP setting? ) :: IV abx, surgery?   Estimated Length of Stay:: 2 midnights   I certify that Inpatient services for greater than or equal to 2 midnights are medically necessary:: Yes   Plans for Post-Acute care--if anticipated (pick the single best option):: A. No post acute care anticipated at this time   Special Needs:: No Special Needs [1]

## 2022-03-07 ENCOUNTER — ANESTHESIA (OUTPATIENT)
Dept: SURGERY | Facility: HOSPITAL | Age: 87
DRG: 416 | End: 2022-03-07
Payer: MEDICARE

## 2022-03-07 ENCOUNTER — ANESTHESIA EVENT (OUTPATIENT)
Dept: SURGERY | Facility: HOSPITAL | Age: 87
DRG: 416 | End: 2022-03-07
Payer: MEDICARE

## 2022-03-07 PROBLEM — K80.20 CALCULUS OF GALLBLADDER WITHOUT CHOLECYSTITIS WITHOUT OBSTRUCTION: Status: ACTIVE | Noted: 2022-03-07

## 2022-03-07 PROBLEM — K81.9 CHOLECYSTITIS: Status: ACTIVE | Noted: 2022-03-07

## 2022-03-07 LAB
ALBUMIN SERPL BCP-MCNC: 3.6 G/DL (ref 3.5–5.2)
ALP SERPL-CCNC: 98 U/L (ref 55–135)
ALT SERPL W/O P-5'-P-CCNC: 14 U/L (ref 10–44)
ANION GAP SERPL CALC-SCNC: 18 MMOL/L (ref 8–16)
AST SERPL-CCNC: 19 U/L (ref 10–40)
BILIRUB SERPL-MCNC: 2.1 MG/DL (ref 0.1–1)
BILIRUB UR QL STRIP: NEGATIVE
BNP SERPL-MCNC: 47 PG/ML (ref 0–99)
BUN SERPL-MCNC: 25 MG/DL (ref 8–23)
CALCIUM SERPL-MCNC: 9.5 MG/DL (ref 8.7–10.5)
CHLORIDE SERPL-SCNC: 95 MMOL/L (ref 95–110)
CLARITY UR: CLEAR
CO2 SERPL-SCNC: 25 MMOL/L (ref 23–29)
COLOR UR: YELLOW
CREAT SERPL-MCNC: 1.5 MG/DL (ref 0.5–1.4)
CTP QC/QA: YES
EST. GFR  (AFRICAN AMERICAN): 47 ML/MIN/1.73 M^2
EST. GFR  (NON AFRICAN AMERICAN): 40 ML/MIN/1.73 M^2
GLUCOSE SERPL-MCNC: 271 MG/DL (ref 70–110)
GLUCOSE UR QL STRIP: NEGATIVE
HGB UR QL STRIP: NEGATIVE
KETONES UR QL STRIP: NEGATIVE
LACTATE SERPL-SCNC: 2.3 MMOL/L (ref 0.5–2.2)
LEUKOCYTE ESTERASE UR QL STRIP: NEGATIVE
NITRITE UR QL STRIP: NEGATIVE
PH UR STRIP: 5 [PH] (ref 5–8)
POCT GLUCOSE: 245 MG/DL (ref 70–110)
POTASSIUM SERPL-SCNC: 4.2 MMOL/L (ref 3.5–5.1)
PROT SERPL-MCNC: 7.5 G/DL (ref 6–8.4)
PROT UR QL STRIP: ABNORMAL
SARS-COV-2 RDRP RESP QL NAA+PROBE: NEGATIVE
SODIUM SERPL-SCNC: 138 MMOL/L (ref 136–145)
SP GR UR STRIP: 1.02 (ref 1–1.03)
TROPONIN I SERPL DL<=0.01 NG/ML-MCNC: 0.02 NG/ML (ref 0–0.03)
URN SPEC COLLECT METH UR: ABNORMAL
UROBILINOGEN UR STRIP-ACNC: NEGATIVE EU/DL

## 2022-03-07 PROCEDURE — 63600175 PHARM REV CODE 636 W HCPCS: Performed by: SURGERY

## 2022-03-07 PROCEDURE — P9045 ALBUMIN (HUMAN), 5%, 250 ML: HCPCS | Mod: JG | Performed by: NURSE ANESTHETIST, CERTIFIED REGISTERED

## 2022-03-07 PROCEDURE — 71000039 HC RECOVERY, EACH ADD'L HOUR: Performed by: SURGERY

## 2022-03-07 PROCEDURE — 63600175 PHARM REV CODE 636 W HCPCS: Performed by: NURSE ANESTHETIST, CERTIFIED REGISTERED

## 2022-03-07 PROCEDURE — 36000707: Performed by: SURGERY

## 2022-03-07 PROCEDURE — 96372 THER/PROPH/DIAG INJ SC/IM: CPT | Performed by: SURGERY

## 2022-03-07 PROCEDURE — 93010 EKG 12-LEAD: ICD-10-PCS | Mod: ,,, | Performed by: INTERNAL MEDICINE

## 2022-03-07 PROCEDURE — 63600175 PHARM REV CODE 636 W HCPCS: Performed by: EMERGENCY MEDICINE

## 2022-03-07 PROCEDURE — 25000242 PHARM REV CODE 250 ALT 637 W/ HCPCS: Performed by: ANESTHESIOLOGY

## 2022-03-07 PROCEDURE — 94640 AIRWAY INHALATION TREATMENT: CPT

## 2022-03-07 PROCEDURE — C1729 CATH, DRAINAGE: HCPCS | Performed by: SURGERY

## 2022-03-07 PROCEDURE — 81003 URINALYSIS AUTO W/O SCOPE: CPT | Performed by: EMERGENCY MEDICINE

## 2022-03-07 PROCEDURE — 25000003 PHARM REV CODE 250: Performed by: EMERGENCY MEDICINE

## 2022-03-07 PROCEDURE — G0378 HOSPITAL OBSERVATION PER HR: HCPCS

## 2022-03-07 PROCEDURE — 25000003 PHARM REV CODE 250: Performed by: NURSE ANESTHETIST, CERTIFIED REGISTERED

## 2022-03-07 PROCEDURE — 94760 N-INVAS EAR/PLS OXIMETRY 1: CPT

## 2022-03-07 PROCEDURE — 37000009 HC ANESTHESIA EA ADD 15 MINS: Performed by: SURGERY

## 2022-03-07 PROCEDURE — 87040 BLOOD CULTURE FOR BACTERIA: CPT | Mod: 59 | Performed by: EMERGENCY MEDICINE

## 2022-03-07 PROCEDURE — 99900035 HC TECH TIME PER 15 MIN (STAT)

## 2022-03-07 PROCEDURE — 93010 ELECTROCARDIOGRAM REPORT: CPT | Mod: 76,,, | Performed by: INTERNAL MEDICINE

## 2022-03-07 PROCEDURE — U0002 COVID-19 LAB TEST NON-CDC: HCPCS | Performed by: EMERGENCY MEDICINE

## 2022-03-07 PROCEDURE — 94761 N-INVAS EAR/PLS OXIMETRY MLT: CPT

## 2022-03-07 PROCEDURE — 27000221 HC OXYGEN, UP TO 24 HOURS

## 2022-03-07 PROCEDURE — 93005 ELECTROCARDIOGRAM TRACING: CPT

## 2022-03-07 PROCEDURE — 63600175 PHARM REV CODE 636 W HCPCS: Performed by: ANESTHESIOLOGY

## 2022-03-07 PROCEDURE — 27201423 OPTIME MED/SURG SUP & DEVICES STERILE SUPPLY: Performed by: SURGERY

## 2022-03-07 PROCEDURE — 36000706: Performed by: SURGERY

## 2022-03-07 PROCEDURE — 37000008 HC ANESTHESIA 1ST 15 MINUTES: Performed by: SURGERY

## 2022-03-07 PROCEDURE — 93010 ELECTROCARDIOGRAM REPORT: CPT | Mod: ,,, | Performed by: INTERNAL MEDICINE

## 2022-03-07 PROCEDURE — 71000033 HC RECOVERY, INTIAL HOUR: Performed by: SURGERY

## 2022-03-07 PROCEDURE — 25000242 PHARM REV CODE 250 ALT 637 W/ HCPCS: Performed by: EMERGENCY MEDICINE

## 2022-03-07 PROCEDURE — 83605 ASSAY OF LACTIC ACID: CPT | Performed by: EMERGENCY MEDICINE

## 2022-03-07 PROCEDURE — 88304 TISSUE EXAM BY PATHOLOGIST: CPT | Mod: 26,,, | Performed by: STUDENT IN AN ORGANIZED HEALTH CARE EDUCATION/TRAINING PROGRAM

## 2022-03-07 PROCEDURE — 25000242 PHARM REV CODE 250 ALT 637 W/ HCPCS: Performed by: SURGERY

## 2022-03-07 PROCEDURE — 25000003 PHARM REV CODE 250: Performed by: SURGERY

## 2022-03-07 PROCEDURE — 88304 PR  SURG PATH,LEVEL III: ICD-10-PCS | Mod: 26,,, | Performed by: STUDENT IN AN ORGANIZED HEALTH CARE EDUCATION/TRAINING PROGRAM

## 2022-03-07 PROCEDURE — 88304 TISSUE EXAM BY PATHOLOGIST: CPT | Performed by: STUDENT IN AN ORGANIZED HEALTH CARE EDUCATION/TRAINING PROGRAM

## 2022-03-07 RX ORDER — SODIUM CHLORIDE 0.9 % (FLUSH) 0.9 %
10 SYRINGE (ML) INJECTION
Status: DISCONTINUED | OUTPATIENT
Start: 2022-03-07 | End: 2022-03-13 | Stop reason: HOSPADM

## 2022-03-07 RX ORDER — ONDANSETRON 2 MG/ML
4 INJECTION INTRAMUSCULAR; INTRAVENOUS DAILY PRN
Status: DISCONTINUED | OUTPATIENT
Start: 2022-03-07 | End: 2022-03-07 | Stop reason: HOSPADM

## 2022-03-07 RX ORDER — ONDANSETRON 2 MG/ML
4 INJECTION INTRAMUSCULAR; INTRAVENOUS EVERY 8 HOURS PRN
Status: DISCONTINUED | OUTPATIENT
Start: 2022-03-07 | End: 2022-03-13 | Stop reason: HOSPADM

## 2022-03-07 RX ORDER — HYDROCODONE BITARTRATE AND ACETAMINOPHEN 5; 325 MG/1; MG/1
1 TABLET ORAL EVERY 6 HOURS PRN
Status: DISCONTINUED | OUTPATIENT
Start: 2022-03-07 | End: 2022-03-13 | Stop reason: HOSPADM

## 2022-03-07 RX ORDER — KETAMINE HCL IN 0.9 % NACL 50 MG/5 ML
SYRINGE (ML) INTRAVENOUS
Status: DISCONTINUED | OUTPATIENT
Start: 2022-03-07 | End: 2022-03-07

## 2022-03-07 RX ORDER — SODIUM CHLORIDE 9 MG/ML
INJECTION, SOLUTION INTRAVENOUS CONTINUOUS
Status: DISCONTINUED | OUTPATIENT
Start: 2022-03-07 | End: 2022-03-07

## 2022-03-07 RX ORDER — SODIUM CHLORIDE 9 MG/ML
INJECTION, SOLUTION INTRAVENOUS CONTINUOUS
Status: CANCELLED | OUTPATIENT
Start: 2022-03-07

## 2022-03-07 RX ORDER — PHENYLEPHRINE HYDROCHLORIDE 10 MG/ML
INJECTION INTRAVENOUS
Status: DISCONTINUED | OUTPATIENT
Start: 2022-03-07 | End: 2022-03-07

## 2022-03-07 RX ORDER — ONDANSETRON HYDROCHLORIDE 2 MG/ML
INJECTION, SOLUTION INTRAMUSCULAR; INTRAVENOUS
Status: DISCONTINUED | OUTPATIENT
Start: 2022-03-07 | End: 2022-03-07

## 2022-03-07 RX ORDER — ALBUTEROL SULFATE 2.5 MG/.5ML
2.5 SOLUTION RESPIRATORY (INHALATION)
Status: COMPLETED | OUTPATIENT
Start: 2022-03-07 | End: 2022-03-07

## 2022-03-07 RX ORDER — ROCURONIUM BROMIDE 10 MG/ML
INJECTION, SOLUTION INTRAVENOUS
Status: DISCONTINUED | OUTPATIENT
Start: 2022-03-07 | End: 2022-03-07

## 2022-03-07 RX ORDER — INSULIN ASPART 100 [IU]/ML
0-5 INJECTION, SOLUTION INTRAVENOUS; SUBCUTANEOUS
Status: DISCONTINUED | OUTPATIENT
Start: 2022-03-07 | End: 2022-03-13 | Stop reason: HOSPADM

## 2022-03-07 RX ORDER — IBUPROFEN 200 MG
16 TABLET ORAL
Status: DISCONTINUED | OUTPATIENT
Start: 2022-03-07 | End: 2022-03-13 | Stop reason: HOSPADM

## 2022-03-07 RX ORDER — SUCCINYLCHOLINE CHLORIDE 20 MG/ML
INJECTION INTRAMUSCULAR; INTRAVENOUS
Status: DISCONTINUED | OUTPATIENT
Start: 2022-03-07 | End: 2022-03-07

## 2022-03-07 RX ORDER — MORPHINE SULFATE 2 MG/ML
3 INJECTION, SOLUTION INTRAMUSCULAR; INTRAVENOUS
Status: COMPLETED | OUTPATIENT
Start: 2022-03-07 | End: 2022-03-07

## 2022-03-07 RX ORDER — GLUCAGON 1 MG
1 KIT INJECTION
Status: DISCONTINUED | OUTPATIENT
Start: 2022-03-07 | End: 2022-03-13 | Stop reason: HOSPADM

## 2022-03-07 RX ORDER — IBUPROFEN 200 MG
24 TABLET ORAL
Status: DISCONTINUED | OUTPATIENT
Start: 2022-03-07 | End: 2022-03-13 | Stop reason: HOSPADM

## 2022-03-07 RX ORDER — ACETAMINOPHEN 10 MG/ML
INJECTION, SOLUTION INTRAVENOUS
Status: DISCONTINUED | OUTPATIENT
Start: 2022-03-07 | End: 2022-03-07

## 2022-03-07 RX ORDER — MUPIROCIN 20 MG/G
OINTMENT TOPICAL
Status: CANCELLED | OUTPATIENT
Start: 2022-03-07

## 2022-03-07 RX ORDER — FENTANYL CITRATE 50 UG/ML
INJECTION, SOLUTION INTRAMUSCULAR; INTRAVENOUS
Status: DISCONTINUED | OUTPATIENT
Start: 2022-03-07 | End: 2022-03-07

## 2022-03-07 RX ORDER — ETOMIDATE 2 MG/ML
INJECTION INTRAVENOUS
Status: DISCONTINUED | OUTPATIENT
Start: 2022-03-07 | End: 2022-03-07

## 2022-03-07 RX ORDER — IPRATROPIUM BROMIDE AND ALBUTEROL SULFATE 2.5; .5 MG/3ML; MG/3ML
3 SOLUTION RESPIRATORY (INHALATION) ONCE
Status: COMPLETED | OUTPATIENT
Start: 2022-03-07 | End: 2022-03-07

## 2022-03-07 RX ORDER — MORPHINE SULFATE 2 MG/ML
2 INJECTION, SOLUTION INTRAMUSCULAR; INTRAVENOUS EVERY 4 HOURS PRN
Status: DISCONTINUED | OUTPATIENT
Start: 2022-03-07 | End: 2022-03-13 | Stop reason: HOSPADM

## 2022-03-07 RX ORDER — PROPOFOL 10 MG/ML
VIAL (ML) INTRAVENOUS
Status: DISCONTINUED | OUTPATIENT
Start: 2022-03-07 | End: 2022-03-07

## 2022-03-07 RX ORDER — ACETAMINOPHEN 325 MG/1
650 TABLET ORAL EVERY 6 HOURS PRN
Status: DISCONTINUED | OUTPATIENT
Start: 2022-03-07 | End: 2022-03-13 | Stop reason: HOSPADM

## 2022-03-07 RX ORDER — LIDOCAINE HCL/PF 100 MG/5ML
SYRINGE (ML) INTRAVENOUS
Status: DISCONTINUED | OUTPATIENT
Start: 2022-03-07 | End: 2022-03-07

## 2022-03-07 RX ORDER — HYDROMORPHONE HYDROCHLORIDE 2 MG/ML
0.4 INJECTION, SOLUTION INTRAMUSCULAR; INTRAVENOUS; SUBCUTANEOUS EVERY 5 MIN PRN
Status: DISCONTINUED | OUTPATIENT
Start: 2022-03-07 | End: 2022-03-07 | Stop reason: HOSPADM

## 2022-03-07 RX ORDER — ALBUTEROL SULFATE 2.5 MG/.5ML
2.5 SOLUTION RESPIRATORY (INHALATION)
Status: DISCONTINUED | OUTPATIENT
Start: 2022-03-07 | End: 2022-03-13 | Stop reason: HOSPADM

## 2022-03-07 RX ORDER — ALBUMIN HUMAN 50 G/1000ML
SOLUTION INTRAVENOUS CONTINUOUS PRN
Status: DISCONTINUED | OUTPATIENT
Start: 2022-03-07 | End: 2022-03-07

## 2022-03-07 RX ADMIN — FENTANYL CITRATE 25 MCG: 50 INJECTION, SOLUTION INTRAMUSCULAR; INTRAVENOUS at 02:03

## 2022-03-07 RX ADMIN — FENTANYL CITRATE 50 MCG: 50 INJECTION, SOLUTION INTRAMUSCULAR; INTRAVENOUS at 01:03

## 2022-03-07 RX ADMIN — SUCCINYLCHOLINE CHLORIDE 120 MG: 20 INJECTION, SOLUTION INTRAMUSCULAR; INTRAVENOUS at 01:03

## 2022-03-07 RX ADMIN — PHENYLEPHRINE HYDROCHLORIDE 100 MCG: 10 INJECTION INTRAVENOUS at 01:03

## 2022-03-07 RX ADMIN — INSULIN ASPART 1 UNITS: 100 INJECTION, SOLUTION INTRAVENOUS; SUBCUTANEOUS at 08:03

## 2022-03-07 RX ADMIN — ACETAMINOPHEN 1000 MG: 10 INJECTION, SOLUTION INTRAVENOUS at 01:03

## 2022-03-07 RX ADMIN — ACETAMINOPHEN 650 MG: 325 TABLET ORAL at 08:03

## 2022-03-07 RX ADMIN — IPRATROPIUM BROMIDE AND ALBUTEROL SULFATE 3 ML: .5; 3 SOLUTION RESPIRATORY (INHALATION) at 03:03

## 2022-03-07 RX ADMIN — PIPERACILLIN AND TAZOBACTAM 4.5 G: 4; .5 INJECTION, POWDER, LYOPHILIZED, FOR SOLUTION INTRAVENOUS; PARENTERAL at 01:03

## 2022-03-07 RX ADMIN — PIPERACILLIN AND TAZOBACTAM 4.5 G: 4; .5 INJECTION, POWDER, LYOPHILIZED, FOR SOLUTION INTRAVENOUS; PARENTERAL at 04:03

## 2022-03-07 RX ADMIN — SODIUM CHLORIDE, SODIUM LACTATE, POTASSIUM CHLORIDE, AND CALCIUM CHLORIDE: .6; .31; .03; .02 INJECTION, SOLUTION INTRAVENOUS at 01:03

## 2022-03-07 RX ADMIN — ALBUTEROL SULFATE 2.5 MG: 2.5 SOLUTION RESPIRATORY (INHALATION) at 07:03

## 2022-03-07 RX ADMIN — ALBUMIN (HUMAN): 12.5 SOLUTION INTRAVENOUS at 01:03

## 2022-03-07 RX ADMIN — PROPOFOL 40 MG: 10 INJECTION, EMULSION INTRAVENOUS at 01:03

## 2022-03-07 RX ADMIN — SODIUM CHLORIDE: 0.9 INJECTION, SOLUTION INTRAVENOUS at 07:03

## 2022-03-07 RX ADMIN — PHENYLEPHRINE HYDROCHLORIDE 100 MCG: 10 INJECTION INTRAVENOUS at 02:03

## 2022-03-07 RX ADMIN — SODIUM CHLORIDE, SODIUM LACTATE, POTASSIUM CHLORIDE, AND CALCIUM CHLORIDE 500 ML: .6; .31; .03; .02 INJECTION, SOLUTION INTRAVENOUS at 05:03

## 2022-03-07 RX ADMIN — ROCURONIUM BROMIDE 30 MG: 10 INJECTION, SOLUTION INTRAVENOUS at 01:03

## 2022-03-07 RX ADMIN — HYDROMORPHONE HYDROCHLORIDE 0.4 MG: 2 INJECTION, SOLUTION INTRAMUSCULAR; INTRAVENOUS; SUBCUTANEOUS at 04:03

## 2022-03-07 RX ADMIN — LIDOCAINE HYDROCHLORIDE 100 MG: 20 INJECTION, SOLUTION INTRAVENOUS at 01:03

## 2022-03-07 RX ADMIN — ALBUTEROL SULFATE 2.5 MG: 2.5 SOLUTION RESPIRATORY (INHALATION) at 05:03

## 2022-03-07 RX ADMIN — Medication 20 MG: at 01:03

## 2022-03-07 RX ADMIN — MORPHINE SULFATE: 2 INJECTION, SOLUTION INTRAMUSCULAR; INTRAVENOUS at 10:03

## 2022-03-07 RX ADMIN — ETOMIDATE 8 MG: 2 INJECTION INTRAVENOUS at 01:03

## 2022-03-07 RX ADMIN — SUGAMMADEX 200 MG: 100 INJECTION, SOLUTION INTRAVENOUS at 02:03

## 2022-03-07 RX ADMIN — ONDANSETRON 8 MG: 2 INJECTION, SOLUTION INTRAMUSCULAR; INTRAVENOUS at 02:03

## 2022-03-07 RX ADMIN — PIPERACILLIN AND TAZOBACTAM 4.5 G: 4; .5 INJECTION, POWDER, LYOPHILIZED, FOR SOLUTION INTRAVENOUS; PARENTERAL at 08:03

## 2022-03-07 NOTE — ED PROVIDER NOTES
Encounter Date: 3/6/2022       History     Chief Complaint   Patient presents with    Shortness of Breath     Pt c/o SOB x 1 day w/ abdominal pain x 3 days. Pt had albuterol treatment at home this AM and PM w/o relief. Pt able to speak in complete sentences. NAD noted. Pt also c/o lethargy. Pt has hx of multiple bowel blockages. Pt denies nausea and vomiting. Last BM yesterday.     The patient is a 89-year-old male who presents to the emergency department with his family tonight with shortness of breath and abdominal pain.  The patient states he used a nebulizer treatment prior to coming to the emergency department with little improvement of his symptoms.  The patient states his shortness of breath began last night.  His wife  2 days ago in the ICU here at [facility].  The patient is concerned because he states his abdomen feels bloated and distended, he had a hard bowel movement on Friday, took a laxative and had 3 bowel movements yesterday (Sat) and has had no bowel movement today.  He denies any nausea or vomiting.  He states he ate dinner tonight without difficulty.  He denies having a cough or fever.        Review of patient's allergies indicates:   Allergen Reactions    Solarcaine [benzocaine-triclosan]     Sulfa (sulfonamide antibiotics)     Sulfamethoxazole     Trimethoprim     Hytrin [terazosin] Rash    Smz-tmp ds [sulfamethoxazole-trimethoprim] Rash     Past Medical History:   Diagnosis Date    Anticoagulant long-term use     Bowel obstruction     BPH (benign prostatic hyperplasia)     Diabetes mellitus     Diverticulitis     Hyperlipidemia     Hypertension      Past Surgical History:   Procedure Laterality Date    ANASTOMOSIS OF ILEUM TO RECTUM  2019    Procedure: ANASTOMOSIS, COLO-RECTAL;  Surgeon: Fouzia Gardner MD;  Location: [facility] OR;  Service: General;;    CHOLECYSTECTOMY N/A 3/7/2022    Procedure: CHOLECYSTECTOMY;  Surgeon: Fouzia Gardner MD;  Location: [facility]  OR;  Service: General;  Laterality: N/A;    COLOSTOMY  2019    Procedure: CREATION, COLOSTOMY;  Surgeon: Fouzia Gardner MD;  Location: [facility] OR;  Service: General;;    EYE SURGERY      GISELA PROCEDURE  2019    Procedure: GISELA PROCEDURE;  Surgeon: Fouzia Gardner MD;  Location: [facility] OR;  Service: General;;  Colon resection    LYSIS OF ADHESIONS N/A 3/7/2022    Procedure: LYSIS, ADHESIONS;  Surgeon: Fouzia Gardner MD;  Location: [facility] OR;  Service: General;  Laterality: N/A;    TONSILLECTOMY       Family History   Problem Relation Age of Onset    Heart disease Father     Hypertension Father     Cancer Mother     Diabetes Paternal Aunt      Social History     Tobacco Use    Smoking status: Former Smoker     Packs/day: 1.00     Years: 25.00     Pack years: 25.00     Types: Cigarettes     Start date:      Quit date:      Years since quittin.2    Smokeless tobacco: Never Used   Substance Use Topics    Alcohol use: Yes     Alcohol/week: 2.0 standard drinks     Types: 2 Glasses of wine per week     Comment: occassionally    Drug use: No     Review of Systems   Constitutional: Positive for activity change. Negative for appetite change, chills and fever.   HENT: Negative for sore throat.    Respiratory: Positive for shortness of breath. Negative for cough and chest tightness.    Cardiovascular: Negative for chest pain, palpitations and leg swelling.   Gastrointestinal: Positive for abdominal distention and abdominal pain (Discomfort). Negative for constipation, nausea and vomiting.   Genitourinary: Negative for dysuria.   Musculoskeletal: Negative for back pain.   Skin: Negative for rash.   Neurological: Negative for dizziness, weakness and light-headedness.   Hematological: Does not bruise/bleed easily.       Physical Exam     Initial Vitals   BP Pulse Resp Temp SpO2   22 0035 22   (!) 159/68 96 20 99.4 °F  (37.4 °C) 96 %      MAP       --                Physical Exam    Nursing note and vitals reviewed.  Constitutional: He appears well-developed and well-nourished.   HENT:   Head: Normocephalic and atraumatic.   Eyes: EOM are normal. Pupils are equal, round, and reactive to light.   Neck: Neck supple.   Normal range of motion.  Cardiovascular: Normal rate, regular rhythm, normal heart sounds and intact distal pulses.   Pulmonary/Chest: Breath sounds normal.   Abdominal: Abdomen is soft. Bowel sounds are normal. He exhibits no distension. There is no abdominal tenderness. There is no rebound and no guarding.   Musculoskeletal:         General: No edema. Normal range of motion.      Cervical back: Normal range of motion and neck supple.     Neurological: He is alert and oriented to person, place, and time.   Skin: Skin is warm and dry.   Psychiatric: He has a normal mood and affect. His behavior is normal. Judgment and thought content normal.         ED Course   Procedures  Labs Reviewed   CBC W/ AUTO DIFFERENTIAL - Abnormal; Notable for the following components:       Result Value    WBC 19.04 (*)     Gran # (ANC) 15.9 (*)     Immature Grans (Abs) 0.10 (*)     Mono # 1.9 (*)     Gran % 83.6 (*)     Lymph % 5.6 (*)     All other components within normal limits   COMPREHENSIVE METABOLIC PANEL - Abnormal; Notable for the following components:    Glucose 271 (*)     BUN 25 (*)     Creatinine 1.5 (*)     Total Bilirubin 2.1 (*)     Anion Gap 18 (*)     eGFR if  47 (*)     eGFR if non  40 (*)     All other components within normal limits   URINALYSIS, REFLEX TO URINE CULTURE - Abnormal; Notable for the following components:    Protein, UA Trace (*)     All other components within normal limits    Narrative:     Specimen Source->Urine   LACTIC ACID, PLASMA - Abnormal; Notable for the following components:    Lactate (Lactic Acid) 2.3 (*)     All other components within normal limits   TROPONIN  I   B-TYPE NATRIURETIC PEPTIDE   SARS-COV-2 RDRP GENE    Narrative:     This test utilizes isothermal nucleic acid amplification   technology to detect the SARS-CoV-2 RdRp nucleic acid segment.   The analytical sensitivity (limit of detection) is 125 genome   equivalents/mL.   A POSITIVE result implies infection with the SARS-CoV-2 virus;   the patient is presumed to be contagious.     A NEGATIVE result means that SARS-CoV-2 nucleic acids are not   present above the limit of detection. A NEGATIVE result should be   treated as presumptive. It does not rule out the possibility of   COVID-19 and should not be the sole basis for treatment decisions.   If COVID-19 is strongly suspected based on clinical and exposure   history, re-testing using an alternate molecular assay should be   considered.   This test is only for use under the Food and Drug   Administration s Emergency Use Authorization (EUA).   Commercial kits are provided by [employer].   Performance characteristics of the EUA have been independently   verified by [facility] Department of   Pathology and Laboratory Medicine.   _________________________________________________________________   The authorized Fact Sheet for Healthcare Providers and the authorized Fact   Sheet for Patients of the ID NOW COVID-19 are available on the FDA   website:     https://www.fda.gov/media/816882/download  https://www.fda.gov/media/835500/download             EKG Readings: (Independently Interpreted)   Initial: 0039. Rhythm: Normal Sinus Rhythm. Conduction: RBBB (and LAFB).     ECG Results          EKG 12-LEAD (Final result)  Result time 03/07/22 20:41:45    Final result by Interface, Lab In [facility] (03/07/22 20:41:45)                 Narrative:    Test Reason : R06.02,K81.9,E11.22,N18.31,I10,    Vent. Rate : 095 BPM     Atrial Rate : 095 BPM     P-R Int : 126 ms          QRS Dur : 112 ms      QT Int : 358 ms       P-R-T Axes : 031 -26 095 degrees     QTc  Int : 449 ms    Normal sinus rhythm  Abnormal QRS-T angle, consider primary T wave abnormality  Abnormal ECG  When compared with ECG of 07-MAR-2022 00:39,  Fusion complexes are no longer Present  (RBBB and left anterior fascicular block) is no longer Present  Confirmed by Moo Aguayo MD ([id number]) on 3/7/2022 8:41:38 PM    Referred By: [email or web address]   SELF           Confirmed By:Moo Aguayo MD                             EKG 12-lead (Final result)  Result time 03/07/22 20:41:43    Final result by Interface, Lab In [facility] (03/07/22 20:41:43)                 Narrative:    Test Reason : R06.02,    Vent. Rate : 095 BPM     Atrial Rate : 095 BPM     P-R Int : 120 ms          QRS Dur : 118 ms      QT Int : 366 ms       P-R-T Axes : 031 -57 074 degrees     QTc Int : 459 ms    Sinus rhythm with Fusion complexes  Right bundle branch block  Left anterior fascicular block   Bifascicular block   Abnormal ECG  When compared with ECG of 15-FEB-2022 09:32,  Fusion complexes are now Present  (RBBB and left anterior fascicular block) is now Present  Confirmed by Moo Aguayo MD ([id number]) on 3/7/2022 8:41:32 PM    Referred By: [email or web address]   SELF           Confirmed By:Moo Aguayo MD                            Imaging Results           NM Hepatobiliary Scan (HIDA) (Final result)  Result time 03/07/22 12:12:17    Final result by Precious Da Silva MD (03/07/22 12:12:17)                 Impression:      Findings concerning for acute cholecystitis.    This report was flagged in Epic as abnormal.      Electronically signed by: Precious Da Silva  Date:    03/07/2022  Time:    12:12             Narrative:    EXAMINATION:  NM HEPATOBILIARY IMAGING INC GB (HIDA)    CLINICAL HISTORY:  Cholelithiasis;Calculus of gallbladder without cholecystitis without obstruction    TECHNIQUE:  Following the IV administration of 5 mCi of Tc-99m-mebrofenin, sequential dynamic anterior images of the abdomen were obtained for 60 minutes followed by a right lateral  view at 60 minutes.  3 mg of morphine were given at 60 minutes with 30 minute imaging follow-up with anterior and right lateral views    COMPARISON:  Abdominal ultrasound and CT abdomen pelvis dated earlier same day    FINDINGS:  The early images demonstrate homogeneous uptake of the radiopharmaceutical by the liver with no focal hepatic abnormalities.    Normal activity is present in the small bowel.  No radiotracer uptake seen within the cystic duct or gallbladder.    The clearance from the liver is good.                                US Abdomen Limited (Final result)  Result time 03/07/22 05:04:25    Final result by Ty Armstrong MD (03/07/22 05:04:25)                 Impression:      Distended gallbladder with biliary sludge, gallbladder wall thickening, and pericholecystic fluid.  Sonographic Leonard sign is reportedly negative though findings raise concern for acute cholecystitis in the appropriate clinical setting.    Incidental findings in the body of the report.    This report was flagged in Epic as abnormal.      Electronically signed by: Ty Armstrong MD  Date:    03/07/2022  Time:    05:04             Narrative:    EXAMINATION:  US ABDOMEN LIMITED    CLINICAL HISTORY:  bili elevated, leukocytosis;.    TECHNIQUE:  Limited ultrasound of the right upper quadrant of the abdomen including pancreas, liver, gallbladder, common bile duct was performed.    COMPARISON:  CT abdomen pelvis without contrast, 03/07/2022 and 02/09/2022.    FINDINGS:  Liver: Normal in size, measuring 15.0 cm. Homogeneous echotexture. No focal hepatic lesions.    Gallbladder: Gallbladder is distended with sludge.  There is gallbladder wall thickening measuring up to 7 mm in thickness.  Small amount of pericholecystic fluid.  These findings are new when compared with prior CT dated 02/09/2022, allowing for differences in modality.  Sonographic Leonard sign is reportedly negative.  No shadowing gallstones.    Biliary system: The  common duct is not dilated, measuring 4 mm.  No intrahepatic ductal dilatation.    Spleen: Normal in size with a homogeneous echotexture, measuring 7.2 x 2.8 cm.    Pancreas: Pancreas is predominantly obscured by overlying bowel gas but is otherwise unremarkable.    Miscellaneous: No ascites.  Limited evaluation of the right kidney is negative for hydronephrosis.  There is a large simple cyst in the superior pole of the right kidney measuring up to 8 cm in size.  IVC is visualized and unremarkable.                               CT Abdomen Pelvis  Without Contrast (Final result)  Result time 03/07/22 00:48:26    Final result by Toro Hamm MD (03/07/22 00:48:26)                 Impression:      Diverticulosis without diverticulitis.    Anterior abdominal wall hernia containing colon without evidence of obstruction.    Hydrops of the gallbladder.  Correlate for early cholecystitis.      Electronically signed by: Toro Hamm  Date:    03/07/2022  Time:    00:48             Narrative:    EXAMINATION:  CT ABDOMEN PELVIS WITHOUT CONTRAST    CLINICAL HISTORY:  Bowel obstruction suspected;    TECHNIQUE:  Low dose axial images, sagittal and coronal reformations were obtained from the lung bases to the pubic symphysis.  Oral contrast was not administered.    COMPARISON:  KUB, 02/13/2022    FINDINGS:  Lung bases are clear.  The base of the heart and pericardium appear unremarkable aside from atherosclerotic calcifications in the aortic valve plane.    The liver contains a low-density in left lobe compatible with cyst.  The gallbladder is significantly distended but without discrete pericholecystic inflammatory changes or fluid.  There is no intra or extrahepatic biliary ductal dilatation.    Pancreas is partially fatty replaced.  No mass or inflammation is seen.    Both kidneys contain numerous cortical cysts the largest in the upper pole of the right kidney measuring up to 8.5 cm.    The adrenal glands appear  normal.  The aorta is atherosclerotic but normal in caliber extending into the iliac system.    There is an anterior abdominal wall hernia with loops of colon abutting into the orifice and extending into the subcutaneous fat to the right of midline.  No obstructive changes evident.  The loops of small intestines appear unremarkable with no dilatation.    There is extensive diverticulosis in the transverse descending and sigmoid colon but without features of diverticulitis.  No colonic obstruction is evident.    The prostate gland is prominent.  The seminal vesicles are small in the urinary bladder is incompletely distended.    Spondylosis and degenerative changes throughout the spine are noted.  There is no free air or free fluid.  No adenopathy is present                               X-Ray Chest AP Portable (Final result)  Result time 03/07/22 00:30:52    Final result by Toro Hamm MD (03/07/22 00:30:52)                 Impression:      No acute abnormality.      Electronically signed by: Toro Hamm  Date:    03/07/2022  Time:    00:30             Narrative:    EXAMINATION:  XR CHEST AP PORTABLE    CLINICAL HISTORY:  CHF;    TECHNIQUE:  Single frontal view of the chest was performed.    COMPARISON:  02/10/2022    FINDINGS:  The lungs are clear, with normal appearance of pulmonary vasculature and no pleural effusion or pneumothorax.    The cardiac silhouette is normal in size. The hilar and mediastinal contours are unremarkable.    Bones are intact.  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                                 Medications   albuterol sulfate nebulizer solution 2.5 mg (2.5 mg Nebulization Given 3/12/22 7561)   sodium chloride 0.9% flush 10 mL (has no administration in time range)   acetaminophen tablet 650 mg (650 mg Oral Given 3/10/22 2615)   morphine injection 2 mg (has no administration in time range)   ondansetron injection 4 mg (4 mg Intravenous Given  3/11/22 0026)   piperacillin-tazobactam 4.5 g in dextrose 5 % 100 mL IVPB (ready to mix system) (4.5 g Intravenous New Bag 3/12/22 0615)   HYDROcodone-acetaminophen 5-325 mg per tablet 1 tablet (has no administration in time range)   glucose chewable tablet 16 g (has no administration in time range)   glucose chewable tablet 24 g (has no administration in time range)   glucagon (human recombinant) injection 1 mg (has no administration in time range)   insulin aspart U-100 pen 0-5 Units (0 Units Subcutaneous Not Given 3/11/22 2115)   hydrALAZINE tablet 25 mg (25 mg Oral Given 3/12/22 0615)   fluticasone furoate-vilanteroL 100-25 mcg/dose diskus inhaler 1 puff (1 puff Inhalation Given 3/12/22 0801)   atorvastatin tablet 40 mg (40 mg Oral Given 3/12/22 0852)   finasteride tablet 5 mg (5 mg Oral Given 3/12/22 0852)   tamsulosin 24 hr capsule 0.4 mg (0.4 mg Oral Given 3/12/22 0852)   hydroCHLOROthiazide tablet 12.5 mg (12.5 mg Oral Given 3/12/22 0852)   lactated ringers bolus 500 mL (0 mLs Intravenous Stopped 3/7/22 0625)   piperacillin-tazobactam 4.5 g in dextrose 5 % 100 mL IVPB (ready to mix system) (0 g Intravenous Stopped 3/7/22 0523)   albuterol sulfate nebulizer solution 2.5 mg (2.5 mg Nebulization Given 3/7/22 0546)   morphine injection 3 mg ( Intravenous Given 3/7/22 1050)   albuterol-ipratropium 2.5 mg-0.5 mg/3 mL nebulizer solution 3 mL (3 mLs Nebulization Given 3/7/22 1549)   iohexoL (OMNIPAQUE 350) injection 100 mL (100 mLs Intravenous Given 3/11/22 3119)                 ED Course as of 03/12/22 0930   Mon Mar 07, 2022   0342 The patient will be signed out at 3:45 a.m. to Dr. Samson awaiting urinalysis results and disposition.  The patient's daughter states she is very concerned about his shortness of breath.  The patient's oxygen saturations are 93-98%, has no wheezing, no fever, no pulmonary edema, no history of congestive heart failure or COPD.  His white blood cell count is 19,000, the the etiology for  the leukocytosis is unclear.  The patient's wife  2 days ago in the ICU at [facility] so this could be a stress reaction. [ST]      ED Course User Index  [ST] Katie Navarro MD             Clinical Impression:   Final diagnoses:  [R06.02] Shortness of breath  [K81.9] Cholecystitis (Primary)          ED Disposition Condition    Observation               Katie Navarro MD  22 0931

## 2022-03-07 NOTE — ED NOTES
Surgery called for report on pt. Surgery is scheduled to follow the next case. Dr Gardner will be down in ER to consent pt prior to surgery. Pt has not returned from hida scan at this time.

## 2022-03-07 NOTE — PROGRESS NOTES
[facility]           Pharmacy        Current Drug Shortage     Due to national backorder and [facility] is critically low on inventory of Dextrose 50% (D50) Syringes and Vials, pharmacy has automatically switched from D50% to D10% IVPB at the equivalent dose until resolution of the shortage per P&T approved protocol.               Ange Alarcon, PharmD  [phone]

## 2022-03-07 NOTE — ED NOTES
Report rec'd, Pt care assumed. Pt resting quietly in bed with his daughter at bedside. Pt aa0x4. Pt went to bathroom via wc with er tech and had a bowel movement. Pt returned to bed and cardiac monitor. Head to toe assessment completed, plan of care reviewed. IV access x 2, sites are free from pain redness or swelling. Pt reminded of NPO status. Pt denies any pain or needs at this time. Call bell within reach.

## 2022-03-07 NOTE — PROGRESS NOTES
VN cued into pt's room for introduction with pt's permission.  VN role explained and informed pt that VN would be working with bedside nurse and the rest of the care team.  Fall risk and bed alarm protocol education provided.  Instructed pt to call for assistance and agreeable.  Allowed time for questions. NAD noted.  Family at bedside. Will cont to be available as needed. .     03/07/22 1751   Admission   Initial VN Admission Questions Complete   Shift   Virtual Nurse - Patient Verbalized Approval Of Camera Use   Safety/Activity   Patient Rounds call light in patient/parent reach;visualized patient   Safety Promotion/Fall Prevention Fall Risk reviewed with patient/family;instructed to call staff for mobility   Pain/Comfort/Sleep   Pain Body Location abdomen   Pain Rating (0-10): Rest 2

## 2022-03-07 NOTE — ANESTHESIA PREPROCEDURE EVALUATION
03/07/2022  Husam Connolly is a 89 y.o., male presents for cholecystectomy under [place]    Past Medical History:   Diagnosis Date    Anticoagulant long-term use     Bowel obstruction     BPH (benign prostatic hyperplasia)     Diabetes mellitus     Diverticulitis     Hyperlipidemia     Hypertension      Past Surgical History:   Procedure Laterality Date    ANASTOMOSIS OF ILEUM TO RECTUM  9/9/2019    Procedure: ANASTOMOSIS, COLO-RECTAL;  Surgeon: Fouzia Gardner MD;  Location: [facility] OR;  Service: General;;    COLOSTOMY  6/16/2019    Procedure: CREATION, COLOSTOMY;  Surgeon: Fouzia Gardner MD;  Location: [facility] OR;  Service: General;;    EYE SURGERY      GISELA PROCEDURE  6/16/2019    Procedure: GISELA PROCEDURE;  Surgeon: Fouzia Gardner MD;  Location: [facility] OR;  Service: General;;  Colon resection    TONSILLECTOMY       Review of patient's allergies indicates:   Allergen Reactions    Solarcaine [benzocaine-triclosan]     Sulfa (sulfonamide antibiotics)     Sulfamethoxazole     Trimethoprim     Hytrin [terazosin] Rash    Smz-tmp ds [sulfamethoxazole-trimethoprim] Rash       Lab Results   Component Value Date    WBC 19.04 (H) 03/06/2022    HGB 15.9 03/06/2022    HCT 47.9 03/06/2022     03/06/2022    CHOL 164 08/25/2021    TRIG 217 (H) 08/25/2021    HDL 58 08/25/2021    ALT 14 03/06/2022    AST 19 03/06/2022     03/06/2022    K 4.2 03/06/2022    CL 95 03/06/2022    CREATININE 1.5 (H) 03/06/2022    BUN 25 (H) 03/06/2022    CO2 25 03/06/2022    TSH 1.05 08/25/2021    PSA 2.2 10/12/2011    INR 1.1 09/10/2019    HGBA1C 7.6 (H) 02/12/2022    MICROALBUR 0.7 08/25/2021         Pre-op Assessment    I have reviewed the Patient Summary Reports.    I have reviewed the Nursing Notes. I have reviewed the NPO Status.   I have reviewed the Medications.     Review of  Systems  Anesthesia Hx:  No problems with previous Anesthesia Denies Hx of Anesthetic complications  Denies Family Hx of Anesthesia complications.   Denies Personal Hx of Anesthesia complications.   Social:  Non-Smoker, No Alcohol Use    Hematology/Oncology:     Oncology Normal     EENT/Dental:EENT/Dental Normal   Cardiovascular:   Exercise tolerance: good Hypertension    Pulmonary:  Pulmonary Normal    Renal/:   Chronic Renal Disease    Hepatic/GI:  Hepatic/GI Normal    Neurological:  Neurology Normal    Endocrine:   Diabetes    Psych:  Psychiatric Normal           Physical Exam  General:  Well nourished      Airway/Jaw/Neck:  Airway Findings: Mouth Opening: Normal   Tongue: Normal   Mallampati: III TM Distance: Normal, at least 6 cm   Jaw/Neck Findings:  Neck ROM: Normal ROM       Dental:  Dental Findings: Periodontal disease, Mild     Chest/Lungs:  Chest/Lungs Findings: Clear to auscultation      Heart/Vascular:  Heart Findings: Rate: Normal  Rhythm: Regular Rhythm  Sounds: Normal        Mental Status:  Mental Status Findings:  Alert and Oriented, Cooperative     · The left ventricle is normal in size with concentric remodeling and normal systolic function.  · The estimated ejection fraction is 60%.  · Grade I left ventricular diastolic dysfunction.  · Mild mitral regurgitation.  · Mild tricuspid regurgitation.  · Mild right ventricular enlargement.  · Mild right atrial enlargement.  · Normal central venous pressure (3 mmHg).  · The estimated PA systolic pressure is 41 mmHg.  · There is pulmonary hypertension.  · The inferobasilar segment of the left ventricle contracts in an asynchronous fashion, consistent with a bundle branch block        Anesthesia Plan  Type of Anesthesia, risks & benefits discussed:  Anesthesia Type:  general    Patient's Preference:   Plan Factors:          Intra-op Monitoring Plan: Standard ASA Monitors  Intra-op Monitoring Plan Comments:   Post Op Pain Control Plan: per primary service  following discharge from PACU and multimodal analgesia  Post Op Pain Control Plan Comments:     Induction:   IV  Beta Blocker:  Patient is not currently on a Beta-Blocker (No further documentation required).       Informed Consent: Informed consent signed with the Patient and all parties understand the risks and agree with anesthesia plan.  All questions answered.  Anesthesia consent signed with patient.  ASA Score: 3     Day of Surgery Review of History & Physical:              Ready For Surgery From Anesthesia Perspective.           Current Facility-Administered Medications on File Prior to Visit   Medication Dose Route Frequency Provider Last Rate Last Admin    0.9%  NaCl infusion   Intravenous Continuous Augusto Samson  mL/hr at 03/07/22 0747 New Bag at 03/07/22 0747    acetaminophen tablet 650 mg  650 mg Oral Q6H PRN Augusto Samson MD        albuterol sulfate nebulizer solution 2.5 mg  2.5 mg Nebulization Q6H WAKE Augusto Samson MD   2.5 mg at 03/07/22 0740    morphine injection 2 mg  2 mg Intravenous Q4H PRN Augusto Samson MD        ondansetron injection 4 mg  4 mg Intravenous Q8H PRN Augusto Samson MD        piperacillin-tazobactam 4.5 g in dextrose 5 % 100 mL IVPB (ready to mix system)  4.5 g Intravenous Q8H Augusto Samson MD        sodium chloride 0.9% flush 10 mL  10 mL Intravenous PRN Augusto Samson MD         Current Outpatient Medications on File Prior to Visit   Medication Sig Dispense Refill    albuterol (ACCUNEB) 1.25 mg/3 mL Nebu Take 3 mLs (1.25 mg total) by nebulization every 6 (six) hours as needed. Rescue 75 mL 0    albuterol (VENTOLIN HFA) 90 mcg/actuation inhaler Inhale 2 puffs into the lungs every 6 (six) hours as needed for Wheezing. Rescue 18 g 3    aspirin (ECOTRIN) 81 MG EC tablet Take 81 mg by mouth once daily.      atorvastatin (LIPITOR) 40 MG tablet Take 1 tablet (40 mg total) by mouth once daily. 90 tablet 3    finasteride (PROSCAR) 5 mg tablet TAKE 1 TABLET BY MOUTH  EVERY DAY (Patient taking differently: Take 5 mg by mouth every evening.) 90 tablet 1    fluticasone propionate (FLONASE) 50 mcg/actuation nasal spray 2 sprays (100 mcg total) by Each Nostril route once daily. 48 g 3    GARLIC ORAL Take 1 capsule by mouth once daily.      hydroCHLOROthiazide (HYDRODIURIL) 12.5 MG Tab       hydroCHLOROthiazide (MICROZIDE) 12.5 mg capsule Take 1 capsule (12.5 mg total) by mouth 2 (two) times daily. 180 capsule 3    metFORMIN (GLUCOPHAGE) 500 MG tablet TAKE 1 TABLET BY MOUTH TWICE DAILY WITH FOOD (Patient taking differently: Take 500 mg by mouth 2 (two) times daily with meals.) 180 tablet 3    multivitamin capsule Take 1 capsule by mouth once daily.      omega-3 fatty acids/fish oil (FISH OIL-OMEGA-3 FATTY ACIDS) 300-1,000 mg capsule Take 1 capsule by mouth once daily.      tamsulosin (FLOMAX) 0.4 mg Cap Take 0.4 mg by mouth nightly.  3    vitamin D (VITAMIN D3) 1000 units Tab Take 1,000 Units by mouth once daily.           Physical Exam  General: Well nourished    Airway:  Mallampati: III   Mouth Opening: Normal  TM Distance: Normal, at least 6 cm  Tongue: Normal  Neck ROM: Normal ROM    Dental:  Periodontal disease, Mild    Chest/Lungs:  Clear to auscultation    Heart:  Rate: Normal  Rhythm: Regular Rhythm  Sounds: Normal          Anesthesia Plan  Type of Anesthesia, risks & benefits discussed:    Anesthesia Type: general  Intra-op Monitoring Plan: Standard ASA Monitors  Post Op Pain Control Plan: per primary service following discharge from PACU and multimodal analgesia  Induction:  IV  Informed Consent: Informed consent signed with the Patient and all parties understand the risks and agree with anesthesia plan.  All questions answered.   ASA Score: 3    Ready For Surgery From Anesthesia Perspective.       .

## 2022-03-07 NOTE — H&P
Chief Complaint   Patient presents with    Shortness of Breath       Pt c/o SOB x 1 day w/ abdominal pain x 3 days. Pt had albuterol treatment at home this AM and PM w/o relief. Pt able to speak in complete sentences. NAD noted. Pt also c/o lethargy. Pt has hx of multiple bowel blockages. Pt denies nausea and vomiting. Last BM yesterday.      The patient is a 89-year-old male who presents to the emergency department with his family tonight with shortness of breath and abdominal pain.  The patient states he used a nebulizer treatment prior to coming to the emergency department with little improvement of his symptoms.  The patient states his shortness of breath began last night.  His wife  2 days ago in the ICU here at [facility].  The patient is concerned because he states his abdomen feels bloated and distended, he had a hard bowel movement on Friday, took a laxative and had 3 bowel movements yesterday (Sat) and has had no bowel movement today.  He denies any nausea or vomiting.  He states he ate dinner tonight without difficulty.  He denies having a cough or fever.                Review of patient's allergies indicates:   Allergen Reactions    Solarcaine [benzocaine-triclosan]      Sulfa (sulfonamide antibiotics)      Sulfamethoxazole      Trimethoprim      Hytrin [terazosin] Rash    Smz-tmp ds [sulfamethoxazole-trimethoprim] Rash           Past Medical History:   Diagnosis Date    Anticoagulant long-term use      Bowel obstruction      BPH (benign prostatic hyperplasia)      Diabetes mellitus      Diverticulitis      Hyperlipidemia      Hypertension              Past Surgical History:   Procedure Laterality Date    ANASTOMOSIS OF ILEUM TO RECTUM   2019     Procedure: ANASTOMOSIS, COLO-RECTAL;  Surgeon: Fouzia Gardner MD;  Location: [facility] OR;  Service: General;;    COLOSTOMY   2019     Procedure: CREATION, COLOSTOMY;  Surgeon: Fouzia Gardner MD;  Location: [facility]  OR;  Service: General;;    EYE SURGERY        GISELA PROCEDURE   2019     Procedure: GISELA PROCEDURE;  Surgeon: Fouzia Gardner MD;  Location: [facility] OR;  Service: General;;  Colon resection    TONSILLECTOMY                Family History   Problem Relation Age of Onset    Heart disease Father      Hypertension Father      Cancer Mother      Diabetes Paternal Aunt        Social History            Tobacco Use    Smoking status: Former Smoker       Packs/day: 1.00       Years: 25.00       Pack years: 25.00       Types: Cigarettes       Start date:        Quit date:        Years since quittin.2    Smokeless tobacco: Never Used   Substance Use Topics    Alcohol use: Yes       Alcohol/week: 2.0 standard drinks       Types: 2 Glasses of wine per week       Comment: occassionally    Drug use: No      Review of Systems   Constitutional: Positive for activity change. Negative for appetite change, chills and fever.   HENT: Negative for sore throat.    Respiratory: Positive for shortness of breath. Negative for cough and chest tightness.    Cardiovascular: Negative for chest pain, palpitations and leg swelling.   Gastrointestinal: Positive for abdominal distention and abdominal pain (Discomfort). Negative for constipation, nausea and vomiting.   Genitourinary: Negative for dysuria.   Musculoskeletal: Negative for back pain.   Skin: Negative for rash.   Neurological: Negative for dizziness, weakness and light-headedness.   Hematological: Does not bruise/bleed easily.         Physical Exam             Initial Vitals [22]   BP Pulse Resp Temp SpO2   -- 96 20 99.4 °F (37.4 °C) 96 %       MAP           --              Physical Exam     Nursing note and vitals reviewed.  Constitutional: He appears well-developed and well-nourished.   HENT:   Head: Normocephalic and atraumatic.   Eyes: EOM are normal. Pupils are equal, round, and reactive to light.   Neck: Neck supple.   Normal range of  motion.  Cardiovascular: Normal rate, regular rhythm, normal heart sounds and intact distal pulses.   Pulmonary/Chest: Breath sounds normal.   Abdominal: Abdomen is soft. Bowel sounds are normal. He exhibits no distension. There is no abdominal tenderness. There is no rebound and no guarding.   Musculoskeletal:         General: No edema. Normal range of motion.      Cervical back: Normal range of motion and neck supple.     Neurological: He is alert and oriented to person, place, and time.   Skin: Skin is warm and dry.   Psychiatric: He has a normal mood and affect. His behavior is normal. Judgment and thought content normal.               Imp: acute cholecystitis and cholilithiasis  Copd, s/p hernia repair ,      Plan: check Hida scan , poible open cholecystectomy.

## 2022-03-07 NOTE — PROVIDER PROGRESS NOTES - EMERGENCY DEPT.
Encounter Date: 3/6/2022    ED Physician Progress Notes           Patient turned over to be by Dr. Katie Navarro at 4:00 a.m., pending further imaging including ultrasound of the right upper quadrant and disposition.  Ultrasound shows findings concerning for cholecystitis.  Discussed with patient and daughter at bedside.  Case discussed with Dr. Gardner, general surgeon of the patient's, accepted the admission under his service.  Requesting HIDA scan.  Agrees with continuing Zosyn.       IMPRESSION:    ICD-10-CM ICD-9-CM   1. Cholecystitis  K81.9 575.10   2. Shortness of breath  R06.02 786.05

## 2022-03-07 NOTE — ED NOTES
Pt to bathroom via wc with er tech for another bowel movement. Pt returned to bed without difficulty. Daughter remains at bedside, call bell within reach.

## 2022-03-07 NOTE — PROGRESS NOTES
Pharmacist Renal Dose Adjustment Note    Husam Connolly is a 89 y.o. male being treated with the medication zosyn    Patient Data:    Vital Signs (Most Recent):  Temp: 99.5 °F (37.5 °C) (03/07/22 0449)  Pulse: 89 (03/07/22 0546)  Resp: (!) 27 (03/07/22 0546)  BP: (!) 150/67 (03/07/22 0402)  SpO2: (!) 93 % (03/07/22 0546)   Vital Signs (72h Range):  Temp:  [99.4 °F (37.4 °C)-99.5 °F (37.5 °C)]   Pulse:  []   Resp:  [20-29]   BP: (138-159)/(62-70)   SpO2:  [93 %-98 %]      Recent Labs   Lab 03/06/22  2344   CREATININE 1.5*     Serum creatinine: 1.5 mg/dL (H) 03/06/22 2344  Estimated creatinine clearance: 33.8 mL/min (A)    Medication:zosyn dose: 4.5g frequency q6 will be changed to medication:zosyn dose:4.5g frequency:q8    Pharmacist's Name: Rogelio Gentile  Pharmacist's Extension: 0214

## 2022-03-07 NOTE — TRANSFER OF CARE
"Anesthesia Transfer of Care Note    Patient: Husam Connolly    Procedure(s) Performed: Procedure(s) (LRB):  CHOLECYSTECTOMY (N/A)  LAPAROTOMY, EXPLORATORY (N/A)  LYSIS, ADHESIONS (N/A)  REPAIR, HERNIA, VENTRAL (N/A)    Patient location: PACU    Anesthesia Type: general    Transport from OR: Transported from OR on 6-10 L/min O2 by face mask with adequate spontaneous ventilation    Post pain: adequate analgesia    Post assessment: no apparent anesthetic complications    Post vital signs: stable    Level of consciousness: sedated and awake    Nausea/Vomiting: no nausea/vomiting    Complications: none    Transfer of care protocol was followed      Last vitals:   Visit Vitals  BP (!) 107/53   Pulse 90   Temp 37.1 °C (98.8 °F) (Oral)   Resp (!) 26   Ht 5' 10" (1.778 m)   Wt 74.8 kg (165 lb)   SpO2 98%   BMI 23.68 kg/m²     "

## 2022-03-07 NOTE — ED NOTES
Pt out of room to hida scan via stretcher. Pt calm and cooperative and his daughter remained at bedside.

## 2022-03-07 NOTE — BRIEF OP NOTE
[place] - Surgery ([facility])  Brief Operative Note    SUMMARY     Surgery Date: 3/7/2022     Surgeon(s) and Role:     * Fouzia Gardner MD - Primary    Assisting Surgeon: None    Pre-op Diagnosis:  Cholecystitis [K81.9]  Type 2 diabetes mellitus with stage 3a chronic kidney disease, without long-term current use of insulin [E11.22, N18.31]    Post-op Diagnosis:  Post-Op Diagnosis Codes:     * Cholecystitis [K81.9]Gangrenous cholecystitis and cholilithiasis     * Type 2 diabetes mellitus with stage 3a chronic kidney disease, without long-term current use of insulin [E11.22, N18.31]    Procedure(s) (LRB):  CHOLECYSTECTOMY (N/A)  LAPAROTOMY, EXPLORATORY (N/A)  LYSIS, ADHESIONS (N/A)  REPAIR, HERNIA, VENTRAL (N/A)    Anesthesia: General    Operative Findings:  After satisfactory general endotracheal anesthesia patient in supine position time-out was called patient identity confirmed site was confirmed abdomen was prepped and draped normal sterile manner using ChloraPrep Bowman catheter was placed right subcostal incision was made taken down deep subcu tissue subcutaneous bleeders were clamped bovied external oblique anterior rectus sheath and rectus muscle was bovied posterior rectus sheath peritoneal cavity was entered patient was found to have acutely inflamed gangrenous gallbladder decompression of the gallbladder was performed using suction catheter then dissection was started at the cystic common bile duct junction which was very inflamed decided to take the gallbladder down from fundus to the cystic bile duct junction with blunt and sharp dissection gallbladder was taken down up to the susi hepatis area of the cystic artery was isolated doubly hemoclipped and incised then cystic duct was isolated with these entrance into the gallbladder was found to be inflamed the area proximal to the gallbladder was ligated using 2-0 was still silk ligature then 2 hemoclips were applied at the cystic duct stump gallbladder  was removed abdominal catheter was thoroughly irrigated antibiotic solution hemostasis was maintained patient had previous surgeries in 1 found to have colonic and small bowel adhesion to the anterior abdominal wall under the lower flap lysis of adhesion was performed and defect noticed in the posterior rectus sheath inferiorly was oversewn using running 1. Prolene suture to close the defect for the hernia abnormal catheter was thoroughly irrigated normal saline solution a Jimmy-Awad was placed in the area brought out lateral part of the incision sutured to the skin using 2 nylon suture wound was closed using in layers using 1. Prolene suture subcutaneous tissues approximated 3-0 Vicryl skin was closed using skin staple estimated blood loss 100 cc specimen removed gangrenous gallbladder patient sent to recovery room in stable condition.    Estimated Blood Loss: Estimated Blood Loss has been documented. 100         Specimens:   Specimen (24h ago, onward)             Start     Ordered    03/07/22 1428  Specimen to Pathology, Surgery General Surgery  Once        Comments: Pre-op Diagnosis: Cholecystitis [K81.9]  Type 2 diabetes mellitus with stage 3a chronic kidney disease, without long-term current use of insulin [E11.22, N18.31]    Procedure(s):  CHOLECYSTECTOMY     Number of specimens: 1    Name of specimens: Gallbladder - perm   References:    Click here for ordering Quick Tip   Question Answer Comment   Procedure Type: General Surgery    Release to patient Immediate        03/07/22 1427                [id number]

## 2022-03-07 NOTE — ED NOTES
Eyes closed.  Pt resting quietly.  Family members at bedside.  Respirations even and unlabored.  Resp.  22

## 2022-03-07 NOTE — ANESTHESIA PROCEDURE NOTES
Intubation    Date/Time: 3/7/2022 1:18 PM  Performed by: Jacklyn Thibodeaux CRNA  Authorized by: Domi Bonilla MD     Intubation:     Induction:  Rapid sequence induction    Intubated:  Postinduction    Attempts:  1    Attempted By:  CINTHIA    Blade:  Landeros 3    Laryngeal View Grade: Grade I - full view of cords      Difficult Airway Encountered?: No      Complications:  None    Airway Device:  Oral endotracheal tube    Airway Device Size:  7.5    Style/Cuff Inflation:  Cuffed    Inflation Amount (mL):  6    Tube secured:  21    Placement Verified By:  Capnometry    Complicating Factors:  None    Findings Post-Intubation:  BS equal bilateral and atraumatic/condition of teeth unchanged

## 2022-03-07 NOTE — ED NOTES
Pt and family notified we needed urine specimen.  Verbalized understanding.   Family states he just went to bathroom before he got in room.

## 2022-03-07 NOTE — PROGRESS NOTES
Patient arrived to unit. VSS, patient AAOx4. Patient states pain 2/10 at the moment. Patient's dressing clean,dry, and intact. Bed alarm in place. Call bell in reach. Safety maintained. Will continue to monitor.

## 2022-03-07 NOTE — ED NOTES
"Pt returned from hida scan. Pt denies pain but reports feeling an "uncomfortable feeling' in abdomen. Pt updated on plan of care. Call bell within reach.   "

## 2022-03-07 NOTE — H&P
Chief Complaint   Patient presents with    Shortness of Breath       Pt c/o SOB x 1 day w/ abdominal pain x 3 days. Pt had albuterol treatment at home this AM and PM w/o relief. Pt able to speak in complete sentences. NAD noted. Pt also c/o lethargy. Pt has hx of multiple bowel blockages. Pt denies nausea and vomiting. Last BM yesterday.      The patient is a 89-year-old male who presents to the emergency department with his family tonight with shortness of breath and abdominal pain.  The patient states he used a nebulizer treatment prior to coming to the emergency department with little improvement of his symptoms.  The patient states his shortness of breath began last night.  His wife  2 days ago in the ICU here at [facility].  The patient is concerned because he states his abdomen feels bloated and distended, he had a hard bowel movement on Friday, took a laxative and had 3 bowel movements yesterday (Sat) and has had no bowel movement today.  He denies any nausea or vomiting.  He states he ate dinner tonight without difficulty.  He denies having a cough or fever.                Review of patient's allergies indicates:   Allergen Reactions    Solarcaine [benzocaine-triclosan]      Sulfa (sulfonamide antibiotics)      Sulfamethoxazole      Trimethoprim      Hytrin [terazosin] Rash    Smz-tmp ds [sulfamethoxazole-trimethoprim] Rash           Past Medical History:   Diagnosis Date    Anticoagulant long-term use      Bowel obstruction      BPH (benign prostatic hyperplasia)      Diabetes mellitus      Diverticulitis      Hyperlipidemia      Hypertension              Past Surgical History:   Procedure Laterality Date    ANASTOMOSIS OF ILEUM TO RECTUM   2019     Procedure: ANASTOMOSIS, COLO-RECTAL;  Surgeon: Fouzia Gardner MD;  Location: [facility] [place];  Service: General;;    COLOSTOMY   2019     Procedure: CREATION, COLOSTOMY;  Surgeon: Fouzia Gardner MD;  Location: [facility] [place];   Service: General;;    EYE SURGERY        GISELA PROCEDURE   2019     Procedure: GISELA PROCEDURE;  Surgeon: Fouzia Gardner MD;  Location: [facility] OR;  Service: General;;  Colon resection    TONSILLECTOMY                Family History   Problem Relation Age of Onset    Heart disease Father      Hypertension Father      Cancer Mother      Diabetes Paternal Aunt        Social History            Tobacco Use    Smoking status: Former Smoker       Packs/day: 1.00       Years: 25.00       Pack years: 25.00       Types: Cigarettes       Start date:        Quit date:        Years since quittin.2    Smokeless tobacco: Never Used   Substance Use Topics    Alcohol use: Yes       Alcohol/week: 2.0 standard drinks       Types: 2 Glasses of wine per week       Comment: occassionally    Drug use: No      Review of Systems   Constitutional: Positive for activity change. Negative for appetite change, chills and fever.   HENT: Negative for sore throat.    Respiratory: Positive for shortness of breath. Negative for cough and chest tightness.    Cardiovascular: Negative for chest pain, palpitations and leg swelling.   Gastrointestinal: Positive for abdominal distention and abdominal pain (Discomfort). Negative for constipation, nausea and vomiting.   Genitourinary: Negative for dysuria.   Musculoskeletal: Negative for back pain.   Skin: Negative for rash.   Neurological: Negative for dizziness, weakness and light-headedness.   Hematological: Does not bruise/bleed easily.         Physical Exam             Initial Vitals [22]   BP Pulse Resp Temp SpO2   -- 96 20 99.4 °F (37.4 °C) 96 %       MAP           --              Physical Exam     Nursing note and vitals reviewed.  Constitutional: He appears well-developed and well-nourished.   HENT:   Head: Normocephalic and atraumatic.   Eyes: EOM are normal. Pupils are equal, round, and reactive to light.   Neck: Neck supple.   Normal range of  motion.  Cardiovascular: Normal rate, regular rhythm, normal heart sounds and intact distal pulses.   Pulmonary/Chest: Breath sounds normal.   Abdominal: Abdomen is soft. Bowel sounds are normal. He exhibits no distension. There is no abdominal tenderness. There is no rebound and no guarding.   Musculoskeletal:         General: No edema. Normal range of motion.      Cervical back: Normal range of motion and neck supple.     Neurological: He is alert and oriented to person, place, and time.   Skin: Skin is warm and dry.   Psychiatric: He has a normal mood and affect. His behavior is normal. Judgment and thought content normal.            Procedures  Labs Reviewed   CBC W/ AUTO DIFFERENTIAL   COMPREHENSIVE METABOLIC PANEL   TROPONIN I   B-TYPE NATRIURETIC PEPTIDE      EKG Readings: (Independently Interpreted)   Initial: 0039. Rhythm: Normal Sinus Rhythm. Conduction: RBBB (and LAFB).      Imaging Results    None            Medications - No data to display                     Clinical Impression:   Final diagnoses:  [R06.02] Shortness of breath                cholelithiasis cholecystitis            Plan: check hida scan

## 2022-03-07 NOTE — ED NOTES
Pt out of room to surgery on stretcher. Pt undressed and has belongings bag with him with ring, necklace and underwear.

## 2022-03-07 NOTE — ED NOTES
Patient identifiers for Husam Connolly checked and correct.  LOC: The patient is awake, alert and aware of environment with an appropriate affect, the patient is oriented x 3 and speaking appropriately.  APPEARANCE: Patient resting quietly and in no acute distress, patient is clean and well groomed, patient's clothing are properly fastened.  SKIN: The skin is warm and dry.  MUSKULOSKELETAL: Patient moving all extremities well, no obvious swelling or deformities noted.  RESPIRATORY: Respirations labored.  CARDIAC: Patient has a normal, no periphreal edema noted.  ABDOMEN: Non tender to palpation, distention noted.

## 2022-03-08 LAB
ALBUMIN SERPL BCP-MCNC: 2.8 G/DL (ref 3.5–5.2)
ALP SERPL-CCNC: 69 U/L (ref 55–135)
ALT SERPL W/O P-5'-P-CCNC: 32 U/L (ref 10–44)
ANION GAP SERPL CALC-SCNC: 14 MMOL/L (ref 8–16)
AST SERPL-CCNC: 55 U/L (ref 10–40)
BASOPHILS # BLD AUTO: 0.03 K/UL (ref 0–0.2)
BASOPHILS NFR BLD: 0.2 % (ref 0–1.9)
BILIRUB SERPL-MCNC: 1.9 MG/DL (ref 0.1–1)
BUN SERPL-MCNC: 29 MG/DL (ref 8–23)
CALCIUM SERPL-MCNC: 7.9 MG/DL (ref 8.7–10.5)
CHLORIDE SERPL-SCNC: 95 MMOL/L (ref 95–110)
CO2 SERPL-SCNC: 23 MMOL/L (ref 23–29)
CREAT SERPL-MCNC: 1.6 MG/DL (ref 0.5–1.4)
DIFFERENTIAL METHOD: ABNORMAL
EOSINOPHIL # BLD AUTO: 0.3 K/UL (ref 0–0.5)
EOSINOPHIL NFR BLD: 2.6 % (ref 0–8)
ERYTHROCYTE [DISTWIDTH] IN BLOOD BY AUTOMATED COUNT: 13.3 % (ref 11.5–14.5)
EST. GFR  (AFRICAN AMERICAN): 43 ML/MIN/1.73 M^2
EST. GFR  (NON AFRICAN AMERICAN): 37 ML/MIN/1.73 M^2
GLUCOSE SERPL-MCNC: 158 MG/DL (ref 70–110)
HCT VFR BLD AUTO: 36.5 % (ref 40–54)
HGB BLD-MCNC: 12.2 G/DL (ref 14–18)
IMM GRANULOCYTES # BLD AUTO: 0.04 K/UL (ref 0–0.04)
IMM GRANULOCYTES NFR BLD AUTO: 0.3 % (ref 0–0.5)
LYMPHOCYTES # BLD AUTO: 1.4 K/UL (ref 1–4.8)
LYMPHOCYTES NFR BLD: 11 % (ref 18–48)
MCH RBC QN AUTO: 29.3 PG (ref 27–31)
MCHC RBC AUTO-ENTMCNC: 33.4 G/DL (ref 32–36)
MCV RBC AUTO: 88 FL (ref 82–98)
MONOCYTES # BLD AUTO: 1.3 K/UL (ref 0.3–1)
MONOCYTES NFR BLD: 10.2 % (ref 4–15)
NEUTROPHILS # BLD AUTO: 9.7 K/UL (ref 1.8–7.7)
NEUTROPHILS NFR BLD: 75.7 % (ref 38–73)
NRBC BLD-RTO: 0 /100 WBC
PLATELET # BLD AUTO: 155 K/UL (ref 150–450)
PMV BLD AUTO: 11.6 FL (ref 9.2–12.9)
POCT GLUCOSE: 156 MG/DL (ref 70–110)
POCT GLUCOSE: 166 MG/DL (ref 70–110)
POCT GLUCOSE: 191 MG/DL (ref 70–110)
POCT GLUCOSE: 206 MG/DL (ref 70–110)
POTASSIUM SERPL-SCNC: 4.3 MMOL/L (ref 3.5–5.1)
PROT SERPL-MCNC: 5.4 G/DL (ref 6–8.4)
RBC # BLD AUTO: 4.17 M/UL (ref 4.6–6.2)
SODIUM SERPL-SCNC: 132 MMOL/L (ref 136–145)
WBC # BLD AUTO: 12.86 K/UL (ref 3.9–12.7)

## 2022-03-08 PROCEDURE — 25000003 PHARM REV CODE 250: Performed by: SURGERY

## 2022-03-08 PROCEDURE — 25000242 PHARM REV CODE 250 ALT 637 W/ HCPCS: Performed by: SURGERY

## 2022-03-08 PROCEDURE — 85025 COMPLETE CBC W/AUTO DIFF WBC: CPT | Performed by: SURGERY

## 2022-03-08 PROCEDURE — 94640 AIRWAY INHALATION TREATMENT: CPT

## 2022-03-08 PROCEDURE — 94761 N-INVAS EAR/PLS OXIMETRY MLT: CPT

## 2022-03-08 PROCEDURE — 21400001 HC TELEMETRY ROOM

## 2022-03-08 PROCEDURE — 27000221 HC OXYGEN, UP TO 24 HOURS

## 2022-03-08 PROCEDURE — 36415 COLL VENOUS BLD VENIPUNCTURE: CPT | Performed by: SURGERY

## 2022-03-08 PROCEDURE — 94799 UNLISTED PULMONARY SVC/PX: CPT

## 2022-03-08 PROCEDURE — 80053 COMPREHEN METABOLIC PANEL: CPT | Performed by: SURGERY

## 2022-03-08 PROCEDURE — 63600175 PHARM REV CODE 636 W HCPCS: Performed by: SURGERY

## 2022-03-08 PROCEDURE — 99900035 HC TECH TIME PER 15 MIN (STAT)

## 2022-03-08 RX ADMIN — ACETAMINOPHEN 650 MG: 325 TABLET ORAL at 08:03

## 2022-03-08 RX ADMIN — ALBUTEROL SULFATE 2.5 MG: 2.5 SOLUTION RESPIRATORY (INHALATION) at 08:03

## 2022-03-08 RX ADMIN — ACETAMINOPHEN 650 MG: 325 TABLET ORAL at 01:03

## 2022-03-08 RX ADMIN — PIPERACILLIN AND TAZOBACTAM 4.5 G: 4; .5 INJECTION, POWDER, LYOPHILIZED, FOR SOLUTION INTRAVENOUS; PARENTERAL at 01:03

## 2022-03-08 RX ADMIN — ALBUTEROL SULFATE 2.5 MG: 2.5 SOLUTION RESPIRATORY (INHALATION) at 01:03

## 2022-03-08 RX ADMIN — PIPERACILLIN AND TAZOBACTAM 4.5 G: 4; .5 INJECTION, POWDER, LYOPHILIZED, FOR SOLUTION INTRAVENOUS; PARENTERAL at 08:03

## 2022-03-08 RX ADMIN — ALBUTEROL SULFATE 2.5 MG: 2.5 SOLUTION RESPIRATORY (INHALATION) at 07:03

## 2022-03-08 RX ADMIN — PIPERACILLIN AND TAZOBACTAM 4.5 G: 4; .5 INJECTION, POWDER, LYOPHILIZED, FOR SOLUTION INTRAVENOUS; PARENTERAL at 04:03

## 2022-03-08 RX ADMIN — ACETAMINOPHEN 650 MG: 325 TABLET ORAL at 02:03

## 2022-03-08 RX ADMIN — INSULIN ASPART 1 UNITS: 100 INJECTION, SOLUTION INTRAVENOUS; SUBCUTANEOUS at 08:03

## 2022-03-08 NOTE — PLAN OF CARE
AAO,abd dressing dry and intact,aaron compressed with red drng,voids per urinal,meds admin per mar,denies c/o n/v,incisional pain w fair relief from tylenol,scds to ble,,bed alarm set.

## 2022-03-08 NOTE — PLAN OF CARE
Patient AAOx4, VSS, patient up in chair and ambulated in lay today. Encouraged patient to use IS and notify nurse if any SOB present. Patient in NAD. Dressing to RLQ clean, dry, and intact. Drain care provided. Call bell in reach. Bed alarm in place. SCDs in use. Safety maintained. Will continue to monitor.   Problem: Adult Inpatient Plan of Care  Goal: Plan of Care Review  Outcome: Ongoing, Progressing  Goal: Patient-Specific Goal (Individualized)  Outcome: Ongoing, Progressing  Goal: Absence of Hospital-Acquired Illness or Injury  Outcome: Ongoing, Progressing  Goal: Optimal Comfort and Wellbeing  Outcome: Ongoing, Progressing  Goal: Readiness for Transition of Care  Outcome: Ongoing, Progressing     Problem: Diabetes Comorbidity  Goal: Blood Glucose Level Within Targeted Range  Outcome: Ongoing, Progressing     Problem: Infection  Goal: Absence of Infection Signs and Symptoms  Outcome: Ongoing, Progressing     Problem: Fall Injury Risk  Goal: Absence of Fall and Fall-Related Injury  Outcome: Ongoing, Progressing     Problem: Bleeding (Surgery Nonspecified)  Goal: Absence of Bleeding  Outcome: Ongoing, Progressing     Problem: Bowel Motility Impaired (Surgery Nonspecified)  Goal: Effective Bowel Elimination  Outcome: Ongoing, Progressing     Problem: Fluid and Electrolyte Imbalance (Surgery Nonspecified)  Goal: Fluid and Electrolyte Balance  Outcome: Ongoing, Progressing     Problem: Glycemic Control Impaired (Surgery Nonspecified)  Goal: Blood Glucose Level Within Targeted Range  Outcome: Ongoing, Progressing     Problem: Infection (Surgery Nonspecified)  Goal: Absence of Infection Signs and Symptoms  Outcome: Ongoing, Progressing     Problem: Ongoing Anesthesia Effects (Surgery Nonspecified)  Goal: Anesthesia/Sedation Recovery  Outcome: Ongoing, Progressing     Problem: Pain (Surgery Nonspecified)  Goal: Acceptable Pain Control  Outcome: Ongoing, Progressing     Problem: Postoperative Nausea and Vomiting  (Surgery Nonspecified)  Goal: Nausea and Vomiting Relief  Outcome: Ongoing, Progressing     Problem: Postoperative Urinary Retention (Surgery Nonspecified)  Goal: Effective Urinary Elimination  Outcome: Ongoing, Progressing     Problem: Respiratory Compromise (Surgery Nonspecified)  Goal: Effective Oxygenation and Ventilation  Outcome: Ongoing, Progressing     Problem: Skin Injury Risk Increased  Goal: Skin Health and Integrity  Outcome: Ongoing, Progressing

## 2022-03-08 NOTE — ANESTHESIA RELEASE NOTE
"Anesthesia Release from PACU Note    Patient: Husam Connolly    Procedure(s) Performed: Procedure(s) (LRB):  CHOLECYSTECTOMY (N/A)  LAPAROTOMY, EXPLORATORY (N/A)  LYSIS, ADHESIONS (N/A)  REPAIR, HERNIA, VENTRAL (N/A)    Anesthesia type: general    Post pain: Adequate analgesia    Post assessment: no apparent anesthetic complications    Last Vitals:   Visit Vitals  BP (!) 108/55 (BP Location: Right arm, Patient Position: Lying)   Pulse 81   Temp 36.6 °C (97.8 °F) (Oral)   Resp 20   Ht 5' 10" (1.778 m)   Wt 74.8 kg (165 lb)   SpO2 (!) 93%   BMI 23.68 kg/m²       Post vital signs: stable    Level of consciousness: awake    Nausea/Vomiting: no nausea/no vomiting    Complications: none    Airway Patency: patent    Respiratory: unassisted    Cardiovascular: stable and blood pressure at baseline    Hydration: euvolemic  "

## 2022-03-08 NOTE — PLAN OF CARE
Patient on oxygen with documented flow.  Will attempt to wean per O2 order protocol.  СВЕТЛАНА to treatment.  Will continue to monitor.

## 2022-03-08 NOTE — ANESTHESIA POSTPROCEDURE EVALUATION
Anesthesia Post Evaluation    Patient: Husam Connolly    Procedure(s) Performed: Procedure(s) (LRB):  CHOLECYSTECTOMY (N/A)  LAPAROTOMY, EXPLORATORY (N/A)  LYSIS, ADHESIONS (N/A)  REPAIR, HERNIA, VENTRAL (N/A)    Final Anesthesia Type: general      Patient location during evaluation: PACU  Patient participation: Yes- Able to Participate  Level of consciousness: awake and alert  Post-procedure vital signs: reviewed and stable  Pain management: adequate  Airway patency: patent    PONV status at discharge: No PONV  Anesthetic complications: no      Cardiovascular status: blood pressure returned to baseline  Respiratory status: unassisted  Hydration status: euvolemic  Follow-up not needed.          Vitals Value Taken Time   /55 03/07/22 1754   Temp 36.6 °C (97.8 °F) 03/07/22 1754   Pulse 81 03/07/22 1801   Resp 20 03/07/22 1754   SpO2 93 % 03/07/22 1754         Event Time   Out of Recovery 03/07/2022 17:19:14         Pain/Keanu Score: Pain Rating Prior to Med Admin: 6 (3/7/2022  4:36 PM)  Pain Rating Post Med Admin: 3 (3/7/2022  4:45 PM)  Keanu Score: 8 (3/7/2022  4:45 PM)

## 2022-03-08 NOTE — PLAN OF CARE
Sw met with pt to discuss d/c planning. Pt now lives alone. Pt stated his wife passed away on Friday. Pt stated he is alright at the moment because his daughters are in town from [place] and [place] and will be staying with him for awhile. Pt also has another daughter who lives nearby him in [place]. Pt has no DME and drives himself to doctor appointments. Pt was independent prior to admit. One of pt's daughters will transport pt home at the time of d/c. Sw encouraged pt to call with any questions or concerns. Sw will continue to follow pt for d/c planning.     Future Appointments   Date Time Provider Department Center   3/21/2022  9:40 AM Slick Baldwin Jr., MD [facility] ORTHO [facility]   3/29/2022  1:30 PM Fouzia Gardner MD [facility] OCC [facility]   5/24/2022  3:00 PM Alverto Green MD [facility]   5/31/2022  1:30 PM Daren Lassiter MD [facility] CARDIO [place] [facility]         03/08/22 0901   Discharge Assessment   Assessment Type Discharge Planning Assessment   Confirmed/corrected address, phone number and insurance Yes   Confirmed Demographics Correct on Facesheet   Source of Information patient   Lives With alone   Facility Arrived From: home   Do you expect to return to your current living situation? Yes   Do you have help at home or someone to help you manage your care at home? Yes   Who are your caregiver(s) and their phone number(s)? pt's daughters   Prior to hospitilization cognitive status: Alert/Oriented   Current cognitive status: Alert/Oriented   Walking or Climbing Stairs Difficulty none   Dressing/Bathing Difficulty none   Equipment Currently Used at Home none   Readmission within 30 days? No   Patient currently being followed by outpatient case management? No   Do you currently have service(s) that help you manage your care at home? No   Do you have prescription coverage? Yes   Coverage [employer] Managed Medicare   Do you have any problems affording any of your prescribed medications? No    Who is going to help you get home at discharge? pt's daughters   How do you get to doctors appointments? car, drives self   Discharge Plan A Home with family   Discharge Plan B Home Health   DME Needed Upon Discharge    (TBD)   Discharge Plan discussed with: Patient   Discharge Barriers Identified None

## 2022-03-08 NOTE — PROGRESS NOTES
Attempt to ambulate patient, patient tolerated standing up on RA, desat to 89%,  2L NC provided for patient. Patient tolerated ambulating around lay x1 with 2L NC. Patient c/o SOB with exertion.  patient returned to room resting in bed with 1L NC while resting and SCDs in place. Will continue to monitor.

## 2022-03-09 PROBLEM — K80.12 CALCULUS OF GALLBLADDER WITH ACUTE ON CHRONIC CHOLECYSTITIS WITHOUT OBSTRUCTION: Status: ACTIVE | Noted: 2022-03-09

## 2022-03-09 LAB
POCT GLUCOSE: 166 MG/DL (ref 70–110)
POCT GLUCOSE: 174 MG/DL (ref 70–110)
POCT GLUCOSE: 174 MG/DL (ref 70–110)
POCT GLUCOSE: 183 MG/DL (ref 70–110)
POCT GLUCOSE: 247 MG/DL (ref 70–110)

## 2022-03-09 PROCEDURE — 94640 AIRWAY INHALATION TREATMENT: CPT

## 2022-03-09 PROCEDURE — 63600175 PHARM REV CODE 636 W HCPCS: Performed by: SURGERY

## 2022-03-09 PROCEDURE — 94761 N-INVAS EAR/PLS OXIMETRY MLT: CPT

## 2022-03-09 PROCEDURE — 27000221 HC OXYGEN, UP TO 24 HOURS

## 2022-03-09 PROCEDURE — 25000242 PHARM REV CODE 250 ALT 637 W/ HCPCS: Performed by: SURGERY

## 2022-03-09 PROCEDURE — 25000003 PHARM REV CODE 250: Performed by: SURGERY

## 2022-03-09 PROCEDURE — 94799 UNLISTED PULMONARY SVC/PX: CPT

## 2022-03-09 PROCEDURE — 99900035 HC TECH TIME PER 15 MIN (STAT)

## 2022-03-09 PROCEDURE — 21400001 HC TELEMETRY ROOM

## 2022-03-09 RX ADMIN — PIPERACILLIN AND TAZOBACTAM 4.5 G: 4; .5 INJECTION, POWDER, LYOPHILIZED, FOR SOLUTION INTRAVENOUS; PARENTERAL at 04:03

## 2022-03-09 RX ADMIN — ACETAMINOPHEN 650 MG: 325 TABLET ORAL at 09:03

## 2022-03-09 RX ADMIN — ONDANSETRON 4 MG: 2 INJECTION INTRAMUSCULAR; INTRAVENOUS at 02:03

## 2022-03-09 RX ADMIN — PIPERACILLIN AND TAZOBACTAM 4.5 G: 4; .5 INJECTION, POWDER, LYOPHILIZED, FOR SOLUTION INTRAVENOUS; PARENTERAL at 08:03

## 2022-03-09 RX ADMIN — PIPERACILLIN AND TAZOBACTAM 4.5 G: 4; .5 INJECTION, POWDER, LYOPHILIZED, FOR SOLUTION INTRAVENOUS; PARENTERAL at 12:03

## 2022-03-09 RX ADMIN — ACETAMINOPHEN 650 MG: 325 TABLET ORAL at 02:03

## 2022-03-09 RX ADMIN — ACETAMINOPHEN 650 MG: 325 TABLET ORAL at 12:03

## 2022-03-09 RX ADMIN — ALBUTEROL SULFATE 2.5 MG: 2.5 SOLUTION RESPIRATORY (INHALATION) at 01:03

## 2022-03-09 RX ADMIN — ALBUTEROL SULFATE 2.5 MG: 2.5 SOLUTION RESPIRATORY (INHALATION) at 07:03

## 2022-03-09 NOTE — PLAN OF CARE
AAO,VSS,meds admin per mar,aaron drain has serosang output,,abd dressing intact,denies n/v,pain managed by tylenol,poc reviewed,bed alarm set.

## 2022-03-09 NOTE — PHARMACY MED REC
"Admission Medication History     The home medication history was taken by Rosie Wharton PharmD.    Medication history obtained from patient    You may go to "Admission" then "Reconcile Home Medications" tabs to review and/or act upon these items.      The home medication list has been updated by the Pharmacy department.    Please read ALL comments highlighted in yellow.    Please address this information as you see fit.     Feel free to contact us if you have any questions or require assistance.      The medications listed below were removed from the home medication list.  Please reorder if appropriate:    o Patient reports he/she IS TAKING the following which was not ordered upon admit  o Aspirin 81 mg (held in [facility] due to surgery)  o tamsulosin 0.4mg  o Atorvastatin 40mg  o Finasteride 5mg  o hctz 12.5mg        Potential issues to be addressed PRIOR TO DISCHARGE   Possibly resuming aspirin 81mg post discharge      Rosie Wharton PharmD.  Ext [phone]                .          "

## 2022-03-09 NOTE — PLAN OF CARE
Patient on oxygen with documented flow.  Will attempt to wean per O2 order protocol.The proper method of use, as well as anticipated side effects, of this aerosol treatment are discussed and demonstrated to the patient. Will continue to monitor.

## 2022-03-09 NOTE — PLAN OF CARE
Problem: Adult Inpatient Plan of Care  Goal: Plan of Care Review  Outcome: Ongoing, Progressing   Pt AAOX4. Safety precautions maintained. Bed low and locked, call light within reach. Medications administered per MAR. Pt tolerating diet well. Ambulated in halls, tolerated well with slight SOB w/ exertion. Supplemental o2 applied and weaned as indicated. DVT prophylaxis continued. Pt able to pass small amount of flatus, diet advanced to full liquids per order. IV abx continued. Hourly rounding performed. Encouraged to call for OOB assistance. Awaiting pt disposition. Pt updated on plan of care and verbalizes understanding.

## 2022-03-09 NOTE — PLAN OF CARE
Sw met with pt and pt's daughters to discuss d/c planning. Sw completed readmission assessment with pt. Pt state he is feeling better today and happy that his daughters are here to visit him. Sw encouraged pt and pt's daughters to call with any questions or concerns. Sw will continue to follow pt for d/c planning.        03/09/22 1529   Readmission   Why were you hospitalized in the last 30 days? Pt stated he had stomach blockage.   Why were you readmitted? New medical problem   When you left the hospital how did you feel? Pt stated he felt great upon discharge   When you left the hospital where did you go? Home with Family   Did patient/caregiver refused recommended DC plan? No   Tell me about what happened between when you left the hospital and the day you returned. Pt stated he starting feeling bad, stomach pains.   When did you start not feeling well? 2 days ago   Did you try to manage your symptoms your self? No   Did you call anyone? No   Did you try to see or did see a doctor or nurse before you came? No   Did you have  a follow-up appointment on discharge? Yes   Did you go? Yes   Was this a planned readmission? No

## 2022-03-09 NOTE — PROGRESS NOTES
"Surgery follow up  /64   Pulse 72   Temp 97.4 °F (36.3 °C)   Resp 20   Ht 5' 10" (1.778 m)   Wt 78.4 kg (172 lb 13.5 oz)   SpO2 96%   BMI 24.80 kg/m²   I/O last 3 completed shifts:  In: 2853.3 [P.O.:1710; I.V.:616.9; IV Piggyback:526.4]  Out: 1965 [Urine:1900; Drains:65]  I/O this shift:  In: -   Out: 200 [Urine:200]      Recent Results (from the past 336 hour(s))   CBC auto differential    Collection Time: 03/08/22  8:43 AM   Result Value Ref Range    WBC 12.86 (H) 3.90 - 12.70 K/uL    Hemoglobin 12.2 (L) 14.0 - 18.0 g/dL    Hematocrit 36.5 (L) 40.0 - 54.0 %    Platelets 155 150 - 450 K/uL   CBC auto differential    Collection Time: 03/06/22 11:44 PM   Result Value Ref Range    WBC 19.04 (H) 3.90 - 12.70 K/uL    Hemoglobin 15.9 14.0 - 18.0 g/dL    Hematocrit 47.9 40.0 - 54.0 %    Platelets 211 150 - 450 K/uL   No results found for this or any previous visit (from the past 336 hour(s)).  Tolerating diet , passing gas  Plan : advance diet as tolerated .    "

## 2022-03-10 ENCOUNTER — PATIENT OUTREACH (OUTPATIENT)
Dept: ADMINISTRATIVE | Facility: OTHER | Age: 87
End: 2022-03-10
Payer: MEDICARE

## 2022-03-10 LAB
POCT GLUCOSE: 137 MG/DL (ref 70–110)
POCT GLUCOSE: 193 MG/DL (ref 70–110)
POCT GLUCOSE: 212 MG/DL (ref 70–110)
POCT GLUCOSE: 239 MG/DL (ref 70–110)

## 2022-03-10 PROCEDURE — 25000003 PHARM REV CODE 250: Performed by: SURGERY

## 2022-03-10 PROCEDURE — 94640 AIRWAY INHALATION TREATMENT: CPT

## 2022-03-10 PROCEDURE — 21400001 HC TELEMETRY ROOM

## 2022-03-10 PROCEDURE — 63600175 PHARM REV CODE 636 W HCPCS: Performed by: SURGERY

## 2022-03-10 PROCEDURE — 94799 UNLISTED PULMONARY SVC/PX: CPT

## 2022-03-10 PROCEDURE — 25000242 PHARM REV CODE 250 ALT 637 W/ HCPCS: Performed by: SURGERY

## 2022-03-10 PROCEDURE — 94761 N-INVAS EAR/PLS OXIMETRY MLT: CPT

## 2022-03-10 RX ORDER — HYDRALAZINE HYDROCHLORIDE 25 MG/1
25 TABLET, FILM COATED ORAL EVERY 8 HOURS
Status: DISCONTINUED | OUTPATIENT
Start: 2022-03-10 | End: 2022-03-13

## 2022-03-10 RX ADMIN — ACETAMINOPHEN 650 MG: 325 TABLET ORAL at 05:03

## 2022-03-10 RX ADMIN — PIPERACILLIN AND TAZOBACTAM 4.5 G: 4; .5 INJECTION, POWDER, LYOPHILIZED, FOR SOLUTION INTRAVENOUS; PARENTERAL at 05:03

## 2022-03-10 RX ADMIN — PIPERACILLIN AND TAZOBACTAM 4.5 G: 4; .5 INJECTION, POWDER, LYOPHILIZED, FOR SOLUTION INTRAVENOUS; PARENTERAL at 09:03

## 2022-03-10 RX ADMIN — INSULIN ASPART 1 UNITS: 100 INJECTION, SOLUTION INTRAVENOUS; SUBCUTANEOUS at 10:03

## 2022-03-10 RX ADMIN — ALBUTEROL SULFATE 2.5 MG: 2.5 SOLUTION RESPIRATORY (INHALATION) at 02:03

## 2022-03-10 RX ADMIN — HYDRALAZINE HYDROCHLORIDE 25 MG: 25 TABLET, FILM COATED ORAL at 09:03

## 2022-03-10 RX ADMIN — HYDRALAZINE HYDROCHLORIDE 25 MG: 25 TABLET, FILM COATED ORAL at 02:03

## 2022-03-10 RX ADMIN — PIPERACILLIN AND TAZOBACTAM 4.5 G: 4; .5 INJECTION, POWDER, LYOPHILIZED, FOR SOLUTION INTRAVENOUS; PARENTERAL at 01:03

## 2022-03-10 RX ADMIN — ALBUTEROL SULFATE 2.5 MG: 2.5 SOLUTION RESPIRATORY (INHALATION) at 08:03

## 2022-03-10 RX ADMIN — ALBUTEROL SULFATE 2.5 MG: 2.5 SOLUTION RESPIRATORY (INHALATION) at 07:03

## 2022-03-10 NOTE — PROGRESS NOTES
"Surgery follow up  BP (!) 173/77   Pulse 76   Temp 98.2 °F (36.8 °C) (Oral)   Resp 20   Ht 5' 10" (1.778 m)   Wt 79.2 kg (174 lb 9.7 oz)   SpO2 (!) 94%   BMI 25.05 kg/m²   I/O last 3 completed shifts:  In: 1434.2 [P.O.:890; IV Piggyback:544.2]  Out: 2120 [Urine:2050; Drains:70]  No intake/output data recorded.      Recent Results (from the past 336 hour(s))   CBC auto differential    Collection Time: 03/08/22  8:43 AM   Result Value Ref Range    WBC 12.86 (H) 3.90 - 12.70 K/uL    Hemoglobin 12.2 (L) 14.0 - 18.0 g/dL    Hematocrit 36.5 (L) 40.0 - 54.0 %    Platelets 155 150 - 450 K/uL   CBC auto differential    Collection Time: 03/06/22 11:44 PM   Result Value Ref Range    WBC 19.04 (H) 3.90 - 12.70 K/uL    Hemoglobin 15.9 14.0 - 18.0 g/dL    Hematocrit 47.9 40.0 - 54.0 %    Platelets 211 150 - 450 K/uL   doing better passing gas.  Drain removed  Out of bed   Ambulating consult pt/ot    "

## 2022-03-10 NOTE — NURSING
"Patient AAOx4. C/o pain to abdomen, PRN tylenol administered per orders. Bilateral coarse sounds to auscultation. Instructed to TCDB, patient states "I feel fine". During 7pm vitals, o2  sats 89%. O2 2L NC placed, o2 back up to 93%. Positive for flatus. Tolerating full liquids. MICHAEL drain in place, output monitored. Instructed to call for assistance. Safety maintained. Will continue to monitor.  "

## 2022-03-10 NOTE — PROGRESS NOTES
IP Liaison - Initial Visit Note    Patient: Husam Connolly  MRN:  [id number]  Date of Service:  3/10/2022  Completed by:  SONA Hardy    Reason for Visit   Patient presents with    IP Liaison Initial Visit       RSW contacted pt via room phone in order to complete SDOH questionnaire and liaison assessment.  Pt has identified no immediate social barriers to care. Per pt, pt is not in need of resources at this time. Pt son will be staying with patient after pt discharges from hospital and will be taking care of pt needs.    The following were addressed during this visit:  - Review SDOH Questions   - Complete patient assessment   - Complete initial visit with patient        Patient Summary     IP Liaison Patient Assessment    General  Level of Caregiver support: Member independent and does not need caregiver assistance  Have you had to make a decision between paying for any of the following in the last 2 months?: None  Transportation means: Family  Assessments  Was the PHQ Depression Screening completed this visit?: No  Was the MUMTAZ-7 Screening completed this visit?: No         SONA Hardy

## 2022-03-10 NOTE — PLAN OF CARE
Pt on RA with documented sats.The proper method of use, as well as anticipated side effects, of this aerosol treatment are discussed and demonstrated to the patient. Will continue to monitor.

## 2022-03-10 NOTE — PLAN OF CARE
Mala met with pt and pt's son Ney [phone] to discuss d/c planning. Ney stated he will be staying with pt for a few weeks upon pt discharging home from the hospital. Pt and Don requested that pt discharge home with home health services. Pt stated he had home health services in the past with [facility]. Pt stated he would like to continue with [facility]. Mala encouraged pt and Don to call with any questions or concerns.     Maal informed attending MD of pt wanting to discharge home with home health services via secure chat.     Mala will continue to follow pt for d/c planning.        03/10/22 1240   Post-Acute Status   Post-Acute Authorization Home Health   Home Health Status Referrals Sent   Coverage [employer] Managed Medicare   Hospital Resources/Appts/Education Provided Appointments scheduled by Navigator/Coordinator   Discharge Delays None known at this time   Discharge Plan   Discharge Plan A Home Health

## 2022-03-11 ENCOUNTER — PATIENT OUTREACH (OUTPATIENT)
Dept: ADMINISTRATIVE | Facility: OTHER | Age: 87
End: 2022-03-11
Payer: MEDICARE

## 2022-03-11 LAB
ANION GAP SERPL CALC-SCNC: 9 MMOL/L (ref 8–16)
BASOPHILS # BLD AUTO: 0.05 K/UL (ref 0–0.2)
BASOPHILS NFR BLD: 0.5 % (ref 0–1.9)
BUN SERPL-MCNC: 18 MG/DL (ref 8–23)
CALCIUM SERPL-MCNC: 9.2 MG/DL (ref 8.7–10.5)
CHLORIDE SERPL-SCNC: 103 MMOL/L (ref 95–110)
CO2 SERPL-SCNC: 27 MMOL/L (ref 23–29)
CREAT SERPL-MCNC: 1.2 MG/DL (ref 0.5–1.4)
DIFFERENTIAL METHOD: ABNORMAL
EOSINOPHIL # BLD AUTO: 0.4 K/UL (ref 0–0.5)
EOSINOPHIL NFR BLD: 4.1 % (ref 0–8)
ERYTHROCYTE [DISTWIDTH] IN BLOOD BY AUTOMATED COUNT: 13.3 % (ref 11.5–14.5)
EST. GFR  (AFRICAN AMERICAN): >60 ML/MIN/1.73 M^2
EST. GFR  (NON AFRICAN AMERICAN): 53 ML/MIN/1.73 M^2
GLUCOSE SERPL-MCNC: 249 MG/DL (ref 70–110)
HCT VFR BLD AUTO: 42.5 % (ref 40–54)
HGB BLD-MCNC: 13.6 G/DL (ref 14–18)
IMM GRANULOCYTES # BLD AUTO: 0.04 K/UL (ref 0–0.04)
IMM GRANULOCYTES NFR BLD AUTO: 0.4 % (ref 0–0.5)
LYMPHOCYTES # BLD AUTO: 1.4 K/UL (ref 1–4.8)
LYMPHOCYTES NFR BLD: 13.9 % (ref 18–48)
MCH RBC QN AUTO: 29.1 PG (ref 27–31)
MCHC RBC AUTO-ENTMCNC: 32 G/DL (ref 32–36)
MCV RBC AUTO: 91 FL (ref 82–98)
MONOCYTES # BLD AUTO: 1.1 K/UL (ref 0.3–1)
MONOCYTES NFR BLD: 11.2 % (ref 4–15)
NEUTROPHILS # BLD AUTO: 6.9 K/UL (ref 1.8–7.7)
NEUTROPHILS NFR BLD: 69.9 % (ref 38–73)
NRBC BLD-RTO: 0 /100 WBC
PLATELET # BLD AUTO: 253 K/UL (ref 150–450)
PMV BLD AUTO: 10.5 FL (ref 9.2–12.9)
POCT GLUCOSE: 171 MG/DL (ref 70–110)
POCT GLUCOSE: 206 MG/DL (ref 70–110)
POCT GLUCOSE: 212 MG/DL (ref 70–110)
POCT GLUCOSE: 233 MG/DL (ref 70–110)
POCT GLUCOSE: 289 MG/DL (ref 70–110)
POTASSIUM SERPL-SCNC: 5.2 MMOL/L (ref 3.5–5.1)
RBC # BLD AUTO: 4.68 M/UL (ref 4.6–6.2)
SODIUM SERPL-SCNC: 139 MMOL/L (ref 136–145)
WBC # BLD AUTO: 9.84 K/UL (ref 3.9–12.7)

## 2022-03-11 PROCEDURE — 25500020 PHARM REV CODE 255: Performed by: SURGERY

## 2022-03-11 PROCEDURE — 25000242 PHARM REV CODE 250 ALT 637 W/ HCPCS: Performed by: SURGERY

## 2022-03-11 PROCEDURE — 94640 AIRWAY INHALATION TREATMENT: CPT

## 2022-03-11 PROCEDURE — 94760 N-INVAS EAR/PLS OXIMETRY 1: CPT

## 2022-03-11 PROCEDURE — 36415 COLL VENOUS BLD VENIPUNCTURE: CPT | Performed by: SURGERY

## 2022-03-11 PROCEDURE — 97162 PT EVAL MOD COMPLEX 30 MIN: CPT | Performed by: PHYSICAL THERAPIST

## 2022-03-11 PROCEDURE — 85025 COMPLETE CBC W/AUTO DIFF WBC: CPT | Performed by: SURGERY

## 2022-03-11 PROCEDURE — 94761 N-INVAS EAR/PLS OXIMETRY MLT: CPT

## 2022-03-11 PROCEDURE — 97116 GAIT TRAINING THERAPY: CPT

## 2022-03-11 PROCEDURE — 63600175 PHARM REV CODE 636 W HCPCS: Performed by: SURGERY

## 2022-03-11 PROCEDURE — 25000003 PHARM REV CODE 250: Performed by: SURGERY

## 2022-03-11 PROCEDURE — 21400001 HC TELEMETRY ROOM

## 2022-03-11 PROCEDURE — 99900035 HC TECH TIME PER 15 MIN (STAT)

## 2022-03-11 PROCEDURE — 99223 1ST HOSP IP/OBS HIGH 75: CPT | Mod: GC,,, | Performed by: INTERNAL MEDICINE

## 2022-03-11 PROCEDURE — 94799 UNLISTED PULMONARY SVC/PX: CPT

## 2022-03-11 PROCEDURE — 80048 BASIC METABOLIC PNL TOTAL CA: CPT | Performed by: SURGERY

## 2022-03-11 PROCEDURE — 99223 PR INITIAL HOSPITAL CARE,LEVL III: ICD-10-PCS | Mod: GC,,, | Performed by: INTERNAL MEDICINE

## 2022-03-11 RX ORDER — TAMSULOSIN HYDROCHLORIDE 0.4 MG/1
0.4 CAPSULE ORAL DAILY
Status: DISCONTINUED | OUTPATIENT
Start: 2022-03-12 | End: 2022-03-13 | Stop reason: HOSPADM

## 2022-03-11 RX ORDER — ATORVASTATIN CALCIUM 40 MG/1
40 TABLET, FILM COATED ORAL DAILY
Status: DISCONTINUED | OUTPATIENT
Start: 2022-03-12 | End: 2022-03-13 | Stop reason: HOSPADM

## 2022-03-11 RX ORDER — FLUTICASONE FUROATE AND VILANTEROL 100; 25 UG/1; UG/1
1 POWDER RESPIRATORY (INHALATION) DAILY
Status: DISCONTINUED | OUTPATIENT
Start: 2022-03-11 | End: 2022-03-13 | Stop reason: HOSPADM

## 2022-03-11 RX ORDER — HYDROCHLOROTHIAZIDE 12.5 MG/1
12.5 TABLET ORAL DAILY
Status: DISCONTINUED | OUTPATIENT
Start: 2022-03-12 | End: 2022-03-13

## 2022-03-11 RX ORDER — FINASTERIDE 5 MG/1
5 TABLET, FILM COATED ORAL DAILY
Status: DISCONTINUED | OUTPATIENT
Start: 2022-03-12 | End: 2022-03-13 | Stop reason: HOSPADM

## 2022-03-11 RX ORDER — METFORMIN HYDROCHLORIDE 500 MG/1
500 TABLET ORAL 2 TIMES DAILY WITH MEALS
Status: DISCONTINUED | OUTPATIENT
Start: 2022-03-11 | End: 2022-03-12

## 2022-03-11 RX ADMIN — INSULIN ASPART 2 UNITS: 100 INJECTION, SOLUTION INTRAVENOUS; SUBCUTANEOUS at 06:03

## 2022-03-11 RX ADMIN — PIPERACILLIN AND TAZOBACTAM 4.5 G: 4; .5 INJECTION, POWDER, LYOPHILIZED, FOR SOLUTION INTRAVENOUS; PARENTERAL at 12:03

## 2022-03-11 RX ADMIN — METFORMIN HYDROCHLORIDE 500 MG: 500 TABLET ORAL at 05:03

## 2022-03-11 RX ADMIN — ALBUTEROL SULFATE 2.5 MG: 2.5 SOLUTION RESPIRATORY (INHALATION) at 02:03

## 2022-03-11 RX ADMIN — HYDRALAZINE HYDROCHLORIDE 25 MG: 25 TABLET, FILM COATED ORAL at 05:03

## 2022-03-11 RX ADMIN — ALBUTEROL SULFATE 2.5 MG: 2.5 SOLUTION RESPIRATORY (INHALATION) at 07:03

## 2022-03-11 RX ADMIN — ONDANSETRON 4 MG: 2 INJECTION INTRAMUSCULAR; INTRAVENOUS at 12:03

## 2022-03-11 RX ADMIN — HYDRALAZINE HYDROCHLORIDE 25 MG: 25 TABLET, FILM COATED ORAL at 12:03

## 2022-03-11 RX ADMIN — PIPERACILLIN AND TAZOBACTAM 4.5 G: 4; .5 INJECTION, POWDER, LYOPHILIZED, FOR SOLUTION INTRAVENOUS; PARENTERAL at 09:03

## 2022-03-11 RX ADMIN — PIPERACILLIN AND TAZOBACTAM 4.5 G: 4; .5 INJECTION, POWDER, LYOPHILIZED, FOR SOLUTION INTRAVENOUS; PARENTERAL at 04:03

## 2022-03-11 RX ADMIN — IOHEXOL 100 ML: 350 INJECTION, SOLUTION INTRAVENOUS at 05:03

## 2022-03-11 RX ADMIN — HYDRALAZINE HYDROCHLORIDE 25 MG: 25 TABLET, FILM COATED ORAL at 09:03

## 2022-03-11 RX ADMIN — INSULIN ASPART 3 UNITS: 100 INJECTION, SOLUTION INTRAVENOUS; SUBCUTANEOUS at 12:03

## 2022-03-11 NOTE — CONSULTS
[facility]/Critical Care Consult Note      Patient:Husam Connolly  Age:89 y.o.  MRN:[id number]  Admit date:3/6/2022  LOS:4 day(s)     Primary Team: Surgery  Primary Attending: Grayson Hairston Attending: Avinash Hairtson Fellow: Signater   Reason for Consult: Shortness of breath     HPI:       89M hx DM2, CKD3, hx small bowel obstruction, HTN, BPH presented 3/6 initially for shortness of breath, found to have cholecystitis and taken for lap jordyn 3/7. Pt's shortness of breath dates to about a month ago and he has required 1-2LNC mainly at night this admission for sats in the high 80s-low 90s. States he did not have any respiratory problems prior to about a month ago, at that time he went to urgent care and was given COVID test, CXR, and albuterol inhaler- inhaler previously helped with symptoms but nebulizers during this admission have not been effective. Previously he could walk 3 miles/day and take care of most IADLs on his own.     MEDICAL HISTORY:      Past Medical History:  Past Medical History:   Diagnosis Date    Anticoagulant long-term use     Bowel obstruction     BPH (benign prostatic hyperplasia)     Diabetes mellitus     Diverticulitis     Hyperlipidemia     Hypertension         Past Surgical History:  Past Surgical History:   Procedure Laterality Date    ANASTOMOSIS OF ILEUM TO RECTUM  9/9/2019    Procedure: ANASTOMOSIS, COLO-RECTAL;  Surgeon: Fouzia Gardner MD;  Location: [facility];  Service: General;;    CHOLECYSTECTOMY N/A 3/7/2022    Procedure: CHOLECYSTECTOMY;  Surgeon: Fouzia Gardner MD;  Location: [facility] OR;  Service: General;  Laterality: N/A;    COLOSTOMY  6/16/2019    Procedure: CREATION, COLOSTOMY;  Surgeon: Fouzia Gardner MD;  Location: [facility] OR;  Service: General;;    EYE SURGERY      GISELA PROCEDURE  6/16/2019    Procedure: GISELA PROCEDURE;  Surgeon: Fouzia Gardner MD;  Location: [facility] OR;  Service: General;;  Colon resection    LYSIS OF ADHESIONS N/A  3/7/2022    Procedure: LYSIS, ADHESIONS;  Surgeon: Fouzia Gardner MD;  Location: [facility] OR;  Service: General;  Laterality: N/A;    TONSILLECTOMY           Family History:   Family History   Problem Relation Age of Onset    Heart disease Father     Hypertension Father     Cancer Mother     Diabetes Paternal Aunt          Social History:  - Tobacco: 25pyh, quit in early 70s  - Alcohol: 2 drinks/week  - Drugs: none  - Home/Work: Lives at home in [place]. Daughters currently staying with him- his wife Aicha passed away in [facility] ICU this past week.     Allergies:  Review of patient's allergies indicates:   Allergen Reactions    Solarcaine [benzocaine-triclosan]     Sulfa (sulfonamide antibiotics)     Sulfamethoxazole     Trimethoprim     Hytrin [terazosin] Rash    Smz-tmp ds [sulfamethoxazole-trimethoprim] Rash       Home Medications:  Current Outpatient Medications   Medication Instructions    albuterol (ACCUNEB) 1.25 mg, Nebulization, Every 6 hours PRN, Rescue    albuterol (VENTOLIN HFA) 90 mcg/actuation inhaler 2 puffs, Inhalation, Every 6 hours PRN, Rescue    aspirin (ECOTRIN) 81 mg, Oral, Daily    atorvastatin (LIPITOR) 40 mg, Oral, Daily    finasteride (PROSCAR) 5 mg tablet TAKE 1 TABLET BY MOUTH EVERY DAY    fluticasone propionate (FLONASE) 100 mcg, Each Nostril, Daily    GARLIC ORAL 1 capsule, Oral, Daily    hydroCHLOROthiazide (HYDRODIURIL) 12.5 MG Tab No dose, route, or frequency recorded.    hydroCHLOROthiazide (MICROZIDE) 12.5 mg, Oral, 2 times daily    metFORMIN (GLUCOPHAGE) 500 MG tablet TAKE 1 TABLET BY MOUTH TWICE DAILY WITH FOOD    multivitamin capsule 1 capsule, Oral, Daily    omega-3 fatty acids/fish oil (FISH OIL-OMEGA-3 FATTY ACIDS) 300-1,000 mg capsule 1 capsule, Oral, Daily    tamsulosin (FLOMAX) 0.4 mg, Oral, Nightly    vitamin D (VITAMIN D3) 1,000 Units, Oral, Daily        OBJECTIVE DATA:      Vital Signs:  Vitals:    03/11/22 1414   BP:    Pulse: 71   Resp: 18   Temp:         Intake/Output:    Intake/Output Summary (Last 24 hours) at 3/11/2022 1526  Last data filed at 3/10/2022 1800  Gross per 24 hour   Intake 120 ml   Output --   Net 120 ml        Physical Exam:  Constitutional: NAD, resting comfortably  HEENT: NCAT, PERRL, MMM  Neck: Supple, no masses, trachea not deviated.  Resp: Mild wheezes bilaterally, normal work of breathing on room air today  Cardio: RRR, no murmurs heard  GI: Soft, non-distended  Extremities: No edema, peripheral pulses 2+ and symmetric, extremities warm.  Skin: No rashes, no wrinkles to feet/legs  Neurologic: oriented to situation, no focal deficits     Laboratory/Imaging:  Recent Labs   Lab 03/06/22  2344 03/08/22  0843 03/11/22  1257   WBC 19.04* 12.86* 9.84   HGB 15.9 12.2* 13.6*   HCT 47.9 36.5* 42.5    155 253    132* 139   K 4.2 4.3 5.2*   CL 95 95 103   CREATININE 1.5* 1.6* 1.2   BUN 25* 29* 18   CO2 25 23 27   ALT 14 32  --    AST 19 55*  --          Medications:   albuterol sulfate  2.5 mg Nebulization Q6H WAKE    fluticasone furoate-vilanteroL  1 puff Inhalation Daily    hydrALAZINE  25 mg Oral Q8H    piperacillin-tazobactam (ZOSYN) IVPB  4.5 g Intravenous Q8H             acetaminophen, glucagon (human recombinant), glucose, glucose, HYDROcodone-acetaminophen, insulin aspart U-100, morphine, ondansetron, sodium chloride 0.9%     Assessment/Plan:     89M hx DM2, CKD3, hx small bowel obstruction, HTN, BPH presented 3/6 initially for shortness of breath, found to have cholecystitis and taken for lap jordyn 3/7. Pt's shortness of breath dates to about a month ago and he has required 1-2LNC mainly at night this admission for sats in the high 80s-low 90s. Unclear etiology of desaturation- pt has known PHN, grade I diastolic dysfunction, 25pyhx smoking but quit in 1972. His CXR is normal this admission and he has no evidence of edema on exam. Will need to rule out small PE causing symptoms and evaluate if mild cardiac abnormalities  have worsened since last echo.    -will start breo inhaler for controller therapy, continue duo-nebs.  -echo ordered  -Unremarkable CXR, CT PE ordered    [facility] will continue to follow.     Quang Ken MD  [facility] PGY-2  [facility]    Pulmonary/Critical Care Attending with Team    Patient seen and examined on rounds with team after review of PMH, reason for admission, hospital course, labs and xrays.  I have reviewed, we discussed, and I have verified Dr. Ken's findings, assessment and recommendations.  The patient is here for SOB that is fairly newly recognized.  He has SOB and smoked remotely but has quit for many years.  On exam his chest is clear though with low breath sounds and heart is RRR.  His SOB may be from deconditioning, weakness, HEfPF, mild asthma, or possibly DVT. He is to have a CTA chest (non-coronary) to see if there is a DVT.   He will be started on Breo 200 and duonebs to see if there is any improvement.      Marcelina Da Silva MD  3/11/2022

## 2022-03-11 NOTE — PLAN OF CARE
Mala met with pt and pt's family to discuss d/c planning. Mala discussed with pt and pt's family about pt discharging home with home health services. Pt and pt's family are open for pt to discharge home with home health services. Pt stated he had [facility] in the past and requested to continue to work with [facility]. Sw sent referral to [facility]. Sw encouraged pt and pt's family to call with any questions or concerns. Mala will continue to follow pt for d/c planning.        03/11/22 1432   Post-Acute Status   Post-Acute Authorization Home Health   Home Health Status Referrals Sent   Coverage [employer] Medicare   Hospital Resources/Appts/Education Provided Appointments scheduled by Navigator/Coordinator   Discharge Delays None known at this time   Discharge Plan   Discharge Plan A Home Health

## 2022-03-11 NOTE — PROGRESS NOTES
CONRADOW met with patient to discuss discharge and additional patient barriers to care. Pt identified no additional barriers to care at this time. Per pt, pt is not in need of resources at this time.    The following were addressed during this visit:  -Complete follow-up with patient    SONA Hardy

## 2022-03-11 NOTE — PLAN OF CARE
Problem: Physical Therapy Goal  Goal: Physical Therapy Goal  Description: Goals to be met by: 22     Patient will increase functional independence with mobility by performin.  Supine to/from sit with Mod Ind  2. Ambulate 120' with or without AD with supervision with or without O2.  3. Up in chair 2 hours  4.  Bed to/from chair with supervision    Outcome: Ongoing, Progressing

## 2022-03-11 NOTE — PROGRESS NOTES
"Surgery follow up  BP (!) 187/74   Pulse 71   Temp 98.2 °F (36.8 °C)   Resp 18   Ht 5' 10" (1.778 m)   Wt 79.8 kg (175 lb 14.8 oz)   SpO2 99%   BMI 25.24 kg/m²   I/O last 3 completed shifts:  In: 959.8 [P.O.:860; IV Piggyback:99.8]  Out: 565 [Urine:550; Drains:15]  No intake/output data recorded.  Moved bowel, tolerated diet check labs.  "

## 2022-03-11 NOTE — PT/OT/SLP EVAL
Physical Therapy Evaluation    Patient Name:  Husam Connolly   MRN:  [id number]    Recommendations:     Discharge Recommendations:  home health PT   Discharge Equipment Recommendations: none   Barriers to discharge: decreased functional mobility tolerance    Assessment:     Husam Connolly is a 89 y.o. male admitted with a medical diagnosis of Calculus of gallbladder with acute on chronic cholecystitis without obstruction.  He presents with the following impairments/functional limitations:  weakness, impaired endurance, impaired self care skills, impaired functional mobilty, decreased lower extremity function, gait instability, decreased safety awareness, impaired balance, impaired cardiopulmonary response to activity, pain. Pt supine to sit with Min assist.  Pt ambulated 15' x 1 and 35' x 1 with HHA/Min assist with patient reaching for objects to hold onto.  Pt sat on commode for over 10 minutes.      Rehab Prognosis: Good; patient would benefit from acute skilled PT services to address these deficits and reach maximum level of function.    Recent Surgery: Procedure(s) (LRB):  CHOLECYSTECTOMY (N/A)  LAPAROTOMY, EXPLORATORY (N/A)  LYSIS, ADHESIONS (N/A)  REPAIR, HERNIA, VENTRAL (N/A) 4 Days Post-Op    Plan:     During this hospitalization, patient to be seen 6 x/week to address the identified rehab impairments via gait training, therapeutic activities, therapeutic exercises, neuromuscular re-education and progress toward the following goals:    · Plan of Care Expires:  22    Subjective     Chief Complaint: I think I should go to the bathroom.  Patient/Family Comments/goals: get well  Pain/Comfort:  · Pain Rating 1:  (reports not pain at rest, slight pain upon sitting up, not rated)    Patients cultural, spiritual, Zoroastrianism conflicts given the current situation: no    Living Environment:  Pt lives alone in a 2SH with 1st floor bedroom and bathroom, wife  in the last week.  Pt has 1 step up into the house.   Pt has a WIS with GB and BIS.  Prior to admission, patients level of function was Ind ambulation without AD.  Equipment used at home: none.  DME owned (not currently used): rolling walker, wheelchair and QC, Rollator.  Upon discharge, patient will have assistance from family/son to stay with him.    Objective:     Communicated with nurse prior to session.  Patient found HOB elevated with bed alarm, blood pressure cuff, telemetry, peripheral IV  upon PT entry to room.    General Precautions: Standard, fall   Orthopedic Precautions:    Braces:    Respiratory Status: Room air initially, O2 Sat 85 post ambulation so returned to 2 Lit O2.    Exams:  · Pt is oriented x 4. Pt has BLE AROM WFL.  Pt has 4 to 5/5 BLE strength    Functional Mobility:  Pt supine to sit with Min assist.  Pt ambulated 15' x 1 and 35' x 1 with HHA/Min assist with patient reaching for objects to hold onto.  Pt sat on commode for over 10 minutes.      Therapeutic Activities and Exercises:   Pt sat on commode for 10 minutes    AM-PAC 6 CLICK MOBILITY  Total Score:18     Patient left up in chair with all lines intact, call button in reach and nurse notified.    GOALS:   Multidisciplinary Problems     Physical Therapy Goals        Problem: Physical Therapy Goal    Goal Priority Disciplines Outcome Goal Variances Interventions   Physical Therapy Goal     PT, PT/OT Ongoing, Progressing     Description: Goals to be met by: 22     Patient will increase functional independence with mobility by performin.  Supine to/from sit with Mod Ind  2. Ambulate 120' with or without AD with supervision with or without O2.  3. Up in chair 2 hours  4.  Bed to/from chair with supervision                     History:     Past Medical History:   Diagnosis Date    Anticoagulant long-term use     Bowel obstruction     BPH (benign prostatic hyperplasia)     Diabetes mellitus     Diverticulitis     Hyperlipidemia     Hypertension        Past Surgical History:    Procedure Laterality Date    ANASTOMOSIS OF ILEUM TO RECTUM  9/9/2019    Procedure: ANASTOMOSIS, COLO-RECTAL;  Surgeon: Fouzia Gardner MD;  Location: [facility];  Service: General;;    CHOLECYSTECTOMY N/A 3/7/2022    Procedure: CHOLECYSTECTOMY;  Surgeon: Fouzia Gardner MD;  Location: [facility] OR;  Service: General;  Laterality: N/A;    COLOSTOMY  6/16/2019    Procedure: CREATION, COLOSTOMY;  Surgeon: Fouzia Gardner MD;  Location: [facility] OR;  Service: General;;    EYE SURGERY      GISELA PROCEDURE  6/16/2019    Procedure: GISELA PROCEDURE;  Surgeon: Fouzia Gardner MD;  Location: [facility] OR;  Service: General;;  Colon resection    LYSIS OF ADHESIONS N/A 3/7/2022    Procedure: LYSIS, ADHESIONS;  Surgeon: Fouzia Gardner MD;  Location: [facility];  Service: General;  Laterality: N/A;    TONSILLECTOMY         Time Tracking:     PT Received On: 03/11/22  PT Start Time: 0947     PT Stop Time: 1021  PT Total Time (min): 34 min     Billable Minutes: Evaluation 16 and Gait Training 16      03/11/2022

## 2022-03-11 NOTE — PLAN OF CARE
Patient on RA with sats as documented.  Patient given aerosol treatment with no adverse reactions noted. Patient instructed on proper use.

## 2022-03-12 PROBLEM — J43.2 CENTRILOBULAR EMPHYSEMA: Status: ACTIVE | Noted: 2022-03-12

## 2022-03-12 LAB
BACTERIA BLD CULT: NORMAL
BACTERIA BLD CULT: NORMAL
POCT GLUCOSE: 166 MG/DL (ref 70–110)
POCT GLUCOSE: 218 MG/DL (ref 70–110)
POCT GLUCOSE: 224 MG/DL (ref 70–110)
POCT GLUCOSE: 233 MG/DL (ref 70–110)

## 2022-03-12 PROCEDURE — 63600175 PHARM REV CODE 636 W HCPCS: Performed by: SURGERY

## 2022-03-12 PROCEDURE — 25000242 PHARM REV CODE 250 ALT 637 W/ HCPCS: Performed by: SURGERY

## 2022-03-12 PROCEDURE — 21400001 HC TELEMETRY ROOM

## 2022-03-12 PROCEDURE — 25000003 PHARM REV CODE 250: Performed by: STUDENT IN AN ORGANIZED HEALTH CARE EDUCATION/TRAINING PROGRAM

## 2022-03-12 PROCEDURE — 25000003 PHARM REV CODE 250: Performed by: SURGERY

## 2022-03-12 PROCEDURE — 94799 UNLISTED PULMONARY SVC/PX: CPT

## 2022-03-12 PROCEDURE — 94640 AIRWAY INHALATION TREATMENT: CPT

## 2022-03-12 PROCEDURE — 27000221 HC OXYGEN, UP TO 24 HOURS

## 2022-03-12 PROCEDURE — 25000242 PHARM REV CODE 250 ALT 637 W/ HCPCS: Performed by: STUDENT IN AN ORGANIZED HEALTH CARE EDUCATION/TRAINING PROGRAM

## 2022-03-12 PROCEDURE — 99232 SBSQ HOSP IP/OBS MODERATE 35: CPT | Mod: GC,,, | Performed by: INTERNAL MEDICINE

## 2022-03-12 PROCEDURE — 99232 PR SUBSEQUENT HOSPITAL CARE,LEVL II: ICD-10-PCS | Mod: GC,,, | Performed by: INTERNAL MEDICINE

## 2022-03-12 PROCEDURE — 99900035 HC TECH TIME PER 15 MIN (STAT)

## 2022-03-12 PROCEDURE — 94761 N-INVAS EAR/PLS OXIMETRY MLT: CPT

## 2022-03-12 PROCEDURE — 63600175 PHARM REV CODE 636 W HCPCS: Performed by: STUDENT IN AN ORGANIZED HEALTH CARE EDUCATION/TRAINING PROGRAM

## 2022-03-12 RX ORDER — DOCUSATE SODIUM 50 MG/5ML
100 LIQUID ORAL 2 TIMES DAILY
Status: DISCONTINUED | OUTPATIENT
Start: 2022-03-12 | End: 2022-03-13 | Stop reason: HOSPADM

## 2022-03-12 RX ORDER — ENOXAPARIN SODIUM 100 MG/ML
40 INJECTION SUBCUTANEOUS EVERY 24 HOURS
Status: DISCONTINUED | OUTPATIENT
Start: 2022-03-12 | End: 2022-03-13 | Stop reason: HOSPADM

## 2022-03-12 RX ORDER — ADHESIVE BANDAGE
30 BANDAGE TOPICAL DAILY
Status: DISCONTINUED | OUTPATIENT
Start: 2022-03-12 | End: 2022-03-13 | Stop reason: HOSPADM

## 2022-03-12 RX ADMIN — HYDRALAZINE HYDROCHLORIDE 25 MG: 25 TABLET, FILM COATED ORAL at 06:03

## 2022-03-12 RX ADMIN — INSULIN ASPART 2 UNITS: 100 INJECTION, SOLUTION INTRAVENOUS; SUBCUTANEOUS at 05:03

## 2022-03-12 RX ADMIN — HYDROCHLOROTHIAZIDE 12.5 MG: 12.5 TABLET ORAL at 08:03

## 2022-03-12 RX ADMIN — FINASTERIDE 5 MG: 5 TABLET, FILM COATED ORAL at 08:03

## 2022-03-12 RX ADMIN — HYDRALAZINE HYDROCHLORIDE 25 MG: 25 TABLET, FILM COATED ORAL at 09:03

## 2022-03-12 RX ADMIN — DOCUSATE SODIUM 100 MG: 50 LIQUID ORAL at 12:03

## 2022-03-12 RX ADMIN — INSULIN ASPART 1 UNITS: 100 INJECTION, SOLUTION INTRAVENOUS; SUBCUTANEOUS at 10:03

## 2022-03-12 RX ADMIN — ALBUTEROL SULFATE 2.5 MG: 2.5 SOLUTION RESPIRATORY (INHALATION) at 08:03

## 2022-03-12 RX ADMIN — MAGNESIUM HYDROXIDE 2400 MG: 400 SUSPENSION ORAL at 12:03

## 2022-03-12 RX ADMIN — TAMSULOSIN HYDROCHLORIDE 0.4 MG: 0.4 CAPSULE ORAL at 08:03

## 2022-03-12 RX ADMIN — ALBUTEROL SULFATE 2.5 MG: 2.5 SOLUTION RESPIRATORY (INHALATION) at 01:03

## 2022-03-12 RX ADMIN — ENOXAPARIN SODIUM 40 MG: 100 INJECTION SUBCUTANEOUS at 05:03

## 2022-03-12 RX ADMIN — HYDRALAZINE HYDROCHLORIDE 25 MG: 25 TABLET, FILM COATED ORAL at 02:03

## 2022-03-12 RX ADMIN — PIPERACILLIN AND TAZOBACTAM 4.5 G: 4; .5 INJECTION, POWDER, LYOPHILIZED, FOR SOLUTION INTRAVENOUS; PARENTERAL at 02:03

## 2022-03-12 RX ADMIN — FLUTICASONE FUROATE AND VILANTEROL TRIFENATATE 1 PUFF: 100; 25 POWDER RESPIRATORY (INHALATION) at 08:03

## 2022-03-12 RX ADMIN — ATORVASTATIN CALCIUM 40 MG: 40 TABLET, FILM COATED ORAL at 08:03

## 2022-03-12 RX ADMIN — PIPERACILLIN AND TAZOBACTAM 4.5 G: 4; .5 INJECTION, POWDER, LYOPHILIZED, FOR SOLUTION INTRAVENOUS; PARENTERAL at 06:03

## 2022-03-12 RX ADMIN — ALBUTEROL SULFATE 2.5 MG: 2.5 SOLUTION RESPIRATORY (INHALATION) at 07:03

## 2022-03-12 RX ADMIN — INSULIN ASPART 2 UNITS: 100 INJECTION, SOLUTION INTRAVENOUS; SUBCUTANEOUS at 12:03

## 2022-03-12 NOTE — PLAN OF CARE
Pt is AAOx3 with no complaints of pain. Pt ambulated in room to bathroom with no issues. No complaints of SOB this shift. Pt had 2 loose BMs this shift. Pt with drsg over incision changed this shift. Pt tolerating diet well.

## 2022-03-12 NOTE — PROGRESS NOTES
"[facility] Pulmonary/Critical Care Fellow Progress Note:    Interval Hx: 89M hx DM2, CKD3, SBO, HTN, BPH presented 3 initially for shortness of breath, found to have cholecystitis s/p lap jordyn on 3/7. Pt's shortness of breath dates to about a month ago and he has required 1-2LNC mainly at night this admission for sats in the high 80s-low 90s. States he did not have any respiratory problems prior to about a month ago, at that time he went to urgent care and was given COVID test, CXR, and albuterol inhaler- inhaler previously helped with symptoms but nebulizers during this admission have not been effective. Previously he could walk 3 miles/day and take care of most IADLs on his own.    Subjective:  Patient reports SOB is much improved from prior and that he is doing well overall. He does have exertional dyspnea still. He reports no cough, no chest pain. He has very mild RUQ pain with deep breathing and movement.     Objective:  Last 24 Hour Vital Signs:  BP  Min: 151/67  Max: 187/74  Temp  Av.9 °F (36.6 °C)  Min: 97.4 °F (36.3 °C)  Max: 98.3 °F (36.8 °C)  Pulse  Av.6  Min: 67  Max: 81  Resp  Av  Min: 15  Max: 21  SpO2  Av.9 %  Min: 92 %  Max: 100 %  Height  Av' 10" (177.8 cm)  Min: 5' 10" (177.8 cm)  Max: 5' 10" (177.8 cm)  Weight  Av.7 kg (175 lb 12.7 oz)  Min: 79.4 kg (175 lb)  Max: 80.1 kg (176 lb 9.4 oz)  Body mass index is 25.34 kg/m².  No intake/output data recorded.        Physical Exam:  General: Alert and awake in NAD  HENT: NCAT; anicteric sclera; OP clear with MMM  Cardio: Regular rate and rhythm with normal S1 and S2; no murmurs or rubs  Resp:  CTAB; respirations unlabored; no wheezes, crackles or rhonchi  Abdom: Soft, NTND    Extrem: WWP with no clubbing, cyanosis or edema  Pulses: 2+ and symmetric distally  Neuro: AAOx3; cooperative and pleasant with no focal deficits      Assessment & Plan:     89M hx DM2, CKD3, hx small bowel obstruction, HTN, BPH admitted for lap jordyn " on 3/7 with persistent SOB and O2 requirements at night, now improved. He has an unknown etiology of desaturation but has a history of pulm HTN seen on priro echo, G1DD and remote hx of smoking about 25pk-yrs (quit 1972). His CXR and CTPE study were unremarkable for an acute cause of his SOB.       -will start breo inhaler for controller therapy, continue duo-nebs.  -echo ordered - pending results  -Unremarkable CXR, CT PE for acute SOB  - multiple pulmonary nodules with largest 5-6mm on CT - would be conservative in the approach to this, but can follow up in clinic for repeat CT scan in a year if he desires with shared decision-making  - scheduled at [facility] [address] for a pulmonary follow up appointment and PFTs because we can get him into clinic faster, he can then come back to [place] for follow up as this is much closer to his house      Chelsi Benítez MD  Pulm/CC Fellow    Pulmonary/Critical Care Attending with Team    Patient seen and examined on rounds with team after ON hospital course, labs and xrays.  I have reviewed, we discussed, and I have verified Dr. Benítez's findings, assessment and recommendations.  The patient remains in good spirits with his son and daughter-in-law  in the room.  He is sitting up in the chair and is feeling OK.  He was pleased that he will get an appointment to see Dr. Benítez at [facility] in the next week or so. Continue care.    Marcelina Da Silva MD  3/12/2022  11:54 AM

## 2022-03-12 NOTE — H&P
[facility] Hospital Medicine Consult Note     Admitting Team: [facility] Hospitalist Team A  Attending Physician: Negrito Brand MD  Resident: Ang  Intern: Ross     Date of Admit: 3/6/2022    Chief Complaint     Shortness of breath x 4 days    Subjective:      History of Present Illness:  Husam Connolly is a 89 year old male patient with PMH of small bowel obstruction, BPH, T2DM, diverticulosis s/p colostomy and anastomosis who presented to the [facility] Internal Medicine team for shortness of breath x 1 month that has worsened over the past few days. 4 days prior he received a cholecystectomy due to a gangrenous gallbladder. He reports that the shortness of breath has occurred in the past after his prior colostomy procedure. He noted that in June 2021, he noticed his exercise tolerance worsened (having to pause to take breaths while going up his staircase). He was supposed to see Dr. Da Silva and get PFTs but it was not completed at that time. He currently states that he feels OK and is able to speak in full sentences without problems, but he has required 1-2L NC overnight during the admission for saturations in the high 80s and low 90s.  He said his legs only swell when he has a lot of salt intake, like when eating crawfish. He denied any cough, chest pain, fever, nausea, vomiting, diarrhea. Of note, the family is concerned due to the recent death of his wife.       Past Medical History:  Past Medical History:   Diagnosis Date    Anticoagulant long-term use     Bowel obstruction     BPH (benign prostatic hyperplasia)     Diabetes mellitus     Diverticulitis     Hyperlipidemia     Hypertension        Past Surgical History:  Past Surgical History:   Procedure Laterality Date    ANASTOMOSIS OF ILEUM TO RECTUM  9/9/2019    Procedure: ANASTOMOSIS, COLO-RECTAL;  Surgeon: Fouzia Gardner MD;  Location: [facility];  Service: General;;    CHOLECYSTECTOMY N/A 3/7/2022    Procedure: CHOLECYSTECTOMY;  Surgeon: Fouzia Gardner MD;   Location: [facility] OR;  Service: General;  Laterality: N/A;    COLOSTOMY  6/16/2019    Procedure: CREATION, COLOSTOMY;  Surgeon: Fouzia Gardner MD;  Location: [facility] OR;  Service: General;;    EYE SURGERY      GISELA PROCEDURE  6/16/2019    Procedure: GISELA PROCEDURE;  Surgeon: Fouzia Gardner MD;  Location: [facility] OR;  Service: General;;  Colon resection    LYSIS OF ADHESIONS N/A 3/7/2022    Procedure: LYSIS, ADHESIONS;  Surgeon: Fouzia Gardner MD;  Location: [facility] OR;  Service: General;  Laterality: N/A;    TONSILLECTOMY         Allergies:  Review of patient's allergies indicates:   Allergen Reactions    Solarcaine [benzocaine-triclosan]     Sulfa (sulfonamide antibiotics)     Sulfamethoxazole     Trimethoprim     Hytrin [terazosin] Rash    Smz-tmp ds [sulfamethoxazole-trimethoprim] Rash       Home Medications:  Prior to Admission medications    Medication Sig Start Date End Date Taking? Authorizing Provider   albuterol (ACCUNEB) 1.25 mg/3 mL Nebu Take 3 mLs (1.25 mg total) by nebulization every 6 (six) hours as needed. Rescue 2/15/22 2/15/23 Yes Linda Mosley MD   albuterol (VENTOLIN HFA) 90 mcg/actuation inhaler Inhale 2 puffs into the lungs every 6 (six) hours as needed for Wheezing. Rescue 2/15/22  Yes Linda Mosley MD   aspirin (ECOTRIN) 81 MG EC tablet Take 81 mg by mouth once daily.   Yes Historical Provider   atorvastatin (LIPITOR) 40 MG tablet Take 1 tablet (40 mg total) by mouth once daily. 4/12/21  Yes Daren Lassiter MD   finasteride (PROSCAR) 5 mg tablet TAKE 1 TABLET BY MOUTH EVERY DAY  Patient taking differently: Take 5 mg by mouth every evening. 10/14/19  Yes Daren Lassiter MD   fluticasone propionate (FLONASE) 50 mcg/actuation nasal spray 2 sprays (100 mcg total) by Each Nostril route once daily. 3/1/21  Yes Alverto Green MD   GARLIC ORAL Take 1 capsule by mouth once daily.   Yes Historical Provider   hydroCHLOROthiazide (MICROZIDE) 12.5 mg capsule  Take 1 capsule (12.5 mg total) by mouth 2 (two) times daily. 21 Yes Daren Lassiter MD   metFORMIN (GLUCOPHAGE) 500 MG tablet TAKE 1 TABLET BY MOUTH TWICE DAILY WITH FOOD  Patient taking differently: Take 500 mg by mouth 2 (two) times daily with meals. 10/17/21  Yes Alverto Green MD   multivitamin capsule Take 1 capsule by mouth once daily.   Yes Historical Provider   omega-3 fatty acids/fish oil (FISH OIL-OMEGA-3 FATTY ACIDS) 300-1,000 mg capsule Take 1 capsule by mouth once daily.   Yes Historical Provider   tamsulosin (FLOMAX) 0.4 mg Cap Take 0.4 mg by mouth nightly. 19  Yes Historical Provider   vitamin D (VITAMIN D3) 1000 units Tab Take 1,000 Units by mouth once daily.   Yes Historical Provider   hydroCHLOROthiazide (HYDRODIURIL) 12.5 MG Tab  22   Historical Provider       Family History:  Family History   Problem Relation Age of Onset    Heart disease Father     Hypertension Father     Cancer Mother     Diabetes Paternal Aunt        Social History:  Social History     Tobacco Use    Smoking status: Former Smoker     Packs/day: 1.00     Years: 25.00     Pack years: 25.00     Types: Cigarettes     Start date:      Quit date:      Years since quittin.2    Smokeless tobacco: Never Used   Substance Use Topics    Alcohol use: Yes     Alcohol/week: 2.0 standard drinks     Types: 2 Glasses of wine per week     Comment: occassionally    Drug use: No       Review of Systems:  Review of Systems   Constitutional: Negative for chills and fever.   HENT: Negative for congestion and sore throat.    Eyes: Negative for blurred vision and double vision.   Respiratory: Positive for shortness of breath. Negative for cough, hemoptysis and sputum production.    Cardiovascular: Negative for chest pain.   Gastrointestinal: Negative for nausea and vomiting.   Genitourinary: Negative for flank pain and hematuria.   Musculoskeletal: Negative for back pain and falls.   Skin: Negative  "for itching and rash.   Neurological: Negative for loss of consciousness and weakness.   Psychiatric/Behavioral: Negative for substance abuse and suicidal ideas.     All other systems are reviewed and are negative.    Health Maintaince :   Primary Care Physician: Peter CARMONA    Immunizations:   TDap Unknown  Flu UTD  Pna Unknown  COVID UTD    Cancer Screening:  Colonoscopy: age     Objective:   Last 24 Hour Vital Signs:  BP  Min: 158/68  Max: 195/79  Temp  Av.3 °F (36.8 °C)  Min: 97.9 °F (36.6 °C)  Max: 98.9 °F (37.2 °C)  Pulse  Av.1  Min: 70  Max: 86  Resp  Av.1  Min: 16  Max: 19  SpO2  Av.8 %  Min: 90 %  Max: 99 %  Height  Av' 10" (177.8 cm)  Min: 5' 10" (177.8 cm)  Max: 5' 10" (177.8 cm)  Weight  Av.6 kg (175 lb 7.4 oz)  Min: 79.4 kg (175 lb)  Max: 79.8 kg (175 lb 14.8 oz)  Body mass index is 25.11 kg/m².  I/O last 3 completed shifts:  In: 959.8 [P.O.:860; IV Piggyback:99.8]  Out: 565 [Urine:550; Drains:15]    Physical Examination:  Physical Exam  Constitutional:       General: He is not in acute distress.     Appearance: He is not ill-appearing or diaphoretic.   HENT:      Head: Normocephalic and atraumatic.      Nose: No congestion.      Mouth/Throat:      Mouth: Mucous membranes are dry.   Eyes:      General: No scleral icterus.     Extraocular Movements: Extraocular movements intact.      Pupils: Pupils are equal, round, and reactive to light.   Cardiovascular:      Rate and Rhythm: Normal rate.      Pulses: Normal pulses.      Heart sounds: No murmur heard.    No gallop.   Pulmonary:      Effort: Pulmonary effort is normal.      Comments: Light crackles and slight expiratory wheezes  Abdominal:      General: Abdomen is flat. There is no distension.   Musculoskeletal:         General: Normal range of motion.      Cervical back: No rigidity.      Right lower leg: No edema.      Left lower leg: No edema.   Skin:     Coloration: Skin is not jaundiced.      Findings: No rash. "   Neurological:      General: No focal deficit present.      Mental Status: He is alert and oriented to person, place, and time.      Cranial Nerves: No cranial nerve deficit.      Sensory: No sensory deficit.   Psychiatric:         Mood and Affect: Mood normal.         Behavior: Behavior normal.           Laboratory:  Most Recent Data:  CBC:   Lab Results   Component Value Date    WBC 9.84 03/11/2022    HGB 13.6 (L) 03/11/2022    HCT 42.5 03/11/2022     03/11/2022    MCV 91 03/11/2022    RDW 13.3 03/11/2022       BMP:   Lab Results   Component Value Date     03/11/2022    K 5.2 (H) 03/11/2022     03/11/2022    CO2 27 03/11/2022    BUN 18 03/11/2022    CREATININE 1.2 03/11/2022     (H) 03/11/2022    CALCIUM 9.2 03/11/2022    MG 1.9 06/03/2021    PHOS 3.0 10/28/2011     LFTs:   Lab Results   Component Value Date    PROT 5.4 (L) 03/08/2022    ALBUMIN 2.8 (L) 03/08/2022    BILITOT 1.9 (H) 03/08/2022    AST 55 (H) 03/08/2022    ALKPHOS 69 03/08/2022    ALT 32 03/08/2022     Coags:   Lab Results   Component Value Date    INR 1.1 09/10/2019     FLP:   Lab Results   Component Value Date    CHOL 164 08/25/2021    HDL 58 08/25/2021    LDLCALC 75 08/25/2021    TRIG 217 (H) 08/25/2021    CHOLHDL 2.8 08/25/2021     DM:   Lab Results   Component Value Date    HGBA1C 7.6 (H) 02/12/2022    HGBA1C 7.8 (H) 08/25/2021    HGBA1C 6.8 (H) 06/01/2021    MICROALBUR 0.7 08/25/2021    LDLCALC 75 08/25/2021    CREATININE 1.2 03/11/2022     Thyroid:   Lab Results   Component Value Date    TSH 1.05 08/25/2021     Anemia: No results found for: IRON, TIBC, FERRITIN, [id number], FOLATE  Cardiac:   Lab Results   Component Value Date    TROPONINI 0.020 03/06/2022    BNP 47 03/06/2022     Urinalysis:   Lab Results   Component Value Date    COLORU Yellow 03/07/2022    SPECGRAV 1.020 03/07/2022    NITRITE Negative 03/07/2022    KETONESU Negative 03/07/2022    UROBILINOGEN Negative 03/07/2022    WBCUA 2 05/31/2021        Trended Lab Data:  Recent Labs   Lab 03/06/22  2344 03/08/22  0843 03/11/22  1257   WBC 19.04* 12.86* 9.84   HGB 15.9 12.2* 13.6*   HCT 47.9 36.5* 42.5    155 253   MCV 88 88 91   RDW 13.2 13.3 13.3    132* 139   K 4.2 4.3 5.2*   CL 95 95 103   CO2 25 23 27   BUN 25* 29* 18   CREATININE 1.5* 1.6* 1.2   * 158* 249*   PROT 7.5 5.4*  --    ALBUMIN 3.6 2.8*  --    BILITOT 2.1* 1.9*  --    AST 19 55*  --    ALKPHOS 98 69  --    ALT 14 32  --        Trended Cardiac Data:  Recent Labs   Lab 03/06/22 2344   TROPONINI 0.020   BNP 47       Microbiology Data:  Blood cx from 3/7 NGTD     Other Results:  EKG (my interpretation): NSR    Radiology:  Imaging Results           NM Hepatobiliary Scan (HIDA) (Final result)  Result time 03/07/22 12:12:17    Final result by Precious Da Silva MD (03/07/22 12:12:17)                 Impression:      Findings concerning for acute cholecystitis.    This report was flagged in Epic as abnormal.      Electronically signed by: Precious Da Silva  Date:    03/07/2022  Time:    12:12             Narrative:    EXAMINATION:  NM HEPATOBILIARY IMAGING INC GB (HIDA)    CLINICAL HISTORY:  Cholelithiasis;Calculus of gallbladder without cholecystitis without obstruction    TECHNIQUE:  Following the IV administration of 5 mCi of Tc-99m-mebrofenin, sequential dynamic anterior images of the abdomen were obtained for 60 minutes followed by a right lateral view at 60 minutes.  3 mg of morphine were given at 60 minutes with 30 minute imaging follow-up with anterior and right lateral views    COMPARISON:  Abdominal ultrasound and CT abdomen pelvis dated earlier same day    FINDINGS:  The early images demonstrate homogeneous uptake of the radiopharmaceutical by the liver with no focal hepatic abnormalities.    Normal activity is present in the small bowel.  No radiotracer uptake seen within the cystic duct or gallbladder.    The clearance from the liver is good.                                US  Abdomen Limited (Final result)  Result time 03/07/22 05:04:25    Final result by Ty Armstrong MD (03/07/22 05:04:25)                 Impression:      Distended gallbladder with biliary sludge, gallbladder wall thickening, and pericholecystic fluid.  Sonographic Leonard sign is reportedly negative though findings raise concern for acute cholecystitis in the appropriate clinical setting.    Incidental findings in the body of the report.    This report was flagged in Epic as abnormal.      Electronically signed by: Ty Armstrong MD  Date:    03/07/2022  Time:    05:04             Narrative:    EXAMINATION:  US ABDOMEN LIMITED    CLINICAL HISTORY:  bili elevated, leukocytosis;.    TECHNIQUE:  Limited ultrasound of the right upper quadrant of the abdomen including pancreas, liver, gallbladder, common bile duct was performed.    COMPARISON:  CT abdomen pelvis without contrast, 03/07/2022 and 02/09/2022.    FINDINGS:  Liver: Normal in size, measuring 15.0 cm. Homogeneous echotexture. No focal hepatic lesions.    Gallbladder: Gallbladder is distended with sludge.  There is gallbladder wall thickening measuring up to 7 mm in thickness.  Small amount of pericholecystic fluid.  These findings are new when compared with prior CT dated 02/09/2022, allowing for differences in modality.  Sonographic Leonard sign is reportedly negative.  No shadowing gallstones.    Biliary system: The common duct is not dilated, measuring 4 mm.  No intrahepatic ductal dilatation.    Spleen: Normal in size with a homogeneous echotexture, measuring 7.2 x 2.8 cm.    Pancreas: Pancreas is predominantly obscured by overlying bowel gas but is otherwise unremarkable.    Miscellaneous: No ascites.  Limited evaluation of the right kidney is negative for hydronephrosis.  There is a large simple cyst in the superior pole of the right kidney measuring up to 8 cm in size.  IVC is visualized and unremarkable.                               CT Abdomen Pelvis   Without Contrast (Final result)  Result time 03/07/22 00:48:26    Final result by Toro Hamm MD (03/07/22 00:48:26)                 Impression:      Diverticulosis without diverticulitis.    Anterior abdominal wall hernia containing colon without evidence of obstruction.    Hydrops of the gallbladder.  Correlate for early cholecystitis.      Electronically signed by: Toro Hamm  Date:    03/07/2022  Time:    00:48             Narrative:    EXAMINATION:  CT ABDOMEN PELVIS WITHOUT CONTRAST    CLINICAL HISTORY:  Bowel obstruction suspected;    TECHNIQUE:  Low dose axial images, sagittal and coronal reformations were obtained from the lung bases to the pubic symphysis.  Oral contrast was not administered.    COMPARISON:  KUB, 02/13/2022    FINDINGS:  Lung bases are clear.  The base of the heart and pericardium appear unremarkable aside from atherosclerotic calcifications in the aortic valve plane.    The liver contains a low-density in left lobe compatible with cyst.  The gallbladder is significantly distended but without discrete pericholecystic inflammatory changes or fluid.  There is no intra or extrahepatic biliary ductal dilatation.    Pancreas is partially fatty replaced.  No mass or inflammation is seen.    Both kidneys contain numerous cortical cysts the largest in the upper pole of the right kidney measuring up to 8.5 cm.    The adrenal glands appear normal.  The aorta is atherosclerotic but normal in caliber extending into the iliac system.    There is an anterior abdominal wall hernia with loops of colon abutting into the orifice and extending into the subcutaneous fat to the right of midline.  No obstructive changes evident.  The loops of small intestines appear unremarkable with no dilatation.    There is extensive diverticulosis in the transverse descending and sigmoid colon but without features of diverticulitis.  No colonic obstruction is evident.    The prostate gland is prominent.  The  seminal vesicles are small in the urinary bladder is incompletely distended.    Spondylosis and degenerative changes throughout the spine are noted.  There is no free air or free fluid.  No adenopathy is present                               X-Ray Chest AP Portable (Final result)  Result time 03/07/22 00:30:52    Final result by Toro Hamm MD (03/07/22 00:30:52)                 Impression:      No acute abnormality.      Electronically signed by: Toro Hamm  Date:    03/07/2022  Time:    00:30             Narrative:    EXAMINATION:  XR CHEST AP PORTABLE    CLINICAL HISTORY:  CHF;    TECHNIQUE:  Single frontal view of the chest was performed.    COMPARISON:  02/10/2022    FINDINGS:  The lungs are clear, with normal appearance of pulmonary vasculature and no pleural effusion or pneumothorax.    The cardiac silhouette is normal in size. The hilar and mediastinal contours are unremarkable.    Bones are intact.  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                                   Assessment:     Husam Connolly is a 89 y.o. male with:  Patient Active Problem List    Diagnosis Date Noted    Calculus of gallbladder with acute on chronic cholecystitis without obstruction 03/09/2022    Cholecystitis 03/07/2022    Calculus of gallbladder without cholecystitis without obstruction 03/07/2022    Small bowel obstruction due to adhesions 02/11/2022    Type 2 diabetes mellitus with stage 3a chronic kidney disease, without long-term current use of insulin 09/22/2021    New onset right bundle branch block (RBBB) 05/11/2021    Aortic atherosclerosis 05/11/2021    Chronic rhinitis 03/01/2021    S/P colostomy 03/01/2021    Right foot drop 08/26/2020    High cholesterol 08/26/2020    Type 2 diabetes mellitus with other specified complication 08/26/2020    Ventral hernia without obstruction or gangrene 02/27/2020    Stage 3 chronic kidney disease 12/13/2018     Hypertension     BPH (benign prostatic hyperplasia)      Husam Connolly is a 89 year old male patient with PMH of small bowel obstruction, BPH, T2DM, diverticulosis s/p colostomy and anastomosis who the [facility] Internal Medicine team was consulted for shortness of breath x 1 month that has worsened over the past few days. Per Pulmonology, SOB could be from deconditioning, weakness, HFpEF, mild asthma, DVT, or possibly occupational exposure.      Plan:     Acute Hypoxic Respiratory Failure  - Unclear etiology of shortness of breath  - Chronic work exposure at [employer] with chemical exposure  - Similar respiratory symptoms after prior abdominal surgery per patient  - Per Pulmonology, his SOB could be from deconditioning, weakness, HFpEF, mild asthma, or DVT  - CXR with nonspecific patchy findings  - Ordered CT PE and Echo   - Started on Breo 200 and DuoNebs per pulm recs  - Plan for PFTs outpatient    Cholecystectomy 3/7  - HIDA scan with acute cholecystitis on 3/7  - S/p cholecystectomy this admission for gangrenous gallbladder  - No complications with operation   - Drain removed 3/10 and patient moved bowel 3/11  - Management per Surgery    Hypertension  - BP at 173/74 on initial visit  - Managed outpatient on HCTZ 12.5  - On Hydralazine 25 TID and HCTZ 12.5  - Would benefit from better BP management    BPH  - Recommend continue finasteride 5 and tamulosin 0.4    Hyperlipidemia  - Recommend continue atorvastatin 40     Type 2 Diabetes  - A1c at 7.6  - SSI inpatient  - Recommend STOP home metformin due to risk of lactic acidosis    CKD3a  - Cr 1.2, GFR 53  - Avoid nephrotoxic agents and renally dose medications      DVTppx: none currently  Diet: renal diet  Dispo: Pending workup of his shortness of breath and reducing O2 requirement      Code Status:     Full Code    Toro Hawkins MD  [facility] Medicine HO-I    [facility] Medicine Hospitalist Pager numbers:   [facility] Hospitalist Medicine Team A (Sunday/Yasmin): [phone]  [facility]  Hospitalist Medicine Team B (Melida/Lauren):  [phone]

## 2022-03-12 NOTE — CONSULTS
[facility] Hospital Medicine Consult Note     Admitting Team: [facility] Hospitalist Team A  Attending Physician: Negrito Brnad MD  Resident: Ang  Intern: Ross     Date of Admit: 3/6/2022    Chief Complaint     Shortness of breath x 4 days    Subjective:      History of Present Illness:  Husam Connolly is a 89 year old male patient with PMH of small bowel obstruction, BPH, T2DM, diverticulosis s/p colostomy and anastomosis who presented to the [facility] Medicine team for shortness of breath x 1 month that has worsened over the past few days. 4 days prior he received a cholecystectomy due to a gangrenous gallbladder. He reports that the shortness of breath has occurred in the past after his prior colostomy procedure. He noted that in June 2021, he noticed his exercise tolerance worsened (having to pause to take breaths while going up his staircase). He was supposed to see Dr. Da Silva and get PFTs but it was not completed at that time. He currently states that he feels OK and is able to speak in full sentences without problems, but he has required 1-2L NC overnight during the admission for saturations in the high 80s and low 90s.  He said his legs only swell when he has a lot of salt intake, like when eating crawfish. He denied any cough, chest pain, fever, nausea, vomiting, diarrhea. Of note, the family is concerned due to the recent death of his wife.       Past Medical History:  Past Medical History:   Diagnosis Date    Anticoagulant long-term use     Bowel obstruction     BPH (benign prostatic hyperplasia)     Diabetes mellitus     Diverticulitis     Hyperlipidemia     Hypertension        Past Surgical History:  Past Surgical History:   Procedure Laterality Date    ANASTOMOSIS OF ILEUM TO RECTUM  9/9/2019    Procedure: ANASTOMOSIS, COLO-RECTAL;  Surgeon: Fouzia Gardner MD;  Location: [facility];  Service: General;;    CHOLECYSTECTOMY N/A 3/7/2022    Procedure: CHOLECYSTECTOMY;  Surgeon: Fouzia Gardner MD;   Location: [facility] OR;  Service: General;  Laterality: N/A;    COLOSTOMY  6/16/2019    Procedure: CREATION, COLOSTOMY;  Surgeon: Fouzia Gardner MD;  Location: [facility] OR;  Service: General;;    EYE SURGERY      GISELA PROCEDURE  6/16/2019    Procedure: GISELA PROCEDURE;  Surgeon: Fouzia Gardner MD;  Location: [facility] OR;  Service: General;;  Colon resection    LYSIS OF ADHESIONS N/A 3/7/2022    Procedure: LYSIS, ADHESIONS;  Surgeon: Fouzia Gardner MD;  Location: [facility] OR;  Service: General;  Laterality: N/A;    TONSILLECTOMY         Allergies:  Review of patient's allergies indicates:   Allergen Reactions    Solarcaine [benzocaine-triclosan]     Sulfa (sulfonamide antibiotics)     Sulfamethoxazole     Trimethoprim     Hytrin [terazosin] Rash    Smz-tmp ds [sulfamethoxazole-trimethoprim] Rash       Home Medications:  Prior to Admission medications    Medication Sig Start Date End Date Taking? Authorizing Provider   albuterol (ACCUNEB) 1.25 mg/3 mL Nebu Take 3 mLs (1.25 mg total) by nebulization every 6 (six) hours as needed. Rescue 2/15/22 2/15/23 Yes Linda Mosley MD   albuterol (VENTOLIN HFA) 90 mcg/actuation inhaler Inhale 2 puffs into the lungs every 6 (six) hours as needed for Wheezing. Rescue 2/15/22  Yes Linda Mosley MD   aspirin (ECOTRIN) 81 MG EC tablet Take 81 mg by mouth once daily.   Yes Historical Provider   atorvastatin (LIPITOR) 40 MG tablet Take 1 tablet (40 mg total) by mouth once daily. 4/12/21  Yes Daren Lassiter MD   finasteride (PROSCAR) 5 mg tablet TAKE 1 TABLET BY MOUTH EVERY DAY  Patient taking differently: Take 5 mg by mouth every evening. 10/14/19  Yes Daren Lassiter MD   fluticasone propionate (FLONASE) 50 mcg/actuation nasal spray 2 sprays (100 mcg total) by Each Nostril route once daily. 3/1/21  Yes Alverto Green MD   GARLIC ORAL Take 1 capsule by mouth once daily.   Yes Historical Provider   hydroCHLOROthiazide (MICROZIDE) 12.5 mg capsule  Take 1 capsule (12.5 mg total) by mouth 2 (two) times daily. 21 Yes Daren Lassiter MD   metFORMIN (GLUCOPHAGE) 500 MG tablet TAKE 1 TABLET BY MOUTH TWICE DAILY WITH FOOD  Patient taking differently: Take 500 mg by mouth 2 (two) times daily with meals. 10/17/21  Yes Alverto Green MD   multivitamin capsule Take 1 capsule by mouth once daily.   Yes Historical Provider   omega-3 fatty acids/fish oil (FISH OIL-OMEGA-3 FATTY ACIDS) 300-1,000 mg capsule Take 1 capsule by mouth once daily.   Yes Historical Provider   tamsulosin (FLOMAX) 0.4 mg Cap Take 0.4 mg by mouth nightly. 19  Yes Historical Provider   vitamin D (VITAMIN D3) 1000 units Tab Take 1,000 Units by mouth once daily.   Yes Historical Provider   hydroCHLOROthiazide (HYDRODIURIL) 12.5 MG Tab  22   Historical Provider       Family History:  Family History   Problem Relation Age of Onset    Heart disease Father     Hypertension Father     Cancer Mother     Diabetes Paternal Aunt        Social History:  Social History     Tobacco Use    Smoking status: Former Smoker     Packs/day: 1.00     Years: 25.00     Pack years: 25.00     Types: Cigarettes     Start date:      Quit date:      Years since quittin.2    Smokeless tobacco: Never Used   Substance Use Topics    Alcohol use: Yes     Alcohol/week: 2.0 standard drinks     Types: 2 Glasses of wine per week     Comment: occassionally    Drug use: No       Review of Systems:  Review of Systems   Constitutional: Negative for chills and fever.   HENT: Negative for congestion and sore throat.    Eyes: Negative for blurred vision and double vision.   Respiratory: Positive for shortness of breath. Negative for cough, hemoptysis and sputum production.    Cardiovascular: Negative for chest pain.   Gastrointestinal: Negative for nausea and vomiting.   Genitourinary: Negative for flank pain and hematuria.   Musculoskeletal: Negative for back pain and falls.   Skin: Negative  "for itching and rash.   Neurological: Negative for loss of consciousness and weakness.   Psychiatric/Behavioral: Negative for substance abuse and suicidal ideas.     All other systems are reviewed and are negative.    Health Maintaince :   Primary Care Physician: Peter CARMONA    Immunizations:   TDap Unknown  Flu UTD  Pna Unknown  COVID UTD    Cancer Screening:  Colonoscopy: age     Objective:   Last 24 Hour Vital Signs:  BP  Min: 158/68  Max: 195/79  Temp  Av.3 °F (36.8 °C)  Min: 97.9 °F (36.6 °C)  Max: 98.9 °F (37.2 °C)  Pulse  Av.1  Min: 70  Max: 86  Resp  Av.1  Min: 16  Max: 19  SpO2  Av.8 %  Min: 90 %  Max: 99 %  Height  Av' 10" (177.8 cm)  Min: 5' 10" (177.8 cm)  Max: 5' 10" (177.8 cm)  Weight  Av.6 kg (175 lb 7.4 oz)  Min: 79.4 kg (175 lb)  Max: 79.8 kg (175 lb 14.8 oz)  Body mass index is 25.11 kg/m².  I/O last 3 completed shifts:  In: 959.8 [P.O.:860; IV Piggyback:99.8]  Out: 565 [Urine:550; Drains:15]    Physical Examination:  Physical Exam  Constitutional:       General: He is not in acute distress.     Appearance: He is not ill-appearing or diaphoretic.   HENT:      Head: Normocephalic and atraumatic.      Nose: No congestion.      Mouth/Throat:      Mouth: Mucous membranes are dry.   Eyes:      General: No scleral icterus.     Extraocular Movements: Extraocular movements intact.      Pupils: Pupils are equal, round, and reactive to light.   Cardiovascular:      Rate and Rhythm: Normal rate.      Pulses: Normal pulses.      Heart sounds: No murmur heard.    No gallop.   Pulmonary:      Effort: Pulmonary effort is normal.      Comments: Light crackles and slight expiratory wheezes  Abdominal:      General: Abdomen is flat. There is no distension.   Musculoskeletal:         General: Normal range of motion.      Cervical back: No rigidity.      Right lower leg: No edema.      Left lower leg: No edema.   Skin:     Coloration: Skin is not jaundiced.      Findings: No rash. "   Neurological:      General: No focal deficit present.      Mental Status: He is alert and oriented to person, place, and time.      Cranial Nerves: No cranial nerve deficit.      Sensory: No sensory deficit.   Psychiatric:         Mood and Affect: Mood normal.         Behavior: Behavior normal.           Laboratory:  Most Recent Data:  CBC:   Lab Results   Component Value Date    WBC 9.84 03/11/2022    HGB 13.6 (L) 03/11/2022    HCT 42.5 03/11/2022     03/11/2022    MCV 91 03/11/2022    RDW 13.3 03/11/2022       BMP:   Lab Results   Component Value Date     03/11/2022    K 5.2 (H) 03/11/2022     03/11/2022    CO2 27 03/11/2022    BUN 18 03/11/2022    CREATININE 1.2 03/11/2022     (H) 03/11/2022    CALCIUM 9.2 03/11/2022    MG 1.9 06/03/2021    PHOS 3.0 10/28/2011     LFTs:   Lab Results   Component Value Date    PROT 5.4 (L) 03/08/2022    ALBUMIN 2.8 (L) 03/08/2022    BILITOT 1.9 (H) 03/08/2022    AST 55 (H) 03/08/2022    ALKPHOS 69 03/08/2022    ALT 32 03/08/2022     Coags:   Lab Results   Component Value Date    INR 1.1 09/10/2019     FLP:   Lab Results   Component Value Date    CHOL 164 08/25/2021    HDL 58 08/25/2021    LDLCALC 75 08/25/2021    TRIG 217 (H) 08/25/2021    CHOLHDL 2.8 08/25/2021     DM:   Lab Results   Component Value Date    HGBA1C 7.6 (H) 02/12/2022    HGBA1C 7.8 (H) 08/25/2021    HGBA1C 6.8 (H) 06/01/2021    MICROALBUR 0.7 08/25/2021    LDLCALC 75 08/25/2021    CREATININE 1.2 03/11/2022     Thyroid:   Lab Results   Component Value Date    TSH 1.05 08/25/2021     Anemia: No results found for: IRON, TIBC, FERRITIN, HOHLSJMB20, FOLATE  Cardiac:   Lab Results   Component Value Date    TROPONINI 0.020 03/06/2022    BNP 47 03/06/2022     Urinalysis:   Lab Results   Component Value Date    COLORU Yellow 03/07/2022    SPECGRAV 1.020 03/07/2022    NITRITE Negative 03/07/2022    KETONESU Negative 03/07/2022    UROBILINOGEN Negative 03/07/2022    WBCUA 2 05/31/2021        Trended Lab Data:  Recent Labs   Lab 03/06/22  2344 03/08/22  0843 03/11/22  1257   WBC 19.04* 12.86* 9.84   HGB 15.9 12.2* 13.6*   HCT 47.9 36.5* 42.5    155 253   MCV 88 88 91   RDW 13.2 13.3 13.3    132* 139   K 4.2 4.3 5.2*   CL 95 95 103   CO2 25 23 27   BUN 25* 29* 18   CREATININE 1.5* 1.6* 1.2   * 158* 249*   PROT 7.5 5.4*  --    ALBUMIN 3.6 2.8*  --    BILITOT 2.1* 1.9*  --    AST 19 55*  --    ALKPHOS 98 69  --    ALT 14 32  --        Trended Cardiac Data:  Recent Labs   Lab 03/06/22 2344   TROPONINI 0.020   BNP 47       Microbiology Data:  Blood cx from 3/7 NGTD     Other Results:  EKG (my interpretation): NSR    Radiology:  Imaging Results           NM Hepatobiliary Scan (HIDA) (Final result)  Result time 03/07/22 12:12:17    Final result by Precious Da Silva MD (03/07/22 12:12:17)                 Impression:      Findings concerning for acute cholecystitis.    This report was flagged in Epic as abnormal.      Electronically signed by: Precious Da Silva  Date:    03/07/2022  Time:    12:12             Narrative:    EXAMINATION:  NM HEPATOBILIARY IMAGING INC GB (HIDA)    CLINICAL HISTORY:  Cholelithiasis;Calculus of gallbladder without cholecystitis without obstruction    TECHNIQUE:  Following the IV administration of 5 mCi of Tc-99m-mebrofenin, sequential dynamic anterior images of the abdomen were obtained for 60 minutes followed by a right lateral view at 60 minutes.  3 mg of morphine were given at 60 minutes with 30 minute imaging follow-up with anterior and right lateral views    COMPARISON:  Abdominal ultrasound and CT abdomen pelvis dated earlier same day    FINDINGS:  The early images demonstrate homogeneous uptake of the radiopharmaceutical by the liver with no focal hepatic abnormalities.    Normal activity is present in the small bowel.  No radiotracer uptake seen within the cystic duct or gallbladder.    The clearance from the liver is good.                                US  Abdomen Limited (Final result)  Result time 03/07/22 05:04:25    Final result by Ty Armstrong MD (03/07/22 05:04:25)                 Impression:      Distended gallbladder with biliary sludge, gallbladder wall thickening, and pericholecystic fluid.  Sonographic Leonard sign is reportedly negative though findings raise concern for acute cholecystitis in the appropriate clinical setting.    Incidental findings in the body of the report.    This report was flagged in Epic as abnormal.      Electronically signed by: Ty Armstrong MD  Date:    03/07/2022  Time:    05:04             Narrative:    EXAMINATION:  US ABDOMEN LIMITED    CLINICAL HISTORY:  bili elevated, leukocytosis;.    TECHNIQUE:  Limited ultrasound of the right upper quadrant of the abdomen including pancreas, liver, gallbladder, common bile duct was performed.    COMPARISON:  CT abdomen pelvis without contrast, 03/07/2022 and 02/09/2022.    FINDINGS:  Liver: Normal in size, measuring 15.0 cm. Homogeneous echotexture. No focal hepatic lesions.    Gallbladder: Gallbladder is distended with sludge.  There is gallbladder wall thickening measuring up to 7 mm in thickness.  Small amount of pericholecystic fluid.  These findings are new when compared with prior CT dated 02/09/2022, allowing for differences in modality.  Sonographic Leonard sign is reportedly negative.  No shadowing gallstones.    Biliary system: The common duct is not dilated, measuring 4 mm.  No intrahepatic ductal dilatation.    Spleen: Normal in size with a homogeneous echotexture, measuring 7.2 x 2.8 cm.    Pancreas: Pancreas is predominantly obscured by overlying bowel gas but is otherwise unremarkable.    Miscellaneous: No ascites.  Limited evaluation of the right kidney is negative for hydronephrosis.  There is a large simple cyst in the superior pole of the right kidney measuring up to 8 cm in size.  IVC is visualized and unremarkable.                               CT Abdomen Pelvis   Without Contrast (Final result)  Result time 03/07/22 00:48:26    Final result by Toro Hamm MD (03/07/22 00:48:26)                 Impression:      Diverticulosis without diverticulitis.    Anterior abdominal wall hernia containing colon without evidence of obstruction.    Hydrops of the gallbladder.  Correlate for early cholecystitis.      Electronically signed by: Toro Hamm  Date:    03/07/2022  Time:    00:48             Narrative:    EXAMINATION:  CT ABDOMEN PELVIS WITHOUT CONTRAST    CLINICAL HISTORY:  Bowel obstruction suspected;    TECHNIQUE:  Low dose axial images, sagittal and coronal reformations were obtained from the lung bases to the pubic symphysis.  Oral contrast was not administered.    COMPARISON:  KUB, 02/13/2022    FINDINGS:  Lung bases are clear.  The base of the heart and pericardium appear unremarkable aside from atherosclerotic calcifications in the aortic valve plane.    The liver contains a low-density in left lobe compatible with cyst.  The gallbladder is significantly distended but without discrete pericholecystic inflammatory changes or fluid.  There is no intra or extrahepatic biliary ductal dilatation.    Pancreas is partially fatty replaced.  No mass or inflammation is seen.    Both kidneys contain numerous cortical cysts the largest in the upper pole of the right kidney measuring up to 8.5 cm.    The adrenal glands appear normal.  The aorta is atherosclerotic but normal in caliber extending into the iliac system.    There is an anterior abdominal wall hernia with loops of colon abutting into the orifice and extending into the subcutaneous fat to the right of midline.  No obstructive changes evident.  The loops of small intestines appear unremarkable with no dilatation.    There is extensive diverticulosis in the transverse descending and sigmoid colon but without features of diverticulitis.  No colonic obstruction is evident.    The prostate gland is prominent.  The  seminal vesicles are small in the urinary bladder is incompletely distended.    Spondylosis and degenerative changes throughout the spine are noted.  There is no free air or free fluid.  No adenopathy is present                               X-Ray Chest AP Portable (Final result)  Result time 03/07/22 00:30:52    Final result by Toro Hamm MD (03/07/22 00:30:52)                 Impression:      No acute abnormality.      Electronically signed by: Toro Hamm  Date:    03/07/2022  Time:    00:30             Narrative:    EXAMINATION:  XR CHEST AP PORTABLE    CLINICAL HISTORY:  CHF;    TECHNIQUE:  Single frontal view of the chest was performed.    COMPARISON:  02/10/2022    FINDINGS:  The lungs are clear, with normal appearance of pulmonary vasculature and no pleural effusion or pneumothorax.    The cardiac silhouette is normal in size. The hilar and mediastinal contours are unremarkable.    Bones are intact.  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                                   Assessment:     Husam Connolly is a 89 y.o. male with:  Patient Active Problem List    Diagnosis Date Noted    Calculus of gallbladder with acute on chronic cholecystitis without obstruction 03/09/2022    Cholecystitis 03/07/2022    Calculus of gallbladder without cholecystitis without obstruction 03/07/2022    Small bowel obstruction due to adhesions 02/11/2022    Type 2 diabetes mellitus with stage 3a chronic kidney disease, without long-term current use of insulin 09/22/2021    New onset right bundle branch block (RBBB) 05/11/2021    Aortic atherosclerosis 05/11/2021    Chronic rhinitis 03/01/2021    S/P colostomy 03/01/2021    Right foot drop 08/26/2020    High cholesterol 08/26/2020    Type 2 diabetes mellitus with other specified complication 08/26/2020    Ventral hernia without obstruction or gangrene 02/27/2020    Stage 3 chronic kidney disease 12/13/2018     Hypertension     BPH (benign prostatic hyperplasia)      Husam Connolly is a 89 year old male patient with PMH of small bowel obstruction, BPH, T2DM, diverticulosis s/p colostomy and anastomosis who the [facility] Internal Medicine team was consulted for shortness of breath x 1 month that has worsened over the past few days. Per Pulmonology, SOB could be from deconditioning, weakness, HFpEF, mild asthma, DVT, or possibly occupational exposure.      Plan:     Acute Hypoxic Respiratory Failure  - Unclear etiology of shortness of breath  - Chronic work exposure at [employer] with chemical exposure  - Similar respiratory symptoms after prior abdominal surgery per patient  - Per Pulmonology, his SOB could be from deconditioning, weakness, HFpEF, mild asthma, or DVT  - CXR with nonspecific patchy findings  - Ordered CT PE and Echo   - Started on Breo 200 and DuoNebs per pulm recs  - Plan for PFTs outpatient    Cholecystectomy 3/7  - HIDA scan with acute cholecystitis on 3/7  - S/p cholecystectomy this admission for gangrenous gallbladder  - No complications with operation   - Drain removed 3/10 and patient moved bowel 3/11  - Management per Surgery    Hypertension  - BP at 173/74 on initial visit  - Managed outpatient on HCTZ 12.5  - On Hydralazine 25 TID and HCTZ 12.5  - Would benefit from better BP management    BPH  - Recommend continue finasteride 5 and tamulosin 0.4    Hyperlipidemia  - Recommend continue atorvastatin 40     Type 2 Diabetes  - A1c at 7.6  - SSI inpatient  - Recommend STOP home metformin due to risk of lactic acidosis    CKD3a  - Cr 1.2, GFR 53  - Avoid nephrotoxic agents and renally dose medications      DVTppx: none currently  Diet: renal diet  Dispo: Pending workup of his shortness of breath and reducing O2 requirement      Code Status:     Full Code    Toro Hawkins MD  [facility] HO-I    [facility] Medicine Hospitalist Pager numbers:   [facility] Hospitalist Medicine Team A (Sunday/Yasmin): [phone]  [facility]  Hospitalist Medicine Team B (Melida/Lauren):  [phone]

## 2022-03-12 NOTE — PROGRESS NOTES
"[facility] Medicine Consult Progress Note     Admitting Team: [facility] Hospitalist Team A  Attending Physician: Jj CARMONA  Resident: Ang  Intern: Ross     Date of Admit: 3/6/2022    Chief Complaint     Shortness of breath x 4 days    Subjective:      Patient endorsed sleeping fine overnight with no new complaints or problems. He does not feel shortness of breath but he has been on NC 2L overnight. He denied any abdominal pain or any drainage from his old drain site. He denied chest pain, nausea, vomiting, fever, or chills overnight.    Objective:   Blood pressure (!) 173/74, pulse 81, temperature 97.7 °F (36.5 °C), resp. rate 18, height 5' 10" (1.778 m), weight 80.1 kg (176 lb 9.4 oz), SpO2 97 %.    Stool 1x    Physical Examination:  Physical Exam  Constitutional:       General: He is not in acute distress.     Appearance: He is not ill-appearing or diaphoretic.   HENT:      Head: Normocephalic and atraumatic.      Nose: No congestion.      Mouth/Throat:      Mouth: Mucous membranes are dry.   Eyes:      General: No scleral icterus.     Extraocular Movements: Extraocular movements intact.      Pupils: Pupils are equal, round, and reactive to light.   Cardiovascular:      Rate and Rhythm: Normal rate.      Pulses: Normal pulses.      Heart sounds: No murmur heard.    No gallop.   Pulmonary:      Effort: Pulmonary effort is normal.      Comments: Light crackles and slight expiratory wheezes  Abdominal:      General: Abdomen is flat. There is no distension.   Musculoskeletal:         General: Normal range of motion.      Cervical back: No rigidity.      Right lower leg: No edema.      Left lower leg: No edema.   Skin:     Coloration: Skin is not jaundiced.      Findings: No rash.   Neurological:      General: No focal deficit present.      Mental Status: He is alert and oriented to person, place, and time.      Cranial Nerves: No cranial nerve deficit.      Sensory: No sensory deficit.   Psychiatric:         Mood and " Affect: Mood normal.         Behavior: Behavior normal.         Laboratory:  Most Recent Data:  CBC:   Lab Results   Component Value Date    WBC 9.84 03/11/2022    HGB 13.6 (L) 03/11/2022    HCT 42.5 03/11/2022     03/11/2022    MCV 91 03/11/2022    RDW 13.3 03/11/2022       BMP:   Lab Results   Component Value Date     03/11/2022    K 5.2 (H) 03/11/2022     03/11/2022    CO2 27 03/11/2022    BUN 18 03/11/2022    CREATININE 1.2 03/11/2022     (H) 03/11/2022    CALCIUM 9.2 03/11/2022    MG 1.9 06/03/2021    PHOS 3.0 10/28/2011     LFTs:   Lab Results   Component Value Date    PROT 5.4 (L) 03/08/2022    ALBUMIN 2.8 (L) 03/08/2022    BILITOT 1.9 (H) 03/08/2022    AST 55 (H) 03/08/2022    ALKPHOS 69 03/08/2022    ALT 32 03/08/2022     Coags:   Lab Results   Component Value Date    INR 1.1 09/10/2019     FLP:   Lab Results   Component Value Date    CHOL 164 08/25/2021    HDL 58 08/25/2021    LDLCALC 75 08/25/2021    TRIG 217 (H) 08/25/2021    CHOLHDL 2.8 08/25/2021     DM:   Lab Results   Component Value Date    HGBA1C 7.6 (H) 02/12/2022    HGBA1C 7.8 (H) 08/25/2021    HGBA1C 6.8 (H) 06/01/2021    MICROALBUR 0.7 08/25/2021    LDLCALC 75 08/25/2021    CREATININE 1.2 03/11/2022     Thyroid:   Lab Results   Component Value Date    TSH 1.05 08/25/2021     Anemia: No results found for: IRON, TIBC, FERRITIN, PCCYMHQP86, FOLATE  Cardiac:   Lab Results   Component Value Date    TROPONINI 0.020 03/06/2022    BNP 47 03/06/2022     Urinalysis:   Lab Results   Component Value Date    COLORU Yellow 03/07/2022    SPECGRAV 1.020 03/07/2022    NITRITE Negative 03/07/2022    KETONESU Negative 03/07/2022    UROBILINOGEN Negative 03/07/2022    WBCUA 2 05/31/2021       Trended Lab Data:  Recent Labs   Lab 03/06/22  2344 03/08/22  0843 03/11/22  1257   WBC 19.04* 12.86* 9.84   HGB 15.9 12.2* 13.6*   HCT 47.9 36.5* 42.5    155 253   MCV 88 88 91   RDW 13.2 13.3 13.3    132* 139   K 4.2 4.3 5.2*   CL 95  95 103   CO2 25 23 27   BUN 25* 29* 18   CREATININE 1.5* 1.6* 1.2   * 158* 249*   PROT 7.5 5.4*  --    ALBUMIN 3.6 2.8*  --    BILITOT 2.1* 1.9*  --    AST 19 55*  --    ALKPHOS 98 69  --    ALT 14 32  --        Trended Cardiac Data:  Recent Labs   Lab 03/06/22  2344   TROPONINI 0.020   BNP 47     Radiology:  Imaging Results           NM Hepatobiliary Scan (HIDA) (Final result)  Result time 03/07/22 12:12:17    Final result by Precious Da Silva MD (03/07/22 12:12:17)                 Impression:      Findings concerning for acute cholecystitis.    This report was flagged in Epic as abnormal.      Electronically signed by: Precious Da Silva  Date:    03/07/2022  Time:    12:12             Narrative:    EXAMINATION:  NM HEPATOBILIARY IMAGING INC GB (HIDA)    CLINICAL HISTORY:  Cholelithiasis;Calculus of gallbladder without cholecystitis without obstruction    TECHNIQUE:  Following the IV administration of 5 mCi of Tc-99m-mebrofenin, sequential dynamic anterior images of the abdomen were obtained for 60 minutes followed by a right lateral view at 60 minutes.  3 mg of morphine were given at 60 minutes with 30 minute imaging follow-up with anterior and right lateral views    COMPARISON:  Abdominal ultrasound and CT abdomen pelvis dated earlier same day    FINDINGS:  The early images demonstrate homogeneous uptake of the radiopharmaceutical by the liver with no focal hepatic abnormalities.    Normal activity is present in the small bowel.  No radiotracer uptake seen within the cystic duct or gallbladder.    The clearance from the liver is good.                                US Abdomen Limited (Final result)  Result time 03/07/22 05:04:25    Final result by Ty Armstrong MD (03/07/22 05:04:25)                 Impression:      Distended gallbladder with biliary sludge, gallbladder wall thickening, and pericholecystic fluid.  Sonographic Leonard sign is reportedly negative though findings raise concern for acute  cholecystitis in the appropriate clinical setting.    Incidental findings in the body of the report.    This report was flagged in Epic as abnormal.      Electronically signed by: Ty Armstrong MD  Date:    03/07/2022  Time:    05:04             Narrative:    EXAMINATION:  US ABDOMEN LIMITED    CLINICAL HISTORY:  bili elevated, leukocytosis;.    TECHNIQUE:  Limited ultrasound of the right upper quadrant of the abdomen including pancreas, liver, gallbladder, common bile duct was performed.    COMPARISON:  CT abdomen pelvis without contrast, 03/07/2022 and 02/09/2022.    FINDINGS:  Liver: Normal in size, measuring 15.0 cm. Homogeneous echotexture. No focal hepatic lesions.    Gallbladder: Gallbladder is distended with sludge.  There is gallbladder wall thickening measuring up to 7 mm in thickness.  Small amount of pericholecystic fluid.  These findings are new when compared with prior CT dated 02/09/2022, allowing for differences in modality.  Sonographic Leonard sign is reportedly negative.  No shadowing gallstones.    Biliary system: The common duct is not dilated, measuring 4 mm.  No intrahepatic ductal dilatation.    Spleen: Normal in size with a homogeneous echotexture, measuring 7.2 x 2.8 cm.    Pancreas: Pancreas is predominantly obscured by overlying bowel gas but is otherwise unremarkable.    Miscellaneous: No ascites.  Limited evaluation of the right kidney is negative for hydronephrosis.  There is a large simple cyst in the superior pole of the right kidney measuring up to 8 cm in size.  IVC is visualized and unremarkable.                               CT Abdomen Pelvis  Without Contrast (Final result)  Result time 03/07/22 00:48:26    Final result by Toro Hamm MD (03/07/22 00:48:26)                 Impression:      Diverticulosis without diverticulitis.    Anterior abdominal wall hernia containing colon without evidence of obstruction.    Hydrops of the gallbladder.  Correlate for early  cholecystitis.      Electronically signed by: Toro Hamm  Date:    03/07/2022  Time:    00:48             Narrative:    EXAMINATION:  CT ABDOMEN PELVIS WITHOUT CONTRAST    CLINICAL HISTORY:  Bowel obstruction suspected;    TECHNIQUE:  Low dose axial images, sagittal and coronal reformations were obtained from the lung bases to the pubic symphysis.  Oral contrast was not administered.    COMPARISON:  KUB, 02/13/2022    FINDINGS:  Lung bases are clear.  The base of the heart and pericardium appear unremarkable aside from atherosclerotic calcifications in the aortic valve plane.    The liver contains a low-density in left lobe compatible with cyst.  The gallbladder is significantly distended but without discrete pericholecystic inflammatory changes or fluid.  There is no intra or extrahepatic biliary ductal dilatation.    Pancreas is partially fatty replaced.  No mass or inflammation is seen.    Both kidneys contain numerous cortical cysts the largest in the upper pole of the right kidney measuring up to 8.5 cm.    The adrenal glands appear normal.  The aorta is atherosclerotic but normal in caliber extending into the iliac system.    There is an anterior abdominal wall hernia with loops of colon abutting into the orifice and extending into the subcutaneous fat to the right of midline.  No obstructive changes evident.  The loops of small intestines appear unremarkable with no dilatation.    There is extensive diverticulosis in the transverse descending and sigmoid colon but without features of diverticulitis.  No colonic obstruction is evident.    The prostate gland is prominent.  The seminal vesicles are small in the urinary bladder is incompletely distended.    Spondylosis and degenerative changes throughout the spine are noted.  There is no free air or free fluid.  No adenopathy is present                               X-Ray Chest AP Portable (Final result)  Result time 03/07/22 00:30:52    Final result by  Toro Hamm MD (03/07/22 00:30:52)                 Impression:      No acute abnormality.      Electronically signed by: Toro Hamm  Date:    03/07/2022  Time:    00:30             Narrative:    EXAMINATION:  XR CHEST AP PORTABLE    CLINICAL HISTORY:  CHF;    TECHNIQUE:  Single frontal view of the chest was performed.    COMPARISON:  02/10/2022    FINDINGS:  The lungs are clear, with normal appearance of pulmonary vasculature and no pleural effusion or pneumothorax.    The cardiac silhouette is normal in size. The hilar and mediastinal contours are unremarkable.    Bones are intact.  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                                  CT PE: No PE, emphysematous changes noted and scattered calcified granulomas     Assessment:     Husam Connolly is a 89 year old male patient with PMH of small bowel obstruction, BPH, T2DM, diverticulosis s/p colostomy and anastomosis who the [facility] team was consulted for shortness of breath x 1 month that has worsened over the past few days. Per Pulmonology, SOB could be from deconditioning, weakness, HFpEF, mild asthma, DVT/PE, or possibly occupational exposure. CTPE with emphysema, which may be principal cause. Recommend to stop home metformin and to consider anti-coagulation while inpatient.     Plan:     Acute Hypoxic Respiratory Failure  - Unclear etiology of shortness of breath  - Chronic work exposure at [employer] with chemical exposure  - Similar respiratory symptoms after prior abdominal surgery per patient  - Per Pulmonology, his SOB could be from deconditioning, weakness, HFpEF, mild asthma, or DVT  - CXR with nonspecific patchy findings  - Ordered CT PE : No PE, emphysematous changes noted and scattered calcified granulomas  - Echo read pending  - Started on Breo 200 and DuoNebs per pulm recs  - Plan for PFTs outpatient    Cholecystectomy 3/7  - HIDA scan with acute cholecystitis on 3/7  - S/p  cholecystectomy this admission for gangrenous gallbladder  - No complications with operation   - Drain removed 3/10 and patient moved bowel 3/11  - Management per Surgery    Hypertension  - BP at 173/74 on initial visit  - Managed outpatient on HCTZ 12.5  - On Hydralazine 25 TID and HCTZ 12.5  - Would benefit from better BP management    BPH  - Recommend continue finasteride 5 and tamulosin 0.4    Hyperlipidemia  - Recommend continue atorvastatin 40     Type 2 Diabetes  - A1c at 7.6  - SSI inpatient  - Recommend STOP home metformin due to risk of lactic acidosis    CKD3a  - Cr 1.2, GFR 53  - Avoid nephrotoxic agents and renally dose medications      DVTppx: none currently  Diet: renal diet  Dispo: Pending workup of his shortness of breath and reducing O2 requirement      Code Status:     Full Code    Toro Hawkins MD  [facility] Internal Medicine -I    [facility] Medicine Hospitalist Pager numbers:   [facility] Hospitalist Medicine Team A (Sunday/Yasmin): [phone]  [facility] Hospitalist Medicine Team B (Melida/Lauren):  [phone]

## 2022-03-12 NOTE — DISCHARGE INSTRUCTIONS
You have an appointment with the pulmonology clinic at [facility] on 3/18 at 1pm. You will need to arrive early for pulmonary function testing.

## 2022-03-12 NOTE — PROGRESS NOTES
"Surgery follow up  BP (!) 153/67   Pulse 78   Temp 98.2 °F (36.8 °C)   Resp 18   Ht 5' 10" (1.778 m)   Wt 80.1 kg (176 lb 9.4 oz)   SpO2 97%   BMI 25.34 kg/m²   No intake/output data recorded.  No intake/output data recorded.      Recent Results (from the past 336 hour(s))   CBC Auto Differential    Collection Time: 03/11/22 12:57 PM   Result Value Ref Range    WBC 9.84 3.90 - 12.70 K/uL    Hemoglobin 13.6 (L) 14.0 - 18.0 g/dL    Hematocrit 42.5 40.0 - 54.0 %    Platelets 253 150 - 450 K/uL   CBC auto differential    Collection Time: 03/08/22  8:43 AM   Result Value Ref Range    WBC 12.86 (H) 3.90 - 12.70 K/uL    Hemoglobin 12.2 (L) 14.0 - 18.0 g/dL    Hematocrit 36.5 (L) 40.0 - 54.0 %    Platelets 155 150 - 450 K/uL   CBC auto differential    Collection Time: 03/06/22 11:44 PM   Result Value Ref Range    WBC 19.04 (H) 3.90 - 12.70 K/uL    Hemoglobin 15.9 14.0 - 18.0 g/dL    Hematocrit 47.9 40.0 - 54.0 %    Platelets 211 150 - 450 K/uL   doing better  Appreciate pulmonary consult.  "

## 2022-03-13 VITALS
OXYGEN SATURATION: 98 % | RESPIRATION RATE: 20 BRPM | HEART RATE: 85 BPM | HEIGHT: 70 IN | DIASTOLIC BLOOD PRESSURE: 55 MMHG | BODY MASS INDEX: 24.52 KG/M2 | SYSTOLIC BLOOD PRESSURE: 118 MMHG | TEMPERATURE: 98 F | WEIGHT: 171.31 LBS

## 2022-03-13 LAB
AORTIC ROOT ANNULUS: 2.96 CM
AORTIC VALVE CUSP SEPERATION: 1.75 CM
AV INDEX (PROSTH): 0.91
AV MEAN GRADIENT: 2 MMHG
AV PEAK GRADIENT: 4 MMHG
AV VALVE AREA: 3.82 CM2
AV VELOCITY RATIO: 0.93
BSA FOR ECHO PROCEDURE: 1.98 M2
CV ECHO LV RWT: 0.53 CM
DOP CALC AO PEAK VEL: 0.99 M/S
DOP CALC AO VTI: 21.78 CM
DOP CALC LVOT AREA: 4.2 CM2
DOP CALC LVOT DIAMETER: 2.31 CM
DOP CALC LVOT PEAK VEL: 0.92 M/S
DOP CALC LVOT STROKE VOLUME: 83.27 CM3
DOP CALC MV VTI: 25.46 CM
DOP CALCLVOT PEAK VEL VTI: 19.88 CM
E WAVE DECELERATION TIME: 360.08 MSEC
E/A RATIO: 0.49
E/E' RATIO: 9 M/S
ECHO LV POSTERIOR WALL: 1.23 CM (ref 0.6–1.1)
EJECTION FRACTION: 55 %
FRACTIONAL SHORTENING: 57 % (ref 28–44)
INTERVENTRICULAR SEPTUM: 0.9 CM (ref 0.6–1.1)
LA MAJOR: 5.03 CM
LA MINOR: 3.9 CM
LA WIDTH: 3.79 CM
LEFT ATRIUM SIZE: 2.98 CM
LEFT ATRIUM VOLUME INDEX MOD: 17.1 ML/M2
LEFT ATRIUM VOLUME INDEX: 21.4 ML/M2
LEFT ATRIUM VOLUME MOD: 33.76 CM3
LEFT ATRIUM VOLUME: 42.18 CM3
LEFT INTERNAL DIMENSION IN SYSTOLE: 2 CM (ref 2.1–4)
LEFT VENTRICLE DIASTOLIC VOLUME INDEX: 33.54 ML/M2
LEFT VENTRICLE DIASTOLIC VOLUME: 66.07 ML
LEFT VENTRICLE MASS INDEX: 88 G/M2
LEFT VENTRICLE SYSTOLIC VOLUME INDEX: 11 ML/M2
LEFT VENTRICLE SYSTOLIC VOLUME: 21.66 ML
LEFT VENTRICULAR INTERNAL DIMENSION IN DIASTOLE: 4.6 CM (ref 3.5–6)
LEFT VENTRICULAR MASS: 173.23 G
LV LATERAL E/E' RATIO: 7.5 M/S
LV SEPTAL E/E' RATIO: 11.25 M/S
MV PEAK A VEL: 0.92 M/S
MV PEAK E VEL: 0.45 M/S
MV PEAK GRADIENT: 5 MMHG
MV STENOSIS PRESSURE HALF TIME: 104.42 MS
MV VALVE AREA BY CONTINUITY EQUATION: 3.27 CM2
MV VALVE AREA P 1/2 METHOD: 2.11 CM2
PISA TR MAX VEL: 3.3 M/S
POCT GLUCOSE: 163 MG/DL (ref 70–110)
POCT GLUCOSE: 222 MG/DL (ref 70–110)
PV PEAK VELOCITY: 1.02 CM/S
RA MAJOR: 3.5 CM
RA PRESSURE: 3 MMHG
RA WIDTH: 3.3 CM
RIGHT VENTRICULAR END-DIASTOLIC DIMENSION: 2.5 CM
RIGHT VENTRICULAR LENGTH IN DIASTOLE (APICAL 4-CHAMBER VIEW): 5.7 CM
RV TISSUE DOPPLER FREE WALL SYSTOLIC VELOCITY 1 (APICAL 4 CHAMBER VIEW): 15.58 CM/S
STJ: 2.37 CM
TDI LATERAL: 0.06 M/S
TDI SEPTAL: 0.04 M/S
TDI: 0.05 M/S
TR MAX PG: 44 MMHG
TRICUSPID ANNULAR PLANE SYSTOLIC EXCURSION: 2.31 CM
TV REST PULMONARY ARTERY PRESSURE: 47 MMHG

## 2022-03-13 PROCEDURE — 25000003 PHARM REV CODE 250: Performed by: STUDENT IN AN ORGANIZED HEALTH CARE EDUCATION/TRAINING PROGRAM

## 2022-03-13 PROCEDURE — 99900035 HC TECH TIME PER 15 MIN (STAT)

## 2022-03-13 PROCEDURE — 94761 N-INVAS EAR/PLS OXIMETRY MLT: CPT

## 2022-03-13 PROCEDURE — 94640 AIRWAY INHALATION TREATMENT: CPT

## 2022-03-13 PROCEDURE — 25000242 PHARM REV CODE 250 ALT 637 W/ HCPCS: Performed by: SURGERY

## 2022-03-13 PROCEDURE — 97116 GAIT TRAINING THERAPY: CPT | Mod: CQ

## 2022-03-13 PROCEDURE — 25000003 PHARM REV CODE 250: Performed by: SURGERY

## 2022-03-13 PROCEDURE — 97530 THERAPEUTIC ACTIVITIES: CPT | Mod: CQ

## 2022-03-13 RX ORDER — LOSARTAN POTASSIUM 25 MG/1
25 TABLET ORAL DAILY
Qty: 90 TABLET | Refills: 3 | Status: SHIPPED | OUTPATIENT
Start: 2022-03-13 | End: 2023-03-10

## 2022-03-13 RX ORDER — HYDROCHLOROTHIAZIDE 25 MG/1
25 TABLET ORAL DAILY
Qty: 90 TABLET | Refills: 3 | Status: SHIPPED | OUTPATIENT
Start: 2022-03-14 | End: 2023-01-26 | Stop reason: SINTOL

## 2022-03-13 RX ORDER — HYDROCHLOROTHIAZIDE 25 MG/1
25 TABLET ORAL DAILY
Status: DISCONTINUED | OUTPATIENT
Start: 2022-03-14 | End: 2022-03-13 | Stop reason: HOSPADM

## 2022-03-13 RX ORDER — LOSARTAN POTASSIUM 25 MG/1
25 TABLET ORAL DAILY
Status: DISCONTINUED | OUTPATIENT
Start: 2022-03-13 | End: 2022-03-13 | Stop reason: HOSPADM

## 2022-03-13 RX ADMIN — TAMSULOSIN HYDROCHLORIDE 0.4 MG: 0.4 CAPSULE ORAL at 08:03

## 2022-03-13 RX ADMIN — ALBUTEROL SULFATE 2.5 MG: 2.5 SOLUTION RESPIRATORY (INHALATION) at 08:03

## 2022-03-13 RX ADMIN — FLUTICASONE FUROATE AND VILANTEROL TRIFENATATE 1 PUFF: 100; 25 POWDER RESPIRATORY (INHALATION) at 08:03

## 2022-03-13 RX ADMIN — ALBUTEROL SULFATE 2.5 MG: 2.5 SOLUTION RESPIRATORY (INHALATION) at 02:03

## 2022-03-13 RX ADMIN — ATORVASTATIN CALCIUM 40 MG: 40 TABLET, FILM COATED ORAL at 08:03

## 2022-03-13 RX ADMIN — HYDROCHLOROTHIAZIDE 12.5 MG: 12.5 TABLET ORAL at 08:03

## 2022-03-13 RX ADMIN — HYDRALAZINE HYDROCHLORIDE 25 MG: 25 TABLET, FILM COATED ORAL at 06:03

## 2022-03-13 RX ADMIN — MAGNESIUM HYDROXIDE 2400 MG: 400 SUSPENSION ORAL at 08:03

## 2022-03-13 RX ADMIN — FINASTERIDE 5 MG: 5 TABLET, FILM COATED ORAL at 08:03

## 2022-03-13 NOTE — PROGRESS NOTES
[facility]                    Pharmacy       Discharge Medication Education    Patient ACCEPTED medication education. Pharmacy has provided education on the name, indication, and possible side effects of the medication(s) prescribed, using teach-back method.     The following medications have also been discussed, during this admission.        Medication List        START taking these medications      losartan 25 MG tablet  Commonly known as: COZAAR  Take 1 tablet (25 mg total) by mouth once daily.            CHANGE how you take these medications      hydroCHLOROthiazide 25 MG tablet  Commonly known as: HYDRODIURIL  Take 1 tablet (25 mg total) by mouth once daily.  Start taking on: March 14, 2022  What changed:   medication strength  how much to take  how to take this  when to take this  Another medication with the same name was removed. Continue taking this medication, and follow the directions you see here.            ASK your doctor about these medications      * albuterol 1.25 mg/3 mL Nebu  Commonly known as: ACCUNEB  Take 3 mLs (1.25 mg total) by nebulization every 6 (six) hours as needed. Rescue     * albuterol 90 mcg/actuation inhaler  Commonly known as: VENTOLIN HFA  Inhale 2 puffs into the lungs every 6 (six) hours as needed for Wheezing. Rescue     aspirin 81 MG EC tablet  Commonly known as: ECOTRIN     atorvastatin 40 MG tablet  Commonly known as: LIPITOR  Take 1 tablet (40 mg total) by mouth once daily.     finasteride 5 mg tablet  Commonly known as: PROSCAR  TAKE 1 TABLET BY MOUTH EVERY DAY     fish oil-omega-3 fatty acids 300-1,000 mg capsule     fluticasone propionate 50 mcg/actuation nasal spray  Commonly known as: FLONASE  2 sprays (100 mcg total) by Each Nostril route once daily.     GARLIC ORAL     metFORMIN 500 MG tablet  Commonly known as: GLUCOPHAGE  TAKE 1 TABLET BY MOUTH TWICE DAILY WITH FOOD     multivitamin capsule     tamsulosin 0.4 mg Cap  Commonly known as: FLOMAX      vitamin D 1000 units Tab  Commonly known as: VITAMIN D3           * This list has 2 medication(s) that are the same as other medications prescribed for you. Read the directions carefully, and ask your doctor or other care provider to review them with you.                   Where to Get Your Medications        You can get these medications from any pharmacy    Bring a paper prescription for each of these medications  hydroCHLOROthiazide 25 MG tablet  losartan 25 MG tablet          Thank you  Rosie Wharton, PharmD  [phone]

## 2022-03-13 NOTE — PLAN OF CARE
AAOX4. Medications given per MAR. Safety maintained. Call bell within reach. Patient encouraged to call for assistance. IV abx infusing. ADA 1500 diet continued. BG monitored. Pt denies N/V, SOB, distress, and pain. Vital signs stable throughout the night. Afebrile. Will continue to monitor.

## 2022-03-13 NOTE — PT/OT/SLP PROGRESS
Physical Therapy Treatment    Patient Name:  Husam Connolly   MRN:  [id number]    Recommendations:     Discharge Recommendations:  home health PT   Discharge Equipment Recommendations: none   Barriers to discharge:   None    Assessment:     Husam Connolly is a 89 y.o. male admitted with a medical diagnosis of Calculus of gallbladder with acute on chronic cholecystitis without obstruction.  He presents with the following impairments/functional limitations:  weakness, impaired endurance, impaired self care skills, impaired functional mobilty, gait instability, impaired balance, decreased coordination, decreased upper extremity function, decreased lower extremity function, decreased safety awareness, decreased ROM, impaired coordination. Pt able to perform ambulation training ~15 ft without AD requiring CGA/SBA as pt reaching for walls and objects to steady himself. ~250 ft with RW requiring SBA. Recommending home health PT upon discharge.    Rehab Prognosis: Good; patient would benefit from acute skilled PT services to address these deficits and reach maximum level of function.    Recent Surgery: Procedure(s) (LRB):  CHOLECYSTECTOMY (N/A)  LAPAROTOMY, EXPLORATORY (N/A)  LYSIS, ADHESIONS (N/A)  REPAIR, HERNIA, VENTRAL (N/A) 6 Days Post-Op    Plan:     During this hospitalization, patient to be seen 6 x/week to address the identified rehab impairments via gait training, therapeutic activities, therapeutic exercises, neuromuscular re-education and progress toward the following goals:    · Plan of Care Expires:  04/05/22    Subjective     Chief Complaint: None Expressed  Patient/Family Comments/goals: None Expressed  Pain/Comfort:  · Pain Rating 1: 0/10  · Pain Rating Post-Intervention 1: 0/10 (No Complaints)      Objective:     Communicated with nurse prior to session.  Patient found sitting edge of bed with peripheral IV, telemetry upon PT entry to room.     General Precautions: Standard, fall   Orthopedic  Precautions:N/A   Braces: N/A  Respiratory Status: Room air     Functional Mobility:  · Transfers:     · Sit to Stand:  stand by assistance with no AD  · Gait:  ~15 ft without AD, IV pole in tow, requiring CGA/SBA. ~250 ft with RW requiring SBA.      AM-PAC 6 CLICK MOBILITY  Turning over in bed (including adjusting bedclothes, sheets and blankets)?: 4  Sitting down on and standing up from a chair with arms (e.g., wheelchair, bedside commode, etc.): 3  Moving from lying on back to sitting on the side of the bed?: 4  Moving to and from a bed to a chair (including a wheelchair)?: 3  Need to walk in hospital room?: 3  Climbing 3-5 steps with a railing?: 3  Basic Mobility Total Score: 20       Therapeutic Activities and Exercises:   Pt sitting at EOB with son present. Able to perform ambulation training ~15 ft without AD,requiring CGA/SBA as pt reaching for walls and other objects to steady self. ~250 ft with RW, IV pole in tow, requiring SBA. Instructed in and performed 1 x 10 reps of sit to stand transfer exercises with SBA. 1 x 10 reps of heel raises with hands resting on IV pole.    Patient left up in chair with all lines intact, call button in reach, chair alarm on,  nurse notified and  son present..    GOALS:   Multidisciplinary Problems     Physical Therapy Goals        Problem: Physical Therapy Goal    Goal Priority Disciplines Outcome Goal Variances Interventions   Physical Therapy Goal     PT, PT/OT Ongoing, Progressing     Description: Goals to be met by: 22     Patient will increase functional independence with mobility by performin.  Supine to/from sit with Mod Ind  2. Ambulate 120' with or without AD with supervision with or without O2.  3. Up in chair 2 hours  4.  Bed to/from chair with supervision                     Time Tracking:     PT Received On: 22  PT Start Time: 1005     PT Stop Time: 1040  PT Total Time (min): 35 min     Billable Minutes: Gait Training  22 and Therapeutic Activity   13    Treatment Type: Treatment  PT/PTA: PTA     PTA Visit Number: 1     03/13/2022

## 2022-03-13 NOTE — PLAN OF CARE
Problem: Physical Therapy Goal  Goal: Physical Therapy Goal  Description: Goals to be met by: 22     Patient will increase functional independence with mobility by performin.  Supine to/from sit with Mod Ind  2. Ambulate 120' with or without AD with supervision with or without O2.  3. Up in chair 2 hours  4.  Bed to/from chair with supervision    Outcome: Ongoing, Progressing   Pt continues to work toward all goals. Able to perform  ambulation training ~15 ft without AD, IV pole in tow, requiring CGA/SBA as pt reaching for walls and objects to steady self. ~250 ft with RW, IV pole in tow, requiring SBA.

## 2022-03-13 NOTE — PLAN OF CARE
SW met with pt and pts adult children via vdyo to discuss dc planning.    Pt will dc with HH.   Disha MONCADA. Sent HH orders to N.     Home Health agency will contact pt to schedule first appointment time/date.   Pts family was made aware if they do not hear from HH agency by Monday to provide Disha MONCADA a call. Pt confirmed that he has Disha MONCADA phone number.     Pts adult children will transport him home upon dc.     SW will continue to follow pt throughout his transitions of care and assist with any dc needs.     Future Appointments   Date Time Provider Department Center   3/18/2022  1:00 PM Eriberto Madrid MD [facility] [address]   3/21/2022  9:40 AM Slick Baldwin Jr., MD [facility]TH[facility]   3/29/2022  1:30 PM Fouzia Gardner MD [facility]   5/24/2022  3:00 PM Alverto Green MD [facility]   5/31/2022  1:30 PM Daren Lassiter MD [facility] C[facility]IO [facility]        03/13/22 1339   Final Note   Assessment Type Final Discharge Note   Anticipated Discharge Disposition [facility]   Hospital Resources/Appts/Education Provided Appointments scheduled by Navigator/Coordinator   Post-Acute Status   Discharge Delays None known at this time

## 2022-03-13 NOTE — PROGRESS NOTES
"[facility] Consult Progress Note     Admitting Team: [facility] Hospitalist Team A  Attending Physician: Jj CARMONA  Resident: Ang  Intern: Ross     Date of Admit: 3/6/2022    Chief Complaint     Shortness of breath x 4 days    Subjective:      Patient says he feels great this morning, said his SOB is much improved and hasn't needed O2 for over a day. He denied any dizziness, CP, abdominal pain, issues with BMs/urination. He reports he's been eating/sleeping well. No complaints at this moment and says he's in "good spirits" and is looking forward to going home.     Objective:   Blood pressure (!) 173/76, pulse 86, temperature 98.6 °F (37 °C), resp. rate 18, height 5' 10" (1.778 m), weight 77.7 kg (171 lb 4.8 oz), SpO2 97 %.    Stool 1x    Physical Examination:  Physical Exam  Constitutional:       General: He is not in acute distress.     Appearance: He is not ill-appearing or diaphoretic.   HENT:      Head: Normocephalic and atraumatic.      Nose: No congestion.      Mouth/Throat:      Mouth: Mucous membranes are dry.   Eyes:      General: No scleral icterus.     Extraocular Movements: Extraocular movements intact.      Pupils: Pupils are equal, round, and reactive to light.   Cardiovascular:      Rate and Rhythm: Normal rate.      Pulses: Normal pulses.      Heart sounds: No murmur heard.    No gallop.   Pulmonary:      Effort: Pulmonary effort is normal.      Comments: Light crackles and slight expiratory wheezes  Abdominal:      General: Abdomen is flat. There is no distension.   Musculoskeletal:         General: Normal range of motion.      Cervical back: No rigidity.      Right lower leg: No edema.      Left lower leg: No edema.   Skin:     Coloration: Skin is not jaundiced.      Findings: No rash.   Neurological:      General: No focal deficit present.      Mental Status: He is alert and oriented to person, place, and time.      Cranial Nerves: No cranial nerve deficit.      Sensory: No sensory deficit. "   Psychiatric:         Mood and Affect: Mood normal.         Behavior: Behavior normal.         Laboratory:  Most Recent Data:  CBC:   Lab Results   Component Value Date    WBC 9.84 03/11/2022    HGB 13.6 (L) 03/11/2022    HCT 42.5 03/11/2022     03/11/2022    MCV 91 03/11/2022    RDW 13.3 03/11/2022       BMP:   Lab Results   Component Value Date     03/11/2022    K 5.2 (H) 03/11/2022     03/11/2022    CO2 27 03/11/2022    BUN 18 03/11/2022    CREATININE 1.2 03/11/2022     (H) 03/11/2022    CALCIUM 9.2 03/11/2022    MG 1.9 06/03/2021    PHOS 3.0 10/28/2011     LFTs:   Lab Results   Component Value Date    PROT 5.4 (L) 03/08/2022    ALBUMIN 2.8 (L) 03/08/2022    BILITOT 1.9 (H) 03/08/2022    AST 55 (H) 03/08/2022    ALKPHOS 69 03/08/2022    ALT 32 03/08/2022     Coags:   Lab Results   Component Value Date    INR 1.1 09/10/2019     FLP:   Lab Results   Component Value Date    CHOL 164 08/25/2021    HDL 58 08/25/2021    LDLCALC 75 08/25/2021    TRIG 217 (H) 08/25/2021    CHOLHDL 2.8 08/25/2021     DM:   Lab Results   Component Value Date    HGBA1C 7.6 (H) 02/12/2022    HGBA1C 7.8 (H) 08/25/2021    HGBA1C 6.8 (H) 06/01/2021    MICROALBUR 0.7 08/25/2021    LDLCALC 75 08/25/2021    CREATININE 1.2 03/11/2022     Thyroid:   Lab Results   Component Value Date    TSH 1.05 08/25/2021     Anemia: No results found for: IRON, TIBC, FERRITIN, [id number], FOLATE  Cardiac:   Lab Results   Component Value Date    TROPONINI 0.020 03/06/2022    BNP 47 03/06/2022     Urinalysis:   Lab Results   Component Value Date    COLORU Yellow 03/07/2022    SPECGRAV 1.020 03/07/2022    NITRITE Negative 03/07/2022    KETONESU Negative 03/07/2022    UROBILINOGEN Negative 03/07/2022    WBCUA 2 05/31/2021       Trended Lab Data:  Recent Labs   Lab 03/06/22  2344 03/08/22  0843 03/11/22  1257   WBC 19.04* 12.86* 9.84   HGB 15.9 12.2* 13.6*   HCT 47.9 36.5* 42.5    155 253   MCV 88 88 91   RDW 13.2 13.3 13.3     132* 139   K 4.2 4.3 5.2*   CL 95 95 103   CO2 25 23 27   BUN 25* 29* 18   CREATININE 1.5* 1.6* 1.2   * 158* 249*   PROT 7.5 5.4*  --    ALBUMIN 3.6 2.8*  --    BILITOT 2.1* 1.9*  --    AST 19 55*  --    ALKPHOS 98 69  --    ALT 14 32  --        Trended Cardiac Data:  Recent Labs   Lab 03/06/22  2344   TROPONINI 0.020   BNP 47     Radiology:  Imaging Results           NM Hepatobiliary Scan (HIDA) (Final result)  Result time 03/07/22 12:12:17    Final result by Precious Da Silva MD (03/07/22 12:12:17)                 Impression:      Findings concerning for acute cholecystitis.    This report was flagged in Epic as abnormal.      Electronically signed by: Precious Da Silva  Date:    03/07/2022  Time:    12:12             Narrative:    EXAMINATION:  NM HEPATOBILIARY IMAGING INC GB (HIDA)    CLINICAL HISTORY:  Cholelithiasis;Calculus of gallbladder without cholecystitis without obstruction    TECHNIQUE:  Following the IV administration of 5 mCi of [id number]-mebrofenin, sequential dynamic anterior images of the abdomen were obtained for 60 minutes followed by a right lateral view at 60 minutes.  3 mg of morphine were given at 60 minutes with 30 minute imaging follow-up with anterior and right lateral views    COMPARISON:  Abdominal ultrasound and CT abdomen pelvis dated earlier same day    FINDINGS:  The early images demonstrate homogeneous uptake of the radiopharmaceutical by the liver with no focal hepatic abnormalities.    Normal activity is present in the small bowel.  No radiotracer uptake seen within the cystic duct or gallbladder.    The clearance from the liver is good.                                US Abdomen Limited (Final result)  Result time 03/07/22 05:04:25    Final result by Ty Armstrong MD (03/07/22 05:04:25)                 Impression:      Distended gallbladder with biliary sludge, gallbladder wall thickening, and pericholecystic fluid.  Sonographic Leonard sign is reportedly negative though findings  raise concern for acute cholecystitis in the appropriate clinical setting.    Incidental findings in the body of the report.    This report was flagged in Epic as abnormal.      Electronically signed by: Ty Armstrong MD  Date:    03/07/2022  Time:    05:04             Narrative:    EXAMINATION:  US ABDOMEN LIMITED    CLINICAL HISTORY:  bili elevated, leukocytosis;.    TECHNIQUE:  Limited ultrasound of the right upper quadrant of the abdomen including pancreas, liver, gallbladder, common bile duct was performed.    COMPARISON:  CT abdomen pelvis without contrast, 03/07/2022 and 02/09/2022.    FINDINGS:  Liver: Normal in size, measuring 15.0 cm. Homogeneous echotexture. No focal hepatic lesions.    Gallbladder: Gallbladder is distended with sludge.  There is gallbladder wall thickening measuring up to 7 mm in thickness.  Small amount of pericholecystic fluid.  These findings are new when compared with prior CT dated 02/09/2022, allowing for differences in modality.  Sonographic Leonard sign is reportedly negative.  No shadowing gallstones.    Biliary system: The common duct is not dilated, measuring 4 mm.  No intrahepatic ductal dilatation.    Spleen: Normal in size with a homogeneous echotexture, measuring 7.2 x 2.8 cm.    Pancreas: Pancreas is predominantly obscured by overlying bowel gas but is otherwise unremarkable.    Miscellaneous: No ascites.  Limited evaluation of the right kidney is negative for hydronephrosis.  There is a large simple cyst in the superior pole of the right kidney measuring up to 8 cm in size.  IVC is visualized and unremarkable.                               CT Abdomen Pelvis  Without Contrast (Final result)  Result time 03/07/22 00:48:26    Final result by Toro Hamm MD (03/07/22 00:48:26)                 Impression:      Diverticulosis without diverticulitis.    Anterior abdominal wall hernia containing colon without evidence of obstruction.    Hydrops of the gallbladder.   Correlate for early cholecystitis.      Electronically signed by: Toro Hamm  Date:    03/07/2022  Time:    00:48             Narrative:    EXAMINATION:  CT ABDOMEN PELVIS WITHOUT CONTRAST    CLINICAL HISTORY:  Bowel obstruction suspected;    TECHNIQUE:  Low dose axial images, sagittal and coronal reformations were obtained from the lung bases to the pubic symphysis.  Oral contrast was not administered.    COMPARISON:  KUB, 02/13/2022    FINDINGS:  Lung bases are clear.  The base of the heart and pericardium appear unremarkable aside from atherosclerotic calcifications in the aortic valve plane.    The liver contains a low-density in left lobe compatible with cyst.  The gallbladder is significantly distended but without discrete pericholecystic inflammatory changes or fluid.  There is no intra or extrahepatic biliary ductal dilatation.    Pancreas is partially fatty replaced.  No mass or inflammation is seen.    Both kidneys contain numerous cortical cysts the largest in the upper pole of the right kidney measuring up to 8.5 cm.    The adrenal glands appear normal.  The aorta is atherosclerotic but normal in caliber extending into the iliac system.    There is an anterior abdominal wall hernia with loops of colon abutting into the orifice and extending into the subcutaneous fat to the right of midline.  No obstructive changes evident.  The loops of small intestines appear unremarkable with no dilatation.    There is extensive diverticulosis in the transverse descending and sigmoid colon but without features of diverticulitis.  No colonic obstruction is evident.    The prostate gland is prominent.  The seminal vesicles are small in the urinary bladder is incompletely distended.    Spondylosis and degenerative changes throughout the spine are noted.  There is no free air or free fluid.  No adenopathy is present                               X-Ray Chest AP Portable (Final result)  Result time 03/07/22 00:30:52     Final result by Toro Hamm MD (03/07/22 00:30:52)                 Impression:      No acute abnormality.      Electronically signed by: Toro Hamm  Date:    03/07/2022  Time:    00:30             Narrative:    EXAMINATION:  XR CHEST AP PORTABLE    CLINICAL HISTORY:  CHF;    TECHNIQUE:  Single frontal view of the chest was performed.    COMPARISON:  02/10/2022    FINDINGS:  The lungs are clear, with normal appearance of pulmonary vasculature and no pleural effusion or pneumothorax.    The cardiac silhouette is normal in size. The hilar and mediastinal contours are unremarkable.    Bones are intact.  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                                  CT PE: No PE, emphysematous changes noted and scattered calcified granulomas     Assessment:     Husam Connolly is a 89 year old male patient with PMH of small bowel obstruction, BPH, T2DM, diverticulosis s/p colostomy and anastomosis who the [facility] team was consulted for shortness of breath x 1 month that has worsened over the past few days. Per Pulmonology, SOB could be from deconditioning, weakness, HFpEF, mild asthma, DVT/PE, or possibly occupational exposure. CTPE with emphysema, which may be principal cause. Recommend to stop home metformin and to consider anti-coagulation while inpatient.     Plan:     Acute Hypoxic Respiratory Failure  - Unclear etiology of shortness of breath  - Chronic work exposure at [employer] with chemical exposure  - Similar respiratory symptoms after prior abdominal surgery per patient  - Per Pulmonology, his SOB could be from deconditioning, weakness, HFpEF, mild asthma, or DVT  - CXR with nonspecific patchy findings  - Ordered CT PE : No PE, emphysematous changes noted and scattered calcified granulomas  - US bilateral LE: no evidence of DVT  - Echo: EF 55% grade I left diastolic dysfunction, abnormal septal wall motion, pulmonary hypertension  - Started on  Breo 200 and DuoNebs per pulm recs  - Plan for PFTs outpatient  - currently on room air in no distress  - reports significant improvement in SOB    Cholecystectomy 3/7  - HIDA scan with acute cholecystitis on 3/7  - S/p cholecystectomy this admission for gangrenous gallbladder  - No complications with operation   - Drain removed 3/10 and patient moved bowel 3/11  - Management per Surgery    Hypertension  - BP at 173/74 on initial visit  - Managed outpatient on HCTZ 12.5  - On Hydralazine 25 TID and HCTZ 12.5  - Would benefit from better BP management    BPH  - Recommend continue finasteride 5 and tamulosin 0.4    Hyperlipidemia  - Recommend continue atorvastatin 40     Type 2 Diabetes  - A1c at 7.6  - SSI inpatient  - Recommend STOP home metformin due to risk of lactic acidosis    CKD3a  - Cr 1.2, GFR 53  - Avoid nephrotoxic agents and renally dose medications      DVT ppx: none currently  Diet: renal diet  Dispo: Pending workup of his shortness of breath and reducing O2 requirement      Code Status:     Full Code    Yifan Rubin MD  [facility] Internal Medicine HO-I    [facility] Medicine Hospitalist Pager numbers:   [facility] Hospitalist Medicine Team A (Sunday/Yasmin): [phone]  [facility] Hospitalist Medicine Team B (Melida/Lauren):  [phone]

## 2022-03-13 NOTE — PLAN OF CARE
Problem: Adult Inpatient Plan of Care  Goal: Plan of Care Review  3/13/2022 1310 by Nina Ralph RN  Outcome: Met  3/13/2022 1256 by Nina Ralph RN  Outcome: Ongoing, Progressing  Goal: Patient-Specific Goal (Individualized)  Outcome: Met  Goal: Absence of Hospital-Acquired Illness or Injury  Outcome: Met  Goal: Optimal Comfort and Wellbeing  Outcome: Met  Goal: Readiness for Transition of Care  Outcome: Met     Problem: Diabetes Comorbidity  Goal: Blood Glucose Level Within Targeted Range  Outcome: Met     Problem: Infection  Goal: Absence of Infection Signs and Symptoms  Outcome: Met     Problem: Fall Injury Risk  Goal: Absence of Fall and Fall-Related Injury  Outcome: Met     Problem: Bowel Motility Impaired (Surgery Nonspecified)  Goal: Effective Bowel Elimination  Outcome: Met     Problem: Fluid and Electrolyte Imbalance (Surgery Nonspecified)  Goal: Fluid and Electrolyte Balance  Outcome: Met     Problem: Glycemic Control Impaired (Surgery Nonspecified)  Goal: Blood Glucose Level Within Targeted Range  Outcome: Met     Problem: Infection (Surgery Nonspecified)  Goal: Absence of Infection Signs and Symptoms  Outcome: Met     Problem: Pain (Surgery Nonspecified)  Goal: Acceptable Pain Control  Outcome: Met     Problem: Postoperative Nausea and Vomiting (Surgery Nonspecified)  Goal: Nausea and Vomiting Relief  Outcome: Met     Problem: Respiratory Compromise (Surgery Nonspecified)  Goal: Effective Oxygenation and Ventilation  Outcome: Met     Problem: Skin Injury Risk Increased  Goal: Skin Health and Integrity  Outcome: Met     Problem: COPD (Chronic Obstructive Pulmonary Disease) Comorbidity  Goal: Maintenance of COPD Symptom Control  Outcome: Met     Problem: Hypertension Comorbidity  Goal: Blood Pressure in Desired Range  Outcome: Met     Problem: Physical Therapy Goal  Goal: Physical Therapy Goal  Description: Goals to be met by: 4/4/22     Patient will increase functional independence with mobility by  performin.  Supine to/from sit with Mod Ind  2. Ambulate 120' with or without AD with supervision with or without O2.  3. Up in chair 2 hours  4.  Bed to/from chair with supervision    Outcome: Met

## 2022-03-13 NOTE — PROGRESS NOTES
"Surgery follow up  BP (!) 118/55   Pulse 88   Temp 98.2 °F (36.8 °C)   Resp 18   Ht 5' 10" (1.778 m)   Wt 77.7 kg (171 lb 4.8 oz)   SpO2 95%   BMI 24.58 kg/m²   I/O last 3 completed shifts:  In: 1119.6 [P.O.:360; IV Piggyback:759.6]  Out: 0   No intake/output data recorded.   Recent Results (from the past 336 hour(s))   CBC Auto Differential    Collection Time: 03/11/22 12:57 PM   Result Value Ref Range    WBC 9.84 3.90 - 12.70 K/uL    Hemoglobin 13.6 (L) 14.0 - 18.0 g/dL    Hematocrit 42.5 40.0 - 54.0 %    Platelets 253 150 - 450 K/uL   CBC auto differential    Collection Time: 03/08/22  8:43 AM   Result Value Ref Range    WBC 12.86 (H) 3.90 - 12.70 K/uL    Hemoglobin 12.2 (L) 14.0 - 18.0 g/dL    Hematocrit 36.5 (L) 40.0 - 54.0 %    Platelets 155 150 - 450 K/uL   CBC auto differential    Collection Time: 03/06/22 11:44 PM   Result Value Ref Range    WBC 19.04 (H) 3.90 - 12.70 K/uL    Hemoglobin 15.9 14.0 - 18.0 g/dL    Hematocrit 47.9 40.0 - 54.0 %    Platelets 211 150 - 450 K/uL   doing better , no complaints   Discharge home today follow up in office Thursday.  "

## 2022-03-14 NOTE — DISCHARGE SUMMARY
[facility] Surg  General Surgery  Discharge Summary      Patient Name: Husam Connolly  MRN: [id number]  Admission Date: 3/6/2022  Hospital Length of Stay: 6 days  Discharge Date and Time: 3/13/2022  3:20 PM  Attending Physician: Teodora att. providers found   Discharging Provider: Fouzia Gardner MD  Primary Care Provider: Alverto Green MD     HPI:  89-year-old male admitted through and the emergency room with epigastric right upper quadrant pain associated nausea vomiting and shortness of breath patient was worked up in the emergency room patient was hospitalized about a month of a small-bowel obstruction patient was worked up and was found to have cholecystitis cholelithiasis patient was admitted for further treatment patient gave history of COPD ventral hernia constipation change in bowel habit hypertension coronary artery disease status post previous ventral hernia    Procedure(s) (LRB):  CHOLECYSTECTOMY (N/A)  LAPAROTOMY, EXPLORATORY (N/A)  LYSIS, ADHESIONS (N/A)  REPAIR, HERNIA, VENTRAL (N/A)     Hospital Course:  Patient admitted started on IV antibiotics underwent workup CT scan showed acute gangrenous gallbladder.  HIDA scan was positive patient was taken to the operating room underwent attempted lap cholecystectomy which was not successful patient underwent exploratory lap lysis of adhesion repair ventral hernia and open cholecystectomy patient was in acute pain postoperative better was patient was kept NPO NG tube IV fluids and IV antibiotics patient was continued on the treatment patient was seen by Medicine forearm shortness of breath of the and internal medicine for control of high blood pressure patient was continued on IV antibiotics underwent acral patient underwent workup for shortness of breath for ruling out pulmonary embolism which was negative patient was out of bed ambulating eating well moving bowel wound was healing well patient was discharged home on p.o. pain pill and antibiotics  advised not to do any heavy lifting patient to come and see me in the office in 1 week to have the stitches taken out arrangements was made for the home health care patient to follow up with with the primary also.    Consults:   Consults (From admission, onward)        Status Ordering Provider     Inpatient consult to Internal Medicine-[facility]  Once        Provider:  Werner Montez MD    Completed STACI MARINA     Inpatient consult to Pulmonology  Once        Provider:  Charlene Siu MD    Completed STACI MARINA          Significant Diagnostic Studies: Labs:   BMP: No results for input(s): GLU, NA, K, CL, CO2, BUN, CREATININE, CALCIUM, MG in the last 48 hours., CMP No results for input(s): NA, K, CL, CO2, GLU, BUN, CREATININE, CALCIUM, PROT, ALBUMIN, BILITOT, ALKPHOS, AST, ALT, ANIONGAP, ESTGFRAFRICA, EGFRNONAA in the last 48 hours., CBC No results for input(s): WBC, HGB, HCT, PLT in the last 48 hours., INR   Lab Results   Component Value Date    INR 1.1 09/10/2019    INR 1.1 04/18/2015    INR 1.0 05/30/2013   , Lipid Panel   Lab Results   Component Value Date    CHOL 164 08/25/2021    HDL 58 08/25/2021    LDLCALC 75 08/25/2021    TRIG 217 (H) 08/25/2021    CHOLHDL 2.8 08/25/2021    and A1C:   Recent Labs   Lab 02/12/22  1209   HGBA1C 7.6*     Microbiology:   Blood Culture   Lab Results   Component Value Date    LABBLOO No growth after 5 days. 03/07/2022   , Sputum Culture No results found for: GSRESP, RESPIRATORYC and Urine Culture  No results found for: LABURIN  Radiology: X-Ray: CXR: X-Ray Chest 1 View (CXR):   Results for orders placed or performed during the hospital encounter of 03/06/22   X-Ray Chest 1 View    Narrative    EXAMINATION:  XR CHEST 1 VIEW    CLINICAL HISTORY:  sob; Cholecystitis, unspecified    TECHNIQUE:  Single frontal view of the chest was performed.    COMPARISON:  03/07/2022    FINDINGS:  No significant change from prior.  No lung consolidation.  No new lung opacity.  No  large pleural effusion or pneumothorax.  Continued atherosclerotic aorta.  Degenerative change visualized shoulder joints.  Further evaluation as warranted clinically.      Impression    Please see above      Electronically signed by: Augusto Brown DO  Date:    03/11/2022  Time:    13:30     MRI: negative for pulmonary embolism  CT scan: CT ABDOMEN PELVIS WITH CONTRAST: No results found for this visit on 03/06/22.  Ultrasound: cholecystitis and chollilithiasis    Pending Diagnostic Studies:     Procedure Component Value Units Date/Time    Specimen to Pathology, Surgery General Surgery [[id number]] Collected: 03/07/22 1428    Order Status: Sent Lab Status: In process Updated: 03/08/22 1004        Final Active Diagnoses:    Diagnosis Date Noted POA    PRINCIPAL PROBLEM:  Calculus of gallbladder with acute on chronic cholecystitis without obstruction [K80.12] 03/09/2022 Yes    Dyspnea [R06.00]  Yes    Centrilobular emphysema [J43.2] 03/12/2022 Yes    SOB (shortness of breath) [R06.02]  Yes    Cholecystitis [K81.9] 03/07/2022 Yes    Calculus of gallbladder without cholecystitis without obstruction [K80.20] 03/07/2022 Yes    Small bowel obstruction due to adhesions [K56.50] 02/11/2022 Yes    Type 2 diabetes mellitus with stage 3a chronic kidney disease, without long-term current use of insulin [E11.22, N18.31] 09/22/2021 Yes     Chronic    Type 2 diabetes mellitus with other specified complication [E11.69] 08/26/2020 Yes    Ventral hernia without obstruction or gangrene [K43.9] 02/27/2020 Yes    Dyspnea on exertion [R06.00] 09/16/2019 Yes      Problems Resolved During this Admission:      Discharged Condition: good    Disposition: Home or Self Care    Follow Up:   Follow-up Information     [facility] Follow up.    Specialty: Pulmonology  Why: You have an appointment scheduled for 3/18 with Dr. Madrid because that was the first available in the given parameters, Dr. Benítez works that day  alongside Dr. Madrid and should be able to see you.  Contact information:  [address]  [facility] [address]  [phone]           Alverto Green MD Follow up.    Specialty: Family Medicine  Why: Offices closed for weekend. Patient to schedule own PCP follow up appointment within a week.  Contact information:  [address]  [address]  [place] [address]  [phone]             Home Health Agency Follow up.    Why: Home Health Agency will contact you to schedule first appointment time/date.                     Patient Instructions:      Ambulatory referral/consult to Pulmonology   Standing Status: Future   Referral Priority: Routine Referral Type: Consultation   Referral Reason: Specialty Services Required   Requested Specialty: Pulmonary Disease     Ambulatory referral/consult to Home Health   Standing Status: Future   Referral Priority: Routine Referral Type: Home Health Care   Referral Reason: Specialty Services Required   Requested Specialty: Home Health Services   Number of Visits Requested: 1     Diet Cardiac     Leave dressing on - Keep it clean, dry, and intact until clinic visit     Remove dressing in 48 hours     Complete PFT with bronchodilator   Standing Status: Future Standing Exp. Date: 03/12/23   Scheduling Instructions: Appointment scheduled in clinic with Dr. Madrid on 3/18     Order Specific Question Answer Comments   Release to patient Immediate      Activity as tolerated     Medications:  Reconciled Home Medications:      Medication List      START taking these medications    losartan 25 MG tablet  Commonly known as: COZAAR  Take 1 tablet (25 mg total) by mouth once daily.        CHANGE how you take these medications    hydroCHLOROthiazide 25 MG tablet  Commonly known as: HYDRODIURIL  Take 1 tablet (25 mg total) by mouth once daily.  What changed:   · medication strength  · how much to take  · how to take this  · when to take this  · Another medication with the same  name was removed. Continue taking this medication, and follow the directions you see here.        ASK your doctor about these medications    * albuterol 1.25 mg/3 mL Nebu  Commonly known as: ACCUNEB  Take 3 mLs (1.25 mg total) by nebulization every 6 (six) hours as needed. Rescue     * albuterol 90 mcg/actuation inhaler  Commonly known as: VENTOLIN HFA  Inhale 2 puffs into the lungs every 6 (six) hours as needed for Wheezing. Rescue     aspirin 81 MG EC tablet  Commonly known as: ECOTRIN  Take 81 mg by mouth once daily.     atorvastatin 40 MG tablet  Commonly known as: LIPITOR  Take 1 tablet (40 mg total) by mouth once daily.     finasteride 5 mg tablet  Commonly known as: PROSCAR  TAKE 1 TABLET BY MOUTH EVERY DAY     fish oil-omega-3 fatty acids 300-1,000 mg capsule  Take 1 capsule by mouth once daily.     fluticasone propionate 50 mcg/actuation nasal spray  Commonly known as: FLONASE  2 sprays (100 mcg total) by Each Nostril route once daily.     GARLIC ORAL  Take 1 capsule by mouth once daily.     metFORMIN 500 MG tablet  Commonly known as: GLUCOPHAGE  TAKE 1 TABLET BY MOUTH TWICE DAILY WITH FOOD     multivitamin capsule  Take 1 capsule by mouth once daily.     tamsulosin 0.4 mg Cap  Commonly known as: FLOMAX  Take 0.4 mg by mouth nightly.     vitamin D 1000 units Tab  Commonly known as: VITAMIN D3  Take 1,000 Units by mouth once daily.         * This list has 2 medication(s) that are the same as other medications prescribed for you. Read the directions carefully, and ask your doctor or other care provider to review them with you.                Fouzia Gardner MD  General Surgery  [facility] Surg

## 2022-03-15 ENCOUNTER — PATIENT OUTREACH (OUTPATIENT)
Dept: ADMINISTRATIVE | Facility: OTHER | Age: 87
End: 2022-03-15
Payer: MEDICARE

## 2022-03-15 LAB
FINAL PATHOLOGIC DIAGNOSIS: NORMAL
GROSS: NORMAL
Lab: NORMAL

## 2022-03-15 NOTE — PROGRESS NOTES
IP Liaison - Final Visit Note    Patient: Husam Connolly  MRN:  [id number]  Date of Service:  3/15/2022  Completed by:  SONA Hardy    Reason for Visit   Patient presents with    IP Liaison Chart Review        Patient Summary     Discharge Date: 3/13/2022  Discharge telephone number/address: [phone] / [address] [facility] [address]  Follow up provider: Eriberto Madrid MD  Follow up appointments: 3/18/2022 @ 1:00pm  Home Health agency & telephone number:  to be set up by PHN   DME ordered &  name: n/a  Assigned OPCM RN/SW: n/a  Report sent to follow up team (PCP/OPCM) via in basket message: n/a  Community Resources arranged including agency name & contact info: n/a      SONA Hardy

## 2022-03-17 NOTE — PHYSICIAN QUERY
PT Name: Husam Connolly  MR #: [id number]     DOCUMENTATION CLARIFICATION     CDS: Brandy Capley, RN  Email: [email or web address]    This form is a permanent document in the medical record.     Query Date: 2022    By submitting this query, we are merely seeking further clarification of documentation.  Please utilize your independent clinical judgment when addressing the question(s) below.  The Medical Record contains the following     Indicators   Supporting Clinical Findings Location in Medical Record   X SOB, ARREDONDO, Wheezing, Productive Cough, Use of Accessory Muscles, etc.  Patient presents with   Shortness of Breath      Pt c/o SOB x 1 day w/ abdominal pain x 3 days. Pt had albuterol treatment at home this AM and PM w/o relief. Pt able to speak in complete sentences. NAD noted.     The patient's daughter states she is very concerned about his shortness of breath.  The patient's oxygen saturations are 93-98%, has no wheezing, no fever, no pulmonary edema, no history of congestive heart failure or COPD.     Positive for shortness of breath. Negative for cough, hemoptysis and sputum production.     Light crackles and slight expiratory wheezes    Patient reports SOB is much improved from prior and that he is doing well overall. He does have exertional dyspnea still. He reports no cough, no chest pain. He has very mild RUQ pain with deep breathing and movement.    ED provider notes 3/6, filed 3/12                Internal medicine consult 3/11, filed 3/12        Pulmonology progress notes 3/12   X RR     ABGs     O2 sat  3/10 3/11  0500   O2 Device   (Oxygen Therapy) Room air Nasal cannula   Flow (L/min)  2   SpO2 91-95 94       Room air initially, O2 Sat 85 post ambulation so returned to 2 Lit O2    Resp  Av.1  Min: 16  Max: 19  SpO2  Av.8 %  Min: 90 %  Max: 99 %    Pt on RA, no respiratory distress noted RT PCS flowsheet 3/10-3/11                PT/OT/SLP eval 3/11      Internal medicine consult  3/11, filed 3/12      RT plan of care filed 3/12 2026      Hypoxia/Hypercapnia      BiPAP/Intubation/Mechanical Ventilation      Supplemental O2      Home O2, Oxygen Dependence     X Respiratory distress or failure Acute Hypoxic Respiratory Failure  - Unclear etiology of shortness of breath  - Chronic work exposure at [employer] with chemical exposure  - Similar respiratory symptoms after prior abdominal surgery per patient  - Per Pulmonology, his SOB could be from deconditioning, weakness, HFpEF, mild asthma, or DVT  - CXR with nonspecific patchy findings  - Ordered CT PE and Echo   - Started on Breo 200 and DuoNebs per pulm recs  - Plan for PFTs outpatient   Internal medicine consult 3/11, filed 3/12   X Radiology findings CTPE with emphysema, which may be principal cause.    No convincing evidence of acute pulmonary embolism.    Solid pulmonary nodules measuring up to 6 mm, as described.  For a solid nodule 6-8 mm, Fleischner Society 2017 guidelines recommend follow up with non-contrast chest CT at 6-12 months and 18-24 months after discovery    No significant change from prior.  No lung consolidation.  No new lung opacity.  No large pleural effusion or pneumothorax.   Internal medicine consult 3/11, filed 3/12    CT 3/11          CXR 3/11   X Acute/Chronic Illness 89M hx DM2, CKD3, hx small bowel obstruction, HTN, BPH presented 3/6 initially for shortness of breath, found to have cholecystitis and taken for lap jordyn 3/7. Pt's shortness of breath dates to about a month ago and he has required 1-2LNC mainly at night this admission for sats in the high 80s-low 90s. States he did not have any respiratory problems prior to about a month ago, at that time he went to urgent care and was given COVID test, CXR, and albuterol inhaler- inhaler previously helped with symptoms but nebulizers during this admission have not been effective. Previously he could walk 3 miles/day and take care of most IADLs on his own.    Unclear  etiology of desaturation- pt has known PHN, grade I diastolic dysfunction, 25pyhx smoking but quit in 1972. His CXR is normal this admission and he has no evidence of edema on exam. Will need to rule out small PE causing symptoms and evaluate if mild cardiac abnormalities have worsened since last echo.     -will start breo inhaler for controller therapy, continue duo-nebs.  -echo ordered  -Unremarkable CXR, CT PE ordered    Current Outpatient Medications  albuterol (ACCUNEB) 1.25 mg, Nebulization, Every 6 hours PRN, Rescue  albuterol (VENTOLIN HFA) 90 mcg/actuation inhaler 2 puffs, Inhalation, Every 6 hours PRN, Rescue  fluticasone propionate (FLONASE) 100 mcg, Each Nostril, Daily  hydroCHLOROthiazide (MICROZIDE) 12.5 mg, Oral, 2 times daily    Resp: Mild wheezes bilaterally, normal work of breathing on room air today    patient gave history of COPD     patient was seen by Medicine forearm shortness of breath of the and internal medicine for control of high blood pressure patient was continued on IV antibiotics underwent acral patient underwent workup for shortness of breath for ruling out pulmonary embolism which was negative    Pulmonology consult 3/11                                              Discharge summary 3/13, filed 3/14    Treatment     X Other Will need outpatient PFT's.  Needs serial imaging as an outpatient for pulmonary nodules.      Internal medicine consult 3/11, filed 3/12       The noted clinical guidelines are only system guidelines and do not replace the providers clinical judgment.    Provider, please specify the diagnosis or diagnoses associated with above clinical findings.     [    ] Agree with consultants diagnosis of acute Respiratory Failure with Hypoxia -   ABG pO2 < 60 mmHg or O2 sat of <91% on room air and respiratory symptoms documented     [    ] Acute Respiratory Distress -   Generally describes less severe respiratory symptoms (tachypnea, in respiratory distress, increased  work of breathing, unable to speak in complete sentences, labored breathing, use of accessory muscles, RR> 24, cyanosis, dyspnea, wheezing, stridor, lethargy) without sufficient measurements (pO2, SpO2, pH, and pCO2) to meet criteria for respiratory failure     [    ] Acute Respiratory Insufficiency -   Generally describes less severe respiratory symptoms and measurements (pO2, SpO2, pH, and pCO2) not meeting criteria for respiratory failure       [  x  ] Other Respiratory Diagnosis (please specify): _______COPD__________     [   ] Clinically Undetermined       Please document in your progress notes daily for the duration of treatment until resolved and include in your discharge summary.     Reference:    VENUS Ewing MD. (2020, March 13). Acute respiratory distress syndrome: Clinical features, diagnosis, and complications in adults ([id number] NATALIE Marie MD & [id number] SAMPSON Sheldon MD, Eds.). Retrieved November 13, 2020, from [email or web address]=search_result&selectedTitle=1~150&usage_type=default&display_rank=1  Form No. 14954

## 2022-03-18 ENCOUNTER — OFFICE VISIT (OUTPATIENT)
Dept: PULMONOLOGY | Facility: CLINIC | Age: 87
End: 2022-03-18
Payer: MEDICARE

## 2022-03-18 VITALS
SYSTOLIC BLOOD PRESSURE: 140 MMHG | OXYGEN SATURATION: 94 % | HEIGHT: 70 IN | HEART RATE: 82 BPM | WEIGHT: 158.94 LBS | DIASTOLIC BLOOD PRESSURE: 72 MMHG | BODY MASS INDEX: 22.75 KG/M2

## 2022-03-18 DIAGNOSIS — R06.09 DOE (DYSPNEA ON EXERTION): ICD-10-CM

## 2022-03-18 DIAGNOSIS — R06.00 DYSPNEA, UNSPECIFIED TYPE: ICD-10-CM

## 2022-03-18 DIAGNOSIS — J43.2 CENTRILOBULAR EMPHYSEMA: ICD-10-CM

## 2022-03-18 DIAGNOSIS — M79.642 LEFT HAND PAIN: Primary | ICD-10-CM

## 2022-03-18 DIAGNOSIS — R91.1 LUNG NODULE: Primary | ICD-10-CM

## 2022-03-18 PROCEDURE — 99213 OFFICE O/P EST LOW 20 MIN: CPT | Mod: GC,S$GLB,, | Performed by: STUDENT IN AN ORGANIZED HEALTH CARE EDUCATION/TRAINING PROGRAM

## 2022-03-18 PROCEDURE — 1111F PR DISCHARGE MEDS RECONCILED W/ CURRENT OUTPATIENT MED LIST: ICD-10-PCS | Mod: CPTII,GC,S$GLB, | Performed by: STUDENT IN AN ORGANIZED HEALTH CARE EDUCATION/TRAINING PROGRAM

## 2022-03-18 PROCEDURE — 99999 PR PBB SHADOW E&M-EST. PATIENT-LVL IV: ICD-10-PCS | Mod: PBBFAC,GC,, | Performed by: STUDENT IN AN ORGANIZED HEALTH CARE EDUCATION/TRAINING PROGRAM

## 2022-03-18 PROCEDURE — 1111F DSCHRG MED/CURRENT MED MERGE: CPT | Mod: CPTII,GC,S$GLB, | Performed by: STUDENT IN AN ORGANIZED HEALTH CARE EDUCATION/TRAINING PROGRAM

## 2022-03-18 PROCEDURE — 99999 PR PBB SHADOW E&M-EST. PATIENT-LVL IV: CPT | Mod: PBBFAC,GC,, | Performed by: STUDENT IN AN ORGANIZED HEALTH CARE EDUCATION/TRAINING PROGRAM

## 2022-03-18 PROCEDURE — 99213 PR OFFICE/OUTPT VISIT, EST, LEVL III, 20-29 MIN: ICD-10-PCS | Mod: GC,S$GLB,, | Performed by: STUDENT IN AN ORGANIZED HEALTH CARE EDUCATION/TRAINING PROGRAM

## 2022-03-18 NOTE — PROGRESS NOTES
Subjective:       Patient ID: Husam Connolly is a 89 y.o. male.    Chief Complaint: Pulmonary Nodules    89M hx DM2, CKD3, SBO, HTN, BPH presented 3/6 initially for shortness of breath, found to have cholecystitis s/p lap converted to open jordyn on 3/7 and was admitted to [place] at that time. We saw him at [place] for hypoxia post-op though he reports that prior to this episode his shortness of breath which dates to about a month ago when he went to urgent care and was given a COVID test, CXR and albuterol inhaler. He has also received a nebulizer treatment before with relief. During admission he required 1-2L at night. He notes that he has had one prior episode of SOB over the summer and was planned to see Dr. Da Silva in clinic and do PFTs but the appointment he was given was much later (November) and he got better without any intervention.     During admission at [place] most recently he was prescribed Breo inhaler which he says he doesn't notice a big difference with but reports that he gets more confidence with walking because of it. He has used the albuterol inhaler 1-2x more to see if it made a difference and he stated that it didn't really. He feels back to normal 60-70% and reports no cough, no SOB, no CP, no change in LE swelling. He has been out walking in his neighborhood.     Of note, he was a prior smoker many years ago. He had a CT scan during his in-patient stay with multiple pulmonary nodules, the largest being 5.4mm.         Review of Systems   Constitutional: Negative for fever, chills, fatigue and night sweats.   HENT: Negative for postnasal drip, rhinorrhea, sinus pressure, sore throat and congestion.    Respiratory: Negative for cough, sputum production, shortness of breath, wheezing, orthopnea and dyspnea on extertion.    Cardiovascular: Negative for chest pain, palpitations and leg swelling.   Gastrointestinal: Negative for nausea, vomiting, abdominal pain, abdominal distention and acid reflux.    Neurological: Negative for weakness.       Objective:      Physical Exam   Constitutional: He is oriented to person, place, and time. He appears well-developed and well-nourished. He appears not cachectic. No distress. He is not obese.   HENT:   Head: Normocephalic.   Right Ear: External ear normal.   Left Ear: External ear normal.   Nose: Nose normal.   Mouth/Throat: Oropharynx is clear and moist. No oropharyngeal exudate.   Cardiovascular: Normal rate, regular rhythm, normal heart sounds and intact distal pulses. Exam reveals no gallop and no friction rub.   No murmur heard.  Pulmonary/Chest: Normal expansion, symmetric chest wall expansion, effort normal and breath sounds normal. No respiratory distress. He has no decreased breath sounds. He has no wheezes.   Abdominal: Soft. He exhibits no distension. There is no abdominal tenderness. There is no rebound and no guarding.   Musculoskeletal:         General: No edema.      Cervical back: Normal range of motion and neck supple.   Lymphadenopathy:     He has no cervical adenopathy.   Neurological: He is alert and oriented to person, place, and time.   Skin: Skin is warm and dry. He is not diaphoretic.   Psychiatric: He has a normal mood and affect. His behavior is normal. Judgment and thought content normal.       No flowsheet data found.      Assessment:       1. ARREDONDO (dyspnea on exertion)    2. Centrilobular emphysema    3. Dyspnea, unspecified type        Outpatient Encounter Medications as of 3/18/2022   Medication Sig Dispense Refill    albuterol (ACCUNEB) 1.25 mg/3 mL Nebu Take 3 mLs (1.25 mg total) by nebulization every 6 (six) hours as needed. Rescue 75 mL 0    albuterol (VENTOLIN HFA) 90 mcg/actuation inhaler Inhale 2 puffs into the lungs every 6 (six) hours as needed for Wheezing. Rescue 18 g 3    aspirin (ECOTRIN) 81 MG EC tablet Take 81 mg by mouth once daily.      atorvastatin (LIPITOR) 40 MG tablet Take 1 tablet (40 mg total) by mouth once daily.  90 tablet 3    finasteride (PROSCAR) 5 mg tablet TAKE 1 TABLET BY MOUTH EVERY DAY (Patient taking differently: Take 5 mg by mouth every evening.) 90 tablet 1    fluticasone propionate (FLONASE) 50 mcg/actuation nasal spray 2 sprays (100 mcg total) by Each Nostril route once daily. 48 g 3    GARLIC ORAL Take 1 capsule by mouth once daily.      hydroCHLOROthiazide (HYDRODIURIL) 25 MG tablet Take 1 tablet (25 mg total) by mouth once daily. 90 tablet 3    losartan (COZAAR) 25 MG tablet Take 1 tablet (25 mg total) by mouth once daily. 90 tablet 3    metFORMIN (GLUCOPHAGE) 500 MG tablet TAKE 1 TABLET BY MOUTH TWICE DAILY WITH FOOD (Patient taking differently: Take 500 mg by mouth 2 (two) times daily with meals.) 180 tablet 3    multivitamin capsule Take 1 capsule by mouth once daily.      omega-3 fatty acids/fish oil (FISH OIL-OMEGA-3 FATTY ACIDS) 300-1,000 mg capsule Take 1 capsule by mouth once daily.      tamsulosin (FLOMAX) 0.4 mg Cap Take 0.4 mg by mouth nightly.  3    vitamin D (VITAMIN D3) 1000 units Tab Take 1,000 Units by mouth once daily.       No facility-administered encounter medications on file as of 3/18/2022.     No orders of the defined types were placed in this encounter.      Plan:       1. Emphysema with possible GOLD A COPD - no hospitalizations and minimal symptoms  - continue Breo for now - discussed with him that he can trial coming off Breo  - PFTs to assess - can be done at [place]    2. Pulmonary nodules - largest 5-6mm, two smaller nodules noted  - repeat CT scan in 12 months, if stable, no further repeat scans needed

## 2022-03-21 ENCOUNTER — HOSPITAL ENCOUNTER (OUTPATIENT)
Dept: RADIOLOGY | Facility: HOSPITAL | Age: 87
Discharge: HOME | End: 2022-03-21
Attending: ORTHOPAEDIC SURGERY
Payer: MEDICARE

## 2022-03-21 ENCOUNTER — OFFICE VISIT (OUTPATIENT)
Dept: ORTHOPEDICS | Facility: CLINIC | Age: 87
End: 2022-03-21
Payer: MEDICARE

## 2022-03-21 VITALS — HEIGHT: 70 IN | WEIGHT: 158 LBS | BODY MASS INDEX: 22.62 KG/M2

## 2022-03-21 DIAGNOSIS — M79.642 LEFT HAND PAIN: ICD-10-CM

## 2022-03-21 DIAGNOSIS — M72.0 DUPUYTREN'S CONTRACTURE OF LEFT HAND: ICD-10-CM

## 2022-03-21 PROCEDURE — 1101F PT FALLS ASSESS-DOCD LE1/YR: CPT | Mod: CPTII,S$GLB,, | Performed by: ORTHOPAEDIC SURGERY

## 2022-03-21 PROCEDURE — 99204 PR OFFICE/OUTPT VISIT, NEW, LEVL IV, 45-59 MIN: ICD-10-PCS | Mod: S$GLB,,, | Performed by: ORTHOPAEDIC SURGERY

## 2022-03-21 PROCEDURE — 1126F PR PAIN SEVERITY QUANTIFIED, NO PAIN PRESENT: ICD-10-PCS | Mod: CPTII,S$GLB,, | Performed by: ORTHOPAEDIC SURGERY

## 2022-03-21 PROCEDURE — 3288F FALL RISK ASSESSMENT DOCD: CPT | Mod: CPTII,S$GLB,, | Performed by: ORTHOPAEDIC SURGERY

## 2022-03-21 PROCEDURE — 1111F PR DISCHARGE MEDS RECONCILED W/ CURRENT OUTPATIENT MED LIST: ICD-10-PCS | Mod: CPTII,S$GLB,, | Performed by: ORTHOPAEDIC SURGERY

## 2022-03-21 PROCEDURE — 3288F PR FALLS RISK ASSESSMENT DOCUMENTED: ICD-10-PCS | Mod: CPTII,S$GLB,, | Performed by: ORTHOPAEDIC SURGERY

## 2022-03-21 PROCEDURE — 1111F DSCHRG MED/CURRENT MED MERGE: CPT | Mod: CPTII,S$GLB,, | Performed by: ORTHOPAEDIC SURGERY

## 2022-03-21 PROCEDURE — 73130 X-RAY EXAM OF HAND: CPT | Mod: 26,LT,, | Performed by: RADIOLOGY

## 2022-03-21 PROCEDURE — 99204 OFFICE O/P NEW MOD 45 MIN: CPT | Mod: S$GLB,,, | Performed by: ORTHOPAEDIC SURGERY

## 2022-03-21 PROCEDURE — 73130 XR HAND COMPLETE 3 VIEW LEFT: ICD-10-PCS | Mod: 26,LT,, | Performed by: RADIOLOGY

## 2022-03-21 PROCEDURE — 1126F AMNT PAIN NOTED NONE PRSNT: CPT | Mod: CPTII,S$GLB,, | Performed by: ORTHOPAEDIC SURGERY

## 2022-03-21 PROCEDURE — 1159F PR MEDICATION LIST DOCUMENTED IN MEDICAL RECORD: ICD-10-PCS | Mod: CPTII,S$GLB,, | Performed by: ORTHOPAEDIC SURGERY

## 2022-03-21 PROCEDURE — 99999 PR PBB SHADOW E&M-EST. PATIENT-LVL III: ICD-10-PCS | Mod: PBBFAC,,, | Performed by: ORTHOPAEDIC SURGERY

## 2022-03-21 PROCEDURE — 99999 PR PBB SHADOW E&M-EST. PATIENT-LVL III: CPT | Mod: PBBFAC,,, | Performed by: ORTHOPAEDIC SURGERY

## 2022-03-21 PROCEDURE — 1101F PR PT FALLS ASSESS DOC 0-1 FALLS W/OUT INJ PAST YR: ICD-10-PCS | Mod: CPTII,S$GLB,, | Performed by: ORTHOPAEDIC SURGERY

## 2022-03-21 PROCEDURE — 73130 X-RAY EXAM OF HAND: CPT | Mod: TC,PN,LT

## 2022-03-21 PROCEDURE — 1159F MED LIST DOCD IN RCRD: CPT | Mod: CPTII,S$GLB,, | Performed by: ORTHOPAEDIC SURGERY

## 2022-03-21 NOTE — PROGRESS NOTES
Please see my attestation in Dr. Benítez's note on 3/18/2022.    Victor Manuel Loving MD  [facility]

## 2022-03-21 NOTE — PROGRESS NOTES
CC:  Dupuytren's contracture left hand involving ring finger        HPI:  Husam Connolly is a very pleasant 89 y.o. male with worsening contracture of the left hand for the past year so  He is now having difficulty using his left hand he can get his ring on as well on the ring finger  No numbness or tingling reported no trauma reported         PAST MEDICAL HISTORY:   Past Medical History:   Diagnosis Date    Anticoagulant long-term use     Bowel obstruction     BPH (benign prostatic hyperplasia)     Diabetes mellitus     Diverticulitis     Hyperlipidemia     Hypertension      PAST SURGICAL HISTORY:   Past Surgical History:   Procedure Laterality Date    ANASTOMOSIS OF ILEUM TO RECTUM  2019    Procedure: ANASTOMOSIS, COLO-RECTAL;  Surgeon: Fouzia Gardner MD;  Location: [facility] OR;  Service: General;;    CHOLECYSTECTOMY N/A 3/7/2022    Procedure: CHOLECYSTECTOMY;  Surgeon: Fouzia Gardner MD;  Location: [facility] OR;  Service: General;  Laterality: N/A;    COLOSTOMY  2019    Procedure: CREATION, COLOSTOMY;  Surgeon: Fouzia Gardner MD;  Location: [facility] OR;  Service: General;;    EYE SURGERY      GISELA PROCEDURE  2019    Procedure: GISELA PROCEDURE;  Surgeon: Fouzia Gardner MD;  Location: [facility] OR;  Service: General;;  Colon resection    LYSIS OF ADHESIONS N/A 3/7/2022    Procedure: LYSIS, ADHESIONS;  Surgeon: Fouzia Gardner MD;  Location: [facility] OR;  Service: General;  Laterality: N/A;    TONSILLECTOMY       FAMILY HISTORY:   Family History   Problem Relation Age of Onset    Heart disease Father     Hypertension Father     Cancer Mother     Diabetes Paternal Aunt      SOCIAL HISTORY:   Social History     Socioeconomic History    Marital status:    Tobacco Use    Smoking status: Former Smoker     Packs/day: 1.00     Years: 25.00     Pack years: 25.00     Types: Cigarettes     Start date:      Quit date:      Years since quittin.2    Smokeless  tobacco: Never Used   Substance and Sexual Activity    Alcohol use: Yes     Alcohol/week: 2.0 standard drinks     Types: 2 Glasses of wine per week     Comment: occassionally    Drug use: No    Sexual activity: Yes     Partners: Female     Social Determinants of Health     Financial Resource Strain: Low Risk     Difficulty of Paying Living Expenses: Not hard at all   Food Insecurity: No Food Insecurity    Worried About Running Out of Food in the Last Year: Never true    Ran Out of Food in the Last Year: Never true   Transportation Needs: No Transportation Needs    Lack of Transportation (Medical): No    Lack of Transportation (Non-Medical): No   Physical Activity: Inactive    Days of Exercise per Week: 0 days    Minutes of Exercise per Session: 0 min   Stress: No Stress Concern Present    Feeling of Stress : Not at all   Social Connections: Moderately Isolated    Frequency of Communication with Friends and Family: More than three times a week    Frequency of Social Gatherings with Friends and Family: Three times a week    Attends Nondenominational Services: Never    Active Member of Clubs or Organizations: No    Attends Club or Organization Meetings: Never    Marital Status:    Housing Stability: Low Risk     Unable to Pay for Housing in the Last Year: No    Number of Places Lived in the Last Year: 1    Unstable Housing in the Last Year: No       MEDICATIONS:   Current Outpatient Medications:     albuterol (ACCUNEB) 1.25 mg/3 mL Nebu, Take 3 mLs (1.25 mg total) by nebulization every 6 (six) hours as needed. Rescue, Disp: 75 mL, Rfl: 0    albuterol (VENTOLIN HFA) 90 mcg/actuation inhaler, Inhale 2 puffs into the lungs every 6 (six) hours as needed for Wheezing. Rescue, Disp: 18 g, Rfl: 3    aspirin (ECOTRIN) 81 MG EC tablet, Take 81 mg by mouth once daily., Disp: , Rfl:     atorvastatin (LIPITOR) 40 MG tablet, Take 1 tablet (40 mg total) by mouth once daily., Disp: 90 tablet, Rfl: 3     finasteride (PROSCAR) 5 mg tablet, TAKE 1 TABLET BY MOUTH EVERY DAY (Patient taking differently: Take 5 mg by mouth every evening.), Disp: 90 tablet, Rfl: 1    fluticasone propionate (FLONASE) 50 mcg/actuation nasal spray, 2 sprays (100 mcg total) by Each Nostril route once daily., Disp: 48 g, Rfl: 3    GARLIC ORAL, Take 1 capsule by mouth once daily., Disp: , Rfl:     hydroCHLOROthiazide (HYDRODIURIL) 25 MG tablet, Take 1 tablet (25 mg total) by mouth once daily., Disp: 90 tablet, Rfl: 3    losartan (COZAAR) 25 MG tablet, Take 1 tablet (25 mg total) by mouth once daily., Disp: 90 tablet, Rfl: 3    metFORMIN (GLUCOPHAGE) 500 MG tablet, TAKE 1 TABLET BY MOUTH TWICE DAILY WITH FOOD (Patient taking differently: Take 500 mg by mouth 2 (two) times daily with meals.), Disp: 180 tablet, Rfl: 3    multivitamin capsule, Take 1 capsule by mouth once daily., Disp: , Rfl:     omega-3 fatty acids/fish oil (FISH OIL-OMEGA-3 FATTY ACIDS) 300-1,000 mg capsule, Take 1 capsule by mouth once daily., Disp: , Rfl:     tamsulosin (FLOMAX) 0.4 mg Cap, Take 0.4 mg by mouth nightly., Disp: , Rfl: 3    vitamin D (VITAMIN D3) 1000 units Tab, Take 1,000 Units by mouth once daily., Disp: , Rfl:     fluticasone/vilanterol (BREO ELLIPTA INHL), Inhale into the lungs., Disp: , Rfl:   ALLERGIES:   Review of patient's allergies indicates:   Allergen Reactions    Solarcaine [benzocaine-triclosan]     Sulfa (sulfonamide antibiotics)     Sulfamethoxazole     Trimethoprim     Hytrin [terazosin] Rash    Smz-tmp ds [sulfamethoxazole-trimethoprim] Rash       Review of Systems:  Constitutional: no fever or chills  ENT: no nasal congestion or sore throat  Respiratory: no cough or shortness of breath  Cardiovascular: no chest pain or palpitations  Gastrointestinal: no nausea or vomiting, PUD, GERD, NSAID intolerance  Genitourinary: no hematuria or dysuria  Integument/Breast: no rash or pruritis  Hematologic/Lymphatic: no easy bruising or  "lymphadenopathy  Musculoskeletal: see HPI  Neurological: no seizures or tremors  Behavioral/Psych: no auditory or visual hallucinations      Physical Exam   Vitals:    03/21/22 0947   Weight: 71.7 kg (158 lb)   Height: 5' 10" (1.778 m)   PainSc: 0-No pain       Constitutional: Oriented to person, place, and time. Appears well-developed and well-nourished.   HENT:   Head: Normocephalic and atraumatic.   Nose: Nose normal.   Eyes: No scleral icterus.   Neck: Normal range of motion.   Cardiovascular: Normal rate and regular rhythm.    Pulses:       Radial pulses are 2+ on the right side, and 2+ on the left side.   Pulmonary/Chest: Effort normal and breath sounds normal.   Abdominal: Soft.   Neurological: Alert and oriented to person, place, and time.   Skin: Skin is warm.   Psychiatric: Normal mood and affect.     MUSCULOSKELETAL UPPER EXTREMITY:  Examination left hand demonstrates a large Dupuytren's nodule at the base of the ring finger and a cord which does extend into the proximal palm  He does have contracture of the MP joint of the ring finger with a positive tabletop test at about a 45 degree contracture of that joint  No involvement of the PIP  Flexion intact  Sensation intact Tinel sign negative            Diagnostic Studies:  AP lateral x-ray left hand demonstrates some arthritic changes at the base of left thumb and small joints of the digits        Assessment:  Dupuytren contracture left hand    Plan:  I explained the nature of the problem to the patient we discussed options including injection versus surgery  He would like surgical correction for excision of the nodule and cord as an outpatient surgery  The risks and benefits of surgery explained to the patient he understands      The risks and benefits of surgery were discussed with the patient today and they understand.  The consent was signed in the office for surgery.      Slick Baldwin MD (Jay)  [facility]  Orthopedic Upper Extremity " Surgery

## 2022-03-22 ENCOUNTER — HOSPITAL ENCOUNTER (OUTPATIENT)
Dept: PULMONOLOGY | Facility: HOSPITAL | Age: 87
Discharge: HOME | End: 2022-03-22
Attending: FAMILY MEDICINE
Payer: MEDICARE

## 2022-03-22 DIAGNOSIS — R06.09 DOE (DYSPNEA ON EXERTION): ICD-10-CM

## 2022-04-04 ENCOUNTER — HOSPITAL ENCOUNTER (OUTPATIENT)
Dept: PULMONOLOGY | Facility: HOSPITAL | Age: 87
Discharge: HOME | End: 2022-04-04
Attending: FAMILY MEDICINE
Payer: MEDICARE

## 2022-04-04 DIAGNOSIS — R91.1 PULMONARY NODULE: ICD-10-CM

## 2022-04-04 DIAGNOSIS — R91.1 PULMONARY NODULE: Primary | ICD-10-CM

## 2022-04-04 PROCEDURE — 94727 GAS DIL/WSHOT DETER LNG VOL: CPT

## 2022-04-04 PROCEDURE — 94640 AIRWAY INHALATION TREATMENT: CPT | Mod: 59

## 2022-04-04 PROCEDURE — 94010 BREATHING CAPACITY TEST: CPT

## 2022-04-04 PROCEDURE — 94729 DIFFUSING CAPACITY: CPT

## 2022-04-14 ENCOUNTER — ANESTHESIA EVENT (OUTPATIENT)
Dept: SURGERY | Facility: HOSPITAL | Age: 87
End: 2022-04-14
Payer: MEDICARE

## 2022-04-14 ENCOUNTER — HOSPITAL ENCOUNTER (OUTPATIENT)
Dept: PREADMISSION TESTING | Facility: HOSPITAL | Age: 87
Discharge: HOME | End: 2022-04-14
Attending: ORTHOPAEDIC SURGERY
Payer: MEDICARE

## 2022-04-14 VITALS
HEART RATE: 69 BPM | BODY MASS INDEX: 22.96 KG/M2 | SYSTOLIC BLOOD PRESSURE: 122 MMHG | HEIGHT: 70 IN | DIASTOLIC BLOOD PRESSURE: 79 MMHG | RESPIRATION RATE: 16 BRPM | WEIGHT: 160.38 LBS

## 2022-04-14 RX ORDER — SODIUM CHLORIDE, SODIUM LACTATE, POTASSIUM CHLORIDE, CALCIUM CHLORIDE 600; 310; 30; 20 MG/100ML; MG/100ML; MG/100ML; MG/100ML
INJECTION, SOLUTION INTRAVENOUS CONTINUOUS
Status: CANCELLED | OUTPATIENT
Start: 2022-04-14

## 2022-04-14 RX ORDER — LIDOCAINE HYDROCHLORIDE 10 MG/ML
1 INJECTION, SOLUTION EPIDURAL; INFILTRATION; INTRACAUDAL; PERINEURAL ONCE
Status: CANCELLED | OUTPATIENT
Start: 2022-04-14 | End: 2022-04-14

## 2022-04-14 NOTE — ANESTHESIA PREPROCEDURE EVALUATION
04/14/2022  Husam Connolly is a 89 y.o., male scheduled for RELEASE, DUPUYTREN CONTRACTURE (Left Hand) 4/22/22.    Past Medical History:   Diagnosis Date    Anticoagulant long-term use     Bowel obstruction     BPH (benign prostatic hyperplasia)     Diabetes mellitus     Diverticulitis     Hyperlipidemia     Hypertension      Past Surgical History:   Procedure Laterality Date    ANASTOMOSIS OF ILEUM TO RECTUM  9/9/2019    Procedure: ANASTOMOSIS, COLO-RECTAL;  Surgeon: Fouzia Gardner MD;  Location: [facility] OR;  Service: General;;    CHOLECYSTECTOMY N/A 3/7/2022    Procedure: CHOLECYSTECTOMY;  Surgeon: Fouzia Gardner MD;  Location: [facility] OR;  Service: General;  Laterality: N/A;    COLOSTOMY  6/16/2019    Procedure: CREATION, COLOSTOMY;  Surgeon: Fouzia Gardner MD;  Location: [facility] OR;  Service: General;;    EYE SURGERY      GISELA PROCEDURE  6/16/2019    Procedure: GISELA PROCEDURE;  Surgeon: Fouzia Gardner MD;  Location: [facility] OR;  Service: General;;  Colon resection    LYSIS OF ADHESIONS N/A 3/7/2022    Procedure: LYSIS, ADHESIONS;  Surgeon: Fouzia Gardner MD;  Location: [facility] OR;  Service: General;  Laterality: N/A;    TONSILLECTOMY           Pre-op Assessment    I have reviewed the Patient Summary Reports.     I have reviewed the Nursing Notes.    I have reviewed the Medications.     Review of Systems  Anesthesia Hx:  No problems with previous Anesthesia Denies Hx of Anesthetic complications   Denies Personal Hx of Anesthesia complications.   Social:  Former Smoker, Alcohol Use    Cardiovascular:   Exercise tolerance: good Hypertension, well controlled Dysrhythmias    Pulmonary:   COPD, moderate Denies Shortness of breath.    Renal/:   Chronic Renal Disease (CKD3)    Hepatic/GI:  Hepatic/GI Normal    Musculoskeletal:  Musculoskeletal Normal     Neurological:  Neurology Normal Denies TIA.  Denies CVA. Denies Seizures.    Endocrine:   Diabetes, well controlled, type 2    Psych:  Psychiatric Normal           Physical Exam  General: Well nourished and Cooperative    Airway:  Mallampati: III / II  Mouth Opening: Small, but > 3cm  TM Distance: Normal  Tongue: Normal  Neck ROM: Normal ROM    Dental:  Intact, Caps / Implants    Chest/Lungs:  Clear to auscultation, Normal Respiratory Rate    Heart:  Rate: Normal  Rhythm: Regular Rhythm  Sounds: Normal    ECHO 3/11/22  · The left ventricle is normal in size with concentric remodeling and normal systolic function.  · The estimated ejection fraction is 55%.  · Grade I left ventricular diastolic dysfunction.  · There is abnormal septal wall motion.  · Normal right ventricular size with normal right ventricular systolic function.  · The estimated PA systolic pressure is 47 mmHg.  · Normal central venous pressure (3 mmHg).  · There is pulmonary hypertension.      EKG 3/7/22        Anesthesia Plan  Type of Anesthesia, risks & benefits discussed:    Anesthesia Type: Regional  Intra-op Monitoring Plan: Standard ASA Monitors  Post Op Pain Control Plan: multimodal analgesia  Induction:  IV  Informed Consent: Informed consent signed with the Patient and all parties understand the risks and agree with anesthesia plan.  All questions answered.   ASA Score: 3  Anesthesia Plan Notes: Anesthesia consent to be signed prior to surgery 4/22/22      Ready For Surgery From Anesthesia Perspective.     .

## 2022-04-14 NOTE — DISCHARGE INSTRUCTIONS
Your surgery is scheduled for 4/22/22.    Please report to [facility] on the 1st Floor at 0715 a.m.    THIS TIME IS SUBJECT TO CHANGE.  YOU WILL RECEIVE A PHONE CALL THE DAY BEFORE SURGERY BY 3:30 PM TO CONFIRM YOUR TIME OF ARRIVAL.  IF YOU HAVE NOT RECEIVED A PHONE CALL BY 3:30 PM THE DAY BEFORE YOUR SURGERY PLEASE CALL [phone]     INSTRUCTIONS IMPORTANT!!!  ¨ Do not eat or drink after 12 midnight-including water, candy, gum, & mints. OK to brush teeth.      ____  Proceed to [facility] on *** for additional testing.        ____  Do not wear makeup, including mascara.  ____  No powder, lotions or creams to surgical area.  ____  Please remove all jewelry, including piercings and leave at home.  ____  No money or valuables needed. Please leave at home.  ____  Please bring any documents given by your doctor.  ____  If going home the same day, arrange for a ride home. You will not be able to             drive if Anesthesia was used.  ____  Children under 18 years require a parent / guardian present the entire time             they are in surgery / recovery.  ____  Wear loose fitting clothing. Allow for dressings, bandages.  ____  Stop Aspirin, Ibuprofen, Motrin, Aleve, Goody's/BC powders, Excedrine and Naproxen (NSAIDS) at least 3-5 days before surgery, unless otherwise instructed by your doctor, or the nurse.   You MAY use Tylenol/acetaminophen until day of surgery.  ____  Wash the surgical area with Hibiclens the night before surgery, and again the             morning of surgery.  Be sure to rinse hibiclens off completely (if instructed by   nurse).  ____  If you take diabetic medication, do not take am of surgery unless instructed by Doctor.  ____  Call MD for temperature above 101 degrees or any other signs of infection such as Urinary (bladder) infection, Upper respiratory infection, skin boils, etc.  ____ Stop taking any Fish Oil supplement or any Vitamins that contain Vitamin E at  least 5 days prior to surgery.  ____ Do Not wear your contact lenses the day of your procedure.  You may wear your glasses.      ____Do not shave surgical site for 3 days prior to surgery.  ____ Practice Good hand washing before, during, and after procedure.      I have read or had read and explained to me, and understand the above information.  Additional comments or instructions:  For additional questions call [phone]      ANESTHESIA SIDE EFFECTS  -For the first 24 hours after surgery:  Do not drive, use heavy equipment, make important decisions, or drink alcohol  -It is normal to feel sleepy for several hours.  Rest until you are more awake.  -Have someone stay with you, if needed.  They can watch for problems and help keep you safe.  -Some possible post anesthesia side effects include: nausea and vomiting, sore throat and hoarseness, sleepiness, and dizziness.        Pre-Op Bathing Instructions    Before surgery, you can play an important role in your own health.    Because skin is not sterile, we need to be sure that your skin is as free of germs as possible. By following the instructions below, you can reduce the number of germs on your skin before surgery.    IMPORTANT: You will need to shower with a special soap called Hibiclens*, available at any pharmacy.  If you are allergic to Chlorhexidine (the antiseptic in Hibiclens), use an antibacterial soap such as Dial Soap for your preoperative shower.  You will shower with Hibiclens both the night before your surgery and the morning of your surgery.  Do not use Hibiclens on the head, face or genitals to avoid injury to those areas.    STEP #1: THE NIGHT BEFORE YOUR SURGERY     Do not shave the area of your body where your surgery will be performed.  Shower and wash your hair and body as usual with your normal soap and shampoo.  Rinse your hair and body thoroughly after you shower to remove all soap residue.  With your hand, apply one packet of Hibiclens soap to  the surgical site.   Wash the site gently for five (5) minutes. Do not scrub your skin too hard.   Do not wash with your regular soap after Hibiclens is used.  Rinse your body thoroughly.  Pat yourself dry with a clean, soft towel.  Do not use lotion, cream, or powder.  Wear clean clothes.    STEP #2: THE MORNING OF YOUR SURGERY     Repeat Step #1.    * Not to be used by people allergic to Chlorhexidine.

## 2022-04-19 PROBLEM — Z90.49 S/P CHOLECYSTECTOMY: Status: ACTIVE | Noted: 2019-07-01

## 2022-04-22 ENCOUNTER — HOSPITAL ENCOUNTER (OUTPATIENT)
Facility: HOSPITAL | Age: 87
Discharge: HOME | End: 2022-04-22
Attending: ORTHOPAEDIC SURGERY | Admitting: ORTHOPAEDIC SURGERY
Payer: MEDICARE

## 2022-04-22 ENCOUNTER — ANESTHESIA (OUTPATIENT)
Dept: SURGERY | Facility: HOSPITAL | Age: 87
End: 2022-04-22
Payer: MEDICARE

## 2022-04-22 VITALS
RESPIRATION RATE: 18 BRPM | HEIGHT: 70 IN | SYSTOLIC BLOOD PRESSURE: 157 MMHG | WEIGHT: 155 LBS | OXYGEN SATURATION: 95 % | DIASTOLIC BLOOD PRESSURE: 68 MMHG | TEMPERATURE: 98 F | HEART RATE: 65 BPM | BODY MASS INDEX: 22.19 KG/M2

## 2022-04-22 DIAGNOSIS — M72.0 DUPUYTREN'S CONTRACTURE OF LEFT HAND: ICD-10-CM

## 2022-04-22 LAB
ANION GAP SERPL CALC-SCNC: 13 MMOL/L (ref 8–16)
BUN SERPL-MCNC: 29 MG/DL (ref 8–23)
CALCIUM SERPL-MCNC: 9.8 MG/DL (ref 8.7–10.5)
CHLORIDE SERPL-SCNC: 100 MMOL/L (ref 95–110)
CO2 SERPL-SCNC: 27 MMOL/L (ref 23–29)
CREAT SERPL-MCNC: 1.4 MG/DL (ref 0.5–1.4)
EST. GFR  (AFRICAN AMERICAN): 51 ML/MIN/1.73 M^2
EST. GFR  (NON AFRICAN AMERICAN): 44 ML/MIN/1.73 M^2
GLUCOSE SERPL-MCNC: 168 MG/DL (ref 70–110)
POTASSIUM SERPL-SCNC: 3.6 MMOL/L (ref 3.5–5.1)
SODIUM SERPL-SCNC: 140 MMOL/L (ref 136–145)

## 2022-04-22 PROCEDURE — 36415 COLL VENOUS BLD VENIPUNCTURE: CPT | Performed by: ORTHOPAEDIC SURGERY

## 2022-04-22 PROCEDURE — 63600175 PHARM REV CODE 636 W HCPCS: Performed by: NURSE ANESTHETIST, CERTIFIED REGISTERED

## 2022-04-22 PROCEDURE — 76942 ECHO GUIDE FOR BIOPSY: CPT | Performed by: STUDENT IN AN ORGANIZED HEALTH CARE EDUCATION/TRAINING PROGRAM

## 2022-04-22 PROCEDURE — 26123 RELEASE PALM CONTRACTURE: CPT | Mod: LT,,, | Performed by: ORTHOPAEDIC SURGERY

## 2022-04-22 PROCEDURE — 63600175 PHARM REV CODE 636 W HCPCS: Performed by: STUDENT IN AN ORGANIZED HEALTH CARE EDUCATION/TRAINING PROGRAM

## 2022-04-22 PROCEDURE — 63600175 PHARM REV CODE 636 W HCPCS: Performed by: ANESTHESIOLOGY

## 2022-04-22 PROCEDURE — 26123 PR PART PALMAR FASCIEC,OPEN 1 DIGIT: ICD-10-PCS | Mod: LT,,, | Performed by: ORTHOPAEDIC SURGERY

## 2022-04-22 PROCEDURE — 37000009 HC ANESTHESIA EA ADD 15 MINS: Performed by: ORTHOPAEDIC SURGERY

## 2022-04-22 PROCEDURE — 36000707: Performed by: ORTHOPAEDIC SURGERY

## 2022-04-22 PROCEDURE — 88304 TISSUE EXAM BY PATHOLOGIST: CPT | Performed by: STUDENT IN AN ORGANIZED HEALTH CARE EDUCATION/TRAINING PROGRAM

## 2022-04-22 PROCEDURE — 36000706: Performed by: ORTHOPAEDIC SURGERY

## 2022-04-22 PROCEDURE — 88304 PR  SURG PATH,LEVEL III: ICD-10-PCS | Mod: 26,,, | Performed by: STUDENT IN AN ORGANIZED HEALTH CARE EDUCATION/TRAINING PROGRAM

## 2022-04-22 PROCEDURE — 71000016 HC POSTOP RECOV ADDL HR: Performed by: ORTHOPAEDIC SURGERY

## 2022-04-22 PROCEDURE — 63600175 PHARM REV CODE 636 W HCPCS: Performed by: ORTHOPAEDIC SURGERY

## 2022-04-22 PROCEDURE — 37000008 HC ANESTHESIA 1ST 15 MINUTES: Performed by: ORTHOPAEDIC SURGERY

## 2022-04-22 PROCEDURE — 71000015 HC POSTOP RECOV 1ST HR: Performed by: ORTHOPAEDIC SURGERY

## 2022-04-22 PROCEDURE — 80048 BASIC METABOLIC PNL TOTAL CA: CPT | Performed by: ORTHOPAEDIC SURGERY

## 2022-04-22 PROCEDURE — 25000003 PHARM REV CODE 250: Performed by: NURSE ANESTHETIST, CERTIFIED REGISTERED

## 2022-04-22 PROCEDURE — 63600175 PHARM REV CODE 636 W HCPCS: Performed by: NURSE PRACTITIONER

## 2022-04-22 PROCEDURE — 88304 TISSUE EXAM BY PATHOLOGIST: CPT | Mod: 26,,, | Performed by: STUDENT IN AN ORGANIZED HEALTH CARE EDUCATION/TRAINING PROGRAM

## 2022-04-22 RX ORDER — PROPOFOL 10 MG/ML
VIAL (ML) INTRAVENOUS
Status: DISCONTINUED | OUTPATIENT
Start: 2022-04-22 | End: 2022-04-22

## 2022-04-22 RX ORDER — VANCOMYCIN HYDROCHLORIDE 1 G/20ML
INJECTION, POWDER, LYOPHILIZED, FOR SOLUTION INTRAVENOUS
Status: DISCONTINUED | OUTPATIENT
Start: 2022-04-22 | End: 2022-04-22 | Stop reason: HOSPADM

## 2022-04-22 RX ORDER — ROPIVACAINE HYDROCHLORIDE 5 MG/ML
INJECTION, SOLUTION EPIDURAL; INFILTRATION; PERINEURAL
Status: COMPLETED | OUTPATIENT
Start: 2022-04-22 | End: 2022-04-22

## 2022-04-22 RX ORDER — LIDOCAINE HYDROCHLORIDE 10 MG/ML
1 INJECTION, SOLUTION EPIDURAL; INFILTRATION; INTRACAUDAL; PERINEURAL ONCE
Status: DISCONTINUED | OUTPATIENT
Start: 2022-04-22 | End: 2022-04-22 | Stop reason: HOSPADM

## 2022-04-22 RX ORDER — HYDROCODONE BITARTRATE AND ACETAMINOPHEN 5; 325 MG/1; MG/1
1 TABLET ORAL EVERY 4 HOURS PRN
Qty: 12 TABLET | Refills: 0 | Status: SHIPPED | OUTPATIENT
Start: 2022-04-22 | End: 2022-05-24

## 2022-04-22 RX ORDER — MIDAZOLAM HYDROCHLORIDE 1 MG/ML
INJECTION, SOLUTION INTRAMUSCULAR; INTRAVENOUS
Status: DISCONTINUED | OUTPATIENT
Start: 2022-04-22 | End: 2022-04-22

## 2022-04-22 RX ORDER — HYDROCODONE BITARTRATE AND ACETAMINOPHEN 5; 325 MG/1; MG/1
1 TABLET ORAL EVERY 4 HOURS PRN
Status: DISCONTINUED | OUTPATIENT
Start: 2022-04-22 | End: 2022-04-22 | Stop reason: HOSPADM

## 2022-04-22 RX ORDER — HYDROMORPHONE HYDROCHLORIDE 2 MG/ML
0.5 INJECTION, SOLUTION INTRAMUSCULAR; INTRAVENOUS; SUBCUTANEOUS EVERY 5 MIN PRN
Status: DISCONTINUED | OUTPATIENT
Start: 2022-04-22 | End: 2022-04-22 | Stop reason: HOSPADM

## 2022-04-22 RX ORDER — PROPOFOL 10 MG/ML
VIAL (ML) INTRAVENOUS CONTINUOUS PRN
Status: DISCONTINUED | OUTPATIENT
Start: 2022-04-22 | End: 2022-04-22

## 2022-04-22 RX ORDER — SODIUM CHLORIDE, SODIUM LACTATE, POTASSIUM CHLORIDE, CALCIUM CHLORIDE 600; 310; 30; 20 MG/100ML; MG/100ML; MG/100ML; MG/100ML
INJECTION, SOLUTION INTRAVENOUS CONTINUOUS
Status: DISCONTINUED | OUTPATIENT
Start: 2022-04-22 | End: 2022-04-22 | Stop reason: HOSPADM

## 2022-04-22 RX ORDER — OXYCODONE HYDROCHLORIDE 5 MG/1
10 TABLET ORAL EVERY 4 HOURS PRN
Status: DISCONTINUED | OUTPATIENT
Start: 2022-04-22 | End: 2022-04-22 | Stop reason: HOSPADM

## 2022-04-22 RX ORDER — LIDOCAINE HCL/PF 100 MG/5ML
SYRINGE (ML) INTRAVENOUS
Status: DISCONTINUED | OUTPATIENT
Start: 2022-04-22 | End: 2022-04-22

## 2022-04-22 RX ORDER — OXYCODONE HYDROCHLORIDE 5 MG/1
5 TABLET ORAL
Status: DISCONTINUED | OUTPATIENT
Start: 2022-04-22 | End: 2022-04-22 | Stop reason: HOSPADM

## 2022-04-22 RX ORDER — CEFAZOLIN SODIUM 2 G/50ML
2 SOLUTION INTRAVENOUS
Status: DISCONTINUED | OUTPATIENT
Start: 2022-04-22 | End: 2022-04-22 | Stop reason: HOSPADM

## 2022-04-22 RX ORDER — ONDANSETRON 8 MG/1
8 TABLET, ORALLY DISINTEGRATING ORAL EVERY 8 HOURS PRN
Status: DISCONTINUED | OUTPATIENT
Start: 2022-04-22 | End: 2022-04-22 | Stop reason: HOSPADM

## 2022-04-22 RX ORDER — ACETAMINOPHEN 325 MG/1
650 TABLET ORAL EVERY 4 HOURS PRN
Status: DISCONTINUED | OUTPATIENT
Start: 2022-04-22 | End: 2022-04-22 | Stop reason: HOSPADM

## 2022-04-22 RX ORDER — ONDANSETRON 2 MG/ML
4 INJECTION INTRAMUSCULAR; INTRAVENOUS DAILY PRN
Status: DISCONTINUED | OUTPATIENT
Start: 2022-04-22 | End: 2022-04-22 | Stop reason: HOSPADM

## 2022-04-22 RX ADMIN — MIDAZOLAM 1 MG: 1 INJECTION INTRAMUSCULAR; INTRAVENOUS at 08:04

## 2022-04-22 RX ADMIN — ROPIVACAINE HYDROCHLORIDE 30 ML: 5 INJECTION, SOLUTION EPIDURAL; INFILTRATION; PERINEURAL at 08:04

## 2022-04-22 RX ADMIN — CEFAZOLIN SODIUM 2 G: 2 SOLUTION INTRAVENOUS at 08:04

## 2022-04-22 RX ADMIN — SODIUM CHLORIDE, SODIUM LACTATE, POTASSIUM CHLORIDE, AND CALCIUM CHLORIDE: .6; .31; .03; .02 INJECTION, SOLUTION INTRAVENOUS at 07:04

## 2022-04-22 RX ADMIN — PROPOFOL 40 MG: 10 INJECTION, EMULSION INTRAVENOUS at 09:04

## 2022-04-22 RX ADMIN — LIDOCAINE HYDROCHLORIDE 40 MG: 20 INJECTION, SOLUTION INTRAVENOUS at 09:04

## 2022-04-22 RX ADMIN — PROPOFOL 100 MCG/KG/MIN: 10 INJECTION, EMULSION INTRAVENOUS at 09:04

## 2022-04-22 NOTE — ANESTHESIA POSTPROCEDURE EVALUATION
Anesthesia Post Evaluation    Patient: Husam Connolly    Procedure(s) Performed: Procedure(s) (LRB):  RELEASE, DUPUYTREN CONTRACTURE (Left)    Final Anesthesia Type: MAC      Patient location during evaluation: [facility]  Patient participation: Yes- Able to Participate  Level of consciousness: awake and alert  Post-procedure vital signs: reviewed and stable  Pain management: adequate  Airway patency: patent    PONV status at discharge: No PONV  Anesthetic complications: no      Cardiovascular status: hemodynamically stable  Respiratory status: unassisted, spontaneous ventilation and room air  Hydration status: euvolemic  Follow-up not needed.          Vitals Value Taken Time   BP  04/22/22 1019   Temp  04/22/22 1019   Pulse  04/22/22 1019   Resp  04/22/22 1019   SpO2  04/22/22 1019         No case tracking events are documented in the log.      Pain/Keanu Score: Keanu Score: 10 (4/22/2022  8:10 AM)

## 2022-04-22 NOTE — H&P
CC:  Dupuytren's contracture left hand involving ring finger           HPI:  Husam Connolly is a very pleasant 89 y.o. male with worsening contracture of the left hand for the past year so  He is now having difficulty using his left hand he can get his ring on as well on the ring finger  No numbness or tingling reported no trauma reported            PAST MEDICAL HISTORY:        Past Medical History:   Diagnosis Date    Anticoagulant long-term use      Bowel obstruction      BPH (benign prostatic hyperplasia)      Diabetes mellitus      Diverticulitis      Hyperlipidemia      Hypertension        PAST SURGICAL HISTORY:         Past Surgical History:   Procedure Laterality Date    ANASTOMOSIS OF ILEUM TO RECTUM   9/9/2019     Procedure: ANASTOMOSIS, COLO-RECTAL;  Surgeon: Fouzia Gardner MD;  Location: [facility] OR;  Service: General;;    CHOLECYSTECTOMY N/A 3/7/2022     Procedure: CHOLECYSTECTOMY;  Surgeon: Fouzia Gardner MD;  Location: [facility] OR;  Service: General;  Laterality: N/A;    COLOSTOMY   6/16/2019     Procedure: CREATION, COLOSTOMY;  Surgeon: Fouzia Gardner MD;  Location: [facility] OR;  Service: General;;    EYE SURGERY        GISELA PROCEDURE   6/16/2019     Procedure: GISELA PROCEDURE;  Surgeon: Fouzia Gardner MD;  Location: [facility] OR;  Service: General;;  Colon resection    LYSIS OF ADHESIONS N/A 3/7/2022     Procedure: LYSIS, ADHESIONS;  Surgeon: Fouzia Gardner MD;  Location: [facility] OR;  Service: General;  Laterality: N/A;    TONSILLECTOMY          FAMILY HISTORY:         Family History   Problem Relation Age of Onset    Heart disease Father      Hypertension Father      Cancer Mother      Diabetes Paternal Aunt        SOCIAL HISTORY:   Social History               Socioeconomic History    Marital status:    Tobacco Use    Smoking status: Former Smoker       Packs/day: 1.00       Years: 25.00       Pack years: 25.00       Types: Cigarettes       Start date:         Quit date:        Years since quittin.2    Smokeless tobacco: Never Used   Substance and Sexual Activity    Alcohol use: Yes       Alcohol/week: 2.0 standard drinks       Types: 2 Glasses of wine per week       Comment: occassionally    Drug use: No    Sexual activity: Yes       Partners: Female      Social Determinants of Health          Financial Resource Strain: Low Risk     Difficulty of Paying Living Expenses: Not hard at all   Food Insecurity: No Food Insecurity    Worried About Running Out of Food in the Last Year: Never true    Ran Out of Food in the Last Year: Never true   Transportation Needs: No Transportation Needs    Lack of Transportation (Medical): No    Lack of Transportation (Non-Medical): No   Physical Activity: Inactive    Days of Exercise per Week: 0 days    Minutes of Exercise per Session: 0 min   Stress: No Stress Concern Present    Feeling of Stress : Not at all   Social Connections: Moderately Isolated    Frequency of Communication with Friends and Family: More than three times a week    Frequency of Social Gatherings with Friends and Family: Three times a week    Attends [employer]ism Services: Never    Active Member of Clubs or Organizations: No    Attends Club or Organization Meetings: Never    Marital Status:    Housing Stability: Low Risk     Unable to Pay for Housing in the Last Year: No    Number of Places Lived in the Last Year: 1    Unstable Housing in the Last Year: No            MEDICATIONS:   Current Outpatient Medications:     albuterol (ACCUNEB) 1.25 mg/3 mL Nebu, Take 3 mLs (1.25 mg total) by nebulization every 6 (six) hours as needed. Rescue, Disp: 75 mL, Rfl: 0    albuterol (VENTOLIN HFA) 90 mcg/actuation inhaler, Inhale 2 puffs into the lungs every 6 (six) hours as needed for Wheezing. Rescue, Disp: 18 g, Rfl: 3    aspirin (ECOTRIN) 81 MG EC tablet, Take 81 mg by mouth once daily., Disp: , Rfl:     atorvastatin (LIPITOR) 40 MG  tablet, Take 1 tablet (40 mg total) by mouth once daily., Disp: 90 tablet, Rfl: 3    finasteride (PROSCAR) 5 mg tablet, TAKE 1 TABLET BY MOUTH EVERY DAY (Patient taking differently: Take 5 mg by mouth every evening.), Disp: 90 tablet, Rfl: 1    fluticasone propionate (FLONASE) 50 mcg/actuation nasal spray, 2 sprays (100 mcg total) by Each Nostril route once daily., Disp: 48 g, Rfl: 3    GARLIC ORAL, Take 1 capsule by mouth once daily., Disp: , Rfl:     hydroCHLOROthiazide (HYDRODIURIL) 25 MG tablet, Take 1 tablet (25 mg total) by mouth once daily., Disp: 90 tablet, Rfl: 3    losartan (COZAAR) 25 MG tablet, Take 1 tablet (25 mg total) by mouth once daily., Disp: 90 tablet, Rfl: 3    metFORMIN (GLUCOPHAGE) 500 MG tablet, TAKE 1 TABLET BY MOUTH TWICE DAILY WITH FOOD (Patient taking differently: Take 500 mg by mouth 2 (two) times daily with meals.), Disp: 180 tablet, Rfl: 3    multivitamin capsule, Take 1 capsule by mouth once daily., Disp: , Rfl:     omega-3 fatty acids/fish oil (FISH OIL-OMEGA-3 FATTY ACIDS) 300-1,000 mg capsule, Take 1 capsule by mouth once daily., Disp: , Rfl:     tamsulosin (FLOMAX) 0.4 mg Cap, Take 0.4 mg by mouth nightly., Disp: , Rfl: 3    vitamin D (VITAMIN D3) 1000 units Tab, Take 1,000 Units by mouth once daily., Disp: , Rfl:     fluticasone/vilanterol (BREO ELLIPTA INHL), Inhale into the lungs., Disp: , Rfl:   ALLERGIES:   Review of patient's allergies indicates:   Allergen Reactions    Solarcaine [benzocaine-triclosan]      Sulfa (sulfonamide antibiotics)      Sulfamethoxazole      Trimethoprim      Hytrin [terazosin] Rash    Smz-tmp ds [sulfamethoxazole-trimethoprim] Rash         Review of Systems:  Constitutional: no fever or chills  ENT: no nasal congestion or sore throat  Respiratory: no cough or shortness of breath  Cardiovascular: no chest pain or palpitations  Gastrointestinal: no nausea or vomiting, PUD, GERD, NSAID intolerance  Genitourinary: no hematuria or  "dysuria  Integument/Breast: no rash or pruritis  Hematologic/Lymphatic: no easy bruising or lymphadenopathy  Musculoskeletal: see HPI  Neurological: no seizures or tremors  Behavioral/Psych: no auditory or visual hallucinations        Physical Exam       Vitals:     03/21/22 0947   Weight: 71.7 kg (158 lb)   Height: 5' 10" (1.778 m)   PainSc: 0-No pain         Constitutional: Oriented to person, place, and time. Appears well-developed and well-nourished.   HENT:   Head: Normocephalic and atraumatic.   Nose: Nose normal.   Eyes: No scleral icterus.   Neck: Normal range of motion.   Cardiovascular: Normal rate and regular rhythm.    Pulses:       Radial pulses are 2+ on the right side, and 2+ on the left side.   Pulmonary/Chest: Effort normal and breath sounds normal.   Abdominal: Soft.   Neurological: Alert and oriented to person, place, and time.   Skin: Skin is warm.   Psychiatric: Normal mood and affect.      MUSCULOSKELETAL UPPER EXTREMITY:  Examination left hand demonstrates a large Dupuytren's nodule at the base of the ring finger and a cord which does extend into the proximal palm  He does have contracture of the MP joint of the ring finger with a positive tabletop test at about a 45 degree contracture of that joint  No involvement of the PIP  Flexion intact  Sensation intact Tinel sign negative                 Diagnostic Studies:  AP lateral x-ray left hand demonstrates some arthritic changes at the base of left thumb and small joints of the digits           Assessment:  Dupuytren contracture left hand     Plan:  I explained the nature of the problem to the patient we discussed options including injection versus surgery  He would like surgical correction for excision of the nodule and cord as an outpatient surgery  The risks and benefits of surgery explained to the patient he understands        The risks and benefits of surgery were discussed with the patient today and they understand.  The consent was signed " "in the office for surgery.        Slick Baldwin MD (Jay)  [facility]  Orthopedic Upper Extremity Surgery       "

## 2022-04-22 NOTE — DISCHARGE INSTRUCTIONS
After Hand Surgery  After surgery, the better you take care of yourself--especially your hand--the sooner it will heal. Follow your surgeons instructions. Try not to bump your hand, and dont move or lift anything while youre still wearing bandages, a splint, or a cast.  Care for your hand    Keep your hand elevated above heart level as much as possible for the first several days after surgery. This helps reduce swelling and pain.  To help prevent infection and speed healing, take care not to get your cast or bandages wet.  Relieve pain as directed  Your surgeon may prescribe pain medicine or suggest you take an anti-inflammatory medicine. You might also be instructed to apply ice (or another cold source) to your hand. If you use ice cubes, put them in a plastic bag and rest it on top of your bandages. Leave the cold source on your hand for as long as its comfortable. Do this several times a day for the first few days after surgery. It may take several minutes before you can feel the cold through the cast or bandages.  Follow up with your surgeon  During a follow-up visit after surgery, your surgeon will check your progress. The stitches, bandages, splint, or cast may be removed. A new cast or splint may be placed. If your hand has healed enough, your surgeon may prescribe exercises.  Do prescribed hand exercises  Your surgeon may recommend that you do exercises. These may be done under the guidance of a physical or occupational therapist. The exercises strengthen your hand, help you regain flexibility, and restore proper function. Do the exercises as advised.  Call your surgeon if you have...  A fever higher than 100.4°F (38.0°C) taken by mouth  Side effects from your medicine, such as prolonged nausea  A wet or loose dressing, or a dressing that is too tight  Excessive bleeding  Increased, ongoing pain or numbness  Signs of infection (such as drainage, warmth, or redness) at the incision  site  ANESTHESIA  -For the first 24 hours after surgery:  Do not drive, use heavy equipment, make important decisions, or drink alcohol  -It is normal to feel sleepy for several hours.  Rest until you are more awake.  -Have someone stay with you, if needed.  They can watch for problems and help keep you safe.  -Some possible post anesthesia side effects include: nausea and vomiting, sore throat and hoarseness, sleepiness, and dizziness.    PAIN  -If you have pain after surgery, pain medicine will help you feel better.  Take it as directed, before pain becomes severe.  Most pain relievers taken by mouth need at least 20-30 minutes to start working.  -Do not drive or drink alcohol while taking pain medicine.  -Pain medication can upset your stomach.  Taking them with a little food may help.  -Other ways to help control pain: elevation, ice, and relaxation  -Call your surgeon if still having unmanageable pain an hour after taking pain medicine.  -Pain medicine can cause constipation.  Taking an over-the counter stool softener while on prescription pain medicine and drinking plenty of fluids can prevent this side effect.  -Call your surgeon if you have severe side effects like: breathing problems, trouble waking up, dizziness, confusion, or severe constipation.    NAUSEA  -Some people have nausea after surgery.  This is often because of anesthesia, pain, pain medicine, or the stress of surgery.  -Do not push yourself to eat.  Start off with clear liquids and soup.  Slowly move to solid foods.  Don't eat fatty, rich, spicy foods at first.  Eat smaller amounts.  -If you develop persistent nausea and vomiting please notify your surgeon immediately.    BLEEDING  -Different types of surgery require different types of care and dressing changes.  It is important to follow all instructions and advice from your surgeon.  Change dressing as directed.  Call your surgeon for any concerns regarding postop bleeding.    SIGNS OF  INFECTION  -Signs of infection include: fever, swelling, drainage, and redness  -Notify your surgeon if you have a fever of 100.4 F (38.0 C) or higher.  -Notify your surgeon if you notice redness, swelling, increased pain, pus, or a foul smell at the incision site.

## 2022-04-22 NOTE — OP NOTE
[facility] - Surgery (Hospital)  Operative Note      Date of Procedure: 4/22/2022     Procedure: Procedure(s) (LRB):  RELEASE, DUPUYTREN CONTRACTURE (Left)     Surgeon(s) and Role:     * Slick Baldwin Jr., MD - Primary    Assisting Surgeon: None    Pre-Operative Diagnosis: Dupuytren's contracture of left hand [M72.0]    Post-Operative Diagnosis: Post-Op Diagnosis Codes:     * Dupuytren's contracture of left hand [M72.0]    Anesthesia: Regional    Operative Findings (including complications, if any):  Testing testing Dupuytren contracture left hand    Description of Technical Procedures:     Preop diagnosis:  Dupuytren contracture left hand involving ring finger.    Postop diagnosis:  Same.    Operative procedure:  Partial palmar fasciectomy left hand with release of ring finger.    Surgeon:  Denny.    First assistant:  Ryanne  .  Anesthesia:  Regional block    EBL:  Minimal.    Specimen:  Dupuytren's fascia.    Operative procedure in detail as follows:    After operative consent was obtained the patient brought the operating room placed supine operating table.  Anesthesia by regional block performed by the anesthesia staff.  A tourniquet applied left arm left upper extremity prepped and draped out in the normal sterile fashion.  The Esmarch used to exsanguinat e the limb and the tourniquet inflated 225 mmHg.  Following this a long zigzag incision incision made in the left palm from the proximal palm to the tip of the left ring finger.  Careful dissection was used to raise full-thickness skin flaps the entire length of the incision.  A long Dupuytr en's cord was noted to extend from the proximal palm to the D IP joint of the ring finger.  Beginning proximally the Dupuytren's cord fascia was excised from proximal to distal carefully protecting the digital nerves to the ring finger.  The A1 pulley was released all abnormal fascia removed and this allowed the ring finger to come into full extension at the MP and PIP  joint.  Hemostasis achieved with Bovie.  The wound irrigated with antibiotic saline solution and skin closed with interrupted and running 5 0 nylon horizontal mattress suture.  Sterile dressing applied followed by a bulky wrap tourniquet deflated patient brought to the recovery room stable condition all sponge needle counts reported as correct no complications        Significant Surgical Tasks Conducted by the Assistant(s), if Applicable: retraction    Estimated Blood Loss (EBL): * No values recorded between 4/22/2022  9:18 AM and 4/22/2022 10:04 AM *           Implants: * No implants in log *    Specimens:   Specimen (24h ago, onward)             Start     Ordered    04/22/22 0945  Specimen to Pathology, Surgery Orthopedics  Once        Comments: Pre-op Diagnosis: Dupuytren's contracture of left hand [M72.0]Procedure(s):RELEASE, DUPUYTREN CONTRACTURE Number of specimens: 1Name of specimens: Left Dupuytren - perm     References:    Click here for ordering Quick Tip   Question Answer Comment   Procedure Type: Orthopedics    Release to patient Immediate        04/22/22 0945                        Condition: Good    Disposition: PACU - hemodynamically stable.    Attestation: I was present and scrubbed for the entire procedure.    Discharge Note    OUTCOME: Patient tolerated treatment/procedure well without complication and is now ready for discharge.    DISPOSITION: Home or Self Care    FINAL DIAGNOSIS:  Dupuytren's contracture of left hand    FOLLOWUP: In clinic    DISCHARGE INSTRUCTIONS:    Discharge Procedure Orders   Diet general     Call MD for:  temperature >100.4     Call MD for:  persistent nausea and vomiting     Call MD for:  severe uncontrolled pain     Keep surgical extremity elevated     Remove dressing in 72 hours        Clinical Reference Documents Added to Patient Instructions       Document    HYDROCODONE AND ACETAMINOPHEN, ADULT (ENGLISH)

## 2022-04-22 NOTE — ANESTHESIA PROCEDURE NOTES
Peripheral Block    Patient location during procedure: pre-op    Reason for block: primary anesthetic    Diagnosis: Dupuytren's contracture   Start time: 4/22/2022 8:00 AM  Timeout: 4/22/2022 7:55 AM   End time: 4/22/2022 8:07 AM    Staffing  Authorizing Provider: José Miguel Guillermo MD  Performing Provider: Nunu Quarles MD    Preanesthetic Checklist  Completed: patient identified, IV checked, site marked, risks and benefits discussed, surgical consent, monitors and equipment checked, pre-op evaluation and timeout performed  Peripheral Block  Patient position: supine  Prep: ChloraPrep  Patient monitoring: heart rate, cardiac monitor, continuous pulse ox, continuous capnometry and frequent blood pressure checks  Block type: axillary  Laterality: left  Injection technique: single shot  Needle  Needle type: Stimuplex   Needle gauge: 21 G  Needle length: 4 in  Needle localization: anatomical landmarks and ultrasound guidance   -ultrasound image captured on disc.  Assessment  Injection assessment: negative aspiration and negative parasthesia  Paresthesia pain: none  Heart rate change: no  Slow fractionated injection: yes  Pain Tolerance: comfortable throughout block and no complaints  Medications:    Medications: ropivacaine (NAROPIN) injection 0.5% - Perineural   30 mL - 4/22/2022 8:05:00 AM    Additional Notes  VSS.  [facility] RN monitoring vitals throughout procedure.  Patient tolerated procedure well.    Supplemented with Musculocutaneous nerve block

## 2022-04-22 NOTE — TRANSFER OF CARE
"Anesthesia Transfer of Care Note    Patient: Husam Connolly    Procedure(s) Performed: Procedure(s) (LRB):  RELEASE, DUPUYTREN CONTRACTURE (Left)    Patient location: [facility]    Anesthesia Type: MAC    Transport from OR: Transported from OR on room air with adequate spontaneous ventilation    Post pain: adequate analgesia    Post assessment: no apparent anesthetic complications and tolerated procedure well    Post vital signs: stable    Level of consciousness: awake and alert    Nausea/Vomiting: no nausea/vomiting    Complications: none    Transfer of care protocol was followed      Last vitals:   Visit Vitals  BP (!) 142/71 (BP Location: Right arm, Patient Position: Lying)   Pulse 74   Temp 36.7 °C (98.1 °F) (Skin)   Resp 18   Ht 5' 10" (1.778 m)   Wt 70.3 kg (155 lb)   SpO2 98%   BMI 22.24 kg/m²     "

## 2022-04-27 ENCOUNTER — NURSE TRIAGE (OUTPATIENT)
Dept: ADMINISTRATIVE | Facility: CLINIC | Age: 87
End: 2022-04-27
Payer: MEDICARE

## 2022-04-27 NOTE — TELEPHONE ENCOUNTER
Pt calling with questions about dressing surgical area. Reviewed AVS with pt. verbalized understanding. Will route message.     Reason for Disposition   Getting the incision wet, questions about    Protocols used: POST-OP SYMPTOMS AND [id number]

## 2022-04-28 LAB
FINAL PATHOLOGIC DIAGNOSIS: NORMAL
GROSS: NORMAL
Lab: NORMAL
MICROSCOPIC EXAM: NORMAL

## 2022-05-04 NOTE — PROGRESS NOTES
Subjective:      Patient ID: Husam Connolly is a 89 y.o. male.    Chief Complaint: Post-op Evaluation (2 wk p/o- Left hand )      HPI: Husam Connolly is here for a PO visit. He is 13 days s/p left Dupuytren's release and left ring trigger finger release. He reports pain has been minimal and does not require analgesia. States finger is significantly straighter. PO complaints include: discoloration hand (bruising).    Past Medical History:   Diagnosis Date    Anticoagulant long-term use     Bowel obstruction     BPH (benign prostatic hyperplasia)     Diabetes mellitus     Diverticulitis     Hyperlipidemia     Hypertension        Current Outpatient Medications:     aspirin (ECOTRIN) 81 MG EC tablet, Take 81 mg by mouth once daily., Disp: , Rfl:     atorvastatin (LIPITOR) 40 MG tablet, TAKE 1 TABLET(40 MG) BY MOUTH EVERY DAY, Disp: 90 tablet, Rfl: 3    finasteride (PROSCAR) 5 mg tablet, TAKE 1 TABLET BY MOUTH EVERY DAY (Patient taking differently: Take 5 mg by mouth every evening.), Disp: 90 tablet, Rfl: 1    GARLIC ORAL, Take 1 capsule by mouth once daily., Disp: , Rfl:     hydroCHLOROthiazide (HYDRODIURIL) 25 MG tablet, Take 1 tablet (25 mg total) by mouth once daily., Disp: 90 tablet, Rfl: 3    losartan (COZAAR) 25 MG tablet, Take 1 tablet (25 mg total) by mouth once daily., Disp: 90 tablet, Rfl: 3    metFORMIN (GLUCOPHAGE) 500 MG tablet, TAKE 1 TABLET BY MOUTH TWICE DAILY WITH FOOD (Patient taking differently: Take 500 mg by mouth 2 (two) times daily with meals.), Disp: 180 tablet, Rfl: 3    multivitamin capsule, Take 1 capsule by mouth once daily., Disp: , Rfl:     omega-3 fatty acids/fish oil (FISH OIL-OMEGA-3 FATTY ACIDS) 300-1,000 mg capsule, Take 1 capsule by mouth once daily., Disp: , Rfl:     tamsulosin (FLOMAX) 0.4 mg Cap, Take 0.4 mg by mouth nightly., Disp: , Rfl: 3    vitamin D (VITAMIN D3) 1000 units Tab, Take 1,000 Units by mouth once daily., Disp: , Rfl:     albuterol (VENTOLIN  "HFA) 90 mcg/actuation inhaler, Inhale 2 puffs into the lungs every 6 (six) hours as needed for Wheezing. Rescue, Disp: 18 g, Rfl: 3    fluticasone propionate (FLONASE) 50 mcg/actuation nasal spray, 2 sprays (100 mcg total) by Each Nostril route once daily. (Patient not taking: Reported on 5/5/2022), Disp: 48 g, Rfl: 3    fluticasone/vilanterol (BREO ELLIPTA INHL), Inhale into the lungs., Disp: , Rfl:     HYDROcodone-acetaminophen (NORCO) 5-325 mg per tablet, Take 1 tablet by mouth every 4 (four) hours as needed for Pain. (Patient not taking: Reported on 5/5/2022), Disp: 12 tablet, Rfl: 0  Review of patient's allergies indicates:   Allergen Reactions    Solarcaine [benzocaine-triclosan]     Sulfa (sulfonamide antibiotics)     Sulfamethoxazole     Trimethoprim     Hytrin [terazosin] Rash    Smz-tmp ds [sulfamethoxazole-trimethoprim] Rash       Ht 5' 10" (1.778 m)   Wt 70.3 kg (154 lb 15.7 oz)   BMI 22.24 kg/m²     Review of Systems   Constitutional: Negative for chills and fever.   Cardiovascular: Negative for chest pain and palpitations.   Respiratory: Negative for shortness of breath and wheezing.    Skin: Negative for poor wound healing and rash.   Musculoskeletal: Positive for joint swelling and stiffness.   Gastrointestinal: Negative for nausea and vomiting.   Genitourinary: Negative for dysuria and hematuria.   Neurological: Negative for seizures and tremors.   Psychiatric/Behavioral: Negative for altered mental status.   Allergic/Immunologic: Negative for environmental allergies and persistent infections.         Objective:    Ortho Exam       Ledft UE: Skin chevron incision from the mid past the PIP joint with sutures in place, moderate splitting at the corners. No sign of infection. Swelling minmal. TTP none. ROM full extension, limited flexion. Sensation intact. Tinels na. Pulses present. Cap refill brisk.   GEN: Well developed, well nourished elderly male. AAOX3. No acute distress. "   Normocephalic, atraumatic.   KARIME  Breathing unlabored.  Mood and affect appropriate.     Assessment:     Imaging: No new        1. Dupuytren's contracture of left hand    s/p guidry fasciotomy       Plan:           Progressing very well  Sutures removed today without complication; dermabond applied given incisional splitting  Appropriate wound care explained  Start gentle ROM of the fingers, no lifting, griping, pulling.   May consider CHT if needed  OTC analgesia as needed    Follow up in about 3 weeks (around 5/26/2022).

## 2022-05-05 ENCOUNTER — OFFICE VISIT (OUTPATIENT)
Dept: ORTHOPEDICS | Facility: CLINIC | Age: 87
End: 2022-05-05
Payer: MEDICARE

## 2022-05-05 VITALS — WEIGHT: 155 LBS | HEIGHT: 70 IN | BODY MASS INDEX: 22.19 KG/M2

## 2022-05-05 DIAGNOSIS — M72.0 DUPUYTREN'S CONTRACTURE OF LEFT HAND: Primary | ICD-10-CM

## 2022-05-05 PROCEDURE — 1126F AMNT PAIN NOTED NONE PRSNT: CPT | Mod: CPTII,S$GLB,, | Performed by: ORTHOPAEDIC SURGERY

## 2022-05-05 PROCEDURE — 1159F MED LIST DOCD IN RCRD: CPT | Mod: CPTII,S$GLB,, | Performed by: ORTHOPAEDIC SURGERY

## 2022-05-05 PROCEDURE — 99024 PR POST-OP FOLLOW-UP VISIT: ICD-10-PCS | Mod: S$GLB,,, | Performed by: ORTHOPAEDIC SURGERY

## 2022-05-05 PROCEDURE — 1101F PT FALLS ASSESS-DOCD LE1/YR: CPT | Mod: CPTII,S$GLB,, | Performed by: ORTHOPAEDIC SURGERY

## 2022-05-05 PROCEDURE — 1101F PR PT FALLS ASSESS DOC 0-1 FALLS W/OUT INJ PAST YR: ICD-10-PCS | Mod: CPTII,S$GLB,, | Performed by: ORTHOPAEDIC SURGERY

## 2022-05-05 PROCEDURE — 1159F PR MEDICATION LIST DOCUMENTED IN MEDICAL RECORD: ICD-10-PCS | Mod: CPTII,S$GLB,, | Performed by: ORTHOPAEDIC SURGERY

## 2022-05-05 PROCEDURE — 99999 PR PBB SHADOW E&M-EST. PATIENT-LVL III: ICD-10-PCS | Mod: PBBFAC,,, | Performed by: ORTHOPAEDIC SURGERY

## 2022-05-05 PROCEDURE — 99024 POSTOP FOLLOW-UP VISIT: CPT | Mod: S$GLB,,, | Performed by: ORTHOPAEDIC SURGERY

## 2022-05-05 PROCEDURE — 1126F PR PAIN SEVERITY QUANTIFIED, NO PAIN PRESENT: ICD-10-PCS | Mod: CPTII,S$GLB,, | Performed by: ORTHOPAEDIC SURGERY

## 2022-05-05 PROCEDURE — 99999 PR PBB SHADOW E&M-EST. PATIENT-LVL III: CPT | Mod: PBBFAC,,, | Performed by: ORTHOPAEDIC SURGERY

## 2022-05-05 PROCEDURE — 3288F FALL RISK ASSESSMENT DOCD: CPT | Mod: CPTII,S$GLB,, | Performed by: ORTHOPAEDIC SURGERY

## 2022-05-05 PROCEDURE — 3288F PR FALLS RISK ASSESSMENT DOCUMENTED: ICD-10-PCS | Mod: CPTII,S$GLB,, | Performed by: ORTHOPAEDIC SURGERY

## 2022-05-24 PROBLEM — Z90.49 S/P CHOLECYSTECTOMY: Status: RESOLVED | Noted: 2019-07-01 | Resolved: 2022-05-24

## 2022-05-24 PROBLEM — K81.9 CHOLECYSTITIS: Status: RESOLVED | Noted: 2022-03-07 | Resolved: 2022-05-24

## 2022-05-24 PROBLEM — R06.09 DYSPNEA ON EXERTION: Status: RESOLVED | Noted: 2019-09-16 | Resolved: 2022-05-24

## 2022-05-24 PROBLEM — K80.12 CALCULUS OF GALLBLADDER WITH ACUTE ON CHRONIC CHOLECYSTITIS WITHOUT OBSTRUCTION: Status: RESOLVED | Noted: 2022-03-09 | Resolved: 2022-05-24

## 2022-05-24 PROBLEM — K80.20 CALCULUS OF GALLBLADDER WITHOUT CHOLECYSTITIS WITHOUT OBSTRUCTION: Status: RESOLVED | Noted: 2022-03-07 | Resolved: 2022-05-24

## 2022-05-30 ENCOUNTER — OFFICE VISIT (OUTPATIENT)
Dept: ORTHOPEDICS | Facility: CLINIC | Age: 87
End: 2022-05-30
Payer: MEDICARE

## 2022-05-30 VITALS — WEIGHT: 160 LBS | BODY MASS INDEX: 22.9 KG/M2 | HEIGHT: 70 IN

## 2022-05-30 DIAGNOSIS — M72.0 DUPUYTREN'S CONTRACTURE OF LEFT HAND: Primary | ICD-10-CM

## 2022-05-30 LAB
BRPFT: ABNORMAL
DLCO ADJ PRE: 7.97 ML/(MIN*MMHG) (ref 16.38–30.24)
DLCO SINGLE BREATH LLN: 16.38
DLCO SINGLE BREATH PRE REF: 34.2 %
DLCO SINGLE BREATH REF: 23.31
DLCOC SBVA LLN: 2.16
DLCOC SBVA PRE REF: 61.7 %
DLCOC SBVA REF: 3.26
DLCOC SINGLE BREATH LLN: 16.38
DLCOC SINGLE BREATH PRE REF: 34.2 %
DLCOC SINGLE BREATH REF: 23.31
DLCOVA LLN: 2.16
DLCOVA PRE REF: 61.7 %
DLCOVA PRE: 2.02 ML/(MIN*MMHG*L) (ref 2.16–4.37)
DLCOVA REF: 3.26
DLVAADJ PRE: 2.02 ML/(MIN*MMHG*L) (ref 2.16–4.37)
ERVN2 LLN: -16449.2
ERVN2 PRE REF: 50.3 %
ERVN2 PRE: 0.4 L (ref -16449.2–16450.8)
ERVN2 REF: 0.8
FEF 25 75 CHG: 24.3 %
FET100 CHG: -10.9 %
FEV1 CHG: 14.7 %
FEV1 FVC CHG: 1.4 %
FEV1 FVC LLN: 57
FEV1 FVC POST REF: 73.6 %
FEV1 FVC PRE REF: 72.6 %
FEV1 FVC REF: 73
FEV1 LLN: 1.72
FEV1 POST REF: 54.3 %
FEV1 PRE REF: 47.4 %
FEV1 REF: 2.59
FRCN2 LLN: 2.9
FRCN2 PRE REF: 142.8 %
FRCN2 REF: 3.89
FVC CHG: 13.1 %
FVC LLN: 2.5
FVC POST REF: 72.8 %
FVC PRE REF: 64.3 %
FVC REF: 3.58
IVC PRE: 2.29 L (ref 2.5–4.65)
IVC SINGLE BREATH LLN: 2.5
IVC SINGLE BREATH PRE REF: 64 %
IVC SINGLE BREATH REF: 3.58
PEF CHG: 18.6 %
PEF LLN: 3.55
PEF POST REF: 56.7 %
PEF PRE REF: 47.8 %
PEF REF: 5.87
POST FEF 25 75: 0.57 L/S
POST FET 100: 6.94 SEC
POST FEV1 FVC: 54.01 % (ref 57.27–89.6)
POST FEV1: 1.41 L (ref 1.72–3.46)
POST FVC: 2.6 L (ref 2.5–4.65)
POST PEF: 3.33 L/S (ref 3.55–8.19)
PRE DLCO: 7.97 ML/(MIN*MMHG) (ref 16.38–30.24)
PRE FEF 25 75: 0.46 L/S
PRE FET 100: 7.79 SEC
PRE FEV1 FVC: 53.28 % (ref 57.27–89.6)
PRE FEV1: 1.23 L (ref 1.72–3.46)
PRE FRC N2: 5.55 L
PRE FVC: 2.3 L (ref 2.5–4.65)
PRE PEF: 2.8 L/S (ref 3.55–8.19)
RVN2 LLN: 2.41
RVN2 PRE REF: 157.9 %
RVN2 PRE: 4.87 L (ref 2.41–3.76)
RVN2 REF: 3.08
RVN2TLCN2 LLN: 40.08
RVN2TLCN2 PRE REF: 138.4 %
RVN2TLCN2 PRE: 67.9 % (ref 40.08–58.04)
RVN2TLCN2 REF: 49.06
TLCN2 LLN: 5.99
TLCN2 PRE REF: 100.4 %
TLCN2 PRE: 7.17 L (ref 5.99–8.29)
TLCN2 REF: 7.14
VA PRE: 3.95 L (ref 6.99–6.99)
VA SINGLE BREATH LLN: 6.99
VA SINGLE BREATH PRE REF: 56.5 %
VA SINGLE BREATH REF: 6.99
VCMAXN2 LLN: 2.5
VCMAXN2 PRE REF: 64.3 %
VCMAXN2 PRE: 2.3 L (ref 2.5–4.65)
VCMAXN2 REF: 3.58

## 2022-05-30 PROCEDURE — 3288F FALL RISK ASSESSMENT DOCD: CPT | Mod: CPTII,S$GLB,, | Performed by: ORTHOPAEDIC SURGERY

## 2022-05-30 PROCEDURE — 3288F PR FALLS RISK ASSESSMENT DOCUMENTED: ICD-10-PCS | Mod: CPTII,S$GLB,, | Performed by: ORTHOPAEDIC SURGERY

## 2022-05-30 PROCEDURE — 1101F PT FALLS ASSESS-DOCD LE1/YR: CPT | Mod: CPTII,S$GLB,, | Performed by: ORTHOPAEDIC SURGERY

## 2022-05-30 PROCEDURE — 1101F PR PT FALLS ASSESS DOC 0-1 FALLS W/OUT INJ PAST YR: ICD-10-PCS | Mod: CPTII,S$GLB,, | Performed by: ORTHOPAEDIC SURGERY

## 2022-05-30 PROCEDURE — 99024 PR POST-OP FOLLOW-UP VISIT: ICD-10-PCS | Mod: S$GLB,,, | Performed by: ORTHOPAEDIC SURGERY

## 2022-05-30 PROCEDURE — 1126F AMNT PAIN NOTED NONE PRSNT: CPT | Mod: CPTII,S$GLB,, | Performed by: ORTHOPAEDIC SURGERY

## 2022-05-30 PROCEDURE — 1159F MED LIST DOCD IN RCRD: CPT | Mod: CPTII,S$GLB,, | Performed by: ORTHOPAEDIC SURGERY

## 2022-05-30 PROCEDURE — 99999 PR PBB SHADOW E&M-EST. PATIENT-LVL III: ICD-10-PCS | Mod: PBBFAC,,, | Performed by: ORTHOPAEDIC SURGERY

## 2022-05-30 PROCEDURE — 99024 POSTOP FOLLOW-UP VISIT: CPT | Mod: S$GLB,,, | Performed by: ORTHOPAEDIC SURGERY

## 2022-05-30 PROCEDURE — 99999 PR PBB SHADOW E&M-EST. PATIENT-LVL III: CPT | Mod: PBBFAC,,, | Performed by: ORTHOPAEDIC SURGERY

## 2022-05-30 PROCEDURE — 1159F PR MEDICATION LIST DOCUMENTED IN MEDICAL RECORD: ICD-10-PCS | Mod: CPTII,S$GLB,, | Performed by: ORTHOPAEDIC SURGERY

## 2022-05-30 PROCEDURE — 1126F PR PAIN SEVERITY QUANTIFIED, NO PAIN PRESENT: ICD-10-PCS | Mod: CPTII,S$GLB,, | Performed by: ORTHOPAEDIC SURGERY

## 2022-05-30 NOTE — PROGRESS NOTES
Subjective:      Patient ID: Husam Connolly is a 89 y.o. male.  Chief Complaint: Post-op Evaluation (Left hand dupuytren's, trigger ( Sx 4/22))      HPI  Husam Connolly is a  89 y.o. male presenting today for post op visit.  He is s/p is excision left hand 6 weeks postop is doing well pain minimal doing exercise on his own slight swelling reported no pain.     Review of patient's allergies indicates:   Allergen Reactions    Solarcaine [benzocaine-triclosan]     Sulfa (sulfonamide antibiotics)     Sulfamethoxazole     Trimethoprim     Hytrin [terazosin] Rash    Smz-tmp ds [sulfamethoxazole-trimethoprim] Rash         Current Outpatient Medications   Medication Sig Dispense Refill    aspirin (ECOTRIN) 81 MG EC tablet Take 81 mg by mouth once daily.      atorvastatin (LIPITOR) 40 MG tablet TAKE 1 TABLET(40 MG) BY MOUTH EVERY DAY 90 tablet 3    finasteride (PROSCAR) 5 mg tablet TAKE 1 TABLET BY MOUTH EVERY DAY (Patient taking differently: Take 5 mg by mouth every evening.) 90 tablet 1    fluticasone propionate (FLONASE) 50 mcg/actuation nasal spray 2 sprays (100 mcg total) by Each Nostril route once daily. 48 g 3    GARLIC ORAL Take 1 capsule by mouth once daily.      hydroCHLOROthiazide (HYDRODIURIL) 25 MG tablet Take 1 tablet (25 mg total) by mouth once daily. 90 tablet 3    losartan (COZAAR) 25 MG tablet Take 1 tablet (25 mg total) by mouth once daily. 90 tablet 3    metFORMIN (GLUCOPHAGE) 500 MG tablet TAKE 1 TABLET BY MOUTH TWICE DAILY WITH FOOD (Patient taking differently: Take 500 mg by mouth 2 (two) times daily with meals.) 180 tablet 3    multivitamin capsule Take 1 capsule by mouth once daily.      omega-3 fatty acids/fish oil (FISH OIL-OMEGA-3 FATTY ACIDS) 300-1,000 mg capsule Take 1 capsule by mouth once daily.      tamsulosin (FLOMAX) 0.4 mg Cap Take 0.4 mg by mouth nightly.  3    vitamin D (VITAMIN D3) 1000 units Tab Take 1,000 Units by mouth once daily.       No current  "facility-administered medications for this visit.       Past Medical History:   Diagnosis Date    Anticoagulant long-term use     Bowel obstruction     BPH (benign prostatic hyperplasia)     Diabetes mellitus     Diverticulitis     Hyperlipidemia     Hypertension        Past Surgical History:   Procedure Laterality Date    ANASTOMOSIS OF ILEUM TO RECTUM  9/9/2019    Procedure: ANASTOMOSIS, COLO-RECTAL;  Surgeon: Fouzia Gardner MD;  Location: [facility];  Service: General;;    CHOLECYSTECTOMY N/A 3/7/2022    Procedure: CHOLECYSTECTOMY;  Surgeon: Fouzia Gardner MD;  Location: [facility] OR;  Service: General;  Laterality: N/A;    COLOSTOMY  6/16/2019    Procedure: CREATION, COLOSTOMY;  Surgeon: Fouzia Gardner MD;  Location: [facility] OR;  Service: General;;    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    Procedure: RELEASE, DUPUYTREN CONTRACTURE;  Surgeon: Slick Baldwin Jr., MD;  Location: [facility];  Service: Orthopedics;  Laterality: Left;    EYE SURGERY      GISELA PROCEDURE  6/16/2019    Procedure: GISELA PROCEDURE;  Surgeon: Fouzia Gardner MD;  Location: [facility];  Service: General;;  Colon resection    LYSIS OF ADHESIONS N/A 3/7/2022    Procedure: LYSIS, ADHESIONS;  Surgeon: Fouzia Gardner MD;  Location: [facility];  Service: General;  Laterality: N/A;    TONSILLECTOMY         OBJECTIVE:   PHYSICAL EXAM:  Height: 5' 10" (177.8 cm) Weight: 72.6 kg (160 lb)  Vitals:    05/30/22 0822   Weight: 72.6 kg (160 lb)   Height: 5' 10" (1.778 m)   PainSc: 0-No pain     Ortho/SPM Exam  Examination left hand the incisions are all well healed mild swelling mild thickening of the scar  Range of motion excellent no evidence of infection    RADIOGRAPHS:  None  Comments: I have personally reviewed the imaging and I agree with the above radiologist's report.    ASSESSMENT/PLAN:     IMPRESSION:  Status post Dupuytren's excision left hand    PLAN:  I have given him a squeeze ball to work on " strengthening  Continue scar massage with cocoa butter  Advance activities as tolerated    FOLLOW UP:  6 weeks    Disclaimer: This note has been generated using voice-recognition software. There may be typographical errors that have been missed during proof-reading.

## 2022-05-31 ENCOUNTER — OFFICE VISIT (OUTPATIENT)
Dept: CARDIOLOGY | Facility: CLINIC | Age: 87
End: 2022-05-31
Payer: MEDICARE

## 2022-05-31 VITALS
WEIGHT: 158.75 LBS | OXYGEN SATURATION: 100 % | HEART RATE: 75 BPM | DIASTOLIC BLOOD PRESSURE: 67 MMHG | SYSTOLIC BLOOD PRESSURE: 137 MMHG | HEIGHT: 70 IN | BODY MASS INDEX: 22.73 KG/M2

## 2022-05-31 DIAGNOSIS — I70.0 AORTIC ATHEROSCLEROSIS: Primary | ICD-10-CM

## 2022-05-31 DIAGNOSIS — E78.00 HIGH CHOLESTEROL: Chronic | ICD-10-CM

## 2022-05-31 DIAGNOSIS — J43.2 CENTRILOBULAR EMPHYSEMA: ICD-10-CM

## 2022-05-31 DIAGNOSIS — I45.10 NEW ONSET RIGHT BUNDLE BRANCH BLOCK (RBBB): ICD-10-CM

## 2022-05-31 DIAGNOSIS — N18.30 STAGE 3 CHRONIC KIDNEY DISEASE, UNSPECIFIED WHETHER STAGE 3A OR 3B CKD: Chronic | ICD-10-CM

## 2022-05-31 DIAGNOSIS — N18.31 TYPE 2 DIABETES MELLITUS WITH STAGE 3A CHRONIC KIDNEY DISEASE, WITHOUT LONG-TERM CURRENT USE OF INSULIN: Chronic | ICD-10-CM

## 2022-05-31 DIAGNOSIS — M72.0 DUPUYTREN'S CONTRACTURE OF LEFT HAND: ICD-10-CM

## 2022-05-31 DIAGNOSIS — I10 PRIMARY HYPERTENSION: Chronic | ICD-10-CM

## 2022-05-31 DIAGNOSIS — E11.22 TYPE 2 DIABETES MELLITUS WITH STAGE 3A CHRONIC KIDNEY DISEASE, WITHOUT LONG-TERM CURRENT USE OF INSULIN: Chronic | ICD-10-CM

## 2022-05-31 DIAGNOSIS — N40.1 BENIGN PROSTATIC HYPERPLASIA WITH LOWER URINARY TRACT SYMPTOMS, SYMPTOM DETAILS UNSPECIFIED: ICD-10-CM

## 2022-05-31 PROCEDURE — 99999 PR PBB SHADOW E&M-EST. PATIENT-LVL III: CPT | Mod: PBBFAC,,, | Performed by: INTERNAL MEDICINE

## 2022-05-31 PROCEDURE — 1101F PT FALLS ASSESS-DOCD LE1/YR: CPT | Mod: CPTII,S$GLB,, | Performed by: INTERNAL MEDICINE

## 2022-05-31 PROCEDURE — 1159F PR MEDICATION LIST DOCUMENTED IN MEDICAL RECORD: ICD-10-PCS | Mod: CPTII,S$GLB,, | Performed by: INTERNAL MEDICINE

## 2022-05-31 PROCEDURE — 3051F PR MOST RECENT HEMOGLOBIN A1C LEVEL 7.0 - < 8.0%: ICD-10-PCS | Mod: CPTII,S$GLB,, | Performed by: INTERNAL MEDICINE

## 2022-05-31 PROCEDURE — 3288F PR FALLS RISK ASSESSMENT DOCUMENTED: ICD-10-PCS | Mod: CPTII,S$GLB,, | Performed by: INTERNAL MEDICINE

## 2022-05-31 PROCEDURE — 99214 PR OFFICE/OUTPT VISIT, EST, LEVL IV, 30-39 MIN: ICD-10-PCS | Mod: 25,S$GLB,, | Performed by: INTERNAL MEDICINE

## 2022-05-31 PROCEDURE — 1126F PR PAIN SEVERITY QUANTIFIED, NO PAIN PRESENT: ICD-10-PCS | Mod: CPTII,S$GLB,, | Performed by: INTERNAL MEDICINE

## 2022-05-31 PROCEDURE — 99214 OFFICE O/P EST MOD 30 MIN: CPT | Mod: 25,S$GLB,, | Performed by: INTERNAL MEDICINE

## 2022-05-31 PROCEDURE — 1160F PR REVIEW ALL MEDS BY PRESCRIBER/CLIN PHARMACIST DOCUMENTED: ICD-10-PCS | Mod: CPTII,S$GLB,, | Performed by: INTERNAL MEDICINE

## 2022-05-31 PROCEDURE — 1160F RVW MEDS BY RX/DR IN RCRD: CPT | Mod: CPTII,S$GLB,, | Performed by: INTERNAL MEDICINE

## 2022-05-31 PROCEDURE — 93000 EKG 12-LEAD: ICD-10-PCS | Mod: S$GLB,,, | Performed by: INTERNAL MEDICINE

## 2022-05-31 PROCEDURE — 1126F AMNT PAIN NOTED NONE PRSNT: CPT | Mod: CPTII,S$GLB,, | Performed by: INTERNAL MEDICINE

## 2022-05-31 PROCEDURE — 3288F FALL RISK ASSESSMENT DOCD: CPT | Mod: CPTII,S$GLB,, | Performed by: INTERNAL MEDICINE

## 2022-05-31 PROCEDURE — 1101F PR PT FALLS ASSESS DOC 0-1 FALLS W/OUT INJ PAST YR: ICD-10-PCS | Mod: CPTII,S$GLB,, | Performed by: INTERNAL MEDICINE

## 2022-05-31 PROCEDURE — 3051F HG A1C>EQUAL 7.0%<8.0%: CPT | Mod: CPTII,S$GLB,, | Performed by: INTERNAL MEDICINE

## 2022-05-31 PROCEDURE — 1159F MED LIST DOCD IN RCRD: CPT | Mod: CPTII,S$GLB,, | Performed by: INTERNAL MEDICINE

## 2022-05-31 PROCEDURE — 99999 PR PBB SHADOW E&M-EST. PATIENT-LVL III: ICD-10-PCS | Mod: PBBFAC,,, | Performed by: INTERNAL MEDICINE

## 2022-05-31 PROCEDURE — 93000 ELECTROCARDIOGRAM COMPLETE: CPT | Mod: S$GLB,,, | Performed by: INTERNAL MEDICINE

## 2022-05-31 NOTE — PROGRESS NOTES
Subjective:      Patient ID: Husam Connolly is a 89 y.o. male.    Chief Complaint: Follow-up and Hypertension    HPI:  Feels well.    Hospitalized in March 2022 with acute gangrenous cholecystitis and underwent open cholecystectomy with lysis of adhesions and ventral hernia repair.    Had hand surgery by Dr Denny Oliveros with all housework and shopping.      Review of Systems   Cardiovascular: Positive for dyspnea on exertion (chronic stable). Negative for chest pain, claudication, irregular heartbeat, leg swelling, near-syncope, orthopnea, palpitations and syncope.      Voiding well with tamsulosin      Past Medical History:   Diagnosis Date    Anticoagulant long-term use     Bowel obstruction     BPH (benign prostatic hyperplasia)     Diabetes mellitus     Diverticulitis     Hyperlipidemia     Hypertension         Past Surgical History:   Procedure Laterality Date    ANASTOMOSIS OF ILEUM TO RECTUM  9/9/2019    Procedure: ANASTOMOSIS, COLO-RECTAL;  Surgeon: Fouzia Gardner MD;  Location: [facility];  Service: General;;    CHOLECYSTECTOMY N/A 3/7/2022    Procedure: CHOLECYSTECTOMY;  Surgeon: Fouzia Gardner MD;  Location: [facility] OR;  Service: General;  Laterality: N/A;    COLOSTOMY  6/16/2019    Procedure: CREATION, COLOSTOMY;  Surgeon: Fouzia Gardenr MD;  Location: [facility] OR;  Service: General;;    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    Procedure: RELEASE, DUPUYTREN CONTRACTURE;  Surgeon: Slick Baldwin Jr., MD;  Location: [facility] OR;  Service: Orthopedics;  Laterality: Left;    EYE SURGERY      GISELA PROCEDURE  6/16/2019    Procedure: GISELA PROCEDURE;  Surgeon: Fouzia Gardner MD;  Location: [facility] OR;  Service: General;;  Colon resection    LYSIS OF ADHESIONS N/A 3/7/2022    Procedure: LYSIS, ADHESIONS;  Surgeon: Fouzia Gardner MD;  Location: [facility] OR;  Service: General;  Laterality: N/A;    TONSILLECTOMY         Family History   Problem Relation Age of  Onset    Heart disease Father     Hypertension Father     Cancer Mother     Diabetes Paternal Aunt        Social History     Socioeconomic History    Marital status:    Tobacco Use    Smoking status: Former Smoker     Packs/day: 1.00     Years: 25.00     Pack years: 25.00     Types: Cigarettes     Start date:      Quit date:      Years since quittin.4    Smokeless tobacco: Never Used   Substance and Sexual Activity    Alcohol use: Yes     Alcohol/week: 2.0 standard drinks     Types: 2 Glasses of wine per week     Comment: occassionally    Drug use: No    Sexual activity: Yes     Partners: Female     Social Determinants of Health     Financial Resource Strain: Low Risk     Difficulty of Paying Living Expenses: Not hard at all   Food Insecurity: No Food Insecurity    Worried About Running Out of Food in the Last Year: Never true    Ran Out of Food in the Last Year: Never true   Transportation Needs: No Transportation Needs    Lack of Transportation (Medical): No    Lack of Transportation (Non-Medical): No   Physical Activity: Inactive    Days of Exercise per Week: 0 days    Minutes of Exercise per Session: 0 min   Stress: No Stress Concern Present    Feeling of Stress : Not at all   Social Connections: Moderately Isolated    Frequency of Communication with Friends and Family: More than three times a week    Frequency of Social Gatherings with Friends and Family: Three times a week    Attends Pentec[employer]al Services: Never    Active Member of Clubs or Organizations: No    Attends Club or Organization Meetings: Never    Marital Status:    Housing Stability: Low Risk     Unable to Pay for Housing in the Last Year: No    Number of Places Lived in the Last Year: 1    Unstable Housing in the Last Year: No       Current Outpatient Medications on File Prior to Visit   Medication Sig Dispense Refill    aspirin (ECOTRIN) 81 MG EC tablet Take 81 mg by mouth once daily.       "atorvastatin (LIPITOR) 40 MG tablet TAKE 1 TABLET(40 MG) BY MOUTH EVERY DAY 90 tablet 3    finasteride (PROSCAR) 5 mg tablet TAKE 1 TABLET BY MOUTH EVERY DAY (Patient taking differently: Take 5 mg by mouth every evening.) 90 tablet 1    fluticasone propionate (FLONASE) 50 mcg/actuation nasal spray 2 sprays (100 mcg total) by Each Nostril route once daily. 48 g 3    GARLIC ORAL Take 1 capsule by mouth once daily.      hydroCHLOROthiazide (HYDRODIURIL) 25 MG tablet Take 1 tablet (25 mg total) by mouth once daily. 90 tablet 3    losartan (COZAAR) 25 MG tablet Take 1 tablet (25 mg total) by mouth once daily. 90 tablet 3    metFORMIN (GLUCOPHAGE) 500 MG tablet TAKE 1 TABLET BY MOUTH TWICE DAILY WITH FOOD (Patient taking differently: Take 500 mg by mouth 2 (two) times daily with meals.) 180 tablet 3    multivitamin capsule Take 1 capsule by mouth once daily.      omega-3 fatty acids/fish oil (FISH OIL-OMEGA-3 FATTY ACIDS) 300-1,000 mg capsule Take 1 capsule by mouth once daily.      tamsulosin (FLOMAX) 0.4 mg Cap Take 0.4 mg by mouth nightly.  3    vitamin D (VITAMIN D3) 1000 units Tab Take 1,000 Units by mouth once daily.       No current facility-administered medications on file prior to visit.       Review of patient's allergies indicates:   Allergen Reactions    Solarcaine [benzocaine-triclosan]     Sulfa (sulfonamide antibiotics)     Sulfamethoxazole     Trimethoprim     Hytrin [terazosin] Rash    Smz-tmp ds [sulfamethoxazole-trimethoprim] Rash     Objective:     Vitals:    05/31/22 1336 05/31/22 1410   BP: (!) 148/80 137/67   BP Location: Left arm Left arm   Patient Position: Sitting Sitting   BP Method: Medium (Automatic)    Pulse: 75    SpO2: 100%    Weight: 72 kg (158 lb 11.7 oz)    Height: 5' 10" (1.778 m)         Physical Exam  Vitals reviewed.   Constitutional:       General: He is not in acute distress.     Appearance: He is well-developed. He is not diaphoretic.   Eyes:      General: No " scleral icterus.  Neck:      Vascular: No carotid bruit or JVD.   Cardiovascular:      Rate and Rhythm: Regular rhythm.      Heart sounds: Normal heart sounds. No murmur heard.    No friction rub. No gallop.   Pulmonary:      Effort: Pulmonary effort is normal. No respiratory distress.      Breath sounds: Normal breath sounds.   Musculoskeletal:      Right lower leg: No edema.      Left lower leg: No edema.   Skin:     General: Skin is warm and dry.   Neurological:      Mental Status: He is alert and oriented to person, place, and time.   Psychiatric:         Behavior: Behavior normal.         Thought Content: Thought content normal.         Judgment: Judgment normal.            ECG today reviewed by me: NSR with PVC, left axis deviation, RBBB is no longer observed      Admission on 04/22/2022, Discharged on 04/22/2022   Component Date Value Ref Range Status    Sodium 04/22/2022 140  136 - 145 mmol/L Final    Potassium 04/22/2022 3.6  3.5 - 5.1 mmol/L Final    Chloride 04/22/2022 100  95 - 110 mmol/L Final    CO2 04/22/2022 27  23 - 29 mmol/L Final    Glucose 04/22/2022 168 (A) 70 - 110 mg/dL Final    BUN 04/22/2022 29 (A) 8 - 23 mg/dL Final    Creatinine 04/22/2022 1.4  0.5 - 1.4 mg/dL Final    Calcium 04/22/2022 9.8  8.7 - 10.5 mg/dL Final    Anion Gap 04/22/2022 13  8 - 16 mmol/L Final    eGFR if  04/22/2022 51 (A) >60 mL/min/1.73 m^2 Final    eGFR if non African American 04/22/2022 44 (A) >60 mL/min/1.73 m^2 Final    Final Pathologic Diagnosis 04/22/2022    Final                    Value:Palmar fascia, left hand, partial fasciectomy:  - Palmar fibromatosis (Dupuytren's contracture)      Microscopic Exam 04/22/2022    Final                    Value:Microscopic examination showed poorly-formed nodules of bland spindle cells  with minimal mitotic activity (less than 1 mitosis per 10 high-power fields),  involving benign fibroadipose tissue.  Pacinian corpuscles were identified.  No  nuclear atypia, necrosis or high-grade features were identified.      Gross 04/22/2022    Final                    Value:[id number]  MRN # on jar as well as on AP label match  Received in formalin, labeled left octavio, are two pieces of rubbery and  tan tissue measuring together together 3.5 x 1 x 0.3 and 0.8 x 0.7 x 0.4 cm.  The cut surfaces of the larger piece display a longitudinal rubbery white  area measuring 3 cm.  The cut surfaces of the smaller piece display a 0.7 cm  firm white area.   Specimen is entirely submitted, as follow:  ORX--1-A smaller piece  YNG--1-B larger piece    Grossed by BM [facility]      Disclaimer 04/22/2022    Final                    Value:Unless the case is a 'gross only' or additional testing only, the final  diagnosis for each specimen is based on a microscopic examination of  appropriate tissue sections.     Hospital Outpatient Visit on 04/04/2022   Component Date Value Ref Range Status    Interpretation 05/30/2022    Final                    Value:There is moderate-severe obstruction present.  There is no significant bronchodilator response, but this does not preclude clinical improvement with use.  There is no restriction.  Air-trapping present.  There is severe reduction in diffusing capacitiy.  Â   Findings correlate with diagnosis of emphysema and pulmonary hypertension.  Â   Nery Argueta MD  Â       Post FVC 05/30/2022 2.60  2.50 - 4.65 L Final    Post FEV1 05/30/2022 1.41 (A) 1.72 - 3.46 L Final    Post FEV1 FVC 05/30/2022 54.01 (A) 57.27 - 89.60 % Final    Post FEF 25 75 05/30/2022 0.57  L/s Final    Post PEF 05/30/2022 3.33 (A) 3.55 - 8.19 L/s Final    Post  05/30/2022 6.94  sec Final    Pre DLCO 05/30/2022 7.97 (A) 16.38 - 30.24 ml/(min*mmHg) Final    DLCO ADJ PRE 05/30/2022 7.97 (A) 16.38 - 30.24 ml/(min*mmHg) Final    DLCOVA PRE 05/30/2022 2.02 (A) 2.16 - 4.37 ml/(min*mmHg*L) Final    DLVAAdj PRE 05/30/2022 2.02 (A)  2.16 - 4.37 ml/(min*mmHg*L) Final    VA PRE 05/30/2022 3.95 (A) 6.99 - 6.99 L Final    IVC PRE 05/30/2022 2.29 (A) 2.50 - 4.65 L Final    TLCN2 PRE 05/30/2022 7.17  5.99 - 8.29 L Final    VCMAXN2 PRE 05/30/2022 2.30 (A) 2.50 - 4.65 L Final    Pre FRC N2 05/30/2022 5.55  L Final    ERVN2 PRE 05/30/2022 0.40  -36919.20 - 40035.80 L Final    RVN2 PRE 05/30/2022 4.87 (A) 2.41 - 3.76 L Final    [id number] PRE 05/30/2022 67.90 (A) 40.08 - 58.04 % Final    Pre FVC 05/30/2022 2.30 (A) 2.50 - 4.65 L Final    Pre FEV1 05/30/2022 1.23 (A) 1.72 - 3.46 L Final    Pre FEV1 FVC 05/30/2022 53.28 (A) 57.27 - 89.60 % Final    Pre FEF 25 75 05/30/2022 0.46  L/s Final    Pre PEF 05/30/2022 2.80 (A) 3.55 - 8.19 L/s Final    Pre  05/30/2022 7.79  sec Final    FVC Ref 05/30/2022 3.58   Final    FVC LLN 05/30/2022 2.50   Final    FVC Pre Ref 05/30/2022 64.3  % Final    FVC Post Ref 05/30/2022 72.8  % Final    FVC Chg 05/30/2022 13.1  % Final    FEV1 Ref 05/30/2022 2.59   Final    FEV1 LLN 05/30/2022 1.72   Final    FEV1 Pre Ref 05/30/2022 47.4  % Final    FEV1 Post Ref 05/30/2022 54.3  % Final    FEV1 Chg 05/30/2022 14.7  % Final    FEV1 FVC Ref 05/30/2022 73   Final    FEV1 FVC LLN 05/30/2022 57   Final    FEV1 FVC Pre Ref 05/30/2022 72.6  % Final    FEV1 FVC Post Ref 05/30/2022 73.6  % Final    FEV1 FVC Chg 05/30/2022 1.4  % Final    FEF 25 75 Chg 05/30/2022 24.3  % Final    PEF Ref 05/30/2022 5.87   Final    PEF LLN 05/30/2022 3.55   Final    PEF Pre Ref 05/30/2022 47.8  % Final    PEF Post Ref 05/30/2022 56.7  % Final    PEF Chg 05/30/2022 18.6  % Final    [id number] Chg 05/30/2022 -10.9  % Final    TLCN2 Ref 05/30/2022 7.14   Final    TLCN2 LLN 05/30/2022 5.99   Final    TLCN2 Pre Ref 05/30/2022 100.4  % Final    VCMAXN2 Ref 05/30/2022 3.58   Final    VCMAXN2 LLN 05/30/2022 2.50   Final    VCMAXN2 Pre Ref 05/30/2022 64.3  % Final    FRCN2 Ref 05/30/2022 3.89   Final    FRCN2 LLN 05/30/2022  2.90   Final    FRCN2 Pre Ref 05/30/2022 142.8  % Final    ERVN2 Ref 05/30/2022 0.80   Final    ERVN2 LLN 05/30/2022 -70937.20   Final    ERVN2 Pre Ref 05/30/2022 50.3  % Final    RVN2 Ref 05/30/2022 3.08   Final    RVN2 LLN 05/30/2022 2.41   Final    RVN2 Pre Ref 05/30/2022 157.9  % Final    [id number] Ref 05/30/2022 49.06   Final    [id number] LLN 05/30/2022 40.08   Final    [id number] Pre Ref 05/30/2022 138.4  % Final    DLCO Single Breath Ref 05/30/2022 23.31   Final    DLCO Single Breath LLN 05/30/2022 16.38   Final    DLCO Single Breath Pre Ref 05/30/2022 34.2  % Final    DLCOc Single Breath Ref 05/30/2022 23.31   Final    DLCOc Single Breath LLN 05/30/2022 16.38   Final    DLCOc Single Breath Pre Ref 05/30/2022 34.2  % Final    DLCOVA Ref 05/30/2022 3.26   Final    DLCOVA LLN 05/30/2022 2.16   Final    DLCOVA Pre Ref 05/30/2022 61.7  % Final    DLCOc SBVA Ref 05/30/2022 3.26   Final    DLCOc SBVA LLN 05/30/2022 2.16   Final    DLCOc SBVA Pre Ref 05/30/2022 61.7  % Final    VA Single Breath Ref 05/30/2022 6.99   Final    VA Single Breath LLN 05/30/2022 6.99   Final    VA Single Breath Pre Ref 05/30/2022 56.5  % Final    IVC Single Breath Ref 05/30/2022 3.58   Final    IVC Single Breath LLN 05/30/2022 2.50   Final    IVC Single Breath Pre Ref 05/30/2022 64.0  % Final   No results displayed because visit has over 200 results.      Office Visit on 02/15/2022   Component Date Value Ref Range Status    POC Rapid COVID 02/15/2022 Negative  Negative Final     Acceptable 02/15/2022 Yes   Final   Admission on 02/09/2022, Discharged on 02/14/2022   Component Date Value Ref Range Status    WBC 02/09/2022 14.90 (A) 3.90 - 12.70 K/uL Final    RBC 02/09/2022 5.92  4.60 - 6.20 M/uL Final    Hemoglobin 02/09/2022 17.2  14.0 - 18.0 g/dL Final    Hematocrit 02/09/2022 51.7  40.0 - 54.0 % Final    MCV 02/09/2022 87  82 - 98 fL Final    MCH 02/09/2022 29.1  27.0 - 31.0 pg Final     MCHC 02/09/2022 33.3  32.0 - 36.0 g/dL Final    RDW 02/09/2022 12.8  11.5 - 14.5 % Final    Platelets 02/09/2022 199  150 - 450 K/uL Final    MPV 02/09/2022 11.8  9.2 - 12.9 fL Final    Immature Granulocytes 02/09/2022 0.3  0.0 - 0.5 % Final    Gran # (ANC) 02/09/2022 11.8 (A) 1.8 - 7.7 K/uL Final    Immature Grans (Abs) 02/09/2022 0.04  0.00 - 0.04 K/uL Final    Lymph # 02/09/2022 1.9  1.0 - 4.8 K/uL Final    Mono # 02/09/2022 1.0  0.3 - 1.0 K/uL Final    Eos # 02/09/2022 0.1  0.0 - 0.5 K/uL Final    Baso # 02/09/2022 0.03  0.00 - 0.20 K/uL Final    nRBC 02/09/2022 0  0 /100 WBC Final    Gran % 02/09/2022 79.4 (A) 38.0 - 73.0 % Final    Lymph % 02/09/2022 12.4 (A) 18.0 - 48.0 % Final    Mono % 02/09/2022 6.9  4.0 - 15.0 % Final    Eosinophil % 02/09/2022 0.8  0.0 - 8.0 % Final    Basophil % 02/09/2022 0.2  0.0 - 1.9 % Final    Differential Method 02/09/2022 Automated   Final    Sodium 02/09/2022 141  136 - 145 mmol/L Final    Potassium 02/09/2022 4.3  3.5 - 5.1 mmol/L Final    Chloride 02/09/2022 99  95 - 110 mmol/L Final    CO2 02/09/2022 28  23 - 29 mmol/L Final    Glucose 02/09/2022 238 (A) 70 - 110 mg/dL Final    BUN 02/09/2022 39 (A) 8 - 23 mg/dL Final    Creatinine 02/09/2022 1.6 (A) 0.5 - 1.4 mg/dL Final    Calcium 02/09/2022 10.8 (A) 8.7 - 10.5 mg/dL Final    Total Protein 02/09/2022 8.0  6.0 - 8.4 g/dL Final    Albumin 02/09/2022 4.5  3.5 - 5.2 g/dL Final    Total Bilirubin 02/09/2022 1.0  0.1 - 1.0 mg/dL Final    Alkaline Phosphatase 02/09/2022 100  55 - 135 U/L Final    AST 02/09/2022 25  10 - 40 U/L Final    ALT 02/09/2022 20  10 - 44 U/L Final    Anion Gap 02/09/2022 14  8 - 16 mmol/L Final    eGFR if  02/09/2022 43 (A) >60 mL/min/1.73 m^2 Final    eGFR if non  02/09/2022 37 (A) >60 mL/min/1.73 m^2 Final    SARS-CoV-2 RNA, Amplification, Qual 02/09/2022 Negative  Negative Final    Lipase 02/09/2022 43  4 - 60 U/L Final    WBC  02/10/2022 12.45  3.90 - 12.70 K/uL Final    RBC 02/10/2022 5.43  4.60 - 6.20 M/uL Final    Hemoglobin 02/10/2022 15.8  14.0 - 18.0 g/dL Final    Hematocrit 02/10/2022 48.6  40.0 - 54.0 % Final    MCV 02/10/2022 90  82 - 98 fL Final    MCH 02/10/2022 29.1  27.0 - 31.0 pg Final    MCHC 02/10/2022 32.5  32.0 - 36.0 g/dL Final    RDW 02/10/2022 12.9  11.5 - 14.5 % Final    Platelets 02/10/2022 180  150 - 450 K/uL Final    MPV 02/10/2022 11.6  9.2 - 12.9 fL Final    Immature Granulocytes 02/10/2022 0.2  0.0 - 0.5 % Final    Gran # (ANC) 02/10/2022 10.3 (A) 1.8 - 7.7 K/uL Final    Immature Grans (Abs) 02/10/2022 0.02  0.00 - 0.04 K/uL Final    Lymph # 02/10/2022 0.6 (A) 1.0 - 4.8 K/uL Final    Mono # 02/10/2022 1.4 (A) 0.3 - 1.0 K/uL Final    Eos # 02/10/2022 0.1  0.0 - 0.5 K/uL Final    Baso # 02/10/2022 0.04  0.00 - 0.20 K/uL Final    nRBC 02/10/2022 0  0 /100 WBC Final    Gran % 02/10/2022 82.9 (A) 38.0 - 73.0 % Final    Lymph % 02/10/2022 4.8 (A) 18.0 - 48.0 % Final    Mono % 02/10/2022 10.9  4.0 - 15.0 % Final    Eosinophil % 02/10/2022 0.9  0.0 - 8.0 % Final    Basophil % 02/10/2022 0.3  0.0 - 1.9 % Final    Differential Method 02/10/2022 Automated   Final    Sodium 02/10/2022 140  136 - 145 mmol/L Final    Potassium 02/10/2022 4.5  3.5 - 5.1 mmol/L Final    Chloride 02/10/2022 103  95 - 110 mmol/L Final    CO2 02/10/2022 27  23 - 29 mmol/L Final    Glucose 02/10/2022 230 (A) 70 - 110 mg/dL Final    BUN 02/10/2022 34 (A) 8 - 23 mg/dL Final    Creatinine 02/10/2022 1.4  0.5 - 1.4 mg/dL Final    Calcium 02/10/2022 9.5  8.7 - 10.5 mg/dL Final    Total Protein 02/10/2022 6.7  6.0 - 8.4 g/dL Final    Albumin 02/10/2022 3.5  3.5 - 5.2 g/dL Final    Total Bilirubin 02/10/2022 1.0  0.1 - 1.0 mg/dL Final    Alkaline Phosphatase 02/10/2022 74  55 - 135 U/L Final    AST 02/10/2022 20  10 - 40 U/L Final    ALT 02/10/2022 16  10 - 44 U/L Final    Anion Gap 02/10/2022 10  8 - 16 mmol/L Final     eGFR if  02/10/2022 51 (A) >60 mL/min/1.73 m^2 Final    eGFR if non  02/10/2022 44 (A) >60 mL/min/1.73 m^2 Final    POCT Glucose 02/12/2022 181 (A) 70 - 110 mg/dL Final    Hemoglobin A1C 02/12/2022 7.6 (A) 4.0 - 5.6 % Final    Estimated Avg Glucose 02/12/2022 171 (A) 68 - 131 mg/dL Final    POCT Glucose 02/12/2022 160 (A) 70 - 110 mg/dL Final    POCT Glucose 02/12/2022 149 (A) 70 - 110 mg/dL Final    POCT Glucose 02/12/2022 157 (A) 70 - 110 mg/dL Final    WBC 02/13/2022 11.91  3.90 - 12.70 K/uL Final    RBC 02/13/2022 5.67  4.60 - 6.20 M/uL Final    Hemoglobin 02/13/2022 16.8  14.0 - 18.0 g/dL Final    Hematocrit 02/13/2022 52.0  40.0 - 54.0 % Final    MCV 02/13/2022 92  82 - 98 fL Final    MCH 02/13/2022 29.6  27.0 - 31.0 pg Final    MCHC 02/13/2022 32.3  32.0 - 36.0 g/dL Final    RDW 02/13/2022 13.1  11.5 - 14.5 % Final    Platelets 02/13/2022 163  150 - 450 K/uL Final    MPV 02/13/2022 11.6  9.2 - 12.9 fL Final    Immature Granulocytes 02/13/2022 0.7 (A) 0.0 - 0.5 % Final    Gran # (ANC) 02/13/2022 9.1 (A) 1.8 - 7.7 K/uL Final    Immature Grans (Abs) 02/13/2022 0.08 (A) 0.00 - 0.04 K/uL Final    Lymph # 02/13/2022 1.2  1.0 - 4.8 K/uL Final    Mono # 02/13/2022 1.5 (A) 0.3 - 1.0 K/uL Final    Eos # 02/13/2022 0.0  0.0 - 0.5 K/uL Final    Baso # 02/13/2022 0.06  0.00 - 0.20 K/uL Final    nRBC 02/13/2022 0  0 /100 WBC Final    Gran % 02/13/2022 76.2 (A) 38.0 - 73.0 % Final    Lymph % 02/13/2022 10.1 (A) 18.0 - 48.0 % Final    Mono % 02/13/2022 12.2  4.0 - 15.0 % Final    Eosinophil % 02/13/2022 0.3  0.0 - 8.0 % Final    Basophil % 02/13/2022 0.5  0.0 - 1.9 % Final    Differential Method 02/13/2022 Automated   Final    Sodium 02/13/2022 154 (A) 136 - 145 mmol/L Final    Potassium 02/13/2022 4.3  3.5 - 5.1 mmol/L Final    Chloride 02/13/2022 116 (A) 95 - 110 mmol/L Final    CO2 02/13/2022 27  23 - 29 mmol/L Final    Glucose 02/13/2022 167 (A) 70 -  110 mg/dL Final    BUN 02/13/2022 26 (A) 8 - 23 mg/dL Final    Creatinine 02/13/2022 1.2  0.5 - 1.4 mg/dL Final    Calcium 02/13/2022 8.9  8.7 - 10.5 mg/dL Final    Anion Gap 02/13/2022 11  8 - 16 mmol/L Final    eGFR if African American 02/13/2022 >60  >60 mL/min/1.73 m^2 Final    eGFR if non  02/13/2022 53 (A) >60 mL/min/1.73 m^2 Final    POCT Glucose 02/13/2022 174 (A) 70 - 110 mg/dL Final    POCT Glucose 02/13/2022 148 (A) 70 - 110 mg/dL Final    POCT Glucose 02/13/2022 217 (A) 70 - 110 mg/dL Final    WBC 02/14/2022 9.28  3.90 - 12.70 K/uL Final    RBC 02/14/2022 4.94  4.60 - 6.20 M/uL Final    Hemoglobin 02/14/2022 14.6  14.0 - 18.0 g/dL Final    Hematocrit 02/14/2022 45.3  40.0 - 54.0 % Final    MCV 02/14/2022 92  82 - 98 fL Final    MCH 02/14/2022 29.6  27.0 - 31.0 pg Final    MCHC 02/14/2022 32.2  32.0 - 36.0 g/dL Final    RDW 02/14/2022 13.1  11.5 - 14.5 % Final    Platelets 02/14/2022 157  150 - 450 K/uL Final    MPV 02/14/2022 11.8  9.2 - 12.9 fL Final    Immature Granulocytes 02/14/2022 1.0 (A) 0.0 - 0.5 % Final    Gran # (ANC) 02/14/2022 6.4  1.8 - 7.7 K/uL Final    Immature Grans (Abs) 02/14/2022 0.09 (A) 0.00 - 0.04 K/uL Final    Lymph # 02/14/2022 1.4  1.0 - 4.8 K/uL Final    Mono # 02/14/2022 1.0  0.3 - 1.0 K/uL Final    Eos # 02/14/2022 0.3  0.0 - 0.5 K/uL Final    Baso # 02/14/2022 0.05  0.00 - 0.20 K/uL Final    nRBC 02/14/2022 0  0 /100 WBC Final    Gran % 02/14/2022 68.6  38.0 - 73.0 % Final    Lymph % 02/14/2022 15.1 (A) 18.0 - 48.0 % Final    Mono % 02/14/2022 11.2  4.0 - 15.0 % Final    Eosinophil % 02/14/2022 3.6  0.0 - 8.0 % Final    Basophil % 02/14/2022 0.5  0.0 - 1.9 % Final    Differential Method 02/14/2022 Automated   Final    Sodium 02/14/2022 145  136 - 145 mmol/L Final    Potassium 02/14/2022 4.2  3.5 - 5.1 mmol/L Final    Chloride 02/14/2022 112 (A) 95 - 110 mmol/L Final    CO2 02/14/2022 26  23 - 29 mmol/L Final    Glucose  02/14/2022 148 (A) 70 - 110 mg/dL Final    BUN 02/14/2022 25 (A) 8 - 23 mg/dL Final    Creatinine 02/14/2022 1.1  0.5 - 1.4 mg/dL Final    Calcium 02/14/2022 8.0 (A) 8.7 - 10.5 mg/dL Final    Anion Gap 02/14/2022 7 (A) 8 - 16 mmol/L Final    eGFR if African American 02/14/2022 >60  >60 mL/min/1.73 m^2 Final    eGFR if non  02/14/2022 59 (A) >60 mL/min/1.73 m^2 Final    POCT Glucose 02/14/2022 144 (A) 70 - 110 mg/dL Final    POCT Glucose 02/14/2022 248 (A) 70 - 110 mg/dL Final   (    Accession #: [id number]    Echocardiogram exercise stress test  Order# [id number]  Reading physician: Daren Lassiter MD Ordering physician: Daren Lassiter MD Study date: 10/19/20       Reason for Exam  Priority: Routine  Dx: Chest pain of uncertain etiology [R07.9 (ICD-10-CM)]; Dyspnea on exertion [R06.00 (ICD-10-CM)]; Localized edema [R60.0 (ICD-10-CM)]   Comments: John protocol     Result Image Hyperlink     Show images for Stress Echo Which stress agent will be used? Treadmill Exercise; Color Flow Doppler? No    Summary    · The test was stopped because the patient experienced fatigue and shortness of breath.  · During stress, the following significant arrhythmias were observed: rare PVCs.  · The left ventricle is normal in size with normal systolic function. The estimated ejection fraction is 60%.  · The stress echo portion of this study is negative for myocardial ischemia.  · The ECG portion of this study is negative for myocardial ischemia.         Echocardiography Findings      Left Ventricle      Accession #: [id number]    Transthoracic echo (TTE) complete  Order# [id number]  Reading physician: Jamarcus Cartwright MD Ordering physician: Charlene Siu MD Study date: 3/11/22       Reason for Exam  Priority: Routine  Dx: Dyspnea [R06.00 (ICD-10-CM)]     Result Image Hyperlink     Show images for Echo    Summary    · The left ventricle is normal in size with concentric remodeling and normal systolic  function.  · The estimated ejection fraction is 55%.  · Grade I left ventricular diastolic dysfunction.  · There is abnormal septal wall motion.  · Normal right ventricular size with normal right ventricular systolic function.  · The estimated PA systolic pressure is 47 mmHg.  · Normal central venous pressure (3 mmHg).  · There is pulmonary hypertension.         Vitals    Height         Assessment:     1. Aortic atherosclerosis    2. High cholesterol    3. Primary hypertension    4. New onset right bundle branch block (RBBB)    5. Stage 3 chronic kidney disease, unspecified whether stage 3a or 3b CKD    6. Type 2 diabetes mellitus with stage 3a chronic kidney disease, without long-term current use of insulin    7. Centrilobular emphysema    8. Dupuytren's contracture of left hand    9. Benign prostatic hyperplasia with lower urinary tract symptoms, symptom details unspecified      Plan:   Husam was seen today for follow-up and hypertension.    Diagnoses and all orders for this visit:    Aortic atherosclerosis  -     IN OFFICE EKG 12-LEAD (to Muse)    High cholesterol  -     IN OFFICE EKG 12-LEAD (to Muse)    Primary hypertension  -     IN OFFICE EKG 12-LEAD (to Muse)    New onset right bundle branch block (RBBB)  -     IN OFFICE EKG 12-LEAD (to Muse)    Stage 3 chronic kidney disease, unspecified whether stage 3a or 3b CKD  -     IN OFFICE EKG 12-LEAD (to Muse)    Type 2 diabetes mellitus with stage 3a chronic kidney disease, without long-term current use of insulin  -     IN OFFICE EKG 12-LEAD (to Muse)    Centrilobular emphysema  -     IN OFFICE EKG 12-LEAD (to [place])    Dupuytren's contracture of left hand  -     IN OFFICE EKG 12-LEAD (to Muse)    Benign prostatic hyperplasia with lower urinary tract symptoms, symptom details unspecified  -     IN OFFICE EKG 12-LEAD (to Muse)     Pt is doing well    Same meds    RTC 6 months     F/u  with Dr Green who has ordered repeat lab    Follow up in about 6 months (around  11/30/2022).

## 2022-06-01 ENCOUNTER — HOSPITAL ENCOUNTER (INPATIENT)
Facility: HOSPITAL | Age: 87
LOS: 1 days | Discharge: HOME | DRG: 390 | End: 2022-06-03
Attending: EMERGENCY MEDICINE | Admitting: SURGERY
Payer: MEDICARE

## 2022-06-01 DIAGNOSIS — M72.0 DUPUYTREN'S CONTRACTURE OF LEFT HAND: ICD-10-CM

## 2022-06-01 DIAGNOSIS — K56.50 SMALL BOWEL OBSTRUCTION DUE TO ADHESIONS: Primary | ICD-10-CM

## 2022-06-01 DIAGNOSIS — K56.609 SMALL BOWEL OBSTRUCTION: ICD-10-CM

## 2022-06-01 DIAGNOSIS — M21.371 RIGHT FOOT DROP: ICD-10-CM

## 2022-06-01 DIAGNOSIS — I10 PRIMARY HYPERTENSION: Chronic | ICD-10-CM

## 2022-06-01 DIAGNOSIS — N40.1 BENIGN PROSTATIC HYPERPLASIA WITH LOWER URINARY TRACT SYMPTOMS, SYMPTOM DETAILS UNSPECIFIED: ICD-10-CM

## 2022-06-01 DIAGNOSIS — E11.69 TYPE 2 DIABETES MELLITUS WITH OTHER SPECIFIED COMPLICATION, WITHOUT LONG-TERM CURRENT USE OF INSULIN: ICD-10-CM

## 2022-06-01 LAB
ALBUMIN SERPL BCP-MCNC: 4.3 G/DL (ref 3.5–5.2)
ALP SERPL-CCNC: 101 U/L (ref 55–135)
ALT SERPL W/O P-5'-P-CCNC: 18 U/L (ref 10–44)
ANION GAP SERPL CALC-SCNC: 15 MMOL/L (ref 8–16)
AST SERPL-CCNC: 22 U/L (ref 10–40)
BASOPHILS # BLD AUTO: 0.03 K/UL (ref 0–0.2)
BASOPHILS NFR BLD: 0.2 % (ref 0–1.9)
BILIRUB SERPL-MCNC: 0.7 MG/DL (ref 0.1–1)
BILIRUB UR QL STRIP: NEGATIVE
BUN SERPL-MCNC: 39 MG/DL (ref 10–30)
CALCIUM SERPL-MCNC: 10.7 MG/DL (ref 8.7–10.5)
CHLORIDE SERPL-SCNC: 95 MMOL/L (ref 95–110)
CLARITY UR: CLEAR
CO2 SERPL-SCNC: 30 MMOL/L (ref 23–29)
COLOR UR: YELLOW
CREAT SERPL-MCNC: 1.6 MG/DL (ref 0.5–1.4)
DIFFERENTIAL METHOD: ABNORMAL
EOSINOPHIL # BLD AUTO: 0.2 K/UL (ref 0–0.5)
EOSINOPHIL NFR BLD: 1.1 % (ref 0–8)
ERYTHROCYTE [DISTWIDTH] IN BLOOD BY AUTOMATED COUNT: 13.4 % (ref 11.5–14.5)
EST. GFR  (AFRICAN AMERICAN): 43 ML/MIN/1.73 M^2
EST. GFR  (NON AFRICAN AMERICAN): 37 ML/MIN/1.73 M^2
GLUCOSE SERPL-MCNC: 254 MG/DL (ref 70–110)
GLUCOSE UR QL STRIP: NEGATIVE
HCT VFR BLD AUTO: 52.7 % (ref 40–54)
HGB BLD-MCNC: 17.6 G/DL (ref 14–18)
HGB UR QL STRIP: NEGATIVE
IMM GRANULOCYTES # BLD AUTO: 0.05 K/UL (ref 0–0.04)
IMM GRANULOCYTES NFR BLD AUTO: 0.3 % (ref 0–0.5)
KETONES UR QL STRIP: NEGATIVE
LEUKOCYTE ESTERASE UR QL STRIP: ABNORMAL
LIPASE SERPL-CCNC: 24 U/L (ref 4–60)
LYMPHOCYTES # BLD AUTO: 0.9 K/UL (ref 1–4.8)
LYMPHOCYTES NFR BLD: 5.2 % (ref 18–48)
MCH RBC QN AUTO: 28.9 PG (ref 27–31)
MCHC RBC AUTO-ENTMCNC: 33.4 G/DL (ref 32–36)
MCV RBC AUTO: 87 FL (ref 82–98)
MICROSCOPIC COMMENT: NORMAL
MONOCYTES # BLD AUTO: 1 K/UL (ref 0.3–1)
MONOCYTES NFR BLD: 5.9 % (ref 4–15)
NEUTROPHILS # BLD AUTO: 15.4 K/UL (ref 1.8–7.7)
NEUTROPHILS NFR BLD: 87.3 % (ref 38–73)
NITRITE UR QL STRIP: NEGATIVE
NRBC BLD-RTO: 0 /100 WBC
PH UR STRIP: 6 [PH] (ref 5–8)
PLATELET # BLD AUTO: 210 K/UL (ref 150–450)
PMV BLD AUTO: 11.2 FL (ref 9.2–12.9)
POTASSIUM SERPL-SCNC: 4 MMOL/L (ref 3.5–5.1)
PROT SERPL-MCNC: 8.1 G/DL (ref 6–8.4)
PROT UR QL STRIP: ABNORMAL
RBC # BLD AUTO: 6.08 M/UL (ref 4.6–6.2)
SODIUM SERPL-SCNC: 140 MMOL/L (ref 136–145)
SP GR UR STRIP: 1.02 (ref 1–1.03)
URN SPEC COLLECT METH UR: ABNORMAL
UROBILINOGEN UR STRIP-ACNC: NEGATIVE EU/DL
WBC # BLD AUTO: 17.66 K/UL (ref 3.9–12.7)
WBC #/AREA URNS HPF: 0 /HPF (ref 0–5)

## 2022-06-01 PROCEDURE — 83690 ASSAY OF LIPASE: CPT | Performed by: PHYSICIAN ASSISTANT

## 2022-06-01 PROCEDURE — 80053 COMPREHEN METABOLIC PANEL: CPT | Performed by: PHYSICIAN ASSISTANT

## 2022-06-01 PROCEDURE — 63600175 PHARM REV CODE 636 W HCPCS: Performed by: EMERGENCY MEDICINE

## 2022-06-01 PROCEDURE — 96374 THER/PROPH/DIAG INJ IV PUSH: CPT

## 2022-06-01 PROCEDURE — 85025 COMPLETE CBC W/AUTO DIFF WBC: CPT | Performed by: PHYSICIAN ASSISTANT

## 2022-06-01 PROCEDURE — 99285 EMERGENCY DEPT VISIT HI MDM: CPT | Mod: 25

## 2022-06-01 PROCEDURE — 81000 URINALYSIS NONAUTO W/SCOPE: CPT | Performed by: PHYSICIAN ASSISTANT

## 2022-06-01 RX ORDER — MORPHINE SULFATE 2 MG/ML
2 INJECTION, SOLUTION INTRAMUSCULAR; INTRAVENOUS
Status: COMPLETED | OUTPATIENT
Start: 2022-06-02 | End: 2022-06-02

## 2022-06-01 RX ORDER — ONDANSETRON 2 MG/ML
4 INJECTION INTRAMUSCULAR; INTRAVENOUS
Status: COMPLETED | OUTPATIENT
Start: 2022-06-01 | End: 2022-06-01

## 2022-06-01 RX ADMIN — ONDANSETRON 4 MG: 2 INJECTION INTRAMUSCULAR; INTRAVENOUS at 10:06

## 2022-06-02 LAB
ANION GAP SERPL CALC-SCNC: 17 MMOL/L (ref 8–16)
ANION GAP SERPL CALC-SCNC: 20 MMOL/L (ref 8–16)
BASOPHILS # BLD AUTO: 0.03 K/UL (ref 0–0.2)
BASOPHILS # BLD AUTO: 0.04 K/UL (ref 0–0.2)
BASOPHILS NFR BLD: 0.3 % (ref 0–1.9)
BASOPHILS NFR BLD: 0.4 % (ref 0–1.9)
BILIRUB UR QL STRIP: NEGATIVE
BUN SERPL-MCNC: 39 MG/DL (ref 10–30)
BUN SERPL-MCNC: 39 MG/DL (ref 10–30)
CALCIUM SERPL-MCNC: 9.5 MG/DL (ref 8.7–10.5)
CALCIUM SERPL-MCNC: 9.6 MG/DL (ref 8.7–10.5)
CHLORIDE SERPL-SCNC: 101 MMOL/L (ref 95–110)
CHLORIDE SERPL-SCNC: 102 MMOL/L (ref 95–110)
CLARITY UR: CLEAR
CO2 SERPL-SCNC: 22 MMOL/L (ref 23–29)
CO2 SERPL-SCNC: 24 MMOL/L (ref 23–29)
COLOR UR: YELLOW
CREAT SERPL-MCNC: 1.4 MG/DL (ref 0.5–1.4)
CREAT SERPL-MCNC: 1.4 MG/DL (ref 0.5–1.4)
DIFFERENTIAL METHOD: ABNORMAL
DIFFERENTIAL METHOD: ABNORMAL
EOSINOPHIL # BLD AUTO: 0.3 K/UL (ref 0–0.5)
EOSINOPHIL # BLD AUTO: 0.3 K/UL (ref 0–0.5)
EOSINOPHIL NFR BLD: 2.5 % (ref 0–8)
EOSINOPHIL NFR BLD: 3 % (ref 0–8)
ERYTHROCYTE [DISTWIDTH] IN BLOOD BY AUTOMATED COUNT: 13.6 % (ref 11.5–14.5)
ERYTHROCYTE [DISTWIDTH] IN BLOOD BY AUTOMATED COUNT: 13.7 % (ref 11.5–14.5)
EST. GFR  (AFRICAN AMERICAN): 50 ML/MIN/1.73 M^2
EST. GFR  (AFRICAN AMERICAN): 50 ML/MIN/1.73 M^2
EST. GFR  (NON AFRICAN AMERICAN): 44 ML/MIN/1.73 M^2
EST. GFR  (NON AFRICAN AMERICAN): 44 ML/MIN/1.73 M^2
GLUCOSE SERPL-MCNC: 167 MG/DL (ref 70–110)
GLUCOSE SERPL-MCNC: 198 MG/DL (ref 70–110)
GLUCOSE UR QL STRIP: ABNORMAL
HCT VFR BLD AUTO: 51.1 % (ref 40–54)
HCT VFR BLD AUTO: 51.8 % (ref 40–54)
HGB BLD-MCNC: 16.8 G/DL (ref 14–18)
HGB BLD-MCNC: 16.9 G/DL (ref 14–18)
HGB UR QL STRIP: NEGATIVE
HYALINE CASTS #/AREA URNS LPF: 1 /LPF
IMM GRANULOCYTES # BLD AUTO: 0.01 K/UL (ref 0–0.04)
IMM GRANULOCYTES # BLD AUTO: 0.01 K/UL (ref 0–0.04)
IMM GRANULOCYTES NFR BLD AUTO: 0.1 % (ref 0–0.5)
IMM GRANULOCYTES NFR BLD AUTO: 0.1 % (ref 0–0.5)
KETONES UR QL STRIP: NEGATIVE
LACTATE SERPL-SCNC: 2.4 MMOL/L (ref 0.5–2.2)
LACTATE SERPL-SCNC: 3.5 MMOL/L (ref 0.5–2.2)
LEUKOCYTE ESTERASE UR QL STRIP: ABNORMAL
LYMPHOCYTES # BLD AUTO: 0.8 K/UL (ref 1–4.8)
LYMPHOCYTES # BLD AUTO: 0.8 K/UL (ref 1–4.8)
LYMPHOCYTES NFR BLD: 7.6 % (ref 18–48)
LYMPHOCYTES NFR BLD: 8.1 % (ref 18–48)
MCH RBC QN AUTO: 29 PG (ref 27–31)
MCH RBC QN AUTO: 29.1 PG (ref 27–31)
MCHC RBC AUTO-ENTMCNC: 32.4 G/DL (ref 32–36)
MCHC RBC AUTO-ENTMCNC: 33.1 G/DL (ref 32–36)
MCV RBC AUTO: 88 FL (ref 82–98)
MCV RBC AUTO: 90 FL (ref 82–98)
MICROSCOPIC COMMENT: NORMAL
MONOCYTES # BLD AUTO: 0.7 K/UL (ref 0.3–1)
MONOCYTES # BLD AUTO: 1.2 K/UL (ref 0.3–1)
MONOCYTES NFR BLD: 11.4 % (ref 4–15)
MONOCYTES NFR BLD: 7.6 % (ref 4–15)
NEUTROPHILS # BLD AUTO: 7.6 K/UL (ref 1.8–7.7)
NEUTROPHILS # BLD AUTO: 8 K/UL (ref 1.8–7.7)
NEUTROPHILS NFR BLD: 78.1 % (ref 38–73)
NEUTROPHILS NFR BLD: 80.8 % (ref 38–73)
NITRITE UR QL STRIP: NEGATIVE
NRBC BLD-RTO: 0 /100 WBC
NRBC BLD-RTO: 0 /100 WBC
PH UR STRIP: 7 [PH] (ref 5–8)
PLATELET # BLD AUTO: 210 K/UL (ref 150–450)
PLATELET # BLD AUTO: 221 K/UL (ref 150–450)
PMV BLD AUTO: 11.3 FL (ref 9.2–12.9)
PMV BLD AUTO: 11.8 FL (ref 9.2–12.9)
POCT GLUCOSE: 232 MG/DL (ref 70–110)
POTASSIUM SERPL-SCNC: 4.2 MMOL/L (ref 3.5–5.1)
POTASSIUM SERPL-SCNC: 5.1 MMOL/L (ref 3.5–5.1)
PROT UR QL STRIP: ABNORMAL
RBC # BLD AUTO: 5.79 M/UL (ref 4.6–6.2)
RBC # BLD AUTO: 5.81 M/UL (ref 4.6–6.2)
RBC #/AREA URNS HPF: 3 /HPF (ref 0–4)
SODIUM SERPL-SCNC: 142 MMOL/L (ref 136–145)
SODIUM SERPL-SCNC: 144 MMOL/L (ref 136–145)
SP GR UR STRIP: 1.02 (ref 1–1.03)
SQUAMOUS #/AREA URNS HPF: 0 /HPF
URN SPEC COLLECT METH UR: ABNORMAL
UROBILINOGEN UR STRIP-ACNC: NEGATIVE EU/DL
WBC # BLD AUTO: 10.25 K/UL (ref 3.9–12.7)
WBC # BLD AUTO: 9.44 K/UL (ref 3.9–12.7)
WBC #/AREA URNS HPF: 3 /HPF (ref 0–5)

## 2022-06-02 PROCEDURE — 25000003 PHARM REV CODE 250: Performed by: EMERGENCY MEDICINE

## 2022-06-02 PROCEDURE — 63600175 PHARM REV CODE 636 W HCPCS: Performed by: EMERGENCY MEDICINE

## 2022-06-02 PROCEDURE — 80048 BASIC METABOLIC PNL TOTAL CA: CPT | Performed by: SURGERY

## 2022-06-02 PROCEDURE — 36415 COLL VENOUS BLD VENIPUNCTURE: CPT | Performed by: EMERGENCY MEDICINE

## 2022-06-02 PROCEDURE — 27000221 HC OXYGEN, UP TO 24 HOURS

## 2022-06-02 PROCEDURE — 83605 ASSAY OF LACTIC ACID: CPT | Mod: 91 | Performed by: EMERGENCY MEDICINE

## 2022-06-02 PROCEDURE — 81000 URINALYSIS NONAUTO W/SCOPE: CPT | Performed by: EMERGENCY MEDICINE

## 2022-06-02 PROCEDURE — 94761 N-INVAS EAR/PLS OXIMETRY MLT: CPT

## 2022-06-02 PROCEDURE — 87040 BLOOD CULTURE FOR BACTERIA: CPT | Mod: 59 | Performed by: EMERGENCY MEDICINE

## 2022-06-02 PROCEDURE — 11000001 HC ACUTE MED/SURG PRIVATE ROOM

## 2022-06-02 PROCEDURE — 85025 COMPLETE CBC W/AUTO DIFF WBC: CPT | Performed by: SURGERY

## 2022-06-02 PROCEDURE — 99900035 HC TECH TIME PER 15 MIN (STAT)

## 2022-06-02 RX ORDER — TALC
6 POWDER (GRAM) TOPICAL NIGHTLY PRN
Status: DISCONTINUED | OUTPATIENT
Start: 2022-06-02 | End: 2022-06-03 | Stop reason: HOSPADM

## 2022-06-02 RX ORDER — SODIUM CHLORIDE 0.9 % (FLUSH) 0.9 %
10 SYRINGE (ML) INJECTION
Status: DISCONTINUED | OUTPATIENT
Start: 2022-06-02 | End: 2022-06-03 | Stop reason: HOSPADM

## 2022-06-02 RX ORDER — ONDANSETRON 2 MG/ML
4 INJECTION INTRAMUSCULAR; INTRAVENOUS EVERY 8 HOURS PRN
Status: DISCONTINUED | OUTPATIENT
Start: 2022-06-02 | End: 2022-06-03 | Stop reason: HOSPADM

## 2022-06-02 RX ORDER — SODIUM CHLORIDE 9 MG/ML
INJECTION, SOLUTION INTRAVENOUS CONTINUOUS
Status: DISCONTINUED | OUTPATIENT
Start: 2022-06-02 | End: 2022-06-03 | Stop reason: HOSPADM

## 2022-06-02 RX ORDER — MORPHINE SULFATE 2 MG/ML
2 INJECTION, SOLUTION INTRAMUSCULAR; INTRAVENOUS EVERY 4 HOURS PRN
Status: DISCONTINUED | OUTPATIENT
Start: 2022-06-02 | End: 2022-06-03 | Stop reason: HOSPADM

## 2022-06-02 RX ADMIN — SODIUM CHLORIDE: 0.9 INJECTION, SOLUTION INTRAVENOUS at 07:06

## 2022-06-02 RX ADMIN — MORPHINE SULFATE 2 MG: 2 INJECTION, SOLUTION INTRAMUSCULAR; INTRAVENOUS at 12:06

## 2022-06-02 RX ADMIN — SODIUM CHLORIDE: 0.9 INJECTION, SOLUTION INTRAVENOUS at 02:06

## 2022-06-02 NOTE — NURSING
Cricital lactic 3.46. Previous lactic 2.4. Pt has had x1 zosyn in ED and is currently receiving fluids at 125ml/hr. MD Gardner notified. Order KUB, CBC and BMP.

## 2022-06-02 NOTE — PLAN OF CARE
DAQUAN met with pt at bedside to discuss dc planning. Pts Daughter Nina, [phone] was also at bedside.     All information was confirmed on chart. Pt resides in home alone. Pts daughter reported that pt wife passed away a couple of months ago. Pt is independent in ADLs. Pt reported no HH/DME at home. Upon dc one of pts daughters will transport him home. DAQUAN provided pts daughter with contact card for any questions or concerns. SW will continue to follow pt throughout his transitions of care and assist with any dc needs.     DAQUAN requested sooner PCP f/u  Future Appointments   Date Time Provider Department Center   6/15/2022  3:45 PM Alverto Green MD [facility]   7/19/2022  1:45 PM Fouzia Gardner MD [facility]   7/25/2022  8:20 AM Slick Baldwin Jr., MD [facility]   11/28/2022 10:45 AM Alverto Green MD [facility]   11/29/2022  8:30 AM Daren Lassiter MD [facility]        06/02/22 1554   Discharge Assessment   Assessment Type Discharge Planning Assessment   Confirmed/corrected address, phone number and insurance Yes   Confirmed Demographics Correct on Facesheet   Source of Information patient;family   Communicated JOSE with patient/caregiver Yes   Lives With alone   Do you expect to return to your current living situation? Yes   Do you have help at home or someone to help you manage your care at home? Yes   Who are your caregiver(s) and their phone number(s)? Daughters Nina [phone]   Prior to hospitilization cognitive status: Alert/Oriented   Current cognitive status: Alert/Oriented   Walking or Climbing Stairs Difficulty none   Dressing/Bathing Difficulty none   Home Layout Able to live on 1st floor   Equipment Currently Used at Home none   Readmission within 30 days? No   Patient currently being followed by outpatient case management? No   Do you currently have service(s) that help you manage your care at home? No   Do you take  prescription medications? Yes   Do you have prescription coverage? Yes   Coverage PHN   Do you have any problems affording any of your prescribed medications? No   Is the patient taking medications as prescribed? yes   Who is going to help you get home at discharge? Yessy Wodo, [phone]   How do you get to doctors appointments? family or friend will provide   Are you on dialysis? No   Do you take coumadin? No   Discharge Plan A Home with family   Discharge Plan B Home Health   DME Needed Upon Discharge    (TBD)   Discharge Plan discussed with: Patient;Adult children   Discharge Barriers Identified None

## 2022-06-02 NOTE — PROGRESS NOTES
"Surgery follow up  /60 (BP Location: Left arm, Patient Position: Lying)   Pulse 82   Temp 98.3 °F (36.8 °C) (Oral)   Resp 18   Ht 5' 10" (1.778 m)   Wt 70.5 kg (155 lb 6.8 oz)   SpO2 (!) 93%   BMI 22.30 kg/m²   No intake/output data recorded.  No intake/output data recorded.  Recent Results (from the past 336 hour(s))   CBC Auto Differential    Collection Time: 06/02/22 11:56 AM   Result Value Ref Range    WBC 10.25 3.90 - 12.70 K/uL    Hemoglobin 16.8 14.0 - 18.0 g/dL    Hematocrit 51.8 40.0 - 54.0 %    Platelets 210 150 - 450 K/uL   CBC auto differential    Collection Time: 06/02/22  7:58 AM   Result Value Ref Range    WBC 9.44 3.90 - 12.70 K/uL    Hemoglobin 16.9 14.0 - 18.0 g/dL    Hematocrit 51.1 40.0 - 54.0 %    Platelets 221 150 - 450 K/uL   CBC W/ AUTO DIFFERENTIAL    Collection Time: 06/01/22 10:18 PM   Result Value Ref Range    WBC 17.66 (H) 3.90 - 12.70 K/uL    Hemoglobin 17.6 14.0 - 18.0 g/dL    Hematocrit 52.7 40.0 - 54.0 %    Platelets 210 150 - 450 K/uL     Recent Results (from the past 336 hour(s))   Basic Metabolic Panel    Collection Time: 06/02/22 11:56 AM   Result Value Ref Range    Sodium 144 136 - 145 mmol/L    Potassium 5.1 3.5 - 5.1 mmol/L    Chloride 102 95 - 110 mmol/L    CO2 22 (L) 23 - 29 mmol/L    BUN 39 (H) 10 - 30 mg/dL    Creatinine 1.4 0.5 - 1.4 mg/dL    Calcium 9.6 8.7 - 10.5 mg/dL    Anion Gap 20 (H) 8 - 16 mmol/L   Basic metabolic panel    Collection Time: 06/02/22  7:59 AM   Result Value Ref Range    Sodium 142 136 - 145 mmol/L    Potassium 4.2 3.5 - 5.1 mmol/L    Chloride 101 95 - 110 mmol/L    CO2 24 23 - 29 mmol/L    BUN 39 (H) 10 - 30 mg/dL    Creatinine 1.4 0.5 - 1.4 mg/dL    Calcium 9.5 8.7 - 10.5 mg/dL    Anion Gap 17 (H) 8 - 16 mmol/L   abdomen soft less tender , will try liquid diet      "

## 2022-06-02 NOTE — FIRST PROVIDER EVALUATION
Emergency Department TeleTriage Encounter Note      CHIEF COMPLAINT    Chief Complaint   Patient presents with    Abdominal Pain     Pt arrives with complaints of abdominal cramping that began this morning. Pt denies any N/V/D. States he has a hx of multiple sbo.        VITAL SIGNS   Initial Vitals [06/01/22 1943]   BP Pulse Resp Temp SpO2   (!) 145/69 87 18 98 °F (36.7 °C) 100 %      MAP       --            ALLERGIES    Review of patient's allergies indicates:   Allergen Reactions    Solarcaine [benzocaine-triclosan]     Sulfa (sulfonamide antibiotics)     Sulfamethoxazole     Trimethoprim     Hytrin [terazosin] Rash    Smz-tmp ds [sulfamethoxazole-trimethoprim] Rash       PROVIDER TRIAGE NOTE  This is a teletriage evaluation of a 89 y.o. male presenting to the ED complaining of abdominal pain. Patient reports abdominal pain since this morning. LBM yesterday. Multiple SBO.    Patient is in no distress. Answers questions appropriately. Appears well.    Initial orders will be placed and care will be transferred to an alternate provider when patient is roomed for a full evaluation. Any additional orders and the final disposition will be determined by that provider.           ORDERS  Labs Reviewed   CBC W/ AUTO DIFFERENTIAL   COMPREHENSIVE METABOLIC PANEL   LIPASE   URINALYSIS, REFLEX TO URINE CULTURE       ED Orders (720h ago, onward)    Start Ordered     Status Ordering Provider    06/01/22 2004 06/01/22 2003  Vital signs  Every 2 hours         Ordered MONISHA GILBERT    06/01/22 2004 06/01/22 2003  Diet NPO  Diet effective now         Ordered MONISHA GILBERT    06/01/22 2004 06/01/22 2003  Insert peripheral IV  Once         Ordered MONISHA GILBERT    06/01/22 2004 06/01/22 2003  CBC W/ AUTO DIFFERENTIAL  STAT         Ordered MONISHA GILBERT    06/01/22 2004 06/01/22 2003  Comp. Metabolic Panel  STAT         Ordered MONISHA GILBERT    06/01/22 2004 06/01/22 2003  Lipase  STAT         Ordered MONISHA GILBERT     06/01/22 2004 06/01/22 2003  Urinalysis, Reflex to Urine Culture Urine, Clean Catch  STAT         Ordered MONISHA GILBERT            Virtual Visit Note: The provider triage portion of this emergency department evaluation and documentation was performed via [employer], a HIPAA-compliant telemedicine application, in concert with a tele-presenter in the room. A face to face patient evaluation with one of my colleagues will occur once the patient is placed in an emergency department room.      DISCLAIMER: This note was prepared with [employer] voice recognition transcription software. Garbled syntax, mangled pronouns, and other bizarre constructions may be attributed to that software system.

## 2022-06-02 NOTE — H&P
Chief Complaint   Patient presents with    Abdominal Pain       Pt arrives with complaints of abdominal cramping that began this morning. Pt denies any N/V/D. States he has a hx of multiple sbo.       The patient is a 89-year-old male who presents to the emergency department with abdominal cramping that started mid morning today and has worsened this evening.  The patient and his daughter admits that he lamb and baclava at the [employer] on Saturday then had a steak dinner for his birthday yesterday.  The patient has had a history of multiple small-bowel obstructions over the past few years.  He has a history of diverticulitis.  He states he is now nauseated but has had no vomiting.  He denies fever, chest pain, shortness of breath.                Review of patient's allergies indicates:   Allergen Reactions    Solarcaine [benzocaine-triclosan]      Sulfa (sulfonamide antibiotics)      Sulfamethoxazole      Trimethoprim      Hytrin [terazosin] Rash    Smz-tmp ds [sulfamethoxazole-trimethoprim] Rash           Past Medical History:   Diagnosis Date    Anticoagulant long-term use      Bowel obstruction      BPH (benign prostatic hyperplasia)      Diabetes mellitus      Diverticulitis      Hyperlipidemia      Hypertension              Past Surgical History:   Procedure Laterality Date    ANASTOMOSIS OF ILEUM TO RECTUM   9/9/2019     Procedure: ANASTOMOSIS, COLO-RECTAL;  Surgeon: Fouzia Gardner MD;  Location: [facility] OR;  Service: General;;    CHOLECYSTECTOMY N/A 3/7/2022     Procedure: CHOLECYSTECTOMY;  Surgeon: Fouzia Gardner MD;  Location: [facility] OR;  Service: General;  Laterality: N/A;    COLOSTOMY   6/16/2019     Procedure: CREATION, COLOSTOMY;  Surgeon: Fouzia Gardner MD;  Location: [facility] OR;  Service: General;;    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     Procedure: RELEASE, DUPUYTREN CONTRACTURE;  Surgeon: Slick Baldwin Jr., MD;  Location: [facility] OR;  Service: Orthopedics;   Laterality: Left;    EYE SURGERY        GISELA PROCEDURE   2019     Procedure: GISELA PROCEDURE;  Surgeon: Fouzia Gardner MD;  Location: [facility] OR;  Service: General;;  Colon resection    LYSIS OF ADHESIONS N/A 3/7/2022     Procedure: LYSIS, ADHESIONS;  Surgeon: Fouzia Gardner MD;  Location: [facility] OR;  Service: General;  Laterality: N/A;    TONSILLECTOMY                Family History   Problem Relation Age of Onset    Heart disease Father      Hypertension Father      Cancer Mother      Diabetes Paternal Aunt        Social History            Tobacco Use    Smoking status: Former Smoker       Packs/day: 1.00       Years: 25.00       Pack years: 25.00       Types: Cigarettes       Start date:        Quit date:        Years since quittin.4    Smokeless tobacco: Never Used   Substance Use Topics    Alcohol use: Yes       Alcohol/week: 2.0 standard drinks       Types: 2 Glasses of wine per week       Comment: occassionally    Drug use: No      Review of Systems   Constitutional: Negative for fever.   HENT: Negative for sore throat.    Respiratory: Negative for shortness of breath.    Cardiovascular: Negative for chest pain.   Gastrointestinal: Negative for nausea.   Genitourinary: Negative for dysuria.   Musculoskeletal: Negative for back pain.   Skin: Negative for rash.   Neurological: Negative for weakness.   Hematological: Does not bruise/bleed easily.         Physical Exam             Initial Vitals [22]   BP Pulse Resp Temp SpO2   (!) 145/69 87 18 98 °F (36.7 °C) 100 %       MAP           --              Physical Exam     Nursing note and vitals reviewed.  Constitutional: He appears well-developed and well-nourished.   HENT:   Head: Normocephalic and atraumatic.   Eyes: EOM are normal. Pupils are equal, round, and reactive to light.   Neck: Neck supple.   Normal range of motion.  Cardiovascular: Normal rate, regular rhythm, normal heart sounds and intact distal  pulses.   Pulmonary/Chest: Breath sounds normal.   Abdominal: Abdomen is soft. Bowel sounds are normal. He exhibits distension (Mild distension). There is no abdominal tenderness. There is no rebound and no guarding.   Musculoskeletal:         General: No tenderness or edema. Normal range of motion.      Cervical back: Normal range of motion and neck supple.      Comments: Feet and ankles are cool and slightly mottled.     Neurological: He is alert and oriented to person, place, and time.   Skin: Skin is warm and dry.   Psychiatric: He has a normal mood and affect. His behavior is normal. Judgment and thought content normal.            ED Course   Procedures        Labs Reviewed   CBC W/ AUTO DIFFERENTIAL - Abnormal; Notable for the following components:       Result Value      WBC 17.66 (*)       Gran # (ANC) 15.4 (*)       Immature Grans (Abs) 0.05 (*)       Lymph # 0.9 (*)       Gran % 87.3 (*)       Lymph % 5.2 (*)       All other components within normal limits   COMPREHENSIVE METABOLIC PANEL - Abnormal; Notable for the following components:     CO2 30 (*)       Glucose 254 (*)       BUN 39 (*)       Creatinine 1.6 (*)       Calcium 10.7 (*)       eGFR if  43 (*)       eGFR if non  37 (*)       All other components within normal limits   URINALYSIS, REFLEX TO URINE CULTURE - Abnormal; Notable for the following components:     Protein, UA Trace (*)       Leukocytes, UA 2+ (*)       All other components within normal limits     Narrative:      Specimen Source->Urine   CULTURE, BLOOD   CULTURE, BLOOD   LIPASE   URINALYSIS MICROSCOPIC     Narrative:      Specimen Source->Urine   LACTIC ACID, PLASMA   URINALYSIS, REFLEX TO URINE CULTURE   SARS-COV-2 RDRP GENE             Imaging Results                    CT Abdomen Pelvis  Without Contrast (Final result)  Result time 06/02/22 00:03:13                Final result by Jean Navarro DO (06/02/22 00:03:13)                                Impression:        1. Findings compatible with high-grade small bowel obstruction with transition point in the left hemiabdomen along the anterior abdominal wall, likely due to adhesions.  Mild bowel wall thickening just proximal to the transition point with associated mild mesenteric edema, without evidence of pneumatosis.  Correlate with lactate to exclude early ischemic changes.  2. Prostatomegaly.  3. Severe atherosclerosis.  4. Extensive colonic diverticulosis without acute diverticulitis.  5. Distension of the stomach and esophagus, correlate for evidence of reflux.  This report was flagged in Epic as abnormal.        Electronically signed by:       Jean Navarro  Date:                                                06/02/2022  Time:                                                00:03                         Narrative:     EXAMINATION:  CT ABDOMEN PELVIS WITHOUT CONTRAST     CLINICAL HISTORY:  Bowel obstruction suspected;     TECHNIQUE:  Multiplanar images were obtained of the abdomen and pelvis from the hemidiaphragms through the symphysis pubis without intravenous contrast.  CT with contrast is recommended. Global shortage of contrast, affecting [facility], acknowledged at this time. See ordering physician notes.     COMPARISON:  CT of the abdomen and pelvis from 03/07/2022.     FINDINGS:  Lung Bases: There are emphysematous changes in the lung bases.     Heart: Heart size is normal.  No pericardial effusion.     Liver: There is a simple cyst in the left hepatic lobe, unchanged in size from prior.  Otherwise, no new focal hepatic lesions.  The liver is normal in size and attenuation.     Biliary tract: No intrahepatic or extrahepatic biliary ductal dilatation.     Gallbladder: Surgically absent.     Pancreas: Normal. No pancreatic ductal dilatation.     Spleen: Normal size without focal lesion.     Adrenals: Normal.     Kidneys and urinary collecting systems: There are multiple bilateral renal cysts.   There is a cyst in the right kidney midpole which demonstrates peripheral calcification, unchanged.  There is cortical thinning of the bilateral kidneys which can be seen with medical renal disease.  No hydronephrosis or urolithiasis.     Lymph nodes: None enlarged.     Esophagus, stomach and bowel: The stomach is moderately distended.  The esophagus is distended with fluid and there is a small hiatal hernia.  There are multiple distended loops of small bowel with fecalization of the contents, and a transition point in the left hemiabdomen along the anterior abdominal wall (series 2, image 81), likely due to adhesions.  There is also mild small bowel wall thickening just proximal to the transition point with mild adjacent mesenteric edema.  More distal loops of small bowel are relatively decompressed.  There is extensive colonic diverticulosis without evidence of acute diverticulitis.  Postop changes of partial colectomy with an anastomosis in the left lower quadrant.     Peritoneum and mesentery: No ascites or free intraperitoneal air. No focal abdominal fluid collection.     Vasculature: There is severe calcified atherosclerosis.  There is no aneurysm.     Urinary bladder: Normal.     Reproductive organs: The prostate is enlarged.     Body wall: No abnormality.  There has been interval ventral hernia repair.  No evidence of recurrence.  Bilateral fat containing inguinal hernias.     Musculoskeletal: No aggressive osseous lesion.  There are degenerative changes of the spine.                                             Medications   morphine injection 2 mg (has no administration in time range)   piperacillin-tazobactam 4.5 g in dextrose 5 % 100 mL IVPB (ready to mix system) (has no administration in time range)   sodium chloride 0.9% flush 10 mL (has no administration in time range)   0.9%  NaCl infusion (has no administration in time range)   melatonin tablet 6 mg (has no administration in time range)   morphine  injection 2 mg (has no administration in time range)   ondansetron injection 4 mg (has no administration in time range)   ondansetron injection 4 mg (4 mg Intravenous Given 6/1/22 2230)       Imp: small bowel obstruction, ventral hernia, constipation    Plan :NPO < NG TUBE IV fluids, repeat xrays

## 2022-06-02 NOTE — PLAN OF CARE
VN Note: labs, notes, orders, care plan review, will be available if needed.   Problem: Adult Inpatient Plan of Care  Goal: Plan of Care Review  Outcome: Ongoing, Progressing

## 2022-06-02 NOTE — ED PROVIDER NOTES
Encounter Date: 6/1/2022       History     Chief Complaint   Patient presents with    Abdominal Pain     Pt arrives with complaints of abdominal cramping that began this morning. Pt denies any N/V/D. States he has a hx of multiple sbo.      The patient is a 89-year-old male who presents to the emergency department with abdominal cramping that started mid morning today and has worsened this evening.  The patient and his daughter admits that he lamb and baclava at the [employer] on Saturday then had a steak dinner for his birthday yesterday.  The patient has had a history of multiple small-bowel obstructions over the past few years.  He has a history of diverticulitis.  He states he is now nauseated but has had no vomiting.  He denies fever, chest pain, shortness of breath.        Review of patient's allergies indicates:   Allergen Reactions    Solarcaine [benzocaine-triclosan]     Sulfa (sulfonamide antibiotics)     Sulfamethoxazole     Trimethoprim     Hytrin [terazosin] Rash    Smz-tmp ds [sulfamethoxazole-trimethoprim] Rash     Past Medical History:   Diagnosis Date    Anticoagulant long-term use     Bowel obstruction     BPH (benign prostatic hyperplasia)     Diabetes mellitus     Diverticulitis     Hyperlipidemia     Hypertension      Past Surgical History:   Procedure Laterality Date    ANASTOMOSIS OF ILEUM TO RECTUM  9/9/2019    Procedure: ANASTOMOSIS, COLO-RECTAL;  Surgeon: Fouzia Gardner MD;  Location: [facility] OR;  Service: General;;    CHOLECYSTECTOMY N/A 3/7/2022    Procedure: CHOLECYSTECTOMY;  Surgeon: Fouzia Gardner MD;  Location: [facility] OR;  Service: General;  Laterality: N/A;    COLOSTOMY  6/16/2019    Procedure: CREATION, COLOSTOMY;  Surgeon: Fouzia Gardner MD;  Location: [facility] OR;  Service: General;;    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 OR;  Service: General;;  Colon resection    LYSIS OF ADHESIONS N/A 3/7/2022    Procedure: LYSIS, ADHESIONS;  Surgeon: Fouzia Gardner MD;  Location: [facility] OR;  Service: General;  Laterality: N/A;    TONSILLECTOMY       Family History   Problem Relation Age of Onset    Heart disease Father     Hypertension Father     Cancer Mother     Diabetes Paternal Aunt      Social History     Tobacco Use    Smoking status: Former Smoker     Packs/day: 1.00     Years: 25.00     Pack years: 25.00     Types: Cigarettes     Start date:      Quit date:      Years since quittin.4    Smokeless tobacco: Never Used   Substance Use Topics    Alcohol use: Yes     Alcohol/week: 2.0 standard drinks     Types: 2 Glasses of wine per week     Comment: occassionally    Drug use: No     Review of Systems   Constitutional: Negative for fever.   HENT: Negative for sore throat.    Respiratory: Negative for shortness of breath.    Cardiovascular: Negative for chest pain.   Gastrointestinal: Negative for nausea.   Genitourinary: Negative for dysuria.   Musculoskeletal: Negative for back pain.   Skin: Negative for rash.   Neurological: Negative for weakness.   Hematological: Does not bruise/bleed easily.       Physical Exam     Initial Vitals [22]   BP Pulse Resp Temp SpO2   (!) 145/69 87 18 98 °F (36.7 °C) 100 %      MAP       --         Physical Exam    Nursing note and vitals reviewed.  Constitutional: He appears well-developed and well-nourished.   HENT:   Head: Normocephalic and atraumatic.   Eyes: EOM are normal. Pupils are equal, round, and reactive to light.   Neck: Neck supple.   Normal range of motion.  Cardiovascular: Normal rate, regular rhythm, normal heart sounds and intact distal pulses.   Pulmonary/Chest: Breath sounds normal.   Abdominal: Abdomen  is soft. Bowel sounds are normal. He exhibits distension (Mild distension). There is no abdominal tenderness. There is no rebound and no guarding.   Musculoskeletal:         General: No tenderness or edema. Normal range of motion.      Cervical back: Normal range of motion and neck supple.      Comments: Feet and ankles are cool and slightly mottled.     Neurological: He is alert and oriented to person, place, and time.   Skin: Skin is warm and dry.   Psychiatric: He has a normal mood and affect. His behavior is normal. Judgment and thought content normal.         ED Course   Procedures  Labs Reviewed   CBC W/ AUTO DIFFERENTIAL - Abnormal; Notable for the following components:       Result Value    WBC 17.66 (*)     Gran # (ANC) 15.4 (*)     Immature Grans (Abs) 0.05 (*)     Lymph # 0.9 (*)     Gran % 87.3 (*)     Lymph % 5.2 (*)     All other components within normal limits   COMPREHENSIVE METABOLIC PANEL - Abnormal; Notable for the following components:    CO2 30 (*)     Glucose 254 (*)     BUN 39 (*)     Creatinine 1.6 (*)     Calcium 10.7 (*)     eGFR if  43 (*)     eGFR if non  37 (*)     All other components within normal limits   URINALYSIS, REFLEX TO URINE CULTURE - Abnormal; Notable for the following components:    Protein, UA Trace (*)     Leukocytes, UA 2+ (*)     All other components within normal limits    Narrative:     Specimen Source->Urine   CULTURE, BLOOD   CULTURE, BLOOD   LIPASE   URINALYSIS MICROSCOPIC    Narrative:     Specimen Source->Urine   LACTIC ACID, PLASMA   URINALYSIS, REFLEX TO URINE CULTURE   SARS-COV-2 RDRP GENE          Imaging Results           CT Abdomen Pelvis  Without Contrast (Final result)  Result time 06/02/22 00:03:13    Final result by Jean Navarro DO (06/02/22 00:03:13)                 Impression:      1. Findings compatible with high-grade small bowel obstruction with transition point in the left hemiabdomen along the anterior  abdominal wall, likely due to adhesions.  Mild bowel wall thickening just proximal to the transition point with associated mild mesenteric edema, without evidence of pneumatosis.  Correlate with lactate to exclude early ischemic changes.  2. Prostatomegaly.  3. Severe atherosclerosis.  4. Extensive colonic diverticulosis without acute diverticulitis.  5. Distension of the stomach and esophagus, correlate for evidence of reflux.  This report was flagged in Epic as abnormal.      Electronically signed by: Jean Navarro  Date:    06/02/2022  Time:    00:03             Narrative:    EXAMINATION:  CT ABDOMEN PELVIS WITHOUT CONTRAST    CLINICAL HISTORY:  Bowel obstruction suspected;    TECHNIQUE:  Multiplanar images were obtained of the abdomen and pelvis from the hemidiaphragms through the symphysis pubis without intravenous contrast.  CT with contrast is recommended. Global [facility] of contrast[facility], acknowledged at this time. See ordering physician notes.    COMPARISON:  CT of the abdomen and pelvis from 03/07/2022.    FINDINGS:  Lung Bases: There are emphysematous changes in the lung bases.    Heart: Heart size is normal.  No pericardial effusion.    Liver: There is a simple cyst in the left hepatic lobe, unchanged in size from prior.  Otherwise, no new focal hepatic lesions.  The liver is normal in size and attenuation.    Biliary tract: No intrahepatic or extrahepatic biliary ductal dilatation.    Gallbladder: Surgically absent.    Pancreas: Normal. No pancreatic ductal dilatation.    Spleen: Normal size without focal lesion.    Adrenals: Normal.    Kidneys and urinary collecting systems: There are multiple bilateral renal cysts.  There is a cyst in the right kidney midpole which demonstrates peripheral calcification, unchanged.  There is cortical thinning of the bilateral kidneys which can be seen with medical renal disease.  No hydronephrosis or urolithiasis.    Lymph nodes: None  enlarged.    Esophagus, stomach and bowel: The stomach is moderately distended.  The esophagus is distended with fluid and there is a small hiatal hernia.  There are multiple distended loops of small bowel with fecalization of the contents, and a transition point in the left hemiabdomen along the anterior abdominal wall (series 2, image 81), likely due to adhesions.  There is also mild small bowel wall thickening just proximal to the transition point with mild adjacent mesenteric edema.  More distal loops of small bowel are relatively decompressed.  There is extensive colonic diverticulosis without evidence of acute diverticulitis.  Postop changes of partial colectomy with an anastomosis in the left lower quadrant.    Peritoneum and mesentery: No ascites or free intraperitoneal air. No focal abdominal fluid collection.    Vasculature: There is severe calcified atherosclerosis.  There is no aneurysm.    Urinary bladder: Normal.    Reproductive organs: The prostate is enlarged.    Body wall: No abnormality.  There has been interval ventral hernia repair.  No evidence of recurrence.  Bilateral fat containing inguinal hernias.    Musculoskeletal: No aggressive osseous lesion.  There are degenerative changes of the spine.                                 Medications   morphine injection 2 mg (has no administration in time range)   piperacillin-tazobactam 4.5 g in dextrose 5 % 100 mL IVPB (ready to mix system) (has no administration in time range)   sodium chloride 0.9% flush 10 mL (has no administration in time range)   0.9%  NaCl infusion (has no administration in time range)   melatonin tablet 6 mg (has no administration in time range)   morphine injection 2 mg (has no administration in time range)   ondansetron injection 4 mg (has no administration in time range)   ondansetron injection 4 mg (4 mg Intravenous Given 6/1/22 2230)                 ED Course as of 06/02/22 0031   Wed Jun 01, 2022   0938 Awaiting CT results.  Pain is increasing, I will give morphine at this time. WBC count is 17,000. I will order BC and LA and cover the patient with antibiotics. [ST]   u Jun 02, 2022   0023 Case discussed with Dr. Gardner.  He recommends admitting the patient to his service with an NG tube, pain medications, IV fluids and antibiotics. [ST]      ED Course User Index  [ST] Katie Navarro MD             Clinical Impression:   Final diagnoses:  [K56.50] Small bowel obstruction due to adhesions          ED Disposition Condition    Admit               Katie Navarro MD  06/02/22 0031

## 2022-06-02 NOTE — PROGRESS NOTES
06/02/22 0346   Admission   Initial VN Admission Questions Complete   Communication Issues? None   Shift   Virtual Nurse - Patient Verbalized Approval Of Camera Use;VN Rounding   Safety/Activity   Patient Rounds visualized patient;clutter free environment maintained   Safety Promotion/Fall Prevention instructed to call staff for mobility;side rails raised x 2;Fall Risk reviewed with patient/family   VIRTUAL NURSE: Admission questions completed with patient at this time. Family at bedside. Care plan and safety precautions reviewed. NGT noted. Pt verbalized no complaints, no needs expressed. Instructed to use call light for assistance, he verbalized understanding. VN will remain available as needed.

## 2022-06-02 NOTE — PLAN OF CARE
0315 Pt appeared to be in no distress.   Non skid socks on, bed locked, call bell within reach, bed alarm set.

## 2022-06-02 NOTE — ED NOTES
Patient identifiers for Husam Connolly checked and correct.  LOC: The patient is awake, alert and aware of environment with an appropriate affect, the patient is oriented x 3 and speaking appropriately.  APPEARANCE: Patient resting quietly and in no acute distress, patient is clean and well groomed, patient's clothing are properly fastened.  SKIN: The skin is warm and dry, patient has normal skin turgor and moist mucus membranes, skin intact, no breakdown or brusing noted.  MUSKULOSKELETAL: Patient moving all extremities well, no obvious swelling or deformities noted.  RESPIRATORY: Airway is open and patent, respirations are spontaneous, patient has a normal effort and rate.  ABDOMEN: Non tender to palpation, distention noted.

## 2022-06-03 ENCOUNTER — PATIENT OUTREACH (OUTPATIENT)
Dept: ADMINISTRATIVE | Facility: OTHER | Age: 87
End: 2022-06-03
Payer: MEDICARE

## 2022-06-03 VITALS
HEIGHT: 70 IN | BODY MASS INDEX: 22.25 KG/M2 | OXYGEN SATURATION: 98 % | HEART RATE: 60 BPM | WEIGHT: 155.44 LBS | TEMPERATURE: 98 F | DIASTOLIC BLOOD PRESSURE: 75 MMHG | RESPIRATION RATE: 18 BRPM | SYSTOLIC BLOOD PRESSURE: 180 MMHG

## 2022-06-03 PROCEDURE — 99900035 HC TECH TIME PER 15 MIN (STAT)

## 2022-06-03 PROCEDURE — 94761 N-INVAS EAR/PLS OXIMETRY MLT: CPT

## 2022-06-03 PROCEDURE — 25000003 PHARM REV CODE 250: Performed by: EMERGENCY MEDICINE

## 2022-06-03 RX ADMIN — SODIUM CHLORIDE: 0.9 INJECTION, SOLUTION INTRAVENOUS at 02:06

## 2022-06-03 NOTE — PLAN OF CARE
Pt will dc with no needs noted. Pts daughter is heading home to get clothes for pt and will be returning soon. SW will continue to follow pt throughout his transitions of care and assist with any dc needs.       Future Appointments   Date Time Provider Department Center   6/15/2022  3:45 PM Alverto Green MD [facility]   7/19/2022  1:45 PM Fouzia Gardner MD [facility]   7/25/2022  8:20 AM Slick Baldwin Jr., MD [facility] [place] [facility]   11/28/2022 10:45 AM Alverto Green MD [facility]   11/29/2022  8:30 AM Daren Lassiter MD [facility] CARDIO [place] [facility]        06/03/22 1437   Final Note   Assessment Type Final Discharge Note   Anticipated Discharge Disposition Home   Hospital Resources/Appts/Education Provided Appointments scheduled by Navigator/Coordinator   Post-Acute Status   Discharge Delays None known at this time

## 2022-06-03 NOTE — PLAN OF CARE
Patient was asleep on room air maintaining sat of 86%. Patient placed on oxygen with documented flow while sleeping. Will continue to monitor and attempt to wean per O2 order protocol.

## 2022-06-03 NOTE — PROGRESS NOTES
IP Liaison - Initial Visit Note    Patient: Husam Connolly  MRN:  [id number]  Date of Service:  6/3/2022  Completed by:  SONA Hardy    Reason for Visit   Patient presents with    IP Liaison Initial Visit       RSW met with patient at bedside in order to complete SDOH questionnaire and liaison assessment.  Pt has identified no social barriers to care. Per pt, pt is not in need of resources at this time.    The following were addressed during this visit:  - Review SDOH Questions   - Complete patient assessment   - Complete initial visit with patient        Patient Summary     IP Liaison Patient Assessment    General  Level of Caregiver support: Member independent and does not need caregiver assistance  Have you had to make a decision between paying for any of the following in the last 2 months?: None  Transportation means: Self, Family  Employment status: Retired and not working  Current symptoms: None  Assessments  Was the PHQ Depression Screening completed this visit?: No  Was the MUMTAZ-7 Screening completed this visit?: No         SONA Hardy

## 2022-06-03 NOTE — PROGRESS NOTES
"Surgery follow up  BP (!) 180/75   Pulse 60   Temp 97.9 °F (36.6 °C)   Resp 18   Ht 5' 10" (1.778 m)   Wt 70.5 kg (155 lb 6.8 oz)   SpO2 98%   BMI 22.30 kg/m²   I/O last 3 completed shifts:  In: 3323 [P.O.:480; I.V.:2843]  Out: 500 [Urine:500]  No intake/output data recorded.      Recent Results (from the past 336 hour(s))   CBC Auto Differential    Collection Time: 06/02/22 11:56 AM   Result Value Ref Range    WBC 10.25 3.90 - 12.70 K/uL    Hemoglobin 16.8 14.0 - 18.0 g/dL    Hematocrit 51.8 40.0 - 54.0 %    Platelets 210 150 - 450 K/uL   CBC auto differential    Collection Time: 06/02/22  7:58 AM   Result Value Ref Range    WBC 9.44 3.90 - 12.70 K/uL    Hemoglobin 16.9 14.0 - 18.0 g/dL    Hematocrit 51.1 40.0 - 54.0 %    Platelets 221 150 - 450 K/uL   CBC W/ AUTO DIFFERENTIAL    Collection Time: 06/01/22 10:18 PM   Result Value Ref Range    WBC 17.66 (H) 3.90 - 12.70 K/uL    Hemoglobin 17.6 14.0 - 18.0 g/dL    Hematocrit 52.7 40.0 - 54.0 %    Platelets 210 150 - 450 K/uL   abdomen soft non tender , tolerating diet well.    "

## 2022-06-03 NOTE — PLAN OF CARE
Discharge orders noted. AVS prepared with medication list, importance of medication compliance, follow up appointments, diet, home care instructions, treatment plan, self management, and when to seek medical attention. Detailed clinical reference list attached. AVS review to follow.

## 2022-06-03 NOTE — PROGRESS NOTES
06/03/22 1600   AVS Confirmation   Discharge instructions and AVS given to and reviewed with patient and/or significant other. Yes   AVS printed and handed to patient by bedside nurse. VN reviewed discharge instructions with patient and daughter Nina using teachback method.  Allowed time for questions, all questions answered.  Patient verbalized complete understanding of discharge instructions and voices no concerns. Discharge instructions complete. Transport wheelchair requested.  Bedside nurse notified.

## 2022-06-05 NOTE — DISCHARGE SUMMARY
[facility]  General Surgery  Discharge Summary      Patient Name: Husam Connolly  MRN: [id number]  Admission Date: 6/1/2022  Hospital Length of Stay: 1 days  Discharge Date and Time: 6/3/2022  4:28 PM  Attending Physician: Teodora att. providers found   Discharging Provider: Fouzia Gardner MD  Primary Care Provider: Alverto Green MD     HPI:  89-year-old male admitted through the emergency room with sudden onset of epigastric right-sided abdominal pain associated nausea and vomited tissues down status post arm ventral hernia repair patient also has had previous surgeries with colon resection now presented with recurrent small-bowel obstruction patient underwent x-ray showed dilated small bowel with possible adhesions patient admitted to the hospital for create an NG tube and iv fluids.        Hospital Course:  Patient admitted with diagnosis of small-bowel obstruction acute abdominal pain patient kept NPO a NG tube was placed as started on IV antibiotics and IV fluids patient underwent repeat x-rays Bowman monitor patient white count was elevated following the white count came back better x-ray showed improving of the pattern small-bowel obstruction patient started having bowel movement no hematemesis no melena patient was feeling better patient the NG tube was taken out patient started on diet patient did well tolerating diet out of bed ambulating patient discharged home to be followed office in a week and to call if any problem patient to resume all his home medication.  Consults:     Significant Diagnostic Studies: Labs: BMP: No results for input(s): GLU, NA, K, CL, CO2, BUN, CREATININE, CALCIUM, MG in the last 48 hours., CMP No results for input(s): NA, K, CL, CO2, GLU, BUN, CREATININE, CALCIUM, PROT, ALBUMIN, BILITOT, ALKPHOS, AST, ALT, ANIONGAP, ESTGFRAFRICA, EGFRNONAA in the last 48 hours. and CBC No results for input(s): WBC, HGB, HCT, PLT in the last 48 hours.  Microbiology:   Blood Culture    Lab Results   Component Value Date    LABBLOO No Growth to date 06/02/2022    LABBLOO No Growth to date 06/02/2022    LABBLOO No Growth to date 06/02/2022     Radiology: X-Ray: KUB: X-Ray Abdomen AP 1 View (KUB):   Results for orders placed or performed during the hospital encounter of 06/01/22   X-Ray Abdomen AP 1 View    Narrative    EXAMINATION:  XR ABDOMEN AP 1 VIEW    CLINICAL HISTORY:  SBO;    TECHNIQUE:  AP View(s) of the abdomen was performed.    COMPARISON:  Same day, 02:09 hours and CT abdomen 06/01/2022    FINDINGS:  Removal of N G-tube.  L mild distention of few small bowel structures particularly left flank 3.4 cm.  Vertical oval-shaped lucency overlying region of spleen, probably spurious, not seen on previous CT or radiograph.  Nondistended air in large bowel and no remaining gastric distension.  Limited residual contrast from previous CT with that urinary bladder cough question.  Postop changes right upper quadrant.      Impression    Decreased small bowel GI tract distention, interval resolution of distal small bowel obstructive process question and clinical correlation requested.      Electronically signed by: Jonathan Eden MD  Date:    06/02/2022  Time:    10:18       Pending Diagnostic Studies:     None        Final Active Diagnoses:    Diagnosis Date Noted POA    PRINCIPAL PROBLEM:  Small bowel obstruction [K56.609] 06/13/2019 Yes    Right foot drop [M21.371] 08/26/2020 Yes    Type 2 diabetes mellitus with other specified complication [E11.69] 08/26/2020 Yes    Stage 3 chronic kidney disease [N18.30] 12/13/2018 Yes     Chronic    Hypertension [I10]  Yes     Chronic      Problems Resolved During this Admission:      Discharged Condition: good    Disposition: Home or Self Care    Follow Up:    Patient Instructions:      Diet Adult Regular     Activity as tolerated     Medications:  Reconciled Home Medications:      Medication List      CHANGE how you take these medications     finasteride 5 mg tablet  Commonly known as: PROSCAR  TAKE 1 TABLET BY MOUTH EVERY DAY  What changed: when to take this        CONTINUE taking these medications    aspirin 81 MG EC tablet  Commonly known as: ECOTRIN  Take 81 mg by mouth once daily.     atorvastatin 40 MG tablet  Commonly known as: LIPITOR  TAKE 1 TABLET(40 MG) BY MOUTH EVERY DAY     fish oil-omega-3 fatty acids 300-1,000 mg capsule  Take 1 capsule by mouth once daily.     fluticasone propionate 50 mcg/actuation nasal spray  Commonly known as: FLONASE  2 sprays (100 mcg total) by Each Nostril route once daily.     GARLIC ORAL  Take 1 capsule by mouth once daily.     hydroCHLOROthiazide 25 MG tablet  Commonly known as: HYDRODIURIL  Take 1 tablet (25 mg total) by mouth once daily.     losartan 25 MG tablet  Commonly known as: COZAAR  Take 1 tablet (25 mg total) by mouth once daily.     metFORMIN 500 MG tablet  Commonly known as: GLUCOPHAGE  TAKE 1 TABLET BY MOUTH TWICE DAILY WITH FOOD     multivitamin capsule  Take 1 capsule by mouth once daily.     tamsulosin 0.4 mg Cap  Commonly known as: FLOMAX  Take 0.4 mg by mouth nightly.     vitamin D 1000 units Tab  Commonly known as: VITAMIN D3  Take 1,000 Units by mouth once daily.            Fouzia Gardner MD  General Surgery  [place] - Telemetry

## 2022-06-07 ENCOUNTER — PATIENT OUTREACH (OUTPATIENT)
Dept: ADMINISTRATIVE | Facility: OTHER | Age: 87
End: 2022-06-07
Payer: MEDICARE

## 2022-06-07 LAB
BACTERIA BLD CULT: NORMAL
BACTERIA BLD CULT: NORMAL

## 2022-06-07 NOTE — PHYSICIAN QUERY
PT Name: Husam Connolly  MR #: [id number]     DOCUMENTATION CLARIFICATION     CDS/: Carina Luna RN, CDS               Contact information: [email or web address]    This form is a permanent document in the medical record.     Query Date: June 7, 2022    By submitting this query, we are merely seeking further clarification of documentation to reflect the severity of illness of your patient. Please utilize your independent clinical judgment when addressing the question(s) below.    The medical record reflects the following:     Indicators   Supporting Clinical Findings Location in Medical Record   x Bowel obstruction, intestinal obstruction, LBO or SBO documented Diagnosis   Small bowel obstruction due to adhesions     IP Admission Order 6/2   x Radiology findings Impression:  1. Findings compatible with high-grade small bowel obstruction with transition point in the left hemiabdomen along the anterior abdominal wall, likely due to adhesions.  Mild bowel wall thickening just proximal to the transition point with associated mild mesenteric edema, without evidence of pneumatosis.  CT Abdomen 6/1   x Treatment/Medication Plan :NPO < NG TUBE IV fluids, repeat xrays H&P 6/1    Procedure/Surgery     x Other  89-year-old male who presents to the emergency department with abdominal cramping that started mid morning today and has worsened this evening.  The patient and his daughter admits that he lamb and baclava at the [employer] on Saturday then had a steak dinner for his birthday yesterday.  The patient has had a history of multiple small-bowel obstructions over the past few years.  He has a history of diverticulitis.  He states he is now nauseated but has had no vomiting    Bowel sounds are normal. He exhibits distension (Mild distension) H&P 6/1                        H&P 6/1     Provider, please further specify the degree of bowel obstruction:    [ x  ] Partial or incomplete intestinal obstruction, due to  adhesions   [   ] Complete intestinal obstruction, due to adhesions   [   ] Other clarification (please specify): _____________________   [   ] Clinically Undetermined           Please document in your progress notes daily for the duration of treatment until resolved, and include in your discharge summary.

## 2022-06-07 NOTE — PROGRESS NOTES
IP Liaison - Final Visit Note    Patient: Husam Connolly  MRN:  [id number]  Date of Service:  6/7/2022  Completed by:  SONA Hardy    Reason for Visit   Patient presents with    IP Liaison Chart Review     Patient discharged from hospital before CONRADOW was able to complete follow-up visit.        Patient Summary     Discharge Date: 6/3/2022  Discharge telephone number/address: [phone] / [address] [facility] [address]  Follow up provider: Alverto Green MD  Follow up appointments: 6/15/2022 @ 3:45pm  Home Health agency & telephone number: n/a  DME ordered &  name: n/a  Assigned OPCM RN/SW: n/a  Report sent to follow up team (PCP/OPCM) via in basket message: n/a  Community Resources arranged including agency name & contact info: n/a      SONA Hardy

## 2022-07-25 ENCOUNTER — OFFICE VISIT (OUTPATIENT)
Dept: ORTHOPEDICS | Facility: CLINIC | Age: 87
End: 2022-07-25
Payer: MEDICARE

## 2022-07-25 VITALS — WEIGHT: 162 LBS | BODY MASS INDEX: 23.19 KG/M2 | HEIGHT: 70 IN

## 2022-07-25 DIAGNOSIS — M72.0 DUPUYTREN'S CONTRACTURE OF LEFT HAND: ICD-10-CM

## 2022-07-25 DIAGNOSIS — M20.40 HAMMER TOE, UNSPECIFIED LATERALITY: Primary | ICD-10-CM

## 2022-07-25 PROCEDURE — 3288F PR FALLS RISK ASSESSMENT DOCUMENTED: ICD-10-PCS | Mod: CPTII,S$GLB,, | Performed by: ORTHOPAEDIC SURGERY

## 2022-07-25 PROCEDURE — 1101F PT FALLS ASSESS-DOCD LE1/YR: CPT | Mod: CPTII,S$GLB,, | Performed by: ORTHOPAEDIC SURGERY

## 2022-07-25 PROCEDURE — 3288F FALL RISK ASSESSMENT DOCD: CPT | Mod: CPTII,S$GLB,, | Performed by: ORTHOPAEDIC SURGERY

## 2022-07-25 PROCEDURE — 1159F MED LIST DOCD IN RCRD: CPT | Mod: CPTII,S$GLB,, | Performed by: ORTHOPAEDIC SURGERY

## 2022-07-25 PROCEDURE — 99024 POSTOP FOLLOW-UP VISIT: CPT | Mod: S$GLB,,, | Performed by: ORTHOPAEDIC SURGERY

## 2022-07-25 PROCEDURE — 99999 PR PBB SHADOW E&M-EST. PATIENT-LVL III: CPT | Mod: PBBFAC,,, | Performed by: ORTHOPAEDIC SURGERY

## 2022-07-25 PROCEDURE — 1159F PR MEDICATION LIST DOCUMENTED IN MEDICAL RECORD: ICD-10-PCS | Mod: CPTII,S$GLB,, | Performed by: ORTHOPAEDIC SURGERY

## 2022-07-25 PROCEDURE — 1126F AMNT PAIN NOTED NONE PRSNT: CPT | Mod: CPTII,S$GLB,, | Performed by: ORTHOPAEDIC SURGERY

## 2022-07-25 PROCEDURE — 99024 PR POST-OP FOLLOW-UP VISIT: ICD-10-PCS | Mod: S$GLB,,, | Performed by: ORTHOPAEDIC SURGERY

## 2022-07-25 PROCEDURE — 1101F PR PT FALLS ASSESS DOC 0-1 FALLS W/OUT INJ PAST YR: ICD-10-PCS | Mod: CPTII,S$GLB,, | Performed by: ORTHOPAEDIC SURGERY

## 2022-07-25 PROCEDURE — 99999 PR PBB SHADOW E&M-EST. PATIENT-LVL III: ICD-10-PCS | Mod: PBBFAC,,, | Performed by: ORTHOPAEDIC SURGERY

## 2022-07-25 PROCEDURE — 1126F PR PAIN SEVERITY QUANTIFIED, NO PAIN PRESENT: ICD-10-PCS | Mod: CPTII,S$GLB,, | Performed by: ORTHOPAEDIC SURGERY

## 2022-07-25 NOTE — PROGRESS NOTES
Subjective:      Patient ID: Husam Connolly is a 89 y.o. male.  Chief Complaint: Post-op Evaluation (Left hand dupuytren's ( Sx 4/22))      HPI  Husam Connolly is a  89 y.o. male presenting today for post op visit.  He is s/p Dupuytren's excision and release left hand now 3 months postop  He is doing well pain minimal numbness has resolved in all fingers  He is doing scar massage and squeeze ball strengthening.     Review of patient's allergies indicates:   Allergen Reactions    Solarcaine [benzocaine-triclosan]     Sulfa (sulfonamide antibiotics)     Sulfamethoxazole     Trimethoprim     Hytrin [terazosin] Rash    Smz-tmp ds [sulfamethoxazole-trimethoprim] Rash         Current Outpatient Medications   Medication Sig Dispense Refill    aspirin (ECOTRIN) 81 MG EC tablet Take 81 mg by mouth once daily.      atorvastatin (LIPITOR) 40 MG tablet TAKE 1 TABLET(40 MG) BY MOUTH EVERY DAY 90 tablet 3    finasteride (PROSCAR) 5 mg tablet TAKE 1 TABLET BY MOUTH EVERY DAY (Patient taking differently: Take 5 mg by mouth every evening.) 90 tablet 1    fluticasone propionate (FLONASE) 50 mcg/actuation nasal spray 2 sprays (100 mcg total) by Each Nostril route once daily. 48 g 3    GARLIC ORAL Take 1 capsule by mouth once daily.      hydroCHLOROthiazide (HYDRODIURIL) 25 MG tablet Take 1 tablet (25 mg total) by mouth once daily. 90 tablet 3    losartan (COZAAR) 25 MG tablet Take 1 tablet (25 mg total) by mouth once daily. 90 tablet 3    metFORMIN (GLUCOPHAGE) 500 MG tablet TAKE 1 TABLET BY MOUTH TWICE DAILY WITH FOOD (Patient taking differently: Take 500 mg by mouth 2 (two) times daily with meals.) 180 tablet 3    multivitamin capsule Take 1 capsule by mouth once daily.      omega-3 fatty acids/fish oil (FISH OIL-OMEGA-3 FATTY ACIDS) 300-1,000 mg capsule Take 1 capsule by mouth once daily.      tamsulosin (FLOMAX) 0.4 mg Cap Take 0.4 mg by mouth nightly.  3    vitamin D (VITAMIN D3) 1000 units Tab Take 1,000  "Units by mouth once daily.       No current facility-administered medications for this visit.       Past Medical History:   Diagnosis Date    BPH (benign prostatic hyperplasia)     Diabetes mellitus     Hyperlipidemia     Hypertension     Small bowel obstruction        Past Surgical History:   Procedure Laterality Date    ANASTOMOSIS OF ILEUM TO RECTUM  9/9/2019    Procedure: ANASTOMOSIS, COLO-RECTAL;  Surgeon: Fouzia Gardner MD;  Location: [facility];  Service: General;;    CHOLECYSTECTOMY N/A 3/7/2022    Procedure: CHOLECYSTECTOMY;  Surgeon: Fouzia Gardner MD;  Location: [facility] OR;  Service: General;  Laterality: N/A;    COLOSTOMY  6/16/2019    Procedure: CREATION, COLOSTOMY;  Surgeon: Fouzia Gardner MD;  Location: [facility] OR;  Service: General;;    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    Procedure: RELEASE, DUPUYTREN CONTRACTURE;  Surgeon: Slick aBldwin Jr., MD;  Location: [facility];  Service: Orthopedics;  Laterality: Left;    EYE SURGERY      GISELA PROCEDURE  6/16/2019    Procedure: GISELA PROCEDURE;  Surgeon: Fouzia Gardner MD;  Location: [facility];  Service: General;;  Colon resection    LYSIS OF ADHESIONS N/A 3/7/2022    Procedure: LYSIS, ADHESIONS;  Surgeon: Fouzia Gardner MD;  Location: [facility];  Service: General;  Laterality: N/A;    TONSILLECTOMY         OBJECTIVE:   PHYSICAL EXAM:  Height: 5' 10" (177.8 cm) Weight: 73.5 kg (162 lb)  Vitals:    07/25/22 0830   Weight: 73.5 kg (162 lb)   Height: 5' 10" (1.778 m)   PainSc: 0-No pain   PainLoc: Hand     Ortho/SPM Exam  Examination left hand incision looks good well-healed slight thickening range of motion is full except for slight flexion contracture of the MP joint      RADIOGRAPHS:  None  Comments: I have personally reviewed the imaging and I agree with the above radiologist's report.    ASSESSMENT/PLAN:     IMPRESSION:  Status post Dupuytren's excision left hand    PLAN:  Continue scar massage and " strengthening with a squeeze ball otherwise full activities    FOLLOW UP:  2-3 months  Referral to Podiatry for leann    Disclaimer: This note has been generated using voice-recognition software. There may be typographical errors that have been missed during proof-reading.

## 2022-09-12 ENCOUNTER — OFFICE VISIT (OUTPATIENT)
Dept: PODIATRY | Facility: CLINIC | Age: 87
End: 2022-09-12
Payer: MEDICARE

## 2022-09-12 VITALS
SYSTOLIC BLOOD PRESSURE: 141 MMHG | HEIGHT: 70 IN | BODY MASS INDEX: 23.24 KG/M2 | DIASTOLIC BLOOD PRESSURE: 75 MMHG | HEART RATE: 79 BPM

## 2022-09-12 DIAGNOSIS — E11.51 TYPE 2 DIABETES MELLITUS WITH PERIPHERAL VASCULAR DISEASE: Primary | ICD-10-CM

## 2022-09-12 DIAGNOSIS — Q66.89 CLAW TOE, UNSPECIFIED LATERALITY: ICD-10-CM

## 2022-09-12 PROCEDURE — 99999 PR PBB SHADOW E&M-EST. PATIENT-LVL IV: CPT | Mod: PBBFAC,,, | Performed by: PODIATRIST

## 2022-09-12 PROCEDURE — 99999 PR PBB SHADOW E&M-EST. PATIENT-LVL IV: ICD-10-PCS | Mod: PBBFAC,,, | Performed by: PODIATRIST

## 2022-09-12 PROCEDURE — 1126F AMNT PAIN NOTED NONE PRSNT: CPT | Mod: CPTII,S$GLB,, | Performed by: PODIATRIST

## 2022-09-12 PROCEDURE — 1160F PR REVIEW ALL MEDS BY PRESCRIBER/CLIN PHARMACIST DOCUMENTED: ICD-10-PCS | Mod: CPTII,S$GLB,, | Performed by: PODIATRIST

## 2022-09-12 PROCEDURE — 1159F MED LIST DOCD IN RCRD: CPT | Mod: CPTII,S$GLB,, | Performed by: PODIATRIST

## 2022-09-12 PROCEDURE — 1159F PR MEDICATION LIST DOCUMENTED IN MEDICAL RECORD: ICD-10-PCS | Mod: CPTII,S$GLB,, | Performed by: PODIATRIST

## 2022-09-12 PROCEDURE — 99203 PR OFFICE/OUTPT VISIT, NEW, LEVL III, 30-44 MIN: ICD-10-PCS | Mod: S$GLB,,, | Performed by: PODIATRIST

## 2022-09-12 PROCEDURE — 1126F PR PAIN SEVERITY QUANTIFIED, NO PAIN PRESENT: ICD-10-PCS | Mod: CPTII,S$GLB,, | Performed by: PODIATRIST

## 2022-09-12 PROCEDURE — 1160F RVW MEDS BY RX/DR IN RCRD: CPT | Mod: CPTII,S$GLB,, | Performed by: PODIATRIST

## 2022-09-12 PROCEDURE — 99203 OFFICE O/P NEW LOW 30 MIN: CPT | Mod: S$GLB,,, | Performed by: PODIATRIST

## 2022-09-12 NOTE — PROGRESS NOTES
Subjective:      Patient ID: Husam Connolly is a 89 y.o. male.    Chief Complaint: Diabetes Mellitus (Alverto Green MD   06/15/2022) and Diabetic Foot Exam    Husam is a 89 y.o. male who presents to the clinic upon referral from Dr. Baldwin  for evaluation and treatment of diabetic feet. Husam has a past medical history of BPH (benign prostatic hyperplasia), Diabetes mellitus, Hyperlipidemia, Hypertension, and Small bowel obstruction. Patient relates no major problem with feet.  Relates no pain to his feet.  Complains of intermittent cramping to his legs mostly at rest.    PCP: Alverto Green MD    Date Last Seen by PCP:  06/15/2022    Current shoe gear: Slip-on shoes    Hemoglobin A1C   Date Value Ref Range Status   02/12/2022 7.6 (H) 4.0 - 5.6 % Final     Comment:     ADA Screening Guidelines:  5.7-6.4%  Consistent with prediabetes  >or=6.5%  Consistent with diabetes    High levels of fetal hemoglobin interfere with the HbA1C  assay. Heterozygous hemoglobin variants (HbS, HgC, etc)do  not significantly interfere with this assay.   However, presence of multiple variants may affect accuracy.     08/25/2021 7.8 (H) <5.7 % of total Hgb Final     Comment:     For someone without known diabetes, a hemoglobin A1c  value of 6.5% or greater indicates that they may have   diabetes and this should be confirmed with a follow-up   test.     For someone with known diabetes, a value <7% indicates   that their diabetes is well controlled and a value   greater than or equal to 7% indicates suboptimal   control. A1c targets should be individualized based on   duration of diabetes, age, comorbid conditions, and   other considerations.     Currently, no consensus exists regarding use of  hemoglobin A1c for diagnosis of diabetes for children.         06/01/2021 6.8 (H) 4.0 - 5.6 % Final     Comment:     ADA Screening Guidelines:  5.7-6.4%  Consistent with prediabetes  >or=6.5%  Consistent with diabetes    High  "levels of fetal hemoglobin interfere with the HbA1C  assay. Heterozygous hemoglobin variants (HbS, HgC, etc)do  not significantly interfere with this assay.   However, presence of multiple variants may affect accuracy.       Vitals:    09/12/22 1353   BP: (!) 141/75   Pulse: 79   Height: 5' 10" (1.778 m)   PainSc: 0-No pain   PainLoc: Foot      Past Medical History:   Diagnosis Date    BPH (benign prostatic hyperplasia)     Diabetes mellitus     Hyperlipidemia     Hypertension     Small bowel obstruction        Past Surgical History:   Procedure Laterality Date    ANASTOMOSIS OF ILEUM TO RECTUM  9/9/2019    Procedure: ANASTOMOSIS, COLO-RECTAL;  Surgeon: Fouzia Gardner MD;  Location: [facility];  Service: General;;    CHOLECYSTECTOMY N/A 3/7/2022    Procedure: CHOLECYSTECTOMY;  Surgeon: Fouzia Gardner MD;  Location: [facility];  Service: General;  Laterality: N/A;    COLOSTOMY  6/16/2019    Procedure: CREATION, COLOSTOMY;  Surgeon: Fouzia Gardner MD;  Location: [facility];  Service: General;;    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    Procedure: RELEASE, DUPUYTREN CONTRACTURE;  Surgeon: Slick Baldwin Jr., MD;  Location: [facility] OR;  Service: Orthopedics;  Laterality: Left;    EYE SURGERY      GISELA PROCEDURE  6/16/2019    Procedure: GISELA PROCEDURE;  Surgeon: Fouzia Gardner MD;  Location: [facility];  Service: General;;  Colon resection    LYSIS OF ADHESIONS N/A 3/7/2022    Procedure: LYSIS, ADHESIONS;  Surgeon: Fouzia Gardner MD;  Location: [facility];  Service: General;  Laterality: N/A;    TONSILLECTOMY         Family History   Problem Relation Age of Onset    Heart disease Father     Hypertension Father     Cancer Mother     Diabetes Paternal Aunt        Social History     Socioeconomic History    Marital status:    Tobacco Use    Smoking status: Former     Packs/day: 1.00     Years: 25.00     Pack years: 25.00     Types: Cigarettes     Start date: 1946     Quit date: 1971     " Years since quittin.7    Smokeless tobacco: Never   Substance and Sexual Activity    Alcohol use: Yes     Alcohol/week: 2.0 standard drinks     Types: 2 Glasses of wine per week     Comment: occassionally    Drug use: No    Sexual activity: Yes     Partners: Female     Social Determinants of Health     Financial Resource Strain: Low Risk     Difficulty of Paying Living Expenses: Not hard at all   Food Insecurity: No Food Insecurity    Worried About Running Out of Food in the Last Year: Never true    Ran Out of Food in the Last Year: Never true   Transportation Needs: No Transportation Needs    Lack of Transportation (Medical): No    Lack of Transportation (Non-Medical): No   Physical Activity: Inactive    Days of Exercise per Week: 0 days    Minutes of Exercise per Session: 0 min   Stress: No Stress Concern Present    Feeling of Stress : Not at all   Social Connections: Socially Isolated    Frequency of Communication with Friends and Family: More than three times a week    Frequency of Social Gatherings with Friends and Family: Three times a week    Attends Christian Services: Never    Active Member of Clubs or Organizations: No    Attends Club or Organization Meetings: Never    Marital Status:    Housing Stability: Low Risk     Unable to Pay for Housing in the Last Year: No    Number of Places Lived in the Last Year: 1    Unstable Housing in the Last Year: No       Current Outpatient Medications   Medication Sig Dispense Refill    aspirin (ECOTRIN) 81 MG EC tablet Take 81 mg by mouth once daily.      atorvastatin (LIPITOR) 40 MG tablet TAKE 1 TABLET(40 MG) BY MOUTH EVERY DAY 90 tablet 3    finasteride (PROSCAR) 5 mg tablet TAKE 1 TABLET BY MOUTH EVERY DAY (Patient taking differently: Take 5 mg by mouth every evening.) 90 tablet 1    fluticasone propionate (FLONASE) 50 mcg/actuation nasal spray 2 sprays (100 mcg total) by Each Nostril route once daily. 48 g 3    GARLIC ORAL Take 1 capsule by mouth once  daily.      hydroCHLOROthiazide (HYDRODIURIL) 25 MG tablet Take 1 tablet (25 mg total) by mouth once daily. 90 tablet 3    losartan (COZAAR) 25 MG tablet Take 1 tablet (25 mg total) by mouth once daily. 90 tablet 3    metFORMIN (GLUCOPHAGE) 500 MG tablet TAKE 1 TABLET BY MOUTH TWICE DAILY WITH FOOD (Patient taking differently: Take 500 mg by mouth 2 (two) times daily with meals.) 180 tablet 3    multivitamin capsule Take 1 capsule by mouth once daily.      omega-3 fatty acids/fish oil (FISH OIL-OMEGA-3 FATTY ACIDS) 300-1,000 mg capsule Take 1 capsule by mouth once daily.      tamsulosin (FLOMAX) 0.4 mg Cap Take 0.4 mg by mouth nightly.  3    vitamin D (VITAMIN D3) 1000 units Tab Take 1,000 Units by mouth once daily.       No current facility-administered medications for this visit.       Review of patient's allergies indicates:   Allergen Reactions    Solarcaine [benzocaine-triclosan]     Sulfa (sulfonamide antibiotics)     Sulfamethoxazole     Trimethoprim     Hytrin [terazosin] Rash    Smz-tmp ds [sulfamethoxazole-trimethoprim] Rash         Review of Systems   Constitutional: Negative for chills, fever and malaise/fatigue.   HENT:  Negative for congestion and hearing loss.    Cardiovascular:  Negative for chest pain, claudication and leg swelling.   Respiratory:  Negative for cough and shortness of breath.    Skin:  Negative for itching, nail changes and poor wound healing.   Musculoskeletal:  Positive for muscle cramps. Negative for back pain, joint pain and muscle weakness.   Gastrointestinal:  Negative for nausea and vomiting.   Neurological:  Negative for numbness, paresthesias and weakness.   Psychiatric/Behavioral:  Negative for altered mental status.          Objective:      Physical Exam  Vitals reviewed.   Constitutional:       General: He is not in acute distress.     Appearance: Normal appearance. He is not ill-appearing.   Cardiovascular:      Pulses:           Dorsalis pedis pulses are 0 on the right  side and 0 on the left side.        Posterior tibial pulses are 1+ on the right side and 1+ on the left side.      Comments: Mild pitting edema to lower extremity bilateral.  Multiple telangiectasias noted to the lower extreme bilateral.  Skin temp is warm to foot bilateral.  Slight rubor noted to the feet bilateral in dependency.  No hair growth follow lower extremity.  Musculoskeletal:      Right foot: Normal range of motion. No deformity, bunion, Charcot foot, foot drop or prominent metatarsal heads.      Left foot: Normal range of motion. No deformity, bunion, Charcot foot, foot drop or prominent metatarsal heads.      Comments: Semi-rigid claw toe deformities toes 2-5 bilateral foot.  No pain with range of motion or manual muscle strength testing of the foot and ankle bilateral.    Mild flexible pes planus bilateral foot.   Feet:      Right foot:      Protective Sensation: 10 sites tested.  10 sites sensed.      Skin integrity: No ulcer, blister, skin breakdown, erythema, warmth, callus, dry skin or fissure.      Toenail Condition: Right toenails are normal.      Left foot:      Protective Sensation: 10 sites tested.  10 sites sensed.      Skin integrity: No ulcer, blister, skin breakdown, erythema, warmth, callus, dry skin or fissure.      Toenail Condition: Left toenails are normal.      Comments: Mild fat pad atrophy noted to the foot bilateral.  Skin:     General: Skin is warm.      Capillary Refill: Capillary refill takes less than 2 seconds.      Findings: No ecchymosis or erythema.      Nails: There is no clubbing.   Neurological:      Mental Status: He is alert.             Assessment:       Encounter Diagnoses   Name Primary?    Claw toe, unspecified laterality     Type 2 diabetes mellitus with peripheral vascular disease Yes         Plan:       Husam was seen today for diabetes mellitus and diabetic foot exam.    Diagnoses and all orders for this visit:    Type 2 diabetes mellitus with peripheral  vascular disease  -     US Lower Extremity Arteries Bilateral; Future    Claw toe, unspecified laterality  -     Ambulatory referral/consult to Podiatry      I counseled the patient on his conditions, their implications and medical management.    Shoe inspection. Diabetic Foot Education. Patient reminded of the importance of good nutrition and blood sugar control to help prevent podiatric complications of diabetes. Patient instructed on proper foot hygeine. We discussed wearing proper shoe gear, daily foot inspections, never walking without protective shoe gear, never putting sharp instruments to feet.    Comprehensive annual diabetic foot exam completed today.  Patient found to be low to intermediate risk for diabetic foot complication.      We discussed appropriate sizing shoes in order to prevent any direct pressure to his toes.  We also discussed daily foot checks.      Arterial ultrasound ordered of the lower extremity bilateral to assess for significant stenosis.      RTC within 1 year parents discussed.    A portion of this note was generated by voice recognition software and may contain spelling and grammar errors.

## 2022-09-19 ENCOUNTER — HOSPITAL ENCOUNTER (INPATIENT)
Facility: HOSPITAL | Age: 87
LOS: 1 days | Discharge: HOME | DRG: 390 | End: 2022-09-21
Attending: EMERGENCY MEDICINE | Admitting: STUDENT IN AN ORGANIZED HEALTH CARE EDUCATION/TRAINING PROGRAM
Payer: MEDICARE

## 2022-09-19 DIAGNOSIS — R10.13 EPIGASTRIC ABDOMINAL PAIN: Primary | ICD-10-CM

## 2022-09-19 DIAGNOSIS — K56.609 SBO (SMALL BOWEL OBSTRUCTION): ICD-10-CM

## 2022-09-19 DIAGNOSIS — R11.2 NON-INTRACTABLE VOMITING WITH NAUSEA, UNSPECIFIED VOMITING TYPE: ICD-10-CM

## 2022-09-19 DIAGNOSIS — K56.50 SMALL BOWEL OBSTRUCTION DUE TO ADHESIONS: ICD-10-CM

## 2022-09-19 LAB
ALBUMIN SERPL BCP-MCNC: 3.9 G/DL (ref 3.5–5.2)
ALP SERPL-CCNC: 84 U/L (ref 55–135)
ALT SERPL W/O P-5'-P-CCNC: 18 U/L (ref 10–44)
ANION GAP SERPL CALC-SCNC: 18 MMOL/L (ref 8–16)
AST SERPL-CCNC: 18 U/L (ref 10–40)
BACTERIA #/AREA URNS HPF: ABNORMAL /HPF
BASOPHILS # BLD AUTO: 0.05 K/UL (ref 0–0.2)
BASOPHILS NFR BLD: 0.2 % (ref 0–1.9)
BILIRUB SERPL-MCNC: 1 MG/DL (ref 0.1–1)
BILIRUB UR QL STRIP: NEGATIVE
BUN SERPL-MCNC: 37 MG/DL (ref 10–30)
CALCIUM SERPL-MCNC: 10.2 MG/DL (ref 8.7–10.5)
CHLORIDE SERPL-SCNC: 97 MMOL/L (ref 95–110)
CLARITY UR: CLEAR
CO2 SERPL-SCNC: 24 MMOL/L (ref 23–29)
COLOR UR: YELLOW
CREAT SERPL-MCNC: 1.5 MG/DL (ref 0.5–1.4)
DIFFERENTIAL METHOD: ABNORMAL
EOSINOPHIL # BLD AUTO: 0.1 K/UL (ref 0–0.5)
EOSINOPHIL NFR BLD: 0.2 % (ref 0–8)
ERYTHROCYTE [DISTWIDTH] IN BLOOD BY AUTOMATED COUNT: 13.2 % (ref 11.5–14.5)
EST. GFR  (NO RACE VARIABLE): 44 ML/MIN/1.73 M^2
GLUCOSE SERPL-MCNC: 195 MG/DL (ref 70–110)
GLUCOSE UR QL STRIP: NEGATIVE
HCT VFR BLD AUTO: 51.4 % (ref 40–54)
HGB BLD-MCNC: 17.1 G/DL (ref 14–18)
HGB UR QL STRIP: NEGATIVE
HYALINE CASTS #/AREA URNS LPF: 5 /LPF
IMM GRANULOCYTES # BLD AUTO: 0.07 K/UL (ref 0–0.04)
IMM GRANULOCYTES NFR BLD AUTO: 0.3 % (ref 0–0.5)
KETONES UR QL STRIP: ABNORMAL
LACTATE SERPL-SCNC: 3.5 MMOL/L (ref 0.5–2.2)
LEUKOCYTE ESTERASE UR QL STRIP: ABNORMAL
LIPASE SERPL-CCNC: 19 U/L (ref 4–60)
LYMPHOCYTES # BLD AUTO: 0.7 K/UL (ref 1–4.8)
LYMPHOCYTES NFR BLD: 3.2 % (ref 18–48)
MCH RBC QN AUTO: 29.6 PG (ref 27–31)
MCHC RBC AUTO-ENTMCNC: 33.3 G/DL (ref 32–36)
MCV RBC AUTO: 89 FL (ref 82–98)
MICROSCOPIC COMMENT: ABNORMAL
MONOCYTES # BLD AUTO: 2.2 K/UL (ref 0.3–1)
MONOCYTES NFR BLD: 10.5 % (ref 4–15)
NEUTROPHILS # BLD AUTO: 18.2 K/UL (ref 1.8–7.7)
NEUTROPHILS NFR BLD: 85.6 % (ref 38–73)
NITRITE UR QL STRIP: NEGATIVE
NRBC BLD-RTO: 0 /100 WBC
PH UR STRIP: 5 [PH] (ref 5–8)
PLATELET # BLD AUTO: 180 K/UL (ref 150–450)
PMV BLD AUTO: 11.4 FL (ref 9.2–12.9)
POTASSIUM SERPL-SCNC: 4 MMOL/L (ref 3.5–5.1)
PROT SERPL-MCNC: 7.5 G/DL (ref 6–8.4)
PROT UR QL STRIP: ABNORMAL
RBC # BLD AUTO: 5.77 M/UL (ref 4.6–6.2)
RBC #/AREA URNS HPF: 1 /HPF (ref 0–4)
SODIUM SERPL-SCNC: 139 MMOL/L (ref 136–145)
SP GR UR STRIP: 1.02 (ref 1–1.03)
SQUAMOUS #/AREA URNS HPF: 1 /HPF
URN SPEC COLLECT METH UR: ABNORMAL
UROBILINOGEN UR STRIP-ACNC: NEGATIVE EU/DL
WBC # BLD AUTO: 21.23 K/UL (ref 3.9–12.7)
WBC #/AREA URNS HPF: 3 /HPF (ref 0–5)

## 2022-09-19 PROCEDURE — 25500020 PHARM REV CODE 255: Performed by: EMERGENCY MEDICINE

## 2022-09-19 PROCEDURE — 81000 URINALYSIS NONAUTO W/SCOPE: CPT | Performed by: NURSE PRACTITIONER

## 2022-09-19 PROCEDURE — G0378 HOSPITAL OBSERVATION PER HR: HCPCS

## 2022-09-19 PROCEDURE — 96375 TX/PRO/DX INJ NEW DRUG ADDON: CPT

## 2022-09-19 PROCEDURE — 25000003 PHARM REV CODE 250: Performed by: EMERGENCY MEDICINE

## 2022-09-19 PROCEDURE — 80053 COMPREHEN METABOLIC PANEL: CPT | Performed by: NURSE PRACTITIONER

## 2022-09-19 PROCEDURE — 83690 ASSAY OF LIPASE: CPT | Performed by: NURSE PRACTITIONER

## 2022-09-19 PROCEDURE — 85025 COMPLETE CBC W/AUTO DIFF WBC: CPT | Performed by: NURSE PRACTITIONER

## 2022-09-19 PROCEDURE — 83605 ASSAY OF LACTIC ACID: CPT | Performed by: EMERGENCY MEDICINE

## 2022-09-19 PROCEDURE — 99285 EMERGENCY DEPT VISIT HI MDM: CPT | Mod: 25

## 2022-09-19 PROCEDURE — 63600175 PHARM REV CODE 636 W HCPCS: Performed by: EMERGENCY MEDICINE

## 2022-09-19 PROCEDURE — 96365 THER/PROPH/DIAG IV INF INIT: CPT

## 2022-09-19 RX ORDER — DEXTROSE MONOHYDRATE AND SODIUM CHLORIDE 5; .9 G/100ML; G/100ML
1000 INJECTION, SOLUTION INTRAVENOUS
Status: COMPLETED | OUTPATIENT
Start: 2022-09-19 | End: 2022-09-19

## 2022-09-19 RX ORDER — MORPHINE SULFATE 2 MG/ML
2 INJECTION, SOLUTION INTRAMUSCULAR; INTRAVENOUS
Status: COMPLETED | OUTPATIENT
Start: 2022-09-19 | End: 2022-09-19

## 2022-09-19 RX ORDER — ONDANSETRON 4 MG/1
4 TABLET, ORALLY DISINTEGRATING ORAL
Status: COMPLETED | OUTPATIENT
Start: 2022-09-19 | End: 2022-09-19

## 2022-09-19 RX ADMIN — IOHEXOL 75 ML: 350 INJECTION, SOLUTION INTRAVENOUS at 10:09

## 2022-09-19 RX ADMIN — ONDANSETRON 4 MG: 4 TABLET, ORALLY DISINTEGRATING ORAL at 08:09

## 2022-09-19 RX ADMIN — DEXTROSE AND SODIUM CHLORIDE 1000 ML: 5; .9 INJECTION, SOLUTION INTRAVENOUS at 09:09

## 2022-09-19 RX ADMIN — MORPHINE SULFATE 2 MG: 2 INJECTION, SOLUTION INTRAMUSCULAR; INTRAVENOUS at 09:09

## 2022-09-19 RX ADMIN — PIPERACILLIN AND TAZOBACTAM 4.5 G: 4; .5 INJECTION, POWDER, LYOPHILIZED, FOR SOLUTION INTRAVENOUS; PARENTERAL at 10:09

## 2022-09-19 NOTE — Clinical Note
Diagnosis: SBO (small bowel obstruction) [089084]   Future Attending Provider: KATLIN NOGUEIRA [[id number]]   Admitting Provider:: KATLIN NOGUEIRA [[id number]]

## 2022-09-20 LAB
ALBUMIN SERPL BCP-MCNC: 3 G/DL (ref 3.5–5.2)
ALP SERPL-CCNC: 62 U/L (ref 55–135)
ALT SERPL W/O P-5'-P-CCNC: 23 U/L (ref 10–44)
ANION GAP SERPL CALC-SCNC: 11 MMOL/L (ref 8–16)
AST SERPL-CCNC: 38 U/L (ref 10–40)
BASOPHILS # BLD AUTO: 0.02 K/UL (ref 0–0.2)
BASOPHILS NFR BLD: 0.2 % (ref 0–1.9)
BILIRUB SERPL-MCNC: 1 MG/DL (ref 0.1–1)
BUN SERPL-MCNC: 38 MG/DL (ref 10–30)
CALCIUM SERPL-MCNC: 8.4 MG/DL (ref 8.7–10.5)
CHLORIDE SERPL-SCNC: 99 MMOL/L (ref 95–110)
CO2 SERPL-SCNC: 27 MMOL/L (ref 23–29)
CREAT SERPL-MCNC: 1.5 MG/DL (ref 0.5–1.4)
CTP QC/QA: YES
DIFFERENTIAL METHOD: ABNORMAL
EOSINOPHIL # BLD AUTO: 0 K/UL (ref 0–0.5)
EOSINOPHIL NFR BLD: 0.3 % (ref 0–8)
ERYTHROCYTE [DISTWIDTH] IN BLOOD BY AUTOMATED COUNT: 13.3 % (ref 11.5–14.5)
EST. GFR  (NO RACE VARIABLE): 44 ML/MIN/1.73 M^2
ESTIMATED AVG GLUCOSE: 154 MG/DL (ref 68–131)
GLUCOSE SERPL-MCNC: 225 MG/DL (ref 70–110)
HBA1C MFR BLD: 7 % (ref 4–5.6)
HCT VFR BLD AUTO: 43.8 % (ref 40–54)
HGB BLD-MCNC: 14.3 G/DL (ref 14–18)
IMM GRANULOCYTES # BLD AUTO: 0.02 K/UL (ref 0–0.04)
IMM GRANULOCYTES NFR BLD AUTO: 0.2 % (ref 0–0.5)
LACTATE SERPL-SCNC: 1 MMOL/L (ref 0.5–2.2)
LACTATE SERPL-SCNC: 2 MMOL/L (ref 0.5–2.2)
LYMPHOCYTES # BLD AUTO: 0.6 K/UL (ref 1–4.8)
LYMPHOCYTES NFR BLD: 4.7 % (ref 18–48)
MCH RBC QN AUTO: 29 PG (ref 27–31)
MCHC RBC AUTO-ENTMCNC: 32.6 G/DL (ref 32–36)
MCV RBC AUTO: 89 FL (ref 82–98)
MONOCYTES # BLD AUTO: 1.1 K/UL (ref 0.3–1)
MONOCYTES NFR BLD: 8.8 % (ref 4–15)
NEUTROPHILS # BLD AUTO: 10.7 K/UL (ref 1.8–7.7)
NEUTROPHILS NFR BLD: 85.8 % (ref 38–73)
NRBC BLD-RTO: 0 /100 WBC
PLATELET # BLD AUTO: 177 K/UL (ref 150–450)
PMV BLD AUTO: 12 FL (ref 9.2–12.9)
POCT GLUCOSE: 159 MG/DL (ref 70–110)
POCT GLUCOSE: 161 MG/DL (ref 70–110)
POCT GLUCOSE: 219 MG/DL (ref 70–110)
POCT GLUCOSE: 222 MG/DL (ref 70–110)
POTASSIUM SERPL-SCNC: 4.1 MMOL/L (ref 3.5–5.1)
PROT SERPL-MCNC: 5.8 G/DL (ref 6–8.4)
RBC # BLD AUTO: 4.93 M/UL (ref 4.6–6.2)
SARS-COV-2 RDRP RESP QL NAA+PROBE: NEGATIVE
SODIUM SERPL-SCNC: 137 MMOL/L (ref 136–145)
WBC # BLD AUTO: 12.51 K/UL (ref 3.9–12.7)

## 2022-09-20 PROCEDURE — 83605 ASSAY OF LACTIC ACID: CPT | Performed by: EMERGENCY MEDICINE

## 2022-09-20 PROCEDURE — 80053 COMPREHEN METABOLIC PANEL: CPT | Performed by: EMERGENCY MEDICINE

## 2022-09-20 PROCEDURE — 83036 HEMOGLOBIN GLYCOSYLATED A1C: CPT

## 2022-09-20 PROCEDURE — 99222 1ST HOSP IP/OBS MODERATE 55: CPT | Mod: AI,,, | Performed by: STUDENT IN AN ORGANIZED HEALTH CARE EDUCATION/TRAINING PROGRAM

## 2022-09-20 PROCEDURE — 83605 ASSAY OF LACTIC ACID: CPT | Mod: 91 | Performed by: EMERGENCY MEDICINE

## 2022-09-20 PROCEDURE — U0002 COVID-19 LAB TEST NON-CDC: HCPCS | Performed by: EMERGENCY MEDICINE

## 2022-09-20 PROCEDURE — 85025 COMPLETE CBC W/AUTO DIFF WBC: CPT | Performed by: EMERGENCY MEDICINE

## 2022-09-20 PROCEDURE — 11000001 HC ACUTE MED/SURG PRIVATE ROOM

## 2022-09-20 PROCEDURE — 99900035 HC TECH TIME PER 15 MIN (STAT)

## 2022-09-20 PROCEDURE — 63600175 PHARM REV CODE 636 W HCPCS: Performed by: EMERGENCY MEDICINE

## 2022-09-20 PROCEDURE — 94761 N-INVAS EAR/PLS OXIMETRY MLT: CPT

## 2022-09-20 PROCEDURE — 96376 TX/PRO/DX INJ SAME DRUG ADON: CPT

## 2022-09-20 PROCEDURE — 25500020 PHARM REV CODE 255

## 2022-09-20 PROCEDURE — 96361 HYDRATE IV INFUSION ADD-ON: CPT

## 2022-09-20 PROCEDURE — 36415 COLL VENOUS BLD VENIPUNCTURE: CPT | Performed by: EMERGENCY MEDICINE

## 2022-09-20 PROCEDURE — 36415 COLL VENOUS BLD VENIPUNCTURE: CPT

## 2022-09-20 PROCEDURE — 99222 PR INITIAL HOSPITAL CARE,LEVL II: ICD-10-PCS | Mod: AI,,, | Performed by: STUDENT IN AN ORGANIZED HEALTH CARE EDUCATION/TRAINING PROGRAM

## 2022-09-20 PROCEDURE — 63600175 PHARM REV CODE 636 W HCPCS

## 2022-09-20 PROCEDURE — 27000221 HC OXYGEN, UP TO 24 HOURS

## 2022-09-20 RX ORDER — SODIUM CHLORIDE 0.9 % (FLUSH) 0.9 %
10 SYRINGE (ML) INJECTION
Status: DISCONTINUED | OUTPATIENT
Start: 2022-09-20 | End: 2022-09-22 | Stop reason: HOSPADM

## 2022-09-20 RX ORDER — ONDANSETRON 2 MG/ML
4 INJECTION INTRAMUSCULAR; INTRAVENOUS EVERY 8 HOURS PRN
Status: DISCONTINUED | OUTPATIENT
Start: 2022-09-20 | End: 2022-09-22 | Stop reason: HOSPADM

## 2022-09-20 RX ORDER — MORPHINE SULFATE 4 MG/ML
4 INJECTION, SOLUTION INTRAMUSCULAR; INTRAVENOUS EVERY 4 HOURS PRN
Status: DISCONTINUED | OUTPATIENT
Start: 2022-09-20 | End: 2022-09-22 | Stop reason: HOSPADM

## 2022-09-20 RX ORDER — TALC
6 POWDER (GRAM) TOPICAL NIGHTLY PRN
Status: DISCONTINUED | OUTPATIENT
Start: 2022-09-20 | End: 2022-09-22 | Stop reason: HOSPADM

## 2022-09-20 RX ORDER — GLUCAGON 1 MG
1 KIT INJECTION
Status: DISCONTINUED | OUTPATIENT
Start: 2022-09-20 | End: 2022-09-22 | Stop reason: HOSPADM

## 2022-09-20 RX ORDER — INSULIN ASPART 100 [IU]/ML
0-5 INJECTION, SOLUTION INTRAVENOUS; SUBCUTANEOUS EVERY 6 HOURS PRN
Status: DISCONTINUED | OUTPATIENT
Start: 2022-09-20 | End: 2022-09-22 | Stop reason: HOSPADM

## 2022-09-20 RX ORDER — DEXTROSE MONOHYDRATE, SODIUM CHLORIDE, AND POTASSIUM CHLORIDE 50; 1.49; 4.5 G/1000ML; G/1000ML; G/1000ML
INJECTION, SOLUTION INTRAVENOUS CONTINUOUS
Status: DISCONTINUED | OUTPATIENT
Start: 2022-09-20 | End: 2022-09-22 | Stop reason: HOSPADM

## 2022-09-20 RX ADMIN — DEXTROSE, SODIUM CHLORIDE, AND POTASSIUM CHLORIDE: 5; .45; .15 INJECTION INTRAVENOUS at 08:09

## 2022-09-20 RX ADMIN — DIATRIZOATE MEGLUMINE AND DIATRIZOATE SODIUM 50 ML: 660; 100 LIQUID ORAL; RECTAL at 02:09

## 2022-09-20 RX ADMIN — MORPHINE SULFATE 4 MG: 4 INJECTION INTRAVENOUS at 12:09

## 2022-09-20 RX ADMIN — DEXTROSE, SODIUM CHLORIDE, AND POTASSIUM CHLORIDE: 5; .45; .15 INJECTION INTRAVENOUS at 11:09

## 2022-09-20 RX ADMIN — INSULIN ASPART 2 UNITS: 100 INJECTION, SOLUTION INTRAVENOUS; SUBCUTANEOUS at 05:09

## 2022-09-20 NOTE — H&P
[facility]  General Surgery  History & Physical    Patient Name: Husam Connolly  MRN: [id number]  Admission Date: 9/19/2022  Attending Physician: Pilo Ann MD   Primary Care Provider: Alverto Green MD    Patient information was obtained from patient and ER records.     Subjective:     Chief Complaint/Reason for Admission: Abdominal pain and nausea    History of Present Illness: Husam Connolly is a 89 y.o. male who  has a past medical history of BPH (benign prostatic hyperplasia), Diabetes mellitus, Hyperlipidemia, Hypertension, and Small bowel obstruction. He has an extensive abdominal PSH including open cholecystectomy earlier this year and history of David's procedure followed by colostomy takedown. He presented to the ED yesterday after sudden onset generalized abdominal pain and nausea/vomiting. He presented to the ED where workup revealed imaging concerning for pSBO.      No current facility-administered medications on file prior to encounter.     Current Outpatient Medications on File Prior to Encounter   Medication Sig    aspirin (ECOTRIN) 81 MG EC tablet Take 81 mg by mouth once daily.    atorvastatin (LIPITOR) 40 MG tablet TAKE 1 TABLET(40 MG) BY MOUTH EVERY DAY    finasteride (PROSCAR) 5 mg tablet TAKE 1 TABLET BY MOUTH EVERY DAY (Patient taking differently: Take 5 mg by mouth every evening.)    fluticasone propionate (FLONASE) 50 mcg/actuation nasal spray 2 sprays (100 mcg total) by Each Nostril route once daily.    GARLIC ORAL Take 1 capsule by mouth once daily.    hydroCHLOROthiazide (HYDRODIURIL) 25 MG tablet Take 1 tablet (25 mg total) by mouth once daily.    losartan (COZAAR) 25 MG tablet Take 1 tablet (25 mg total) by mouth once daily.    metFORMIN (GLUCOPHAGE) 500 MG tablet TAKE 1 TABLET BY MOUTH TWICE DAILY WITH FOOD (Patient taking differently: Take 500 mg by mouth 2 (two) times daily with meals.)    multivitamin capsule Take 1 capsule by mouth once daily.     omega-3 fatty acids/fish oil (FISH OIL-OMEGA-3 FATTY ACIDS) 300-1,000 mg capsule Take 1 capsule by mouth once daily.    tamsulosin (FLOMAX) 0.4 mg Cap Take 0.4 mg by mouth nightly.    vitamin D (VITAMIN D3) 1000 units Tab Take 1,000 Units by mouth once daily.       Review of patient's allergies indicates:   Allergen Reactions    Solarcaine [benzocaine-triclosan]     Sulfa (sulfonamide antibiotics)     Sulfamethoxazole     Trimethoprim     Hytrin [terazosin] Rash    Smz-tmp ds [sulfamethoxazole-trimethoprim] Rash       Past Medical History:   Diagnosis Date    BPH (benign prostatic hyperplasia)     Diabetes mellitus     Hyperlipidemia     Hypertension     Small bowel obstruction      Past Surgical History:   Procedure Laterality Date    ANASTOMOSIS OF ILEUM TO RECTUM  9/9/2019    Procedure: ANASTOMOSIS, COLO-RECTAL;  Surgeon: Fouzia Gardner MD;  Location: [facility];  Service: General;;    CHOLECYSTECTOMY N/A 3/7/2022    Procedure: CHOLECYSTECTOMY;  Surgeon: Fouzia Gardner MD;  Location: [facility] OR;  Service: General;  Laterality: N/A;    COLOSTOMY  6/16/2019    Procedure: CREATION, COLOSTOMY;  Surgeon: Fouzia Gardner MD;  Location: 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 OR;  Service: Orthopedics;  Laterality: Left;    EYE SURGERY      GISELA PROCEDURE  6/16/2019    Procedure: GISELA PROCEDURE;  Surgeon: Fouzia Gardner MD;  Location: [facility] OR;  Service: General;;  Colon resection    LYSIS OF ADHESIONS N/A 3/7/2022    Procedure: LYSIS, ADHESIONS;  Surgeon: Fouzia Gardner MD;  Location: [facility] OR;  Service: General;  Laterality: N/A;    TONSILLECTOMY       Family History       Problem Relation (Age of Onset)    Cancer Mother    Diabetes Paternal Aunt    Heart disease Father    Hypertension Father          Tobacco Use    Smoking status:  Former     Packs/day: 1.00     Years: 25.00     Pack years: 25.00     Types: Cigarettes     Start date:      Quit date: 1971     Years since quittin.7    Smokeless tobacco: Never   Substance and Sexual Activity    Alcohol use: Yes     Alcohol/week: 2.0 standard drinks     Types: 2 Glasses of wine per week     Comment: occassionally    Drug use: No    Sexual activity: Yes     Partners: Female     Review of Systems   Constitutional:  Negative for chills and fever.   Respiratory:  Negative for cough and shortness of breath.    Cardiovascular:  Negative for chest pain.   Gastrointestinal:  Positive for abdominal distention, abdominal pain, nausea and vomiting. Negative for diarrhea.   Genitourinary:  Negative for dysuria.   Neurological:  Negative for dizziness and light-headedness.   Objective:     Vital Signs (Most Recent):  Temp: 98.2 °F (36.8 °C) (22 121)  Pulse: 62 (22 121)  Resp: 18 (22 121)  BP: (!) 131/58 (22 121)  SpO2: 99 % (22)   Vital Signs (24h Range):  Temp:  [96.9 °F (36.1 °C)-98.2 °F (36.8 °C)] 98.2 °F (36.8 °C)  Pulse:  [62-94] 62  Resp:  [16-20] 18  SpO2:  [95 %-99 %] 99 %  BP: (120-150)/(56-68) 131/58     Weight: 74.8 kg (164 lb 14.5 oz)  Body mass index is 23.66 kg/m².    Physical Exam  Vitals reviewed.   Constitutional:       Appearance: Normal appearance.   Cardiovascular:      Rate and Rhythm: Normal rate.      Pulses: Normal pulses.   Pulmonary:      Effort: Pulmonary effort is normal.   Abdominal:      General: There is distension.      Palpations: Abdomen is soft.      Comments: Tenderness improved, distension persists. NGT in place with minimal output.   Skin:     General: Skin is warm and dry.   Neurological:      General: No focal deficit present.      Mental Status: He is alert and oriented to person, place, and time.       Significant Labs:  I have reviewed all pertinent lab results within the past 24 hours.  CBC:   Recent Labs   Lab  09/20/22  0433   WBC 12.51   RBC 4.93   HGB 14.3   HCT 43.8      MCV 89   MCH 29.0   MCHC 32.6     BMP:   Recent Labs   Lab 09/20/22  0433   *      K 4.1   CL 99   CO2 27   BUN 38*   CREATININE 1.5*   CALCIUM 8.4*       Significant Diagnostics:  I have reviewed all pertinent imaging results/findings within the past 24 hours.      Assessment/Plan:     * Small bowel obstruction due to adhesions  89 yo male admitted with high grade SBO. Feeling better with decreased pain and nausea s/p NGT placement. Feels rumbling now. We will plan to treat non operatively for now.    - continue NGT to LIWS  - mIVF  - ISS  - continue current pain and nausea regimen  - ambulate frequently  - monitor for return of bowel function      VTE Risk Mitigation (From admission, onward)         Ordered     IP VTE HIGH RISK PATIENT  Once         09/20/22 0115                Alverto Fountain MD  General Surgery  [facility]

## 2022-09-20 NOTE — HPI
Husam Connolly is a 89 y.o. male who  has a past medical history of BPH (benign prostatic hyperplasia), Diabetes mellitus, Hyperlipidemia, Hypertension, and Small bowel obstruction. He has an extensive abdominal PSH including open cholecystectomy earlier this year and history of David's procedure followed by colostomy takedown. He presented to the ED yesterday after sudden onset generalized abdominal pain and nausea/vomiting. He presented to the ED where workup revealed imaging concerning for pSBO.

## 2022-09-20 NOTE — ED PROVIDER NOTES
Encounter Date: 9/19/2022       History     Chief Complaint   Patient presents with    Abdominal Pain    Nausea    Emesis     C/o abdominal cramping w/ nausea and 1 episode emesis en route x 1 day. Reports hx of multiple bowel obstruction w/ ostomy placements, last one is April. States symptoms feel the same. Also reports dizziness and diaphoresis. Last BM yesterday.     Husam Connolly is a 89 y.o. male who  has a past medical history of BPH (benign prostatic hyperplasia), Diabetes mellitus, Hyperlipidemia, Hypertension, and Small bowel obstruction.    The patient presents to the ED due to abdominal pain, N/V.   Patient reports symptoms started earlier today. He describes diffuse abdominal pain associated with several episodes of vomiting.  He has history of multiple abdominal surgeries and prior bowel obstructions. He reports symptoms today are similar in character to prior obstructions. He last had a BM yesterday, no BM or flatulence today. Denies any fever, diarrhea, blood in stool, CP, SOB, or any other concerns. He did not take any medications prior to arrival.     Review of patient's allergies indicates:   Allergen Reactions    Solarcaine [benzocaine-triclosan]     Sulfa (sulfonamide antibiotics)     Sulfamethoxazole     Trimethoprim     Hytrin [terazosin] Rash    Smz-tmp ds [sulfamethoxazole-trimethoprim] Rash     Past Medical History:   Diagnosis Date    BPH (benign prostatic hyperplasia)     Diabetes mellitus     Hyperlipidemia     Hypertension     Small bowel obstruction      Past Surgical History:   Procedure Laterality Date    ANASTOMOSIS OF ILEUM TO RECTUM  9/9/2019    Procedure: ANASTOMOSIS, COLO-RECTAL;  Surgeon: Fouzia Gardner MD;  Location: [facility] OR;  Service: General;;    CHOLECYSTECTOMY N/A 3/7/2022    Procedure: CHOLECYSTECTOMY;  Surgeon: Fouzia Gardner MD;  Location: [facility] OR;  Service: General;  Laterality: N/A;    COLOSTOMY  6/16/2019    Procedure: CREATION, COLOSTOMY;  Surgeon:  Fouzia Gardner MD;  Location: [facility] OR;  Service: General;;    DUPUYTREN CONTRACTURE RELEASE Left 2022    Procedure: RELEASE, DUPUYTREN CONTRACTURE;  Surgeon: Slick Baldwin Jr., MD;  Location: [facility] OR;  Service: Orthopedics;  Laterality: Left;    EYE SURGERY      GISELA PROCEDURE  2019    Procedure: GISELA PROCEDURE;  Surgeon: Fouzia Gardner MD;  Location: [facility] OR;  Service: General;;  Colon resection    LYSIS OF ADHESIONS N/A 3/7/2022    Procedure: LYSIS, ADHESIONS;  Surgeon: Fouzia Gardner MD;  Location: [facility] OR;  Service: General;  Laterality: N/A;    TONSILLECTOMY       Family History   Problem Relation Age of Onset    Heart disease Father     Hypertension Father     Cancer Mother     Diabetes Paternal Aunt      Social History     Tobacco Use    Smoking status: Former     Packs/day: 1.00     Years: 25.00     Pack years: 25.00     Types: Cigarettes     Start date:      Quit date:      Years since quittin.7    Smokeless tobacco: Never   Substance Use Topics    Alcohol use: Yes     Alcohol/week: 2.0 standard drinks     Types: 2 Glasses of wine per week     Comment: occassionally    Drug use: No     Review of Systems   Constitutional:  Negative for chills and fever.   HENT:  Negative for sore throat.    Respiratory:  Negative for shortness of breath.    Cardiovascular:  Negative for chest pain.   Gastrointestinal:  Positive for abdominal pain, nausea and vomiting. Negative for constipation and diarrhea.   Genitourinary:  Negative for dysuria, frequency and urgency.   Musculoskeletal:  Negative for back pain.   Skin:  Negative for rash and wound.   Neurological:  Negative for weakness.   Hematological:  Does not bruise/bleed easily.   Psychiatric/Behavioral:  Negative for agitation, behavioral problems and confusion.      Physical Exam     Initial Vitals   BP Pulse Resp Temp SpO2   22 1921 22 1921 22 19222 19222 2127   137/66 87 20 97.9  °F (36.6 °C) 95 %      MAP       --                Physical Exam    Nursing note and vitals reviewed.  Constitutional: He appears well-developed and well-nourished. He is not diaphoretic. No distress.   HENT:   Head: Normocephalic and atraumatic.   Mouth/Throat: Oropharynx is clear and moist.   Eyes: EOM are normal. Pupils are equal, round, and reactive to light.   Neck: No tracheal deviation present.   Cardiovascular:  Normal rate, regular rhythm, normal heart sounds and intact distal pulses.           Pulmonary/Chest: Breath sounds normal. No stridor. No respiratory distress.   Abdominal: Abdomen is soft and protuberant. He exhibits distension. He exhibits no mass. Bowel sounds are decreased. A surgical scar is present. There is no abdominal tenderness.   Post-op scars, well-healed.   Distended abdomen, no focal tenderness.  There is no rebound and no guarding.   Musculoskeletal:         General: No edema. Normal range of motion.     Neurological: He is alert and oriented to person, place, and time. No cranial nerve deficit or sensory deficit.   Skin: Skin is warm and dry. Capillary refill takes less than 2 seconds. No rash noted.   Psychiatric: He has a normal mood and affect. His behavior is normal. Thought content normal.       ED Course   Procedures  Labs Reviewed   CBC W/ AUTO DIFFERENTIAL - Abnormal; Notable for the following components:       Result Value    WBC 21.23 (*)     Gran # (ANC) 18.2 (*)     Immature Grans (Abs) 0.07 (*)     Lymph # 0.7 (*)     Mono # 2.2 (*)     Gran % 85.6 (*)     Lymph % 3.2 (*)     All other components within normal limits   COMPREHENSIVE METABOLIC PANEL - Abnormal; Notable for the following components:    Glucose 195 (*)     BUN 37 (*)     Creatinine 1.5 (*)     Anion Gap 18 (*)     eGFR 44 (*)     All other components within normal limits   URINALYSIS, REFLEX TO URINE CULTURE - Abnormal; Notable for the following components:    Protein, UA Trace (*)     Ketones, UA Trace (*)      Leukocytes, UA 1+ (*)     All other components within normal limits    Narrative:     Specimen Source->Urine   LACTIC ACID, PLASMA - Abnormal; Notable for the following components:    Lactate (Lactic Acid) 3.5 (*)     All other components within normal limits    Narrative:     LA   critical result(s) called and verbal readback obtained from   Haseeb Acevedo RN by CARLO 09/19/2022 22:02   URINALYSIS MICROSCOPIC - Abnormal; Notable for the following components:    Hyaline Casts, UA 5 (*)     All other components within normal limits    Narrative:     Specimen Source->Urine   LIPASE   LACTIC ACID, PLASMA          Imaging Results              CT Abdomen Pelvis With Contrast (Final result)  Result time 09/19/22 23:07:46      Final result by Inez Hamm MD (09/19/22 23:07:46)                   Impression:      Small-bowel obstruction with transition points described.  Findings are similar compared to the prior exam.    Diverticulosis without evidence of diverticulitis.  Postoperative changes involving the distal colon.    Cholecystectomy.  Intrahepatic mild prominence of the intrahepatic biliary system is noted.  No there is no extrahepatic biliary dilatation.    Bilateral stable renal cysts several are too small to characterize.    Please see above for additional incidental nonacute findings.      Electronically signed by: Inez Hamm  Date:    09/19/2022  Time:    23:07               Narrative:    EXAMINATION:  CT ABDOMEN PELVIS WITH CONTRAST    CLINICAL HISTORY:  Bowel obstruction suspected;    TECHNIQUE:  Low dose axial images, sagittal and coronal reformations were obtained from the lung bases to the pubic symphysis before and following the IV administration of 75 mL of Omnipaque 350 ..    COMPARISON:  06/01/2022 CT abdomen pelvis is    FINDINGS:  Abdomen:    - Lung bases: No infiltrates and no nodules.  Visualized heart is not significantly enlarged.    - Liver: Stable low density in the left lobe of  the liver likely represent small cysts.    - Gallbladder: Cholecystectomy.    - Bile Ducts: There is mild prominence of the intrahepatic biliary ducts without significant prominence of the common bile duct.    - Spleen: Negative.    - Kidneys: Multiple stable cysts are noted in the kidneys bilaterally with dominant cysts appearing simple.  One cyst on the right involving the lower pole has a calcific rim.  Several are too small to characterize bilaterally.  There is symmetric enhancement of the cortices.  Left kidney is relatively atrophic.  No calculi or hydronephrosis .    - Adrenals: Unremarkable.    - Pancreas: No mass or peripancreatic fat stranding.    - Retroperitoneum:  No significant adenopathy.    - Vascular: No abdominal aortic aneurysm.  There is moderate atherosclerosis of the aorta and its imaged branches.    - Abdominal wall:  Unremarkable.    Pelvis:    No pelvic mass, adenopathy, or free fluid.  Bladder is unremarkable.  Prostate is enlarged.  Multiple pelvic calcifications noted.    Bowel/Mesentery: There is small bowel dilatation around the distal small bowel.  There is a transition point around the mid lower abdomen seen on 67 series 601.  Second transition point in the right lower quadrant is around axial image 88 series 2.  The stomach and proximal small bowel is not significantly distended.    The there is diverticulosis of the distal colon with suture line along the descending colon distally.    Bones:  No acute osseous abnormality and no suspicious lytic or blastic lesion. There is spondylosis throughout the spine.  No compression fractures or subluxation.                                       Medications   ondansetron disintegrating tablet 4 mg (4 mg Oral Given 9/19/22 2020)   morphine injection 2 mg (2 mg Intravenous Given 9/19/22 2147)   dextrose 5 % and 0.9 % NaCl infusion (1,000 mLs Intravenous New Bag 9/19/22 2155)   piperacillin-tazobactam 4.5 g in dextrose 5 % 100 mL IVPB (ready to  mix system) (0 g Intravenous Stopped 9/19/22 2332)   iohexoL (OMNIPAQUE 350) injection 75 mL (75 mLs Intravenous Given 9/19/22 2231)     Medical Decision Making:   History:   Old Medical Records: I decided to obtain old medical records.  Old Records Summarized: records from clinic visits and other records.       <> Summary of Records: History of ex-lap in 06/2019 and 09/2019 performed by Dr. Gardner. Cholecystectomy 03/2022.   Multiple recent admissions 02, 03, and 06/2022 for SBO from adhesions from prior surgery.   Initial Assessment:   89 yo M with history of multiple abdominal surgeries and prior admissions for SBO presents to ED with abdominal pain, N/V.  Will obtain labs, CT A/P, and reassess.   Differential Diagnosis:   Differential Diagnosis includes, but is not limited to:  Bowel obstruction, incarcerated/strangulated hernia, ileus, appendicitis, cholecystitis, aspirated foreign body, esophageal food impaction, biliary colic, colitis/diverticulitis, gastroenteritis, esophagitis, hepatitis, pancreatitis, GERD, PUD, constipation, nephrolithiasis, UTI/pyelonephritis.      Clinical Tests:   Lab Tests: Reviewed and Ordered  Radiological Study: Ordered and Reviewed  ED Management:  CT confirmed SBO with multiple transition points. Will place NGT.  Dr. Vargas with General Surgery contacted, case discussed, will admit for bowel rest and further management of SBO.    On re-evaluation, the patient's status has remained stable.  At this time, I believe the patient should be admitted to the hospital for further evaluation and management of SBO.  Surgery service was contacted and the case was discussed.   The consulting physician/team agrees with plan and will admit under their service.   The patient and family were updated with test results, overall impression, and further plan of care. All questions were answered. The patient expressed understanding and agrees with the current plan.               ED Course as of  09/19/22 2344   Mon Sep 19, 2022   2217 WBC and lactate elevated, likely reactive secondary to bowel obstruction.  Patient is afebrile, vitals are normal.  CT A/P is pending.  Will give empiric IV antibiotics. [SS]      ED Course User Index  [SS] Alan Jacobo MD                 Clinical Impression:   Final diagnoses:  [K56.609] SBO (small bowel obstruction)  [R10.13] Epigastric abdominal pain (Primary)  [R11.2] Non-intractable vomiting with nausea, unspecified vomiting type        ED Disposition Condition    Observation Stable                Alan Jacobo MD  09/19/22 9969

## 2022-09-20 NOTE — PLAN OF CARE
Pt is AAOx4. Pt given medications as ordered per MAR. L NG Tube clamped off after gastrograffin given around 2 pm, with x-ray scheduled for 10 pm. Ambulated lay with 1 person assist. Urinal provided and within reach. Blood glucose monitoring maintained. Safety maintained. Bed alarm set. Instructed to use call light for assistance. Will continue to monitor.          Problem: Adult Inpatient Plan of Care  Goal: Optimal Comfort and Wellbeing  Outcome: Ongoing, Progressing     Problem: Diabetes Comorbidity  Goal: Blood Glucose Level Within Targeted Range  Outcome: Ongoing, Progressing     Problem: Fall Injury Risk  Goal: Absence of Fall and Fall-Related Injury  Outcome: Ongoing, Progressing

## 2022-09-20 NOTE — PLAN OF CARE
Pt vitals were maintained,   Problem: Adult Inpatient Plan of Care  Goal: Optimal Comfort and Wellbeing  Outcome: Ongoing, Progressing     Problem: Diabetes Comorbidity  Goal: Blood Glucose Level Within Targeted Range  Outcome: Ongoing, Progressing     Problem: Infection (Intestinal Obstruction)  Goal: Absence of Infection Signs and Symptoms  Outcome: Ongoing, Progressing     Problem: Nausea and Vomiting (Intestinal Obstruction)  Goal: Nausea and Vomiting Relief  Outcome: Ongoing, Progressing     Problem: Pain (Intestinal Obstruction)  Goal: Acceptable Pain Control  Outcome: Ongoing, Progressing     Problem: Impaired Wound Healing  Goal: Optimal Wound Healing  Outcome: Ongoing, Not Progressing

## 2022-09-20 NOTE — ED TRIAGE NOTES
Pt reports lower abd cramps since 1500 today. Pain started while he was sitting down. Pt vomited once en route. Last BM was yesterday. Denies dysuria

## 2022-09-20 NOTE — PLAN OF CARE
CM met with pt, Daughter Nina Rayo [phone] and granddaughter   dx:  SBO - NGT in place.     Independent prior to admit - no dme, no hh - able to navigate the 16 steps in his two story home.    pt has access to a rw and wc -- has a shower with a built in bench and grab bars.     Daughter will transport to home - will spend at least one night with pt.    She lives about 10 - 15 minutes away from pt.   Future Appointments   Date Time Provider Department Center   9/26/2022  1:15 PM [facility] US2 [facility]   10/25/2022  8:30 AM Cristy Olmos PA-C [facility]O [facility]   11/28/2022 10:45 AM Alverto Green MD [facility]   11/29/2022  8:30 AM Daren Lassiter MD [facility] CARDIO [facility]   2/16/2023  2:30 PM Fouzia Gardner MD [facility]        09/20/22 3202   Discharge Planning   Assessment Type Discharge Planning Brief Assessment   Resource/Environmental Concerns none   Support Systems Children;Family members   Equipment Currently Used at Home none  (built in bench in shower;  pt has access to rw, wc)   Current Living Arrangements home/apartment/condo   Patient/Family Anticipates Transition to home   Patient/Family Anticipated Services at Transition none   DME Needed Upon Discharge  none   Discharge Plan A Home;Home with family

## 2022-09-20 NOTE — PROGRESS NOTES
09/20/22 0134   Admission   Initial VN Admission Questions Complete   Communication Issues? None   Shift   Virtual Nurse - Patient Verbalized Approval Of Camera Use   Safety/Activity   Patient Rounds bed in low position;placement of personal items at bedside;visualized patient;call light in patient/parent reach;clutter free environment maintained   Safety Promotion/Fall Prevention Fall Risk reviewed with patient/family;high risk medications identified;medications reviewed;side rails raised x 2;instructed to call staff for mobility   Positioning   Body Position position changed independently;supine   Head of Bed (HOB) Positioning HOB elevated   Pain/Comfort/Sleep   Pain Rating (0-10): Rest 0

## 2022-09-20 NOTE — FIRST PROVIDER EVALUATION
Emergency Department TeleTriage Encounter Note      CHIEF COMPLAINT    Chief Complaint   Patient presents with    Abdominal Pain    Nausea    Emesis     C/o abdominal cramping w/ nausea and 1 episode emesis en route x 1 day. Reports hx of multiple bowel obstruction w/ ostomy placements, last one is April. States symptoms feel the same. Also reports dizziness and diaphoresis. Last BM yesterday.       VITAL SIGNS   Initial Vitals [09/19/22 1921]   BP Pulse Resp Temp SpO2   137/66 87 20 97.9 °F (36.6 °C) --      MAP       --            ALLERGIES    Review of patient's allergies indicates:   Allergen Reactions    Solarcaine [benzocaine-triclosan]     Sulfa (sulfonamide antibiotics)     Sulfamethoxazole     Trimethoprim     Hytrin [terazosin] Rash    Smz-tmp ds [sulfamethoxazole-trimethoprim] Rash       PROVIDER TRIAGE NOTE  This is a teletriage evaluation of a 89 y.o. male presenting to the ED with c/o abdominal pain, nausea and chills.  Pt has history of bowel obstructions and symptoms are similar.       PE: Distressed due to pain.  Nauseated in triage.  VSS.       Plan: labs, imaging    Limited physical exam via telehealth: The patient is awake, alert, answering questions appropriately and is not in respiratory distress. All ED beds are full at present; patient notified of this status.  Patient seen and medically screened by Nurse Practitioner via teletriage.      Initial orders will be placed and care will be transferred to an alternate provider when patient is roomed for a full evaluation. Any additional orders and the final disposition will be determined by that provider.         ORDERS  Labs Reviewed   CBC W/ AUTO DIFFERENTIAL   COMPREHENSIVE METABOLIC PANEL   LIPASE   URINALYSIS, REFLEX TO URINE CULTURE       ED Orders (720h ago, onward)      Start Ordered     Status Ordering Provider    09/19/22 2011 09/19/22 2010  Vital signs  Every 2 hours         Ordered JACOB OLIVO    09/19/22 2011 09/19/22 2010  Diet  NPO  Diet effective now         Ordered JACOB OLIVONatan    09/19/22 2011 09/19/22 2010  Insert peripheral IV  Once         Ordered JACOB OLIVONatan    09/19/22 2011 09/19/22 2010  CBC W/ AUTO DIFFERENTIAL  STAT         Ordered JACOB OLIVONatan    09/19/22 2011 09/19/22 2010  Comp. Metabolic Panel  STAT         Ordered JACOB OLIVONatan    09/19/22 2011 09/19/22 2010  Lipase  STAT         Ordered JACOB OLIVO MARLYN    09/19/22 2011 09/19/22 2010  Urinalysis, Reflex to Urine Culture Urine, Clean Catch  STAT         Ordered JACOB OLIVONatan    09/19/22 2011 09/19/22 2010  CT Abdomen Pelvis With Contrast  1 time imaging         Ordered MANNYWALTLINDAJACOB L.              Virtual Visit Note: The provider triage portion of this emergency department evaluation and documentation was performed via [employer], a HIPAA-compliant telemedicine application, in concert with a tele-presenter in the room. A face to face patient evaluation with one of my colleagues will occur once the patient is placed in an emergency department room.      DISCLAIMER: This note was prepared with [employer] voice recognition transcription software. Garbled syntax, mangled pronouns, and other bizarre constructions may be attributed to that software system.

## 2022-09-20 NOTE — ASSESSMENT & PLAN NOTE
89 yo male admitted with high grade SBO. Feeling better with decreased pain and nausea s/p NGT placement. Feels rumbling now. We will plan to treat non operatively for now.    - continue NGT to LIWS  - mIVF  - ISS  - continue current pain and nausea regimen  - ambulate frequently  - monitor for return of bowel function

## 2022-09-20 NOTE — SUBJECTIVE & OBJECTIVE
No current facility-administered medications on file prior to encounter.     Current Outpatient Medications on File Prior to Encounter   Medication Sig    aspirin (ECOTRIN) 81 MG EC tablet Take 81 mg by mouth once daily.    atorvastatin (LIPITOR) 40 MG tablet TAKE 1 TABLET(40 MG) BY MOUTH EVERY DAY    finasteride (PROSCAR) 5 mg tablet TAKE 1 TABLET BY MOUTH EVERY DAY (Patient taking differently: Take 5 mg by mouth every evening.)    fluticasone propionate (FLONASE) 50 mcg/actuation nasal spray 2 sprays (100 mcg total) by Each Nostril route once daily.    GARLIC ORAL Take 1 capsule by mouth once daily.    hydroCHLOROthiazide (HYDRODIURIL) 25 MG tablet Take 1 tablet (25 mg total) by mouth once daily.    losartan (COZAAR) 25 MG tablet Take 1 tablet (25 mg total) by mouth once daily.    metFORMIN (GLUCOPHAGE) 500 MG tablet TAKE 1 TABLET BY MOUTH TWICE DAILY WITH FOOD (Patient taking differently: Take 500 mg by mouth 2 (two) times daily with meals.)    multivitamin capsule Take 1 capsule by mouth once daily.    omega-3 fatty acids/fish oil (FISH OIL-OMEGA-3 FATTY ACIDS) 300-1,000 mg capsule Take 1 capsule by mouth once daily.    tamsulosin (FLOMAX) 0.4 mg Cap Take 0.4 mg by mouth nightly.    vitamin D (VITAMIN D3) 1000 units Tab Take 1,000 Units by mouth once daily.       Review of patient's allergies indicates:   Allergen Reactions    Solarcaine [benzocaine-triclosan]     Sulfa (sulfonamide antibiotics)     Sulfamethoxazole     Trimethoprim     Hytrin [terazosin] Rash    Smz-tmp ds [sulfamethoxazole-trimethoprim] Rash       Past Medical History:   Diagnosis Date    BPH (benign prostatic hyperplasia)     Diabetes mellitus     Hyperlipidemia     Hypertension     Small bowel obstruction      Past Surgical History:   Procedure Laterality Date    ANASTOMOSIS OF ILEUM TO RECTUM  9/9/2019    Procedure: ANASTOMOSIS, COLO-RECTAL;  Surgeon: Fouzia Gardner MD;  Location: [facility];  Service: General;;    CHOLECYSTECTOMY N/A  3/7/2022    Procedure: CHOLECYSTECTOMY;  Surgeon: Fouzia Gardner MD;  Location: [facility] OR;  Service: General;  Laterality: N/A;    COLOSTOMY  2019    Procedure: CREATION, COLOSTOMY;  Surgeon: Fouzia Gardner MD;  Location: [facility] OR;  Service: General;;    DUPUYTREN CONTRACTURE RELEASE Left 2022    Procedure: RELEASE, DUPUYTREN CONTRACTURE;  Surgeon: Slick Baldwin Jr., MD;  Location: [facility] OR;  Service: Orthopedics;  Laterality: Left;    EYE SURGERY      GISELA PROCEDURE  2019    Procedure: GISELA PROCEDURE;  Surgeon: Fouzia Gardner MD;  Location: [facility] OR;  Service: General;;  Colon resection    LYSIS OF ADHESIONS N/A 3/7/2022    Procedure: LYSIS, ADHESIONS;  Surgeon: Fouzia Gardner MD;  Location: [facility] OR;  Service: General;  Laterality: N/A;    TONSILLECTOMY       Family History       Problem Relation (Age of Onset)    Cancer Mother    Diabetes Paternal Aunt    Heart disease Father    Hypertension Father          Tobacco Use    Smoking status: Former     Packs/day: 1.00     Years: 25.00     Pack years: 25.00     Types: Cigarettes     Start date:      Quit date:      Years since quittin.7    Smokeless tobacco: Never   Substance and Sexual Activity    Alcohol use: Yes     Alcohol/week: 2.0 standard drinks     Types: 2 Glasses of wine per week     Comment: occassionally    Drug use: No    Sexual activity: Yes     Partners: Female     Review of Systems   Constitutional:  Negative for chills and fever.   Respiratory:  Negative for cough and shortness of breath.    Cardiovascular:  Negative for chest pain.   Gastrointestinal:  Positive for abdominal distention, abdominal pain, nausea and vomiting. Negative for diarrhea.   Genitourinary:  Negative for dysuria.   Neurological:  Negative for dizziness and light-headedness.   Objective:     Vital Signs (Most Recent):  Temp: 98.2 °F (36.8 °C) (22 121)  Pulse: 62 (22 121)  Resp: 18 (22)  BP: (!)  131/58 (09/20/22 1211)  SpO2: 99 % (09/20/22 1211)   Vital Signs (24h Range):  Temp:  [96.9 °F (36.1 °C)-98.2 °F (36.8 °C)] 98.2 °F (36.8 °C)  Pulse:  [62-94] 62  Resp:  [16-20] 18  SpO2:  [95 %-99 %] 99 %  BP: (120-150)/(56-68) 131/58     Weight: 74.8 kg (164 lb 14.5 oz)  Body mass index is 23.66 kg/m².    Physical Exam  Vitals reviewed.   Constitutional:       Appearance: Normal appearance.   Cardiovascular:      Rate and Rhythm: Normal rate.      Pulses: Normal pulses.   Pulmonary:      Effort: Pulmonary effort is normal.   Abdominal:      General: There is distension.      Palpations: Abdomen is soft.      Comments: Tenderness improved, distension persists. NGT in place with minimal output.   Skin:     General: Skin is warm and dry.   Neurological:      General: No focal deficit present.      Mental Status: He is alert and oriented to person, place, and time.       Significant Labs:  I have reviewed all pertinent lab results within the past 24 hours.  CBC:   Recent Labs   Lab 09/20/22  0433   WBC 12.51   RBC 4.93   HGB 14.3   HCT 43.8      MCV 89   MCH 29.0   MCHC 32.6     BMP:   Recent Labs   Lab 09/20/22  0433   *      K 4.1   CL 99   CO2 27   BUN 38*   CREATININE 1.5*   CALCIUM 8.4*       Significant Diagnostics:  I have reviewed all pertinent imaging results/findings within the past 24 hours.

## 2022-09-21 ENCOUNTER — PATIENT OUTREACH (OUTPATIENT)
Dept: ADMINISTRATIVE | Facility: OTHER | Age: 87
End: 2022-09-21
Payer: MEDICARE

## 2022-09-21 VITALS
DIASTOLIC BLOOD PRESSURE: 66 MMHG | RESPIRATION RATE: 19 BRPM | TEMPERATURE: 98 F | HEIGHT: 70 IN | SYSTOLIC BLOOD PRESSURE: 145 MMHG | BODY MASS INDEX: 23.95 KG/M2 | WEIGHT: 167.31 LBS | OXYGEN SATURATION: 93 % | HEART RATE: 64 BPM

## 2022-09-21 PROBLEM — K56.50 SMALL BOWEL OBSTRUCTION DUE TO ADHESIONS: Status: RESOLVED | Noted: 2022-02-11 | Resolved: 2022-09-21

## 2022-09-21 PROBLEM — K56.609 SMALL BOWEL OBSTRUCTION: Status: RESOLVED | Noted: 2019-06-13 | Resolved: 2022-09-21

## 2022-09-21 LAB
ALBUMIN SERPL BCP-MCNC: 2.7 G/DL (ref 3.5–5.2)
ANION GAP SERPL CALC-SCNC: 9 MMOL/L (ref 8–16)
BUN SERPL-MCNC: 20 MG/DL (ref 10–30)
CALCIUM SERPL-MCNC: 8.2 MG/DL (ref 8.7–10.5)
CHLORIDE SERPL-SCNC: 105 MMOL/L (ref 95–110)
CO2 SERPL-SCNC: 22 MMOL/L (ref 23–29)
CREAT SERPL-MCNC: 1 MG/DL (ref 0.5–1.4)
EST. GFR  (NO RACE VARIABLE): >60 ML/MIN/1.73 M^2
GLUCOSE SERPL-MCNC: 188 MG/DL (ref 70–110)
MAGNESIUM SERPL-MCNC: 2.2 MG/DL (ref 1.6–2.6)
PHOSPHATE SERPL-MCNC: 1.8 MG/DL (ref 2.7–4.5)
POCT GLUCOSE: 182 MG/DL (ref 70–110)
POCT GLUCOSE: 197 MG/DL (ref 70–110)
POTASSIUM SERPL-SCNC: 4.4 MMOL/L (ref 3.5–5.1)
SODIUM SERPL-SCNC: 136 MMOL/L (ref 136–145)

## 2022-09-21 PROCEDURE — 97162 PT EVAL MOD COMPLEX 30 MIN: CPT

## 2022-09-21 PROCEDURE — 99232 PR SUBSEQUENT HOSPITAL CARE,LEVL II: ICD-10-PCS | Mod: ,,, | Performed by: STUDENT IN AN ORGANIZED HEALTH CARE EDUCATION/TRAINING PROGRAM

## 2022-09-21 PROCEDURE — 97116 GAIT TRAINING THERAPY: CPT

## 2022-09-21 PROCEDURE — 36415 COLL VENOUS BLD VENIPUNCTURE: CPT

## 2022-09-21 PROCEDURE — 27000221 HC OXYGEN, UP TO 24 HOURS

## 2022-09-21 PROCEDURE — 99232 SBSQ HOSP IP/OBS MODERATE 35: CPT | Mod: ,,, | Performed by: STUDENT IN AN ORGANIZED HEALTH CARE EDUCATION/TRAINING PROGRAM

## 2022-09-21 PROCEDURE — 80069 RENAL FUNCTION PANEL: CPT

## 2022-09-21 PROCEDURE — 94761 N-INVAS EAR/PLS OXIMETRY MLT: CPT

## 2022-09-21 PROCEDURE — 83735 ASSAY OF MAGNESIUM: CPT

## 2022-09-21 PROCEDURE — 63600175 PHARM REV CODE 636 W HCPCS

## 2022-09-21 PROCEDURE — 99900035 HC TECH TIME PER 15 MIN (STAT)

## 2022-09-21 PROCEDURE — 11000001 HC ACUTE MED/SURG PRIVATE ROOM

## 2022-09-21 RX ADMIN — DEXTROSE, SODIUM CHLORIDE, AND POTASSIUM CHLORIDE: 5; .45; .15 INJECTION INTRAVENOUS at 05:09

## 2022-09-21 NOTE — PROGRESS NOTES
Future Appointments   Date Time Provider Department Center   9/26/2022  1:15 PM [facility] [place] [facility]   9/29/2022 10:00 AM Alverto Green MD [facility]   10/25/2022  8:30 AM Cristy Olmos PA-C [facility] [place] [facility]   11/28/2022 10:45 AM Alverto Green MD [facility]   11/29/2022  8:30 AM Daren Lassiter MD [facility] [place] [facility]   2/16/2023  2:30 PM Fouzia Gardner MD [facility]

## 2022-09-21 NOTE — PLAN OF CARE
Pt for d/c to home today   No dme, no HH   Pt has transportation to home   Discharging nurse to review d/c meds/instructions   Future Appointments   Date Time Provider Department Center   9/26/2022  1:15 PM [facility]2 [facility] [place] [facility]   9/29/2022 10:00 AM Alverto Green MD [facility]   10/25/2022  8:30 AM Cristy Olmos PA-C [facility]THO [place] [facility]   11/28/2022 10:45 AM Alverto Green MD [facility]   11/29/2022  8:30 AM Daren Lassiter MD [facility] C[facility]IO [place] [facility]   2/16/2023  2:30 PM Fouzia Gardner MD [facility]        09/21/22 1518   Final Note   Assessment Type Final Discharge Note   Anticipated Discharge Disposition Home   What phone number can be called within the next 1-3 days to see how you are doing after discharge? [phone]   Hospital Resources/Appts/Education Provided Appointments scheduled and added to AVS   Post-Acute Status   Discharge Delays None known at this time

## 2022-09-21 NOTE — PROGRESS NOTES
[facility]  General Surgery  Progress Note    Subjective:     History of Present Illness:  Husam Connolly is a 89 y.o. male who  has a past medical history of BPH (benign prostatic hyperplasia), Diabetes mellitus, Hyperlipidemia, Hypertension, and Small bowel obstruction. He has an extensive abdominal PSH including open cholecystectomy earlier this year and history of David's procedure followed by colostomy takedown. He presented to the ED yesterday after sudden onset generalized abdominal pain and nausea/vomiting. He presented to the ED where workup revealed imaging concerning for pSBO.      Post-Op Info:  * No surgery found *         Interval History: NAEON. NGT removed this morning and he has been feeling well. Tolerating a regular diet now and having return of bowel function. AVSS. No acute concerns.     Medications:  Continuous Infusions:   dextrose 5 % and 0.45 % NaCl with KCl 20 mEq 100 mL/hr at 09/21/22 0548     Scheduled Meds:  PRN Meds:dextrose 10%, dextrose 10%, glucagon (human recombinant), influenza, insulin aspart U-100, melatonin, morphine, ondansetron, sodium chloride 0.9%     Review of patient's allergies indicates:   Allergen Reactions    Solarcaine [benzocaine-triclosan]     Sulfa (sulfonamide antibiotics)     Sulfamethoxazole     Trimethoprim     Hytrin [terazosin] Rash    Smz-tmp ds [sulfamethoxazole-trimethoprim] Rash     Objective:     Vital Signs (Most Recent):  Temp: 98.1 °F (36.7 °C) (09/21/22 1154)  Pulse: 64 (09/21/22 1154)  Resp: 19 (09/21/22 1154)  BP: (!) 145/66 (09/21/22 1154)  SpO2: (!) 93 % (09/21/22 1218)   Vital Signs (24h Range):  Temp:  [98 °F (36.7 °C)-99 °F (37.2 °C)] 98.1 °F (36.7 °C)  Pulse:  [59-71] 64  Resp:  [16-19] 19  SpO2:  [93 %-99 %] 93 %  BP: (122-157)/(57-70) 145/66     Weight: 75.9 kg (167 lb 5.3 oz)  Body mass index is 24.01 kg/m².    Intake/Output - Last 3 Shifts         09/19 0700 09/20 0659 09/20 0700 09/21 0659 09/21 0700 09/22 0659     P.O.   240    IV Piggyback 100      Total Intake(mL/kg) 100 (1.3)  240 (3.2)    Urine (mL/kg/hr)  1125 (0.6) 200 (0.4)    Drains  50     Stool  0 0    Total Output  1175 200    Net +100 -1175 +40           Urine Occurrence   1 x    Stool Occurrence  1 x 1 x            Physical Exam  Vitals reviewed.   Constitutional:       Appearance: Normal appearance.   Cardiovascular:      Rate and Rhythm: Normal rate.      Pulses: Normal pulses.   Pulmonary:      Effort: Pulmonary effort is normal.   Abdominal:      General: Abdomen is flat.      Palpations: Abdomen is soft.      Tenderness: There is no abdominal tenderness.   Skin:     General: Skin is warm and dry.   Neurological:      General: No focal deficit present.      Mental Status: He is alert and oriented to person, place, and time.       Significant Labs:  I have reviewed all pertinent lab results within the past 24 hours.  CBC:   Recent Labs   Lab 09/20/22  0433   WBC 12.51   RBC 4.93   HGB 14.3   HCT 43.8      MCV 89   MCH 29.0   MCHC 32.6     BMP:   Recent Labs   Lab 09/21/22  0959   *      K 4.4      CO2 22*   BUN 20   CREATININE 1.0   CALCIUM 8.2*   MG 2.2       Significant Diagnostics:  I have reviewed all pertinent imaging results/findings within the past 24 hours.    Assessment/Plan:     * Small bowel obstruction due to adhesions  89 yo male admitted with high grade SBO, now resolved. NGT removed, tolerating a regular diet, and having return of bowel function. Clinically stable for discharge.    - Discharge this afternoon  - Follow up as needed        Alverto Fountain MD  General Surgery  [facility]

## 2022-09-21 NOTE — ASSESSMENT & PLAN NOTE
89 yo male admitted with high grade SBO, now resolved. NGT removed, tolerating a regular diet, and having return of bowel function. Clinically stable for discharge.    - Discharge this afternoon  - Follow up as needed

## 2022-09-21 NOTE — PLAN OF CARE
Patient seen for physical therapy evaluation.  Patient is at [facility].  Patient performed bed mobility and transfers at Modified independent level, and gait with no AD on level surfaces x 200'.  Patient does not require further PT services at this time.  Discharge Physical Therapy    Problem: Physical Therapy  Goal: Physical Therapy Goal  Description: Patient and family with return demonstration of pacing, energy conservation techniques, and overall safety with mobility.    Outcome: MET

## 2022-09-21 NOTE — PLAN OF CARE
VN note: VN completed AVS and attachments and notified bedside nurse, Caroline. Will cont to be available and intervene prn.

## 2022-09-21 NOTE — SUBJECTIVE & OBJECTIVE
Interval History: NAEON. NGT removed this morning and he has been feeling well. Tolerating a regular diet now and having return of bowel function. AVSS. No acute concerns.     Medications:  Continuous Infusions:   dextrose 5 % and 0.45 % NaCl with KCl 20 mEq 100 mL/hr at 09/21/22 0548     Scheduled Meds:  PRN Meds:dextrose 10%, dextrose 10%, glucagon (human recombinant), influenza, insulin aspart U-100, melatonin, morphine, ondansetron, sodium chloride 0.9%     Review of patient's allergies indicates:   Allergen Reactions    Solarcaine [benzocaine-triclosan]     Sulfa (sulfonamide antibiotics)     Sulfamethoxazole     Trimethoprim     Hytrin [terazosin] Rash    Smz-tmp ds [sulfamethoxazole-trimethoprim] Rash     Objective:     Vital Signs (Most Recent):  Temp: 98.1 °F (36.7 °C) (09/21/22 1154)  Pulse: 64 (09/21/22 1154)  Resp: 19 (09/21/22 1154)  BP: (!) 145/66 (09/21/22 1154)  SpO2: (!) 93 % (09/21/22 1218)   Vital Signs (24h Range):  Temp:  [98 °F (36.7 °C)-99 °F (37.2 °C)] 98.1 °F (36.7 °C)  Pulse:  [59-71] 64  Resp:  [16-19] 19  SpO2:  [93 %-99 %] 93 %  BP: (122-157)/(57-70) 145/66     Weight: 75.9 kg (167 lb 5.3 oz)  Body mass index is 24.01 kg/m².    Intake/Output - Last 3 Shifts         09/19 0700 09/20 0659 09/20 0700 09/21 0659 09/21 0700  09/22 0659    P.O.   240    IV Piggyback 100      Total Intake(mL/kg) 100 (1.3)  240 (3.2)    Urine (mL/kg/hr)  1125 (0.6) 200 (0.4)    Drains  50     Stool  0 0    Total Output  1175 200    Net +100 -1175 +40           Urine Occurrence   1 x    Stool Occurrence  1 x 1 x            Physical Exam  Vitals reviewed.   Constitutional:       Appearance: Normal appearance.   Cardiovascular:      Rate and Rhythm: Normal rate.      Pulses: Normal pulses.   Pulmonary:      Effort: Pulmonary effort is normal.   Abdominal:      General: Abdomen is flat.      Palpations: Abdomen is soft.      Tenderness: There is no abdominal tenderness.   Skin:     General: Skin is warm and dry.    Neurological:      General: No focal deficit present.      Mental Status: He is alert and oriented to person, place, and time.       Significant Labs:  I have reviewed all pertinent lab results within the past 24 hours.  CBC:   Recent Labs   Lab 09/20/22  0433   WBC 12.51   RBC 4.93   HGB 14.3   HCT 43.8      MCV 89   MCH 29.0   MCHC 32.6     BMP:   Recent Labs   Lab 09/21/22  0959   *      K 4.4      CO2 22*   BUN 20   CREATININE 1.0   CALCIUM 8.2*   MG 2.2       Significant Diagnostics:  I have reviewed all pertinent imaging results/findings within the past 24 hours.

## 2022-09-21 NOTE — DISCHARGE SUMMARY
[facility]  General Surgery  Discharge Summary      Patient Name: Husam Connolly  MRN: [id number]  Admission Date: 9/19/2022  Hospital Length of Stay: 1 days  Discharge Date and Time:  09/21/2022 1:24 PM  Attending Physician: Pilo Ann MD   Discharging Provider: Alverto Fountain MD  Primary Care Provider: Alverto Green MD    HPI:   Husma Connolly is a 89 y.o. male who  has a past medical history of BPH (benign prostatic hyperplasia), Diabetes mellitus, Hyperlipidemia, Hypertension, and Small bowel obstruction. He has an extensive abdominal PSH including open cholecystectomy earlier this year and history of David's procedure followed by colostomy takedown. He presented to the ED yesterday after sudden onset generalized abdominal pain and nausea/vomiting. He presented to the ED where workup revealed imaging concerning for pSBO.      * No surgery found *      Indwelling Lines/Drains at time of discharge:   Lines/Drains/Airways     None               Hospital Course: Mr. Connolly was admitted on 9/19 with a partial small bowel obstruction, likely secondary to adhesions given extensive surgical history. He was managed non operatively. He began having return of bowel function on 9/21, his diet was advanced and he tolerated this well. By the afternoon on 9/21 he was medically stable for discharge.       Goals of Care Treatment Preferences:  Code Status: Full Code    Living Will: Yes                  Pending Diagnostic Studies:     None        Final Active Diagnoses:      Problems Resolved During this Admission:    Diagnosis Date Noted Date Resolved POA    PRINCIPAL PROBLEM:  Small bowel obstruction due to adhesions [K56.50] 02/11/2022 09/21/2022 Yes      Discharged Condition: good    Disposition: Home or Self Care    Follow Up:   Follow-up Information     Alverto Green MD Follow up in 2 week(s).    Specialty: Family Medicine  Contact information:  [address]  [place] [address]  [phone]                       Patient Instructions:      Diet diabetic     Notify your health care provider if you experience any of the following:  temperature >100.4     Notify your health care provider if you experience any of the following:  persistent nausea and vomiting or diarrhea     Notify your health care provider if you experience any of the following:  severe uncontrolled pain     Notify your health care provider if you experience any of the following:  difficulty breathing or increased cough     Notify your health care provider if you experience any of the following:  worsening rash     Notify your health care provider if you experience any of the following:  severe persistent headache     Notify your health care provider if you experience any of the following:  persistent dizziness, light-headedness, or visual disturbances     Notify your health care provider if you experience any of the following:  increased confusion or weakness     Activity as tolerated     Medications:  Reconciled Home Medications:      Medication List      CHANGE how you take these medications    finasteride 5 mg tablet  Commonly known as: PROSCAR  TAKE 1 TABLET BY MOUTH EVERY DAY  What changed: when to take this        CONTINUE taking these medications    aspirin 81 MG EC tablet  Commonly known as: ECOTRIN  Take 81 mg by mouth once daily.     atorvastatin 40 MG tablet  Commonly known as: LIPITOR  TAKE 1 TABLET(40 MG) BY MOUTH EVERY DAY     fish oil-omega-3 fatty acids 300-1,000 mg capsule  Take 1 capsule by mouth once daily.     fluticasone propionate 50 mcg/actuation nasal spray  Commonly known as: FLONASE  2 sprays (100 mcg total) by Each Nostril route once daily.     GARLIC ORAL  Take 1 capsule by mouth once daily.     hydroCHLOROthiazide 25 MG tablet  Commonly known as: HYDRODIURIL  Take 1 tablet (25 mg total) by mouth once daily.     losartan 25 MG tablet  Commonly known as: COZAAR  Take 1 tablet  (25 mg total) by mouth once daily.     metFORMIN 500 MG tablet  Commonly known as: GLUCOPHAGE  TAKE 1 TABLET BY MOUTH TWICE DAILY WITH FOOD     multivitamin capsule  Take 1 capsule by mouth once daily.     tamsulosin 0.4 mg Cap  Commonly known as: FLOMAX  Take 0.4 mg by mouth nightly.     vitamin D 1000 units Tab  Commonly known as: VITAMIN D3  Take 1,000 Units by mouth once daily.          Time spent on the discharge of patient: 10 minutes    Alverto Fountain MD  General Surgery  [facility]

## 2022-09-21 NOTE — HOSPITAL COURSE
Mr. Connolly was admitted on 9/19 with a partial small bowel obstruction, likely secondary to adhesions given extensive surgical history. He was managed non operatively. He began having return of bowel function on 9/21, his diet was advanced and he tolerated this well. By the afternoon on 9/21 he was medically stable for discharge.

## 2022-09-21 NOTE — PLAN OF CARE
Plan of care reviewed  Hourly rounding  Pt free of falls  Cob in reach  ABD xray done, dr notified  NG tube clamped

## 2022-09-21 NOTE — PT/OT/SLP EVAL
"Physical Therapy Evaluation & Discharge    Patient Name:  Husam Connolly   MRN:  [id number]    Recommendations:     Discharge Recommendations:  home   Discharge Equipment Recommendations: none   Barriers to discharge: None    Assessment:     Husam Connolly is a 89 y.o. male admitted with a medical diagnosis of Small bowel obstruction due to adhesions.  He presents with the following impairments/functional limitations:  impaired endurance Patient seen for physical therapy evaluation.  Patient is at [facility].  Patient performed bed mobility and transfers at Modified independent level, and gait with no AD on level surfaces x 200'.  Patient does not require further PT services at this time.  Discharge Physical Therapy.    Rehab Prognosis: Good;   Recent Surgery: * No surgery found *      Plan:     Discharge Physical Therapy    Subjective     Chief Complaint: "I do pretty well ... I walked the halls yesterday with my nurse"  Patient/Family Comments/goals: To return home at [facility]  Pain/Comfort:  Pain Rating 1: 0/10  Pain Rating Post-Intervention 1: 0/10    Patients cultural, spiritual, Yarsanism conflicts given the current situation: no    Living Environment:  Patient lives alone in 2 , 1 [address].  Bedroom and Bathroom are downstairs.  Bathroom has a WIS with built in bench and grab bars.  Patient does go upstairs 3-4x/week.  16 steps to 2nd floor with R railing.    Prior to admission, patients level of function was independent with gait and ADLs.  Patient drives, does all grocery shopping, cooking, and IADLs.  Equipment used at home: other (see comments), cane, quad, walker, rolling, wheelchair (Patient has a built in bench in shower, grab bars, and bed rail on bed.).  DME owned (not currently used): none.  Upon discharge, patient will have assistance from family that lives nearby.    Objective:     Communicated with nurse Velazquez prior to session.  Patient found supine with bed alarm, oxygen, peripheral IV  upon PT entry to " room.    General Precautions: Standard, fall   Orthopedic Precautions:N/A   Braces: N/A  Respiratory Status: Nasal cannula, flow 1.5 L/min    Exams:  Cognitive Exam:  Patient is oriented to Person, Place, Time, and Situation  Gross Motor Coordination:  WFL  Postural Exam:  Patient presented with the following abnormalities:    -       Rounded shoulders  -       Forward head  Sensation:    -       Intact  light/touch B LEs grossly  Skin Integrity/Edema:      -       Skin integrity: Visible skin intact  RUE ROM: WFL  RUE Strength: WFL  LUE ROM: Limited 50% in Left Shoulder, otherwise WFL except patient has a Duputryns' contracture L hand  LUE Strength: Deficits: Shoulder limited to 3-/5 in available range.  Elbow and wrist WFL.    RLE ROM: WFL  RLE Strength: 4/5 grossly  LLE ROM: WFL  LLE Strength: 4/5 grossly    Functional Mobility:  Bed Mobility:     Rolling Right: independence  Supine to Sit: independence  Sit to Supine: independence  Transfers:     Sit to Stand:  modified independence with no AD  Gait: x 200' with no AD on level surfaces at independent level, good pace, steady  Balance: Seated EOB:  no LOB, Modified Independent    Standing:  no LOB, no AD required, Modified Independent    Therapeutic Activities and Exercises:  Patient seen for physical therapy evaluation.  Daughter present for evaluation.  Patient is at PLOF.  No assist required for functional mobility.  Patient educated on safety with mobility, pacing, and energy conservation.  Patient and daughter with return demonstration of all education.    AM-PAC 6 CLICK MOBILITY  Total Score:21     Patient left supine with all lines intact, call button in reach, bed alarm on, and nurse notified.    GOALS:   Multidisciplinary Problems       Physical Therapy Goals          Problem: Physical Therapy    Goal Priority Disciplines Outcome Goal Variances Interventions   Physical Therapy Goal     PT, PT/OT Ongoing, Progressing     Description: Patient and family  with return demonstration of pacing, energy conservation techniques, and overall safety with mobility.                         History:     Past Medical History:   Diagnosis Date    BPH (benign prostatic hyperplasia)     Diabetes mellitus     Hyperlipidemia     Hypertension     Small bowel obstruction     Small bowel obstruction 6/13/2019       Past Surgical History:   Procedure Laterality Date    ANASTOMOSIS OF ILEUM TO RECTUM  9/9/2019    Procedure: ANASTOMOSIS, COLO-RECTAL;  Surgeon: Fouzia Gardner MD;  Location: [facility];  Service: General;;    CHOLECYSTECTOMY N/A 3/7/2022    Procedure: CHOLECYSTECTOMY;  Surgeon: Fouzia Gardner MD;  Location: [facility];  Service: General;  Laterality: N/A;    COLOSTOMY  6/16/2019    Procedure: CREATION, COLOSTOMY;  Surgeon: Fouzia Gardner MD;  Location: [facility];  Service: General;;    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    Procedure: RELEASE, DUPUYTREN CONTRACTURE;  Surgeon: Slick Baldwin Jr., MD;  Location: [facility];  Service: Orthopedics;  Laterality: Left;    EYE SURGERY      GISELA PROCEDURE  6/16/2019    Procedure: GISELA PROCEDURE;  Surgeon: Fouzia Gardner MD;  Location: [facility];  Service: General;;  Colon resection    LYSIS OF ADHESIONS N/A 3/7/2022    Procedure: LYSIS, ADHESIONS;  Surgeon: Fouzia Gardner MD;  Location: [facility];  Service: General;  Laterality: N/A;    TONSILLECTOMY         Time Tracking:     PT Received On: 09/21/22  PT Start Time: 1022     PT Stop Time: 1048  PT Total Time (min): 26 min     Billable Minutes: Evaluation 15 and Gait Training 11      09/21/2022

## 2022-09-21 NOTE — PROGRESS NOTES
IP Liaison - Initial Visit Note    Patient: Husam Connolly  MRN:  [id number]  Date of Service:  9/21/2022  Completed by:  SONA Hardy    Reason for Visit   Patient presents with    IP Liaison Initial Visit       RSW met with patient at bedside in order to complete SDOH questionnaire and liaison assessment.  Pt has identified no social barriers to care. Pt is independent and also has his daughter who is available to assist with pt social needs. Per pt, pt is not in need of resources at this time.    The following were addressed during this visit:  - Review SDOH Questions   - Complete patient assessment   - Complete initial visit with patient        Patient Summary     IP Liaison Patient Assessment    General  Level of Caregiver support: Member independent and does not need caregiver assistance  Have you had to make a decision between paying for any of the following in the last 2 months?: None  Transportation means: Family, Self  Assessments  Was the PHQ Depression Screening completed this visit?: No  Was the MUMTAZ-7 Screening completed this visit?: No       SONA Hardy

## 2022-09-21 NOTE — NURSING
AVS reviewed with patient and daughter by staff nurse. Pt verbalized understanding of upcoming appointments and home medications. IV removed. Voiced no concerns. W/c transport to escort pt and belongings to main lobby.

## 2022-09-22 ENCOUNTER — PATIENT OUTREACH (OUTPATIENT)
Dept: ADMINISTRATIVE | Facility: OTHER | Age: 87
End: 2022-09-22
Payer: MEDICARE

## 2022-09-22 NOTE — PROGRESS NOTES
IP Liaison - Final Visit Note    Patient: Husam Connolly  MRN:  [id number]  Date of Service:  9/22/2022  Completed by:  SONA Hardy    Reason for Visit   Patient presents with    IP Liaison Chart Review     Patient discharged from hospital before CONRADOW was able to complete follow-up visit.        Patient Summary     Discharge Date: 9/21/2022  Discharge telephone number/address: [phone] / [address] [address]  Follow up provider: Alverto Green MD  Follow up appointments: 9/29/2022 @ 10:00am  Home Health agency & telephone number: n/a  DME ordered &  name: n/a  Assigned OPCM RN/SW: n/a  Report sent to follow up team (PCP/OPCM) via in basket message: n/a  Community Resources arranged including agency name & contact info: n/a      SONA Hardy

## 2022-09-23 ENCOUNTER — PATIENT OUTREACH (OUTPATIENT)
Dept: ADMINISTRATIVE | Facility: CLINIC | Age: 87
End: 2022-09-23
Payer: MEDICARE

## 2022-09-23 NOTE — PROGRESS NOTES
C3 nurse attempted to contact Husamjaylon Connolly  for a TCC post hospital discharge follow up call. The patient is unable to conduct the call @ this time. The patient requested a callback.

## 2022-09-26 ENCOUNTER — HOSPITAL ENCOUNTER (OUTPATIENT)
Dept: RADIOLOGY | Facility: HOSPITAL | Age: 87
Discharge: HOME | End: 2022-09-26
Attending: PODIATRIST
Payer: MEDICARE

## 2022-09-26 DIAGNOSIS — E11.51 TYPE 2 DIABETES MELLITUS WITH PERIPHERAL VASCULAR DISEASE: ICD-10-CM

## 2022-09-26 PROCEDURE — 93925 LOWER EXTREMITY STUDY: CPT | Mod: TC

## 2022-09-26 PROCEDURE — 93925 LOWER EXTREMITY STUDY: CPT | Mod: 26,,, | Performed by: RADIOLOGY

## 2022-09-26 PROCEDURE — 93925 US LOWER EXTREMITY ARTERIES BILATERAL: ICD-10-PCS | Mod: 26,,, | Performed by: RADIOLOGY

## 2022-09-27 ENCOUNTER — PATIENT MESSAGE (OUTPATIENT)
Dept: PODIATRY | Facility: CLINIC | Age: 87
End: 2022-09-27
Payer: MEDICARE

## 2022-09-27 DIAGNOSIS — E11.51 TYPE 2 DIABETES MELLITUS WITH PERIPHERAL VASCULAR DISEASE: Primary | ICD-10-CM

## 2022-10-24 NOTE — PROGRESS NOTES
Subjective:      Patient ID: Husam Connolly is a 89 y.o. male.  Chief Complaint: Follow-up (L Hand )      HPI  Husam Connolly is a  89 y.o. male presenting today for post op visit.  He is s/p Dupuytren's excision and release left hand now 5 months postop. He continues to do well with no complaints of pain or continued contracture. Pt does note an increase in scar buildup to the palmar region causing a bit a restriction to full digit extension. He otherwise is pleased with his results.    Review of patient's allergies indicates:   Allergen Reactions    Solarcaine [benzocaine-triclosan]     Sulfa (sulfonamide antibiotics)     Sulfamethoxazole     Trimethoprim     Hytrin [terazosin] Rash    Smz-tmp ds [sulfamethoxazole-trimethoprim] Rash         Current Outpatient Medications   Medication Sig Dispense Refill    aspirin (ECOTRIN) 81 MG EC tablet Take 81 mg by mouth once daily.      atorvastatin (LIPITOR) 40 MG tablet TAKE 1 TABLET(40 MG) BY MOUTH EVERY DAY 90 tablet 3    finasteride (PROSCAR) 5 mg tablet TAKE 1 TABLET BY MOUTH EVERY DAY (Patient taking differently: Take 5 mg by mouth every evening.) 90 tablet 1    fluticasone propionate (FLONASE) 50 mcg/actuation nasal spray 2 sprays (100 mcg total) by Each Nostril route once daily. 48 g 3    GARLIC ORAL Take 1 capsule by mouth once daily.      hydroCHLOROthiazide (HYDRODIURIL) 25 MG tablet Take 1 tablet (25 mg total) by mouth once daily. 90 tablet 3    losartan (COZAAR) 25 MG tablet Take 1 tablet (25 mg total) by mouth once daily. 90 tablet 3    metFORMIN (GLUCOPHAGE) 500 MG tablet TAKE 1 TABLET BY MOUTH TWICE DAILY WITH FOOD 180 tablet 3    multivitamin capsule Take 1 capsule by mouth once daily.      omega-3 fatty acids/fish oil (FISH OIL-OMEGA-3 FATTY ACIDS) 300-1,000 mg capsule Take 1 capsule by mouth once daily.      tamsulosin (FLOMAX) 0.4 mg Cap Take 0.4 mg by mouth nightly.  3    vitamin D (VITAMIN D3) 1000 units Tab Take 1,000 Units by mouth once daily.    "    No current facility-administered medications for this visit.       Past Medical History:   Diagnosis Date    BPH (benign prostatic hyperplasia)     Diabetes mellitus     Hyperlipidemia     Hypertension     Small bowel obstruction     Small bowel obstruction 6/13/2019       Past Surgical History:   Procedure Laterality Date    ANASTOMOSIS OF ILEUM TO RECTUM  9/9/2019    Procedure: ANASTOMOSIS, COLO-RECTAL;  Surgeon: Fouzia Gardner MD;  Location: [facility];  Service: General;;    CHOLECYSTECTOMY N/A 3/7/2022    Procedure: CHOLECYSTECTOMY;  Surgeon: Fouzia Gardner MD;  Location: [facility];  Service: General;  Laterality: N/A;    COLOSTOMY  6/16/2019    Procedure: CREATION, COLOSTOMY;  Surgeon: Fouzia Gardner MD;  Location: [facility] OR;  Service: General;;    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    Procedure: RELEASE, DUPUYTREN CONTRACTURE;  Surgeon: Slick Baldwin Jr., MD;  Location: [facility];  Service: Orthopedics;  Laterality: Left;    EYE SURGERY      GISELA PROCEDURE  6/16/2019    Procedure: GISELA PROCEDURE;  Surgeon: Fouzia Gardner MD;  Location: [facility];  Service: General;;  Colon resection    LYSIS OF ADHESIONS N/A 3/7/2022    Procedure: LYSIS, ADHESIONS;  Surgeon: Fouzia Gardner MD;  Location: [facility];  Service: General;  Laterality: N/A;    TONSILLECTOMY       OBJECTIVE:   PHYSICAL EXAM:  Height: 5' 10" (177.8 cm) Weight: 72.6 kg (160 lb)  Vitals:    10/25/22 0757   Weight: 72.6 kg (160 lb)   Height: 5' 10" (1.778 m)   PainSc: 0-No pain       Ortho/SPM Exam  Examination left hand incision looks good well-healed with slight thickening of scar tissue.  Range of motion is full except for slight flexion contracture of the MP joint.  SILT throughout.  BCR to finger pads.  Radial pulse palpable.    RADIOGRAPHS:  No new imaging obtained today    ASSESSMENT/PLAN:     IMPRESSION:  Status post Dupuytren's excision left hand    PLAN:    The patient is very pleased with his " progress. We discussed that scars mature for about a year, and we still have a bit of time to work on flattening the profile and extension restriction over the next few weeks.    OT referral for scar massage x4wks  Continue activity as tolerated    FOLLOW UP:  2 mos for final repeat physical exam

## 2022-10-25 ENCOUNTER — OFFICE VISIT (OUTPATIENT)
Dept: ORTHOPEDICS | Facility: CLINIC | Age: 87
End: 2022-10-25
Payer: MEDICARE

## 2022-10-25 VITALS — WEIGHT: 160 LBS | BODY MASS INDEX: 22.9 KG/M2 | HEIGHT: 70 IN

## 2022-10-25 DIAGNOSIS — M72.0 DUPUYTREN'S CONTRACTURE OF LEFT HAND: Primary | ICD-10-CM

## 2022-10-25 PROCEDURE — 1159F MED LIST DOCD IN RCRD: CPT | Mod: CPTII,S$GLB,, | Performed by: PHYSICIAN ASSISTANT

## 2022-10-25 PROCEDURE — 1160F RVW MEDS BY RX/DR IN RCRD: CPT | Mod: CPTII,S$GLB,, | Performed by: PHYSICIAN ASSISTANT

## 2022-10-25 PROCEDURE — 1160F PR REVIEW ALL MEDS BY PRESCRIBER/CLIN PHARMACIST DOCUMENTED: ICD-10-PCS | Mod: CPTII,S$GLB,, | Performed by: PHYSICIAN ASSISTANT

## 2022-10-25 PROCEDURE — 1159F PR MEDICATION LIST DOCUMENTED IN MEDICAL RECORD: ICD-10-PCS | Mod: CPTII,S$GLB,, | Performed by: PHYSICIAN ASSISTANT

## 2022-10-25 PROCEDURE — 99212 PR OFFICE/OUTPT VISIT, EST, LEVL II, 10-19 MIN: ICD-10-PCS | Mod: S$GLB,,, | Performed by: PHYSICIAN ASSISTANT

## 2022-10-25 PROCEDURE — 99999 PR PBB SHADOW E&M-EST. PATIENT-LVL IV: ICD-10-PCS | Mod: PBBFAC,,, | Performed by: PHYSICIAN ASSISTANT

## 2022-10-25 PROCEDURE — 1101F PT FALLS ASSESS-DOCD LE1/YR: CPT | Mod: CPTII,S$GLB,, | Performed by: PHYSICIAN ASSISTANT

## 2022-10-25 PROCEDURE — 1126F AMNT PAIN NOTED NONE PRSNT: CPT | Mod: CPTII,S$GLB,, | Performed by: PHYSICIAN ASSISTANT

## 2022-10-25 PROCEDURE — 99212 OFFICE O/P EST SF 10 MIN: CPT | Mod: S$GLB,,, | Performed by: PHYSICIAN ASSISTANT

## 2022-10-25 PROCEDURE — 1101F PR PT FALLS ASSESS DOC 0-1 FALLS W/OUT INJ PAST YR: ICD-10-PCS | Mod: CPTII,S$GLB,, | Performed by: PHYSICIAN ASSISTANT

## 2022-10-25 PROCEDURE — 3288F PR FALLS RISK ASSESSMENT DOCUMENTED: ICD-10-PCS | Mod: CPTII,S$GLB,, | Performed by: PHYSICIAN ASSISTANT

## 2022-10-25 PROCEDURE — 3288F FALL RISK ASSESSMENT DOCD: CPT | Mod: CPTII,S$GLB,, | Performed by: PHYSICIAN ASSISTANT

## 2022-10-25 PROCEDURE — 1126F PR PAIN SEVERITY QUANTIFIED, NO PAIN PRESENT: ICD-10-PCS | Mod: CPTII,S$GLB,, | Performed by: PHYSICIAN ASSISTANT

## 2022-10-25 PROCEDURE — 99999 PR PBB SHADOW E&M-EST. PATIENT-LVL IV: CPT | Mod: PBBFAC,,, | Performed by: PHYSICIAN ASSISTANT

## 2022-11-03 ENCOUNTER — CLINICAL SUPPORT (OUTPATIENT)
Dept: REHABILITATION | Facility: HOSPITAL | Age: 87
End: 2022-11-03
Payer: MEDICARE

## 2022-11-03 DIAGNOSIS — M25.642 STIFFNESS OF FINGER JOINT, LEFT: ICD-10-CM

## 2022-11-03 DIAGNOSIS — M72.0 DUPUYTREN'S CONTRACTURE OF LEFT HAND: ICD-10-CM

## 2022-11-03 DIAGNOSIS — R29.898 WEAKNESS OF LEFT HAND: ICD-10-CM

## 2022-11-03 PROCEDURE — 97110 THERAPEUTIC EXERCISES: CPT | Mod: PN

## 2022-11-03 PROCEDURE — 97165 OT EVAL LOW COMPLEX 30 MIN: CPT | Mod: PN

## 2022-11-03 NOTE — PLAN OF CARE
[facility] Occupational Therapy  Initial Evaluation     Date: 11/3/2022  Name: Husam Connolly  Clinic Number: [id number]    Therapy Diagnosis:   Encounter Diagnoses   Name Primary?    Dupuytren's contracture of left hand     Stiffness of finger joint, left     Weakness of left hand      Physician: Cristy Olmos PA-C    Physician Orders: eval and treat  Medical Diagnosis: M72.0 (ICD-10-CM) - Dupuytren's contracture of left hand  Surgical Procedure and Date: L Dupuytren's Release, 4/22/22 / Date of Injury/Onset: several years ago  Evaluation Date: 11/3/22  Insurance Authorization Period Expiration: 12/31/22  Plan of Care Expiration: 12/30/22  Date of Return to MD: 1/5/23  Visit # / Visits authorized: 1 / 1  FOTO: initial eval     Precautions:  Standard    Time In: 11:15 am  Time Out: 12:00 pm  Total Appointment Time (timed & untimed codes): 45 minutes    Subjective     Date of Onset: a few years ago    History of Current Condition/Mechanism of Injury: Husam reports: he underwent L Dupuytren's Release and presents with c/o stiffness in L hand.    Involved Side: Left  Dominant Side: Right  Imaging: none   Prior Therapy: none  Occupation: retired  Working presently: retired  Duties: n/a    Functional Limitations/Social History:    Previous functional status includes: Independent with all ADLs.     Current Functional Status   Home/Living environment: lives alone      Limitation of Functional Status as follows:   ADLs/IADLs:     - Feeding: ind    - Bathing: ind    - Dressing/Grooming: ind    - Driving: ind     Leisure: playing games on ipad/iphone    Pain:  Functional Pain Scale Rating 0-10: Current 0/10, worst 0/10, best 0/10     Patient's Goals for Therapy: to relieve stiffness in hand    Medical History:   Past Medical History:   Diagnosis Date    BPH (benign prostatic hyperplasia)     Diabetes mellitus     Hyperlipidemia     Hypertension     Small bowel obstruction     Small bowel obstruction  6/13/2019       Surgical History:    has a past surgical history that includes Tonsillectomy; Eye surgery; David procedure (6/16/2019); 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    Medications:   has a current medication list which includes the following prescription(s): aspirin, atorvastatin, finasteride, fluticasone propionate, garlic, hydrochlorothiazide, losartan, metformin, multivitamin, fish oil-omega-3 fatty acids, tamsulosin, and vitamin d.    Allergies:   Review of patient's allergies indicates:   Allergen Reactions    Solarcaine [benzocaine-triclosan]     Sulfa (sulfonamide antibiotics)     Sulfamethoxazole     Trimethoprim     Hytrin [terazosin] Rash    Smz-tmp ds [sulfamethoxazole-trimethoprim] Rash          Objective     Observation/Appearance: healed incision over volar RF and palm    Edema. Measured in centimeters.   11/3/2022 11/3/2022      Left Right     Wrist Crease 16.1 16.0     Distal Palmar Crease 19.5 20.1     RF P1 6.2 6.2         Elbow and Wrist ROM. WNLs    Hand ROM. Measured in degrees.   11/3/2022 11/3/2022      Left Right                   Ring:   MP -30/95 0/95                PIP 0/90 0/100                DIP 0/65 0/70               Strength (Dynamometer) and Pinch Strength (Pinch Gauge)  Measured in pounds.   11/3/2022 11/3/2022      Left Right     Rung II 50 65     Carbone Pinch 12 14     3pt Pinch 13 18       Sensation: pt c/o numbness over scar; not formally assessed    CMS Impairment/Limitation/Restriction for FOTO Hand Survey    Therapist reviewed FOTO scores for Husam Connolly on 11/3/2022.   FOTO documents entered into EPIC - see Media section.    Limitation Score: 37%         Treatment     Total Treatment time (time-based codes) separate from Evaluation: 30 minutes    Husam received the following supervised modalities after being  cleared for contradictions for 10 minutes:   -Paraffin to decrease pain and stiffness and increase tissue elasticity    Husam received the following manual therapy techniques for 10 minutes:   -IASTM and STM around scar to decrease stiffness and adhesions in surrounding tissues    Husam received therapeutic exercises for 10 minutes including:  -TGEs and blocking exercises  -golf ball rolling    Patient Education and Home Exercises      Education provided:   - progression of treatment and scar management    Written Home Exercises Provided: Patient instructed to cont prior HEP.  Exercises were reviewed and Husam was able to demonstrate them prior to the end of the session.  Husam demonstrated good  understanding of the education provided. See EMR under Patient Instructions for exercises provided during therapy sessions.     Pt was advised to perform these exercises free of pain, and to stop performing them if pain occurs.    Patient/Family Education: role of OT, goals for OT, scheduling/cancellations - pt verbalized understanding. Discussed insurance limitations with patient.    Assessment     Husam Connolly is a 89 y.o. male referred to outpatient occupational therapy and presents with a medical diagnosis of L Dupuytren's Release.  Patient presents with the following therapy deficits: Decreased ROM, Decreased  strength, Decreased pinch strength, Decreased functional hand use, Joint Stiffness, and Scar Adhesions and demonstrates limitations as described in the chart below. Following medical record review it is determined that pt will benefit from occupational therapy services in order to maximize pain free and/or functional use of left hand. The following goals were discussed with the patient and patient is in agreement with them as to be addressed in the treatment plan. The patient's rehab potential is Excellent.     Anticipated barriers to occupational therapy: none  Pt has no cultural, educational or  language barriers to learning provided.    Profile and History Assessment of Occupational Performance Level of Clinical Decision Making Complexity Score   Occupational Profile:   Husam Connolly is a 89 y.o. male who lives alone and is retired Husam Connolly has difficulty with  ADLs and IADLs as listed previously, which  Affecting hisdaily functional abilities.      Comorbidities:    has a past medical history of BPH (benign prostatic hyperplasia), Diabetes mellitus, Hyperlipidemia, Hypertension, Small bowel obstruction, and Small bowel obstruction.    Medical and Therapy History Review:   Brief               Performance Deficits    Physical:  Joint Mobility  Muscle Power/Strength  Skin Integrity/Scar Formation   Strength    Cognitive:  No Deficits    Psychosocial:    No Deficits     Clinical Decision Making:  low    Assessment Process:  Problem-Focused Assessments    Modification/Need for Assistance:  Not Necessary    Intervention Selection:  Multiple Treatment Options       low  Based on PMHX, co morbidities , data from assessments and functional level of assistance required with task and clinical presentation directly impacting function.         Goals:   The following goals were discussed with the patient and patient is in agreement with them as to be addressed in the treatment plan.   Short term Goals:  1) Initiate HEP  2) Pt will increase AROM of L RF by 5-10 degrees in order to assist with gripping by 4 weeks.  3) Pt will increase functional  strength by 5# in order to A in opening containers for med management or home management tasks by 4 weeks.   4) Patient will be able to achieve less than or equal to 32% on the FOTO, demonstrating overall improved functional ability with upper extremity. (Self-care category)    Long Term Goals:  Goals to be met by discharge:  1) Independent with HEP  2) Pt will increase R RF SCHMIDT to WNLs in order to increase functional use for grasp with home management or work  related tasks by d/c.   3) Patient will be able to achieve less than or equal to 27% on the FOTO, demonstrating overall improved functional ability with upper extremity.  (Self-care category)      Plan   Plan of Care Certification: 11/3/2022 to 12/30/22.     Outpatient Occupational Therapy 2 times weekly for 8 weeks to include the following interventions: Paraffin, Fluidotherapy, and Manual therapy/joint mobilizations, ROM, Strengthening, Therapeutic Exercise, and Ultrasound.      Margaret Boasberg, OT      I CERTIFY THE NEED FOR THESE SERVICES FURNISHED UNDER THIS PLAN OF TREATMENT AND WHILE UNDER MY CARE  Physician's comments:      Physician's Signature: ___________________________________________________

## 2022-11-03 NOTE — PATIENT INSTRUCTIONS
"[facility], OCCUPATIONAL THERAPY  HOME EXERCISE PROGRAM     Complete the following massage for 3 minutes, 3x/day:                                Massage using medium pressure on and around scar in circles, side to side and up and down.    Complete the following exercises for 10 repetitions, 3x/day:       AROM: Isolated IPJ Flexion / Extension ("Hook")   Bend only your middle and end knuckles. Hold 3 seconds.   Straighten your fingers.       AROM: MCP and PIP Flexion / Extension ("Straight Fist")  Bend your bottom and middle knuckles, keeping the tips of your fingers straight.   Try to touch the pads of your fingers on your palm. Hold 3 seconds.   Straighten your fingers.       AROM: Composite Flexion / Extension ("Full Fist")  Bend every joint in your hand into a fist. Hold 3 seconds.   Straighten your fingers.     AROM: PIP (Middle Joint) Flexion / Extension  Pinch bottom knuckle  to prevent bending.   Actively bend middle knuckle until stretch is felt.   Hold 3 seconds. Relax. Straighten finger as far as possible.    AROM: DIP (Tip Joint) Flexion / Extension  Pinch middle knuckle to prevent bending.   Bend end knuckle until stretch is felt. Hold   3 seconds. Relax. Straighten finger as far as possible.       Therapist: Maggie Boasberg, OTR/CHT        "

## 2022-11-07 NOTE — PROGRESS NOTES
"  Occupational Therapy Treatment Note     Date: 11/8/2022  Name: Husam Connolly  Clinic Number: [id number]    Therapy Diagnosis:   Encounter Diagnoses   Name Primary?    Stiffness of finger joint, left Yes    Weakness of left hand      Physician: Cristy Olmos PA-C    Physician Orders: eval and treat  Medical Diagnosis: M72.0 (ICD-10-CM) - Dupuytren's contracture of left hand  Surgical Procedure and Date: L Dupuytren's Release, 4/22/22 / Date of Injury/Onset: several years ago  Evaluation Date: 11/3/22  Insurance Authorization Period Expiration: 12/31/22  Plan of Care Expiration: 12/30/22  Date of Return to MD: 1/5/23  Visit # / Visits authorized: 2/ 1  FOTO: initial eval      Precautions:  Standard     Time In: 9:45 am  Time Out: 10:30 am  Total Appointment Time (timed & untimed codes): 45 minutes    Subjective     Pt reports: "I don't have pain today it's just stiffness."  he was compliant with home exercise program given last session.   Response to previous treatment:pain free   Functional change: tolerated progression of treatment well    Pain: 0/10  Location: left hands      Objective     Objective Measures updated at progress report unless specified.   Observation/Appearance: healed incision over volar RF and palm     Edema. Measured in centimeters.    11/3/2022 11/3/2022         Left Right       Wrist Crease 16.1 16.0       Distal Palmar Crease 19.5 20.1       RF P1 6.2 6.2          Elbow and Wrist ROM. WNLs     Hand ROM. Measured in degrees.    11/3/2022 11/3/2022         Left Right                               Ring:   MP -30/95 0/95                  PIP 0/90 0/100                  DIP 0/65 0/70                       Strength (Dynamometer) and Pinch Strength (Pinch Gauge)  Measured in pounds.    11/3/2022 11/3/2022         Left Right       Rung II 50 65       Carbone Pinch 12 14       3pt Pinch 13 18          Treatment     Husam received the following supervised modalities after being cleared for " contradictions for 10 minutes:   -Paraffin to decrease pain and stiffness and increase tissue elasticity     Husam received the following manual therapy techniques for 10 minutes:   -IASTM and STM with scar extractor around scar to decrease stiffness and adhesions in surrounding tissues     Husam received therapeutic exercises for 25 minutes including:  -TGEs and blocking exercises 20x  -golf ball rolling 2 minutes  -flex bar rolling 2 minutes  -finger spreads 20x  -Finger extensions 20x  -pink putty pancakes and squeezes    Home Exercises and Education Provided     Education provided:   - Progress towards goals   -issued dycem for home scar mobilization    Written Home Exercises Provided: Patient instructed to cont prior HEP.  Exercises were reviewed and Husam was able to demonstrate them prior to the end of the session.  Husam demonstrated good  understanding of the HEP provided.     See EMR under Patient Instructions for exercises provided prior visit.     Assessment   Pt with good participation this date. Pain free throughout session. Pt with dense scar tissue noted during ASTYM and scar mobilization however responded well to treatment. Able to complete all exercises in a pain free range of motion and with good technique. Pt very motivated.     Husam is progressing well towards his goals and there are no updates to goals at this time. Pt prognosis is Good.     Pt will continue to benefit from skilled outpatient occupational therapy to address the deficits listed in the problem list on initial evaluation provide pt/family education and to maximize pt's level of independence in the home and community environment.     Anticipated barriers to occupational therapy: none    Pt's spiritual, cultural and educational needs considered and pt agreeable to plan of care and goals.    Goals:  Short term Goals:  1) Initiate home exercise program -met, ongoing  2) Pt will increase AROM of L RF by 5-10 degrees in order to  assist with gripping by 4 weeks. -Not met, progressing  3) Pt will increase functional  strength by 5# in order to A in opening containers for med management or home management tasks by 4 weeks. -Not met, progressing  4) Patient will be able to achieve less than or equal to 32% on the FOTO, demonstrating overall improved functional ability with upper extremity. (Self-care category) -Not met, progressing     Long Term Goals:  Goals to be met by discharge:  1) Independent with home exercise program -Not met, progressing  2) Pt will increase R RF SCHMIDT to WNLs in order to increase functional use for grasp with home management or work related tasks by d/c. -Not met, progressing  3) Patient will be able to achieve less than or equal to 27% on the FOTO, demonstrating overall improved functional ability with upper extremity.  (Self-care category) -Not met, progressing    Plan   Continue with OT POC  Updates/Grading for next session: progress as able      SIMON Hernadez

## 2022-11-08 ENCOUNTER — CLINICAL SUPPORT (OUTPATIENT)
Dept: REHABILITATION | Facility: HOSPITAL | Age: 87
End: 2022-11-08
Payer: MEDICARE

## 2022-11-08 DIAGNOSIS — R29.898 WEAKNESS OF LEFT HAND: ICD-10-CM

## 2022-11-08 DIAGNOSIS — M25.642 STIFFNESS OF FINGER JOINT, LEFT: Primary | ICD-10-CM

## 2022-11-08 PROCEDURE — 97140 MANUAL THERAPY 1/> REGIONS: CPT | Mod: PN

## 2022-11-08 PROCEDURE — 97018 PARAFFIN BATH THERAPY: CPT | Mod: PN

## 2022-11-08 PROCEDURE — 97110 THERAPEUTIC EXERCISES: CPT | Mod: PN

## 2022-11-10 ENCOUNTER — CLINICAL SUPPORT (OUTPATIENT)
Dept: REHABILITATION | Facility: HOSPITAL | Age: 87
End: 2022-11-10
Payer: MEDICARE

## 2022-11-10 DIAGNOSIS — R29.898 WEAKNESS OF LEFT HAND: ICD-10-CM

## 2022-11-10 DIAGNOSIS — M25.642 STIFFNESS OF FINGER JOINT, LEFT: Primary | ICD-10-CM

## 2022-11-10 PROCEDURE — 97110 THERAPEUTIC EXERCISES: CPT | Mod: PN

## 2022-11-10 PROCEDURE — 97018 PARAFFIN BATH THERAPY: CPT | Mod: PN

## 2022-11-10 PROCEDURE — 97140 MANUAL THERAPY 1/> REGIONS: CPT | Mod: PN

## 2022-11-10 NOTE — PROGRESS NOTES
Occupational Therapy Treatment Note     Date: 11/10/2022  Name: Husam Connolly  Clinic Number: [id number]    Therapy Diagnosis:   Encounter Diagnoses   Name Primary?    Stiffness of finger joint, left Yes    Weakness of left hand      Physician: Cristy Olmos PA-C    Physician Orders: eval and treat  Medical Diagnosis: M72.0 (ICD-10-CM) - Dupuytren's contracture of left hand  Surgical Procedure and Date: L Dupuytren's Release, 4/22/22 / Date of Injury/Onset: several years ago  Evaluation Date: 11/3/22  Insurance Authorization Period Expiration: 12/31/22  Plan of Care Expiration: 12/30/22  Date of Return to MD: 1/5/23  Visit # / Visits authorized: 3 / 11  FOTO: initial eval      Precautions:  Standard     Time In: 10:30 am  Time Out: 11:15 am  Total Appointment Time (timed & untimed codes): 45 minutes    Subjective     Pt reports: his scar feels softer  he was compliant with home exercise program given last session.   Response to previous treatment:pain free   Functional change: tolerated progression of treatment well    Pain: 0/10  Location: left hands      Objective     Objective Measures updated at progress report unless specified.   Observation/Appearance: healed incision over volar RF and palm     Edema. Measured in centimeters.    11/3/2022 11/3/2022         Left Right       Wrist Crease 16.1 16.0       Distal Palmar Crease 19.5 20.1       RF P1 6.2 6.2          Elbow and Wrist ROM. WNLs     Hand ROM. Measured in degrees.    11/3/2022 11/3/2022         Left Right                               Ring:   MP -30/95 0/95                  PIP 0/90 0/100                  DIP 0/65 0/70                       Strength (Dynamometer) and Pinch Strength (Pinch Gauge)  Measured in pounds.    11/3/2022 11/3/2022         Left Right       Rung II 50 65       Carbone Pinch 12 14       3pt Pinch 13 18          Treatment     Husam received the following supervised modalities after being cleared for contradictions for 10  minutes:   -Paraffin to decrease pain and stiffness and increase tissue elasticity     Husam received the following manual therapy techniques for 10 minutes:   -IASTM and STM with scar extractor around scar to decrease stiffness and adhesions in surrounding tissues     Husam received therapeutic exercises for 25 minutes including:  -TGEs and blocking exercises 20x  -golf ball rolling 2 minutes  -red flex bar smiles, frowns, twists and rolls x 20 reps each  -finger spreads 20x  -Finger extensions 20x  -pink putty pancakes, squeezes and dowel presses  -handmaster finger extension with yellow band x 20 reps    Home Exercises and Education Provided     Education provided:   - Progress towards goals   -issued dycem for home scar mobilization    Written Home Exercises Provided: Patient instructed to cont prior HEP.  Exercises were reviewed and Husam was able to demonstrate them prior to the end of the session.  Husam demonstrated good  understanding of the HEP provided.     See EMR under Patient Instructions for exercises provided prior visit.     Assessment   Pt with good participation this date. Treatment focused on scar management and strengthening exercises. Pt tolerated session without difficulty. Able to complete all exercises in a pain free range of motion and with good technique. Pt very motivated.     Husam is progressing well towards his goals and there are no updates to goals at this time. Pt prognosis is Good.     Pt will continue to benefit from skilled outpatient occupational therapy to address the deficits listed in the problem list on initial evaluation provide pt/family education and to maximize pt's level of independence in the home and community environment.     Anticipated barriers to occupational therapy: none    Pt's spiritual, cultural and educational needs considered and pt agreeable to plan of care and goals.    Goals:  Short term Goals:  1) Initiate home exercise program -met, ongoing  2) Pt  will increase AROM of L RF by 5-10 degrees in order to assist with gripping by 4 weeks. -Not met, progressing  3) Pt will increase functional  strength by 5# in order to A in opening containers for med management or home management tasks by 4 weeks. -Not met, progressing  4) Patient will be able to achieve less than or equal to 32% on the FOTO, demonstrating overall improved functional ability with upper extremity. (Self-care category) -Not met, progressing     Long Term Goals:  Goals to be met by discharge:  1) Independent with home exercise program -Not met, progressing  2) Pt will increase R RF SCHMIDT to WNLs in order to increase functional use for grasp with home management or work related tasks by d/c. -Not met, progressing  3) Patient will be able to achieve less than or equal to 27% on the FOTO, demonstrating overall improved functional ability with upper extremity.  (Self-care category) -Not met, progressing    Plan   Continue with OT POC  Updates/Grading for next session: progress as able      Margaret Boasberg, OT

## 2022-11-14 ENCOUNTER — LAB VISIT (OUTPATIENT)
Dept: LAB | Facility: HOSPITAL | Age: 87
End: 2022-11-14
Attending: FAMILY MEDICINE
Payer: MEDICARE

## 2022-11-14 DIAGNOSIS — N18.32 TYPE 2 DIABETES MELLITUS WITH STAGE 3B CHRONIC KIDNEY DISEASE, WITHOUT LONG-TERM CURRENT USE OF INSULIN: ICD-10-CM

## 2022-11-14 DIAGNOSIS — E11.22 TYPE 2 DIABETES MELLITUS WITH STAGE 3B CHRONIC KIDNEY DISEASE, WITHOUT LONG-TERM CURRENT USE OF INSULIN: ICD-10-CM

## 2022-11-14 LAB
ANION GAP SERPL CALC-SCNC: 14 MMOL/L (ref 8–16)
BUN SERPL-MCNC: 22 MG/DL (ref 10–30)
CALCIUM SERPL-MCNC: 9.6 MG/DL (ref 8.7–10.5)
CHLORIDE SERPL-SCNC: 103 MMOL/L (ref 95–110)
CO2 SERPL-SCNC: 22 MMOL/L (ref 23–29)
CREAT SERPL-MCNC: 1.4 MG/DL (ref 0.5–1.4)
EST. GFR  (NO RACE VARIABLE): 47 ML/MIN/1.73 M^2
ESTIMATED AVG GLUCOSE: 166 MG/DL (ref 68–131)
GLUCOSE SERPL-MCNC: 182 MG/DL (ref 70–110)
HBA1C MFR BLD: 7.4 % (ref 4–5.6)
POTASSIUM SERPL-SCNC: 4 MMOL/L (ref 3.5–5.1)
SODIUM SERPL-SCNC: 139 MMOL/L (ref 136–145)

## 2022-11-14 PROCEDURE — 83036 HEMOGLOBIN GLYCOSYLATED A1C: CPT | Performed by: FAMILY MEDICINE

## 2022-11-14 PROCEDURE — 36415 COLL VENOUS BLD VENIPUNCTURE: CPT | Performed by: FAMILY MEDICINE

## 2022-11-14 PROCEDURE — 80048 BASIC METABOLIC PNL TOTAL CA: CPT | Performed by: FAMILY MEDICINE

## 2022-11-15 ENCOUNTER — CLINICAL SUPPORT (OUTPATIENT)
Dept: REHABILITATION | Facility: HOSPITAL | Age: 87
End: 2022-11-15
Payer: MEDICARE

## 2022-11-15 DIAGNOSIS — R29.898 WEAKNESS OF LEFT HAND: ICD-10-CM

## 2022-11-15 DIAGNOSIS — M25.642 STIFFNESS OF FINGER JOINT, LEFT: Primary | ICD-10-CM

## 2022-11-15 PROCEDURE — 97110 THERAPEUTIC EXERCISES: CPT | Mod: PN

## 2022-11-15 PROCEDURE — 97140 MANUAL THERAPY 1/> REGIONS: CPT | Mod: PN

## 2022-11-15 PROCEDURE — 97018 PARAFFIN BATH THERAPY: CPT | Mod: PN,59

## 2022-11-15 NOTE — PROGRESS NOTES
Occupational Therapy Treatment Note     Date: 11/15/2022  Name: Husam Connolly  [facility] Number: [id number]    Therapy Diagnosis:   Encounter Diagnoses   Name Primary?    Stiffness of finger joint, left Yes    Weakness of left hand      Physician: Cristy Olmos PA-C    Physician Orders: eval and treat  Medical Diagnosis: M72.0 (ICD-10-CM) - Dupuytren's contracture of left hand  Surgical Procedure and Date: L Dupuytren's Release, 4/22/22 / Date of Injury/Onset: several years ago  Evaluation Date: 11/3/22  Insurance Authorization Period Expiration: 12/31/22  Plan of Care Expiration: 12/30/22  Date of Return to MD: 1/5/23  Visit # / Visits authorized: 4 / 11  FOTO: initial eval      Precautions:  Standard     Time In: 10:30 am  Time Out: 11:15 am  Total Appointment Time (timed & untimed codes): 45 minutes    Subjective     Pt reports: his hand does not feel as stiff. He states the stiffness is more in his finger than his palm now.  he was compliant with home exercise program given last session.   Response to previous treatment:pain free   Functional change: tolerated progression of treatment well    Pain: 0/10  Location: left hands      Objective     Objective Measures updated at progress report unless specified.   Observation/Appearance: healed incision over volar RF and palm     Edema. Measured in centimeters.    11/3/2022 11/3/2022  11/15/22       Left Right  Left     Wrist Crease 16.1 16.0  16.0     Distal Palmar Crease 19.5 20.1  19.9     RF P1 6.2 6.2  6.0        Elbow and Wrist ROM. WNLs     Hand ROM. Measured in degrees.    11/3/2022 11/3/2022  11/15/22       Left Right  Left                             Ring:   MP -30/95 0/95  -20/95                PIP 0/90 0/100  0/95                DIP 0/65 0/70  0/70                     Strength (Dynamometer) and Pinch Strength (Pinch Gauge)  Measured in pounds.    11/3/2022 11/3/2022 11/15/22        Left Right  Left     Rung II 50 65  50     Carbone Pinch 12 14  12      3pt Pinch 13 18  10        Treatment     Husam received the following supervised modalities after being cleared for contradictions for 10 minutes:   -Paraffin to decrease pain and stiffness and increase tissue elasticity     Husam received the following manual therapy techniques for 10 minutes:   -IASTM and STM with scar extractor around scar to decrease stiffness and adhesions in surrounding tissues     Husam received therapeutic exercises for 25 minutes including:  -TGEs and blocking exercises 20x  -golf ball rolling 2 minutes  -red flex bar smiles, frowns, twists and rolls x 30 reps each  -finger spreads 20x  -Finger extensions 20x  -pink putty pancakes, squeezes and dowel presses  -blue digi-extender finger extension x 30 reps    Home Exercises and Education Provided     Education provided:   - Progress towards goals   -issued dycem for home scar mobilization    Written Home Exercises Provided: Patient instructed to cont prior HEP.  Exercises were reviewed and Husam was able to demonstrate them prior to the end of the session.  Husam demonstrated good  understanding of the HEP provided.     See EMR under Patient Instructions for exercises provided prior visit.     Assessment   Pt with good participation this date. Treatment focused on scar management and strengthening exercises. Pt tolerated session without difficulty. He demonstrates increased ROM. Plan to progress as tolerated.    Husam is progressing well towards his goals and there are no updates to goals at this time. Pt prognosis is Good.     Pt will continue to benefit from skilled outpatient occupational therapy to address the deficits listed in the problem list on initial evaluation provide pt/family education and to maximize pt's level of independence in the home and community environment.     Anticipated barriers to occupational therapy: none    Pt's spiritual, cultural and educational needs considered and pt agreeable to plan of care and  goals.    Goals:  Short term Goals:  1) Initiate home exercise program -met, ongoing  2) Pt will increase AROM of L RF by 5-10 degrees in order to assist with gripping by 4 weeks. -Not met, progressing  3) Pt will increase functional  strength by 5# in order to A in opening containers for med management or home management tasks by 4 weeks. -Not met, progressing  4) Patient will be able to achieve less than or equal to 32% on the FOTO, demonstrating overall improved functional ability with upper extremity. (Self-care category) -Not met, progressing     Long Term Goals:  Goals to be met by discharge:  1) Independent with home exercise program -Not met, progressing  2) Pt will increase R RF SCHMIDT to WNLs in order to increase functional use for grasp with home management or work related tasks by d/c. -Not met, progressing  3) Patient will be able to achieve less than or equal to 27% on the FOTO, demonstrating overall improved functional ability with upper extremity.  (Self-care category) -Not met, progressing    Plan   Continue with OT POC  Updates/Grading for next session: progress as able      Margaret Boasberg, OT

## 2022-11-17 ENCOUNTER — CLINICAL SUPPORT (OUTPATIENT)
Dept: REHABILITATION | Facility: HOSPITAL | Age: 87
End: 2022-11-17
Payer: MEDICARE

## 2022-11-17 DIAGNOSIS — M25.642 STIFFNESS OF FINGER JOINT, LEFT: Primary | ICD-10-CM

## 2022-11-17 DIAGNOSIS — R29.898 WEAKNESS OF LEFT HAND: ICD-10-CM

## 2022-11-17 PROCEDURE — 97035 APP MDLTY 1+ULTRASOUND EA 15: CPT | Mod: PN

## 2022-11-17 PROCEDURE — 97140 MANUAL THERAPY 1/> REGIONS: CPT | Mod: PN

## 2022-11-17 PROCEDURE — L3913 HFO W/O JOINTS CF: HCPCS | Mod: PN

## 2022-11-17 PROCEDURE — 97110 THERAPEUTIC EXERCISES: CPT | Mod: PN

## 2022-11-17 NOTE — PROGRESS NOTES
"  Occupational Therapy Treatment Note     Date: 11/17/2022  Name: Husam Connolly  Clinic Number: [id number]    Therapy Diagnosis:   Encounter Diagnoses   Name Primary?    Stiffness of finger joint, left Yes    Weakness of left hand      Physician: Cristy Olmos PA-C    Physician Orders: eval and treat  Medical Diagnosis: M72.0 (ICD-10-CM) - Dupuytren's contracture of left hand  Surgical Procedure and Date: L Dupuytren's Release, 4/22/22 / Date of Injury/Onset: several years ago  Evaluation Date: 11/3/22  Insurance Authorization Period Expiration: 12/31/22  Plan of Care Expiration: 12/30/22  Date of Return to MD: 1/5/23  Visit # / Visits authorized: 4 / 11  FOTO: initial eval      Precautions:  Standard     Time In: 1215  Time Out: 1300  Total Appointment Time (timed & untimed codes): 45 minutes    Subjective     Pt reports: He reports he's made great progress since starting therapy. "Finger is getting straighter."  he was compliant with home exercise program given last session.   Response to previous treatment:pain free   Functional change: tolerated progression of treatment well    Pain: 0/10  Location: left hands      Objective     Objective Measures updated at progress report unless specified.   Observation/Appearance: healed incision over volar RF and palm     Edema. Measured in centimeters.    11/3/2022 11/3/2022  11/15/22       Left Right  Left     Wrist Crease 16.1 16.0  16.0     Distal Palmar Crease 19.5 20.1  19.9     RF P1 6.2 6.2  6.0        Elbow and Wrist ROM. WNLs     Hand ROM. Measured in degrees.    11/3/2022 11/3/2022  11/15/22       Left Right  Left                             Ring:   MP -30/95 0/95  -20/95                PIP 0/90 0/100  0/95                DIP 0/65 0/70  0/70                     Strength (Dynamometer) and Pinch Strength (Pinch Gauge)  Measured in pounds.    11/3/2022 11/3/2022 11/15/22        Left Right  Left     Rung II 50 65  50     Carbone Pinch 12 14  12     3pt Pinch 13 " 18  10        Treatment     Husam received the following supervised modalities after being cleared for contradictions for 5 minutes:   -Paraffin to decrease pain and stiffness and increase tissue elasticity    Husam received the following direct contact modalities after being cleared for contradictions for 8 minutes:   - ultrasound to dense volar scars to increase blood flow, circulation, tissue elasticity, pain management and for wound/scar management for 3.0 Mhz, Intensity 1.0 w/cm2 at 100% duty cycle.       Husam received the following manual therapy techniques for 8 minutes:   -IASTM and STM with scar extractor around scar to decrease stiffness and adhesions in surrounding tissues     Husam received therapeutic exercises for 9 minutes including:  -TGEs and blocking exercises 20x  -golf ball rolling 2 minutes  -red flex bar smiles, frowns, twists and rolls x 30 reps each  -finger spreads 20x  -Finger extensions 20x  -pink putty pancakes, squeezes and dowel presses  -blue digi-extender finger extension x 30 reps    Husam received Orthosis Management for 10 minutes:  - Fabricated hand based digit extension splint for pt to wear during sleep and intermittently during the day for a low load extension stretch to promote digit extension following Dupuytren's contracture release     Home Exercises and Education Provided     Education provided:   - Progress towards goals   -issued dycem for home scar mobilization    Written Home Exercises Provided: Patient instructed to cont prior HEP.  Exercises were reviewed and Husam was able to demonstrate them prior to the end of the session.  Husam demonstrated good  understanding of the HEP provided.     See EMR under Patient Instructions for exercises provided prior visit.     Assessment     Pt tolerated session well including manual scar management therapies. He reports greater range of motion and trace to no pain. He reports good fit of his digit extension orthosis. He  is motivated in therapy sessions and he is progressing well.     Husam is progressing well towards his goals and there are no updates to goals at this time. Pt prognosis is Good.     Pt will continue to benefit from skilled outpatient occupational therapy to address the deficits listed in the problem list on initial evaluation provide pt/family education and to maximize pt's level of independence in the home and community environment.     Anticipated barriers to occupational therapy: none    Pt's spiritual, cultural and educational needs considered and pt agreeable to plan of care and goals.    Goals:  Short term Goals:  1) Initiate home exercise program -met, ongoing  2) Pt will increase AROM of L RF by 5-10 degrees in order to assist with gripping by 4 weeks. -Not met, progressing  3) Pt will increase functional  strength by 5# in order to A in opening containers for med management or home management tasks by 4 weeks. -Not met, progressing  4) Patient will be able to achieve less than or equal to 32% on the FOTO, demonstrating overall improved functional ability with upper extremity. (Self-care category) -Not met, progressing     Long Term Goals:  Goals to be met by discharge:  1) Independent with home exercise program -Not met, progressing  2) Pt will increase R RF SCHMIDT to WNLs in order to increase functional use for grasp with home management or work related tasks by d/c. -Not met, progressing  3) Patient will be able to achieve less than or equal to 27% on the FOTO, demonstrating overall improved functional ability with upper extremity.  (Self-care category) -Not met, progressing    Plan   Continue with OT POC  Updates/Grading for next session: progress as able      Lesley Barajas, OT

## 2022-11-22 ENCOUNTER — CLINICAL SUPPORT (OUTPATIENT)
Dept: REHABILITATION | Facility: HOSPITAL | Age: 87
End: 2022-11-22
Payer: MEDICARE

## 2022-11-22 DIAGNOSIS — M25.642 STIFFNESS OF FINGER JOINT, LEFT: Primary | ICD-10-CM

## 2022-11-22 DIAGNOSIS — R29.898 WEAKNESS OF LEFT HAND: ICD-10-CM

## 2022-11-22 PROCEDURE — 97018 PARAFFIN BATH THERAPY: CPT | Mod: 59,PN

## 2022-11-22 PROCEDURE — 97110 THERAPEUTIC EXERCISES: CPT | Mod: PN

## 2022-11-22 PROCEDURE — 97140 MANUAL THERAPY 1/> REGIONS: CPT | Mod: PN

## 2022-11-22 NOTE — PROGRESS NOTES
Occupational Therapy Treatment Note     Date: 11/22/2022  Name: Husam Connolly  Clinic Number: [id number]    Therapy Diagnosis:   Encounter Diagnoses   Name Primary?    Stiffness of finger joint, left Yes    Weakness of left hand      Physician: Cristy Olmos PA-C    Physician Orders: eval and treat  Medical Diagnosis: M72.0 (ICD-10-CM) - Dupuytren's contracture of left hand  Surgical Procedure and Date: L Dupuytren's Release, 4/22/22 / Date of Injury/Onset: several years ago  Evaluation Date: 11/3/22  Insurance Authorization Period Expiration: 12/31/22  Plan of Care Expiration: 12/30/22  Date of Return to MD: 1/5/23  Visit # / Visits authorized: 6 / 11  FOTO: initial eval      Precautions:  Standard     Time In: 10:30 am  Time Out: 11:15 am  Total Appointment Time (timed & untimed codes): 45 minutes    Subjective     Pt reports: he has more flexibility in scar and in ROM  he was compliant with home exercise program given last session.   Response to previous treatment:pain free   Functional change: tolerated progression of treatment well    Pain: 0/10  Location: left hand     Objective      Observation/Appearance: healed incision over volar RF and palm     Edema. Measured in centimeters.    11/3/2022 11/3/2022  11/15/22       Left Right  Left     Wrist Crease 16.1 16.0  16.0     Distal Palmar Crease 19.5 20.1  19.9     RF P1 6.2 6.2  6.0        Elbow and Wrist ROM. WNLs     Hand ROM. Measured in degrees.    11/3/2022 11/3/2022  11/15/22       Left Right  Left                             Ring:   MP -30/95 0/95  -20/95                PIP 0/90 0/100  0/95                DIP 0/65 0/70  0/70                     Strength (Dynamometer) and Pinch Strength (Pinch Gauge)  Measured in pounds.    11/3/2022 11/3/2022 11/15/22        Left Right  Left     Rung II 50 65  50     Carbone Pinch 12 14  12     3pt Pinch 13 18  10        Treatment     Husam received the following supervised modalities after being cleared for  contradictions for 10 minutes:   -Paraffin to decrease pain and stiffness and increase tissue elasticity       Husam received the following manual therapy techniques for 10 minutes:   -IASTM and STM with scar extractor around scar to decrease stiffness and adhesions in surrounding tissues     Husam received therapeutic exercises for 25 minutes including:  -TGEs and blocking exercises 20x  -golf ball rolling 2 minutes  -green flex bar smiles, frowns, twists and rolls x 30 reps each  -finger spreads 20x  -Finger extensions 20x  -pink putty pancakes, squeezes and dowel presses  -blue digi-extender finger extension x 30 reps  -modified orthosis and reviewed wear schedule      Home Exercises and Education Provided     Education provided:   - Progress towards goals   -issued dycem for home scar mobilization    Written Home Exercises Provided: Patient instructed to cont prior HEP.  Exercises were reviewed and Husam was able to demonstrate them prior to the end of the session.  Husam demonstrated good  understanding of the HEP provided.     See EMR under Patient Instructions for exercises provided prior visit.     Assessment     Pt performed well in session. Treatment focused on scar management, extension ROM and strengthening. Pt tolerated without difficulty. Modified orthosis to improve fit and comfort and reviewed wear schedule. He is motivated in therapy sessions and he is progressing well.     Husam is progressing well towards his goals and there are no updates to goals at this time. Pt prognosis is Good.     Pt will continue to benefit from skilled outpatient occupational therapy to address the deficits listed in the problem list on initial evaluation provide pt/family education and to maximize pt's level of independence in the home and community environment.     Anticipated barriers to occupational therapy: none    Pt's spiritual, cultural and educational needs considered and pt agreeable to plan of care and  goals.    Goals:  Short term Goals:  1) Initiate home exercise program -met, ongoing  2) Pt will increase AROM of L RF by 5-10 degrees in order to assist with gripping by 4 weeks. -Not met, progressing  3) Pt will increase functional  strength by 5# in order to A in opening containers for med management or home management tasks by 4 weeks. -Not met, progressing  4) Patient will be able to achieve less than or equal to 32% on the FOTO, demonstrating overall improved functional ability with upper extremity. (Self-care category) -Not met, progressing     Long Term Goals:  Goals to be met by discharge:  1) Independent with home exercise program -Not met, progressing  2) Pt will increase R RF SCHMIDT to WNLs in order to increase functional use for grasp with home management or work related tasks by d/c. -Not met, progressing  3) Patient will be able to achieve less than or equal to 27% on the FOTO, demonstrating overall improved functional ability with upper extremity.  (Self-care category) -Not met, progressing    Plan   Continue with OT POC  Updates/Grading for next session: progress as able      Margaret Boasberg, OT

## 2022-11-27 NOTE — PRE-PROCEDURE INSTRUCTIONS
Nader Mauro    Allergies, medical, surgical, family and psychosocial histories reviewed with patient. Periop plan of care reviewed. Preop instructions given, including medications to take and to hold. Hibiclens soap and instructions on use given. Time allotted for questions to be addressed.  Patient verbalized understanding.     Impaired

## 2022-11-28 ENCOUNTER — LAB VISIT (OUTPATIENT)
Dept: LAB | Facility: HOSPITAL | Age: 87
End: 2022-11-28
Attending: FAMILY MEDICINE
Payer: MEDICARE

## 2022-11-28 DIAGNOSIS — E78.00 HIGH CHOLESTEROL: ICD-10-CM

## 2022-11-28 DIAGNOSIS — I10 ESSENTIAL HYPERTENSION: ICD-10-CM

## 2022-11-28 DIAGNOSIS — R53.83 FATIGUE, UNSPECIFIED TYPE: ICD-10-CM

## 2022-11-28 LAB
ALBUMIN SERPL BCP-MCNC: 3.6 G/DL (ref 3.5–5.2)
ALP SERPL-CCNC: 76 U/L (ref 55–135)
ALT SERPL W/O P-5'-P-CCNC: 16 U/L (ref 10–44)
ANION GAP SERPL CALC-SCNC: 11 MMOL/L (ref 8–16)
AST SERPL-CCNC: 19 U/L (ref 10–40)
BASOPHILS # BLD AUTO: 0.04 K/UL (ref 0–0.2)
BASOPHILS NFR BLD: 0.5 % (ref 0–1.9)
BILIRUB SERPL-MCNC: 0.7 MG/DL (ref 0.1–1)
BUN SERPL-MCNC: 32 MG/DL (ref 10–30)
CALCIUM SERPL-MCNC: 9.8 MG/DL (ref 8.7–10.5)
CHLORIDE SERPL-SCNC: 102 MMOL/L (ref 95–110)
CHOLEST SERPL-MCNC: 165 MG/DL (ref 120–199)
CHOLEST/HDLC SERPL: 2.8 {RATIO} (ref 2–5)
CO2 SERPL-SCNC: 26 MMOL/L (ref 23–29)
CREAT SERPL-MCNC: 1.4 MG/DL (ref 0.5–1.4)
DIFFERENTIAL METHOD: NORMAL
EOSINOPHIL # BLD AUTO: 0.3 K/UL (ref 0–0.5)
EOSINOPHIL NFR BLD: 3.4 % (ref 0–8)
ERYTHROCYTE [DISTWIDTH] IN BLOOD BY AUTOMATED COUNT: 12.8 % (ref 11.5–14.5)
EST. GFR  (NO RACE VARIABLE): 47 ML/MIN/1.73 M^2
GLUCOSE SERPL-MCNC: 124 MG/DL (ref 70–110)
HCT VFR BLD AUTO: 47.5 % (ref 40–54)
HDLC SERPL-MCNC: 59 MG/DL (ref 40–75)
HDLC SERPL: 35.8 % (ref 20–50)
HGB BLD-MCNC: 15.5 G/DL (ref 14–18)
IMM GRANULOCYTES # BLD AUTO: 0.02 K/UL (ref 0–0.04)
IMM GRANULOCYTES NFR BLD AUTO: 0.2 % (ref 0–0.5)
LDLC SERPL CALC-MCNC: 78.6 MG/DL (ref 63–159)
LYMPHOCYTES # BLD AUTO: 2.2 K/UL (ref 1–4.8)
LYMPHOCYTES NFR BLD: 26.7 % (ref 18–48)
MCH RBC QN AUTO: 29.3 PG (ref 27–31)
MCHC RBC AUTO-ENTMCNC: 32.6 G/DL (ref 32–36)
MCV RBC AUTO: 90 FL (ref 82–98)
MONOCYTES # BLD AUTO: 0.9 K/UL (ref 0.3–1)
MONOCYTES NFR BLD: 10.7 % (ref 4–15)
NEUTROPHILS # BLD AUTO: 4.8 K/UL (ref 1.8–7.7)
NEUTROPHILS NFR BLD: 58.5 % (ref 38–73)
NONHDLC SERPL-MCNC: 106 MG/DL
NRBC BLD-RTO: 0 /100 WBC
PLATELET # BLD AUTO: 204 K/UL (ref 150–450)
PMV BLD AUTO: 11.3 FL (ref 9.2–12.9)
POTASSIUM SERPL-SCNC: 4 MMOL/L (ref 3.5–5.1)
PROT SERPL-MCNC: 6.9 G/DL (ref 6–8.4)
RBC # BLD AUTO: 5.29 M/UL (ref 4.6–6.2)
SODIUM SERPL-SCNC: 139 MMOL/L (ref 136–145)
TRIGL SERPL-MCNC: 137 MG/DL (ref 30–150)
TSH SERPL DL<=0.005 MIU/L-ACNC: 1.02 UIU/ML (ref 0.4–4)
WBC # BLD AUTO: 8.13 K/UL (ref 3.9–12.7)

## 2022-11-28 PROCEDURE — 80061 LIPID PANEL: CPT | Performed by: FAMILY MEDICINE

## 2022-11-28 PROCEDURE — 85025 COMPLETE CBC W/AUTO DIFF WBC: CPT | Performed by: FAMILY MEDICINE

## 2022-11-28 PROCEDURE — 36415 COLL VENOUS BLD VENIPUNCTURE: CPT | Performed by: FAMILY MEDICINE

## 2022-11-28 PROCEDURE — 84443 ASSAY THYROID STIM HORMONE: CPT | Performed by: FAMILY MEDICINE

## 2022-11-28 PROCEDURE — 80053 COMPREHEN METABOLIC PANEL: CPT | Performed by: FAMILY MEDICINE

## 2022-11-28 PROCEDURE — 82607 VITAMIN B-12: CPT | Performed by: FAMILY MEDICINE

## 2022-11-29 ENCOUNTER — OFFICE VISIT (OUTPATIENT)
Dept: CARDIOLOGY | Facility: CLINIC | Age: 87
End: 2022-11-29
Payer: MEDICARE

## 2022-11-29 VITALS
DIASTOLIC BLOOD PRESSURE: 78 MMHG | SYSTOLIC BLOOD PRESSURE: 138 MMHG | WEIGHT: 162.38 LBS | BODY MASS INDEX: 23.25 KG/M2 | HEART RATE: 82 BPM | HEIGHT: 70 IN | OXYGEN SATURATION: 96 %

## 2022-11-29 DIAGNOSIS — I10 PRIMARY HYPERTENSION: Chronic | ICD-10-CM

## 2022-11-29 DIAGNOSIS — R06.09 DYSPNEA ON EXERTION: ICD-10-CM

## 2022-11-29 DIAGNOSIS — E11.69 TYPE 2 DIABETES MELLITUS WITH OTHER SPECIFIED COMPLICATION, WITHOUT LONG-TERM CURRENT USE OF INSULIN: ICD-10-CM

## 2022-11-29 DIAGNOSIS — E11.51 TYPE 2 DIABETES MELLITUS WITH PERIPHERAL VASCULAR DISEASE: ICD-10-CM

## 2022-11-29 DIAGNOSIS — I70.0 AORTIC ATHEROSCLEROSIS: ICD-10-CM

## 2022-11-29 DIAGNOSIS — J43.2 CENTRILOBULAR EMPHYSEMA: ICD-10-CM

## 2022-11-29 DIAGNOSIS — E78.00 HIGH CHOLESTEROL: Primary | Chronic | ICD-10-CM

## 2022-11-29 DIAGNOSIS — I45.10 RBBB: ICD-10-CM

## 2022-11-29 LAB — VIT B12 SERPL-MCNC: 387 PG/ML (ref 210–950)

## 2022-11-29 PROCEDURE — 1159F MED LIST DOCD IN RCRD: CPT | Mod: CPTII,S$GLB,, | Performed by: INTERNAL MEDICINE

## 2022-11-29 PROCEDURE — 99999 PR PBB SHADOW E&M-EST. PATIENT-LVL III: CPT | Mod: PBBFAC,,, | Performed by: INTERNAL MEDICINE

## 2022-11-29 PROCEDURE — 99214 PR OFFICE/OUTPT VISIT, EST, LEVL IV, 30-39 MIN: ICD-10-PCS | Mod: 25,S$GLB,, | Performed by: INTERNAL MEDICINE

## 2022-11-29 PROCEDURE — 1126F PR PAIN SEVERITY QUANTIFIED, NO PAIN PRESENT: ICD-10-PCS | Mod: CPTII,S$GLB,, | Performed by: INTERNAL MEDICINE

## 2022-11-29 PROCEDURE — 1126F AMNT PAIN NOTED NONE PRSNT: CPT | Mod: CPTII,S$GLB,, | Performed by: INTERNAL MEDICINE

## 2022-11-29 PROCEDURE — 99999 PR PBB SHADOW E&M-EST. PATIENT-LVL III: ICD-10-PCS | Mod: PBBFAC,,, | Performed by: INTERNAL MEDICINE

## 2022-11-29 PROCEDURE — 93000 ELECTROCARDIOGRAM COMPLETE: CPT | Mod: S$GLB,,, | Performed by: INTERNAL MEDICINE

## 2022-11-29 PROCEDURE — 1159F PR MEDICATION LIST DOCUMENTED IN MEDICAL RECORD: ICD-10-PCS | Mod: CPTII,S$GLB,, | Performed by: INTERNAL MEDICINE

## 2022-11-29 PROCEDURE — 1101F PR PT FALLS ASSESS DOC 0-1 FALLS W/OUT INJ PAST YR: ICD-10-PCS | Mod: CPTII,S$GLB,, | Performed by: INTERNAL MEDICINE

## 2022-11-29 PROCEDURE — 1101F PT FALLS ASSESS-DOCD LE1/YR: CPT | Mod: CPTII,S$GLB,, | Performed by: INTERNAL MEDICINE

## 2022-11-29 PROCEDURE — 99214 OFFICE O/P EST MOD 30 MIN: CPT | Mod: 25,S$GLB,, | Performed by: INTERNAL MEDICINE

## 2022-11-29 PROCEDURE — 3288F PR FALLS RISK ASSESSMENT DOCUMENTED: ICD-10-PCS | Mod: CPTII,S$GLB,, | Performed by: INTERNAL MEDICINE

## 2022-11-29 PROCEDURE — 1160F RVW MEDS BY RX/DR IN RCRD: CPT | Mod: CPTII,S$GLB,, | Performed by: INTERNAL MEDICINE

## 2022-11-29 PROCEDURE — 3288F FALL RISK ASSESSMENT DOCD: CPT | Mod: CPTII,S$GLB,, | Performed by: INTERNAL MEDICINE

## 2022-11-29 PROCEDURE — 1160F PR REVIEW ALL MEDS BY PRESCRIBER/CLIN PHARMACIST DOCUMENTED: ICD-10-PCS | Mod: CPTII,S$GLB,, | Performed by: INTERNAL MEDICINE

## 2022-11-29 PROCEDURE — 93000 EKG 12-LEAD: ICD-10-PCS | Mod: S$GLB,,, | Performed by: INTERNAL MEDICINE

## 2022-11-29 RX ORDER — LORATADINE 10 MG/1
10 TABLET ORAL DAILY
COMMUNITY
End: 2022-12-29

## 2022-11-29 NOTE — PROGRESS NOTES
Subjective:      Patient ID: Husam Connolly is a 89 y.o. male.    Chief Complaint: Follow-up    HPI:  Pt has mild chronic shortness of breath with exertion.    Relatively sedentary lifestyle.    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    Had cholecystectomy by Dr Jj Zuritaview removed skin cancer    Dr Baldwin released hand contracture.    Pt quit smoking age 41      Review of Systems   Cardiovascular:  Negative for chest pain, claudication, dyspnea on exertion, irregular heartbeat, leg swelling, near-syncope, orthopnea, palpitations and syncope.      Past Medical History:   Diagnosis Date    BPH (benign prostatic hyperplasia)     Diabetes mellitus     Hyperlipidemia     Hypertension     Small bowel obstruction     Small bowel obstruction 6/13/2019        Past Surgical History:   Procedure Laterality Date    ANASTOMOSIS OF ILEUM TO RECTUM  9/9/2019    Procedure: ANASTOMOSIS, COLO-RECTAL;  Surgeon: Fouzia Gardner MD;  Location: [facility] OR;  Service: General;;    CHOLECYSTECTOMY N/A 3/7/2022    Procedure: CHOLECYSTECTOMY;  Surgeon: Fouzia Gardner MD;  Location: [facility] OR;  Service: General;  Laterality: N/A;    COLOSTOMY  6/16/2019    Procedure: CREATION, COLOSTOMY;  Surgeon: Fouzia Gardner MD;  Location: [facility] OR;  Service: General;;    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    Procedure: RELEASE, DUPUYTREN CONTRACTURE;  Surgeon: Slick Baldwin Jr., MD;  Location: [facility] OR;  Service: Orthopedics;  Laterality: Left;    EYE SURGERY      GISELA PROCEDURE  6/16/2019    Procedure: GISELA PROCEDURE;  Surgeon: Fouzia Gardner MD;  Location: [facility] OR;  Service: General;;  Colon resection    LYSIS OF ADHESIONS N/A 3/7/2022    Procedure: LYSIS, ADHESIONS;  Surgeon: Fouzia Gardner MD;  Location: [facility] OR;  Service: General;  Laterality: N/A;    TONSILLECTOMY         Family History   Problem Relation Age of Onset    Heart  disease Father     Hypertension Father     Cancer Mother     Diabetes Paternal Aunt        Social History     Socioeconomic History    Marital status:    Tobacco Use    Smoking status: Former     Packs/day: 1.00     Years: 25.00     Pack years: 25.00     Types: Cigarettes     Start date:      Quit date:      Years since quittin.9    Smokeless tobacco: Never   Substance and Sexual Activity    Alcohol use: Yes     Alcohol/week: 2.0 standard drinks     Types: 2 Glasses of wine per week     Comment: occassionally    Drug use: No    Sexual activity: Yes     Partners: Female     Social Determinants of Health     Financial Resource Strain: Low Risk     Difficulty of Paying Living Expenses: Not hard at all   Food Insecurity: No Food Insecurity    Worried About Running Out of Food in the Last Year: Never true    Ran Out of Food in the Last Year: Never true   Transportation Needs: No Transportation Needs    Lack of Transportation (Medical): No    Lack of Transportation (Non-Medical): No   Physical Activity: Inactive    Days of Exercise per Week: 0 days    Minutes of Exercise per Session: 0 min   Stress: No Stress Concern Present    Feeling of Stress : Not at all   Social Connections: Socially Isolated    Frequency of Communication with Friends and Family: More than three times a week    Frequency of Social Gatherings with Friends and Family: More than three times a week    Attends [employer] Services: Never    Active Member of Clubs or Organizations: No    Attends Club or Organization Meetings: Never    Marital Status:    Housing Stability: Low Risk     Unable to Pay for Housing in the Last Year: No    Number of Places Lived in the Last Year: 1    Unstable Housing in the Last Year: No       Current Outpatient Medications on File Prior to Visit   Medication Sig Dispense Refill    aspirin (ECOTRIN) 81 MG EC tablet Take 81 mg by mouth once daily.      atorvastatin (LIPITOR) 40 MG tablet TAKE 1 TABLET(40  "MG) BY MOUTH EVERY DAY 90 tablet 3    finasteride (PROSCAR) 5 mg tablet TAKE 1 TABLET BY MOUTH EVERY DAY (Patient taking differently: Take 5 mg by mouth every evening.) 90 tablet 1    fluticasone propionate (FLONASE) 50 mcg/actuation nasal spray 2 sprays (100 mcg total) by Each Nostril route once daily. 48 g 3    GARLIC ORAL Take 1 capsule by mouth once daily.      hydroCHLOROthiazide (HYDRODIURIL) 25 MG tablet Take 1 tablet (25 mg total) by mouth once daily. 90 tablet 3    loratadine (CLARITIN) 10 mg tablet Take 10 mg by mouth once daily.      losartan (COZAAR) 25 MG tablet Take 1 tablet (25 mg total) by mouth once daily. 90 tablet 3    metFORMIN (GLUCOPHAGE) 500 MG tablet TAKE 1 TABLET BY MOUTH TWICE DAILY WITH FOOD 180 tablet 3    multivitamin capsule Take 1 capsule by mouth once daily.      omega-3 fatty acids/fish oil (FISH OIL-OMEGA-3 FATTY ACIDS) 300-1,000 mg capsule Take 1 capsule by mouth once daily.      tamsulosin (FLOMAX) 0.4 mg Cap Take 0.4 mg by mouth nightly.  3    vitamin D (VITAMIN D3) 1000 units Tab Take 1,000 Units by mouth once daily.       No current facility-administered medications on file prior to visit.       Review of patient's allergies indicates:   Allergen Reactions    Solarcaine [benzocaine-triclosan]     Sulfa (sulfonamide antibiotics)     Sulfamethoxazole     Trimethoprim     Hytrin [terazosin] Rash    Smz-tmp ds [sulfamethoxazole-trimethoprim] Rash     Objective:     Vitals:    11/29/22 0830   BP: 138/78   BP Location: Right arm   Patient Position: Sitting   BP Method: Large (Automatic)   Pulse: 82   SpO2: 96%   Weight: 73.6 kg (162 lb 5.9 oz)   Height: 5' 10" (1.778 m)        Physical Exam  Vitals reviewed.   Constitutional:       General: He is not in acute distress.     Appearance: He is well-developed. He is not diaphoretic.   Eyes:      General: No scleral icterus.  Neck:      Vascular: No carotid bruit or JVD.   Cardiovascular:      Rate and Rhythm: Regular rhythm.      Heart " sounds: Murmur (II/VI systolic) heard.     No friction rub. No gallop.   Pulmonary:      Effort: Pulmonary effort is normal. No respiratory distress.      Breath sounds: Normal breath sounds.   Musculoskeletal:      Right lower leg: No edema.      Left lower leg: No edema.   Skin:     General: Skin is warm and dry.   Neurological:      Mental Status: He is alert and oriented to person, place, and time.   Psychiatric:         Behavior: Behavior normal.         Thought Content: Thought content normal.         Judgment: Judgment normal.      ECG today reviewed by me: NSR, RBBB, left axis deviation      Lab Visit on 11/28/2022   Component Date Value Ref Range Status    Sodium 11/28/2022 139  136 - 145 mmol/L Final    Potassium 11/28/2022 4.0  3.5 - 5.1 mmol/L Final    Chloride 11/28/2022 102  95 - 110 mmol/L Final    CO2 11/28/2022 26  23 - 29 mmol/L Final    Glucose 11/28/2022 124 (H)  70 - 110 mg/dL Final    BUN 11/28/2022 32 (H)  10 - 30 mg/dL Final    Creatinine 11/28/2022 1.4  0.5 - 1.4 mg/dL Final    Calcium 11/28/2022 9.8  8.7 - 10.5 mg/dL Final    Total Protein 11/28/2022 6.9  6.0 - 8.4 g/dL Final    Albumin 11/28/2022 3.6  3.5 - 5.2 g/dL Final    Total Bilirubin 11/28/2022 0.7  0.1 - 1.0 mg/dL Final    Alkaline Phosphatase 11/28/2022 76  55 - 135 U/L Final    AST 11/28/2022 19  10 - 40 U/L Final    ALT 11/28/2022 16  10 - 44 U/L Final    Anion Gap 11/28/2022 11  8 - 16 mmol/L Final    eGFR 11/28/2022 47 (A)  >60 mL/min/1.73 m^2 Final    Cholesterol 11/28/2022 165  120 - 199 mg/dL Final    Triglycerides 11/28/2022 137  30 - 150 mg/dL Final    HDL 11/28/2022 59  40 - 75 mg/dL Final    LDL Cholesterol 11/28/2022 78.6  63.0 - 159.0 mg/dL Final    HDL/Cholesterol Ratio 11/28/2022 35.8  20.0 - 50.0 % Final    Total Cholesterol/HDL Ratio 11/28/2022 2.8  2.0 - 5.0 Final    Non-HDL Cholesterol 11/28/2022 106  mg/dL Final    Vitamin B-12 11/28/2022 387  210 - 950 pg/mL Final    TSH 11/28/2022 1.016  0.400 - 4.000 uIU/mL  Final    WBC 11/28/2022 8.13  3.90 - 12.70 K/uL Final    RBC 11/28/2022 5.29  4.60 - 6.20 M/uL Final    Hemoglobin 11/28/2022 15.5  14.0 - 18.0 g/dL Final    Hematocrit 11/28/2022 47.5  40.0 - 54.0 % Final    MCV 11/28/2022 90  82 - 98 fL Final    MCH 11/28/2022 29.3  27.0 - 31.0 pg Final    MCHC 11/28/2022 32.6  32.0 - 36.0 g/dL Final    RDW 11/28/2022 12.8  11.5 - 14.5 % Final    Platelets 11/28/2022 204  150 - 450 K/uL Final    MPV 11/28/2022 11.3  9.2 - 12.9 fL Final    Immature Granulocytes 11/28/2022 0.2  0.0 - 0.5 % Final    Gran # (ANC) 11/28/2022 4.8  1.8 - 7.7 K/uL Final    Immature Grans (Abs) 11/28/2022 0.02  0.00 - 0.04 K/uL Final    Lymph # 11/28/2022 2.2  1.0 - 4.8 K/uL Final    Mono # 11/28/2022 0.9  0.3 - 1.0 K/uL Final    Eos # 11/28/2022 0.3  0.0 - 0.5 K/uL Final    Baso # 11/28/2022 0.04  0.00 - 0.20 K/uL Final    nRBC 11/28/2022 0  0 /100 WBC Final    Gran % 11/28/2022 58.5  38.0 - 73.0 % Final    Lymph % 11/28/2022 26.7  18.0 - 48.0 % Final    Mono % 11/28/2022 10.7  4.0 - 15.0 % Final    Eosinophil % 11/28/2022 3.4  0.0 - 8.0 % Final    Basophil % 11/28/2022 0.5  0.0 - 1.9 % Final    Differential Method 11/28/2022 Automated   Final   Lab Visit on 11/14/2022   Component Date Value Ref Range Status    Hemoglobin A1C 11/14/2022 7.4 (H)  4.0 - 5.6 % Final    Estimated Avg Glucose 11/14/2022 166 (H)  68 - 131 mg/dL Final    Sodium 11/14/2022 139  136 - 145 mmol/L Final    Potassium 11/14/2022 4.0  3.5 - 5.1 mmol/L Final    Chloride 11/14/2022 103  95 - 110 mmol/L Final    CO2 11/14/2022 22 (L)  23 - 29 mmol/L Final    Glucose 11/14/2022 182 (H)  70 - 110 mg/dL Final    BUN 11/14/2022 22  10 - 30 mg/dL Final    Creatinine 11/14/2022 1.4  0.5 - 1.4 mg/dL Final    Calcium 11/14/2022 9.6  8.7 - 10.5 mg/dL Final    Anion Gap 11/14/2022 14  8 - 16 mmol/L Final    eGFR 11/14/2022 47 (A)  >60 mL/min/1.73 m^2 Final   Lab Visit on 11/14/2022   Component Date Value Ref Range Status    Microalbumin, Urine  11/14/2022 28.0  ug/mL Final    Creatinine, Urine 11/14/2022 140.0  23.0 - 375.0 mg/dL Final    Microalb/Creat Ratio 11/14/2022 20.0  0.0 - 30.0 ug/mg Final   Admission on 09/19/2022, Discharged on 09/21/2022   Component Date Value Ref Range Status    WBC 09/19/2022 21.23 (H)  3.90 - 12.70 K/uL Final    RBC 09/19/2022 5.77  4.60 - 6.20 M/uL Final    Hemoglobin 09/19/2022 17.1  14.0 - 18.0 g/dL Final    Hematocrit 09/19/2022 51.4  40.0 - 54.0 % Final    MCV 09/19/2022 89  82 - 98 fL Final    MCH 09/19/2022 29.6  27.0 - 31.0 pg Final    MCHC 09/19/2022 33.3  32.0 - 36.0 g/dL Final    RDW 09/19/2022 13.2  11.5 - 14.5 % Final    Platelets 09/19/2022 180  150 - 450 K/uL Final    MPV 09/19/2022 11.4  9.2 - 12.9 fL Final    Immature Granulocytes 09/19/2022 0.3  0.0 - 0.5 % Final    Gran # (ANC) 09/19/2022 18.2 (H)  1.8 - 7.7 K/uL Final    Immature Grans (Abs) 09/19/2022 0.07 (H)  0.00 - 0.04 K/uL Final    Lymph # 09/19/2022 0.7 (L)  1.0 - 4.8 K/uL Final    Mono # 09/19/2022 2.2 (H)  0.3 - 1.0 K/uL Final    Eos # 09/19/2022 0.1  0.0 - 0.5 K/uL Final    Baso # 09/19/2022 0.05  0.00 - 0.20 K/uL Final    nRBC 09/19/2022 0  0 /100 WBC Final    Gran % 09/19/2022 85.6 (H)  38.0 - 73.0 % Final    Lymph % 09/19/2022 3.2 (L)  18.0 - 48.0 % Final    Mono % 09/19/2022 10.5  4.0 - 15.0 % Final    Eosinophil % 09/19/2022 0.2  0.0 - 8.0 % Final    Basophil % 09/19/2022 0.2  0.0 - 1.9 % Final    Differential Method 09/19/2022 Automated   Final    Sodium 09/19/2022 139  136 - 145 mmol/L Final    Potassium 09/19/2022 4.0  3.5 - 5.1 mmol/L Final    Chloride 09/19/2022 97  95 - 110 mmol/L Final    CO2 09/19/2022 24  23 - 29 mmol/L Final    Glucose 09/19/2022 195 (H)  70 - 110 mg/dL Final    BUN 09/19/2022 37 (H)  10 - 30 mg/dL Final    Creatinine 09/19/2022 1.5 (H)  0.5 - 1.4 mg/dL Final    Calcium 09/19/2022 10.2  8.7 - 10.5 mg/dL Final    Total Protein 09/19/2022 7.5  6.0 - 8.4 g/dL Final    Albumin 09/19/2022 3.9  3.5 - 5.2 g/dL Final     Total Bilirubin 09/19/2022 1.0  0.1 - 1.0 mg/dL Final    Alkaline Phosphatase 09/19/2022 84  55 - 135 U/L Final    AST 09/19/2022 18  10 - 40 U/L Final    ALT 09/19/2022 18  10 - 44 U/L Final    Anion Gap 09/19/2022 18 (H)  8 - 16 mmol/L Final    eGFR 09/19/2022 44 (A)  >60 mL/min/1.73 m^2 Final    Lipase 09/19/2022 19  4 - 60 U/L Final    Specimen UA 09/19/2022 Urine, Clean Catch   Final    Color, UA 09/19/2022 Yellow  Yellow, Straw, Josy Final    Appearance, UA 09/19/2022 Clear  Clear Final    pH, UA 09/19/2022 5.0  5.0 - 8.0 Final    Specific Gravity, UA 09/19/2022 1.025  1.005 - 1.030 Final    Protein, UA 09/19/2022 Trace (A)  Negative Final    Glucose, UA 09/19/2022 Negative  Negative Final    Ketones, UA 09/19/2022 Trace (A)  Negative Final    Bilirubin (UA) 09/19/2022 Negative  Negative Final    Occult Blood UA 09/19/2022 Negative  Negative Final    Nitrite, UA 09/19/2022 Negative  Negative Final    Urobilinogen, UA 09/19/2022 Negative  <2.0 EU/dL Final    Leukocytes, UA 09/19/2022 1+ (A)  Negative Final    Lactate (Lactic Acid) 09/19/2022 3.5 (HH)  0.5 - 2.2 mmol/L Final    RBC, UA 09/19/2022 1  0 - 4 /hpf Final    WBC, UA 09/19/2022 3  0 - 5 /hpf Final    Bacteria 09/19/2022 Rare  None-Occ /hpf Final    Squam Epithel, UA 09/19/2022 1  /hpf Final    Hyaline Casts, UA 09/19/2022 5 (A)  0-1/lpf /lpf Final    Microscopic Comment 09/19/2022 SEE COMMENT   Final    Lactate (Lactic Acid) 09/20/2022 2.0  0.5 - 2.2 mmol/L Final    POC Rapid COVID 09/20/2022 Negative  Negative Final     Acceptable 09/20/2022 Yes   Final    WBC 09/20/2022 12.51  3.90 - 12.70 K/uL Final    RBC 09/20/2022 4.93  4.60 - 6.20 M/uL Final    Hemoglobin 09/20/2022 14.3  14.0 - 18.0 g/dL Final    Hematocrit 09/20/2022 43.8  40.0 - 54.0 % Final    MCV 09/20/2022 89  82 - 98 fL Final    MCH 09/20/2022 29.0  27.0 - 31.0 pg Final    MCHC 09/20/2022 32.6  32.0 - 36.0 g/dL Final    RDW 09/20/2022 13.3  11.5 - 14.5 % Final    Platelets  09/20/2022 177  150 - 450 K/uL Final    MPV 09/20/2022 12.0  9.2 - 12.9 fL Final    Immature Granulocytes 09/20/2022 0.2  0.0 - 0.5 % Final    Gran # (ANC) 09/20/2022 10.7 (H)  1.8 - 7.7 K/uL Final    Immature Grans (Abs) 09/20/2022 0.02  0.00 - 0.04 K/uL Final    Lymph # 09/20/2022 0.6 (L)  1.0 - 4.8 K/uL Final    Mono # 09/20/2022 1.1 (H)  0.3 - 1.0 K/uL Final    Eos # 09/20/2022 0.0  0.0 - 0.5 K/uL Final    Baso # 09/20/2022 0.02  0.00 - 0.20 K/uL Final    nRBC 09/20/2022 0  0 /100 WBC Final    Gran % 09/20/2022 85.8 (H)  38.0 - 73.0 % Final    Lymph % 09/20/2022 4.7 (L)  18.0 - 48.0 % Final    Mono % 09/20/2022 8.8  4.0 - 15.0 % Final    Eosinophil % 09/20/2022 0.3  0.0 - 8.0 % Final    Basophil % 09/20/2022 0.2  0.0 - 1.9 % Final    Differential Method 09/20/2022 Automated   Final    Sodium 09/20/2022 137  136 - 145 mmol/L Final    Potassium 09/20/2022 4.1  3.5 - 5.1 mmol/L Final    Chloride 09/20/2022 99  95 - 110 mmol/L Final    CO2 09/20/2022 27  23 - 29 mmol/L Final    Glucose 09/20/2022 225 (H)  70 - 110 mg/dL Final    BUN 09/20/2022 38 (H)  10 - 30 mg/dL Final    Creatinine 09/20/2022 1.5 (H)  0.5 - 1.4 mg/dL Final    Calcium 09/20/2022 8.4 (L)  8.7 - 10.5 mg/dL Final    Total Protein 09/20/2022 5.8 (L)  6.0 - 8.4 g/dL Final    Albumin 09/20/2022 3.0 (L)  3.5 - 5.2 g/dL Final    Total Bilirubin 09/20/2022 1.0  0.1 - 1.0 mg/dL Final    Alkaline Phosphatase 09/20/2022 62  55 - 135 U/L Final    AST 09/20/2022 38  10 - 40 U/L Final    ALT 09/20/2022 23  10 - 44 U/L Final    Anion Gap 09/20/2022 11  8 - 16 mmol/L Final    eGFR 09/20/2022 44 (A)  >60 mL/min/1.73 m^2 Final    POCT Glucose 09/20/2022 222 (H)  70 - 110 mg/dL Final    POCT Glucose 09/20/2022 161 (H)  70 - 110 mg/dL Final    Hemoglobin A1C 09/20/2022 7.0 (H)  4.0 - 5.6 % Final    Estimated Avg Glucose 09/20/2022 154 (H)  68 - 131 mg/dL Final    POCT Glucose 09/20/2022 219 (H)  70 - 110 mg/dL Final    POCT Glucose 09/20/2022 159 (H)  70 - 110 mg/dL  Final    Lactate (Lactic Acid) 09/20/2022 1.0  0.5 - 2.2 mmol/L Final    POCT Glucose 09/21/2022 197 (H)  70 - 110 mg/dL Final    Glucose 09/21/2022 188 (H)  70 - 110 mg/dL Final    Sodium 09/21/2022 136  136 - 145 mmol/L Final    Potassium 09/21/2022 4.4  3.5 - 5.1 mmol/L Final    Chloride 09/21/2022 105  95 - 110 mmol/L Final    CO2 09/21/2022 22 (L)  23 - 29 mmol/L Final    BUN 09/21/2022 20  10 - 30 mg/dL Final    Calcium 09/21/2022 8.2 (L)  8.7 - 10.5 mg/dL Final    Creatinine 09/21/2022 1.0  0.5 - 1.4 mg/dL Final    Albumin 09/21/2022 2.7 (L)  3.5 - 5.2 g/dL Final    Phosphorus 09/21/2022 1.8 (L)  2.7 - 4.5 mg/dL Final    eGFR 09/21/2022 >60  >60 mL/min/1.73 m^2 Final    Anion Gap 09/21/2022 9  8 - 16 mmol/L Final    Magnesium 09/21/2022 2.2  1.6 - 2.6 mg/dL Final    POCT Glucose 09/21/2022 182 (H)  70 - 110 mg/dL Final   Admission on 06/01/2022, Discharged on 06/03/2022   Component Date Value Ref Range Status    WBC 06/01/2022 17.66 (H)  3.90 - 12.70 K/uL Final    RBC 06/01/2022 6.08  4.60 - 6.20 M/uL Final    Hemoglobin 06/01/2022 17.6  14.0 - 18.0 g/dL Final    Hematocrit 06/01/2022 52.7  40.0 - 54.0 % Final    MCV 06/01/2022 87  82 - 98 fL Final    MCH 06/01/2022 28.9  27.0 - 31.0 pg Final    MCHC 06/01/2022 33.4  32.0 - 36.0 g/dL Final    RDW 06/01/2022 13.4  11.5 - 14.5 % Final    Platelets 06/01/2022 210  150 - 450 K/uL Final    MPV 06/01/2022 11.2  9.2 - 12.9 fL Final    Immature Granulocytes 06/01/2022 0.3  0.0 - 0.5 % Final    Gran # (ANC) 06/01/2022 15.4 (H)  1.8 - 7.7 K/uL Final    Immature Grans (Abs) 06/01/2022 0.05 (H)  0.00 - 0.04 K/uL Final    Lymph # 06/01/2022 0.9 (L)  1.0 - 4.8 K/uL Final    Mono # 06/01/2022 1.0  0.3 - 1.0 K/uL Final    Eos # 06/01/2022 0.2  0.0 - 0.5 K/uL Final    Baso # 06/01/2022 0.03  0.00 - 0.20 K/uL Final    nRBC 06/01/2022 0  0 /100 WBC Final    Gran % 06/01/2022 87.3 (H)  38.0 - 73.0 % Final    Lymph % 06/01/2022 5.2 (L)  18.0 - 48.0 % Final    Mono % 06/01/2022  5.9  4.0 - 15.0 % Final    Eosinophil % 06/01/2022 1.1  0.0 - 8.0 % Final    Basophil % 06/01/2022 0.2  0.0 - 1.9 % Final    Differential Method 06/01/2022 Automated   Final    Sodium 06/01/2022 140  136 - 145 mmol/L Final    Potassium 06/01/2022 4.0  3.5 - 5.1 mmol/L Final    Chloride 06/01/2022 95  95 - 110 mmol/L Final    CO2 06/01/2022 30 (H)  23 - 29 mmol/L Final    Glucose 06/01/2022 254 (H)  70 - 110 mg/dL Final    BUN 06/01/2022 39 (H)  10 - 30 mg/dL Final    Creatinine 06/01/2022 1.6 (H)  0.5 - 1.4 mg/dL Final    Calcium 06/01/2022 10.7 (H)  8.7 - 10.5 mg/dL Final    Total Protein 06/01/2022 8.1  6.0 - 8.4 g/dL Final    Albumin 06/01/2022 4.3  3.5 - 5.2 g/dL Final    Total Bilirubin 06/01/2022 0.7  0.1 - 1.0 mg/dL Final    Alkaline Phosphatase 06/01/2022 101  55 - 135 U/L Final    AST 06/01/2022 22  10 - 40 U/L Final    ALT 06/01/2022 18  10 - 44 U/L Final    Anion Gap 06/01/2022 15  8 - 16 mmol/L Final    eGFR if  06/01/2022 43 (A)  >60 mL/min/1.73 m^2 Final    eGFR if non  06/01/2022 37 (A)  >60 mL/min/1.73 m^2 Final    Lipase 06/01/2022 24  4 - 60 U/L Final    Specimen UA 06/01/2022 Urine, Clean Catch   Final    Color, UA 06/01/2022 Yellow  Yellow, Straw, Josy Final    Appearance, UA 06/01/2022 Clear  Clear Final    pH, UA 06/01/2022 6.0  5.0 - 8.0 Final    Specific Gravity, UA 06/01/2022 1.020  1.005 - 1.030 Final    Protein, UA 06/01/2022 Trace (A)  Negative Final    Glucose, UA 06/01/2022 Negative  Negative Final    Ketones, UA 06/01/2022 Negative  Negative Final    Bilirubin (UA) 06/01/2022 Negative  Negative Final    Occult Blood UA 06/01/2022 Negative  Negative Final    Nitrite, UA 06/01/2022 Negative  Negative Final    Urobilinogen, UA 06/01/2022 Negative  <2.0 EU/dL Final    Leukocytes, UA 06/01/2022 2+ (A)  Negative Final    WBC, UA 06/01/2022 0  0 - 5 /hpf Final    Microscopic Comment 06/01/2022 SEE COMMENT   Final    Blood Culture, Routine 06/02/2022 No growth  after 5 days.   Final    Blood Culture, Routine 06/02/2022 No growth after 5 days.   Final    Lactate (Lactic Acid) 06/02/2022 2.4 (H)  0.5 - 2.2 mmol/L Final    Specimen UA 06/02/2022 Urine, Clean Catch   Final    Color, UA 06/02/2022 Yellow  Yellow, Straw, Josy Final    Appearance, UA 06/02/2022 Clear  Clear Final    pH, UA 06/02/2022 7.0  5.0 - 8.0 Final    Specific Gravity, UA 06/02/2022 1.020  1.005 - 1.030 Final    Protein, UA 06/02/2022 Trace (A)  Negative Final    Glucose, UA 06/02/2022 1+ (A)  Negative Final    Ketones, UA 06/02/2022 Negative  Negative Final    Bilirubin (UA) 06/02/2022 Negative  Negative Final    Occult Blood UA 06/02/2022 Negative  Negative Final    Nitrite, UA 06/02/2022 Negative  Negative Final    Urobilinogen, UA 06/02/2022 Negative  <2.0 EU/dL Final    Leukocytes, UA 06/02/2022 1+ (A)  Negative Final    Lactate (Lactic Acid) 06/02/2022 3.5 (HH)  0.5 - 2.2 mmol/L Final    RBC, UA 06/02/2022 3  0 - 4 /hpf Final    WBC, UA 06/02/2022 3  0 - 5 /hpf Final    Squam Epithel, UA 06/02/2022 0  /hpf Final    Hyaline Casts, UA 06/02/2022 1  0-1/lpf /lpf Final    Microscopic Comment 06/02/2022 SEE COMMENT   Final    POCT Glucose 06/02/2022 232 (H)  70 - 110 mg/dL Final    WBC 06/02/2022 9.44  3.90 - 12.70 K/uL Final    RBC 06/02/2022 5.81  4.60 - 6.20 M/uL Final    Hemoglobin 06/02/2022 16.9  14.0 - 18.0 g/dL Final    Hematocrit 06/02/2022 51.1  40.0 - 54.0 % Final    MCV 06/02/2022 88  82 - 98 fL Final    MCH 06/02/2022 29.1  27.0 - 31.0 pg Final    MCHC 06/02/2022 33.1  32.0 - 36.0 g/dL Final    RDW 06/02/2022 13.6  11.5 - 14.5 % Final    Platelets 06/02/2022 221  150 - 450 K/uL Final    MPV 06/02/2022 11.3  9.2 - 12.9 fL Final    Immature Granulocytes 06/02/2022 0.1  0.0 - 0.5 % Final    Gran # (ANC) 06/02/2022 7.6  1.8 - 7.7 K/uL Final    Immature Grans (Abs) 06/02/2022 0.01  0.00 - 0.04 K/uL Final    Lymph # 06/02/2022 0.8 (L)  1.0 - 4.8 K/uL Final    Mono # 06/02/2022 0.7  0.3 - 1.0 K/uL  Final    Eos # 06/02/2022 0.3  0.0 - 0.5 K/uL Final    Baso # 06/02/2022 0.04  0.00 - 0.20 K/uL Final    nRBC 06/02/2022 0  0 /100 WBC Final    Gran % 06/02/2022 80.8 (H)  38.0 - 73.0 % Final    Lymph % 06/02/2022 8.1 (L)  18.0 - 48.0 % Final    Mono % 06/02/2022 7.6  4.0 - 15.0 % Final    Eosinophil % 06/02/2022 3.0  0.0 - 8.0 % Final    Basophil % 06/02/2022 0.4  0.0 - 1.9 % Final    Differential Method 06/02/2022 Automated   Final    Sodium 06/02/2022 142  136 - 145 mmol/L Final    Potassium 06/02/2022 4.2  3.5 - 5.1 mmol/L Final    Chloride 06/02/2022 101  95 - 110 mmol/L Final    CO2 06/02/2022 24  23 - 29 mmol/L Final    Glucose 06/02/2022 198 (H)  70 - 110 mg/dL Final    BUN 06/02/2022 39 (H)  10 - 30 mg/dL Final    Creatinine 06/02/2022 1.4  0.5 - 1.4 mg/dL Final    Calcium 06/02/2022 9.5  8.7 - 10.5 mg/dL Final    Anion Gap 06/02/2022 17 (H)  8 - 16 mmol/L Final    eGFR if African American 06/02/2022 50 (A)  >60 mL/min/1.73 m^2 Final    eGFR if non African American 06/02/2022 44 (A)  >60 mL/min/1.73 m^2 Final    WBC 06/02/2022 10.25  3.90 - 12.70 K/uL Final    RBC 06/02/2022 5.79  4.60 - 6.20 M/uL Final    Hemoglobin 06/02/2022 16.8  14.0 - 18.0 g/dL Final    Hematocrit 06/02/2022 51.8  40.0 - 54.0 % Final    MCV 06/02/2022 90  82 - 98 fL Final    MCH 06/02/2022 29.0  27.0 - 31.0 pg Final    MCHC 06/02/2022 32.4  32.0 - 36.0 g/dL Final    RDW 06/02/2022 13.7  11.5 - 14.5 % Final    Platelets 06/02/2022 210  150 - 450 K/uL Final    MPV 06/02/2022 11.8  9.2 - 12.9 fL Final    Immature Granulocytes 06/02/2022 0.1  0.0 - 0.5 % Final    Gran # (ANC) 06/02/2022 8.0 (H)  1.8 - 7.7 K/uL Final    Immature Grans (Abs) 06/02/2022 0.01  0.00 - 0.04 K/uL Final    Lymph # 06/02/2022 0.8 (L)  1.0 - 4.8 K/uL Final    Mono # 06/02/2022 1.2 (H)  0.3 - 1.0 K/uL Final    Eos # 06/02/2022 0.3  0.0 - 0.5 K/uL Final    Baso # 06/02/2022 0.03  0.00 - 0.20 K/uL Final    nRBC 06/02/2022 0  0 /100 WBC Final    Gran % 06/02/2022 78.1  (H)  38.0 - 73.0 % Final    Lymph % 06/02/2022 7.6 (L)  18.0 - 48.0 % Final    Mono % 06/02/2022 11.4  4.0 - 15.0 % Final    Eosinophil % 06/02/2022 2.5  0.0 - 8.0 % Final    Basophil % 06/02/2022 0.3  0.0 - 1.9 % Final    Differential Method 06/02/2022 Automated   Final    Sodium 06/02/2022 144  136 - 145 mmol/L Final    Potassium 06/02/2022 5.1  3.5 - 5.1 mmol/L Final    Chloride 06/02/2022 102  95 - 110 mmol/L Final    CO2 06/02/2022 22 (L)  23 - 29 mmol/L Final    Glucose 06/02/2022 167 (H)  70 - 110 mg/dL Final    BUN 06/02/2022 39 (H)  10 - 30 mg/dL Final    Creatinine 06/02/2022 1.4  0.5 - 1.4 mg/dL Final    Calcium 06/02/2022 9.6  8.7 - 10.5 mg/dL Final    Anion Gap 06/02/2022 20 (H)  8 - 16 mmol/L Final    eGFR if African American 06/02/2022 50 (A)  >60 mL/min/1.73 m^2 Final    eGFR if non African American 06/02/2022 44 (A)  >60 mL/min/1.73 m^2 Final   (  Accession #: [id number]    Echocardiogram exercise stress test  Order# [id number]  Reading physician: Daren Lassiter MD Ordering physician: Daern Lassiter MD Study date: 10/19/20       Reason for Exam  Priority: Routine  Dx: Chest pain of uncertain etiology [R07.9 (ICD-10-CM)]; Dyspnea on exertion [R06.00 (ICD-10-CM)]; Localized edema [R60.0 (ICD-10-CM)]   Comments: John protocol     Result Image Hyperlink     Show images for Stress Echo Which stress agent will be used? Treadmill Exercise; Color Flow Doppler? No    Summary    The test was stopped because the patient experienced fatigue and shortness of breath.  During stress, the following significant arrhythmias were observed: rare PVCs.  The left ventricle is normal in size with normal systolic function. The estimated ejection fraction is 60%.  The stress echo portion of this study is negative for myocardial ischemia.  The ECG portion of this study is negative for myocardial ischemia.         Echocardiography Findings      Left Ventricle    Accession #: [id number]    Transthoracic echo (TTE)  complete  Order# [id number]  Reading physician: Jamarcus Cartwright MD Ordering physician: Charlene Siu MD Study date: 3/11/22       Reason for Exam  Priority: Routine  Dx: Dyspnea [R06.00 (ICD-10-CM)]     Result Image Hyperlink     Show images for Echo    Summary    The left ventricle is normal in size with concentric remodeling and normal systolic function.  The estimated ejection fraction is 55%.  Grade I left ventricular diastolic dysfunction.  There is abnormal septal wall motion.  Normal right ventricular size with normal right ventricular systolic function.  The estimated PA systolic pressure is 47 mmHg.  Normal central venous pressure (3 mmHg).  There is pulmonary hypertension.         Vitals    Height   CTA Chest Non Coronary  Status: Edited Result - FINAL     [employer] Results Release    [facility] Status: Active  Results Release     PACS Images for [facility] [place] Viewer     Show images for CTA Chest Non Coronary   Contains abnormal data CTA Chest Non Coronary  Order: [id number]  Status: Edited Result - FINAL     Visible to patient: Yes (not seen)     Next appt: 11/30/2022 at 11:45 AM in Outpatient Rehab (Lesley Barajas OT)     0 Result Notes  Details    Reading Physician Reading Date Result Priority   Heladio Montejo MD  [phone]  [phone] 3/11/2022 Routine     Addenda     Regarding the 21 mm hypodense nodule involving the right thyroid lobe, further characterization with nonemergent ultrasound would be recommended, if this lesion has not been previously evaluated.     This report was flagged in Epic as abnormal.        Electronically signed by: Heladio Montejo  Date:                                            03/12/2022  Time:                                           00:27  Signed by Heladio Montejo MD on 3/12/2022 00:29  Narrative & Impression  EXAMINATION:  CTA CHEST NON CORONARY     CLINICAL HISTORY:  Pulmonary embolism (PE) suspected, high prob;     TECHNIQUE:  Low dose axial images,  sagittal and coronal reformations were obtained from the thoracic inlet to the lung bases following the IV administration of 100 mL of Omnipaque 350.  Contrast timing was optimized to evaluate the pulmonary arteries.  MIP images were performed.     COMPARISON:  CT of the abdomen pelvis performed 03/07/2022.     FINDINGS:  Exam quality: Satisfactory.     Pulmonary embolism: No acute or chronic pulmonary embolism.     Central pulmonary arteries: Normal caliber.     Aorta: Normal calibre.  Calcifications.     Heart: No right heart strain. Normal size.     Coronary arteries: Limited assessment     Pericardium: Normal. No effusion, thickening, or calcification.     Support tubes and lines: None.     Base of neck/thyroid: Suggested 21 mm hypodense nodule right thyroid lobe (series 2, image 28).     Lymph nodes: No supraclavicular, axillary, internal mammary, mediastinal, or hilar adenopathy.     Esophagus: Nonspecific circumferential esophageal wall thickening, finding which may be seen in setting of esophagitis.     Pleura: No effusion, thickening, or calcification.     Upper abdomen: Partially imaged sequela of recent cholecystectomy.  Nonspecific fluid and gas collection is noted in the cholecystectomy bed (series 2, image 454).  Nonspecific hypoattenuating lesion in the left hepatic lobe.  Renal cortical atrophy noted.  Numerous renal cysts are seen.  Additional adverse of mesenteric induration/congestion and unencapsulated fluid are noted in the upper abdomen.     Body wall: Unremarkable     Airways: Central airways appear patent.     Lungs: Emphysematous changes are noted.  Dependent atelectasis and scar.  4 mm solid nodule right upper lobe (series 3, image 56).  5-6 mm solid nodule right lower lobe (series 3, image 229).  3 mm solid nodule right middle lobe (series 3, image 195).  Additional scattered calcified granuloma are suggested.  Triangular shaped solid nodule abutting the fissure (series 3, image 236), may  represent lymph node.     Bones: Osseous demineralization.  Degenerative change of the spine.     Impression:     1. No convincing evidence of acute pulmonary embolism.  2. Partially imaged postoperative sequela of recent cholecystectomy.  Nonspecific fluid and gas collection at the cholecystectomy bed may reflect evolving postoperative seroma and/or hematoma, noting that superinfection of the collection would be difficult exclude.  Clinical correlation recommended.  3. Solid pulmonary nodules measuring up to 6 mm, as described.  For a solid nodule 6-8 mm, Fleischner Society 2017 guidelines recommend follow up with non-contrast chest CT at 6-12 months and 18-24 months after discovery.  4. Additional details, as provided in the body of report.        Electronically signed by: Heladio Montejo  Date:                                            03/11/2022  Time:                                           21:32           Exam Ended: 03/11/22 17:43 Last Resulted: 03/12/22 00:27            US Lower Extremity Arteries Bilateral  Status: Final result     MyChart Results Release    [facility] Status: Active  Results Release     PACS Images for [employer] Viewer     Show images for US Lower Extremity Arteries Bilateral  All Reviewers List    Ammon Muñoz DPM on 9/27/2022 08:09     US Lower Extremity Arteries Bilateral  Order: [id number]  Status: Final result      Visible to patient: Yes (seen)       Next appt: 11/30/2022 at 11:45 AM in Outpatient Rehab (Lesley Barajas OT)       Dx: Type 2 diabetes mellitus with periphe...       0 Result Notes      Details    Reading Physician Reading Date Result Priority   Rosalino Real MD  [phone]  [phone] 9/26/2022 Routine     Narrative & Impression  EXAMINATION:  US LOWER EXTREMITY ARTERIES BILATERAL     CLINICAL HISTORY:  Type 2 diabetes mellitus with diabetic peripheral angiopathy without gangrene     TECHNIQUE:  Bilateral lower extremity arterial duplex  ultrasound examination performed. Multiple gray scale and color doppler images were obtained in addition to waveform analysis.     COMPARISON:  03/12/2022     FINDINGS:  The peak systolic velocities on the right are as follows, in centimeters/second:     Common femoral artery: 136, biphasic     Deep profundus: 135, biphasic     Superficial femoral artery, proximal: 79, biphasic     Superficial femoral artery, mid portion: 89, triphasic; 309, biphasic     Superficial femoral artery, distal: 78, biphasic     Popliteal artery: 83, biphasic     Distal popliteal artery: 88, biphasic     Anterior tibial artery: 74, biphasic     Posterior tibial artery: 91, biphasic     The peak systolic velocities on the left are as follows, in centimeters/second:     Common femoral artery: 118, biphasic     Deep profundus: 119, biphasic     Superficial femoral artery, proximal: 106, biphasic     Superficial femoral artery, mid portion: 116, biphasic     Superficial femoral artery, distal: 75, biphasic     Popliteal artery: 67, biphasic     Distal popliteal artery: 75, biphasic     Anterior tibial artery: 37, biphasic; 106, biphasic with adjacent collaterals     Posterior tibial artery: 90, biphasic with suspected adjacent collateral.     Impression:     Abnormal velocity doubling of the right mid-SFA and distal left anterior tibial artery concerning for hemodynamically significant stenosis.     Suspected adjacent collaterals at the distal left anterior tibial and posterior tibial arteries.        Electronically signed by: Rosalino Real  Date:                                            09/26/2022  Time:                                           15:26               Exam Ended: 09/26/22 14:00           Assessment:     1. High cholesterol    2. Type 2 diabetes mellitus with peripheral vascular disease    3. Primary hypertension    4. Type 2 diabetes mellitus with other specified complication, without long-term current use of insulin    5.  Centrilobular emphysema    6. Aortic atherosclerosis    7. RBBB    8. Dyspnea on exertion      Plan:   Husam was seen today for follow-up.    Diagnoses and all orders for this visit:    High cholesterol  -     IN OFFICE EKG 12-LEAD (to Muse)    Type 2 diabetes mellitus with peripheral vascular disease  -     Ambulatory referral/consult to Cardiology  -     IN OFFICE EKG 12-LEAD (to Muse)    Primary hypertension  -     IN OFFICE EKG 12-LEAD (to Muse)    Type 2 diabetes mellitus with other specified complication, without long-term current use of insulin  -     IN OFFICE EKG 12-LEAD (to Muse)    Centrilobular emphysema    Aortic atherosclerosis    RBBB    Dyspnea on exertion     Pt is doing well    The shortness of breath and mild pulmonary hypertension are due to emphysema    The peripheral artery disease is asymptomatic    Discussed a walking program    Discussed foot care    Same meds    F/u with Dr Green    RTC 6 months    Follow up in about 6 months (around 5/29/2023).

## 2022-11-30 ENCOUNTER — CLINICAL SUPPORT (OUTPATIENT)
Dept: REHABILITATION | Facility: HOSPITAL | Age: 87
End: 2022-11-30
Payer: MEDICARE

## 2022-11-30 DIAGNOSIS — M25.642 STIFFNESS OF FINGER JOINT, LEFT: Primary | ICD-10-CM

## 2022-11-30 DIAGNOSIS — R29.898 WEAKNESS OF LEFT HAND: ICD-10-CM

## 2022-11-30 PROCEDURE — 97018 PARAFFIN BATH THERAPY: CPT | Mod: 59,PN

## 2022-11-30 PROCEDURE — 97035 APP MDLTY 1+ULTRASOUND EA 15: CPT | Mod: PN

## 2022-11-30 PROCEDURE — 97110 THERAPEUTIC EXERCISES: CPT | Mod: PN

## 2022-11-30 PROCEDURE — 97140 MANUAL THERAPY 1/> REGIONS: CPT | Mod: PN

## 2022-11-30 NOTE — PROGRESS NOTES
Occupational Therapy Treatment Note     Date: 11/30/2022  Name: Husam Connolly  Clinic Number: [id number]    Therapy Diagnosis:   Encounter Diagnoses   Name Primary?    Stiffness of finger joint, left Yes    Weakness of left hand      Physician: Cristy Olmos PA-C    Physician Orders: eval and treat  Medical Diagnosis: M72.0 (ICD-10-CM) - Dupuytren's contracture of left hand  Surgical Procedure and Date: L Dupuytren's Release, 4/22/22 / Date of Injury/Onset: several years ago  Evaluation Date: 11/3/22  Insurance Authorization Period Expiration: 12/31/22  Plan of Care Expiration: 12/30/22  Date of Return to MD: 1/5/23  Visit # / Visits authorized: 6 / 11  FOTO: initial eval      Precautions:  Standard     Time In: 1700  Time Out: 1745  Total Appointment Time (timed & untimed codes): 45 minutes    Subjective     Pt reports: He reports he's wearing the digit extension splint at night while he sleeps.   he was compliant with home exercise program given last session.   Response to previous treatment:pain free   Functional change: tolerated progression of treatment well    Pain: 0/10  Location: no pain reported today, main c/o is stiffness    Objective      Observation/Appearance: healed incision over volar RF and palm     Edema. Measured in centimeters.    11/3/2022 11/3/2022  11/15/22       Left Right  Left     Wrist Crease 16.1 16.0  16.0     Distal Palmar Crease 19.5 20.1  19.9     RF P1 6.2 6.2  6.0        Elbow and Wrist ROM. WNLs     Hand ROM. Measured in degrees.    11/3/2022 11/3/2022  11/15/22  11/30     Left Right  Left                             Ring:   MP -30/95 0/95  -20/95 -25/98               PIP 0/90 0/100  0/95                DIP 0/65 0/70  0/70                     Strength (Dynamometer) and Pinch Strength (Pinch Gauge)  Measured in pounds.    11/3/2022 11/3/2022 11/15/22        Left Right  Left     Rung II 50 65  50     Carbone Pinch 12 14  12     3pt Pinch 13 18  10        Treatment     Husam  received the following supervised modalities after being cleared for contradictions for 10 minutes:   -Paraffin and MH with palm facing down with pad under tip of long finger to give extension stretch to decrease pain and stiffness and increase tissue elasticity    Husam received the following direct contact modalities after being cleared for contradictions for 8 minutes:   - ultrasound to dense volar scars to increase blood flow, circulation, tissue elasticity, pain management and for wound/scar management for 3.0 Mhz, Intensity 1.0 w/cm2 at 100% duty cycle.       Husam received the following manual therapy techniques for 8 minutes:   -IASTM and STM with scar extractor around scar to decrease stiffness and adhesions in surrounding tissues     Husam received therapeutic exercises for 19 minutes including:  -TGEs and blocking exercises 20x  -golf ball rolling 2 minutes  -green flex bar smiles, frowns, twists and rolls x 30 reps each  -finger spreads 20x  -Finger extensions 20x  -pink putty pancakes, squeezes and dowel presses  -blue digi-extender finger extension x 30 reps  -modified orthosis and reviewed wear schedule      Home Exercises and Education Provided     Education provided:   - Progress towards goals   -issued dycem for home scar mobilization    Written Home Exercises Provided: Patient instructed to cont prior HEP.  Exercises were reviewed and Husam was able to demonstrate them prior to the end of the session.  Husam demonstrated good  understanding of the HEP provided.     See EMR under Patient Instructions for exercises provided prior visit.     Assessment     Pt performed well in session. He reports compliance with wearing orthosis. Treatment focused on scar management, extension ROM and strengthening. Pt tolerated without difficulty. He is motivated in therapy sessions.     Husam is progressing well towards his goals and there are no updates to goals at this time. Pt prognosis is Good.     Pt  will continue to benefit from skilled outpatient occupational therapy to address the deficits listed in the problem list on initial evaluation provide pt/family education and to maximize pt's level of independence in the home and community environment.     Anticipated barriers to occupational therapy: none    Pt's spiritual, cultural and educational needs considered and pt agreeable to plan of care and goals.    Goals:  Short term Goals:  1) Initiate home exercise program -met, ongoing  2) Pt will increase AROM of L RF by 5-10 degrees in order to assist with gripping by 4 weeks. -Not met, progressing  3) Pt will increase functional  strength by 5# in order to A in opening containers for med management or home management tasks by 4 weeks. -Not met, progressing  4) Patient will be able to achieve less than or equal to 32% on the FOTO, demonstrating overall improved functional ability with upper extremity. (Self-care category) -Not met, progressing     Long Term Goals:  Goals to be met by discharge:  1) Independent with home exercise program -Not met, progressing  2) Pt will increase R RF SCHMIDT to WNLs in order to increase functional use for grasp with home management or work related tasks by d/c. -Not met, progressing  3) Patient will be able to achieve less than or equal to 27% on the FOTO, demonstrating overall improved functional ability with upper extremity.  (Self-care category) -Not met, progressing    Plan   Continue with OT POC  Updates/Grading for next session: progress as able      Lesley Barajas, OT

## 2022-12-05 NOTE — PROGRESS NOTES
"  Occupational Therapy Treatment Note     Date: 12/6/2022  Name: Husam Connolly  Clinic Number: [id number]    Therapy Diagnosis:   Encounter Diagnoses   Name Primary?    Stiffness of finger joint, left Yes    Weakness of left hand      Physician: Cristy Olmos PA-C    Physician Orders: eval and treat  Medical Diagnosis: M72.0 (ICD-10-CM) - Dupuytren's contracture of left hand  Surgical Procedure and Date: L Dupuytren's Release, 4/22/22 / Date of Injury/Onset: several years ago  Evaluation Date: 11/3/22  Insurance Authorization Period Expiration: 12/31/22  Plan of Care Expiration: 12/30/22  Date of Return to MD: 1/5/23  Visit # / Visits authorized: 7/ 11  FOTO: initial eval 37%  7th visit:35%     Precautions:  Standard     Time In: 10:00  Time Out: 10:45  Total Appointment Time (timed & untimed codes): 45 minutes    Subjective     Pt reports: I feel like this finger is getting straighter."  he was compliant with home exercise program given last session.   Response to previous treatment:pain free   Functional change: improved FOTO score and range of motion     Pain: 0/10  Location: no pain reported today, main c/o is stiffness    Objective      Observation/Appearance: healed incision over volar RF and palm     Edema. Measured in centimeters.    11/3/2022 11/3/2022  11/15/22 12/6/22      Left Right  Left Left    Wrist Crease 16.1 16.0  16.0 16    Distal Palmar Crease 19.5 20.1  19.9  19.5   RF P1 6.2 6.2  6.0  6.1      Elbow and Wrist ROM. WNLs     Hand ROM. Measured in degrees.    11/3/2022 11/3/2022  11/15/22  11/30 12/6/22     Left Right  Left Left  Left                             Ring:   MP -30/95 0/95  -20/95 -25/98  -15/95              PIP 0/90 0/100  0/95                 DIP 0/65 0/70  0/70                       Strength (Dynamometer) and Pinch Strength (Pinch Gauge)  Measured in pounds.    11/3/2022 11/3/2022 11/15/22  12/6/22      Left Right  Left  Left   Rung II 50 65  50  50   Carbone Pinch 12 14  12  " 17   3pt Pinch 13 18  10  11      Treatment     Husam received the following supervised modalities after being cleared for contradictions for 10 minutes:   -Paraffin and MH to decrease pain and stiffness and increase tissue elasticity      Husam received the following manual therapy techniques for 10 minutes:   -IASTM and STM with scar extractor around scar to decrease stiffness and adhesions in surrounding tissues     Husam received therapeutic exercises for 19 minutes including:  -TGEs and blocking exercises 20x  -golf ball rolling 2 minutes  -green flex bar smiles, frowns, twists and rolls x 30 reps each  -finger spreads 20x  -Finger extensions 20x  -pink putty pancakes, squeezes and dowel presses  -blue digi-extender finger extension x 30 reps    Home Exercises and Education Provided     Education provided:   - Progress towards goals   -issued dycem for home scar mobilization    Written Home Exercises Provided: Patient instructed to cont prior HEP.  Exercises were reviewed and Husam was able to demonstrate them prior to the end of the session.  Husam demonstrated good  understanding of the HEP provided.     See EMR under Patient Instructions for exercises provided prior visit.     Assessment     Pt with good participation this date. Pain free throughout session. Signs and symptoms of dense fibrotic tissue noted during ASTYM however responded well to treatment. Good endurance with exercises. Pt with overall improved range of motion and strength in left hand. Improved FOTO score indicating increased functional use Left upper extremity with self care tasks.     Husam is progressing well towards his goals and there are no updates to goals at this time. Pt prognosis is Good.     Pt will continue to benefit from skilled outpatient occupational therapy to address the deficits listed in the problem list on initial evaluation provide pt/family education and to maximize pt's level of independence in the home and  community environment.     Anticipated barriers to occupational therapy: none    Pt's spiritual, cultural and educational needs considered and pt agreeable to plan of care and goals.    Goals:  Short term Goals:  1) Initiate home exercise program -met, ongoing  2) Pt will increase AROM of L RF by 5-10 degrees in order to assist with gripping by 4 weeks. -met  3) Pt will increase functional  strength by 5# in order to A in opening containers for med management or home management tasks by 4 weeks. -Not met, progressing  4) Patient will be able to achieve less than or equal to 32% on the FOTO, demonstrating overall improved functional ability with upper extremity. (Self-care category) -Not met, progressing     Long Term Goals:  Goals to be met by discharge:  1) Independent with home exercise program -Not met, progressing  2) Pt will increase R RF SCHMIDT to WNLs in order to increase functional use for grasp with home management or work related tasks by d/c. -Not met, progressing  3) Patient will be able to achieve less than or equal to 27% on the FOTO, demonstrating overall improved functional ability with upper extremity.  (Self-care category) -Not met, progressing    Plan   Continue with OT POC  Updates/Grading for next session: progress as able      SIMON Hernadez

## 2022-12-06 ENCOUNTER — CLINICAL SUPPORT (OUTPATIENT)
Dept: REHABILITATION | Facility: HOSPITAL | Age: 87
End: 2022-12-06
Payer: MEDICARE

## 2022-12-06 DIAGNOSIS — R29.898 WEAKNESS OF LEFT HAND: ICD-10-CM

## 2022-12-06 DIAGNOSIS — M25.642 STIFFNESS OF FINGER JOINT, LEFT: Primary | ICD-10-CM

## 2022-12-06 PROCEDURE — 97140 MANUAL THERAPY 1/> REGIONS: CPT | Mod: PN

## 2022-12-06 PROCEDURE — 97018 PARAFFIN BATH THERAPY: CPT | Mod: PN

## 2022-12-06 PROCEDURE — 97110 THERAPEUTIC EXERCISES: CPT | Mod: PN

## 2022-12-08 ENCOUNTER — CLINICAL SUPPORT (OUTPATIENT)
Dept: REHABILITATION | Facility: HOSPITAL | Age: 87
End: 2022-12-08
Payer: MEDICARE

## 2022-12-08 DIAGNOSIS — R29.898 WEAKNESS OF LEFT HAND: ICD-10-CM

## 2022-12-08 DIAGNOSIS — M25.642 STIFFNESS OF FINGER JOINT, LEFT: Primary | ICD-10-CM

## 2022-12-08 PROCEDURE — 97140 MANUAL THERAPY 1/> REGIONS: CPT | Mod: PN

## 2022-12-08 PROCEDURE — 97110 THERAPEUTIC EXERCISES: CPT | Mod: PN

## 2022-12-08 PROCEDURE — 97018 PARAFFIN BATH THERAPY: CPT | Mod: PN

## 2022-12-08 NOTE — PROGRESS NOTES
Occupational Therapy Treatment Note     Date: 12/8/2022  Name: Husam Connolly  Clinic Number: [id number]    Therapy Diagnosis:   Encounter Diagnoses   Name Primary?    Stiffness of finger joint, left Yes    Weakness of left hand        Physician: Cristy Olmos PA-C    Physician Orders: eval and treat  Medical Diagnosis: M72.0 (ICD-10-CM) - Dupuytren's contracture of left hand  Surgical Procedure and Date: L Dupuytren's Release, 4/22/22 / Date of Injury/Onset: several years ago  Evaluation Date: 11/3/22  Insurance Authorization Period Expiration: 12/31/22  Plan of Care Expiration: 12/30/22  Date of Return to MD: 1/5/23  Visit # / Visits authorized:  8 / 11  FOTO: initial eval 37%  7th visit:35%     Precautions:  Standard     Time In: 1000  Time Out: 1045  Total Appointment Time (timed & untimed codes): 45 minutes    Subjective     Pt reports: He reports he put a small pad in his splint to give his ring finger a little more stretch. He reports he feels like the scar tissue is softening.   he was compliant with home exercise program given last session.   Response to previous treatment:pain free   Functional change: improved FOTO score and range of motion     Pain: 0/10  Location: no pain reported today, main c/o is stiffness    Objective      Observation/Appearance: healed incision over volar RF and palm     Edema. Measured in centimeters.    11/3/2022 11/3/2022  11/15/22 12/6/22      Left Right  Left Left    Wrist Crease 16.1 16.0  16.0 16    Distal Palmar Crease 19.5 20.1  19.9  19.5   RF P1 6.2 6.2  6.0  6.1      Elbow and Wrist ROM. WNLs     Hand ROM. Measured in degrees.    11/3/2022 11/3/2022  11/15/22  11/30 12/6/22     Left Right  Left Left  Left                             Ring:   MP -30/95 0/95  -20/95 -25/98  -15/95              PIP 0/90 0/100  0/95                 DIP 0/65 0/70  0/70                       Strength (Dynamometer) and Pinch Strength (Pinch Gauge)  Measured in pounds.    11/3/2022  11/3/2022 11/15/22  12/6/22      Left Right  Left  Left   Rung II 50 65  50  50   Carbone Pinch 12 14  12  17   3pt Pinch 13 18  10  11      Treatment     Husam received the following supervised modalities after being cleared for contradictions for 10 minutes:   -Paraffin and MH to decrease pain and stiffness and increase tissue elasticity      Husam received the following manual therapy techniques for 10 minutes:   -IASTM and STM with scar extractor around scar to decrease stiffness and adhesions in surrounding tissues     Husam received therapeutic exercises for 25 minutes including:  -TGEs and blocking exercises 20x  -golf ball rolling 2 minutes  -green flex bar smiles, frowns, twists and rolls x 30 reps each  -finger spreads 20x  -Finger extensions 20x  -pink putty pancakes, squeezes and dowel presses  -blue digi-extender finger extension x 30 reps    Home Exercises and Education Provided     Education provided:   - Progress towards goals   -issued dycem for home scar mobilization    Written Home Exercises Provided: Patient instructed to cont prior HEP.  Exercises were reviewed and Husam was able to demonstrate them prior to the end of the session.  Husam demonstrated good  understanding of the HEP provided.     See EMR under Patient Instructions for exercises provided prior visit.     Assessment     Pt tolerated session well without exacerbation of pain. OT added blue foam padding to his splint to give his long finger a little more stretch. He reports comfortable fit. He reports compliance with golf ball massage and putty rolls at home.    Husam is progressing well towards his goals and there are no updates to goals at this time. Pt prognosis is Good.     Pt will continue to benefit from skilled outpatient occupational therapy to address the deficits listed in the problem list on initial evaluation provide pt/family education and to maximize pt's level of independence in the home and community environment.      Anticipated barriers to occupational therapy: none    Pt's spiritual, cultural and educational needs considered and pt agreeable to plan of care and goals.    Goals:  Short term Goals:  1) Initiate home exercise program -met, ongoing  2) Pt will increase AROM of L RF by 5-10 degrees in order to assist with gripping by 4 weeks. -met  3) Pt will increase functional  strength by 5# in order to A in opening containers for med management or home management tasks by 4 weeks. -Not met, progressing  4) Patient will be able to achieve less than or equal to 32% on the FOTO, demonstrating overall improved functional ability with upper extremity. (Self-care category) -Not met, progressing     Long Term Goals:  Goals to be met by discharge:  1) Independent with home exercise program -Not met, progressing  2) Pt will increase R RF SCHMIDT to WNLs in order to increase functional use for grasp with home management or work related tasks by d/c. -Not met, progressing  3) Patient will be able to achieve less than or equal to 27% on the FOTO, demonstrating overall improved functional ability with upper extremity.  (Self-care category) -Not met, progressing    Plan   Continue with OT POC  Updates/Grading for next session: progress as able      Lesley Barajas, OT

## 2022-12-15 ENCOUNTER — CLINICAL SUPPORT (OUTPATIENT)
Dept: REHABILITATION | Facility: HOSPITAL | Age: 87
End: 2022-12-15
Attending: PHYSICIAN ASSISTANT
Payer: MEDICARE

## 2022-12-15 DIAGNOSIS — R29.898 WEAKNESS OF LEFT HAND: ICD-10-CM

## 2022-12-15 DIAGNOSIS — M25.642 STIFFNESS OF FINGER JOINT, LEFT: Primary | ICD-10-CM

## 2022-12-15 PROCEDURE — 97110 THERAPEUTIC EXERCISES: CPT | Mod: PN

## 2022-12-15 PROCEDURE — 97140 MANUAL THERAPY 1/> REGIONS: CPT | Mod: PN

## 2022-12-15 PROCEDURE — 97018 PARAFFIN BATH THERAPY: CPT | Mod: 59,PN

## 2022-12-15 NOTE — PROGRESS NOTES
Occupational Therapy Treatment Note     Date: 12/15/2022  Name: Husam Connolly  Clinic Number: [id number]    Therapy Diagnosis:   Encounter Diagnoses   Name Primary?    Stiffness of finger joint, left Yes    Weakness of left hand      Physician: Cristy Olmos PA-C    Physician Orders: eval and treat  Medical Diagnosis: M72.0 (ICD-10-CM) - Dupuytren's contracture of left hand  Surgical Procedure and Date: L Dupuytren's Release, 4/22/22 / Date of Injury/Onset: several years ago  Evaluation Date: 11/3/22  Insurance Authorization Period Expiration: 12/31/22  Plan of Care Expiration: 12/30/22  Date of Return to MD: 1/5/23  Visit # / Visits authorized:  9 / 11  FOTO: initial eval 37%  7th visit:35%     Precautions:  Standard     Time In: 12:10  Time Out: 12:55  Total Appointment Time (timed & untimed codes): 45 minutes    Subjective     Pt reports: He reports he put a small pad in his splint to give his ring finger a little more stretch. He reports he feels like the scar tissue is softening.   he was compliant with home exercise program given last session.   Response to previous treatment:pain free   Functional change: scar tissue becoming more pliable    Pain: 0/10  Location: no pain reported today, main c/o is stiffness    Objective      Observation/Appearance: healed incision over volar RF and palm     Edema. Measured in centimeters.    11/3/2022 11/3/2022  11/15/22 12/6/22      Left Right  Left Left    Wrist Crease 16.1 16.0  16.0 16    Distal Palmar Crease 19.5 20.1  19.9  19.5   RF P1 6.2 6.2  6.0  6.1      Elbow and Wrist ROM. WNLs     Hand ROM. Measured in degrees.    11/3/2022 11/3/2022  11/15/22  11/30 12/6/22     Left Right  Left Left  Left                             Ring:   MP -30/95 0/95  -20/95 -25/98  -15/95              PIP 0/90 0/100  0/95                 DIP 0/65 0/70  0/70                       Strength (Dynamometer) and Pinch Strength (Pinch Gauge)  Measured in pounds.    11/3/2022 11/3/2022  11/15/22  12/6/22      Left Right  Left  Left   Rung II 50 65  50  50   Carbone Pinch 12 14  12  17   3pt Pinch 13 18  10  11      Treatment     Husam received the following supervised modalities after being cleared for contradictions for 10 minutes:   -Paraffin and MH to decrease pain and stiffness and increase tissue elasticity      Husam received the following manual therapy techniques for 10 minutes:   -IASTM and STM with scar extractor around scar to decrease stiffness and adhesions in surrounding tissues     Husam received therapeutic exercises for 25 minutes including:  -TGEs and blocking exercises 20x  -golf ball rolling 2 minutes  -green flex bar smiles, frowns, twists and rolls x 30 reps each  -finger spreads 20x  -Finger extensions 20x  -pink putty pancakes, squeezes and dowel presses  -blue digi-extender finger extension x 30 reps    Home Exercises and Education Provided     Education provided:   - Progress towards goals   -issued dycem for home scar mobilization    Written Home Exercises Provided: Patient instructed to cont prior HEP.  Exercises were reviewed and Husam was able to demonstrate them prior to the end of the session.  Husam demonstrated good  understanding of the HEP provided.     See EMR under Patient Instructions for exercises provided prior visit.     Assessment     Pt with good participation this date. Pain free throughout session and with most functional activities. His biggest limitation is c/o dense scar tissue however he reports scar tissue feels like it's become softer and more pliable with therapy and home exercise program. Pt was able to complete all exercises in a pain free range of motion with good technique. Pt approaching discharge status.    Husam is progressing well towards his goals and there are no updates to goals at this time. Pt prognosis is Good.     Pt will continue to benefit from skilled outpatient occupational therapy to address the deficits listed in the  problem list on initial evaluation provide pt/family education and to maximize pt's level of independence in the home and community environment.     Anticipated barriers to occupational therapy: none    Pt's spiritual, cultural and educational needs considered and pt agreeable to plan of care and goals.    Goals:  Short term Goals:  1) Initiate home exercise program -met, ongoing  2) Pt will increase AROM of L RF by 5-10 degrees in order to assist with gripping by 4 weeks. -met  3) Pt will increase functional  strength by 5# in order to A in opening containers for med management or home management tasks by 4 weeks. -Not met, progressing  4) Patient will be able to achieve less than or equal to 32% on the FOTO, demonstrating overall improved functional ability with upper extremity. (Self-care category) -Not met, progressing     Long Term Goals:  Goals to be met by discharge:  1) Independent with home exercise program -Not met, progressing  2) Pt will increase R RF SCHMIDT to WNLs in order to increase functional use for grasp with home management or work related tasks by d/c. -Not met, progressing  3) Patient will be able to achieve less than or equal to 27% on the FOTO, demonstrating overall improved functional ability with upper extremity.  (Self-care category) -Not met, progressing    Plan   Continue with OT POC  Updates/Grading for next session: progress as able, anticipate discharge to home exercise program at the end of the month      SIMON Hernadez

## 2022-12-20 ENCOUNTER — CLINICAL SUPPORT (OUTPATIENT)
Dept: REHABILITATION | Facility: HOSPITAL | Age: 87
End: 2022-12-20
Payer: MEDICARE

## 2022-12-20 DIAGNOSIS — R29.898 WEAKNESS OF LEFT HAND: ICD-10-CM

## 2022-12-20 DIAGNOSIS — M25.642 STIFFNESS OF FINGER JOINT, LEFT: Primary | ICD-10-CM

## 2022-12-20 PROCEDURE — 97110 THERAPEUTIC EXERCISES: CPT | Mod: PN

## 2022-12-20 PROCEDURE — 97140 MANUAL THERAPY 1/> REGIONS: CPT | Mod: PN

## 2022-12-20 PROCEDURE — 97018 PARAFFIN BATH THERAPY: CPT | Mod: 59,PN

## 2022-12-20 NOTE — PROGRESS NOTES
Occupational Therapy Treatment Note     Date: 12/20/2022  Name: Husam Connolly  [facility] Number: [id number]    Therapy Diagnosis:   Encounter Diagnoses   Name Primary?    Stiffness of finger joint, left Yes    Weakness of left hand      Physician: Cristy Olmos PA-C    Physician Orders: eval and treat  Medical Diagnosis: M72.0 (ICD-10-CM) - Dupuytren's contracture of left hand  Surgical Procedure and Date: L Dupuytren's Release, 4/22/22 / Date of Injury/Onset: several years ago  Evaluation Date: 11/3/22  Insurance Authorization Period Expiration: 12/31/22  Plan of Care Expiration: 12/30/22  Date of Return to MD: 1/5/23  Visit # / Visits authorized:  10 / 11  FOTO: initial eval 37%  7th visit:35%     Precautions:  Standard     Time In: 1130  Time Out: 1215  Total Appointment Time (timed & untimed codes): 45 minutes    Subjective     Pt reports: He reports he's nearly back to prior level of function and he's ready for DC soon. He will bring his splint in next visit for final adjustments   he was compliant with home exercise program given last session.   Response to previous treatment:pain free   Functional change: scar tissue becoming more pliable    Pain: 0/10  Location: no pain reported today, main c/o is stiffness    Objective      Observation/Appearance: healed incision over volar RF and palm     Edema. Measured in centimeters.    11/3/2022 11/3/2022  11/15/22 12/6/22      Left Right  Left Left    Wrist Crease 16.1 16.0  16.0 16    Distal Palmar Crease 19.5 20.1  19.9  19.5   RF P1 6.2 6.2  6.0  6.1      Elbow and Wrist ROM. WNLs     Hand ROM. Measured in degrees.    11/3/2022 11/3/2022  11/15/22  11/30 12/6/22 12/20     Left Right  Left Left  Left Left                                Ring:   MP -30/95 0/95  -20/95 -25/98  -15/95 -20/100              PIP 0/90 0/100  0/95                  DIP 0/65 0/70  0/70                         Strength (Dynamometer) and Pinch Strength (Pinch Gauge)  Measured in  pounds.    11/3/2022 11/3/2022 11/15/22  12/6/22      Left Right  Left  Left   Rung II 50 65  50  50   Carbone Pinch 12 14  12  17   3pt Pinch 13 18  10  11      Treatment     Husam received the following supervised modalities after being cleared for contradictions for 10 minutes:   -Paraffin and MH to decrease pain and stiffness and increase tissue elasticity      Husam received the following manual therapy techniques for 10 minutes:   -IASTM and STM with scar extractor around scar to decrease stiffness and adhesions in surrounding tissues     Husam received therapeutic exercises for 25 minutes including:  -TGEs and blocking exercises 20x  -golf ball rolling 2 minutes  -green flex bar smiles, frowns, twists and rolls x 30 reps each  -finger spreads 20x  -Finger extensions 20x  -pink putty pancakes, squeezes and dowel presses  -blue digi-extender finger extension x 30 reps    Home Exercises and Education Provided     Education provided:   - Progress towards goals   -issued dycem for home scar mobilization    Written Home Exercises Provided: Patient instructed to cont prior HEP.  Exercises were reviewed and Husam was able to demonstrate them prior to the end of the session.  Husam demonstrated good  understanding of the HEP provided.     See EMR under Patient Instructions for exercises provided prior visit.     Assessment     Pt tolerated OT session well without exacerbation of pain. Re-assessed active range of motion and he demonstrates increased left ring finger proximal interphalangeal joint extension and flexion. OT and pt discuss D/C next visit and pt agrees.     Husam is progressing well towards his goals and there are no updates to goals at this time. Pt prognosis is Good.     Pt will continue to benefit from skilled outpatient occupational therapy to address the deficits listed in the problem list on initial evaluation provide pt/family education and to maximize pt's level of independence in the home and  community environment.     Anticipated barriers to occupational therapy: none    Pt's spiritual, cultural and educational needs considered and pt agreeable to plan of care and goals.    Goals:  Short term Goals:  1) Initiate home exercise program -met, ongoing  2) Pt will increase AROM of L RF by 5-10 degrees in order to assist with gripping by 4 weeks. -met  3) Pt will increase functional  strength by 5# in order to A in opening containers for med management or home management tasks by 4 weeks. -Not met, progressing  4) Patient will be able to achieve less than or equal to 32% on the FOTO, demonstrating overall improved functional ability with upper extremity. (Self-care category) -Not met, progressing     Long Term Goals:  Goals to be met by discharge:  1) Independent with home exercise program -Not met, progressing  2) Pt will increase R RF SCHMIDT to WNLs in order to increase functional use for grasp with home management or work related tasks by d/c. -met  3) Patient will be able to achieve less than or equal to 27% on the FOTO, demonstrating overall improved functional ability with upper extremity.  (Self-care category) -Not met, progressing    Plan   Continue with OT POC  Updates/Grading for next session: progress as able, anticipate discharge to home exercise program at the end of the month      Lesley Barajas OT

## 2022-12-29 ENCOUNTER — CLINICAL SUPPORT (OUTPATIENT)
Dept: REHABILITATION | Facility: HOSPITAL | Age: 87
End: 2022-12-29
Payer: MEDICARE

## 2022-12-29 DIAGNOSIS — R29.898 WEAKNESS OF LEFT HAND: ICD-10-CM

## 2022-12-29 DIAGNOSIS — M25.642 STIFFNESS OF FINGER JOINT, LEFT: Primary | ICD-10-CM

## 2022-12-29 PROCEDURE — 97140 MANUAL THERAPY 1/> REGIONS: CPT | Mod: PN

## 2022-12-29 PROCEDURE — 97035 APP MDLTY 1+ULTRASOUND EA 15: CPT | Mod: PN

## 2022-12-29 PROCEDURE — 97018 PARAFFIN BATH THERAPY: CPT | Mod: PN

## 2022-12-29 PROCEDURE — 97530 THERAPEUTIC ACTIVITIES: CPT | Mod: PN

## 2022-12-29 NOTE — PROGRESS NOTES
"  Occupational Therapy Treatment Note / Discharge Summary     Date: 12/29/2022  Name: Husam Connolly  Clinic Number: [id number]    Therapy Diagnosis:   Encounter Diagnoses   Name Primary?    Stiffness of finger joint, left Yes    Weakness of left hand      Physician: Cristy Olmos PA-C    Physician Orders: eval and treat  Medical Diagnosis: M72.0 (ICD-10-CM) - Dupuytren's contracture of left hand  Surgical Procedure and Date: L Dupuytren's Release, 4/22/22 / Date of Injury/Onset: several years ago  Evaluation Date: 11/3/22  Insurance Authorization Period Expiration: 12/31/22  Plan of Care Expiration: 12/30/22  Date of Return to MD: 1/5/23  Visit # / Visits authorized:  11 / 19  FOTO: initial eval 37%  7th visit:35%     Precautions:  Standard     Time In: 1345  Time Out: 1430  Total Appointment Time (timed & untimed codes): 45 minutes    Subjective     Pt reports: "Hand is doing pretty good. I'm able to bend my fingers more." He reports he's back to his prior level of function.   he was compliant with home exercise program given last session.   Response to previous treatment:pain free   Functional change: scar tissue becoming more pliable    Pain: 0/10  Location: no pain reported today, main c/o is stiffness    Objective      Observation/Appearance: healed incision over volar RF and palm     Edema. Measured in centimeters.    11/3/2022 11/3/2022  11/15/22 12/6/22  12/29     Left Right  Left Left  Left    Wrist Crease 16.1 16.0  16.0 16     Distal Palmar Crease 19.5 20.1  19.9  19.5    RF P1 6.2 6.2  6.0  6.1 6.2      Elbow and Wrist ROM. WNLs     Hand ROM. Measured in degrees.    11/3/2022 11/3/2022  11/15/22  11/30 12/6/22 12/20 12/29     Left Right  Left Left  Left Left  Left                                  Ring:   MP -30/95 0/95  -20/95 -25/98  -15/95 -20/100 -25/100              PIP 0/90 0/100  0/95                   DIP 0/65 0/70  0/70                           Strength (Dynamometer) and Pinch Strength " (Pinch Gauge)  Measured in pounds.    11/3/2022 11/3/2022 11/15/22  12/6/22  12/29     Left Right  Left  Left Left    Rung II 50 65  50  50 55   Carbone Pinch 12 14  12  17 15   3pt Pinch 13 18  10  11 12      Treatment     Husam received the following supervised modalities after being cleared for contradictions for 10 minutes:   -Paraffin and MH to decrease pain and stiffness and increase tissue elasticity      Husam received the following direct contact modalities after being cleared for contradictions for 8 minutes:   - Patient ultrasound to dense palmar scars increase blood flow, tissue elasticity, and for wound/scar management for 8 minutes @ 3.3 Mhz, Intensity 1.0 w/cm2 at 100% duty cycle.      Husam received the following manual therapy techniques for 8 minutes:   -IASTM and STM with scar extractor around scar to decrease stiffness and adhesions in surrounding tissues     Husam received therapeutic exercises for 19 minutes including:  - re-assessed all objective measures  - re-administered FOTO      Home Exercises and Education Provided     Education provided:   - Progress towards goals   -issued dycem for home scar mobilization    Written Home Exercises Provided: Patient instructed to cont prior HEP.  Exercises were reviewed and Husam was able to demonstrate them prior to the end of the session.  Husam demonstrated good  understanding of the HEP provided.     See EMR under Patient Instructions for exercises provided prior visit.     Assessment     Pt has been coming to OT for 2 months s/p dupuytrens release for treatment of weakness, decreased range of motion, and pain. Pt has been motivated throughout course of care. He demonstrates overall improved, edema, range of motion, pain reports, and /pinch strength. He has reached maximum potential for skilled OT services at this point. He demonstrated an overall improved Quick Dash score. Pt is independent with home exercise program and feels he can continue  this at home. Pt has met all goals. Discharge from OT today with home exercise program in place.        Anticipated barriers to occupational therapy: none    Pt's spiritual, cultural and educational needs considered and pt agreeable to plan of care and goals.    Goals:  Short term Goals:  1) Initiate home exercise program -met, ongoing  2) Pt will increase AROM of L RF by 5-10 degrees in order to assist with gripping by 4 weeks. -met  3) Pt will increase functional  strength by 5# in order to A in opening containers for med management or home management tasks by 4 weeks. -Not met, progressing  4) Patient will be able to achieve less than or equal to 32% on the FOTO, demonstrating overall improved functional ability with upper extremity. (Self-care category) -Not met, progressing     Long Term Goals:  Goals to be met by discharge:  1) Independent with home exercise program -met   2) Pt will increase R RF SCHMIDT to WNLs in order to increase functional use for grasp with home management or work related tasks by d/c. -met  3) Patient will be able to achieve less than or equal to 27% on the FOTO, demonstrating overall improved functional ability with upper extremity.  (Self-care category) -met 9.15 at D/C    Plan     D/C OT      Lesley Barajas, OT

## 2022-12-30 PROBLEM — M25.642 STIFFNESS OF FINGER JOINT, LEFT: Status: RESOLVED | Noted: 2022-11-03 | Resolved: 2022-12-30

## 2022-12-30 PROBLEM — R29.898 WEAKNESS OF LEFT HAND: Status: RESOLVED | Noted: 2022-11-03 | Resolved: 2022-12-30

## 2023-01-05 ENCOUNTER — HOSPITAL ENCOUNTER (OUTPATIENT)
Dept: RADIOLOGY | Facility: HOSPITAL | Age: 88
Discharge: HOME | End: 2023-01-05
Attending: ORTHOPAEDIC SURGERY
Payer: MEDICARE

## 2023-01-05 ENCOUNTER — OFFICE VISIT (OUTPATIENT)
Dept: ORTHOPEDICS | Facility: CLINIC | Age: 88
End: 2023-01-05
Payer: MEDICARE

## 2023-01-05 ENCOUNTER — TELEPHONE (OUTPATIENT)
Dept: ORTHOPEDICS | Facility: CLINIC | Age: 88
End: 2023-01-05
Payer: MEDICARE

## 2023-01-05 VITALS — WEIGHT: 159 LBS | BODY MASS INDEX: 22.76 KG/M2 | HEIGHT: 70 IN

## 2023-01-05 DIAGNOSIS — M25.512 LEFT SHOULDER PAIN, UNSPECIFIED CHRONICITY: ICD-10-CM

## 2023-01-05 DIAGNOSIS — M25.512 LEFT SHOULDER PAIN, UNSPECIFIED CHRONICITY: Primary | ICD-10-CM

## 2023-01-05 DIAGNOSIS — M19.019 SHOULDER ARTHRITIS: ICD-10-CM

## 2023-01-05 PROCEDURE — 1159F PR MEDICATION LIST DOCUMENTED IN MEDICAL RECORD: ICD-10-PCS | Mod: CPTII,S$GLB,, | Performed by: ORTHOPAEDIC SURGERY

## 2023-01-05 PROCEDURE — 73030 XR SHOULDER COMPLETE 2 OR MORE VIEWS LEFT: ICD-10-PCS | Mod: 26,LT,, | Performed by: RADIOLOGY

## 2023-01-05 PROCEDURE — 73030 X-RAY EXAM OF SHOULDER: CPT | Mod: TC,PN,LT

## 2023-01-05 PROCEDURE — 20610 DRAIN/INJ JOINT/BURSA W/O US: CPT | Mod: LT,S$GLB,, | Performed by: ORTHOPAEDIC SURGERY

## 2023-01-05 PROCEDURE — 1159F MED LIST DOCD IN RCRD: CPT | Mod: CPTII,S$GLB,, | Performed by: ORTHOPAEDIC SURGERY

## 2023-01-05 PROCEDURE — 1126F PR PAIN SEVERITY QUANTIFIED, NO PAIN PRESENT: ICD-10-PCS | Mod: CPTII,S$GLB,, | Performed by: ORTHOPAEDIC SURGERY

## 2023-01-05 PROCEDURE — 99213 OFFICE O/P EST LOW 20 MIN: CPT | Mod: 25,S$GLB,, | Performed by: ORTHOPAEDIC SURGERY

## 2023-01-05 PROCEDURE — 73030 X-RAY EXAM OF SHOULDER: CPT | Mod: 26,LT,, | Performed by: RADIOLOGY

## 2023-01-05 PROCEDURE — 99213 PR OFFICE/OUTPT VISIT, EST, LEVL III, 20-29 MIN: ICD-10-PCS | Mod: 25,S$GLB,, | Performed by: ORTHOPAEDIC SURGERY

## 2023-01-05 PROCEDURE — 1101F PR PT FALLS ASSESS DOC 0-1 FALLS W/OUT INJ PAST YR: ICD-10-PCS | Mod: CPTII,S$GLB,, | Performed by: ORTHOPAEDIC SURGERY

## 2023-01-05 PROCEDURE — 3288F FALL RISK ASSESSMENT DOCD: CPT | Mod: CPTII,S$GLB,, | Performed by: ORTHOPAEDIC SURGERY

## 2023-01-05 PROCEDURE — 1126F AMNT PAIN NOTED NONE PRSNT: CPT | Mod: CPTII,S$GLB,, | Performed by: ORTHOPAEDIC SURGERY

## 2023-01-05 PROCEDURE — 3288F PR FALLS RISK ASSESSMENT DOCUMENTED: ICD-10-PCS | Mod: CPTII,S$GLB,, | Performed by: ORTHOPAEDIC SURGERY

## 2023-01-05 PROCEDURE — 20610 PR DRAIN/INJECT LARGE JOINT/BURSA: ICD-10-PCS | Mod: LT,S$GLB,, | Performed by: ORTHOPAEDIC SURGERY

## 2023-01-05 PROCEDURE — 99999 PR PBB SHADOW E&M-EST. PATIENT-LVL III: CPT | Mod: PBBFAC,,, | Performed by: ORTHOPAEDIC SURGERY

## 2023-01-05 PROCEDURE — 99999 PR PBB SHADOW E&M-EST. PATIENT-LVL III: ICD-10-PCS | Mod: PBBFAC,,, | Performed by: ORTHOPAEDIC SURGERY

## 2023-01-05 PROCEDURE — 1101F PT FALLS ASSESS-DOCD LE1/YR: CPT | Mod: CPTII,S$GLB,, | Performed by: ORTHOPAEDIC SURGERY

## 2023-01-05 RX ORDER — CELECOXIB 200 MG/1
200 CAPSULE ORAL DAILY
Qty: 30 CAPSULE | Refills: 2 | Status: SHIPPED | OUTPATIENT
Start: 2023-01-05 | End: 2023-02-04

## 2023-01-05 RX ORDER — TRIAMCINOLONE ACETONIDE 40 MG/ML
40 INJECTION, SUSPENSION INTRA-ARTICULAR; INTRAMUSCULAR
Status: COMPLETED | OUTPATIENT
Start: 2023-01-05 | End: 2023-01-05

## 2023-01-05 RX ADMIN — TRIAMCINOLONE ACETONIDE 40 MG: 40 INJECTION, SUSPENSION INTRA-ARTICULAR; INTRAMUSCULAR at 09:01

## 2023-01-05 NOTE — PROGRESS NOTES
Subjective:      Patient ID: Husam Connolly is a 89 y.o. male.  Chief Complaint: Follow-up of the Left Hand      HPI  Husam Connolly is a  89 y.o. male presenting today for follow up of Dupuytren's excision of the left hand.  He reports that he is doing well with his left hand but now having some pain in his left shoulder  Regarding left shoulder having some pain difficulty with use for the past several years   No recent trauma reported.    Review of patient's allergies indicates:   Allergen Reactions    Solarcaine [benzocaine-triclosan]     Sulfa (sulfonamide antibiotics)     Sulfamethoxazole     Trimethoprim     Hytrin [terazosin] Rash    Smz-tmp ds [sulfamethoxazole-trimethoprim] Rash         Current Outpatient Medications   Medication Sig Dispense Refill    aspirin (ECOTRIN) 81 MG EC tablet Take 81 mg by mouth once daily.      atorvastatin (LIPITOR) 40 MG tablet TAKE 1 TABLET(40 MG) BY MOUTH EVERY DAY 90 tablet 3    finasteride (PROSCAR) 5 mg tablet TAKE 1 TABLET BY MOUTH EVERY DAY (Patient taking differently: Take 5 mg by mouth every evening.) 90 tablet 1    fluticasone propionate (FLONASE) 50 mcg/actuation nasal spray 2 sprays (100 mcg total) by Each Nostril route once daily. 48 g 3    GARLIC ORAL Take 1 capsule by mouth once daily.      hydroCHLOROthiazide (HYDRODIURIL) 25 MG tablet Take 1 tablet (25 mg total) by mouth once daily. 90 tablet 3    levocetirizine (XYZAL) 5 MG tablet Take 5 mg by mouth every evening.      levoFLOXacin (LEVAQUIN) 500 MG tablet Take 1 tablet (500 mg total) by mouth once daily. for 5 days 5 tablet 0    losartan (COZAAR) 25 MG tablet Take 1 tablet (25 mg total) by mouth once daily. 90 tablet 3    metFORMIN (GLUCOPHAGE) 500 MG tablet TAKE 1 TABLET BY MOUTH TWICE DAILY WITH FOOD 180 tablet 3    multivitamin capsule Take 1 capsule by mouth once daily.      omega-3 fatty acids/fish oil (FISH OIL-OMEGA-3 FATTY ACIDS) 300-1,000 mg capsule Take 1 capsule by mouth once daily.       "tamsulosin (FLOMAX) 0.4 mg Cap Take 0.4 mg by mouth nightly.  3    vitamin D (VITAMIN D3) 1000 units Tab Take 1,000 Units by mouth once daily.       No current facility-administered medications for this visit.       Past Medical History:   Diagnosis Date    BPH (benign prostatic hyperplasia)     Diabetes mellitus     Hyperlipidemia     Hypertension     Small bowel obstruction     Small bowel obstruction 6/13/2019       Past Surgical History:   Procedure Laterality Date    ANASTOMOSIS OF ILEUM TO RECTUM  9/9/2019    Procedure: ANASTOMOSIS, COLO-RECTAL;  Surgeon: Fouzia Gardner MD;  Location: [facility] OR;  Service: General;;    CHOLECYSTECTOMY N/A 3/7/2022    Procedure: CHOLECYSTECTOMY;  Surgeon: Fouzia Gardner MD;  Location: [facility] OR;  Service: General;  Laterality: N/A;    COLOSTOMY  6/16/2019    Procedure: CREATION, COLOSTOMY;  Surgeon: Fouzia Gardner MD;  Location: [facility] OR;  Service: General;;    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;  Service: General;;  Colon resection    LYSIS OF ADHESIONS N/A 3/7/2022    Procedure: LYSIS, ADHESIONS;  Surgeon: Fouzia Gardner MD;  Location: [facility];  Service: General;  Laterality: N/A;    TONSILLECTOMY         OBJECTIVE:   PHYSICAL EXAM:  Height: 5' 10" (177.8 cm) Weight: 72.1 kg (159 lb)  Vitals:    01/05/23 0816   Weight: 72.1 kg (159 lb)   Height: 5' 10" (1.778 m)   PainSc: 0-No pain     Ortho/SPM Exam  Examination left shoulder no tenderness no swelling range of motion is decreased especially abduction and elevation due to pain mild crepitation no instability    RADIOGRAPHS:  AP lateral x-ray of the left shoulder show severe arthritic changes of the shoulder " joint  Comments: I have personally reviewed the imaging and I agree with the above radiologist's report.    ASSESSMENT/PLAN:     IMPRESSION:  1. Status post Dupuytren's excision left hand doing well.      2. Severe arthritis left shoulder glenohumeral    PLAN:  I explained the nature of the problem in the shoulder   Recommended injection today  After pause for time-out identified the left shoulder injected with Kenalog 40 mg 2 cc xylocaine sterile technique  Tolerated the procedure well without complication   I have also started him on Celebrex 200 mg once a day with food    FOLLOW UP:  6-8 weeks    Disclaimer: This note has been generated using voice-recognition software. There may be typographical errors that have been missed during proof-reading.

## 2023-01-26 ENCOUNTER — LAB VISIT (OUTPATIENT)
Dept: LAB | Facility: HOSPITAL | Age: 88
End: 2023-01-26
Attending: FAMILY MEDICINE
Payer: MEDICARE

## 2023-01-26 DIAGNOSIS — E11.22 TYPE 2 DIABETES MELLITUS WITH STAGE 3A CHRONIC KIDNEY DISEASE, WITHOUT LONG-TERM CURRENT USE OF INSULIN: Chronic | ICD-10-CM

## 2023-01-26 DIAGNOSIS — N18.31 TYPE 2 DIABETES MELLITUS WITH STAGE 3A CHRONIC KIDNEY DISEASE, WITHOUT LONG-TERM CURRENT USE OF INSULIN: Chronic | ICD-10-CM

## 2023-01-26 DIAGNOSIS — G47.62 NOCTURNAL LEG CRAMPS: ICD-10-CM

## 2023-01-26 DIAGNOSIS — D69.2 SENILE PURPURA: ICD-10-CM

## 2023-01-26 DIAGNOSIS — E78.00 HIGH CHOLESTEROL: Chronic | ICD-10-CM

## 2023-01-26 LAB
ALBUMIN SERPL BCP-MCNC: 3.8 G/DL (ref 3.5–5.2)
ALP SERPL-CCNC: 70 U/L (ref 55–135)
ALT SERPL W/O P-5'-P-CCNC: 22 U/L (ref 10–44)
ANION GAP SERPL CALC-SCNC: 12 MMOL/L (ref 8–16)
AST SERPL-CCNC: 23 U/L (ref 10–40)
BASOPHILS # BLD AUTO: 0.02 K/UL (ref 0–0.2)
BASOPHILS NFR BLD: 0.3 % (ref 0–1.9)
BILIRUB SERPL-MCNC: 1.3 MG/DL (ref 0.1–1)
BUN SERPL-MCNC: 30 MG/DL (ref 10–30)
CALCIUM SERPL-MCNC: 10.1 MG/DL (ref 8.7–10.5)
CHLORIDE SERPL-SCNC: 100 MMOL/L (ref 95–110)
CHOLEST SERPL-MCNC: 180 MG/DL (ref 120–199)
CHOLEST/HDLC SERPL: 2.6 {RATIO} (ref 2–5)
CK SERPL-CCNC: 114 U/L (ref 20–200)
CO2 SERPL-SCNC: 27 MMOL/L (ref 23–29)
CREAT SERPL-MCNC: 1.3 MG/DL (ref 0.5–1.4)
DIFFERENTIAL METHOD: NORMAL
EOSINOPHIL # BLD AUTO: 0.3 K/UL (ref 0–0.5)
EOSINOPHIL NFR BLD: 3.5 % (ref 0–8)
ERYTHROCYTE [DISTWIDTH] IN BLOOD BY AUTOMATED COUNT: 13.3 % (ref 11.5–14.5)
EST. GFR  (NO RACE VARIABLE): 52 ML/MIN/1.73 M^2
ESTIMATED AVG GLUCOSE: 174 MG/DL (ref 68–131)
GLUCOSE SERPL-MCNC: 128 MG/DL (ref 70–110)
HBA1C MFR BLD: 7.7 % (ref 4–5.6)
HCT VFR BLD AUTO: 48.8 % (ref 40–54)
HDLC SERPL-MCNC: 69 MG/DL (ref 40–75)
HDLC SERPL: 38.3 % (ref 20–50)
HGB BLD-MCNC: 16.3 G/DL (ref 14–18)
IMM GRANULOCYTES # BLD AUTO: 0.02 K/UL (ref 0–0.04)
IMM GRANULOCYTES NFR BLD AUTO: 0.3 % (ref 0–0.5)
LDLC SERPL CALC-MCNC: 88.2 MG/DL (ref 63–159)
LYMPHOCYTES # BLD AUTO: 1.8 K/UL (ref 1–4.8)
LYMPHOCYTES NFR BLD: 23.6 % (ref 18–48)
MAGNESIUM SERPL-MCNC: 2.4 MG/DL (ref 1.6–2.6)
MCH RBC QN AUTO: 29.6 PG (ref 27–31)
MCHC RBC AUTO-ENTMCNC: 33.4 G/DL (ref 32–36)
MCV RBC AUTO: 89 FL (ref 82–98)
MONOCYTES # BLD AUTO: 0.7 K/UL (ref 0.3–1)
MONOCYTES NFR BLD: 9.5 % (ref 4–15)
NEUTROPHILS # BLD AUTO: 4.9 K/UL (ref 1.8–7.7)
NEUTROPHILS NFR BLD: 62.8 % (ref 38–73)
NONHDLC SERPL-MCNC: 111 MG/DL
NRBC BLD-RTO: 0 /100 WBC
PLATELET # BLD AUTO: 164 K/UL (ref 150–450)
PMV BLD AUTO: 11.3 FL (ref 9.2–12.9)
POTASSIUM SERPL-SCNC: 4.3 MMOL/L (ref 3.5–5.1)
PROT SERPL-MCNC: 7.1 G/DL (ref 6–8.4)
RBC # BLD AUTO: 5.5 M/UL (ref 4.6–6.2)
SODIUM SERPL-SCNC: 139 MMOL/L (ref 136–145)
TRIGL SERPL-MCNC: 114 MG/DL (ref 30–150)
WBC # BLD AUTO: 7.75 K/UL (ref 3.9–12.7)

## 2023-01-26 PROCEDURE — 83735 ASSAY OF MAGNESIUM: CPT | Performed by: FAMILY MEDICINE

## 2023-01-26 PROCEDURE — 85025 COMPLETE CBC W/AUTO DIFF WBC: CPT | Performed by: FAMILY MEDICINE

## 2023-01-26 PROCEDURE — 80053 COMPREHEN METABOLIC PANEL: CPT | Performed by: FAMILY MEDICINE

## 2023-01-26 PROCEDURE — 82550 ASSAY OF CK (CPK): CPT | Performed by: FAMILY MEDICINE

## 2023-01-26 PROCEDURE — 83036 HEMOGLOBIN GLYCOSYLATED A1C: CPT | Performed by: FAMILY MEDICINE

## 2023-01-26 PROCEDURE — 80061 LIPID PANEL: CPT | Performed by: FAMILY MEDICINE

## 2023-01-26 PROCEDURE — 36415 COLL VENOUS BLD VENIPUNCTURE: CPT | Performed by: FAMILY MEDICINE

## 2023-03-02 ENCOUNTER — OFFICE VISIT (OUTPATIENT)
Dept: ORTHOPEDICS | Facility: CLINIC | Age: 88
End: 2023-03-02
Payer: MEDICARE

## 2023-03-02 VITALS — BODY MASS INDEX: 23.39 KG/M2 | HEIGHT: 70 IN

## 2023-03-02 DIAGNOSIS — M72.0 DUPUYTREN'S CONTRACTURE OF LEFT HAND: Primary | ICD-10-CM

## 2023-03-02 DIAGNOSIS — M19.019 SHOULDER ARTHRITIS: ICD-10-CM

## 2023-03-02 PROCEDURE — 1126F PR PAIN SEVERITY QUANTIFIED, NO PAIN PRESENT: ICD-10-PCS | Mod: CPTII,S$GLB,, | Performed by: ORTHOPAEDIC SURGERY

## 2023-03-02 PROCEDURE — 1126F AMNT PAIN NOTED NONE PRSNT: CPT | Mod: CPTII,S$GLB,, | Performed by: ORTHOPAEDIC SURGERY

## 2023-03-02 PROCEDURE — 1159F MED LIST DOCD IN RCRD: CPT | Mod: CPTII,S$GLB,, | Performed by: ORTHOPAEDIC SURGERY

## 2023-03-02 PROCEDURE — 1101F PT FALLS ASSESS-DOCD LE1/YR: CPT | Mod: CPTII,S$GLB,, | Performed by: ORTHOPAEDIC SURGERY

## 2023-03-02 PROCEDURE — 99999 PR PBB SHADOW E&M-EST. PATIENT-LVL III: ICD-10-PCS | Mod: PBBFAC,,, | Performed by: ORTHOPAEDIC SURGERY

## 2023-03-02 PROCEDURE — 1101F PR PT FALLS ASSESS DOC 0-1 FALLS W/OUT INJ PAST YR: ICD-10-PCS | Mod: CPTII,S$GLB,, | Performed by: ORTHOPAEDIC SURGERY

## 2023-03-02 PROCEDURE — 99213 PR OFFICE/OUTPT VISIT, EST, LEVL III, 20-29 MIN: ICD-10-PCS | Mod: S$GLB,,, | Performed by: ORTHOPAEDIC SURGERY

## 2023-03-02 PROCEDURE — 99999 PR PBB SHADOW E&M-EST. PATIENT-LVL III: CPT | Mod: PBBFAC,,, | Performed by: ORTHOPAEDIC SURGERY

## 2023-03-02 PROCEDURE — 3288F FALL RISK ASSESSMENT DOCD: CPT | Mod: CPTII,S$GLB,, | Performed by: ORTHOPAEDIC SURGERY

## 2023-03-02 PROCEDURE — 3288F PR FALLS RISK ASSESSMENT DOCUMENTED: ICD-10-PCS | Mod: CPTII,S$GLB,, | Performed by: ORTHOPAEDIC SURGERY

## 2023-03-02 PROCEDURE — 1159F PR MEDICATION LIST DOCUMENTED IN MEDICAL RECORD: ICD-10-PCS | Mod: CPTII,S$GLB,, | Performed by: ORTHOPAEDIC SURGERY

## 2023-03-02 PROCEDURE — 99213 OFFICE O/P EST LOW 20 MIN: CPT | Mod: S$GLB,,, | Performed by: ORTHOPAEDIC SURGERY

## 2023-03-02 NOTE — PROGRESS NOTES
Subjective:      Patient ID: Husam Connolly is a 89 y.o. male.  Chief Complaint: Follow-up of the Left Shoulder and Follow-up (Left hand Dupuytren's ( Sx 4/22) )      HPI  Huasm Connolly is a  89 y.o. male presenting today for follow up of left shoulder arthritis.  He reports that he is much improved after the injection last visit   Pain is minimal minimal still having some stiffness in the shoulder  He is also doing well with his left hand after previous Dupuytren's excision.    Review of patient's allergies indicates:   Allergen Reactions    Solarcaine [benzocaine-triclosan]     Sulfa (sulfonamide antibiotics)     Sulfamethoxazole     Trimethoprim     Hytrin [terazosin] Rash    Smz-tmp ds [sulfamethoxazole-trimethoprim] Rash         Current Outpatient Medications   Medication Sig Dispense Refill    aspirin (ECOTRIN) 81 MG EC tablet Take 81 mg by mouth once daily.      atorvastatin (LIPITOR) 40 MG tablet TAKE 1 TABLET(40 MG) BY MOUTH EVERY DAY 90 tablet 3    celecoxib (CELEBREX) 200 MG capsule       finasteride (PROSCAR) 5 mg tablet TAKE 1 TABLET BY MOUTH EVERY DAY (Patient taking differently: Take 5 mg by mouth every evening.) 90 tablet 1    fluticasone propionate (FLONASE) 50 mcg/actuation nasal spray 2 sprays (100 mcg total) by Each Nostril route once daily. 48 g 3    GARLIC ORAL Take 1 capsule by mouth once daily.      losartan (COZAAR) 25 MG tablet Take 1 tablet (25 mg total) by mouth once daily. 90 tablet 3    metFORMIN (GLUCOPHAGE) 500 MG tablet TAKE 1 TABLET BY MOUTH TWICE DAILY WITH FOOD 180 tablet 3    multivitamin capsule Take 1 capsule by mouth once daily.      omega-3 fatty acids/fish oil (FISH OIL-OMEGA-3 FATTY ACIDS) 300-1,000 mg capsule Take 1 capsule by mouth once daily.      tamsulosin (FLOMAX) 0.4 mg Cap Take 0.4 mg by mouth nightly.  3    vitamin D (VITAMIN D3) 1000 units Tab Take 1,000 Units by mouth once daily.       No current facility-administered medications for this visit.       Past  "Medical History:   Diagnosis Date    BPH (benign prostatic hyperplasia)     Diabetes mellitus     Hyperlipidemia     Hypertension     Small bowel obstruction 06/13/2019       Past Surgical History:   Procedure Laterality Date    ANASTOMOSIS OF ILEUM TO RECTUM  9/9/2019    Procedure: ANASTOMOSIS, COLO-RECTAL;  Surgeon: Fouzia Gardner MD;  Location: [facility] OR;  Service: General;;    CHOLECYSTECTOMY N/A 3/7/2022    Procedure: CHOLECYSTECTOMY;  Surgeon: Fouzia Gardner MD;  Location: [facility] OR;  Service: General;  Laterality: N/A;    COLOSTOMY  6/16/2019    Procedure: CREATION, COLOSTOMY;  Surgeon: Fouzia Gardner MD;  Location: [facility] OR;  Service: General;;    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    Procedure: RELEASE, DUPUYTREN CONTRACTURE;  Surgeon: Slick Baldwin Jr., MD;  Location: [facility] OR;  Service: Orthopedics;  Laterality: Left;    EYE SURGERY      GISELA PROCEDURE  6/16/2019    Procedure: GISELA PROCEDURE;  Surgeon: Fouzia Gardner MD;  Location: [facility] OR;  Service: General;;  Colon resection    LYSIS OF ADHESIONS N/A 3/7/2022    Procedure: LYSIS, ADHESIONS;  Surgeon: Fouzia Gardner MD;  Location: [facility] OR;  Service: General;  Laterality: N/A;    TONSILLECTOMY         OBJECTIVE:   PHYSICAL EXAM:  Height: 5' 10" (177.8 cm)    Vitals:    03/02/23 0934   Height: 5' 10" (1.778 m)   PainSc: 0-No pain     Ortho/SPM Exam  Examination left hand range of motion fingers full slight contracture of the MP joint mild scarring   Examination left shoulder no tenderness no swelling range of motion is slightly decreased due to stiffness but minimal pain   Good strength       RADIOGRAPHS:  None  Comments: I have personally reviewed the imaging and I agree with the above radiologist's report.    ASSESSMENT/PLAN:     IMPRESSION:  Left shoulder arthritis severe    PLAN:  He really does not feel like he needs an injection although we can repeat that in the future   Continue current medications and " range of motion to prevent stiffness    FOLLOW UP:  2-3 months    Disclaimer: This note has been generated using voice-recognition software. There may be typographical errors that have been missed during proof-reading.

## 2023-05-19 ENCOUNTER — LAB VISIT (OUTPATIENT)
Dept: LAB | Facility: HOSPITAL | Age: 88
End: 2023-05-19
Attending: FAMILY MEDICINE
Payer: MEDICARE

## 2023-05-19 DIAGNOSIS — R60.0 EDEMA OF BOTH LOWER LEGS: ICD-10-CM

## 2023-05-19 LAB
ALBUMIN SERPL BCP-MCNC: 3.8 G/DL (ref 3.5–5.2)
ALP SERPL-CCNC: 78 U/L (ref 55–135)
ALT SERPL W/O P-5'-P-CCNC: 20 U/L (ref 10–44)
ANION GAP SERPL CALC-SCNC: 8 MMOL/L (ref 8–16)
AST SERPL-CCNC: 21 U/L (ref 10–40)
BILIRUB SERPL-MCNC: 0.7 MG/DL (ref 0.1–1)
BNP SERPL-MCNC: 84 PG/ML (ref 0–99)
BUN SERPL-MCNC: 29 MG/DL (ref 10–30)
CALCIUM SERPL-MCNC: 9.8 MG/DL (ref 8.7–10.5)
CHLORIDE SERPL-SCNC: 104 MMOL/L (ref 95–110)
CO2 SERPL-SCNC: 27 MMOL/L (ref 23–29)
CREAT SERPL-MCNC: 1.5 MG/DL (ref 0.5–1.4)
EST. GFR  (NO RACE VARIABLE): 44 ML/MIN/1.73 M^2
GLUCOSE SERPL-MCNC: 128 MG/DL (ref 70–110)
POTASSIUM SERPL-SCNC: 4.9 MMOL/L (ref 3.5–5.1)
PROT SERPL-MCNC: 6.9 G/DL (ref 6–8.4)
SODIUM SERPL-SCNC: 139 MMOL/L (ref 136–145)
TSH SERPL DL<=0.005 MIU/L-ACNC: 1.12 UIU/ML (ref 0.4–4)

## 2023-05-19 PROCEDURE — 83880 ASSAY OF NATRIURETIC PEPTIDE: CPT | Performed by: FAMILY MEDICINE

## 2023-05-19 PROCEDURE — 36415 COLL VENOUS BLD VENIPUNCTURE: CPT | Performed by: FAMILY MEDICINE

## 2023-05-19 PROCEDURE — 84443 ASSAY THYROID STIM HORMONE: CPT | Performed by: FAMILY MEDICINE

## 2023-05-19 PROCEDURE — 80053 COMPREHEN METABOLIC PANEL: CPT | Performed by: FAMILY MEDICINE

## 2023-06-08 ENCOUNTER — OFFICE VISIT (OUTPATIENT)
Dept: ORTHOPEDICS | Facility: CLINIC | Age: 88
End: 2023-06-08
Payer: MEDICARE

## 2023-06-08 VITALS — HEIGHT: 70 IN | BODY MASS INDEX: 24.05 KG/M2 | WEIGHT: 168 LBS

## 2023-06-08 DIAGNOSIS — I10 HYPERTENSION, UNSPECIFIED TYPE: Primary | ICD-10-CM

## 2023-06-08 DIAGNOSIS — M19.019 SHOULDER ARTHRITIS: ICD-10-CM

## 2023-06-08 PROCEDURE — 3288F FALL RISK ASSESSMENT DOCD: CPT | Mod: CPTII,S$GLB,, | Performed by: ORTHOPAEDIC SURGERY

## 2023-06-08 PROCEDURE — 1125F PR PAIN SEVERITY QUANTIFIED, PAIN PRESENT: ICD-10-PCS | Mod: CPTII,S$GLB,, | Performed by: ORTHOPAEDIC SURGERY

## 2023-06-08 PROCEDURE — 1101F PR PT FALLS ASSESS DOC 0-1 FALLS W/OUT INJ PAST YR: ICD-10-PCS | Mod: CPTII,S$GLB,, | Performed by: ORTHOPAEDIC SURGERY

## 2023-06-08 PROCEDURE — 1159F PR MEDICATION LIST DOCUMENTED IN MEDICAL RECORD: ICD-10-PCS | Mod: CPTII,S$GLB,, | Performed by: ORTHOPAEDIC SURGERY

## 2023-06-08 PROCEDURE — 99999 PR PBB SHADOW E&M-EST. PATIENT-LVL III: ICD-10-PCS | Mod: PBBFAC,,, | Performed by: ORTHOPAEDIC SURGERY

## 2023-06-08 PROCEDURE — 1101F PT FALLS ASSESS-DOCD LE1/YR: CPT | Mod: CPTII,S$GLB,, | Performed by: ORTHOPAEDIC SURGERY

## 2023-06-08 PROCEDURE — 1125F AMNT PAIN NOTED PAIN PRSNT: CPT | Mod: CPTII,S$GLB,, | Performed by: ORTHOPAEDIC SURGERY

## 2023-06-08 PROCEDURE — 99999 PR PBB SHADOW E&M-EST. PATIENT-LVL III: CPT | Mod: PBBFAC,,, | Performed by: ORTHOPAEDIC SURGERY

## 2023-06-08 PROCEDURE — 3288F PR FALLS RISK ASSESSMENT DOCUMENTED: ICD-10-PCS | Mod: CPTII,S$GLB,, | Performed by: ORTHOPAEDIC SURGERY

## 2023-06-08 PROCEDURE — 1159F MED LIST DOCD IN RCRD: CPT | Mod: CPTII,S$GLB,, | Performed by: ORTHOPAEDIC SURGERY

## 2023-06-08 PROCEDURE — 99213 PR OFFICE/OUTPT VISIT, EST, LEVL III, 20-29 MIN: ICD-10-PCS | Mod: S$GLB,,, | Performed by: ORTHOPAEDIC SURGERY

## 2023-06-08 PROCEDURE — 99213 OFFICE O/P EST LOW 20 MIN: CPT | Mod: S$GLB,,, | Performed by: ORTHOPAEDIC SURGERY

## 2023-06-08 NOTE — PROGRESS NOTES
Subjective:      Patient ID: Husam Connolly is a 89 y.o. male.  Chief Complaint: Follow-up of the Left Shoulder      HPI  Husam Connolly is a  89 y.o. male presenting today for follow up of left shoulder arthritis.  He reports that he is doing well   Minimal pain  He has been doing exercises and is keeping the shoulder loose with good motion  He is able to do most regular activities with the shoulder.    Review of patient's allergies indicates:   Allergen Reactions    Solarcaine [benzocaine-triclosan]     Sulfa (sulfonamide antibiotics)     Sulfamethoxazole     Trimethoprim     Hytrin [terazosin] Rash    Smz-tmp ds [sulfamethoxazole-trimethoprim] Rash         Current Outpatient Medications   Medication Sig Dispense Refill    aspirin (ECOTRIN) 81 MG EC tablet Take 81 mg by mouth once daily.      atorvastatin (LIPITOR) 40 MG tablet TAKE 1 TABLET(40 MG) BY MOUTH EVERY DAY 90 tablet 3    celecoxib (CELEBREX) 200 MG capsule TAKE 1 CAPSULE(200 MG) BY MOUTH EVERY DAY 30 capsule 2    finasteride (PROSCAR) 5 mg tablet TAKE 1 TABLET BY MOUTH EVERY DAY (Patient taking differently: Take 5 mg by mouth every evening.) 90 tablet 1    fluticasone propionate (FLONASE) 50 mcg/actuation nasal spray 2 sprays (100 mcg total) by Each Nostril route once daily. 48 g 3    GARLIC ORAL Take 1 capsule by mouth once daily.      losartan (COZAAR) 25 MG tablet TAKE 1 TABLET BY MOUTH EVERY DAY 90 tablet 3    metFORMIN (GLUCOPHAGE) 500 MG tablet TAKE 1 TABLET BY MOUTH TWICE DAILY WITH FOOD 180 tablet 3    multivitamin capsule Take 1 capsule by mouth once daily.      omega-3 fatty acids/fish oil (FISH OIL-OMEGA-3 FATTY ACIDS) 300-1,000 mg capsule Take 1 capsule by mouth once daily.      tamsulosin (FLOMAX) 0.4 mg Cap Take 0.4 mg by mouth nightly.  3    vitamin D (VITAMIN D3) 1000 units Tab Take 1,000 Units by mouth once daily.       No current facility-administered medications for this visit.       Past Medical History:   Diagnosis Date    BPH  "(benign prostatic hyperplasia)     Diabetes mellitus     Hyperlipidemia     Hypertension     Small bowel obstruction 06/13/2019       Past Surgical History:   Procedure Laterality Date    ANASTOMOSIS OF ILEUM TO RECTUM  9/9/2019    Procedure: ANASTOMOSIS, COLO-RECTAL;  Surgeon: Fouzia Gardner MD;  Location: [facility] OR;  Service: General;;    CHOLECYSTECTOMY N/A 3/7/2022    Procedure: CHOLECYSTECTOMY;  Surgeon: Fouzia Gardner MD;  Location: [facility] OR;  Service: General;  Laterality: N/A;    COLOSTOMY  6/16/2019    Procedure: CREATION, COLOSTOMY;  Surgeon: Fouzia Gardner MD;  Location: [facility] OR;  Service: General;;    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    Procedure: RELEASE, DUPUYTREN CONTRACTURE;  Surgeon: Slick Baldwin Jr., MD;  Location: [facility] OR;  Service: Orthopedics;  Laterality: Left;    EYE SURGERY      GISELA PROCEDURE  6/16/2019    Procedure: GISELA PROCEDURE;  Surgeon: Fouzia Gardner MD;  Location: [facility] OR;  Service: General;;  Colon resection    LYSIS OF ADHESIONS N/A 3/7/2022    Procedure: LYSIS, ADHESIONS;  Surgeon: Fouzia Gardner MD;  Location: [facility] OR;  Service: General;  Laterality: N/A;    TONSILLECTOMY         OBJECTIVE:   PHYSICAL EXAM:  Height: 5' 10" (177.8 cm) Weight: 76.2 kg (168 lb)  Vitals:    06/08/23 1024   Weight: 76.2 kg (168 lb)   Height: 5' 10" (1.778 m)   PainSc:   2     Ortho/SPM Exam  Examination left shoulder no tenderness no swelling no bruising  Range of motion is about 60 percent normal with minimal pain   No crepitation   No instability  Strength intact       RADIOGRAPHS:  None  Comments: I have personally reviewed the imaging and I agree with the above radiologist's report.    ASSESSMENT/PLAN:     IMPRESSION:  Left shoulder arthritis severe    PLAN:  He is doing well so I really do not see any reason to consider surgery or injection   Advil or Motrin as needed   Continue exercises as he is currently doing and regular activities   If " symptoms worsen we may repeat the injection in the future    FOLLOW UP:  As needed    Disclaimer: This note has been generated using voice-recognition software. There may be typographical errors that have been missed during proof-reading.

## 2023-06-09 PROBLEM — I45.10 NEW ONSET RIGHT BUNDLE BRANCH BLOCK (RBBB): Status: RESOLVED | Noted: 2021-05-11 | Resolved: 2023-06-09

## 2023-06-09 PROBLEM — R06.09 DYSPNEA ON EXERTION: Status: RESOLVED | Noted: 2022-11-29 | Resolved: 2023-06-09

## 2023-06-13 ENCOUNTER — HOSPITAL ENCOUNTER (OUTPATIENT)
Dept: RADIOLOGY | Facility: HOSPITAL | Age: 88
Discharge: HOME | End: 2023-06-13
Attending: INTERNAL MEDICINE
Payer: MEDICARE

## 2023-06-13 ENCOUNTER — TELEPHONE (OUTPATIENT)
Dept: CARDIOLOGY | Facility: CLINIC | Age: 88
End: 2023-06-13

## 2023-06-13 ENCOUNTER — OFFICE VISIT (OUTPATIENT)
Dept: CARDIOLOGY | Facility: CLINIC | Age: 88
End: 2023-06-13
Payer: MEDICARE

## 2023-06-13 VITALS
HEIGHT: 70 IN | OXYGEN SATURATION: 94 % | SYSTOLIC BLOOD PRESSURE: 150 MMHG | DIASTOLIC BLOOD PRESSURE: 70 MMHG | BODY MASS INDEX: 23.78 KG/M2 | HEART RATE: 84 BPM | WEIGHT: 166.13 LBS

## 2023-06-13 DIAGNOSIS — R06.09 DYSPNEA ON EXERTION: ICD-10-CM

## 2023-06-13 DIAGNOSIS — R91.1 PULMONARY NODULE: ICD-10-CM

## 2023-06-13 DIAGNOSIS — E78.00 HIGH CHOLESTEROL: ICD-10-CM

## 2023-06-13 DIAGNOSIS — I70.0 AORTIC ATHEROSCLEROSIS: ICD-10-CM

## 2023-06-13 DIAGNOSIS — I45.10 RBBB: ICD-10-CM

## 2023-06-13 DIAGNOSIS — N40.1 BENIGN PROSTATIC HYPERPLASIA WITH LOWER URINARY TRACT SYMPTOMS, SYMPTOM DETAILS UNSPECIFIED: ICD-10-CM

## 2023-06-13 DIAGNOSIS — N18.31 TYPE 2 DIABETES MELLITUS WITH STAGE 3A CHRONIC KIDNEY DISEASE, WITHOUT LONG-TERM CURRENT USE OF INSULIN: Chronic | ICD-10-CM

## 2023-06-13 DIAGNOSIS — E78.00 HIGH CHOLESTEROL: Primary | Chronic | ICD-10-CM

## 2023-06-13 DIAGNOSIS — J43.2 CENTRILOBULAR EMPHYSEMA: ICD-10-CM

## 2023-06-13 DIAGNOSIS — N18.30 STAGE 3 CHRONIC KIDNEY DISEASE, UNSPECIFIED WHETHER STAGE 3A OR 3B CKD: Chronic | ICD-10-CM

## 2023-06-13 DIAGNOSIS — R60.0 LOCALIZED EDEMA: ICD-10-CM

## 2023-06-13 DIAGNOSIS — N18.31 TYPE 2 DIABETES MELLITUS WITH STAGE 3A CHRONIC KIDNEY DISEASE, WITHOUT LONG-TERM CURRENT USE OF INSULIN: ICD-10-CM

## 2023-06-13 DIAGNOSIS — I10 PRIMARY HYPERTENSION: ICD-10-CM

## 2023-06-13 DIAGNOSIS — I10 PRIMARY HYPERTENSION: Chronic | ICD-10-CM

## 2023-06-13 DIAGNOSIS — N18.30 STAGE 3 CHRONIC KIDNEY DISEASE, UNSPECIFIED WHETHER STAGE 3A OR 3B CKD: ICD-10-CM

## 2023-06-13 DIAGNOSIS — E11.22 TYPE 2 DIABETES MELLITUS WITH STAGE 3A CHRONIC KIDNEY DISEASE, WITHOUT LONG-TERM CURRENT USE OF INSULIN: ICD-10-CM

## 2023-06-13 DIAGNOSIS — E11.22 TYPE 2 DIABETES MELLITUS WITH STAGE 3A CHRONIC KIDNEY DISEASE, WITHOUT LONG-TERM CURRENT USE OF INSULIN: Chronic | ICD-10-CM

## 2023-06-13 PROCEDURE — 99999 PR PBB SHADOW E&M-EST. PATIENT-LVL IV: ICD-10-PCS | Mod: PBBFAC,,, | Performed by: INTERNAL MEDICINE

## 2023-06-13 PROCEDURE — 3288F PR FALLS RISK ASSESSMENT DOCUMENTED: ICD-10-PCS | Mod: CPTII,S$GLB,, | Performed by: INTERNAL MEDICINE

## 2023-06-13 PROCEDURE — 71046 XR CHEST PA AND LATERAL: ICD-10-PCS | Mod: 26,,, | Performed by: RADIOLOGY

## 2023-06-13 PROCEDURE — 1160F RVW MEDS BY RX/DR IN RCRD: CPT | Mod: CPTII,S$GLB,, | Performed by: INTERNAL MEDICINE

## 2023-06-13 PROCEDURE — 1159F PR MEDICATION LIST DOCUMENTED IN MEDICAL RECORD: ICD-10-PCS | Mod: CPTII,S$GLB,, | Performed by: INTERNAL MEDICINE

## 2023-06-13 PROCEDURE — 99999 PR PBB SHADOW E&M-EST. PATIENT-LVL IV: CPT | Mod: PBBFAC,,, | Performed by: INTERNAL MEDICINE

## 2023-06-13 PROCEDURE — 1101F PR PT FALLS ASSESS DOC 0-1 FALLS W/OUT INJ PAST YR: ICD-10-PCS | Mod: CPTII,S$GLB,, | Performed by: INTERNAL MEDICINE

## 2023-06-13 PROCEDURE — 99214 PR OFFICE/OUTPT VISIT, EST, LEVL IV, 30-39 MIN: ICD-10-PCS | Mod: 25,S$GLB,, | Performed by: INTERNAL MEDICINE

## 2023-06-13 PROCEDURE — 1159F MED LIST DOCD IN RCRD: CPT | Mod: CPTII,S$GLB,, | Performed by: INTERNAL MEDICINE

## 2023-06-13 PROCEDURE — 99214 OFFICE O/P EST MOD 30 MIN: CPT | Mod: 25,S$GLB,, | Performed by: INTERNAL MEDICINE

## 2023-06-13 PROCEDURE — 1126F AMNT PAIN NOTED NONE PRSNT: CPT | Mod: CPTII,S$GLB,, | Performed by: INTERNAL MEDICINE

## 2023-06-13 PROCEDURE — 1126F PR PAIN SEVERITY QUANTIFIED, NO PAIN PRESENT: ICD-10-PCS | Mod: CPTII,S$GLB,, | Performed by: INTERNAL MEDICINE

## 2023-06-13 PROCEDURE — 1101F PT FALLS ASSESS-DOCD LE1/YR: CPT | Mod: CPTII,S$GLB,, | Performed by: INTERNAL MEDICINE

## 2023-06-13 PROCEDURE — 93000 ELECTROCARDIOGRAM COMPLETE: CPT | Mod: S$GLB,,, | Performed by: INTERNAL MEDICINE

## 2023-06-13 PROCEDURE — 71046 X-RAY EXAM CHEST 2 VIEWS: CPT | Mod: TC,FY

## 2023-06-13 PROCEDURE — 93000 EKG 12-LEAD: ICD-10-PCS | Mod: S$GLB,,, | Performed by: INTERNAL MEDICINE

## 2023-06-13 PROCEDURE — 1160F PR REVIEW ALL MEDS BY PRESCRIBER/CLIN PHARMACIST DOCUMENTED: ICD-10-PCS | Mod: CPTII,S$GLB,, | Performed by: INTERNAL MEDICINE

## 2023-06-13 PROCEDURE — 3288F FALL RISK ASSESSMENT DOCD: CPT | Mod: CPTII,S$GLB,, | Performed by: INTERNAL MEDICINE

## 2023-06-13 PROCEDURE — 71046 X-RAY EXAM CHEST 2 VIEWS: CPT | Mod: 26,,, | Performed by: RADIOLOGY

## 2023-06-13 RX ORDER — LOSARTAN POTASSIUM 50 MG/1
50 TABLET ORAL DAILY
Qty: 90 TABLET | Refills: 3 | Status: SHIPPED | OUTPATIENT
Start: 2023-06-13 | End: 2023-07-25 | Stop reason: SDUPTHER

## 2023-06-13 NOTE — PROGRESS NOTES
Subjective:      Patient ID: Husam Connolly is a 89 y.o. male.    Chief Complaint: Follow-up    HPI:  Pt c/o shortness of breath walking short distances, getting worse.    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    Review of Systems   Cardiovascular:  Positive for dyspnea on exertion and leg swelling. Negative for chest pain, claudication, irregular heartbeat, near-syncope, orthopnea, palpitations and syncope.      Nocturia times one or two    BP at home is 130-150 over 70  Past Medical History:   Diagnosis Date    BPH (benign prostatic hyperplasia)     Diabetes mellitus (type 2)     Emphysema 03/12/2022    High cholesterol 08/26/2020    Hypertension     RBBB 11/29/2022    Small bowel obstruction 06/13/2019        Past Surgical History:   Procedure Laterality Date    ANASTOMOSIS OF ILEUM TO RECTUM  9/9/2019    Procedure: ANASTOMOSIS, COLO-RECTAL;  Surgeon: Fouzia Gardner MD;  Location: [facility] OR;  Service: General;;    CHOLECYSTECTOMY N/A 3/7/2022    Procedure: CHOLECYSTECTOMY;  Surgeon: Fouzia Gardner MD;  Location: [facility] OR;  Service: General;  Laterality: N/A;    COLOSTOMY  6/16/2019    Procedure: CREATION, COLOSTOMY;  Surgeon: Fouzia Gardner MD;  Location: [facility] OR;  Service: General;;    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    Procedure: RELEASE, DUPUYTREN CONTRACTURE;  Surgeon: Slick Baldwin Jr., MD;  Location: [facility] OR;  Service: Orthopedics;  Laterality: Left;    EYE SURGERY      GISELA PROCEDURE  6/16/2019    Procedure: GISELA PROCEDURE;  Surgeon: Fouzia Gardner MD;  Location: [facility] OR;  Service: General;;  Colon resection    LYSIS OF ADHESIONS N/A 3/7/2022    Procedure: LYSIS, ADHESIONS;  Surgeon: Fouzia Gardner MD;  Location: [facility] OR;  Service: General;  Laterality: N/A;    TONSILLECTOMY         Family History   Problem Relation Age of Onset    Heart disease Father     Hypertension Father     Cancer Mother     Diabetes  Paternal Aunt        Social History     Socioeconomic History    Marital status:    Tobacco Use    Smoking status: Former     Packs/day: 1.00     Years: 25.00     Pack years: 25.00     Types: Cigarettes     Start date:      Quit date:      Years since quittin.4    Smokeless tobacco: Never   Substance and Sexual Activity    Alcohol use: Not Currently     Alcohol/week: 5.0 standard drinks     Types: 5 Cans of beer per week    Drug use: No    Sexual activity: Yes     Partners: Female     Social Determinants of Health     Financial Resource Strain: Low Risk     Difficulty of Paying Living Expenses: Not hard at all   Food Insecurity: No Food Insecurity    Worried About Running Out of Food in the Last Year: Never true    Ran Out of Food in the Last Year: Never true   Transportation Needs: No Transportation Needs    Lack of Transportation (Medical): No    Lack of Transportation (Non-Medical): No   Physical Activity: Inactive    Days of Exercise per Week: 0 days    Minutes of Exercise per Session: 0 min   Stress: No Stress Concern Present    Feeling of Stress : Not at all   Social Connections: Socially Isolated    Frequency of Communication with Friends and Family: More than three times a week    Frequency of Social Gatherings with Friends and Family: More than three times a week    Attends [employer] Services: Never    Active Member of Clubs or Organizations: No    Attends Club or Organization Meetings: Never    Marital Status:    Housing Stability: Low Risk     Unable to Pay for Housing in the Last Year: No    Number of Places Lived in the Last Year: 1    Unstable Housing in the Last Year: No       Current Outpatient Medications on File Prior to Visit   Medication Sig Dispense Refill    aspirin (ECOTRIN) 81 MG EC tablet Take 81 mg by mouth once daily.      atorvastatin (LIPITOR) 40 MG tablet TAKE 1 TABLET(40 MG) BY MOUTH EVERY DAY 90 tablet 3    finasteride (PROSCAR) 5 mg tablet TAKE 1 TABLET BY  "MOUTH EVERY DAY (Patient taking differently: Take 5 mg by mouth every evening.) 90 tablet 1    fluticasone propionate (FLONASE) 50 mcg/actuation nasal spray 2 sprays (100 mcg total) by Each Nostril route once daily. 48 g 3    fluticasone-umeclidin-vilanter (TRELEGY ELLIPTA) 100-62.5-25 mcg DsDv Inhale 1 puff into the lungs once daily.      GARLIC ORAL Take 1 capsule by mouth once daily.      metFORMIN (GLUCOPHAGE) 500 MG tablet TAKE 1 TABLET BY MOUTH TWICE DAILY WITH FOOD 180 tablet 3    multivitamin capsule Take 1 capsule by mouth once daily.      omega-3 fatty acids/fish oil (FISH OIL-OMEGA-3 FATTY ACIDS) 300-1,000 mg capsule Take 1 capsule by mouth once daily.      tamsulosin (FLOMAX) 0.4 mg Cap Take 0.4 mg by mouth nightly.  3    vitamin D (VITAMIN D3) 1000 units Tab Take 1,000 Units by mouth once daily.      [DISCONTINUED] celecoxib (CELEBREX) 200 MG capsule TAKE 1 CAPSULE(200 MG) BY MOUTH EVERY DAY 30 capsule 2    [DISCONTINUED] losartan (COZAAR) 25 MG tablet TAKE 1 TABLET BY MOUTH EVERY DAY 90 tablet 3     No current facility-administered medications on file prior to visit.       Review of patient's allergies indicates:   Allergen Reactions    Solarcaine [benzocaine-triclosan]     Sulfa (sulfonamide antibiotics)     Sulfamethoxazole     Trimethoprim     Hytrin [terazosin] Rash    Smz-tmp ds [sulfamethoxazole-trimethoprim] Rash     Objective:     Vitals:    06/13/23 0943 06/13/23 1009   BP: (!) 152/85 (!) 150/70   BP Location: Right arm Left arm   Patient Position: Sitting Sitting   BP Method: Large (Automatic)    Pulse: 84    SpO2: (!) 94%    Weight: 75.4 kg (166 lb 1.9 oz)    Height: 5' 10" (1.778 m)         Physical Exam  Constitutional:       General: He is not in acute distress.     Appearance: He is well-developed. He is not diaphoretic.   Eyes:      General: No scleral icterus.  Neck:      Vascular: No carotid bruit or JVD.   Cardiovascular:      Rate and Rhythm: Regular rhythm.      Heart sounds: " Normal heart sounds. No murmur heard.    No friction rub. No gallop.   Pulmonary:      Effort: Pulmonary effort is normal. No respiratory distress.      Breath sounds: Rales (crackles left base posteriorly) present.   Musculoskeletal:      Right lower leg: Edema (trivial) present.      Left lower leg: Edema (trace pitting) present.   Skin:     General: Skin is warm and dry.   Neurological:      Mental Status: He is alert and oriented to person, place, and time.   Psychiatric:         Behavior: Behavior normal.         Thought Content: Thought content normal.         Judgment: Judgment normal.      ECG today reviewed by me: NSR with PVC, left axis deviation, nonspecific T wave abnormality    Lab Visit on 05/19/2023   Component Date Value Ref Range Status    Sodium 05/19/2023 139  136 - 145 mmol/L Final    Potassium 05/19/2023 4.9  3.5 - 5.1 mmol/L Final    Chloride 05/19/2023 104  95 - 110 mmol/L Final    CO2 05/19/2023 27  23 - 29 mmol/L Final    Glucose 05/19/2023 128 (H)  70 - 110 mg/dL Final    BUN 05/19/2023 29  10 - 30 mg/dL Final    Creatinine 05/19/2023 1.5 (H)  0.5 - 1.4 mg/dL Final    Calcium 05/19/2023 9.8  8.7 - 10.5 mg/dL Final    Total Protein 05/19/2023 6.9  6.0 - 8.4 g/dL Final    Albumin 05/19/2023 3.8  3.5 - 5.2 g/dL Final    Total Bilirubin 05/19/2023 0.7  0.1 - 1.0 mg/dL Final    Alkaline Phosphatase 05/19/2023 78  55 - 135 U/L Final    AST 05/19/2023 21  10 - 40 U/L Final    ALT 05/19/2023 20  10 - 44 U/L Final    Anion Gap 05/19/2023 8  8 - 16 mmol/L Final    eGFR 05/19/2023 44 (A)  >60 mL/min/1.73 m^2 Final    TSH 05/19/2023 1.121  0.400 - 4.000 uIU/mL Final    BNP 05/19/2023 84  0 - 99 pg/mL Final   Lab Visit on 01/26/2023   Component Date Value Ref Range Status    Microalbumin, Urine 01/26/2023 20.0  ug/mL Final    Creatinine, Urine 01/26/2023 95.0  23.0 - 375.0 mg/dL Final    Microalb/Creat Ratio 01/26/2023 21.1  0.0 - 30.0 ug/mg Final   Lab Visit on 01/26/2023   Component Date Value Ref  Range Status    CPK 01/26/2023 114  20 - 200 U/L Final    WBC 01/26/2023 7.75  3.90 - 12.70 K/uL Final    RBC 01/26/2023 5.50  4.60 - 6.20 M/uL Final    Hemoglobin 01/26/2023 16.3  14.0 - 18.0 g/dL Final    Hematocrit 01/26/2023 48.8  40.0 - 54.0 % Final    MCV 01/26/2023 89  82 - 98 fL Final    MCH 01/26/2023 29.6  27.0 - 31.0 pg Final    MCHC 01/26/2023 33.4  32.0 - 36.0 g/dL Final    RDW 01/26/2023 13.3  11.5 - 14.5 % Final    Platelets 01/26/2023 164  150 - 450 K/uL Final    MPV 01/26/2023 11.3  9.2 - 12.9 fL Final    Immature Granulocytes 01/26/2023 0.3  0.0 - 0.5 % Final    Gran # (ANC) 01/26/2023 4.9  1.8 - 7.7 K/uL Final    Immature Grans (Abs) 01/26/2023 0.02  0.00 - 0.04 K/uL Final    Lymph # 01/26/2023 1.8  1.0 - 4.8 K/uL Final    Mono # 01/26/2023 0.7  0.3 - 1.0 K/uL Final    Eos # 01/26/2023 0.3  0.0 - 0.5 K/uL Final    Baso # 01/26/2023 0.02  0.00 - 0.20 K/uL Final    nRBC 01/26/2023 0  0 /100 WBC Final    Gran % 01/26/2023 62.8  38.0 - 73.0 % Final    Lymph % 01/26/2023 23.6  18.0 - 48.0 % Final    Mono % 01/26/2023 9.5  4.0 - 15.0 % Final    Eosinophil % 01/26/2023 3.5  0.0 - 8.0 % Final    Basophil % 01/26/2023 0.3  0.0 - 1.9 % Final    Differential Method 01/26/2023 Automated   Final    Sodium 01/26/2023 139  136 - 145 mmol/L Final    Potassium 01/26/2023 4.3  3.5 - 5.1 mmol/L Final    Chloride 01/26/2023 100  95 - 110 mmol/L Final    CO2 01/26/2023 27  23 - 29 mmol/L Final    Glucose 01/26/2023 128 (H)  70 - 110 mg/dL Final    BUN 01/26/2023 30  10 - 30 mg/dL Final    Creatinine 01/26/2023 1.3  0.5 - 1.4 mg/dL Final    Calcium 01/26/2023 10.1  8.7 - 10.5 mg/dL Final    Total Protein 01/26/2023 7.1  6.0 - 8.4 g/dL Final    Albumin 01/26/2023 3.8  3.5 - 5.2 g/dL Final    Total Bilirubin 01/26/2023 1.3 (H)  0.1 - 1.0 mg/dL Final    Alkaline Phosphatase 01/26/2023 70  55 - 135 U/L Final    AST 01/26/2023 23  10 - 40 U/L Final    ALT 01/26/2023 22  10 - 44 U/L Final    Anion Gap 01/26/2023 12  8 - 16  mmol/L Final    eGFR 01/26/2023 52 (A)  >60 mL/min/1.73 m^2 Final    Hemoglobin A1C 01/26/2023 7.7 (H)  4.0 - 5.6 % Final    Estimated Avg Glucose 01/26/2023 174 (H)  68 - 131 mg/dL Final    Cholesterol 01/26/2023 180  120 - 199 mg/dL Final    Triglycerides 01/26/2023 114  30 - 150 mg/dL Final    HDL 01/26/2023 69  40 - 75 mg/dL Final    LDL Cholesterol 01/26/2023 88.2  63.0 - 159.0 mg/dL Final    HDL/Cholesterol Ratio 01/26/2023 38.3  20.0 - 50.0 % Final    Total Cholesterol/HDL Ratio 01/26/2023 2.6  2.0 - 5.0 Final    Non-HDL Cholesterol 01/26/2023 111  mg/dL Final    Magnesium 01/26/2023 2.4  1.6 - 2.6 mg/dL Final   (  CTA Chest Non Coronary  Status: Edited Result - FINAL     [employer] Results Release    [facility] Status: Active  Results Release     PACS Images for [employer] Viewer     Show images for CTA Chest Non Coronary   Contains abnormal data CTA Chest Non Coronary  Order: [id number]  Status: Edited Result - FINAL     Visible to patient: Yes (not seen)     Next appt: 07/07/2023 at 10:15 AM in Internal Medicine (Alverto Green MD)     0 Result Notes  Details    Reading Physician Reading Date Result Priority   Heladio Montejo MD  [phone] 3/11/2022 Routine     Addenda     Regarding the 21 mm hypodense nodule involving the right thyroid lobe, further characterization with nonemergent ultrasound would be recommended, if this lesion has not been previously evaluated.     This report was flagged in Epic as abnormal.        Electronically signed by: Heladio Montejo  Date:                                            03/12/2022  Time:                                           00:27  Signed by Heladio Montejo MD on 3/12/2022 00:29  Narrative & Impression  EXAMINATION:  CTA CHEST NON CORONARY     CLINICAL HISTORY:  Pulmonary embolism (PE) suspected, high prob;     TECHNIQUE:  Low dose axial images, sagittal and coronal reformations were obtained from the thoracic inlet to the lung bases following the  IV administration of 100 mL of Omnipaque 350.  Contrast timing was optimized to evaluate the pulmonary arteries.  MIP images were performed.     COMPARISON:  CT of the abdomen pelvis performed 03/07/2022.     FINDINGS:  Exam quality: Satisfactory.     Pulmonary embolism: No acute or chronic pulmonary embolism.     Central pulmonary arteries: Normal caliber.     Aorta: Normal calibre.  Calcifications.     Heart: No right heart strain. Normal size.     Coronary arteries: Limited assessment     Pericardium: Normal. No effusion, thickening, or calcification.     Support tubes and lines: None.     Base of neck/thyroid: Suggested 21 mm hypodense nodule right thyroid lobe (series 2, image 28).     Lymph nodes: No supraclavicular, axillary, internal mammary, mediastinal, or hilar adenopathy.     Esophagus: Nonspecific circumferential esophageal wall thickening, finding which may be seen in setting of esophagitis.     Pleura: No effusion, thickening, or calcification.     Upper abdomen: Partially imaged sequela of recent cholecystectomy.  Nonspecific fluid and gas collection is noted in the cholecystectomy bed (series 2, image 454).  Nonspecific hypoattenuating lesion in the left hepatic lobe.  Renal cortical atrophy noted.  Numerous renal cysts are seen.  Additional adverse of mesenteric induration/congestion and unencapsulated fluid are noted in the upper abdomen.     Body wall: Unremarkable     Airways: Central airways appear patent.     Lungs: Emphysematous changes are noted.  Dependent atelectasis and scar.  4 mm solid nodule right upper lobe (series 3, image 56).  5-6 mm solid nodule right lower lobe (series 3, image 229).  3 mm solid nodule right middle lobe (series 3, image 195).  Additional scattered calcified granuloma are suggested.  Triangular shaped solid nodule abutting the fissure (series 3, image 236), may represent lymph node.     Bones: Osseous demineralization.  Degenerative change of the spine.      Impression:     1. No convincing evidence of acute pulmonary embolism.  2. Partially imaged postoperative sequela of recent cholecystectomy.  Nonspecific fluid and gas collection at the cholecystectomy bed may reflect evolving postoperative seroma and/or hematoma, noting that superinfection of the collection would be difficult exclude.  Clinical correlation recommended.  3. Solid pulmonary nodules measuring up to 6 mm, as described.  For a solid nodule 6-8 mm, Fleischner Society 2017 guidelines recommend follow up with non-contrast chest CT at 6-12 months and 18-24 months after discovery.  4. Additional details, as provided in the body of report.        Electronically signed by: Heladio Montejo  Date:                                            03/11/2022  Time:                                           21:32           Exam Ended: 03/11/22 17:43 Last Resulted: 03/12/22 00:27            CT Abdomen Pelvis With Contrast  Status: Final result     MyChart Results Release    [employer] Status: Active  Results Release     PACS Images for [employer] [facility] Viewer     Show images for CT Abdomen Pelvis With Contrast  CT Abdomen Pelvis With Contrast  Order: [id number]  Status: Final result    Visible to patient: Yes (not seen)     Next appt: 07/07/2023 at 10:15 AM in Internal Medicine (Alverto Green MD)     0 Result Notes  Details    Reading Physician Reading Date Result Priority   Inez Hamm MD  [phone] 9/19/2022 STAT     Narrative & Impression  EXAMINATION:  CT ABDOMEN PELVIS WITH CONTRAST     CLINICAL HISTORY:  Bowel obstruction suspected;     TECHNIQUE:  Low dose axial images, sagittal and coronal reformations were obtained from the lung bases to the pubic symphysis before and following the IV administration of 75 mL of Omnipaque 350 ..     COMPARISON:  06/01/2022 CT abdomen pelvis is     FINDINGS:  Abdomen:     - Lung bases: No infiltrates and no nodules.  Visualized heart is not significantly enlarged.      - Liver: Stable low density in the left lobe of the liver likely represent small cysts.     - Gallbladder: Cholecystectomy.     - Bile Ducts: There is mild prominence of the intrahepatic biliary ducts without significant prominence of the common bile duct.     - Spleen: Negative.     - Kidneys: Multiple stable cysts are noted in the kidneys bilaterally with dominant cysts appearing simple.  One cyst on the right involving the lower pole has a calcific rim.  Several are too small to characterize bilaterally.  There is symmetric enhancement of the cortices.  Left kidney is relatively atrophic.  No calculi or hydronephrosis .     - Adrenals: Unremarkable.     - Pancreas: No mass or peripancreatic fat stranding.     - Retroperitoneum:  No significant adenopathy.     - Vascular: No abdominal aortic aneurysm.  There is moderate atherosclerosis of the aorta and its imaged branches.     - Abdominal wall:  Unremarkable.     Pelvis:     No pelvic mass, adenopathy, or free fluid.  Bladder is unremarkable.  Prostate is enlarged.  Multiple pelvic calcifications noted.     Bowel/Mesentery: There is small bowel dilatation around the distal small bowel.  There is a transition point around the mid lower abdomen seen on 67 series 601.  Second transition point in the right lower quadrant is around axial image 88 series 2.  The stomach and proximal small bowel is not significantly distended.     The there is diverticulosis of the distal colon with suture line along the descending colon distally.     Bones:  No acute osseous abnormality and no suspicious lytic or blastic lesion. There is spondylosis throughout the spine.  No compression fractures or subluxation.     Impression:     Small-bowel obstruction with transition points described.  Findings are similar compared to the prior exam.     Diverticulosis without evidence of diverticulitis.  Postoperative changes involving the distal colon.     Cholecystectomy.  Intrahepatic mild  prominence of the intrahepatic biliary system is noted.  No there is no extrahepatic biliary dilatation.     Bilateral stable renal cysts several are too small to characterize.     Please see above for additional incidental nonacute findings.        Electronically signed by: Inez Hamm  Date:                                            09/19/2022   US Lower Extremity Arteries Bilateral  Status: Final result     MyChart Results Release    MyChart Status: Active  Results Release     PACS Images for [employer] Viewer     Show images for US Lower Extremity Arteries Bilateral  All Reviewers List    Ammon Muñoz DPM on 9/27/2022 08:09     US Lower Extremity Arteries Bilateral  Order: [id number]  Status: Final result      Visible to patient: Yes (seen)       Next appt: 07/07/2023 at 10:15 AM in Internal Medicine (Alverto Green MD)       Dx: Type 2 diabetes mellitus with periphe...       0 Result Notes      Details    Reading Physician Reading Date Result Priority   Rosalino Real MD  [phone] 9/26/2022 Routine     Narrative & Impression  EXAMINATION:  US LOWER EXTREMITY ARTERIES BILATERAL     CLINICAL HISTORY:  Type 2 diabetes mellitus with diabetic peripheral angiopathy without gangrene     TECHNIQUE:  Bilateral lower extremity arterial duplex ultrasound examination performed. Multiple gray scale and color doppler images were obtained in addition to waveform analysis.     COMPARISON:  03/12/2022     FINDINGS:  The peak systolic velocities on the right are as follows, in centimeters/second:     Common femoral artery: 136, biphasic     Deep profundus: 135, biphasic     Superficial femoral artery, proximal: 79, biphasic     Superficial femoral artery, mid portion: 89, triphasic; 309, biphasic     Superficial femoral artery, distal: 78, biphasic     Popliteal artery: 83, biphasic     Distal popliteal artery: 88, biphasic     Anterior tibial artery: 74, biphasic     Posterior tibial artery:  91, biphasic     The peak systolic velocities on the left are as follows, in centimeters/second:     Common femoral artery: 118, biphasic     Deep profundus: 119, biphasic     Superficial femoral artery, proximal: 106, biphasic     Superficial femoral artery, mid portion: 116, biphasic     Superficial femoral artery, distal: 75, biphasic     Popliteal artery: 67, biphasic     Distal popliteal artery: 75, biphasic     Anterior tibial artery: 37, biphasic; 106, biphasic with adjacent collaterals     Posterior tibial artery: 90, biphasic with suspected adjacent collateral.     Impression:     Abnormal velocity doubling of the right mid-SFA and distal left anterior tibial artery concerning for hemodynamically significant stenosis.     Suspected adjacent collaterals at the distal left anterior tibial and posterior tibial arteries.        Electronically signed by: Rosalino Real  Date:                                            09/26/2022  Time:                                           15:26     Accession #: [id number]  Transthoracic echo (TTE) complete  Order# [id number]  Reading physician: Jamarcus Cartwright MD Ordering physician: Charlene Siu MD Study date: 3/11/22     Reason for Exam  Priority: Routine  Dx: Dyspnea [R06.00 (ICD-10-CM)]     Result Image Hyperlink     Show images for Echo  Summary    The left ventricle is normal in size with concentric remodeling and normal systolic function.  The estimated ejection fraction is 55%.  Grade I left ventricular diastolic dysfunction.  There is abnormal septal wall motion.  Normal right ventricular size with normal right ventricular systolic function.  The estimated PA systolic pressure is 47 mmHg.  Normal central venous pressure (3 mmHg).  There is pulmonary hypertension.   Accession #: [id number]    Echocardiogram exercise stress test  Order# [id number]  Reading physician: Daren Lassiter MD Ordering physician: Daren Lassiter MD Study date: 10/19/20       Reason  for Exam  Priority: Routine  Dx: Chest pain of uncertain etiology [R07.9 (ICD-10-CM)]; Dyspnea on exertion [R06.00 (ICD-10-CM)]; Localized edema [R60.0 (ICD-10-CM)]   Comments: John protocol     Result Image Hyperlink     Show images for Stress Echo Which stress agent will be used? Treadmill Exercise; Color Flow Doppler? No    Summary    The test was stopped because the patient experienced fatigue and shortness of breath.  During stress, the following significant arrhythmias were observed: rare PVCs.  The left ventricle is normal in size with normal systolic function. The estimated ejection fraction is 60%.  The stress echo portion of this study is negative for myocardial ischemia.  The ECG portion of this study is negative for myocardial ischemia.         Echocardiography Findings      Left Ventricle      Assessment:     1. High cholesterol    2. Primary hypertension    3. Aortic atherosclerosis    4. Dyspnea on exertion    5. RBBB    6. Benign prostatic hyperplasia with lower urinary tract symptoms, symptom details unspecified    7. Stage 3 chronic kidney disease, unspecified whether stage 3a or 3b CKD    8. Type 2 diabetes mellitus with stage 3a chronic kidney disease, without long-term current use of insulin    9. Localized edema    10. Centrilobular emphysema    11. Pulmonary nodules      Plan:   Husam was seen today for follow-up.    Diagnoses and all orders for this visit:    High cholesterol  -     IN OFFICE EKG 12-LEAD (to Muse)  -     X-Ray Chest PA And Lateral; Future  -     CBC Auto Differential; Future  -     Comprehensive Metabolic Panel; Future    Primary hypertension  -     IN OFFICE EKG 12-LEAD (to Muse)  -     losartan (COZAAR) 50 MG tablet; Take 1 tablet (50 mg total) by mouth once daily.  -     X-Ray Chest PA And Lateral; Future  -     CBC Auto Differential; Future  -     Comprehensive Metabolic Panel; Future    Aortic atherosclerosis  -     IN OFFICE EKG 12-LEAD (to Muse)  -     X-Ray Chest  PA And Lateral; Future  -     CBC Auto Differential; Future  -     Comprehensive Metabolic Panel; Future    Dyspnea on exertion  -     IN OFFICE EKG 12-LEAD (to Muse)  -     X-Ray Chest PA And Lateral; Future  -     CBC Auto Differential; Future  -     Comprehensive Metabolic Panel; Future    RBBB  -     IN OFFICE EKG 12-LEAD (to Muse)  -     X-Ray Chest PA And Lateral; Future  -     CBC Auto Differential; Future  -     Comprehensive Metabolic Panel; Future    Benign prostatic hyperplasia with lower urinary tract symptoms, symptom details unspecified  -     IN OFFICE EKG 12-LEAD (to [facility])  -     X-Ray Chest PA And Lateral; Future  -     CBC Auto Differential; Future  -     Comprehensive Metabolic Panel; Future    Stage 3 chronic kidney disease, unspecified whether stage 3a or 3b CKD  -     IN OFFICE EKG 12-LEAD (to [facility])  -     losartan (COZAAR) 50 MG tablet; Take 1 tablet (50 mg total) by mouth once daily.  -     X-Ray Chest PA And Lateral; Future  -     CBC Auto Differential; Future  -     Comprehensive Metabolic Panel; Future    Type 2 diabetes mellitus with stage 3a chronic kidney disease, without long-term current use of insulin  -     IN OFFICE EKG 12-LEAD (to [facility])  -     X-Ray Chest PA And Lateral; Future  -     CBC Auto Differential; Future  -     Comprehensive Metabolic Panel; Future    Localized edema  -     IN OFFICE EKG 12-LEAD (to [facility])  -     X-Ray Chest PA And Lateral; Future  -     CBC Auto Differential; Future  -     Comprehensive Metabolic Panel; Future    Centrilobular emphysema  -     IN OFFICE EKG 12-LEAD (to [facility])  -     X-Ray Chest PA And Lateral; Future  -     CBC Auto Differential; Future  -     Comprehensive Metabolic Panel; Future    Pulmonary nodules  -     IN OFFICE EKG 12-LEAD (to [facility])  -     X-Ray Chest PA And Lateral; Future  -     CBC Auto Differential; Future  -     Comprehensive Metabolic Panel; Future     Due to CKD III and edema and HBP will discontinue the Celebrex and Aleve  and all NSAID's and instead substitute Tylenol prn.    Will double the losartan for HBP    Rest of meds the same    The dyspnea on exertion is most likely due to emphysema    Repeat CXR    Follow up in about 4 weeks (around 7/11/2023).with repeat lab

## 2023-06-28 NOTE — PLAN OF CARE
Encounter Date: 6/27/2023    SCRIBE #1 NOTE: I, Urszula Childs, am scribing for, and in the presence of,  Dr. Aniket Robison. I have scribed the following portions of the note - Other sections scribed: HPI/ROS/PE.     History     Chief Complaint   Patient presents with    Hemoptysis     Pt reports cough up blood clots for 30 minutes. Pt denies CP, SOB, N/V/D. Pt denies any medical Hx.      Sandro Humphries is a 40 y.o. male, with no PMHx, who presents to the ED with hemoptysis today duration-30 minutes. Pt reports being in the heat today and after having an argument started with hemoptysis. Pt reports sinus congestion and cough. No other exacerbating or alleviating factors. Pt denies anticoagulant use. Patient denies chest pain, shortness of breath, nausea, vomiting, diarrhea, or other associated symptoms. Patient has no other complaints at the present time.      The history is provided by the patient.   Review of patient's allergies indicates:  No Known Allergies  No past medical history on file.  No past surgical history on file.  No family history on file.  Social History     Tobacco Use    Smoking status: Every Day    Smokeless tobacco: Never   Substance Use Topics    Alcohol use: Not Currently     Comment: occ    Drug use: Never     Review of Systems   Constitutional: Negative.  Negative for fever.   HENT:  Positive for congestion. Negative for sore throat.    Eyes: Negative.    Respiratory:  Positive for cough. Negative for shortness of breath.         Positive for hemoptysis.    Cardiovascular: Negative.  Negative for chest pain.   Gastrointestinal: Negative.  Negative for diarrhea, nausea and vomiting.   Endocrine: Negative.    Genitourinary: Negative.  Negative for dysuria.   Musculoskeletal: Negative.  Negative for myalgias.   Skin:  Negative for rash.   Allergic/Immunologic: Negative.    Neurological: Negative.  Negative for headaches.   Hematological: Negative.  Negative for adenopathy.  Signed out of PACU by Dr. Vargas.  Report called to ICU nurse, Toma.  Spoke with family, Katie, by phone with room number 257.     Psychiatric/Behavioral: Negative.  Negative for behavioral problems.    All other systems reviewed and are negative.    Physical Exam     Initial Vitals [06/27/23 1855]   BP Pulse Resp Temp SpO2   (!) 175/94 (!) 123 16 98.2 °F (36.8 °C) 96 %      MAP       --         Physical Exam    Nursing note and vitals reviewed.  Constitutional: He appears well-developed and well-nourished.   HENT:   Head: Normocephalic and atraumatic.   Dried blood to nares with excoriations around Kiesselbach plexus. Imperfection to left gingiva.   Eyes: Conjunctivae and EOM are normal. Pupils are equal, round, and reactive to light.   Neck: Neck supple. No thyroid mass and no thyromegaly present.   Cardiovascular:  Normal rate, regular rhythm, S1 normal, S2 normal and intact distal pulses.     Exam reveals no gallop and no friction rub.       No murmur heard.  Pulmonary/Chest: Effort normal and breath sounds normal. No respiratory distress.   Abdominal: Abdomen is soft and flat. Bowel sounds are normal. There is no splenomegaly or hepatomegaly. There is no abdominal tenderness. No hernia.   No right CVA tenderness.  No left CVA tenderness. There is no rebound, no guarding, no tenderness at McBurney's point and negative Hernandez's sign.   Musculoskeletal:         General: No edema. Normal range of motion.      Cervical back: Neck supple.     Lymphadenopathy:     He has no axillary adenopathy.   Neurological: He is alert and oriented to person, place, and time. GCS eye subscore is 4. GCS verbal subscore is 5. GCS motor subscore is 6.   Skin: Skin is warm, dry and intact. Capillary refill takes less than 2 seconds.   Psychiatric: He has a normal mood and affect. His speech is normal. Judgment normal. Cognition and memory are normal.       ED Course   Procedures  Labs Reviewed - No data to display       Imaging Results    None          Medications - No data to display  Medical Decision Making:   ED Management:  This patient was found to have  epistaxis with associated cough related hemoptysis.  Patient has no evidence of any bleeding diathesis.  Patient had no respiratory symptoms.  Did not feel that x-ray evaluation was necessary.  Patient was given appropriate medications and instructions to manage his condition and has no limitations to care at home.        Scribe Attestation:   Scribe #1: I performed the above scribed service and the documentation accurately describes the services I performed. I attest to the accuracy of the note.                   Clinical Impression:   Final diagnoses:  [R04.0] Epistaxis (Primary)     This document was produced by a scribe under my direction and in my presence. I agree with the content of the note and have made any necessary edits.     Aniket Robison MD    06/28/2023 6:33 AM     ED Disposition Condition    Discharge Stable          ED Prescriptions       Medication Sig Dispense Start Date End Date Auth. Provider    mupirocin (BACTROBAN) 2 % ointment Apply topically 3 (three) times daily. Intranasally for 7 days 7 g 6/27/2023 7/4/2023 Aniket Robison MD          Follow-up Information       Follow up With Specialties Details Why Contact Info    Your PCP  Schedule an appointment as soon as possible for a visit in 1 week               Aniket Robison MD  06/28/23 0685

## 2023-07-03 RX ORDER — CELECOXIB 200 MG/1
CAPSULE ORAL
Qty: 30 CAPSULE | Refills: 2 | Status: SHIPPED | OUTPATIENT
Start: 2023-07-03 | End: 2023-07-24

## 2023-07-21 ENCOUNTER — LAB VISIT (OUTPATIENT)
Dept: LAB | Facility: HOSPITAL | Age: 88
End: 2023-07-21
Attending: INTERNAL MEDICINE
Payer: MEDICARE

## 2023-07-21 DIAGNOSIS — E78.00 HIGH CHOLESTEROL: Chronic | ICD-10-CM

## 2023-07-21 DIAGNOSIS — I10 PRIMARY HYPERTENSION: Chronic | ICD-10-CM

## 2023-07-21 DIAGNOSIS — I70.0 AORTIC ATHEROSCLEROSIS: ICD-10-CM

## 2023-07-21 DIAGNOSIS — N40.1 BENIGN PROSTATIC HYPERPLASIA WITH LOWER URINARY TRACT SYMPTOMS, SYMPTOM DETAILS UNSPECIFIED: ICD-10-CM

## 2023-07-21 DIAGNOSIS — N18.30 STAGE 3 CHRONIC KIDNEY DISEASE, UNSPECIFIED WHETHER STAGE 3A OR 3B CKD: Chronic | ICD-10-CM

## 2023-07-21 DIAGNOSIS — N18.31 TYPE 2 DIABETES MELLITUS WITH STAGE 3A CHRONIC KIDNEY DISEASE, WITHOUT LONG-TERM CURRENT USE OF INSULIN: Chronic | ICD-10-CM

## 2023-07-21 DIAGNOSIS — R60.0 LOCALIZED EDEMA: ICD-10-CM

## 2023-07-21 DIAGNOSIS — R91.1 PULMONARY NODULE: ICD-10-CM

## 2023-07-21 DIAGNOSIS — I45.10 RBBB: ICD-10-CM

## 2023-07-21 DIAGNOSIS — E11.22 TYPE 2 DIABETES MELLITUS WITH STAGE 3A CHRONIC KIDNEY DISEASE, WITHOUT LONG-TERM CURRENT USE OF INSULIN: Chronic | ICD-10-CM

## 2023-07-21 DIAGNOSIS — J43.2 CENTRILOBULAR EMPHYSEMA: ICD-10-CM

## 2023-07-21 DIAGNOSIS — R06.09 DYSPNEA ON EXERTION: ICD-10-CM

## 2023-07-21 LAB
ALBUMIN SERPL BCP-MCNC: 3.8 G/DL (ref 3.5–5.2)
ALP SERPL-CCNC: 83 U/L (ref 55–135)
ALT SERPL W/O P-5'-P-CCNC: 20 U/L (ref 10–44)
ANION GAP SERPL CALC-SCNC: 11 MMOL/L (ref 8–16)
AST SERPL-CCNC: 27 U/L (ref 10–40)
BASOPHILS # BLD AUTO: 0.05 K/UL (ref 0–0.2)
BASOPHILS NFR BLD: 0.7 % (ref 0–1.9)
BILIRUB SERPL-MCNC: 0.9 MG/DL (ref 0.1–1)
BUN SERPL-MCNC: 32 MG/DL (ref 10–30)
CALCIUM SERPL-MCNC: 9.6 MG/DL (ref 8.7–10.5)
CHLORIDE SERPL-SCNC: 105 MMOL/L (ref 95–110)
CO2 SERPL-SCNC: 24 MMOL/L (ref 23–29)
CREAT SERPL-MCNC: 1.3 MG/DL (ref 0.5–1.4)
DIFFERENTIAL METHOD: NORMAL
EOSINOPHIL # BLD AUTO: 0.3 K/UL (ref 0–0.5)
EOSINOPHIL NFR BLD: 3.4 % (ref 0–8)
ERYTHROCYTE [DISTWIDTH] IN BLOOD BY AUTOMATED COUNT: 13.6 % (ref 11.5–14.5)
EST. GFR  (NO RACE VARIABLE): 52 ML/MIN/1.73 M^2
GLUCOSE SERPL-MCNC: 164 MG/DL (ref 70–110)
HCT VFR BLD AUTO: 46.3 % (ref 40–54)
HGB BLD-MCNC: 15.5 G/DL (ref 14–18)
IMM GRANULOCYTES # BLD AUTO: 0.01 K/UL (ref 0–0.04)
IMM GRANULOCYTES NFR BLD AUTO: 0.1 % (ref 0–0.5)
LYMPHOCYTES # BLD AUTO: 2.3 K/UL (ref 1–4.8)
LYMPHOCYTES NFR BLD: 30.5 % (ref 18–48)
MCH RBC QN AUTO: 29.4 PG (ref 27–31)
MCHC RBC AUTO-ENTMCNC: 33.5 G/DL (ref 32–36)
MCV RBC AUTO: 88 FL (ref 82–98)
MONOCYTES # BLD AUTO: 0.8 K/UL (ref 0.3–1)
MONOCYTES NFR BLD: 10.1 % (ref 4–15)
NEUTROPHILS # BLD AUTO: 4.2 K/UL (ref 1.8–7.7)
NEUTROPHILS NFR BLD: 55.2 % (ref 38–73)
NRBC BLD-RTO: 0 /100 WBC
PLATELET # BLD AUTO: 172 K/UL (ref 150–450)
PMV BLD AUTO: 11.8 FL (ref 9.2–12.9)
POTASSIUM SERPL-SCNC: 4.4 MMOL/L (ref 3.5–5.1)
PROT SERPL-MCNC: 6.9 G/DL (ref 6–8.4)
RBC # BLD AUTO: 5.28 M/UL (ref 4.6–6.2)
SODIUM SERPL-SCNC: 140 MMOL/L (ref 136–145)
WBC # BLD AUTO: 7.63 K/UL (ref 3.9–12.7)

## 2023-07-21 PROCEDURE — 36415 COLL VENOUS BLD VENIPUNCTURE: CPT | Performed by: INTERNAL MEDICINE

## 2023-07-21 PROCEDURE — 85025 COMPLETE CBC W/AUTO DIFF WBC: CPT | Performed by: INTERNAL MEDICINE

## 2023-07-21 PROCEDURE — 80053 COMPREHEN METABOLIC PANEL: CPT | Performed by: INTERNAL MEDICINE

## 2023-07-24 PROBLEM — I27.20 PULMONARY HYPERTENSION: Status: ACTIVE | Noted: 2023-07-24

## 2023-07-25 ENCOUNTER — OFFICE VISIT (OUTPATIENT)
Dept: CARDIOLOGY | Facility: CLINIC | Age: 88
End: 2023-07-25
Payer: MEDICARE

## 2023-07-25 VITALS
BODY MASS INDEX: 24.81 KG/M2 | SYSTOLIC BLOOD PRESSURE: 154 MMHG | HEIGHT: 70 IN | HEART RATE: 84 BPM | WEIGHT: 173.31 LBS | DIASTOLIC BLOOD PRESSURE: 78 MMHG

## 2023-07-25 DIAGNOSIS — E11.22 TYPE 2 DIABETES MELLITUS WITH STAGE 3A CHRONIC KIDNEY DISEASE, WITHOUT LONG-TERM CURRENT USE OF INSULIN: Chronic | ICD-10-CM

## 2023-07-25 DIAGNOSIS — R60.0 LOCALIZED EDEMA: ICD-10-CM

## 2023-07-25 DIAGNOSIS — N18.31 TYPE 2 DIABETES MELLITUS WITH STAGE 3A CHRONIC KIDNEY DISEASE, WITHOUT LONG-TERM CURRENT USE OF INSULIN: Chronic | ICD-10-CM

## 2023-07-25 DIAGNOSIS — N18.30 STAGE 3 CHRONIC KIDNEY DISEASE, UNSPECIFIED WHETHER STAGE 3A OR 3B CKD: Chronic | ICD-10-CM

## 2023-07-25 DIAGNOSIS — I10 PRIMARY HYPERTENSION: Chronic | ICD-10-CM

## 2023-07-25 DIAGNOSIS — R06.09 DYSPNEA ON EXERTION: ICD-10-CM

## 2023-07-25 DIAGNOSIS — J43.2 CENTRILOBULAR EMPHYSEMA: Primary | ICD-10-CM

## 2023-07-25 DIAGNOSIS — I27.20 PULMONARY HYPERTENSION: ICD-10-CM

## 2023-07-25 DIAGNOSIS — I70.0 AORTIC ATHEROSCLEROSIS: ICD-10-CM

## 2023-07-25 DIAGNOSIS — I10 WHITE COAT SYNDROME WITH DIAGNOSIS OF HYPERTENSION: ICD-10-CM

## 2023-07-25 DIAGNOSIS — E78.00 HIGH CHOLESTEROL: Chronic | ICD-10-CM

## 2023-07-25 PROCEDURE — 1101F PR PT FALLS ASSESS DOC 0-1 FALLS W/OUT INJ PAST YR: ICD-10-PCS | Mod: CPTII,S$GLB,, | Performed by: INTERNAL MEDICINE

## 2023-07-25 PROCEDURE — 1160F RVW MEDS BY RX/DR IN RCRD: CPT | Mod: CPTII,S$GLB,, | Performed by: INTERNAL MEDICINE

## 2023-07-25 PROCEDURE — 1159F MED LIST DOCD IN RCRD: CPT | Mod: CPTII,S$GLB,, | Performed by: INTERNAL MEDICINE

## 2023-07-25 PROCEDURE — 99214 PR OFFICE/OUTPT VISIT, EST, LEVL IV, 30-39 MIN: ICD-10-PCS | Mod: S$GLB,,, | Performed by: INTERNAL MEDICINE

## 2023-07-25 PROCEDURE — 1160F PR REVIEW ALL MEDS BY PRESCRIBER/CLIN PHARMACIST DOCUMENTED: ICD-10-PCS | Mod: CPTII,S$GLB,, | Performed by: INTERNAL MEDICINE

## 2023-07-25 PROCEDURE — 99999 PR PBB SHADOW E&M-EST. PATIENT-LVL III: CPT | Mod: PBBFAC,,, | Performed by: INTERNAL MEDICINE

## 2023-07-25 PROCEDURE — 1125F AMNT PAIN NOTED PAIN PRSNT: CPT | Mod: CPTII,S$GLB,, | Performed by: INTERNAL MEDICINE

## 2023-07-25 PROCEDURE — 99214 OFFICE O/P EST MOD 30 MIN: CPT | Mod: S$GLB,,, | Performed by: INTERNAL MEDICINE

## 2023-07-25 PROCEDURE — 3288F PR FALLS RISK ASSESSMENT DOCUMENTED: ICD-10-PCS | Mod: CPTII,S$GLB,, | Performed by: INTERNAL MEDICINE

## 2023-07-25 PROCEDURE — 3288F FALL RISK ASSESSMENT DOCD: CPT | Mod: CPTII,S$GLB,, | Performed by: INTERNAL MEDICINE

## 2023-07-25 PROCEDURE — 1101F PT FALLS ASSESS-DOCD LE1/YR: CPT | Mod: CPTII,S$GLB,, | Performed by: INTERNAL MEDICINE

## 2023-07-25 PROCEDURE — 99999 PR PBB SHADOW E&M-EST. PATIENT-LVL III: ICD-10-PCS | Mod: PBBFAC,,, | Performed by: INTERNAL MEDICINE

## 2023-07-25 PROCEDURE — 1125F PR PAIN SEVERITY QUANTIFIED, PAIN PRESENT: ICD-10-PCS | Mod: CPTII,S$GLB,, | Performed by: INTERNAL MEDICINE

## 2023-07-25 PROCEDURE — 1159F PR MEDICATION LIST DOCUMENTED IN MEDICAL RECORD: ICD-10-PCS | Mod: CPTII,S$GLB,, | Performed by: INTERNAL MEDICINE

## 2023-07-25 RX ORDER — LOSARTAN POTASSIUM 50 MG/1
50 TABLET ORAL DAILY
Qty: 90 TABLET | Refills: 3 | Status: SHIPPED | OUTPATIENT
Start: 2023-07-25 | End: 2023-07-25

## 2023-07-25 RX ORDER — LOSARTAN POTASSIUM 100 MG/1
100 TABLET ORAL DAILY
Qty: 90 TABLET | Refills: 3 | Status: SHIPPED | OUTPATIENT
Start: 2023-07-25 | End: 2024-07-24

## 2023-07-25 NOTE — PROGRESS NOTES
Subjective:      Patient ID: Husam Connolly is a 89 y.o. male.    Chief Complaint: Follow-up (Blood pressure follow up)    HPI:Pt has chronic left shoulder pain; has trouble lifting the left arm up.    Pt has seen Dr Baldwin in the past.    Pt saw Dr Green who ordered a CT of the chest due to his shortness of breath.    Pt is short of breath with any activity.    Review of Systems   Cardiovascular:  Positive for dyspnea on exertion and leg swelling (mil intermittent ankle swelling bilaerally). Negative for chest pain, claudication, irregular heartbeat, near-syncope, orthopnea, palpitations and syncope.      Past Medical History:   Diagnosis Date    BPH (benign prostatic hyperplasia)     Diabetes mellitus (type 2)     Emphysema 03/12/2022    High cholesterol 08/26/2020    Hypertension     RBBB 11/29/2022    Small bowel obstruction 06/13/2019        Past Surgical History:   Procedure Laterality Date    ANASTOMOSIS OF ILEUM TO RECTUM  9/9/2019    Procedure: ANASTOMOSIS, COLO-RECTAL;  Surgeon: Fouzia Gardner MD;  Location: [facility] OR;  Service: General;;    CHOLECYSTECTOMY N/A 3/7/2022    Procedure: CHOLECYSTECTOMY;  Surgeon: Fouzia Gardner MD;  Location: [facility] [place] OR;  Service: General;  Laterality: N/A;    COLOSTOMY  6/16/2019    Procedure: CREATION, COLOSTOMY;  Surgeon: Fouzia Gardner MD;  Location: [facility] OR;  Service: General;;    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    Procedure: RELEASE, DUPUYTREN CONTRACTURE;  Surgeon: Slick Baldwin Jr., MD;  Location: [facility] OR;  Service: Orthopedics;  Laterality: Left;    EYE SURGERY      GISELA PROCEDURE  6/16/2019    Procedure: GISELA PROCEDURE;  Surgeon: Fouzia Gardner MD;  Location: [facility] OR;  Service: General;;  Colon resection    LYSIS OF ADHESIONS N/A 3/7/2022    Procedure: LYSIS, ADHESIONS;  Surgeon: Fouzia Gardner MD;  Location: [facility] OR;  Service: General;  Laterality: N/A;    TONSILLECTOMY         Family History   Problem Relation  Age of Onset    Heart disease Father     Hypertension Father     Cancer Mother     Diabetes Paternal Aunt        Social History     Socioeconomic History    Marital status:    Tobacco Use    Smoking status: Former     Packs/day: 1.00     Years: 25.00     Pack years: 25.00     Types: Cigarettes     Start date:      Quit date:      Years since quittin.5    Smokeless tobacco: Never   Substance and Sexual Activity    Alcohol use: Not Currently     Alcohol/week: 5.0 standard drinks     Types: 5 Cans of beer per week    Drug use: No    Sexual activity: Yes     Partners: Female     Social Determinants of Health     Financial Resource Strain: Low Risk     Difficulty of Paying Living Expenses: Not hard at all   Food Insecurity: No Food Insecurity    Worried About Running Out of Food in the Last Year: Never true    Ran Out of Food in the Last Year: Never true   Transportation Needs: No Transportation Needs    Lack of Transportation (Medical): No    Lack of Transportation (Non-Medical): No   Physical Activity: Inactive    Days of Exercise per Week: 0 days    Minutes of Exercise per Session: 0 min   Stress: No Stress Concern Present    Feeling of Stress : Not at all   Social Connections: Socially Isolated    Frequency of Communication with Friends and Family: More than three times a week    Frequency of Social Gatherings with Friends and Family: More than three times a week    Attends [employer] Services: Never    Active Member of Clubs or Organizations: No    Attends Club or Organization Meetings: Never    Marital Status:    Housing Stability: Low Risk     Unable to Pay for Housing in the Last Year: No    Number of Places Lived in the Last Year: 1    Unstable Housing in the Last Year: No       Current Outpatient Medications on File Prior to Visit   Medication Sig Dispense Refill    aspirin (ECOTRIN) 81 MG EC tablet Take 81 mg by mouth once daily.      atorvastatin (LIPITOR) 40 MG tablet TAKE 1  "TABLET(40 MG) BY MOUTH EVERY DAY 90 tablet 3    finasteride (PROSCAR) 5 mg tablet TAKE 1 TABLET BY MOUTH EVERY DAY (Patient taking differently: Take 5 mg by mouth every evening.) 90 tablet 1    fluticasone propionate (FLONASE) 50 mcg/actuation nasal spray 2 sprays (100 mcg total) by Each Nostril route once daily. 48 g 3    GARLIC ORAL Take 1 capsule by mouth once daily.      metFORMIN (GLUCOPHAGE) 500 MG tablet TAKE 1 TABLET BY MOUTH TWICE DAILY WITH FOOD 180 tablet 3    multivitamin capsule Take 1 capsule by mouth once daily.      omega-3 fatty acids/fish oil (FISH OIL-OMEGA-3 FATTY ACIDS) 300-1,000 mg capsule Take 1 capsule by mouth once daily.      tamsulosin (FLOMAX) 0.4 mg Cap Take 0.4 mg by mouth nightly.  3    vitamin D (VITAMIN D3) 1000 units Tab Take 1,000 Units by mouth once daily.      [DISCONTINUED] losartan (COZAAR) 50 MG tablet Take 1 tablet (50 mg total) by mouth once daily. 90 tablet 3     No current facility-administered medications on file prior to visit.       Review of patient's allergies indicates:   Allergen Reactions    Solarcaine [benzocaine-triclosan]     Sulfa (sulfonamide antibiotics)     Sulfamethoxazole     Trimethoprim     Hytrin [terazosin] Rash    Smz-tmp ds [sulfamethoxazole-trimethoprim] Rash     Objective:     Vitals:    07/25/23 1139 07/25/23 1158   BP: (!) 185/85 (!) 154/78   BP Location: Left arm Left arm   Patient Position: Sitting Sitting   BP Method: Large (Automatic)    Pulse: 84    Weight: 78.6 kg (173 lb 4.5 oz)    Height: 5' 10" (1.778 m)         Physical Exam  Constitutional:       General: He is not in acute distress.     Appearance: He is well-developed. He is not diaphoretic.   Eyes:      General: No scleral icterus.  Neck:      Vascular: No carotid bruit or JVD.   Cardiovascular:      Rate and Rhythm: Regular rhythm.      Heart sounds: Normal heart sounds. No murmur heard.    No friction rub. No gallop.   Pulmonary:      Effort: Pulmonary effort is normal. No " respiratory distress.      Breath sounds: Rales (few crackles left base) present.   Musculoskeletal:      Right lower leg: Edema (trace to one plus edema bilaterally') present.      Left lower leg: Edema present.   Skin:     General: Skin is warm and dry.   Neurological:      Mental Status: He is alert and oriented to person, place, and time.   Psychiatric:         Behavior: Behavior normal.         Thought Content: Thought content normal.         Judgment: Judgment normal.      Lab Visit on 07/21/2023   Component Date Value Ref Range Status    WBC 07/21/2023 7.63  3.90 - 12.70 K/uL Final    RBC 07/21/2023 5.28  4.60 - 6.20 M/uL Final    Hemoglobin 07/21/2023 15.5  14.0 - 18.0 g/dL Final    Hematocrit 07/21/2023 46.3  40.0 - 54.0 % Final    MCV 07/21/2023 88  82 - 98 fL Final    MCH 07/21/2023 29.4  27.0 - 31.0 pg Final    MCHC 07/21/2023 33.5  32.0 - 36.0 g/dL Final    RDW 07/21/2023 13.6  11.5 - 14.5 % Final    Platelets 07/21/2023 172  150 - 450 K/uL Final    MPV 07/21/2023 11.8  9.2 - 12.9 fL Final    Immature Granulocytes 07/21/2023 0.1  0.0 - 0.5 % Final    Gran # (ANC) 07/21/2023 4.2  1.8 - 7.7 K/uL Final    Immature Grans (Abs) 07/21/2023 0.01  0.00 - 0.04 K/uL Final    Lymph # 07/21/2023 2.3  1.0 - 4.8 K/uL Final    Mono # 07/21/2023 0.8  0.3 - 1.0 K/uL Final    Eos # 07/21/2023 0.3  0.0 - 0.5 K/uL Final    Baso # 07/21/2023 0.05  0.00 - 0.20 K/uL Final    nRBC 07/21/2023 0  0 /100 WBC Final    Gran % 07/21/2023 55.2  38.0 - 73.0 % Final    Lymph % 07/21/2023 30.5  18.0 - 48.0 % Final    Mono % 07/21/2023 10.1  4.0 - 15.0 % Final    Eosinophil % 07/21/2023 3.4  0.0 - 8.0 % Final    Basophil % 07/21/2023 0.7  0.0 - 1.9 % Final    Differential Method 07/21/2023 Automated   Final    Sodium 07/21/2023 140  136 - 145 mmol/L Final    Potassium 07/21/2023 4.4  3.5 - 5.1 mmol/L Final    Chloride 07/21/2023 105  95 - 110 mmol/L Final    CO2 07/21/2023 24  23 - 29 mmol/L Final    Glucose 07/21/2023 164 (H)  70 - 110  mg/dL Final    BUN 07/21/2023 32 (H)  10 - 30 mg/dL Final    Creatinine 07/21/2023 1.3  0.5 - 1.4 mg/dL Final    Calcium 07/21/2023 9.6  8.7 - 10.5 mg/dL Final    Total Protein 07/21/2023 6.9  6.0 - 8.4 g/dL Final    Albumin 07/21/2023 3.8  3.5 - 5.2 g/dL Final    Total Bilirubin 07/21/2023 0.9  0.1 - 1.0 mg/dL Final    Alkaline Phosphatase 07/21/2023 83  55 - 135 U/L Final    AST 07/21/2023 27  10 - 40 U/L Final    ALT 07/21/2023 20  10 - 44 U/L Final    eGFR 07/21/2023 52 (A)  >60 mL/min/1.73 m^2 Final    Anion Gap 07/21/2023 11  8 - 16 mmol/L Final   Lab Visit on 05/19/2023   Component Date Value Ref Range Status    Sodium 05/19/2023 139  136 - 145 mmol/L Final    Potassium 05/19/2023 4.9  3.5 - 5.1 mmol/L Final    Chloride 05/19/2023 104  95 - 110 mmol/L Final    CO2 05/19/2023 27  23 - 29 mmol/L Final    Glucose 05/19/2023 128 (H)  70 - 110 mg/dL Final    BUN 05/19/2023 29  10 - 30 mg/dL Final    Creatinine 05/19/2023 1.5 (H)  0.5 - 1.4 mg/dL Final    Calcium 05/19/2023 9.8  8.7 - 10.5 mg/dL Final    Total Protein 05/19/2023 6.9  6.0 - 8.4 g/dL Final    Albumin 05/19/2023 3.8  3.5 - 5.2 g/dL Final    Total Bilirubin 05/19/2023 0.7  0.1 - 1.0 mg/dL Final    Alkaline Phosphatase 05/19/2023 78  55 - 135 U/L Final    AST 05/19/2023 21  10 - 40 U/L Final    ALT 05/19/2023 20  10 - 44 U/L Final    Anion Gap 05/19/2023 8  8 - 16 mmol/L Final    eGFR 05/19/2023 44 (A)  >60 mL/min/1.73 m^2 Final    TSH 05/19/2023 1.121  0.400 - 4.000 uIU/mL Final    BNP 05/19/2023 84  0 - 99 pg/mL Final   Lab Visit on 01/26/2023   Component Date Value Ref Range Status    Microalbumin, Urine 01/26/2023 20.0  ug/mL Final    Creatinine, Urine 01/26/2023 95.0  23.0 - 375.0 mg/dL Final    Microalb/Creat Ratio 01/26/2023 21.1  0.0 - 30.0 ug/mg Final   Lab Visit on 01/26/2023   Component Date Value Ref Range Status    CPK 01/26/2023 114  20 - 200 U/L Final    WBC 01/26/2023 7.75  3.90 - 12.70 K/uL Final    RBC 01/26/2023 5.50  4.60 - 6.20 M/uL  Final    Hemoglobin 01/26/2023 16.3  14.0 - 18.0 g/dL Final    Hematocrit 01/26/2023 48.8  40.0 - 54.0 % Final    MCV 01/26/2023 89  82 - 98 fL Final    MCH 01/26/2023 29.6  27.0 - 31.0 pg Final    MCHC 01/26/2023 33.4  32.0 - 36.0 g/dL Final    RDW 01/26/2023 13.3  11.5 - 14.5 % Final    Platelets 01/26/2023 164  150 - 450 K/uL Final    MPV 01/26/2023 11.3  9.2 - 12.9 fL Final    Immature Granulocytes 01/26/2023 0.3  0.0 - 0.5 % Final    Gran # (ANC) 01/26/2023 4.9  1.8 - 7.7 K/uL Final    Immature Grans (Abs) 01/26/2023 0.02  0.00 - 0.04 K/uL Final    Lymph # 01/26/2023 1.8  1.0 - 4.8 K/uL Final    Mono # 01/26/2023 0.7  0.3 - 1.0 K/uL Final    Eos # 01/26/2023 0.3  0.0 - 0.5 K/uL Final    Baso # 01/26/2023 0.02  0.00 - 0.20 K/uL Final    nRBC 01/26/2023 0  0 /100 WBC Final    Gran % 01/26/2023 62.8  38.0 - 73.0 % Final    Lymph % 01/26/2023 23.6  18.0 - 48.0 % Final    Mono % 01/26/2023 9.5  4.0 - 15.0 % Final    Eosinophil % 01/26/2023 3.5  0.0 - 8.0 % Final    Basophil % 01/26/2023 0.3  0.0 - 1.9 % Final    Differential Method 01/26/2023 Automated   Final    Sodium 01/26/2023 139  136 - 145 mmol/L Final    Potassium 01/26/2023 4.3  3.5 - 5.1 mmol/L Final    Chloride 01/26/2023 100  95 - 110 mmol/L Final    CO2 01/26/2023 27  23 - 29 mmol/L Final    Glucose 01/26/2023 128 (H)  70 - 110 mg/dL Final    BUN 01/26/2023 30  10 - 30 mg/dL Final    Creatinine 01/26/2023 1.3  0.5 - 1.4 mg/dL Final    Calcium 01/26/2023 10.1  8.7 - 10.5 mg/dL Final    Total Protein 01/26/2023 7.1  6.0 - 8.4 g/dL Final    Albumin 01/26/2023 3.8  3.5 - 5.2 g/dL Final    Total Bilirubin 01/26/2023 1.3 (H)  0.1 - 1.0 mg/dL Final    Alkaline Phosphatase 01/26/2023 70  55 - 135 U/L Final    AST 01/26/2023 23  10 - 40 U/L Final    ALT 01/26/2023 22  10 - 44 U/L Final    Anion Gap 01/26/2023 12  8 - 16 mmol/L Final    eGFR 01/26/2023 52 (A)  >60 mL/min/1.73 m^2 Final    Hemoglobin A1C 01/26/2023 7.7 (H)  4.0 - 5.6 % Final    Estimated Avg  Glucose 01/26/2023 174 (H)  68 - 131 mg/dL Final    Cholesterol 01/26/2023 180  120 - 199 mg/dL Final    Triglycerides 01/26/2023 114  30 - 150 mg/dL Final    HDL 01/26/2023 69  40 - 75 mg/dL Final    LDL Cholesterol 01/26/2023 88.2  63.0 - 159.0 mg/dL Final    HDL/Cholesterol Ratio 01/26/2023 38.3  20.0 - 50.0 % Final    Total Cholesterol/HDL Ratio 01/26/2023 2.6  2.0 - 5.0 Final    Non-HDL Cholesterol 01/26/2023 111  mg/dL Final    Magnesium 01/26/2023 2.4  1.6 - 2.6 mg/dL Final   (  Primary hypertension...     0 Result Notes  Details    Reading Physician Reading Date Result Priority   Rc Liu MD  [phone] 6/13/2023 Routine     Narrative & Impression  EXAMINATION:  XR CHEST PA AND LATERAL     CLINICAL HISTORY:  Pure hypercholesterolemia, unspecified     TECHNIQUE:  PA and lateral views of the chest were performed.     COMPARISON:  03/11/2022     FINDINGS:  The cardiomediastinal silhouette is not enlarged noting calcification and tortuosity of the aorta, stable.  There is prominent nipple shadow bilaterally..  There is no pleural effusion.  The trachea is midline.  The lungs are symmetrically expanded bilaterally with coarse central hilar interstitial attenuation, similar to the previous exam..  There is left basilar subsegmental atelectasis.  There is no pneumothorax.  The osseous structures are remarkable for degenerative change..     Impression:     1. Interstitial findings appears similar to the previous exam, superimposed edema is a consideration.  No large focal consolidation noting prominent nipple shadow bilaterally.        Electronically signed by: Rc Liu MD  Date:                                            06/13/2023  Time:                                           11:33           Exam Ended: 06/13/23 11:17         CTA Chest Non Coronary  Status: Edited Result - FINAL     MyChart Results Release    [facility] Status: Active  Results Release     PACS Images for [facility]  Viewer     Show images for CTA Chest Non Coronary   Contains abnormal data CTA Chest Non Coronary  Order: [id number]  Status: Edited Result - FINAL     Visible to patient: Yes (not seen)     Next appt: 07/07/2023 at 10:15 AM in Internal Medicine (Alverto Green MD)     0 Result Notes  Details    Reading Physician Reading Date Result Priority   Heladio Montejo MD  [phone] 3/11/2022 Routine     Addenda     Regarding the 21 mm hypodense nodule involving the right thyroid lobe, further characterization with nonemergent ultrasound would be recommended, if this lesion has not been previously evaluated.     This report was flagged in Epic as abnormal.        Electronically signed by: Heladio Montejo  Date:                                            03/12/2022  Time:                                           00:27  Signed by Heladio Montejo MD on 3/12/2022 00:29  Narrative & Impression  EXAMINATION:  CTA CHEST NON CORONARY     CLINICAL HISTORY:  Pulmonary embolism (PE) suspected, high prob;     TECHNIQUE:  Low dose axial images, sagittal and coronal reformations were obtained from the thoracic inlet to the lung bases following the IV administration of 100 mL of Omnipaque 350.  Contrast timing was optimized to evaluate the pulmonary arteries.  MIP images were performed.     COMPARISON:  CT of the abdomen pelvis performed 03/07/2022.     FINDINGS:  Exam quality: Satisfactory.     Pulmonary embolism: No acute or chronic pulmonary embolism.     Central pulmonary arteries: Normal caliber.     Aorta: Normal calibre.  Calcifications.     Heart: No right heart strain. Normal size.     Coronary arteries: Limited assessment     Pericardium: Normal. No effusion, thickening, or calcification.     Support tubes and lines: None.     Base of neck/thyroid: Suggested 21 mm hypodense nodule right thyroid lobe (series 2, image 28).     Lymph nodes: No supraclavicular, axillary, internal mammary, mediastinal, or hilar  adenopathy.     Esophagus: Nonspecific circumferential esophageal wall thickening, finding which may be seen in setting of esophagitis.     Pleura: No effusion, thickening, or calcification.     Upper abdomen: Partially imaged sequela of recent cholecystectomy.  Nonspecific fluid and gas collection is noted in the cholecystectomy bed (series 2, image 454).  Nonspecific hypoattenuating lesion in the left hepatic lobe.  Renal cortical atrophy noted.  Numerous renal cysts are seen.  Additional adverse of mesenteric induration/congestion and unencapsulated fluid are noted in the upper abdomen.     Body wall: Unremarkable     Airways: Central airways appear patent.     Lungs: Emphysematous changes are noted.  Dependent atelectasis and scar.  4 mm solid nodule right upper lobe (series 3, image 56).  5-6 mm solid nodule right lower lobe (series 3, image 229).  3 mm solid nodule right middle lobe (series 3, image 195).  Additional scattered calcified granuloma are suggested.  Triangular shaped solid nodule abutting the fissure (series 3, image 236), may represent lymph node.     Bones: Osseous demineralization.  Degenerative change of the spine.     Impression:     1. No convincing evidence of acute pulmonary embolism.  2. Partially imaged postoperative sequela of recent cholecystectomy.  Nonspecific fluid and gas collection at the cholecystectomy bed may reflect evolving postoperative seroma and/or hematoma, noting that superinfection of the collection would be difficult exclude.  Clinical correlation recommended.  3. Solid pulmonary nodules measuring up to 6 mm, as described.  For a solid nodule 6-8 mm, Fleischner Society 2017 guidelines recommend follow up with non-contrast chest CT at 6-12 months and 18-24 months after discovery.  4. Additional details, as provided in the body of report.        Electronically signed by: Heladio Montejo  Date:                                            03/11/2022  Time:                                            21:32           Exam Ended: 03/11/22 17:43 Last Resulted: 03/12/22 00:27            Accession #: [id number]  Transthoracic echo (TTE) complete  Order# [id number]  Reading physician: Jamarcus Cartwright MD Ordering physician: Charlene Siu MD Study date: 3/11/22     Reason for Exam  Priority: Routine  Dx: Dyspnea [R06.00 (ICD-10-CM)]     Result Image Hyperlink     Show images for Echo  Summary    The left ventricle is normal in size with concentric remodeling and normal systolic function.  The estimated ejection fraction is 55%.  Grade I left ventricular diastolic dysfunction.  There is abnormal septal wall motion.  Normal right ventricular size with normal right ventricular systolic function.  The estimated PA systolic pressure is 47 mmHg.  Normal central venous pressure (3 mmHg).  There is pulmonary hypertension.   Accession #: [id number]    Echocardiogram exercise stress test  Order# [id number]  Reading physician: Daren Lassiter MD Ordering physician: Daren Lassiter MD Study date: 10/19/20       Reason for Exam  Priority: Routine  Dx: Chest pain of uncertain etiology [R07.9 (ICD-10-CM)]; Dyspnea on exertion [R06.00 (ICD-10-CM)]; Localized edema [R60.0 (ICD-10-CM)]   Comments: John protocol     Result Image Hyperlink     Show images for Stress Echo Which stress agent will be used? Treadmill Exercise; Color Flow Doppler? No    Summary    The test was stopped because the patient experienced fatigue and shortness of breath.  During stress, the following significant arrhythmias were observed: rare PVCs.  The left ventricle is normal in size with normal systolic function. The estimated ejection fraction is 60%.  The stress echo portion of this study is negative for myocardial ischemia.  The ECG portion of this study is negative for myocardial ischemia.         Echocardiography Findings      Left Ventricle      Assessment:     1. Centrilobular emphysema    2. Primary hypertension    3.  Stage 3 chronic kidney disease, unspecified whether stage 3a or 3b CKD    4. Pulmonary hypertension    5. Aortic atherosclerosis    6. High cholesterol    7. Dyspnea on exertion    8. White coat syndrome with diagnosis of hypertension    9. Type 2 diabetes mellitus with stage 3a chronic kidney disease, without long-term current use of insulin    10. Localized edema      Plan:   Husam was seen today for follow-up.    Diagnoses and all orders for this visit:    Centrilobular emphysema    Primary hypertension  -     Discontinue: losartan (COZAAR) 50 MG tablet; Take 1 tablet (50 mg total) by mouth once daily.    Stage 3 chronic kidney disease, unspecified whether stage 3a or 3b CKD  -     Discontinue: losartan (COZAAR) 50 MG tablet; Take 1 tablet (50 mg total) by mouth once daily.    Pulmonary hypertension    Aortic atherosclerosis    High cholesterol    Dyspnea on exertion    White coat syndrome with diagnosis of hypertension    Type 2 diabetes mellitus with stage 3a chronic kidney disease, without long-term current use of insulin    Localized edema    Other orders  -     losartan (COZAAR) 100 MG tablet; Take 1 tablet (100 mg total) by mouth once daily.     Will increase the losartan to 100 mg daily for HBP    Note pt had leg cramps when taking HCTZ    The shortness of breath is likely due to emphysema    Avoid salt    Pt has mild venous stasis edema    F/u with Dr Green    F/u with Dr Morris    F/u with Dr Muñoz    F/u with Dr Gardner    F/u with Dr Velazquezview    RTC 2 months    Follow up in about 2 months (around 9/25/2023).

## 2023-07-27 ENCOUNTER — HOSPITAL ENCOUNTER (OUTPATIENT)
Dept: RADIOLOGY | Facility: HOSPITAL | Age: 88
Discharge: HOME | End: 2023-07-27
Attending: FAMILY MEDICINE
Payer: MEDICARE

## 2023-07-27 DIAGNOSIS — R91.1 SOLITARY PULMONARY NODULE: ICD-10-CM

## 2023-07-27 DIAGNOSIS — R91.8 OTHER NONSPECIFIC ABNORMAL FINDING OF LUNG FIELD: ICD-10-CM

## 2023-07-27 DIAGNOSIS — R06.09 DOE (DYSPNEA ON EXERTION): ICD-10-CM

## 2023-07-27 DIAGNOSIS — R91.1 PULMONARY NODULE: ICD-10-CM

## 2023-07-27 PROCEDURE — 71250 CT THORAX DX C-: CPT | Mod: TC

## 2023-07-27 PROCEDURE — 71250 CT THORAX DX C-: CPT | Mod: 26,,, | Performed by: RADIOLOGY

## 2023-07-27 PROCEDURE — 71250 CT CHEST WITHOUT CONTRAST: ICD-10-PCS | Mod: 26,,, | Performed by: RADIOLOGY

## 2023-08-23 ENCOUNTER — OFFICE VISIT (OUTPATIENT)
Dept: FAMILY MEDICINE | Facility: CLINIC | Age: 88
End: 2023-08-23
Payer: MEDICARE

## 2023-08-23 VITALS
HEIGHT: 70 IN | HEART RATE: 88 BPM | SYSTOLIC BLOOD PRESSURE: 128 MMHG | WEIGHT: 170.63 LBS | DIASTOLIC BLOOD PRESSURE: 72 MMHG | OXYGEN SATURATION: 94 % | BODY MASS INDEX: 24.43 KG/M2

## 2023-08-23 DIAGNOSIS — J44.9 COPD, GROUP B, BY GOLD 2017 CLASSIFICATION: ICD-10-CM

## 2023-08-23 DIAGNOSIS — J43.2 CENTRILOBULAR EMPHYSEMA: Primary | ICD-10-CM

## 2023-08-23 PROBLEM — E11.69 TYPE 2 DIABETES MELLITUS WITH OTHER SPECIFIED COMPLICATION: Status: RESOLVED | Noted: 2020-08-26 | Resolved: 2023-08-23

## 2023-08-23 PROCEDURE — 3288F FALL RISK ASSESSMENT DOCD: CPT | Mod: CPTII,S$GLB,, | Performed by: STUDENT IN AN ORGANIZED HEALTH CARE EDUCATION/TRAINING PROGRAM

## 2023-08-23 PROCEDURE — 99215 OFFICE O/P EST HI 40 MIN: CPT | Mod: S$GLB,,, | Performed by: STUDENT IN AN ORGANIZED HEALTH CARE EDUCATION/TRAINING PROGRAM

## 2023-08-23 PROCEDURE — 99215 PR OFFICE/OUTPT VISIT, EST, LEVL V, 40-54 MIN: ICD-10-PCS | Mod: S$GLB,,, | Performed by: STUDENT IN AN ORGANIZED HEALTH CARE EDUCATION/TRAINING PROGRAM

## 2023-08-23 PROCEDURE — 3078F DIAST BP <80 MM HG: CPT | Mod: CPTII,S$GLB,, | Performed by: STUDENT IN AN ORGANIZED HEALTH CARE EDUCATION/TRAINING PROGRAM

## 2023-08-23 PROCEDURE — 3078F PR MOST RECENT DIASTOLIC BLOOD PRESSURE < 80 MM HG: ICD-10-PCS | Mod: CPTII,S$GLB,, | Performed by: STUDENT IN AN ORGANIZED HEALTH CARE EDUCATION/TRAINING PROGRAM

## 2023-08-23 PROCEDURE — 1159F PR MEDICATION LIST DOCUMENTED IN MEDICAL RECORD: ICD-10-PCS | Mod: CPTII,S$GLB,, | Performed by: STUDENT IN AN ORGANIZED HEALTH CARE EDUCATION/TRAINING PROGRAM

## 2023-08-23 PROCEDURE — 1160F RVW MEDS BY RX/DR IN RCRD: CPT | Mod: CPTII,S$GLB,, | Performed by: STUDENT IN AN ORGANIZED HEALTH CARE EDUCATION/TRAINING PROGRAM

## 2023-08-23 PROCEDURE — 99999 PR PBB SHADOW E&M-EST. PATIENT-LVL IV: CPT | Mod: PBBFAC,,, | Performed by: STUDENT IN AN ORGANIZED HEALTH CARE EDUCATION/TRAINING PROGRAM

## 2023-08-23 PROCEDURE — 99999 PR PBB SHADOW E&M-EST. PATIENT-LVL IV: ICD-10-PCS | Mod: PBBFAC,,, | Performed by: STUDENT IN AN ORGANIZED HEALTH CARE EDUCATION/TRAINING PROGRAM

## 2023-08-23 PROCEDURE — 1101F PR PT FALLS ASSESS DOC 0-1 FALLS W/OUT INJ PAST YR: ICD-10-PCS | Mod: CPTII,S$GLB,, | Performed by: STUDENT IN AN ORGANIZED HEALTH CARE EDUCATION/TRAINING PROGRAM

## 2023-08-23 PROCEDURE — 1160F PR REVIEW ALL MEDS BY PRESCRIBER/CLIN PHARMACIST DOCUMENTED: ICD-10-PCS | Mod: CPTII,S$GLB,, | Performed by: STUDENT IN AN ORGANIZED HEALTH CARE EDUCATION/TRAINING PROGRAM

## 2023-08-23 PROCEDURE — 1126F PR PAIN SEVERITY QUANTIFIED, NO PAIN PRESENT: ICD-10-PCS | Mod: CPTII,S$GLB,, | Performed by: STUDENT IN AN ORGANIZED HEALTH CARE EDUCATION/TRAINING PROGRAM

## 2023-08-23 PROCEDURE — 3074F PR MOST RECENT SYSTOLIC BLOOD PRESSURE < 130 MM HG: ICD-10-PCS | Mod: CPTII,S$GLB,, | Performed by: STUDENT IN AN ORGANIZED HEALTH CARE EDUCATION/TRAINING PROGRAM

## 2023-08-23 PROCEDURE — 3074F SYST BP LT 130 MM HG: CPT | Mod: CPTII,S$GLB,, | Performed by: STUDENT IN AN ORGANIZED HEALTH CARE EDUCATION/TRAINING PROGRAM

## 2023-08-23 PROCEDURE — 1159F MED LIST DOCD IN RCRD: CPT | Mod: CPTII,S$GLB,, | Performed by: STUDENT IN AN ORGANIZED HEALTH CARE EDUCATION/TRAINING PROGRAM

## 2023-08-23 PROCEDURE — 1126F AMNT PAIN NOTED NONE PRSNT: CPT | Mod: CPTII,S$GLB,, | Performed by: STUDENT IN AN ORGANIZED HEALTH CARE EDUCATION/TRAINING PROGRAM

## 2023-08-23 PROCEDURE — 1101F PT FALLS ASSESS-DOCD LE1/YR: CPT | Mod: CPTII,S$GLB,, | Performed by: STUDENT IN AN ORGANIZED HEALTH CARE EDUCATION/TRAINING PROGRAM

## 2023-08-23 PROCEDURE — 3288F PR FALLS RISK ASSESSMENT DOCUMENTED: ICD-10-PCS | Mod: CPTII,S$GLB,, | Performed by: STUDENT IN AN ORGANIZED HEALTH CARE EDUCATION/TRAINING PROGRAM

## 2023-08-23 RX ORDER — IPRATROPIUM BROMIDE 21 UG/1
2 SPRAY, METERED NASAL
Refills: 0 | Status: CANCELLED | OUTPATIENT
Start: 2023-08-23

## 2023-08-23 RX ORDER — FLUTICASONE PROPIONATE AND SALMETEROL 100; 50 UG/1; UG/1
1 POWDER RESPIRATORY (INHALATION) 2 TIMES DAILY
Qty: 60 EACH | Refills: 3 | Status: SHIPPED | OUTPATIENT
Start: 2023-08-23 | End: 2023-12-01 | Stop reason: SDUPTHER

## 2023-08-23 NOTE — PROGRESS NOTES
History & Physical  [facility]      SUBJECTIVE:     History of Present Illness:  Patient is a 89 y.o. male presents to clinic to establish care. PMH centrilobular emphysema, pulmonary nodules (stable on recent CT), HTN, HLD, aortic atherosclerosis, pulmonary HTN, CKD 3, BPH, T2DM, RBBB, s/p small bowel resection with re-anastimosis 2/2 adhesions.     Emphysema: Has not ever hospitalized for emphysema. Pt has ARREDONDO. Able to talk at rest but with minimal exertion will be very short of breath. Checks oxygen and it will drop to 88% with minimal exertion. ARREDONDO bothersome because previously very active and enjoyed running but no longer able to be as active. Had used atrovent inhaler once in the past for 2 week trial that did not seem to help. Has congestion in the morning, uses flonase for this. Cough in the evening. Productive ocassionally. No chest tightness. Pt lives in a 2 story house and can go up 16 steps before he needs to stop. Sleeps soundly, only wakes up to urinate but no problems breathing in sleep. Not tired during the day but doesn't feel like he has the energy to be able to do all of hte things he wants to do     Able to do all ADLs without assistance. Has daughter that lives close by who invites him to dinner twice a week.     Currently established with General Surgery, Cardiology, Podiatry, Urology and Hand Surgery.     Smoker- Former, quit age 40      Review of patient's allergies indicates:   Allergen Reactions    Solarcaine [benzocaine-triclosan]     Sulfa (sulfonamide antibiotics)     Sulfamethoxazole     Trimethoprim     Hytrin [terazosin] Rash    Smz-tmp ds [sulfamethoxazole-trimethoprim] Rash       Past Medical History:   Diagnosis Date    BPH (benign prostatic hyperplasia)     Diabetes mellitus (type 2)     Emphysema 03/12/2022    High cholesterol 08/26/2020    Hypertension     RBBB 11/29/2022    Small bowel obstruction 06/13/2019     Past Surgical History:  "  Procedure Laterality Date    ANASTOMOSIS OF ILEUM TO RECTUM  2019    Procedure: ANASTOMOSIS, COLO-RECTAL;  Surgeon: Fouzia Gardner MD;  Location: [facility] OR;  Service: General;;    CHOLECYSTECTOMY N/A 3/7/2022    Procedure: CHOLECYSTECTOMY;  Surgeon: Fouzia Gardner MD;  Location: [facility] OR;  Service: General;  Laterality: N/A;    COLOSTOMY  2019    Procedure: CREATION, COLOSTOMY;  Surgeon: Fouzia Gardner MD;  Location: [facility] OR;  Service: General;;    DUPUYTREN CONTRACTURE RELEASE Left 2022    Procedure: RELEASE, DUPUYTREN CONTRACTURE;  Surgeon: Slick Baldwin Jr., MD;  Location: [facility] OR;  Service: Orthopedics;  Laterality: Left;    EYE SURGERY      GISELA PROCEDURE  2019    Procedure: GISELA PROCEDURE;  Surgeon: Fouzia Gardner MD;  Location: [facility] OR;  Service: General;;  Colon resection    LYSIS OF ADHESIONS N/A 3/7/2022    Procedure: LYSIS, ADHESIONS;  Surgeon: Fouzia Gardner MD;  Location: [facility];  Service: General;  Laterality: N/A;    TONSILLECTOMY       Family History   Problem Relation Age of Onset    Heart disease Father     Hypertension Father     Cancer Mother     Diabetes Paternal Aunt      Social History     Tobacco Use    Smoking status: Former     Current packs/day: 0.00     Average packs/day: 1 pack/day for 25.0 years (25.0 ttl pk-yrs)     Types: Cigarettes     Start date:      Quit date:      Years since quittin.6    Smokeless tobacco: Never   Substance Use Topics    Alcohol use: Not Currently     Alcohol/week: 5.0 standard drinks of alcohol     Types: 5 Cans of beer per week    Drug use: No        OBJECTIVE:     Vital Signs (Most Recent)  Vitals:    23 1311   BP: 128/72   BP Location: Left arm   Patient Position: Sitting   BP Method: Medium (Manual)   Pulse: 88   SpO2: (!) 94%   Weight: 77.4 kg (170 lb 10.2 oz)   Height: 5' 10" (1.778 m)     BMI: 24.48    Physical Exam:  Gen: Well nourished and developed, NAD, appears stated " age  Neck: trachea midline, no LAD, no thyromegaly  CV: RRR, S1 and S2 present, no murmurs, no LE edema, radial and dorsalis pedis pulses equal and present bilaterally  Resp: breathing comfortably on room air, CTAB, no wheezes or crackles, prolonged expiratory phase  Psych: Logical thought process, answers questions appropriately      ASSESSMENT/PLAN:   89 y.o.male presents to clinic to establish care. Current complaint of ARREDONDO.    1. Centrilobular emphysema  CAT score 16- medium health impact which puts patient at Group B, GOLD 2 based on PFT. Will start PRN combivent and daily fluticasone-salmeterol for control. Discussed need to be compliant with medications for at least 1 month to see if there is any improvement as it can take time to notice improvement in symptoms. Medication, side effects and proper use discussed. Pt verbalized understanding and was in agreement with the plan.     - ipratropium-albuteroL (COMBIVENT)  mcg/actuation inhaler; Inhale 2 puffs into the lungs every 6 (six) hours as needed for Shortness of Breath. Rescue  Dispense: 4 g; Refill: 3  - fluticasone-salmeterol diskus inhaler 100-50 mcg; Inhale 1 puff into the lungs 2 (two) times daily. Controller  Dispense: 60 each; Refill: 3      2. 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       Follow-up: January for emphysema    I spent a total of 45 minutes on the day of the visit.This includes face to face time and non-face to face time preparing to see the patient (eg, review of tests), obtaining and/or reviewing separately obtained history, documenting clinical information in the electronic or other health record, independently interpreting results and communicating results to the patient/family/caregiver, or care coordinator.      Williams Knight, DO  Family Medicine

## 2023-09-06 ENCOUNTER — TELEPHONE (OUTPATIENT)
Dept: FAMILY MEDICINE | Facility: CLINIC | Age: 88
End: 2023-09-06
Payer: MEDICARE

## 2023-09-06 NOTE — TELEPHONE ENCOUNTER
----- Message from Yoselyn Lemons sent at 9/6/2023 12:01 PM CDT -----  Type:  RX Refill Request    Who Called: pt  Refill or New Rx:refill  RX Name and Strength:fluticasone-salmeterol diskus inhaler 100-50 mcg  Preferred Pharmacy with phone number:[id number] - [place], [place] - [address] AT [facility] [address]  Local or Mail Order:local  Ordering Provider:valeria  Would the patient rather a call back or a response via [facility]? call  Best Call Back Number:[phone]  Additional Information: Directions are unclear refax directions

## 2023-09-12 ENCOUNTER — PATIENT MESSAGE (OUTPATIENT)
Dept: ADMINISTRATIVE | Facility: HOSPITAL | Age: 88
End: 2023-09-12
Payer: MEDICARE

## 2023-09-15 ENCOUNTER — PATIENT OUTREACH (OUTPATIENT)
Dept: ADMINISTRATIVE | Facility: HOSPITAL | Age: 88
End: 2023-09-15
Payer: MEDICARE

## 2023-09-15 DIAGNOSIS — E11.51 TYPE 2 DIABETES MELLITUS WITH PERIPHERAL VASCULAR DISEASE: Primary | ICD-10-CM

## 2023-09-15 DIAGNOSIS — N18.31 TYPE 2 DIABETES MELLITUS WITH STAGE 3A CHRONIC KIDNEY DISEASE, WITHOUT LONG-TERM CURRENT USE OF INSULIN: Chronic | ICD-10-CM

## 2023-09-15 DIAGNOSIS — E11.22 TYPE 2 DIABETES MELLITUS WITH STAGE 3A CHRONIC KIDNEY DISEASE, WITHOUT LONG-TERM CURRENT USE OF INSULIN: Chronic | ICD-10-CM

## 2023-09-15 NOTE — PROGRESS NOTES
09/15/2023  Gap report audit performed. Care Everywhere updates requested and reviewed  Overdue HM topic chart audit and/or requested. LINKS triggered and reconciled. Media reviewed : Lab A1C  patient is requesting pharmcy outreach d/t  Patient stating Pharmacy will not fill emergency inhaler d/t no instructions on administration, states the pharmacy has outreached without response. Please outreach patient states - some one is being negligent  please advise- MD staff message sent with high alert

## 2023-09-19 ENCOUNTER — LAB VISIT (OUTPATIENT)
Dept: LAB | Facility: HOSPITAL | Age: 88
End: 2023-09-19
Attending: STUDENT IN AN ORGANIZED HEALTH CARE EDUCATION/TRAINING PROGRAM
Payer: MEDICARE

## 2023-09-19 DIAGNOSIS — N18.31 TYPE 2 DIABETES MELLITUS WITH STAGE 3A CHRONIC KIDNEY DISEASE, WITHOUT LONG-TERM CURRENT USE OF INSULIN: Chronic | ICD-10-CM

## 2023-09-19 DIAGNOSIS — E11.51 TYPE 2 DIABETES MELLITUS WITH PERIPHERAL VASCULAR DISEASE: ICD-10-CM

## 2023-09-19 DIAGNOSIS — E11.22 TYPE 2 DIABETES MELLITUS WITH STAGE 3A CHRONIC KIDNEY DISEASE, WITHOUT LONG-TERM CURRENT USE OF INSULIN: Chronic | ICD-10-CM

## 2023-09-19 LAB
ESTIMATED AVG GLUCOSE: 169 MG/DL (ref 68–131)
HBA1C MFR BLD: 7.5 % (ref 4–5.6)

## 2023-09-19 PROCEDURE — 83036 HEMOGLOBIN GLYCOSYLATED A1C: CPT | Performed by: STUDENT IN AN ORGANIZED HEALTH CARE EDUCATION/TRAINING PROGRAM

## 2023-09-19 PROCEDURE — 36415 COLL VENOUS BLD VENIPUNCTURE: CPT | Mod: PO | Performed by: STUDENT IN AN ORGANIZED HEALTH CARE EDUCATION/TRAINING PROGRAM

## 2023-09-21 ENCOUNTER — OFFICE VISIT (OUTPATIENT)
Dept: PODIATRY | Facility: CLINIC | Age: 88
End: 2023-09-21
Payer: MEDICARE

## 2023-09-21 VITALS
HEART RATE: 84 BPM | HEIGHT: 70 IN | WEIGHT: 170 LBS | DIASTOLIC BLOOD PRESSURE: 77 MMHG | SYSTOLIC BLOOD PRESSURE: 184 MMHG | BODY MASS INDEX: 24.34 KG/M2

## 2023-09-21 DIAGNOSIS — E11.51 TYPE 2 DIABETES MELLITUS WITH PERIPHERAL VASCULAR DISEASE: Primary | ICD-10-CM

## 2023-09-21 DIAGNOSIS — E11.49 TYPE 2 DIABETES MELLITUS WITH NEUROLOGICAL MANIFESTATION: ICD-10-CM

## 2023-09-21 DIAGNOSIS — Q66.89 CLAW TOE, UNSPECIFIED LATERALITY: ICD-10-CM

## 2023-09-21 PROCEDURE — 99999 PR PBB SHADOW E&M-EST. PATIENT-LVL III: ICD-10-PCS | Mod: PBBFAC,,, | Performed by: PODIATRIST

## 2023-09-21 PROCEDURE — 3288F FALL RISK ASSESSMENT DOCD: CPT | Mod: CPTII,S$GLB,, | Performed by: PODIATRIST

## 2023-09-21 PROCEDURE — 1160F RVW MEDS BY RX/DR IN RCRD: CPT | Mod: CPTII,S$GLB,, | Performed by: PODIATRIST

## 2023-09-21 PROCEDURE — 1126F PR PAIN SEVERITY QUANTIFIED, NO PAIN PRESENT: ICD-10-PCS | Mod: CPTII,S$GLB,, | Performed by: PODIATRIST

## 2023-09-21 PROCEDURE — 1126F AMNT PAIN NOTED NONE PRSNT: CPT | Mod: CPTII,S$GLB,, | Performed by: PODIATRIST

## 2023-09-21 PROCEDURE — 99213 OFFICE O/P EST LOW 20 MIN: CPT | Mod: S$GLB,,, | Performed by: PODIATRIST

## 2023-09-21 PROCEDURE — 1159F MED LIST DOCD IN RCRD: CPT | Mod: CPTII,S$GLB,, | Performed by: PODIATRIST

## 2023-09-21 PROCEDURE — 1101F PR PT FALLS ASSESS DOC 0-1 FALLS W/OUT INJ PAST YR: ICD-10-PCS | Mod: CPTII,S$GLB,, | Performed by: PODIATRIST

## 2023-09-21 PROCEDURE — 99213 PR OFFICE/OUTPT VISIT, EST, LEVL III, 20-29 MIN: ICD-10-PCS | Mod: S$GLB,,, | Performed by: PODIATRIST

## 2023-09-21 PROCEDURE — 1159F PR MEDICATION LIST DOCUMENTED IN MEDICAL RECORD: ICD-10-PCS | Mod: CPTII,S$GLB,, | Performed by: PODIATRIST

## 2023-09-21 PROCEDURE — 3288F PR FALLS RISK ASSESSMENT DOCUMENTED: ICD-10-PCS | Mod: CPTII,S$GLB,, | Performed by: PODIATRIST

## 2023-09-21 PROCEDURE — 1160F PR REVIEW ALL MEDS BY PRESCRIBER/CLIN PHARMACIST DOCUMENTED: ICD-10-PCS | Mod: CPTII,S$GLB,, | Performed by: PODIATRIST

## 2023-09-21 PROCEDURE — 99999 PR PBB SHADOW E&M-EST. PATIENT-LVL III: CPT | Mod: PBBFAC,,, | Performed by: PODIATRIST

## 2023-09-21 PROCEDURE — 1101F PT FALLS ASSESS-DOCD LE1/YR: CPT | Mod: CPTII,S$GLB,, | Performed by: PODIATRIST

## 2023-09-21 NOTE — PROGRESS NOTES
Subjective:      Patient ID: Husam Connolly is a 89 y.o. male.    Chief Complaint: Diabetic Foot Exam (Dm foot exam)    Husam is a 89 y.o. male who presents to the clinic upon referral from Dr. Teodora rosales. provider found  for evaluation and treatment of diabetic feet. Husam has a past medical history of BPH (benign prostatic hyperplasia), Diabetes mellitus (type 2), Emphysema (03/12/2022), High cholesterol (08/26/2020), Hypertension, RBBB (11/29/2022), and Small bowel obstruction (06/13/2019). Patient relates no major problem with feet.  Relates no pain to his feet.  Complains of intermittent cramping to his legs mostly at rest.    09/21/2023:  Returns for annual diabetic foot exam.  He relates to intermittent numbness around the 2nd and 3rd toe bilateral foot and notices progressive hammertoe deformity to the toes.  He is followed by Cardiology and has a history of peripheral vascular disease.    PCP: Williams Knight, DO    Date Last Seen by PCP:  08/23/2023    Current shoe gear: Slip-on shoes    Hemoglobin A1C   Date Value Ref Range Status   09/19/2023 7.5 (H) 4.0 - 5.6 % Final     Comment:     ADA Screening Guidelines:  5.7-6.4%  Consistent with prediabetes  >or=6.5%  Consistent with diabetes    High levels of fetal hemoglobin interfere with the HbA1C  assay. Heterozygous hemoglobin variants (HbS, HgC, etc)do  not significantly interfere with this assay.   However, presence of multiple variants may affect accuracy.     01/26/2023 7.7 (H) 4.0 - 5.6 % Final     Comment:     ADA Screening Guidelines:  5.7-6.4%  Consistent with prediabetes  >or=6.5%  Consistent with diabetes    High levels of fetal hemoglobin interfere with the HbA1C  assay. Heterozygous hemoglobin variants (HbS, HgC, etc)do  not significantly interfere with this assay.   However, presence of multiple variants may affect accuracy.     11/14/2022 7.4 (H) 4.0 - 5.6 % Final     Comment:     ADA Screening Guidelines:  5.7-6.4%  Consistent with  "prediabetes  >or=6.5%  Consistent with diabetes    High levels of fetal hemoglobin interfere with the HbA1C  assay. Heterozygous hemoglobin variants (HbS, HgC, etc)do  not significantly interfere with this assay.   However, presence of multiple variants may affect accuracy.       Vitals:    09/21/23 1112   BP: (!) 184/77   Pulse: 84   Weight: 77.1 kg (170 lb)   Height: 5' 10" (1.778 m)   PainSc: 0-No pain      Past Medical History:   Diagnosis Date    BPH (benign prostatic hyperplasia)     Diabetes mellitus (type 2)     Emphysema 03/12/2022    High cholesterol 08/26/2020    Hypertension     RBBB 11/29/2022    Small bowel obstruction 06/13/2019       Past Surgical History:   Procedure Laterality Date    ANASTOMOSIS OF ILEUM TO RECTUM  9/9/2019    Procedure: ANASTOMOSIS, COLO-RECTAL;  Surgeon: Fouzia Gardner MD;  Location: [facility];  Service: General;;    CHOLECYSTECTOMY N/A 3/7/2022    Procedure: CHOLECYSTECTOMY;  Surgeon: Fouzia Gardner MD;  Location: [facility] OR;  Service: General;  Laterality: N/A;    COLOSTOMY  6/16/2019    Procedure: CREATION, COLOSTOMY;  Surgeon: Fouzia Gardner MD;  Location: [facility] OR;  Service: General;;    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    Procedure: RELEASE, DUPUYTREN CONTRACTURE;  Surgeon: Slick Baldwin Jr., MD;  Location: [facility] OR;  Service: Orthopedics;  Laterality: Left;    EYE SURGERY      GISELA PROCEDURE  6/16/2019    Procedure: GISELA PROCEDURE;  Surgeon: Fouzia Gardner MD;  Location: [facility] OR;  Service: General;;  Colon resection    LYSIS OF ADHESIONS N/A 3/7/2022    Procedure: LYSIS, ADHESIONS;  Surgeon: Fouzia Gardner MD;  Location: [facility] OR;  Service: General;  Laterality: N/A;    TONSILLECTOMY         Family History   Problem Relation Age of Onset    Heart disease Father     Hypertension Father     Cancer Mother     Diabetes Paternal Aunt        Social History     Socioeconomic History    Marital status:    Tobacco Use    Smoking " status: Former     Current packs/day: 0.00     Average packs/day: 1 pack/day for 25.0 years (25.0 ttl pk-yrs)     Types: Cigarettes     Start date:      Quit date:      Years since quittin.7    Smokeless tobacco: Never   Substance and Sexual Activity    Alcohol use: Not Currently     Alcohol/week: 5.0 standard drinks of alcohol     Types: 5 Cans of beer per week    Drug use: No    Sexual activity: Yes     Partners: Female     Social Determinants of Health     Financial Resource Strain: Low Risk  (2022)    Overall Financial Resource Strain (CARDIA)     Difficulty of Paying Living Expenses: Not hard at all   Food Insecurity: No Food Insecurity (2022)    Hunger Vital Sign     Worried About Running Out of Food in the Last Year: Never true     Ran Out of Food in the Last Year: Never true   Transportation Needs: No Transportation Needs (2022)    PRAPARE - Transportation     Lack of Transportation (Medical): No     Lack of Transportation (Non-Medical): No   Physical Activity: Inactive (2022)    Exercise Vital Sign     Days of Exercise per Week: 0 days     Minutes of Exercise per Session: 0 min   Stress: No Stress Concern Present (2022)    [employer] - Occupational Stress Questionnaire     Feeling of Stress : Not at all   Social Connections: Socially Isolated (2022)    Social Connection and Isolation Panel [NHANES]     Frequency of Communication with Friends and Family: More than three times a week     Frequency of Social Gatherings with Friends and Family: More than three times a week     Attends Orthodoxy Services: Never     Active Member of Clubs or Organizations: No     Attends Club or Organization Meetings: Never     Marital Status:    Housing Stability: Low Risk  (2022)    Housing Stability Vital Sign     Unable to Pay for Housing in the Last Year: No     Number of Places Lived in the Last Year: 1     Unstable Housing in the Last  Year: No       Current Outpatient Medications   Medication Sig Dispense Refill    aspirin (ECOTRIN) 81 MG EC tablet Take 81 mg by mouth once daily.      atorvastatin (LIPITOR) 40 MG tablet TAKE 1 TABLET(40 MG) BY MOUTH EVERY DAY 90 tablet 3    finasteride (PROSCAR) 5 mg tablet TAKE 1 TABLET BY MOUTH EVERY DAY (Patient taking differently: Take 5 mg by mouth every evening.) 90 tablet 1    fluticasone propionate (FLONASE) 50 mcg/actuation nasal spray 2 sprays (100 mcg total) by Each Nostril route once daily. (Patient taking differently: 2 sprays by Each Nostril route 2 (two) times a day.) 48 g 3    fluticasone-salmeterol diskus inhaler 100-50 mcg Inhale 1 puff into the lungs 2 (two) times daily. Controller 60 each 3    GARLIC ORAL Take 1 capsule by mouth once daily.      ipratropium-albuteroL (COMBIVENT)  mcg/actuation inhaler Inhale 2 puffs into the lungs every 6 (six) hours as needed for Shortness of Breath. Rescue 4 g 3    losartan (COZAAR) 100 MG tablet Take 1 tablet (100 mg total) by mouth once daily. 90 tablet 3    metFORMIN (GLUCOPHAGE) 500 MG tablet TAKE 1 TABLET BY MOUTH TWICE DAILY WITH FOOD 180 tablet 3    multivitamin capsule Take 1 capsule by mouth once daily.      omega-3 fatty acids/fish oil (FISH OIL-OMEGA-3 FATTY ACIDS) 300-1,000 mg capsule Take 1 capsule by mouth once daily.      tamsulosin (FLOMAX) 0.4 mg Cap Take 0.4 mg by mouth nightly.  3    vitamin D (VITAMIN D3) 1000 units Tab Take 1,000 Units by mouth once daily.       No current facility-administered medications for this visit.       Review of patient's allergies indicates:   Allergen Reactions    Solarcaine [benzocaine-triclosan]     Sulfa (sulfonamide antibiotics)     Sulfamethoxazole     Trimethoprim     Hytrin [terazosin] Rash    Smz-tmp ds [sulfamethoxazole-trimethoprim] Rash         Review of Systems   Constitutional: Negative for chills, fever and malaise/fatigue.   HENT:  Negative for congestion and hearing loss.     Cardiovascular:  Negative for chest pain, claudication and leg swelling.   Respiratory:  Negative for cough and shortness of breath.    Skin:  Negative for itching, nail changes and poor wound healing.   Musculoskeletal:  Positive for muscle cramps. Negative for back pain, joint pain and muscle weakness.   Gastrointestinal:  Negative for nausea and vomiting.   Neurological:  Negative for numbness, paresthesias and weakness.   Psychiatric/Behavioral:  Negative for altered mental status.            Objective:      Physical Exam  Vitals reviewed.   Constitutional:       General: He is not in acute distress.     Appearance: Normal appearance. He is not ill-appearing.   Cardiovascular:      Pulses:           Dorsalis pedis pulses are 0 on the right side and 0 on the left side.        Posterior tibial pulses are 1+ on the right side and 1+ on the left side.      Comments: Monophasic DP bilateral with Doppler.  Mild pitting edema to lower extremity bilateral.  Multiple varicosities and telangiectasias noted to the lower extreme bilateral.  Skin temp is warm to foot bilateral.  Slight rubor noted to the feet bilateral in dependency.  No hair growth follow lower extremity.  Musculoskeletal:      Right foot: Normal range of motion. No deformity, bunion, Charcot foot, foot drop or prominent metatarsal heads.      Left foot: Normal range of motion. No deformity, bunion, Charcot foot, foot drop or prominent metatarsal heads.      Comments: Semi-rigid claw toe deformities toes 2-5 bilateral foot.  No pain with range of motion or manual muscle strength testing of the foot and ankle bilateral.    Mild flexible pes planus bilateral foot.   Feet:      Right foot:      Protective Sensation: 10 sites tested.  9 sites sensed.      Skin integrity: No ulcer, blister, skin breakdown, erythema, warmth, callus, dry skin or fissure.      Toenail Condition: Right toenails are normal.      Left foot:      Protective Sensation: 10 sites  tested.  9 sites sensed.      Skin integrity: No ulcer, blister, skin breakdown, erythema, warmth, callus, dry skin or fissure.      Toenail Condition: Left toenails are normal.      Comments: Mild fat pad atrophy noted to the foot bilateral.  Skin:     General: Skin is warm.      Capillary Refill: Capillary refill takes less than 2 seconds.      Findings: No ecchymosis or erythema.      Nails: There is no clubbing.   Neurological:      Mental Status: He is alert and oriented to person, place, and time.      Sensory: Sensation is intact.      Motor: Weakness present.      Comments: Decreased vibratory sensation bilateral foot.  Manual muscle strength 4/5 to lower extremity bilateral.               Assessment:       Encounter Diagnoses   Name Primary?    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    Shoe inspection. Diabetic Foot Education. Patient reminded of the importance of good nutrition and blood sugar control to help prevent podiatric complications of diabetes. Patient instructed on proper foot hygeine. We discussed wearing proper shoe gear, daily foot inspections, never walking without protective shoe gear, never putting sharp instruments to feet.    Comprehensive annual diabetic foot exam completed today.  Patient found to be intermediate risk for diabetic foot complication.      We discussed appropriate sizing shoes in order to prevent any direct pressure to his toes.  We also discussed daily foot checks.      Arterial ultrasound of the  lower extremity previously completed a year ago.  Managed per Cardiology.      RTC within 1 year p.r.n. as discussed.    A portion of this note was generated by voice recognition software and may contain spelling and grammar errors.

## 2023-09-26 ENCOUNTER — OFFICE VISIT (OUTPATIENT)
Dept: CARDIOLOGY | Facility: CLINIC | Age: 88
End: 2023-09-26
Payer: MEDICARE

## 2023-09-26 VITALS
BODY MASS INDEX: 24.82 KG/M2 | OXYGEN SATURATION: 97 % | DIASTOLIC BLOOD PRESSURE: 64 MMHG | WEIGHT: 173.38 LBS | HEIGHT: 70 IN | HEART RATE: 82 BPM | SYSTOLIC BLOOD PRESSURE: 160 MMHG

## 2023-09-26 DIAGNOSIS — E11.51 TYPE 2 DIABETES MELLITUS WITH PERIPHERAL VASCULAR DISEASE: ICD-10-CM

## 2023-09-26 DIAGNOSIS — I70.0 AORTIC ATHEROSCLEROSIS: ICD-10-CM

## 2023-09-26 DIAGNOSIS — E11.22 TYPE 2 DIABETES MELLITUS WITH STAGE 3A CHRONIC KIDNEY DISEASE, WITHOUT LONG-TERM CURRENT USE OF INSULIN: Chronic | ICD-10-CM

## 2023-09-26 DIAGNOSIS — I10 PRIMARY HYPERTENSION: Primary | Chronic | ICD-10-CM

## 2023-09-26 DIAGNOSIS — E78.00 HIGH CHOLESTEROL: Chronic | ICD-10-CM

## 2023-09-26 DIAGNOSIS — J43.2 CENTRILOBULAR EMPHYSEMA: ICD-10-CM

## 2023-09-26 DIAGNOSIS — N18.31 TYPE 2 DIABETES MELLITUS WITH STAGE 3A CHRONIC KIDNEY DISEASE, WITHOUT LONG-TERM CURRENT USE OF INSULIN: Chronic | ICD-10-CM

## 2023-09-26 DIAGNOSIS — I27.20 PULMONARY HYPERTENSION: ICD-10-CM

## 2023-09-26 PROCEDURE — 93000 ELECTROCARDIOGRAM COMPLETE: CPT | Mod: S$GLB,,, | Performed by: INTERNAL MEDICINE

## 2023-09-26 PROCEDURE — 1159F MED LIST DOCD IN RCRD: CPT | Mod: CPTII,S$GLB,, | Performed by: INTERNAL MEDICINE

## 2023-09-26 PROCEDURE — 1101F PT FALLS ASSESS-DOCD LE1/YR: CPT | Mod: CPTII,S$GLB,, | Performed by: INTERNAL MEDICINE

## 2023-09-26 PROCEDURE — 1159F PR MEDICATION LIST DOCUMENTED IN MEDICAL RECORD: ICD-10-PCS | Mod: CPTII,S$GLB,, | Performed by: INTERNAL MEDICINE

## 2023-09-26 PROCEDURE — 1160F PR REVIEW ALL MEDS BY PRESCRIBER/CLIN PHARMACIST DOCUMENTED: ICD-10-PCS | Mod: CPTII,S$GLB,, | Performed by: INTERNAL MEDICINE

## 2023-09-26 PROCEDURE — 1160F RVW MEDS BY RX/DR IN RCRD: CPT | Mod: CPTII,S$GLB,, | Performed by: INTERNAL MEDICINE

## 2023-09-26 PROCEDURE — 3288F FALL RISK ASSESSMENT DOCD: CPT | Mod: CPTII,S$GLB,, | Performed by: INTERNAL MEDICINE

## 2023-09-26 PROCEDURE — 99214 OFFICE O/P EST MOD 30 MIN: CPT | Mod: 25,S$GLB,, | Performed by: INTERNAL MEDICINE

## 2023-09-26 PROCEDURE — 3288F PR FALLS RISK ASSESSMENT DOCUMENTED: ICD-10-PCS | Mod: CPTII,S$GLB,, | Performed by: INTERNAL MEDICINE

## 2023-09-26 PROCEDURE — 99999 PR PBB SHADOW E&M-EST. PATIENT-LVL III: ICD-10-PCS | Mod: PBBFAC,,, | Performed by: INTERNAL MEDICINE

## 2023-09-26 PROCEDURE — 99214 PR OFFICE/OUTPT VISIT, EST, LEVL IV, 30-39 MIN: ICD-10-PCS | Mod: 25,S$GLB,, | Performed by: INTERNAL MEDICINE

## 2023-09-26 PROCEDURE — 93000 EKG 12-LEAD: ICD-10-PCS | Mod: S$GLB,,, | Performed by: INTERNAL MEDICINE

## 2023-09-26 PROCEDURE — 1126F AMNT PAIN NOTED NONE PRSNT: CPT | Mod: CPTII,S$GLB,, | Performed by: INTERNAL MEDICINE

## 2023-09-26 PROCEDURE — 99999 PR PBB SHADOW E&M-EST. PATIENT-LVL III: CPT | Mod: PBBFAC,,, | Performed by: INTERNAL MEDICINE

## 2023-09-26 PROCEDURE — 1126F PR PAIN SEVERITY QUANTIFIED, NO PAIN PRESENT: ICD-10-PCS | Mod: CPTII,S$GLB,, | Performed by: INTERNAL MEDICINE

## 2023-09-26 PROCEDURE — 1101F PR PT FALLS ASSESS DOC 0-1 FALLS W/OUT INJ PAST YR: ICD-10-PCS | Mod: CPTII,S$GLB,, | Performed by: INTERNAL MEDICINE

## 2023-09-26 RX ORDER — AMLODIPINE BESYLATE 5 MG/1
5 TABLET ORAL DAILY
Qty: 90 TABLET | Refills: 3 | Status: SHIPPED | OUTPATIENT
Start: 2023-09-26 | End: 2023-11-14 | Stop reason: SINTOL

## 2023-09-26 NOTE — PROGRESS NOTES
"  Subjective:      Patient ID: Husam Connolly is a 89 y.o. male.    Chief Complaint: Follow-up    HPI:  Short of breath walking less than a block.    Drives car to doctors' and grocery.    Does light housework.    Pt uses 2 MDI's.    Pt has seen a pulmonologist in the past.    Review of Systems   Cardiovascular:  Positive for chest pain (Rare "quick stab in the chest " lasts a second, unrelated to activity) and dyspnea on exertion. Negative for claudication, irregular heartbeat, leg swelling, near-syncope, orthopnea, palpitations and syncope.      Urinates frequently.  Pt is followed by a urologist      BP at home is typically 170/75  Past Medical History:   Diagnosis Date    BPH (benign prostatic hyperplasia)     Diabetes mellitus (type 2)     Emphysema 03/12/2022    High cholesterol 08/26/2020    Hypertension     RBBB 11/29/2022    Small bowel obstruction 06/13/2019        Past Surgical History:   Procedure Laterality Date    ANASTOMOSIS OF ILEUM TO RECTUM  9/9/2019    Procedure: ANASTOMOSIS, COLO-RECTAL;  Surgeon: Fouzia Gardner MD;  Location: [facility] OR;  Service: General;;    CHOLECYSTECTOMY N/A 3/7/2022    Procedure: CHOLECYSTECTOMY;  Surgeon: Fouzia Gardner MD;  Location: [facility] OR;  Service: General;  Laterality: N/A;    COLOSTOMY  6/16/2019    Procedure: CREATION, COLOSTOMY;  Surgeon: Fouzia Gardner MD;  Location: [facility] OR;  Service: General;;    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    Procedure: RELEASE, DUPUYTREN CONTRACTURE;  Surgeon: Slick Baldwin Jr., MD;  Location: [facility] OR;  Service: Orthopedics;  Laterality: Left;    EYE SURGERY      GISELA PROCEDURE  6/16/2019    Procedure: GISELA PROCEDURE;  Surgeon: Fouzia Gardner MD;  Location: [facility] OR;  Service: General;;  Colon resection    LYSIS OF ADHESIONS N/A 3/7/2022    Procedure: LYSIS, ADHESIONS;  Surgeon: Fouzia Gardner MD;  Location: [facility] OR;  Service: General;  Laterality: N/A;    TONSILLECTOMY         Family " History   Problem Relation Age of Onset    Heart disease Father     Hypertension Father     Cancer Mother     Diabetes Paternal Aunt        Social History     Socioeconomic History    Marital status:    Tobacco Use    Smoking status: Former     Current packs/day: 0.00     Average packs/day: 1 pack/day for 25.0 years (25.0 ttl pk-yrs)     Types: Cigarettes     Start date:      Quit date:      Years since quittin.7    Smokeless tobacco: Never   Substance and Sexual Activity    Alcohol use: Not Currently     Alcohol/week: 5.0 standard drinks of alcohol     Types: 5 Cans of beer per week    Drug use: No    Sexual activity: Yes     Partners: Female     Social Determinants of Health     Financial Resource Strain: Low Risk  (2022)    Overall Financial Resource Strain (CARDIA)     Difficulty of Paying Living Expenses: Not hard at all   Food Insecurity: No Food Insecurity (2022)    Hunger Vital Sign     Worried About Running Out of Food in the Last Year: Never true     Ran Out of Food in the Last Year: Never true   Transportation Needs: No Transportation Needs (2022)    PRAPARE - Transportation     Lack of Transportation (Medical): No     Lack of Transportation (Non-Medical): No   Physical Activity: Inactive (2022)    Exercise Vital Sign     Days of Exercise per Week: 0 days     Minutes of Exercise per Session: 0 min   Stress: No Stress Concern Present (2022)    [employer] - Occupational Stress Questionnaire     Feeling of Stress : Not at all   Social Connections: Socially Isolated (2022)    Social Connection and Isolation Panel [NHANES]     Frequency of Communication with Friends and Family: More than three times a week     Frequency of Social Gatherings with Friends and Family: More than three times a week     Attends Congregation Services: Never     Active Member of Clubs or Organizations: No     Attends Club or Organization Meetings: Never      Marital Status:    Housing Stability: Low Risk  (9/21/2022)    Housing Stability Vital Sign     Unable to Pay for Housing in the Last Year: No     Number of Places Lived in the Last Year: 1     Unstable Housing in the Last Year: No       Current Outpatient Medications on File Prior to Visit   Medication Sig Dispense Refill    aspirin (ECOTRIN) 81 MG EC tablet Take 81 mg by mouth once daily.      atorvastatin (LIPITOR) 40 MG tablet TAKE 1 TABLET(40 MG) BY MOUTH EVERY DAY 90 tablet 3    finasteride (PROSCAR) 5 mg tablet TAKE 1 TABLET BY MOUTH EVERY DAY (Patient taking differently: Take 5 mg by mouth every evening.) 90 tablet 1    fluticasone propionate (FLONASE) 50 mcg/actuation nasal spray 2 sprays (100 mcg total) by Each Nostril route once daily. (Patient taking differently: 2 sprays by Each Nostril route 2 (two) times a day.) 48 g 3    fluticasone-salmeterol diskus inhaler 100-50 mcg Inhale 1 puff into the lungs 2 (two) times daily. Controller 60 each 3    GARLIC ORAL Take 1 capsule by mouth once daily.      ipratropium-albuteroL (COMBIVENT)  mcg/actuation inhaler Inhale 2 puffs into the lungs every 6 (six) hours as needed for Shortness of Breath. Rescue 4 g 3    losartan (COZAAR) 100 MG tablet Take 1 tablet (100 mg total) by mouth once daily. 90 tablet 3    metFORMIN (GLUCOPHAGE) 500 MG tablet TAKE 1 TABLET BY MOUTH TWICE DAILY WITH FOOD 180 tablet 3    multivitamin capsule Take 1 capsule by mouth once daily.      omega-3 fatty acids/fish oil (FISH OIL-OMEGA-3 FATTY ACIDS) 300-1,000 mg capsule Take 1 capsule by mouth once daily.      tamsulosin (FLOMAX) 0.4 mg Cap Take 0.4 mg by mouth nightly.  3    vitamin D (VITAMIN D3) 1000 units Tab Take 1,000 Units by mouth once daily.       No current facility-administered medications on file prior to visit.       Review of patient's allergies indicates:   Allergen Reactions    Solarcaine [benzocaine-triclosan]     Sulfa (sulfonamide antibiotics)      "Sulfamethoxazole     Trimethoprim     Hytrin [terazosin] Rash    Smz-tmp ds [sulfamethoxazole-trimethoprim] Rash     Objective:     Vitals:    09/26/23 1425 09/26/23 1448   BP: (!) 186/84 (!) 160/64   BP Location: Right arm Left arm   Patient Position: Sitting Sitting   BP Method: Medium (Automatic)    Pulse: 82    SpO2: 97%    Weight: 78.7 kg (173 lb 6.3 oz)    Height: 5' 10" (1.778 m)         Physical Exam  Constitutional:       General: He is not in acute distress.     Appearance: He is well-developed. He is not diaphoretic.   Eyes:      General: No scleral icterus.  Neck:      Vascular: No carotid bruit or JVD.   Cardiovascular:      Rate and Rhythm: Regular rhythm.      Heart sounds: Normal heart sounds. No murmur heard.     No friction rub. No gallop.   Pulmonary:      Effort: Pulmonary effort is normal. No respiratory distress.      Breath sounds: Normal breath sounds.   Musculoskeletal:      Right lower leg: No edema.      Left lower leg: No edema.   Skin:     General: Skin is warm and dry.   Neurological:      Mental Status: He is alert and oriented to person, place, and time.   Psychiatric:         Behavior: Behavior normal.         Thought Content: Thought content normal.         Judgment: Judgment normal.      ECG today: NSR, left axis deviation, reviewed by me, no significant change    Lab Visit on 09/19/2023   Component Date Value Ref Range Status    Hemoglobin A1C 09/19/2023 7.5 (H)  4.0 - 5.6 % Final    Estimated Avg Glucose 09/19/2023 169 (H)  68 - 131 mg/dL Final   Lab Visit on 07/21/2023   Component Date Value Ref Range Status    WBC 07/21/2023 7.63  3.90 - 12.70 K/uL Final    RBC 07/21/2023 5.28  4.60 - 6.20 M/uL Final    Hemoglobin 07/21/2023 15.5  14.0 - 18.0 g/dL Final    Hematocrit 07/21/2023 46.3  40.0 - 54.0 % Final    MCV 07/21/2023 88  82 - 98 fL Final    MCH 07/21/2023 29.4  27.0 - 31.0 pg Final    MCHC 07/21/2023 33.5  32.0 - 36.0 g/dL Final    RDW 07/21/2023 13.6  11.5 - 14.5 % Final "    Platelets 07/21/2023 172  150 - 450 K/uL Final    MPV 07/21/2023 11.8  9.2 - 12.9 fL Final    Immature Granulocytes 07/21/2023 0.1  0.0 - 0.5 % Final    Gran # (ANC) 07/21/2023 4.2  1.8 - 7.7 K/uL Final    Immature Grans (Abs) 07/21/2023 0.01  0.00 - 0.04 K/uL Final    Lymph # 07/21/2023 2.3  1.0 - 4.8 K/uL Final    Mono # 07/21/2023 0.8  0.3 - 1.0 K/uL Final    Eos # 07/21/2023 0.3  0.0 - 0.5 K/uL Final    Baso # 07/21/2023 0.05  0.00 - 0.20 K/uL Final    nRBC 07/21/2023 0  0 /100 WBC Final    Gran % 07/21/2023 55.2  38.0 - 73.0 % Final    Lymph % 07/21/2023 30.5  18.0 - 48.0 % Final    Mono % 07/21/2023 10.1  4.0 - 15.0 % Final    Eosinophil % 07/21/2023 3.4  0.0 - 8.0 % Final    Basophil % 07/21/2023 0.7  0.0 - 1.9 % Final    Differential Method 07/21/2023 Automated   Final    Sodium 07/21/2023 140  136 - 145 mmol/L Final    Potassium 07/21/2023 4.4  3.5 - 5.1 mmol/L Final    Chloride 07/21/2023 105  95 - 110 mmol/L Final    CO2 07/21/2023 24  23 - 29 mmol/L Final    Glucose 07/21/2023 164 (H)  70 - 110 mg/dL Final    BUN 07/21/2023 32 (H)  10 - 30 mg/dL Final    Creatinine 07/21/2023 1.3  0.5 - 1.4 mg/dL Final    Calcium 07/21/2023 9.6  8.7 - 10.5 mg/dL Final    Total Protein 07/21/2023 6.9  6.0 - 8.4 g/dL Final    Albumin 07/21/2023 3.8  3.5 - 5.2 g/dL Final    Total Bilirubin 07/21/2023 0.9  0.1 - 1.0 mg/dL Final    Alkaline Phosphatase 07/21/2023 83  55 - 135 U/L Final    AST 07/21/2023 27  10 - 40 U/L Final    ALT 07/21/2023 20  10 - 44 U/L Final    eGFR 07/21/2023 52 (A)  >60 mL/min/1.73 m^2 Final    Anion Gap 07/21/2023 11  8 - 16 mmol/L Final   Lab Visit on 05/19/2023   Component Date Value Ref Range Status    Sodium 05/19/2023 139  136 - 145 mmol/L Final    Potassium 05/19/2023 4.9  3.5 - 5.1 mmol/L Final    Chloride 05/19/2023 104  95 - 110 mmol/L Final    CO2 05/19/2023 27  23 - 29 mmol/L Final    Glucose 05/19/2023 128 (H)  70 - 110 mg/dL Final    BUN 05/19/2023 29  10 - 30 mg/dL Final     Creatinine 05/19/2023 1.5 (H)  0.5 - 1.4 mg/dL Final    Calcium 05/19/2023 9.8  8.7 - 10.5 mg/dL Final    Total Protein 05/19/2023 6.9  6.0 - 8.4 g/dL Final    Albumin 05/19/2023 3.8  3.5 - 5.2 g/dL Final    Total Bilirubin 05/19/2023 0.7  0.1 - 1.0 mg/dL Final    Alkaline Phosphatase 05/19/2023 78  55 - 135 U/L Final    AST 05/19/2023 21  10 - 40 U/L Final    ALT 05/19/2023 20  10 - 44 U/L Final    Anion Gap 05/19/2023 8  8 - 16 mmol/L Final    eGFR 05/19/2023 44 (A)  >60 mL/min/1.73 m^2 Final    TSH 05/19/2023 1.121  0.400 - 4.000 uIU/mL Final    BNP 05/19/2023 84  0 - 99 pg/mL Final   (    Primary hypertension...     0 Result Notes  Details    Reading Physician Reading Date Result Priority   Rc Liu MD  [phone] 6/13/2023 Routine     Narrative & Impression  EXAMINATION:  XR CHEST PA AND LATERAL     CLINICAL HISTORY:  Pure hypercholesterolemia, unspecified     TECHNIQUE:  PA and lateral views of the chest were performed.     COMPARISON:  03/11/2022     FINDINGS:  The cardiomediastinal silhouette is not enlarged noting calcification and tortuosity of the aorta, stable.  There is prominent nipple shadow bilaterally..  There is no pleural effusion.  The trachea is midline.  The lungs are symmetrically expanded bilaterally with coarse central hilar interstitial attenuation, similar to the previous exam..  There is left basilar subsegmental atelectasis.  There is no pneumothorax.  The osseous structures are remarkable for degenerative change..     Impression:     1. Interstitial findings appears similar to the previous exam, superimposed edema is a consideration.  No large focal consolidation noting prominent nipple shadow bilaterally.        Electronically signed by: Rc Liu MD  Date:                                            06/13/2023  Time:                                           11:33           Exam Ended: 06/13/23 11:17         CTA Chest Non Coronary  Status: Edited Result - FINAL      [employer] Results Release    [facility] Status: Active  Results Release     PACS Images for [facility] [place] Viewer     Show images for CTA Chest Non Coronary   Contains abnormal data CTA Chest Non Coronary  Order: [id number]  Status: Edited Result - FINAL     Visible to patient: Yes (not seen)     Next appt: 07/07/2023 at 10:15 AM in Internal Medicine (Alverto Green MD)     0 Result Notes  Details    Reading Physician Reading Date Result Priority   Heladio Montejo MD  [phone] 3/11/2022 Routine     Addenda     Regarding the 21 mm hypodense nodule involving the right thyroid lobe, further characterization with nonemergent ultrasound would be recommended, if this lesion has not been previously evaluated.     This report was flagged in Epic as abnormal.        Electronically signed by: Heladio Montejo  Date:                                            03/12/2022  Time:                                           00:27  Signed by Heladio Montejo MD on 3/12/2022 00:29  Narrative & Impression  EXAMINATION:  CTA CHEST NON CORONARY     CLINICAL HISTORY:  Pulmonary embolism (PE) suspected, high prob;     TECHNIQUE:  Low dose axial images, sagittal and coronal reformations were obtained from the thoracic inlet to the lung bases following the IV administration of 100 mL of Omnipaque 350.  Contrast timing was optimized to evaluate the pulmonary arteries.  MIP images were performed.     COMPARISON:  CT of the abdomen pelvis performed 03/07/2022.     FINDINGS:  Exam quality: Satisfactory.     Pulmonary embolism: No acute or chronic pulmonary embolism.     Central pulmonary arteries: Normal caliber.     Aorta: Normal calibre.  Calcifications.     Heart: No right heart strain. Normal size.     Coronary arteries: Limited assessment     Pericardium: Normal. No effusion, thickening, or calcification.     Support tubes and lines: None.     Base of neck/thyroid: Suggested 21 mm hypodense nodule right thyroid lobe (series 2,  image 28).     Lymph nodes: No supraclavicular, axillary, internal mammary, mediastinal, or hilar adenopathy.     Esophagus: Nonspecific circumferential esophageal wall thickening, finding which may be seen in setting of esophagitis.     Pleura: No effusion, thickening, or calcification.     Upper abdomen: Partially imaged sequela of recent cholecystectomy.  Nonspecific fluid and gas collection is noted in the cholecystectomy bed (series 2, image 454).  Nonspecific hypoattenuating lesion in the left hepatic lobe.  Renal cortical atrophy noted.  Numerous renal cysts are seen.  Additional adverse of mesenteric induration/congestion and unencapsulated fluid are noted in the upper abdomen.     Body wall: Unremarkable     Airways: Central airways appear patent.     Lungs: Emphysematous changes are noted.  Dependent atelectasis and scar.  4 mm solid nodule right upper lobe (series 3, image 56).  5-6 mm solid nodule right lower lobe (series 3, image 229).  3 mm solid nodule right middle lobe (series 3, image 195).  Additional scattered calcified granuloma are suggested.  Triangular shaped solid nodule abutting the fissure (series 3, image 236), may represent lymph node.     Bones: Osseous demineralization.  Degenerative change of the spine.     Impression:     1. No convincing evidence of acute pulmonary embolism.  2. Partially imaged postoperative sequela of recent cholecystectomy.  Nonspecific fluid and gas collection at the cholecystectomy bed may reflect evolving postoperative seroma and/or hematoma, noting that superinfection of the collection would be difficult exclude.  Clinical correlation recommended.  3. Solid pulmonary nodules measuring up to 6 mm, as described.  For a solid nodule 6-8 mm, Fleischner Society 2017 guidelines recommend follow up with non-contrast chest CT at 6-12 months and 18-24 months after discovery.  4. Additional details, as provided in the body of report.        Electronically signed by:  Heladio Montejo  Date:                                            03/11/2022  Time:                                           21:32           Exam Ended: 03/11/22 17:43 Last Resulted: 03/12/22 00:27            CT Abdomen Pelvis With Contrast  Status: Final result     My[facility] Results Release    My[facility] Status: Active  Results Release     PACS Images for [facility] [place] Viewer     Show images for CT Abdomen Pelvis With Contrast  CT Abdomen Pelvis With Contrast  Order: [id number]  Status: Final result    Visible to patient: Yes (not seen)     Next appt: 07/07/2023 at 10:15 AM in Internal Medicine (Alverto Green MD)     0 Result Notes  Details    Reading Physician Reading Date Result Priority   Inez Hamm MD  [phone] 9/19/2022 STAT     Narrative & Impression  EXAMINATION:  CT ABDOMEN PELVIS WITH CONTRAST     CLINICAL HISTORY:  Bowel obstruction suspected;     TECHNIQUE:  Low dose axial images, sagittal and coronal reformations were obtained from the lung bases to the pubic symphysis before and following the IV administration of 75 mL of Omnipaque 350 ..     COMPARISON:  06/01/2022 CT abdomen pelvis is     FINDINGS:  Abdomen:     - Lung bases: No infiltrates and no nodules.  Visualized heart is not significantly enlarged.     - Liver: Stable low density in the left lobe of the liver likely represent small cysts.     - Gallbladder: Cholecystectomy.     - Bile Ducts: There is mild prominence of the intrahepatic biliary ducts without significant prominence of the common bile duct.     - Spleen: Negative.     - Kidneys: Multiple stable cysts are noted in the kidneys bilaterally with dominant cysts appearing simple.  One cyst on the right involving the lower pole has a calcific rim.  Several are too small to characterize bilaterally.  There is symmetric enhancement of the cortices.  Left kidney is relatively atrophic.  No calculi or hydronephrosis .     - Adrenals: Unremarkable.     - Pancreas: No mass or  peripancreatic fat stranding.     - Retroperitoneum:  No significant adenopathy.     - Vascular: No abdominal aortic aneurysm.  There is moderate atherosclerosis of the aorta and its imaged branches.     - Abdominal wall:  Unremarkable.     Pelvis:     No pelvic mass, adenopathy, or free fluid.  Bladder is unremarkable.  Prostate is enlarged.  Multiple pelvic calcifications noted.     Bowel/Mesentery: There is small bowel dilatation around the distal small bowel.  There is a transition point around the mid lower abdomen seen on 67 series 601.  Second transition point in the right lower quadrant is around axial image 88 series 2.  The stomach and proximal small bowel is not significantly distended.     The there is diverticulosis of the distal colon with suture line along the descending colon distally.     Bones:  No acute osseous abnormality and no suspicious lytic or blastic lesion. There is spondylosis throughout the spine.  No compression fractures or subluxation.     Impression:     Small-bowel obstruction with transition points described.  Findings are similar compared to the prior exam.     Diverticulosis without evidence of diverticulitis.  Postoperative changes involving the distal colon.     Cholecystectomy.  Intrahepatic mild prominence of the intrahepatic biliary system is noted.  No there is no extrahepatic biliary dilatation.     Bilateral stable renal cysts several are too small to characterize.     Please see above for additional incidental nonacute findings.        Electronically signed by: Inez Hamm  Date:                                            09/19/2022   US Lower Extremity Arteries Bilateral  Status: Final result     [employer] Results Release    [place] Status: Active  Results Release     PACS Images for [facility] Viewer     Show images for US Lower Extremity Arteries Bilateral  All Reviewers List    Ammon Muñoz DPM on 9/27/2022 08:09     US Lower Extremity Arteries  Bilateral  Order: [id number]  Status: Final result      Visible to patient: Yes (seen)       Next appt: 07/07/2023 at 10:15 AM in Internal Medicine (Alverto Green MD)       Dx: Type 2 diabetes mellitus with periphe...       0 Result Notes      Details    Reading Physician Reading Date Result Priority   Rosalino Real MD  [phone] 9/26/2022 Routine     Narrative & Impression  EXAMINATION:  US LOWER EXTREMITY ARTERIES BILATERAL     CLINICAL HISTORY:  Type 2 diabetes mellitus with diabetic peripheral angiopathy without gangrene     TECHNIQUE:  Bilateral lower extremity arterial duplex ultrasound examination performed. Multiple gray scale and color doppler images were obtained in addition to waveform analysis.     COMPARISON:  03/12/2022     FINDINGS:  The peak systolic velocities on the right are as follows, in centimeters/second:     Common femoral artery: 136, biphasic     Deep profundus: 135, biphasic     Superficial femoral artery, proximal: 79, biphasic     Superficial femoral artery, mid portion: 89, triphasic; 309, biphasic     Superficial femoral artery, distal: 78, biphasic     Popliteal artery: 83, biphasic     Distal popliteal artery: 88, biphasic     Anterior tibial artery: 74, biphasic     Posterior tibial artery: 91, biphasic     The peak systolic velocities on the left are as follows, in centimeters/second:     Common femoral artery: 118, biphasic     Deep profundus: 119, biphasic     Superficial femoral artery, proximal: 106, biphasic     Superficial femoral artery, mid portion: 116, biphasic     Superficial femoral artery, distal: 75, biphasic     Popliteal artery: 67, biphasic     Distal popliteal artery: 75, biphasic     Anterior tibial artery: 37, biphasic; 106, biphasic with adjacent collaterals     Posterior tibial artery: 90, biphasic with suspected adjacent collateral.     Impression:     Abnormal velocity doubling of the right mid-SFA and distal left anterior tibial artery  concerning for hemodynamically significant stenosis.     Suspected adjacent collaterals at the distal left anterior tibial and posterior tibial arteries.        Electronically signed by: Rosalino Farhan  Date:                                            09/26/2022  Time:                                           15:26     Accession #: [id number]  Transthoracic echo (TTE) complete  Order# [id number]  Reading physician: Jamarcus Cartwright MD Ordering physician: Charlene Siu MD Study date: 3/11/22     Reason for Exam  Priority: Routine  Dx: Dyspnea [R06.00 (ICD-10-CM)]     Result Image Hyperlink     Show images for Echo  Summary    The left ventricle is normal in size with concentric remodeling and normal systolic function.  The estimated ejection fraction is 55%.  Grade I left ventricular diastolic dysfunction.  There is abnormal septal wall motion.  Normal right ventricular size with normal right ventricular systolic function.  The estimated PA systolic pressure is 47 mmHg.  Normal central venous pressure (3 mmHg).  There is pulmonary hypertension.   CTA Chest Non Coronary  Status: Edited Result - FINAL     [employer] Results Release    [employer] Status: Active  Results Release     PACS Images for [employer] Viewer     Show images for CTA Chest Non Coronary   Contains abnormal data CTA Chest Non Coronary  Order: [id number]  Status: Edited Result - FINAL     Visible to patient: Yes (not seen)     Next appt: 11/30/2022 at 11:45 AM in Outpatient Rehab (Lesley Barajas OT)     0 Result Notes  Details    Reading Physician Reading Date Result Priority   Heladio Montejo MD  [phone]  [phone] 3/11/2022 Routine     Addenda     Regarding the 21 mm hypodense nodule involving the right thyroid lobe, further characterization with nonemergent ultrasound would be recommended, if this lesion has not been previously evaluated.     This report was flagged in Epic as abnormal.        Electronically signed by: Heladio  Nikia  Date:                                            03/12/2022  Time:                                           00:27  Signed by Heladio Montejo MD on 3/12/2022 00:29  Narrative & Impression  EXAMINATION:  CTA CHEST NON CORONARY     CLINICAL HISTORY:  Pulmonary embolism (PE) suspected, high prob;     TECHNIQUE:  Low dose axial images, sagittal and coronal reformations were obtained from the thoracic inlet to the lung bases following the IV administration of 100 mL of Omnipaque 350.  Contrast timing was optimized to evaluate the pulmonary arteries.  MIP images were performed.     COMPARISON:  CT of the abdomen pelvis performed 03/07/2022.     FINDINGS:  Exam quality: Satisfactory.     Pulmonary embolism: No acute or chronic pulmonary embolism.     Central pulmonary arteries: Normal caliber.     Aorta: Normal calibre.  Calcifications.     Heart: No right heart strain. Normal size.     Coronary arteries: Limited assessment     Pericardium: Normal. No effusion, thickening, or calcification.     Support tubes and lines: None.     Base of neck/thyroid: Suggested 21 mm hypodense nodule right thyroid lobe (series 2, image 28).     Lymph nodes: No supraclavicular, axillary, internal mammary, mediastinal, or hilar adenopathy.     Esophagus: Nonspecific circumferential esophageal wall thickening, finding which may be seen in setting of esophagitis.     Pleura: No effusion, thickening, or calcification.     Upper abdomen: Partially imaged sequela of recent cholecystectomy.  Nonspecific fluid and gas collection is noted in the cholecystectomy bed (series 2, image 454).  Nonspecific hypoattenuating lesion in the left hepatic lobe.  Renal cortical atrophy noted.  Numerous renal cysts are seen.  Additional adverse of mesenteric induration/congestion and unencapsulated fluid are noted in the upper abdomen.     Body wall: Unremarkable     Airways: Central airways appear patent.     Lungs: Emphysematous changes are noted.   Dependent atelectasis and scar.  4 mm solid nodule right upper lobe (series 3, image 56).  5-6 mm solid nodule right lower lobe (series 3, image 229).  3 mm solid nodule right middle lobe (series 3, image 195).  Additional scattered calcified granuloma are suggested.  Triangular shaped solid nodule abutting the fissure (series 3, image 236), may represent lymph node.     Bones: Osseous demineralization.  Degenerative change of the spine.     Impression:     1. No convincing evidence of acute pulmonary embolism.  2. Partially imaged postoperative sequela of recent cholecystectomy.  Nonspecific fluid and gas collection at the cholecystectomy bed may reflect evolving postoperative seroma and/or hematoma, noting that superinfection of the collection would be difficult exclude.  Clinical correlation recommended.  3. Solid pulmonary nodules measuring up to 6 mm, as described.  For a solid nodule 6-8 mm, Fleischner Society 2017 guidelines recommend follow up with non-contrast chest CT at 6-12 months and 18-24 months after discovery.  4. Additional details, as provided in the body of report.        Electronically signed by: Heladio Montejo  Date:                                            03/11/2022  Time:                                           21:32           Exam Ended: 03/11/22 17:43 Last Resulted: 03/12/22 00:27            Accession #: [id number]    Echocardiogram exercise stress test  Order# [id number]  Reading physician: Daren Lassiter MD Ordering physician: Daren Lassiter MD Study date: 10/19/20       Reason for Exam  Priority: Routine  Dx: Chest pain of uncertain etiology [R07.9 (ICD-10-CM)]; Dyspnea on exertion [R06.00 (ICD-10-CM)]; Localized edema [R60.0 (ICD-10-CM)]   Comments: John protocol     Result Image Hyperlink     Show images for Stress Echo Which stress agent will be used? Treadmill Exercise; Color Flow Doppler? No    Summary    The test was stopped because the patient experienced fatigue  and shortness of breath.  During stress, the following significant arrhythmias were observed: rare PVCs.  The left ventricle is normal in size with normal systolic function. The estimated ejection fraction is 60%.  The stress echo portion of this study is negative for myocardial ischemia.  The ECG portion of this study is negative for myocardial ischemia.         Echocardiography Findings      Left Ventricle    Accession #: [id number]    Transthoracic echo (TTE) complete  Order# [id number]  Reading physician: Jamarcus Cartwright MD Ordering physician: Charlene Siu MD Study date: 3/11/22       Reason for Exam  Priority: Routine  Dx: Dyspnea [R06.00 (ICD-10-CM)]     Result Image Hyperlink     Show images for Echo    Summary    The left ventricle is normal in size with concentric remodeling and normal systolic function.  The estimated ejection fraction is 55%.  Grade I left ventricular diastolic dysfunction.  There is abnormal septal wall motion.  Normal right ventricular size with normal right ventricular systolic function.  The estimated PA systolic pressure is 47 mmHg.  Normal central venous pressure (3 mmHg).  There is pulmonary hypertension.         Vitals    Height     Assessment:     1. Primary hypertension    2. Aortic atherosclerosis    3. High cholesterol    4. Pulmonary hypertension    5. Centrilobular emphysema    6. Type 2 diabetes mellitus with stage 3a chronic kidney disease, without long-term current use of insulin    7. Type 2 diabetes mellitus with peripheral vascular disease      Plan:   Husam was seen today for follow-up.    Diagnoses and all orders for this visit:    Primary hypertension  -     IN OFFICE EKG 12-LEAD (to [facility])    Aortic atherosclerosis  -     IN OFFICE EKG 12-LEAD (to Muse)    High cholesterol  -     IN OFFICE EKG 12-LEAD (to Muse)    Pulmonary hypertension  -     IN OFFICE EKG 12-LEAD (to Muse)    Centrilobular emphysema  -     IN OFFICE EKG 12-LEAD (to Muse)    Type 2 diabetes  mellitus with stage 3a chronic kidney disease, without long-term current use of insulin  -     IN OFFICE EKG 12-LEAD (to Muse)    Type 2 diabetes mellitus with peripheral vascular disease  -     IN OFFICE EKG 12-LEAD (to [place])    Other orders  -     amLODIPine (NORVASC) 5 MG tablet; Take 1 tablet (5 mg total) by mouth once daily.    Pt is doing well except for elevated blood pressure     Will add amlodipine 5 mg daily for HBP    Rest of meds the same    Follow up in about 4 weeks (around 10/24/2023).

## 2023-10-06 DIAGNOSIS — N18.32 TYPE 2 DIABETES MELLITUS WITH STAGE 3B CHRONIC KIDNEY DISEASE, WITHOUT LONG-TERM CURRENT USE OF INSULIN: ICD-10-CM

## 2023-10-06 DIAGNOSIS — E11.22 TYPE 2 DIABETES MELLITUS WITH STAGE 3B CHRONIC KIDNEY DISEASE, WITHOUT LONG-TERM CURRENT USE OF INSULIN: ICD-10-CM

## 2023-10-06 RX ORDER — METFORMIN HYDROCHLORIDE 500 MG/1
500 TABLET ORAL 2 TIMES DAILY WITH MEALS
Qty: 180 TABLET | Refills: 3 | Status: SHIPPED | OUTPATIENT
Start: 2023-10-06 | End: 2024-03-05 | Stop reason: ALTCHOICE

## 2023-10-06 NOTE — TELEPHONE ENCOUNTER
Refill Routing Note   Medication(s) are not appropriate for processing by [facility] for the following reason(s):      Non-participating provider    [facility] action(s):  Route Care Due:  None identified            Appointments  past 12m or future 3m with PCP    Date Provider   Last Visit   8/23/2023 Williams Knight, DO   Next Visit   1/25/2024 Williams Knight, DO   ED visits in past 90 days: 0        Note composed:10:09 AM 10/06/2023

## 2023-11-14 ENCOUNTER — OFFICE VISIT (OUTPATIENT)
Dept: CARDIOLOGY | Facility: CLINIC | Age: 88
End: 2023-11-14
Payer: MEDICARE

## 2023-11-14 VITALS
DIASTOLIC BLOOD PRESSURE: 60 MMHG | OXYGEN SATURATION: 93 % | HEIGHT: 70 IN | SYSTOLIC BLOOD PRESSURE: 137 MMHG | BODY MASS INDEX: 25.13 KG/M2 | HEART RATE: 83 BPM | WEIGHT: 175.5 LBS

## 2023-11-14 DIAGNOSIS — N18.30 STAGE 3 CHRONIC KIDNEY DISEASE, UNSPECIFIED WHETHER STAGE 3A OR 3B CKD: Chronic | ICD-10-CM

## 2023-11-14 DIAGNOSIS — E78.00 HIGH CHOLESTEROL: Chronic | ICD-10-CM

## 2023-11-14 DIAGNOSIS — I45.10 RBBB: ICD-10-CM

## 2023-11-14 DIAGNOSIS — E11.51 TYPE 2 DIABETES MELLITUS WITH PERIPHERAL VASCULAR DISEASE: ICD-10-CM

## 2023-11-14 DIAGNOSIS — I10 PRIMARY HYPERTENSION: Primary | Chronic | ICD-10-CM

## 2023-11-14 DIAGNOSIS — I27.20 PULMONARY HYPERTENSION: ICD-10-CM

## 2023-11-14 DIAGNOSIS — R60.0 LOCALIZED EDEMA: ICD-10-CM

## 2023-11-14 DIAGNOSIS — N18.31 TYPE 2 DIABETES MELLITUS WITH STAGE 3A CHRONIC KIDNEY DISEASE, WITHOUT LONG-TERM CURRENT USE OF INSULIN: Chronic | ICD-10-CM

## 2023-11-14 DIAGNOSIS — J43.2 CENTRILOBULAR EMPHYSEMA: ICD-10-CM

## 2023-11-14 DIAGNOSIS — R91.1 PULMONARY NODULE: ICD-10-CM

## 2023-11-14 DIAGNOSIS — I70.0 AORTIC ATHEROSCLEROSIS: ICD-10-CM

## 2023-11-14 DIAGNOSIS — M19.019 SHOULDER ARTHRITIS: ICD-10-CM

## 2023-11-14 DIAGNOSIS — E11.22 TYPE 2 DIABETES MELLITUS WITH STAGE 3A CHRONIC KIDNEY DISEASE, WITHOUT LONG-TERM CURRENT USE OF INSULIN: Chronic | ICD-10-CM

## 2023-11-14 PROCEDURE — 1160F PR REVIEW ALL MEDS BY PRESCRIBER/CLIN PHARMACIST DOCUMENTED: ICD-10-PCS | Mod: CPTII,S$GLB,, | Performed by: INTERNAL MEDICINE

## 2023-11-14 PROCEDURE — 1126F AMNT PAIN NOTED NONE PRSNT: CPT | Mod: CPTII,S$GLB,, | Performed by: INTERNAL MEDICINE

## 2023-11-14 PROCEDURE — 1101F PT FALLS ASSESS-DOCD LE1/YR: CPT | Mod: CPTII,S$GLB,, | Performed by: INTERNAL MEDICINE

## 2023-11-14 PROCEDURE — 1101F PR PT FALLS ASSESS DOC 0-1 FALLS W/OUT INJ PAST YR: ICD-10-PCS | Mod: CPTII,S$GLB,, | Performed by: INTERNAL MEDICINE

## 2023-11-14 PROCEDURE — 1159F PR MEDICATION LIST DOCUMENTED IN MEDICAL RECORD: ICD-10-PCS | Mod: CPTII,S$GLB,, | Performed by: INTERNAL MEDICINE

## 2023-11-14 PROCEDURE — 3288F PR FALLS RISK ASSESSMENT DOCUMENTED: ICD-10-PCS | Mod: CPTII,S$GLB,, | Performed by: INTERNAL MEDICINE

## 2023-11-14 PROCEDURE — 99999 PR PBB SHADOW E&M-EST. PATIENT-LVL IV: CPT | Mod: PBBFAC,,, | Performed by: INTERNAL MEDICINE

## 2023-11-14 PROCEDURE — 1160F RVW MEDS BY RX/DR IN RCRD: CPT | Mod: CPTII,S$GLB,, | Performed by: INTERNAL MEDICINE

## 2023-11-14 PROCEDURE — 99214 PR OFFICE/OUTPT VISIT, EST, LEVL IV, 30-39 MIN: ICD-10-PCS | Mod: S$GLB,,, | Performed by: INTERNAL MEDICINE

## 2023-11-14 PROCEDURE — 3288F FALL RISK ASSESSMENT DOCD: CPT | Mod: CPTII,S$GLB,, | Performed by: INTERNAL MEDICINE

## 2023-11-14 PROCEDURE — 1159F MED LIST DOCD IN RCRD: CPT | Mod: CPTII,S$GLB,, | Performed by: INTERNAL MEDICINE

## 2023-11-14 PROCEDURE — 99999 PR PBB SHADOW E&M-EST. PATIENT-LVL IV: ICD-10-PCS | Mod: PBBFAC,,, | Performed by: INTERNAL MEDICINE

## 2023-11-14 PROCEDURE — 1126F PR PAIN SEVERITY QUANTIFIED, NO PAIN PRESENT: ICD-10-PCS | Mod: CPTII,S$GLB,, | Performed by: INTERNAL MEDICINE

## 2023-11-14 PROCEDURE — 99214 OFFICE O/P EST MOD 30 MIN: CPT | Mod: S$GLB,,, | Performed by: INTERNAL MEDICINE

## 2023-11-14 RX ORDER — HYDRALAZINE HYDROCHLORIDE 10 MG/1
10 TABLET, FILM COATED ORAL 3 TIMES DAILY
Qty: 270 TABLET | Refills: 3 | Status: SHIPPED | OUTPATIENT
Start: 2023-11-14 | End: 2023-11-28

## 2023-11-14 NOTE — PROGRESS NOTES
Subjective:      Patient ID: Husam Connolly is a 89 y.o. male.    Chief Complaint: Follow-up and Hypertension    HPI:  BP is improved but feet are now swollen.    Main problem is chronic shortness of breath with any physical exertion.    Pt has seen pulmonologist at [facility] [place]    Pt has a hx of lung nodules    Review of Systems   Cardiovascular:  Positive for dyspnea on exertion and leg swelling. Negative for chest pain, claudication, irregular heartbeat, near-syncope, orthopnea, palpitations and syncope.      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    Pt is taking Tylenol    Pt has cramping in hands    ``  Past Medical History:   Diagnosis Date    BPH (benign prostatic hyperplasia)     Diabetes mellitus (type 2)     Emphysema 03/12/2022    High cholesterol 08/26/2020    Hypertension     RBBB 11/29/2022    Small bowel obstruction 06/13/2019        Past Surgical History:   Procedure Laterality Date    ANASTOMOSIS OF ILEUM TO RECTUM  9/9/2019    Procedure: ANASTOMOSIS, COLO-RECTAL;  Surgeon: Fouzia Gardner MD;  Location: [facility] OR;  Service: General;;    CHOLECYSTECTOMY N/A 3/7/2022    Procedure: CHOLECYSTECTOMY;  Surgeon: Fouzia Gardner MD;  Location: [facility] OR;  Service: General;  Laterality: N/A;    COLOSTOMY  6/16/2019    Procedure: CREATION, COLOSTOMY;  Surgeon: Fouzia Gardner MD;  Location: [facility] OR;  Service: General;;    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    Procedure: RELEASE, DUPUYTREN CONTRACTURE;  Surgeon: Slick Baldwin Jr., MD;  Location: [facility] OR;  Service: Orthopedics;  Laterality: Left;    EYE SURGERY      GISELA PROCEDURE  6/16/2019    Procedure: GISELA PROCEDURE;  Surgeon: Fouzia Gardner MD;  Location: [facility] OR;  Service: General;;  Colon resection    LYSIS OF ADHESIONS N/A 3/7/2022    Procedure: LYSIS, ADHESIONS;  Surgeon: Fouzia Gardner MD;  Location: [facility] OR;  Service: General;  Laterality: N/A;    TONSILLECTOMY         Family History   Problem Relation Age of Onset     Heart disease Father     Hypertension Father     Cancer Mother     Diabetes Paternal Aunt        Social History     Socioeconomic History    Marital status:    Tobacco Use    Smoking status: Former     Current packs/day: 0.00     Average packs/day: 1 pack/day for 25.0 years (25.0 ttl pk-yrs)     Types: Cigarettes     Start date:      Quit date:      Years since quittin.9    Smokeless tobacco: Never   Substance and Sexual Activity    Alcohol use: Not Currently     Alcohol/week: 5.0 standard drinks of alcohol     Types: 5 Cans of beer per week    Drug use: No    Sexual activity: Yes     Partners: Female     Social Determinants of Health     Financial Resource Strain: Low Risk  (2022)    Overall Financial Resource Strain (CARDIA)     Difficulty of Paying Living Expenses: Not hard at all   Food Insecurity: No Food Insecurity (2022)    Hunger Vital Sign     Worried About Running Out of Food in the Last Year: Never true     Ran Out of Food in the Last Year: Never true   Transportation Needs: No Transportation Needs (2022)    PRAPARE - Transportation     Lack of Transportation (Medical): No     Lack of Transportation (Non-Medical): No   Physical Activity: Inactive (2022)    Exercise Vital Sign     Days of Exercise per Week: 0 days     Minutes of Exercise per Session: 0 min   Stress: No Stress Concern Present (2022)    [employer] [place] [employer] - Occupational Stress Questionnaire     Feeling of Stress : Not at all   Social Connections: Socially Isolated (2022)    Social Connection and Isolation Panel [NHANES]     Frequency of Communication with Friends and Family: More than three times a week     Frequency of Social Gatherings with Friends and Family: More than three times a week     Attends Christianity Services: Never     Active Member of Clubs or Organizations: No     Attends Club or Organization Meetings: Never     Marital Status:    Housing  Stability: Low Risk  (9/21/2022)    Housing Stability Vital Sign     Unable to Pay for Housing in the Last Year: No     Number of Places Lived in the Last Year: 1     Unstable Housing in the Last Year: No       Current Outpatient Medications on File Prior to Visit   Medication Sig Dispense Refill    aspirin (ECOTRIN) 81 MG EC tablet Take 81 mg by mouth once daily.      atorvastatin (LIPITOR) 40 MG tablet TAKE 1 TABLET(40 MG) BY MOUTH EVERY DAY 90 tablet 3    finasteride (PROSCAR) 5 mg tablet TAKE 1 TABLET BY MOUTH EVERY DAY (Patient taking differently: Take 5 mg by mouth every evening.) 90 tablet 1    fluticasone propionate (FLONASE) 50 mcg/actuation nasal spray 2 sprays (100 mcg total) by Each Nostril route once daily. (Patient taking differently: 2 sprays by Each Nostril route 2 (two) times a day.) 48 g 3    fluticasone-salmeterol diskus inhaler 100-50 mcg Inhale 1 puff into the lungs 2 (two) times daily. Controller 60 each 3    GARLIC ORAL Take 1 capsule by mouth once daily.      ipratropium-albuteroL (COMBIVENT)  mcg/actuation inhaler Inhale 2 puffs into the lungs every 6 (six) hours as needed for Shortness of Breath. Rescue 4 g 3    losartan (COZAAR) 100 MG tablet Take 1 tablet (100 mg total) by mouth once daily. 90 tablet 3    metFORMIN (GLUCOPHAGE) 500 MG tablet Take 1 tablet (500 mg total) by mouth 2 (two) times daily with meals. 180 tablet 3    multivitamin capsule Take 1 capsule by mouth once daily.      omega-3 fatty acids/fish oil (FISH OIL-OMEGA-3 FATTY ACIDS) 300-1,000 mg capsule Take 1 capsule by mouth once daily.      tamsulosin (FLOMAX) 0.4 mg Cap Take 0.4 mg by mouth nightly.  3    vitamin D (VITAMIN D3) 1000 units Tab Take 1,000 Units by mouth once daily.      [DISCONTINUED] amLODIPine (NORVASC) 5 MG tablet Take 1 tablet (5 mg total) by mouth once daily. 90 tablet 3     No current facility-administered medications on file prior to visit.       Review of patient's allergies indicates:  "  Allergen Reactions    Solarcaine [benzocaine-triclosan]     Sulfa (sulfonamide antibiotics)     Sulfamethoxazole     Trimethoprim     Hytrin [terazosin] Rash    Smz-tmp ds [sulfamethoxazole-trimethoprim] Rash     Objective:     Vitals:    11/14/23 1029 11/14/23 1045   BP: (!) 150/75 137/60   BP Location: Left arm Left arm   Patient Position: Sitting Sitting   BP Method: Large (Automatic)    Pulse: 83    SpO2: (!) 93%    Weight: 79.6 kg (175 lb 7.8 oz)    Height: 5' 10" (1.778 m)         Physical Exam  Vitals reviewed.   Constitutional:       General: He is not in acute distress.     Appearance: He is well-developed. He is not diaphoretic.   Eyes:      General: No scleral icterus.  Neck:      Vascular: No carotid bruit or JVD.   Cardiovascular:      Rate and Rhythm: Regular rhythm.      Heart sounds: Murmur (II/VI systolic) heard.      No friction rub. No gallop.   Pulmonary:      Effort: Pulmonary effort is normal. No respiratory distress.      Breath sounds: Normal breath sounds.   Musculoskeletal:      Right lower leg: Edema (trace to one plus edema bilaterally) present.      Left lower leg: Edema present.   Skin:     General: Skin is warm and dry.   Neurological:      Mental Status: He is alert and oriented to person, place, and time.   Psychiatric:         Behavior: Behavior normal.         Thought Content: Thought content normal.         Judgment: Judgment normal.        Lab Visit on 09/19/2023   Component Date Value Ref Range Status    Hemoglobin A1C 09/19/2023 7.5 (H)  4.0 - 5.6 % Final    Estimated Avg Glucose 09/19/2023 169 (H)  68 - 131 mg/dL Final   Lab Visit on 07/21/2023   Component Date Value Ref Range Status    WBC 07/21/2023 7.63  3.90 - 12.70 K/uL Final    RBC 07/21/2023 5.28  4.60 - 6.20 M/uL Final    Hemoglobin 07/21/2023 15.5  14.0 - 18.0 g/dL Final    Hematocrit 07/21/2023 46.3  40.0 - 54.0 % Final    MCV 07/21/2023 88  82 - 98 fL Final    MCH 07/21/2023 29.4  27.0 - 31.0 pg Final    MCHC " 07/21/2023 33.5  32.0 - 36.0 g/dL Final    RDW 07/21/2023 13.6  11.5 - 14.5 % Final    Platelets 07/21/2023 172  150 - 450 K/uL Final    MPV 07/21/2023 11.8  9.2 - 12.9 fL Final    Immature Granulocytes 07/21/2023 0.1  0.0 - 0.5 % Final    Gran # (ANC) 07/21/2023 4.2  1.8 - 7.7 K/uL Final    Immature Grans (Abs) 07/21/2023 0.01  0.00 - 0.04 K/uL Final    Lymph # 07/21/2023 2.3  1.0 - 4.8 K/uL Final    Mono # 07/21/2023 0.8  0.3 - 1.0 K/uL Final    Eos # 07/21/2023 0.3  0.0 - 0.5 K/uL Final    Baso # 07/21/2023 0.05  0.00 - 0.20 K/uL Final    nRBC 07/21/2023 0  0 /100 WBC Final    Gran % 07/21/2023 55.2  38.0 - 73.0 % Final    Lymph % 07/21/2023 30.5  18.0 - 48.0 % Final    Mono % 07/21/2023 10.1  4.0 - 15.0 % Final    Eosinophil % 07/21/2023 3.4  0.0 - 8.0 % Final    Basophil % 07/21/2023 0.7  0.0 - 1.9 % Final    Differential Method 07/21/2023 Automated   Final    Sodium 07/21/2023 140  136 - 145 mmol/L Final    Potassium 07/21/2023 4.4  3.5 - 5.1 mmol/L Final    Chloride 07/21/2023 105  95 - 110 mmol/L Final    CO2 07/21/2023 24  23 - 29 mmol/L Final    Glucose 07/21/2023 164 (H)  70 - 110 mg/dL Final    BUN 07/21/2023 32 (H)  10 - 30 mg/dL Final    Creatinine 07/21/2023 1.3  0.5 - 1.4 mg/dL Final    Calcium 07/21/2023 9.6  8.7 - 10.5 mg/dL Final    Total Protein 07/21/2023 6.9  6.0 - 8.4 g/dL Final    Albumin 07/21/2023 3.8  3.5 - 5.2 g/dL Final    Total Bilirubin 07/21/2023 0.9  0.1 - 1.0 mg/dL Final    Alkaline Phosphatase 07/21/2023 83  55 - 135 U/L Final    AST 07/21/2023 27  10 - 40 U/L Final    ALT 07/21/2023 20  10 - 44 U/L Final    eGFR 07/21/2023 52 (A)  >60 mL/min/1.73 m^2 Final    Anion Gap 07/21/2023 11  8 - 16 mmol/L Final   Lab Visit on 05/19/2023   Component Date Value Ref Range Status    Sodium 05/19/2023 139  136 - 145 mmol/L Final    Potassium 05/19/2023 4.9  3.5 - 5.1 mmol/L Final    Chloride 05/19/2023 104  95 - 110 mmol/L Final    CO2 05/19/2023 27  23 - 29 mmol/L Final    Glucose 05/19/2023  128 (H)  70 - 110 mg/dL Final    BUN 05/19/2023 29  10 - 30 mg/dL Final    Creatinine 05/19/2023 1.5 (H)  0.5 - 1.4 mg/dL Final    Calcium 05/19/2023 9.8  8.7 - 10.5 mg/dL Final    Total Protein 05/19/2023 6.9  6.0 - 8.4 g/dL Final    Albumin 05/19/2023 3.8  3.5 - 5.2 g/dL Final    Total Bilirubin 05/19/2023 0.7  0.1 - 1.0 mg/dL Final    Alkaline Phosphatase 05/19/2023 78  55 - 135 U/L Final    AST 05/19/2023 21  10 - 40 U/L Final    ALT 05/19/2023 20  10 - 44 U/L Final    Anion Gap 05/19/2023 8  8 - 16 mmol/L Final    eGFR 05/19/2023 44 (A)  >60 mL/min/1.73 m^2 Final    TSH 05/19/2023 1.121  0.400 - 4.000 uIU/mL Final    BNP 05/19/2023 84  0 - 99 pg/mL Final   (    Assessment:     1. Primary hypertension    2. Pulmonary hypertension    3. RBBB    4. High cholesterol    5. Aortic atherosclerosis    6. Centrilobular emphysema    7. Pulmonary nodules    8. Stage 3 chronic kidney disease, unspecified whether stage 3a or 3b CKD    9. Type 2 diabetes mellitus with peripheral vascular disease    10. Type 2 diabetes mellitus with stage 3a chronic kidney disease, without long-term current use of insulin    11. Shoulder arthritis    12. Localized edema      Plan:   Husam was seen today for follow-up and hypertension.    Diagnoses and all orders for this visit:    Primary hypertension    Pulmonary hypertension    RBBB    High cholesterol    Aortic atherosclerosis    Centrilobular emphysema    Pulmonary nodules    Stage 3 chronic kidney disease, unspecified whether stage 3a or 3b CKD    Type 2 diabetes mellitus with peripheral vascular disease    Type 2 diabetes mellitus with stage 3a chronic kidney disease, without long-term current use of insulin    Shoulder arthritis    Localized edema    Pt is having a good deal of venous stasis edema due to amlodipine    Pt had leg cramps on HCTZ    Pt was transiently hyperkalemic 3/22    Will discontinue the amlodipine and begin hydralazine 10 mg tid    RTC one month    Follow up in  about 4 weeks (around 12/12/2023).

## 2023-11-28 ENCOUNTER — OFFICE VISIT (OUTPATIENT)
Dept: CARDIOLOGY | Facility: CLINIC | Age: 88
End: 2023-11-28
Payer: MEDICARE

## 2023-11-28 VITALS
WEIGHT: 172.63 LBS | SYSTOLIC BLOOD PRESSURE: 160 MMHG | DIASTOLIC BLOOD PRESSURE: 68 MMHG | BODY MASS INDEX: 24.71 KG/M2 | HEIGHT: 70 IN | OXYGEN SATURATION: 94 % | HEART RATE: 83 BPM

## 2023-11-28 DIAGNOSIS — R60.0 LOCALIZED EDEMA: ICD-10-CM

## 2023-11-28 DIAGNOSIS — E11.22 TYPE 2 DIABETES MELLITUS WITH STAGE 3A CHRONIC KIDNEY DISEASE, WITHOUT LONG-TERM CURRENT USE OF INSULIN: Chronic | ICD-10-CM

## 2023-11-28 DIAGNOSIS — I10 PRIMARY HYPERTENSION: Primary | Chronic | ICD-10-CM

## 2023-11-28 DIAGNOSIS — E11.51 TYPE 2 DIABETES MELLITUS WITH PERIPHERAL VASCULAR DISEASE: ICD-10-CM

## 2023-11-28 DIAGNOSIS — I70.0 AORTIC ATHEROSCLEROSIS: ICD-10-CM

## 2023-11-28 DIAGNOSIS — I45.10 RBBB: ICD-10-CM

## 2023-11-28 DIAGNOSIS — I27.20 PULMONARY HYPERTENSION: ICD-10-CM

## 2023-11-28 DIAGNOSIS — N18.31 TYPE 2 DIABETES MELLITUS WITH STAGE 3A CHRONIC KIDNEY DISEASE, WITHOUT LONG-TERM CURRENT USE OF INSULIN: Chronic | ICD-10-CM

## 2023-11-28 DIAGNOSIS — E78.00 HIGH CHOLESTEROL: Chronic | ICD-10-CM

## 2023-11-28 DIAGNOSIS — J43.2 CENTRILOBULAR EMPHYSEMA: ICD-10-CM

## 2023-11-28 PROCEDURE — 1125F AMNT PAIN NOTED PAIN PRSNT: CPT | Mod: CPTII,S$GLB,, | Performed by: INTERNAL MEDICINE

## 2023-11-28 PROCEDURE — 1101F PT FALLS ASSESS-DOCD LE1/YR: CPT | Mod: CPTII,S$GLB,, | Performed by: INTERNAL MEDICINE

## 2023-11-28 PROCEDURE — 99999 PR PBB SHADOW E&M-EST. PATIENT-LVL IV: ICD-10-PCS | Mod: PBBFAC,,, | Performed by: INTERNAL MEDICINE

## 2023-11-28 PROCEDURE — 1101F PR PT FALLS ASSESS DOC 0-1 FALLS W/OUT INJ PAST YR: ICD-10-PCS | Mod: CPTII,S$GLB,, | Performed by: INTERNAL MEDICINE

## 2023-11-28 PROCEDURE — 99999 PR PBB SHADOW E&M-EST. PATIENT-LVL IV: CPT | Mod: PBBFAC,,, | Performed by: INTERNAL MEDICINE

## 2023-11-28 PROCEDURE — 1159F MED LIST DOCD IN RCRD: CPT | Mod: CPTII,S$GLB,, | Performed by: INTERNAL MEDICINE

## 2023-11-28 PROCEDURE — 99214 OFFICE O/P EST MOD 30 MIN: CPT | Mod: S$GLB,,, | Performed by: INTERNAL MEDICINE

## 2023-11-28 PROCEDURE — 1125F PR PAIN SEVERITY QUANTIFIED, PAIN PRESENT: ICD-10-PCS | Mod: CPTII,S$GLB,, | Performed by: INTERNAL MEDICINE

## 2023-11-28 PROCEDURE — 3288F PR FALLS RISK ASSESSMENT DOCUMENTED: ICD-10-PCS | Mod: CPTII,S$GLB,, | Performed by: INTERNAL MEDICINE

## 2023-11-28 PROCEDURE — 99214 PR OFFICE/OUTPT VISIT, EST, LEVL IV, 30-39 MIN: ICD-10-PCS | Mod: S$GLB,,, | Performed by: INTERNAL MEDICINE

## 2023-11-28 PROCEDURE — 1160F PR REVIEW ALL MEDS BY PRESCRIBER/CLIN PHARMACIST DOCUMENTED: ICD-10-PCS | Mod: CPTII,S$GLB,, | Performed by: INTERNAL MEDICINE

## 2023-11-28 PROCEDURE — 3288F FALL RISK ASSESSMENT DOCD: CPT | Mod: CPTII,S$GLB,, | Performed by: INTERNAL MEDICINE

## 2023-11-28 PROCEDURE — 1159F PR MEDICATION LIST DOCUMENTED IN MEDICAL RECORD: ICD-10-PCS | Mod: CPTII,S$GLB,, | Performed by: INTERNAL MEDICINE

## 2023-11-28 PROCEDURE — 1160F RVW MEDS BY RX/DR IN RCRD: CPT | Mod: CPTII,S$GLB,, | Performed by: INTERNAL MEDICINE

## 2023-11-28 RX ORDER — HYDRALAZINE HYDROCHLORIDE 25 MG/1
25 TABLET, FILM COATED ORAL 3 TIMES DAILY
Qty: 270 TABLET | Refills: 3 | Status: SHIPPED | OUTPATIENT
Start: 2023-11-28 | End: 2024-01-02 | Stop reason: DRUGHIGH

## 2023-11-28 NOTE — PROGRESS NOTES
Subjective:      Patient ID: Husam Connolly is a 89 y.o. male.    Chief Complaint: Hypertension and Follow-up    HPI:  Edema resolved completely with cessation of amlodipine.    Pt c/o chronic shortness of breath with minimal exertion.    Pt is using 2 MDI's    Review of Systems   Cardiovascular:  Positive for dyspnea on exertion (chronic). Negative for chest pain, claudication, irregular heartbeat, leg swelling (resolved), near-syncope, orthopnea, palpitations and syncope.      /78 at home      Past Medical History:   Diagnosis Date    BPH (benign prostatic hyperplasia)     Diabetes mellitus (type 2)     Emphysema 03/12/2022    High cholesterol 08/26/2020    Hypertension     RBBB 11/29/2022    Small bowel obstruction 06/13/2019        Past Surgical History:   Procedure Laterality Date    ANASTOMOSIS OF ILEUM TO RECTUM  9/9/2019    Procedure: ANASTOMOSIS, COLO-RECTAL;  Surgeon: Fouzia Gardner MD;  Location: [facility] OR;  Service: General;;    CHOLECYSTECTOMY N/A 3/7/2022    Procedure: CHOLECYSTECTOMY;  Surgeon: Fouzia Gardner MD;  Location: [facility] OR;  Service: General;  Laterality: N/A;    COLOSTOMY  6/16/2019    Procedure: CREATION, COLOSTOMY;  Surgeon: Fouzia Gardner MD;  Location: [facility] OR;  Service: General;;    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    Procedure: RELEASE, DUPUYTREN CONTRACTURE;  Surgeon: Slick Baldwin Jr., MD;  Location: [facility] OR;  Service: Orthopedics;  Laterality: Left;    EYE SURGERY      GISELA PROCEDURE  6/16/2019    Procedure: GISELA PROCEDURE;  Surgeon: Fouzia Gardner MD;  Location: [facility] OR;  Service: General;;  Colon resection    LYSIS OF ADHESIONS N/A 3/7/2022    Procedure: LYSIS, ADHESIONS;  Surgeon: Fouzia Gardner MD;  Location: [facility] OR;  Service: General;  Laterality: N/A;    TONSILLECTOMY         Family History   Problem Relation Age of Onset    Heart disease Father     Hypertension Father     Cancer Mother     Diabetes Paternal Aunt         Social History     Socioeconomic History    Marital status:    Tobacco Use    Smoking status: Former     Current packs/day: 0.00     Average packs/day: 1 pack/day for 25.0 years (25.0 ttl pk-yrs)     Types: Cigarettes     Start date:      Quit date:      Years since quittin.9    Smokeless tobacco: Never   Substance and Sexual Activity    Alcohol use: Not Currently     Alcohol/week: 5.0 standard drinks of alcohol     Types: 5 Cans of beer per week    Drug use: No    Sexual activity: Yes     Partners: Female     Social Determinants of Health     Financial Resource Strain: Low Risk  (2023)    Overall Financial Resource Strain (CARDIA)     Difficulty of Paying Living Expenses: Not very hard   Food Insecurity: No Food Insecurity (2023)    Hunger Vital Sign     Worried About Running Out of Food in the Last Year: Never true     Ran Out of Food in the Last Year: Never true   Transportation Needs: No Transportation Needs (2023)    PRAPARE - Transportation     Lack of Transportation (Medical): No     Lack of Transportation (Non-Medical): No   Physical Activity: Unknown (2023)    Exercise Vital Sign     Minutes of Exercise per Session: 0 min   Stress: No Stress Concern Present (2023)    [employer] - Occupational Stress Questionnaire     Feeling of Stress : Not at all   Social Connections: Unknown (2023)    Social Connection and Isolation Panel [NHANES]     Frequency of Communication with Friends and Family: More than three times a week     Frequency of Social Gatherings with Friends and Family: Three times a week     Active Member of Clubs or Organizations: No     Attends Club or Organization Meetings: Never     Marital Status:    Housing Stability: High Risk (2023)    Housing Stability Vital Sign     Unable to Pay for Housing in the Last Year: No     Number of Places Lived in the Last Year: 3     Unstable Housing in the  Last Year: No       Current Outpatient Medications on File Prior to Visit   Medication Sig Dispense Refill    aspirin (ECOTRIN) 81 MG EC tablet Take 81 mg by mouth once daily.      atorvastatin (LIPITOR) 40 MG tablet TAKE 1 TABLET(40 MG) BY MOUTH EVERY DAY 90 tablet 3    finasteride (PROSCAR) 5 mg tablet TAKE 1 TABLET BY MOUTH EVERY DAY (Patient taking differently: Take 5 mg by mouth every evening.) 90 tablet 1    fluticasone propionate (FLONASE) 50 mcg/actuation nasal spray 2 sprays (100 mcg total) by Each Nostril route once daily. (Patient taking differently: 2 sprays by Each Nostril route 2 (two) times a day.) 48 g 3    fluticasone-salmeterol diskus inhaler 100-50 mcg Inhale 1 puff into the lungs 2 (two) times daily. Controller 60 each 3    GARLIC ORAL Take 1 capsule by mouth once daily.      ipratropium-albuteroL (COMBIVENT)  mcg/actuation inhaler Inhale 2 puffs into the lungs every 6 (six) hours as needed for Shortness of Breath. Rescue 4 g 3    losartan (COZAAR) 100 MG tablet Take 1 tablet (100 mg total) by mouth once daily. 90 tablet 3    metFORMIN (GLUCOPHAGE) 500 MG tablet Take 1 tablet (500 mg total) by mouth 2 (two) times daily with meals. 180 tablet 3    multivitamin capsule Take 1 capsule by mouth once daily.      omega-3 fatty acids/fish oil (FISH OIL-OMEGA-3 FATTY ACIDS) 300-1,000 mg capsule Take 1 capsule by mouth once daily.      tamsulosin (FLOMAX) 0.4 mg Cap Take 0.4 mg by mouth nightly.  3    vitamin D (VITAMIN D3) 1000 units Tab Take 1,000 Units by mouth once daily.      [DISCONTINUED] hydrALAZINE (APRESOLINE) 10 MG tablet Take 1 tablet (10 mg total) by mouth 3 (three) times daily. 270 tablet 3     No current facility-administered medications on file prior to visit.       Review of patient's allergies indicates:   Allergen Reactions    Solarcaine [benzocaine-triclosan]     Sulfa (sulfonamide antibiotics)     Sulfamethoxazole     Trimethoprim     Hytrin [terazosin] Rash    Smz-tmp ds  "[sulfamethoxazole-trimethoprim] Rash     Objective:     Vitals:    11/28/23 1022 11/28/23 1046   BP: (!) 166/80 (!) 160/68   BP Location: Left arm Left arm   Patient Position: Sitting Sitting   BP Method: Large (Automatic)    Pulse: 83    SpO2: (!) 94%    Weight: 78.3 kg (172 lb 9.9 oz)    Height: 5' 10" (1.778 m)         Physical Exam  Constitutional:       General: He is not in acute distress.     Appearance: He is well-developed. He is not diaphoretic.   Eyes:      General: No scleral icterus.  Neck:      Vascular: No carotid bruit or JVD.   Cardiovascular:      Rate and Rhythm: Regular rhythm.      Heart sounds: Normal heart sounds. No murmur heard.     No friction rub. No gallop.   Pulmonary:      Effort: Pulmonary effort is normal. No respiratory distress.      Breath sounds: Rales (few crackles left base) present.   Musculoskeletal:      Right lower leg: Edema (trace pitting pedal edema bilaterally) present.      Left lower leg: Edema present.   Skin:     General: Skin is warm and dry.   Neurological:      Mental Status: He is alert and oriented to person, place, and time.   Psychiatric:         Behavior: Behavior normal.         Thought Content: Thought content normal.         Judgment: Judgment normal.        Lab Visit on 09/19/2023   Component Date Value Ref Range Status    Hemoglobin A1C 09/19/2023 7.5 (H)  4.0 - 5.6 % Final    Estimated Avg Glucose 09/19/2023 169 (H)  68 - 131 mg/dL Final   Lab Visit on 07/21/2023   Component Date Value Ref Range Status    WBC 07/21/2023 7.63  3.90 - 12.70 K/uL Final    RBC 07/21/2023 5.28  4.60 - 6.20 M/uL Final    Hemoglobin 07/21/2023 15.5  14.0 - 18.0 g/dL Final    Hematocrit 07/21/2023 46.3  40.0 - 54.0 % Final    MCV 07/21/2023 88  82 - 98 fL Final    MCH 07/21/2023 29.4  27.0 - 31.0 pg Final    MCHC 07/21/2023 33.5  32.0 - 36.0 g/dL Final    RDW 07/21/2023 13.6  11.5 - 14.5 % Final    Platelets 07/21/2023 172  150 - 450 K/uL Final    MPV 07/21/2023 11.8  9.2 - " "12.9 fL Final    Immature Granulocytes 07/21/2023 0.1  0.0 - 0.5 % Final    Gran # (ANC) 07/21/2023 4.2  1.8 - 7.7 K/uL Final    Immature Grans (Abs) 07/21/2023 0.01  0.00 - 0.04 K/uL Final    Lymph # 07/21/2023 2.3  1.0 - 4.8 K/uL Final    Mono # 07/21/2023 0.8  0.3 - 1.0 K/uL Final    Eos # 07/21/2023 0.3  0.0 - 0.5 K/uL Final    Baso # 07/21/2023 0.05  0.00 - 0.20 K/uL Final    nRBC 07/21/2023 0  0 /100 WBC Final    Gran % 07/21/2023 55.2  38.0 - 73.0 % Final    Lymph % 07/21/2023 30.5  18.0 - 48.0 % Final    Mono % 07/21/2023 10.1  4.0 - 15.0 % Final    Eosinophil % 07/21/2023 3.4  0.0 - 8.0 % Final    Basophil % 07/21/2023 0.7  0.0 - 1.9 % Final    Differential Method 07/21/2023 Automated   Final    Sodium 07/21/2023 140  136 - 145 mmol/L Final    Potassium 07/21/2023 4.4  3.5 - 5.1 mmol/L Final    Chloride 07/21/2023 105  95 - 110 mmol/L Final    CO2 07/21/2023 24  23 - 29 mmol/L Final    Glucose 07/21/2023 164 (H)  70 - 110 mg/dL Final    BUN 07/21/2023 32 (H)  10 - 30 mg/dL Final    Creatinine 07/21/2023 1.3  0.5 - 1.4 mg/dL Final    Calcium 07/21/2023 9.6  8.7 - 10.5 mg/dL Final    Total Protein 07/21/2023 6.9  6.0 - 8.4 g/dL Final    Albumin 07/21/2023 3.8  3.5 - 5.2 g/dL Final    Total Bilirubin 07/21/2023 0.9  0.1 - 1.0 mg/dL Final    Alkaline Phosphatase 07/21/2023 83  55 - 135 U/L Final    AST 07/21/2023 27  10 - 40 U/L Final    ALT 07/21/2023 20  10 - 44 U/L Final    eGFR 07/21/2023 52 (A)  >60 mL/min/1.73 m^2 Final    Anion Gap 07/21/2023 11  8 - 16 mmol/L Final   (  Transthoracic echo (TTE) complete    Height:  5' 10" (1.778 m)   Weight:  79.4 kg (175 lb)   Blood Pressure:  187/74    Date of Study:  3/11/22   Ordering Provider:  Charlene Siu MD   Clinical Indications:  Dyspnea [R06.00 (ICD-10-CM)]       Interpreting Physicians  Performing Staff   Jamarcus Cartwright MD Tech:  Lea Pino PCT        Reason for Exam  Priority: Routine  Dx: Dyspnea [R06.00 (ICD-10-CM)]     Result Image " Hyperlink     Show images for Echo  Summary    The left ventricle is normal in size with concentric remodeling and normal systolic function.  The estimated ejection fraction is 55%.  Grade I left ventricular diastolic dysfunction.  There is abnormal septal wall motion.  Normal right ventricular size with normal right ventricular systolic function.  The estimated PA systolic pressure is 47 mmHg.  Normal central venous pressure (3 mmHg).  There is pulmonary hypertension.     Vitals  X-Ray Chest PA And Lateral  Status: Final result     MyChart Results Release    Ritani Status: Active  Results Release     PACS Images for [facility] [place] Viewer     Show images for X-Ray Chest PA And Lateral  All Reviewers List    Daren Lassiter MD on 6/13/2023 12:28     X-Ray Chest PA And Lateral  Order: [id number]  Status: Final result     Visible to patient: Yes (not seen)     Next appt: 01/25/2024 at 01:20 PM in Family Medicine (Williams Knight DO)     Dx: Localized edema; Primary hypertension...     0 Result Notes  Details    Reading Physician Reading Date Result Priority   Rc Liu MD  [phone] 6/13/2023 Routine     Narrative & Impression  EXAMINATION:  XR CHEST PA AND LATERAL     CLINICAL HISTORY:  Pure hypercholesterolemia, unspecified     TECHNIQUE:  PA and lateral views of the chest were performed.     COMPARISON:  03/11/2022     FINDINGS:  The cardiomediastinal silhouette is not enlarged noting calcification and tortuosity of the aorta, stable.  There is prominent nipple shadow bilaterally..  There is no pleural effusion.  The trachea is midline.  The lungs are symmetrically expanded bilaterally with coarse central hilar interstitial attenuation, similar to the previous exam..  There is left basilar subsegmental atelectasis.  There is no pneumothorax.  The osseous structures are remarkable for degenerative change..     Impression:     1. Interstitial findings appears similar to the previous exam,  superimposed edema is a consideration.  No large focal consolidation noting prominent nipple shadow bilaterally.        Electronically signed by: Rc Liu MD  Date:                                            06/13/2023  Time:                                           11:33           Exam Ended: 06/13/23 11:17         CT Chest Without Contrast  Status: Final result     MyChart Results Release    MyChart Status: Active  Results Release     PACS Images for [employer] Viewer     Show images for CT Chest Without Contrast  All Reviewers List    Alverto Green MD on 8/4/2023 16:19     CT Chest Without Contrast  Order: [id number]  Status: Final result     Visible to patient: Yes (seen)     Next appt: 01/25/2024 at 01:20 PM in Family Medicine (Williams Knight DO)     Dx: ARREDONDO (dyspnea on exertion); Pulmonary ...     0 Result Notes  Details    Reading Physician Reading Date Result Priority   Rayshawn Luong III, MD  [phone]  [phone] 7/27/2023 Routine     Narrative & Impression  EXAMINATION:  CT CHEST WITHOUT CONTRAST     CLINICAL HISTORY:  Lung nodule, multiple < 6mm;Lung nodule, 6-8mm;Lung nodules; Other forms of dyspnea     TECHNIQUE:  CT of the thorax was performed with low dose, lung screening protocol.  No contrast was administered.  Sagittal and coronal reconstructions were obtained.     COMPARISON:  None.     FINDINGS:  Comparison is CTA chest 03/11/2022.     There is a right thyroid nodule.  No mediastinal, hilar, or axillary adenopathy is seen.  There are coronary calcifications.  There is a left lobe liver cyst.  There are multiple bilateral renal cysts.  The adrenal glands are not enlarged.  There is diverticulosis.  The trachea and bronchi are patent.  There is severe emphysema.  There are calcified lung granulomas.  There are stable small pulmonary micro nodules.  Bones showed DJD.     Impression:     Lung-RADS Category:  2 - Benign Appearance or Behavior - continue annual  screening with LDCT in 12 months.     Clinically or potentially clinically significant non lung cancer finding:  Coronary calcifications     Prior Lung Cancer Modifier:  None     Left lobe liver cyst.     Bilateral renal cysts.     Severe emphysema.     Diverticulosis.        Electronically signed by: Rayshawn Luong MD  Date:                                            07/27/2023  Time:                                           10:31           Exam Ended: 07/27/23 10:25                   Component Ref Range & Units 6 mo ago 1 yr ago 2 yr ago 3 yr ago   BNP 0 - 99 pg/mL 84  47 CM  30 CM  49 CM    Comment: Values of less than 100 pg/ml are consistent with non-CHF populations.   Resulting Agency  [facility]              Specimen Collected: 05/19/23 12:01 Last Resulted: 05/19/23 13:27           Assessment:     1. Primary hypertension    2. High cholesterol    3. Aortic atherosclerosis    4. Pulmonary hypertension    5. RBBB    6. Type 2 diabetes mellitus with peripheral vascular disease    7. Type 2 diabetes mellitus with stage 3a chronic kidney disease, without long-term current use of insulin    8. Centrilobular emphysema    9. Localized edema      Plan:   Husam was seen today for hypertension and follow-up.    Diagnoses and all orders for this visit:    Primary hypertension    High cholesterol    Aortic atherosclerosis    Pulmonary hypertension    RBBB    Type 2 diabetes mellitus with peripheral vascular disease    Type 2 diabetes mellitus with stage 3a chronic kidney disease, without long-term current use of insulin    Centrilobular emphysema    Localized edema    Other orders  -     hydrALAZINE (APRESOLINE) 25 MG tablet; Take 1 tablet (25 mg total) by mouth 3 (three) times daily.     The shortness of breath is due to emphysema    The venous stasis edema has improved with cessation of the amlodipine.  There is no clear evidence of diastolic CHF or corpulmonale    Will increase the hydralazine to 25 mg tid  for HBP    Rest of meds the same    Follow up in about 4 weeks (around 12/26/2023).

## 2023-12-01 DIAGNOSIS — J43.2 CENTRILOBULAR EMPHYSEMA: ICD-10-CM

## 2023-12-01 NOTE — TELEPHONE ENCOUNTER
Refill Routing Note   Medication(s) are not appropriate for processing by [facility] for the following reason(s):        Non-participating provider    [facility] action(s):  Route               Appointments  past 12m or future 3m with PCP    Date Provider   Last Visit   8/23/2023 Williams Knight, DO   Next Visit   1/25/2024 Williams Knight, DO   ED visits in past 90 days: 0        Note composed:4:36 PM 12/01/2023

## 2023-12-03 RX ORDER — FLUTICASONE PROPIONATE AND SALMETEROL 100; 50 UG/1; UG/1
1 POWDER RESPIRATORY (INHALATION) 2 TIMES DAILY
Qty: 60 EACH | Refills: 3 | Status: SHIPPED | OUTPATIENT
Start: 2023-12-03 | End: 2024-03-28

## 2024-01-02 ENCOUNTER — OFFICE VISIT (OUTPATIENT)
Dept: CARDIOLOGY | Facility: CLINIC | Age: 89
End: 2024-01-02
Payer: MEDICARE

## 2024-01-02 VITALS
OXYGEN SATURATION: 95 % | HEART RATE: 76 BPM | DIASTOLIC BLOOD PRESSURE: 62 MMHG | BODY MASS INDEX: 24.22 KG/M2 | SYSTOLIC BLOOD PRESSURE: 165 MMHG | HEIGHT: 70 IN | WEIGHT: 169.19 LBS

## 2024-01-02 DIAGNOSIS — J43.2 CENTRILOBULAR EMPHYSEMA: ICD-10-CM

## 2024-01-02 DIAGNOSIS — I70.0 AORTIC ATHEROSCLEROSIS: ICD-10-CM

## 2024-01-02 DIAGNOSIS — E78.00 HIGH CHOLESTEROL: Chronic | ICD-10-CM

## 2024-01-02 DIAGNOSIS — N18.31 TYPE 2 DIABETES MELLITUS WITH STAGE 3A CHRONIC KIDNEY DISEASE, WITHOUT LONG-TERM CURRENT USE OF INSULIN: Chronic | ICD-10-CM

## 2024-01-02 DIAGNOSIS — I27.20 PULMONARY HYPERTENSION: ICD-10-CM

## 2024-01-02 DIAGNOSIS — I45.10 RBBB: ICD-10-CM

## 2024-01-02 DIAGNOSIS — E11.22 TYPE 2 DIABETES MELLITUS WITH STAGE 3A CHRONIC KIDNEY DISEASE, WITHOUT LONG-TERM CURRENT USE OF INSULIN: Chronic | ICD-10-CM

## 2024-01-02 DIAGNOSIS — E11.51 TYPE 2 DIABETES MELLITUS WITH PERIPHERAL VASCULAR DISEASE: ICD-10-CM

## 2024-01-02 DIAGNOSIS — N18.30 STAGE 3 CHRONIC KIDNEY DISEASE, UNSPECIFIED WHETHER STAGE 3A OR 3B CKD: Chronic | ICD-10-CM

## 2024-01-02 DIAGNOSIS — R60.0 LOCALIZED EDEMA: ICD-10-CM

## 2024-01-02 DIAGNOSIS — M19.019 SHOULDER ARTHRITIS: ICD-10-CM

## 2024-01-02 DIAGNOSIS — I10 PRIMARY HYPERTENSION: Primary | Chronic | ICD-10-CM

## 2024-01-02 PROCEDURE — 99213 OFFICE O/P EST LOW 20 MIN: CPT | Mod: S$GLB,,, | Performed by: INTERNAL MEDICINE

## 2024-01-02 PROCEDURE — 99999 PR PBB SHADOW E&M-EST. PATIENT-LVL IV: CPT | Mod: PBBFAC,,, | Performed by: INTERNAL MEDICINE

## 2024-01-02 RX ORDER — HYDRALAZINE HYDROCHLORIDE 50 MG/1
50 TABLET, FILM COATED ORAL 3 TIMES DAILY
Qty: 270 TABLET | Refills: 3 | Status: SHIPPED | OUTPATIENT
Start: 2024-01-02 | End: 2025-01-01

## 2024-01-02 NOTE — PROGRESS NOTES
"  Subjective:      Patient ID: Husam Connolly is a 89 y.o. male.    Chief Complaint: Follow-up    HPI:  "My blood pressure is up and down.  Anywhere from 125 to 190 mm Hg systolic."      "I have an arthritic left shoulder"  Pt is seeing Dr Baldwin.    "I find myself exceedingly with a lack of energy."  Pt has nocturia times one.  Pt falls asleep frequently during the day.    Pt is chronically short of breath with minimal exertion.      Review of Systems   Cardiovascular:  Positive for dyspnea on exertion. Negative for chest pain, claudication, irregular heartbeat, leg swelling, near-syncope, orthopnea, palpitations and syncope.        Past Medical History:   Diagnosis Date    BPH (benign prostatic hyperplasia)     Diabetes mellitus (type 2)     Emphysema 03/12/2022    High cholesterol 08/26/2020    Hypertension     RBBB 11/29/2022    Small bowel obstruction 06/13/2019        Past Surgical History:   Procedure Laterality Date    ANASTOMOSIS OF ILEUM TO RECTUM  9/9/2019    Procedure: ANASTOMOSIS, COLO-RECTAL;  Surgeon: Fouzia Gardner MD;  Location: [facility] OR;  Service: General;;    CHOLECYSTECTOMY N/A 3/7/2022    Procedure: CHOLECYSTECTOMY;  Surgeon: Fouzia Gardner MD;  Location: [facility] OR;  Service: General;  Laterality: N/A;    COLOSTOMY  6/16/2019    Procedure: CREATION, COLOSTOMY;  Surgeon: Fouzia Gardner MD;  Location: [facility] OR;  Service: General;;    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    Procedure: RELEASE, DUPUYTREN CONTRACTURE;  Surgeon: Slick Baldwin Jr., MD;  Location: [facility] OR;  Service: Orthopedics;  Laterality: Left;    EYE SURGERY      GISELA PROCEDURE  6/16/2019    Procedure: GISELA PROCEDURE;  Surgeon: Fouzia Gardner MD;  Location: [facility] OR;  Service: General;;  Colon resection    LYSIS OF ADHESIONS N/A 3/7/2022    Procedure: LYSIS, ADHESIONS;  Surgeon: Fouzia Gardner MD;  Location: [facility] OR;  Service: General;  Laterality: N/A;    TONSILLECTOMY         Family " History   Problem Relation Age of Onset    Heart disease Father     Hypertension Father     Cancer Mother     Diabetes Paternal Aunt        Social History     Socioeconomic History    Marital status:    Tobacco Use    Smoking status: Former     Current packs/day: 0.00     Average packs/day: 1 pack/day for 25.0 years (25.0 ttl pk-yrs)     Types: Cigarettes     Start date:      Quit date:      Years since quittin.0    Smokeless tobacco: Never   Substance and Sexual Activity    Alcohol use: Not Currently     Alcohol/week: 5.0 standard drinks of alcohol     Types: 5 Cans of beer per week    Drug use: No    Sexual activity: Yes     Partners: Female     Social Determinants of Health     Financial Resource Strain: Low Risk  (2023)    Overall Financial Resource Strain (CARDIA)     Difficulty of Paying Living Expenses: Not very hard   Food Insecurity: No Food Insecurity (2023)    Hunger Vital Sign     Worried About Running Out of Food in the Last Year: Never true     Ran Out of Food in the Last Year: Never true   Transportation Needs: No Transportation Needs (2023)    PRAPARE - Transportation     Lack of Transportation (Medical): No     Lack of Transportation (Non-Medical): No   Physical Activity: Unknown (2023)    Exercise Vital Sign     Minutes of Exercise per Session: 0 min   Stress: No Stress Concern Present (2023)    [employer] - Occupational Stress Questionnaire     Feeling of Stress : Not at all   Social Connections: Unknown (2023)    Social Connection and Isolation Panel [NHANES]     Frequency of Communication with Friends and Family: More than three times a week     Frequency of Social Gatherings with Friends and Family: Three times a week     Active Member of Clubs or Organizations: No     Attends Club or Organization Meetings: Never     Marital Status:    Housing Stability: High Risk (2023)    Housing Stability Vital  Sign     Unable to Pay for Housing in the Last Year: No     Number of Places Lived in the Last Year: 3     Unstable Housing in the Last Year: No       Current Outpatient Medications on File Prior to Visit   Medication Sig Dispense Refill    aspirin (ECOTRIN) 81 MG EC tablet Take 81 mg by mouth once daily.      atorvastatin (LIPITOR) 40 MG tablet TAKE 1 TABLET(40 MG) BY MOUTH EVERY DAY 90 tablet 3    finasteride (PROSCAR) 5 mg tablet TAKE 1 TABLET BY MOUTH EVERY DAY (Patient taking differently: Take 5 mg by mouth every evening.) 90 tablet 1    fluticasone propionate (FLONASE) 50 mcg/actuation nasal spray 2 sprays (100 mcg total) by Each Nostril route once daily. (Patient taking differently: 2 sprays by Each Nostril route 2 (two) times a day.) 48 g 3    fluticasone-salmeterol diskus inhaler 100-50 mcg Inhale 1 puff into the lungs 2 (two) times daily. Controller 60 each 3    GARLIC ORAL Take 1 capsule by mouth once daily.      ipratropium-albuteroL (COMBIVENT)  mcg/actuation inhaler Inhale 2 puffs into the lungs every 6 (six) hours as needed for Shortness of Breath. Rescue 4 g 3    losartan (COZAAR) 100 MG tablet Take 1 tablet (100 mg total) by mouth once daily. 90 tablet 3    metFORMIN (GLUCOPHAGE) 500 MG tablet Take 1 tablet (500 mg total) by mouth 2 (two) times daily with meals. 180 tablet 3    multivitamin capsule Take 1 capsule by mouth once daily.      omega-3 fatty acids/fish oil (FISH OIL-OMEGA-3 FATTY ACIDS) 300-1,000 mg capsule Take 1 capsule by mouth once daily.      tamsulosin (FLOMAX) 0.4 mg Cap Take 0.4 mg by mouth nightly.  3    vitamin D (VITAMIN D3) 1000 units Tab Take 1,000 Units by mouth once daily.      [DISCONTINUED] hydrALAZINE (APRESOLINE) 25 MG tablet Take 1 tablet (25 mg total) by mouth 3 (three) times daily. 270 tablet 3     No current facility-administered medications on file prior to visit.       Review of patient's allergies indicates:   Allergen Reactions    Solarcaine  "[benzocaine-triclosan]     Sulfa (sulfonamide antibiotics)     Sulfamethoxazole     Trimethoprim     Hytrin [terazosin] Rash    Smz-tmp ds [sulfamethoxazole-trimethoprim] Rash     Objective:     Vitals:    01/02/24 1305 01/02/24 1324   BP: (!) 168/75 (!) 165/62   BP Location: Left arm Left arm   Patient Position: Sitting Sitting   BP Method: Large (Automatic)    Pulse: 76    SpO2: 95%    Weight: 76.8 kg (169 lb 3.3 oz)    Height: 5' 10" (1.778 m)         Physical Exam  Constitutional:       General: He is not in acute distress.     Appearance: He is well-developed. He is not diaphoretic.   Eyes:      General: No scleral icterus.  Neck:      Vascular: No carotid bruit or JVD.   Cardiovascular:      Rate and Rhythm: Regular rhythm.      Heart sounds: Normal heart sounds. No murmur heard.     No friction rub. No gallop.   Pulmonary:      Effort: Pulmonary effort is normal. No respiratory distress.      Breath sounds: Normal breath sounds.   Musculoskeletal:      Right lower leg: Edema (trivial bilateral) present.      Left lower leg: Edema present.   Skin:     General: Skin is warm and dry.   Neurological:      Mental Status: He is alert and oriented to person, place, and time.   Psychiatric:         Behavior: Behavior normal.         Thought Content: Thought content normal.         Judgment: Judgment normal.        Lab Visit on 09/19/2023   Component Date Value Ref Range Status    Hemoglobin A1C 09/19/2023 7.5 (H)  4.0 - 5.6 % Final    Estimated Avg Glucose 09/19/2023 169 (H)  68 - 131 mg/dL Final   Lab Visit on 07/21/2023   Component Date Value Ref Range Status    WBC 07/21/2023 7.63  3.90 - 12.70 K/uL Final    RBC 07/21/2023 5.28  4.60 - 6.20 M/uL Final    Hemoglobin 07/21/2023 15.5  14.0 - 18.0 g/dL Final    Hematocrit 07/21/2023 46.3  40.0 - 54.0 % Final    MCV 07/21/2023 88  82 - 98 fL Final    MCH 07/21/2023 29.4  27.0 - 31.0 pg Final    MCHC 07/21/2023 33.5  32.0 - 36.0 g/dL Final    RDW 07/21/2023 13.6  11.5 - " "14.5 % Final    Platelets 07/21/2023 172  150 - 450 K/uL Final    MPV 07/21/2023 11.8  9.2 - 12.9 fL Final    Immature Granulocytes 07/21/2023 0.1  0.0 - 0.5 % Final    Gran # (ANC) 07/21/2023 4.2  1.8 - 7.7 K/uL Final    Immature Grans (Abs) 07/21/2023 0.01  0.00 - 0.04 K/uL Final    Lymph # 07/21/2023 2.3  1.0 - 4.8 K/uL Final    Mono # 07/21/2023 0.8  0.3 - 1.0 K/uL Final    Eos # 07/21/2023 0.3  0.0 - 0.5 K/uL Final    Baso # 07/21/2023 0.05  0.00 - 0.20 K/uL Final    nRBC 07/21/2023 0  0 /100 WBC Final    Gran % 07/21/2023 55.2  38.0 - 73.0 % Final    Lymph % 07/21/2023 30.5  18.0 - 48.0 % Final    Mono % 07/21/2023 10.1  4.0 - 15.0 % Final    Eosinophil % 07/21/2023 3.4  0.0 - 8.0 % Final    Basophil % 07/21/2023 0.7  0.0 - 1.9 % Final    Differential Method 07/21/2023 Automated   Final    Sodium 07/21/2023 140  136 - 145 mmol/L Final    Potassium 07/21/2023 4.4  3.5 - 5.1 mmol/L Final    Chloride 07/21/2023 105  95 - 110 mmol/L Final    CO2 07/21/2023 24  23 - 29 mmol/L Final    Glucose 07/21/2023 164 (H)  70 - 110 mg/dL Final    BUN 07/21/2023 32 (H)  10 - 30 mg/dL Final    Creatinine 07/21/2023 1.3  0.5 - 1.4 mg/dL Final    Calcium 07/21/2023 9.6  8.7 - 10.5 mg/dL Final    Total Protein 07/21/2023 6.9  6.0 - 8.4 g/dL Final    Albumin 07/21/2023 3.8  3.5 - 5.2 g/dL Final    Total Bilirubin 07/21/2023 0.9  0.1 - 1.0 mg/dL Final    Alkaline Phosphatase 07/21/2023 83  55 - 135 U/L Final    AST 07/21/2023 27  10 - 40 U/L Final    ALT 07/21/2023 20  10 - 44 U/L Final    eGFR 07/21/2023 52 (A)  >60 mL/min/1.73 m^2 Final    Anion Gap 07/21/2023 11  8 - 16 mmol/L Final   (  Transthoracic echo (TTE) complete    Height:  5' 10" (1.778 m)   Weight:  79.4 kg (175 lb)   Blood Pressure:  187/74    Date of Study:  3/11/22   Ordering Provider:  Charlene Siu MD   Clinical Indications:  Dyspnea [R06.00 (ICD-10-CM)]       Interpreting Physicians  Performing Staff   Jamarcus Cartwright MD Tech:  Lea Pino, PCT    "     Reason for Exam  Priority: Routine  Dx: Dyspnea [R06.00 (ICD-10-CM)]     Result Image Hyperlink     Show images for Echo  Summary    The left ventricle is normal in size with concentric remodeling and normal systolic function.  The estimated ejection fraction is 55%.  Grade I left ventricular diastolic dysfunction.  There is abnormal septal wall motion.  Normal right ventricular size with normal right ventricular systolic function.  The estimated PA systolic pressure is 47 mmHg.  Normal central venous pressure (3 mmHg).  There is pulmonary hypertension.     Vitals  X-Ray Chest PA And Lateral  Status: Final result     MyChart Results Release    [facility] Status: Active  Results Release     PACS Images for ViTAL Ruby Viewer     Show images for X-Ray Chest PA And Lateral  All Reviewers List    Daren Lassiter MD on 6/13/2023 12:28     X-Ray Chest PA And Lateral  Order: [id number]  Status: Final result     Visible to patient: Yes (not seen)     Next appt: 01/25/2024 at 01:20 PM in Family Medicine (Williams Knight DO)     Dx: Localized edema; Primary hypertension...     0 Result Notes  Details    Reading Physician Reading Date Result Priority   cR Liu MD  [phone] 6/13/2023 Routine     Narrative & Impression  EXAMINATION:  XR CHEST PA AND LATERAL     CLINICAL HISTORY:  Pure hypercholesterolemia, unspecified     TECHNIQUE:  PA and lateral views of the chest were performed.     COMPARISON:  03/11/2022     FINDINGS:  The cardiomediastinal silhouette is not enlarged noting calcification and tortuosity of the aorta, stable.  There is prominent nipple shadow bilaterally..  There is no pleural effusion.  The trachea is midline.  The lungs are symmetrically expanded bilaterally with coarse central hilar interstitial attenuation, similar to the previous exam..  There is left basilar subsegmental atelectasis.  There is no pneumothorax.  The osseous structures are remarkable for degenerative  change..     Impression:     1. Interstitial findings appears similar to the previous exam, superimposed edema is a consideration.  No large focal consolidation noting prominent nipple shadow bilaterally.        Electronically signed by: Rc Liu MD  Date:                                            06/13/2023  Time:                                           11:33           Exam Ended: 06/13/23 11:17             Assessment:     1. Primary hypertension    2. High cholesterol    3. Aortic atherosclerosis    4. Pulmonary hypertension    5. RBBB    6. Centrilobular emphysema    7. Stage 3 chronic kidney disease, unspecified whether stage 3a or 3b CKD    8. Type 2 diabetes mellitus with stage 3a chronic kidney disease, without long-term current use of insulin    9. Localized edema    10. Shoulder arthritis    11. Type 2 diabetes mellitus with peripheral vascular disease      Plan:   Husam was seen today for follow-up.    Diagnoses and all orders for this visit:    Primary hypertension    High cholesterol    Aortic atherosclerosis    Pulmonary hypertension    RBBB    Centrilobular emphysema    Stage 3 chronic kidney disease, unspecified whether stage 3a or 3b CKD    Type 2 diabetes mellitus with stage 3a chronic kidney disease, without long-term current use of insulin    Localized edema    Shoulder arthritis    Type 2 diabetes mellitus with peripheral vascular disease    Other orders  -     hydrALAZINE (APRESOLINE) 50 MG tablet; Take 1 tablet (50 mg total) by mouth 3 (three) times daily.     Will increase the hydralazine to 50 mg tid    RTC one month    Keep BP log at home    The shortness of breath with exertion is due to emphysema    Rest of meds the same    Consider Sleep medicine consult if daytime sleepiness persists    Follow up in about 4 weeks (around 1/30/2024).

## 2024-01-08 DIAGNOSIS — J43.2 CENTRILOBULAR EMPHYSEMA: ICD-10-CM

## 2024-01-09 RX ORDER — IPRATROPIUM BROMIDE AND ALBUTEROL 20; 100 UG/1; UG/1
SPRAY, METERED RESPIRATORY (INHALATION)
Qty: 4 G | Refills: 3 | Status: SHIPPED | OUTPATIENT
Start: 2024-01-09

## 2024-01-09 NOTE — TELEPHONE ENCOUNTER
Refill Routing Note   Medication(s) are not appropriate for processing by [facility] for the following reason(s):        Non-participating provider    [facility] action(s):  Route               Appointments  past 12m or future 3m with PCP    Date Provider   Last Visit   8/23/2023 Williams Knight, DO   Next Visit   1/25/2024 Williams Knight, DO   ED visits in past 90 days: 0        Note composed:4:30 PM 01/09/2024

## 2024-01-25 ENCOUNTER — PATIENT OUTREACH (OUTPATIENT)
Dept: ADMINISTRATIVE | Facility: OTHER | Age: 89
End: 2024-01-25
Payer: MEDICARE

## 2024-01-25 ENCOUNTER — OFFICE VISIT (OUTPATIENT)
Dept: FAMILY MEDICINE | Facility: CLINIC | Age: 89
End: 2024-01-25
Payer: MEDICARE

## 2024-01-25 VITALS
HEIGHT: 70 IN | HEART RATE: 78 BPM | OXYGEN SATURATION: 98 % | SYSTOLIC BLOOD PRESSURE: 164 MMHG | BODY MASS INDEX: 24.52 KG/M2 | WEIGHT: 171.31 LBS | DIASTOLIC BLOOD PRESSURE: 78 MMHG

## 2024-01-25 DIAGNOSIS — I10 PRIMARY HYPERTENSION: Chronic | ICD-10-CM

## 2024-01-25 DIAGNOSIS — R53.83 FATIGUE, UNSPECIFIED TYPE: ICD-10-CM

## 2024-01-25 DIAGNOSIS — J43.2 CENTRILOBULAR EMPHYSEMA: Primary | ICD-10-CM

## 2024-01-25 DIAGNOSIS — N18.31 TYPE 2 DIABETES MELLITUS WITH STAGE 3A CHRONIC KIDNEY DISEASE, WITHOUT LONG-TERM CURRENT USE OF INSULIN: Chronic | ICD-10-CM

## 2024-01-25 DIAGNOSIS — E11.22 TYPE 2 DIABETES MELLITUS WITH STAGE 3A CHRONIC KIDNEY DISEASE, WITHOUT LONG-TERM CURRENT USE OF INSULIN: Chronic | ICD-10-CM

## 2024-01-25 PROCEDURE — 99214 OFFICE O/P EST MOD 30 MIN: CPT | Mod: S$GLB,,, | Performed by: STUDENT IN AN ORGANIZED HEALTH CARE EDUCATION/TRAINING PROGRAM

## 2024-01-25 PROCEDURE — 99999 PR PBB SHADOW E&M-EST. PATIENT-LVL V: CPT | Mod: PBBFAC,,, | Performed by: STUDENT IN AN ORGANIZED HEALTH CARE EDUCATION/TRAINING PROGRAM

## 2024-01-25 RX ORDER — TIOTROPIUM BROMIDE INHALATION SPRAY 3.12 UG/1
2 SPRAY, METERED RESPIRATORY (INHALATION) DAILY
Qty: 4 G | Refills: 6 | Status: SHIPPED | OUTPATIENT
Start: 2024-01-25 | End: 2024-02-26 | Stop reason: SDUPTHER

## 2024-01-25 NOTE — PROGRESS NOTES
Progress Note  [facility]    Subjective:     Husam Connolly is a 89 y.o. year old male who presents to clinic for f/u    Emphysema: Has been using advair BID as prescribed. Has not noticed as much improvement with this. Will use combivent PRN but frequently is away from inhaler when he needs it most. Is now able to walk all 16 steps to second floor of house but still very short of breath. No difficulty breathing at rest. Will take about 1 minute to have breathing return to normal when SOB.     Fatigue: Has a lot of fatigue and lack of energy. Does not feel depressed. Has daughter in town who he has dinner with twice a week. Eating well. Never feels well rested. Wakes up multiple times at night. Not sure if he snores or not. Has been told in the past that he snored. Does not sleep through the night. Only sleeps 3-4 hours at a time before waking up.     Patient Active Problem List    Diagnosis Date Noted    COPD, group B, by GOLD 2017 classification 08/23/2023    Pulmonary hypertension 07/24/2023    Shoulder arthritis 01/05/2023    Type 2 diabetes mellitus with peripheral vascular disease 11/29/2022    RBBB 11/29/2022    Pulmonary nodules     Dupuytren's contracture of left hand 03/21/2022    Centrilobular emphysema 03/12/2022    Small bowel obstruction due to adhesions 02/11/2022    Type 2 diabetes mellitus with stage 3a chronic kidney disease, without long-term current use of insulin 09/22/2021    Aortic atherosclerosis 05/11/2021    Chronic rhinitis 03/01/2021    S/P colostomy 03/01/2021    Localized edema 10/08/2020    Right foot drop 08/26/2020    High cholesterol 08/26/2020    Ventral hernia without obstruction or gangrene 02/27/2020    S/P cholecystectomy 07/01/2019    Stage 3 chronic kidney disease 12/13/2018    Hypertension     BPH (benign prostatic hyperplasia)         Review of patient's allergies indicates:   Allergen Reactions    Solarcaine [benzocaine-triclosan]      Sulfa (sulfonamide antibiotics)     Sulfamethoxazole     Trimethoprim     Hytrin [terazosin] Rash    Smz-tmp ds [sulfamethoxazole-trimethoprim] Rash        Past Medical History:   Diagnosis Date    BPH (benign prostatic hyperplasia)     Diabetes mellitus (type 2)     Emphysema 2022    High cholesterol 2020    Hypertension     RBBB 2022    Small bowel obstruction 2019      Past Surgical History:   Procedure Laterality Date    ANASTOMOSIS OF ILEUM TO RECTUM  2019    Procedure: ANASTOMOSIS, COLO-RECTAL;  Surgeon: Fouzia Gardner MD;  Location: [facility] OR;  Service: General;;    CHOLECYSTECTOMY N/A 3/7/2022    Procedure: CHOLECYSTECTOMY;  Surgeon: Fouzia Gardner MD;  Location: [facility] OR;  Service: General;  Laterality: N/A;    COLOSTOMY  2019    Procedure: CREATION, COLOSTOMY;  Surgeon: Fouzia Gardner MD;  Location: [facility] OR;  Service: General;;    DUPUYTREN CONTRACTURE RELEASE Left 2022    Procedure: RELEASE, DUPUYTREN CONTRACTURE;  Surgeon: Slick Baldwin Jr., MD;  Location: [facility] OR;  Service: Orthopedics;  Laterality: Left;    EYE SURGERY      GISELA PROCEDURE  2019    Procedure: GISELA PROCEDURE;  Surgeon: Fouzia Gardner MD;  Location: [facility];  Service: General;;  Colon resection    LYSIS OF ADHESIONS N/A 3/7/2022    Procedure: LYSIS, ADHESIONS;  Surgeon: Fouzia Gardner MD;  Location: [facility];  Service: General;  Laterality: N/A;    TONSILLECTOMY        Family History   Problem Relation Age of Onset    Heart disease Father     Hypertension Father     Cancer Mother     Diabetes Paternal Aunt       Social History     Tobacco Use    Smoking status: Former     Current packs/day: 0.00     Average packs/day: 1 pack/day for 25.0 years (25.0 ttl pk-yrs)     Types: Cigarettes     Start date:      Quit date:      Years since quittin.1    Smokeless tobacco: Never   Substance Use Topics    Alcohol use: Not Currently     Alcohol/week: 5.0  "standard drinks of alcohol     Types: 5 Cans of beer per week        Objective:     Vitals:    01/25/24 1308   BP: (!) 164/78   BP Location: Left arm   Patient Position: Sitting   BP Method: Large (Manual)   Pulse: 78   SpO2: 98%   Weight: 77.7 kg (171 lb 4.8 oz)   Height: 5' 10" (1.778 m)     BMI: 24.58    Gen: No apparent distress, well nourished and developed, appears stated age  CV: RRR, S1 and S2 present, no LE edema  Resp: CTAB, normal respiratory effort, prolonged expiratory phase      Assessment/Plan:     Husam Connolly is a 89 y.o. year old male who presents to clinic for f/u    1. Centrilobular emphysema  Complaint with advair. Will add spiriva to see if we can get best control. Medication, side effects and proper use discussed. Pt verbalized understanding and was in agreement with the plan.    - tiotropium bromide (SPIRIVA RESPIMAT) 2.5 mcg/actuation inhaler; Inhale 2 puffs into the lungs Daily. Controller  Dispense: 4 g; Refill: 6    2. Fatigue, unspecified type  Has some symptoms consistent with LC that could be contributing to HTN and fatigue. Will evaluate for this and other causes. Pt verbalized understanding and was in agreement with the plan.    - Comprehensive metabolic panel; Future  - CBC auto differential; Future  - TSH; Future  - Ambulatory referral/consult to Sleep Disorders; Future    3. Primary hypertension  Poorly controlled on current regimen at this time. Has been working with cardiology to manage. Will defer mediation adjustment to them as has an appointment within the next week.   - Comprehensive metabolic panel; Future    4. Type 2 diabetes mellitus with stage 3a chronic kidney disease, without long-term current use of insulin  Due for routine screen, agreeable.   - HEMOGLOBIN A1C; Future  - Microalbumin/creatinine urine ratio; Future        Follow-up: 3 months for emphysema    Williams Knight, DO  Family Medicine        "

## 2024-01-26 ENCOUNTER — LAB VISIT (OUTPATIENT)
Dept: LAB | Facility: HOSPITAL | Age: 89
End: 2024-01-26
Attending: STUDENT IN AN ORGANIZED HEALTH CARE EDUCATION/TRAINING PROGRAM
Payer: MEDICARE

## 2024-01-26 DIAGNOSIS — E11.22 TYPE 2 DIABETES MELLITUS WITH STAGE 3A CHRONIC KIDNEY DISEASE, WITHOUT LONG-TERM CURRENT USE OF INSULIN: Chronic | ICD-10-CM

## 2024-01-26 DIAGNOSIS — N18.31 TYPE 2 DIABETES MELLITUS WITH STAGE 3A CHRONIC KIDNEY DISEASE, WITHOUT LONG-TERM CURRENT USE OF INSULIN: Chronic | ICD-10-CM

## 2024-01-26 LAB
ALBUMIN/CREAT UR: 101.8 UG/MG (ref 0–30)
CREAT UR-MCNC: 167 MG/DL (ref 23–375)
MICROALBUMIN UR DL<=1MG/L-MCNC: 170 UG/ML

## 2024-01-26 PROCEDURE — 82043 UR ALBUMIN QUANTITATIVE: CPT | Performed by: STUDENT IN AN ORGANIZED HEALTH CARE EDUCATION/TRAINING PROGRAM

## 2024-01-29 ENCOUNTER — PATIENT MESSAGE (OUTPATIENT)
Dept: FAMILY MEDICINE | Facility: CLINIC | Age: 89
End: 2024-01-29
Payer: MEDICARE

## 2024-01-29 DIAGNOSIS — N18.32 STAGE 3B CHRONIC KIDNEY DISEASE: Primary | ICD-10-CM

## 2024-01-29 NOTE — TELEPHONE ENCOUNTER
Labs stable/unremarkable except kidney function now CKF 3b instead of 3a. Likely in setting of uncontrolled HTN given diabetes has been well controlled for some time. Will defer nephro referral at this time and plan to repeat in 1 month to ensure stable.     Williams Knight, DO  Family Medicine

## 2024-01-30 ENCOUNTER — PATIENT MESSAGE (OUTPATIENT)
Dept: ADMINISTRATIVE | Facility: OTHER | Age: 89
End: 2024-01-30
Payer: MEDICARE

## 2024-01-30 ENCOUNTER — OFFICE VISIT (OUTPATIENT)
Dept: CARDIOLOGY | Facility: CLINIC | Age: 89
End: 2024-01-30
Payer: MEDICARE

## 2024-01-30 VITALS
WEIGHT: 170.94 LBS | BODY MASS INDEX: 24.47 KG/M2 | HEIGHT: 70 IN | OXYGEN SATURATION: 95 % | HEART RATE: 81 BPM | DIASTOLIC BLOOD PRESSURE: 72 MMHG | SYSTOLIC BLOOD PRESSURE: 144 MMHG

## 2024-01-30 DIAGNOSIS — E78.00 HIGH CHOLESTEROL: Chronic | ICD-10-CM

## 2024-01-30 DIAGNOSIS — I10 PRIMARY HYPERTENSION: Primary | Chronic | ICD-10-CM

## 2024-01-30 DIAGNOSIS — J43.2 CENTRILOBULAR EMPHYSEMA: ICD-10-CM

## 2024-01-30 DIAGNOSIS — I27.20 PULMONARY HYPERTENSION: ICD-10-CM

## 2024-01-30 DIAGNOSIS — N18.31 TYPE 2 DIABETES MELLITUS WITH STAGE 3A CHRONIC KIDNEY DISEASE, WITHOUT LONG-TERM CURRENT USE OF INSULIN: Chronic | ICD-10-CM

## 2024-01-30 DIAGNOSIS — N18.30 STAGE 3 CHRONIC KIDNEY DISEASE, UNSPECIFIED WHETHER STAGE 3A OR 3B CKD: Chronic | ICD-10-CM

## 2024-01-30 DIAGNOSIS — R60.0 LOCALIZED EDEMA: ICD-10-CM

## 2024-01-30 DIAGNOSIS — I95.1 ORTHOSTATIC HYPOTENSION: ICD-10-CM

## 2024-01-30 DIAGNOSIS — E11.22 TYPE 2 DIABETES MELLITUS WITH STAGE 3A CHRONIC KIDNEY DISEASE, WITHOUT LONG-TERM CURRENT USE OF INSULIN: Chronic | ICD-10-CM

## 2024-01-30 DIAGNOSIS — I45.10 RBBB: ICD-10-CM

## 2024-01-30 DIAGNOSIS — I70.0 AORTIC ATHEROSCLEROSIS: ICD-10-CM

## 2024-01-30 PROCEDURE — 99999 PR PBB SHADOW E&M-EST. PATIENT-LVL IV: CPT | Mod: PBBFAC,,, | Performed by: INTERNAL MEDICINE

## 2024-01-30 PROCEDURE — 99214 OFFICE O/P EST MOD 30 MIN: CPT | Mod: S$GLB,,, | Performed by: INTERNAL MEDICINE

## 2024-01-30 NOTE — PROGRESS NOTES
CHW - Outreach Attempt    Community Health Worker left a In Basket message for 1st attempt to contact patient regarding: SDOH  Community Health Worker to attempt to contact patient on: 2/19  CHW - Case Closure    This Community Health Worker spoke to patient via telephone today.   Pt/Caregiver reported: Pt denied needing any assistance, pt stated that he is very self sufficient and he is able to perform his ADL's on his own. Pt stated that his daughter lives in town and she will visit him often, several times per week. SDOH was completed verified updated by CHW.  Pt/Caregiver denied any additional needs at this time and agrees with episode closure at this time.  Provided patient with Community Health Worker's contact information and encouraged him/her to contact this Community Health Worker if additional needs arise.

## 2024-01-30 NOTE — PROGRESS NOTES
"  Subjective:      Patient ID: Husam Connolly is a 89 y.o. male.    Chief Complaint: No chief complaint on file.    HPI:  Pt arose from the commode after having a bowel movement and suffered a severe dizzy spell.    Pt has been having episodes of lightheadedness since the 3rd inhaler was added to pt's regimen for his COPD.    BP at home has "stabilized" at around 155 mm Hg systolic since increasing the hydralazine to 50 mg tid    Lowest BP reading at home was 115/66 over the past month    Review of Systems   Cardiovascular:  Positive for dyspnea on exertion and near-syncope. Negative for chest pain, claudication, irregular heartbeat, leg swelling, orthopnea, palpitations and syncope.      Pt is scheduled for a sleep apnea evaluation  Past Medical History:   Diagnosis Date    BPH (benign prostatic hyperplasia)     Diabetes mellitus (type 2)     Emphysema 03/12/2022    High cholesterol 08/26/2020    Hypertension     RBBB 11/29/2022    Small bowel obstruction 06/13/2019        Past Surgical History:   Procedure Laterality Date    ANASTOMOSIS OF ILEUM TO RECTUM  9/9/2019    Procedure: ANASTOMOSIS, COLO-RECTAL;  Surgeon: Fouzia Gardner MD;  Location: [facility] OR;  Service: General;;    CHOLECYSTECTOMY N/A 3/7/2022    Procedure: CHOLECYSTECTOMY;  Surgeon: Fouzia Gardner MD;  Location: [facility] OR;  Service: General;  Laterality: N/A;    COLOSTOMY  6/16/2019    Procedure: CREATION, COLOSTOMY;  Surgeon: Fouzia Gardner MD;  Location: [facility] OR;  Service: General;;    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    Procedure: RELEASE, DUPUYTREN CONTRACTURE;  Surgeon: Slick Baldwin Jr., MD;  Location: [facility] OR;  Service: Orthopedics;  Laterality: Left;    EYE SURGERY      GISELA PROCEDURE  6/16/2019    Procedure: GISELA PROCEDURE;  Surgeon: Fouzia Gardner MD;  Location: [facility] OR;  Service: General;;  Colon resection    LYSIS OF ADHESIONS N/A 3/7/2022    Procedure: LYSIS, ADHESIONS;  Surgeon: Fouzia ESPOSITO" MD Jj;  Location: [facility] OR;  Service: General;  Laterality: N/A;    TONSILLECTOMY         Family History   Problem Relation Age of Onset    Heart disease Father     Hypertension Father     Cancer Mother     Diabetes Paternal Aunt        Social History     Socioeconomic History    Marital status:    Tobacco Use    Smoking status: Former     Current packs/day: 0.00     Average packs/day: 1 pack/day for 25.0 years (25.0 ttl pk-yrs)     Types: Cigarettes     Start date:      Quit date:      Years since quittin.1    Smokeless tobacco: Never   Substance and Sexual Activity    Alcohol use: Not Currently     Alcohol/week: 5.0 standard drinks of alcohol     Types: 5 Cans of beer per week    Drug use: No    Sexual activity: Yes     Partners: Female     Social Determinants of Health     Financial Resource Strain: Low Risk  (2023)    Overall Financial Resource Strain (CARDIA)     Difficulty of Paying Living Expenses: Not very hard   Food Insecurity: No Food Insecurity (2024)    Hunger Vital Sign     Worried About Running Out of Food in the Last Year: Never true     Ran Out of Food in the Last Year: Never true   Transportation Needs: No Transportation Needs (2024)    PRAPARE - Transportation     Lack of Transportation (Medical): No     Lack of Transportation (Non-Medical): No   Physical Activity: Inactive (2024)    Exercise Vital Sign     Days of Exercise per Week: 0 days     Minutes of Exercise per Session: 0 min   Stress: No Stress Concern Present (2024)    [employer] - Occupational Stress Questionnaire     Feeling of Stress : Not at all   Social Connections: Unknown (2024)    Social Connection and Isolation Panel [NHANES]     Frequency of Communication with Friends and Family: More than three times a week     Frequency of Social Gatherings with Friends and Family: Three times a week     Active Member of Clubs or Organizations: No     Attends  Club or Organization Meetings: Never     Marital Status:    Housing Stability: High Risk (1/27/2024)    Housing Stability Vital Sign     Unable to Pay for Housing in the Last Year: No     Number of Places Lived in the Last Year: 3     Unstable Housing in the Last Year: No       Current Outpatient Medications on File Prior to Visit   Medication Sig Dispense Refill    aspirin (ECOTRIN) 81 MG EC tablet Take 81 mg by mouth once daily.      atorvastatin (LIPITOR) 40 MG tablet TAKE 1 TABLET(40 MG) BY MOUTH EVERY DAY 90 tablet 3    COMBIVENT RESPIMAT  mcg/actuation inhaler INHALE 2 PUFFS INTO THE LUNGS EVERY 6 HOURS AS NEEDED FOR SHORTNESS OF BREATH OR RESCUE 4 g 3    finasteride (PROSCAR) 5 mg tablet TAKE 1 TABLET BY MOUTH EVERY DAY (Patient taking differently: Take 5 mg by mouth every evening.) 90 tablet 1    fluticasone propionate (FLONASE) 50 mcg/actuation nasal spray 2 sprays (100 mcg total) by Each Nostril route once daily. (Patient taking differently: 2 sprays by Each Nostril route 2 (two) times a day.) 48 g 3    fluticasone-salmeterol diskus inhaler 100-50 mcg Inhale 1 puff into the lungs 2 (two) times daily. Controller 60 each 3    GARLIC ORAL Take 1 capsule by mouth once daily.      hydrALAZINE (APRESOLINE) 50 MG tablet Take 1 tablet (50 mg total) by mouth 3 (three) times daily. 270 tablet 3    losartan (COZAAR) 100 MG tablet Take 1 tablet (100 mg total) by mouth once daily. 90 tablet 3    metFORMIN (GLUCOPHAGE) 500 MG tablet Take 1 tablet (500 mg total) by mouth 2 (two) times daily with meals. 180 tablet 3    multivitamin capsule Take 1 capsule by mouth once daily.      omega-3 fatty acids/fish oil (FISH OIL-OMEGA-3 FATTY ACIDS) 300-1,000 mg capsule Take 1 capsule by mouth once daily.      tamsulosin (FLOMAX) 0.4 mg Cap Take 0.4 mg by mouth nightly.  3    tiotropium bromide (SPIRIVA RESPIMAT) 2.5 mcg/actuation inhaler Inhale 2 puffs into the lungs Daily. Controller 4 g 6    vitamin D (VITAMIN D3)  "1000 units Tab Take 1,000 Units by mouth once daily.       No current facility-administered medications on file prior to visit.       Review of patient's allergies indicates:   Allergen Reactions    Solarcaine [benzocaine-triclosan]     Sulfa (sulfonamide antibiotics)     Sulfamethoxazole     Trimethoprim     Hytrin [terazosin] Rash    Smz-tmp ds [sulfamethoxazole-trimethoprim] Rash     Objective:     Vitals:    01/30/24 1127 01/30/24 1130 01/30/24 1225 01/30/24 1226   BP: (!) 171/67 (!) 173/80 (!) 159/62 (!) 144/72   BP Location: Left arm Right arm Left arm Left arm   Patient Position: Sitting Sitting Sitting Sitting   BP Method: Large (Automatic)      Pulse: 81      SpO2: 95%      Weight: 77.6 kg (170 lb 15.5 oz)      Height: 5' 10" (1.778 m)           Physical Exam  Vitals reviewed.   Constitutional:       General: He is not in acute distress.     Appearance: He is well-developed. He is not diaphoretic.   Eyes:      General: No scleral icterus.  Neck:      Vascular: No carotid bruit or JVD.   Cardiovascular:      Rate and Rhythm: Regular rhythm.      Heart sounds: Normal heart sounds. No murmur heard.     No friction rub. No gallop.   Pulmonary:      Effort: Pulmonary effort is normal. No respiratory distress.      Breath sounds: Normal breath sounds.   Musculoskeletal:      Right lower leg: Edema present.      Left lower leg: Edema (trivial bilateral) present.   Skin:     General: Skin is warm and dry.   Neurological:      Mental Status: He is alert and oriented to person, place, and time.   Psychiatric:         Behavior: Behavior normal.         Thought Content: Thought content normal.         Judgment: Judgment normal.        Lab Visit on 01/26/2024   Component Date Value Ref Range Status    Microalbumin, Urine 01/26/2024 170.0  ug/mL Final    Creatinine, Urine 01/26/2024 167.0  23.0 - 375.0 mg/dL Final    Microalb/Creat Ratio 01/26/2024 101.8 (H)  0.0 - 30.0 ug/mg Final   Lab Visit on 01/26/2024   Component " Date Value Ref Range Status    Hemoglobin A1C 01/26/2024 7.5 (H)  4.0 - 5.6 % Final    Estimated Avg Glucose 01/26/2024 169 (H)  68 - 131 mg/dL Final    Sodium 01/26/2024 140  136 - 145 mmol/L Final    Potassium 01/26/2024 4.5  3.5 - 5.1 mmol/L Final    Chloride 01/26/2024 104  95 - 110 mmol/L Final    CO2 01/26/2024 22 (L)  23 - 29 mmol/L Final    Glucose 01/26/2024 193 (H)  70 - 110 mg/dL Final    BUN 01/26/2024 30  10 - 30 mg/dL Final    Creatinine 01/26/2024 1.7 (H)  0.5 - 1.4 mg/dL Final    Calcium 01/26/2024 9.7  8.7 - 10.5 mg/dL Final    Total Protein 01/26/2024 6.5  6.0 - 8.4 g/dL Final    Albumin 01/26/2024 3.6  3.5 - 5.2 g/dL Final    Total Bilirubin 01/26/2024 0.9  0.1 - 1.0 mg/dL Final    Alkaline Phosphatase 01/26/2024 73  55 - 135 U/L Final    AST 01/26/2024 18  10 - 40 U/L Final    ALT 01/26/2024 15  10 - 44 U/L Final    eGFR 01/26/2024 37.4 (A)  >60 mL/min/1.73 m^2 Final    Anion Gap 01/26/2024 14  8 - 16 mmol/L Final    WBC 01/26/2024 8.01  3.90 - 12.70 K/uL Final    RBC 01/26/2024 4.78  4.60 - 6.20 M/uL Final    Hemoglobin 01/26/2024 13.8 (L)  14.0 - 18.0 g/dL Final    Hematocrit 01/26/2024 43.1  40.0 - 54.0 % Final    MCV 01/26/2024 90  82 - 98 fL Final    MCH 01/26/2024 28.9  27.0 - 31.0 pg Final    MCHC 01/26/2024 32.0  32.0 - 36.0 g/dL Final    RDW 01/26/2024 13.2  11.5 - 14.5 % Final    Platelets 01/26/2024 198  150 - 450 K/uL Final    MPV 01/26/2024 11.5  9.2 - 12.9 fL Final    Immature Granulocytes 01/26/2024 0.4  0.0 - 0.5 % Final    Gran # (ANC) 01/26/2024 5.2  1.8 - 7.7 K/uL Final    Immature Grans (Abs) 01/26/2024 0.03  0.00 - 0.04 K/uL Final    Lymph # 01/26/2024 1.7  1.0 - 4.8 K/uL Final    Mono # 01/26/2024 0.8  0.3 - 1.0 K/uL Final    Eos # 01/26/2024 0.2  0.0 - 0.5 K/uL Final    Baso # 01/26/2024 0.04  0.00 - 0.20 K/uL Final    nRBC 01/26/2024 0  0 /100 WBC Final    Gran % 01/26/2024 65.4  38.0 - 73.0 % Final    Lymph % 01/26/2024 21.0  18.0 - 48.0 % Final    Mono % 01/26/2024 9.7   "4.0 - 15.0 % Final    Eosinophil % 01/26/2024 3.0  0.0 - 8.0 % Final    Basophil % 01/26/2024 0.5  0.0 - 1.9 % Final    Differential Method 01/26/2024 Automated   Final    TSH 01/26/2024 1.020  0.400 - 4.000 uIU/mL Final   Lab Visit on 09/19/2023   Component Date Value Ref Range Status    Hemoglobin A1C 09/19/2023 7.5 (H)  4.0 - 5.6 % Final    Estimated Avg Glucose 09/19/2023 169 (H)  68 - 131 mg/dL Final   (            Component Ref Range & Units 6 mo ago 1 yr ago 2 yr ago 3 yr ago   BNP 0 - 99 pg/mL 84  47 CM  30 CM  49 CM    Comment: Values of less than 100 pg/ml are consistent with non-CHF populations.   Resulting Agency  [facility]              Specimen Collected: 05/19/23 12:01 Last Resulted: 05/19/23 13:27         Transthoracic echo (TTE) complete    Height:  5' 10" (1.778 m)   Weight:  79.4 kg (175 lb)   Blood Pressure:  187/74    Date of Study:  3/11/22   Ordering Provider:  Charlene Siu MD   Clinical Indications:  Dyspnea [R06.00 (ICD-10-CM)]       Interpreting Physicians  Performing Staff   Jamarcus Cartwright MD Tech:  Lea Pino PCT        Reason for Exam  Priority: Routine  Dx: Dyspnea [R06.00 (ICD-10-CM)]     Result Image Hyperlink     Show images for Echo  Summary    The left ventricle is normal in size with concentric remodeling and normal systolic function.  The estimated ejection fraction is 55%.  Grade I left ventricular diastolic dysfunction.  There is abnormal septal wall motion.  Normal right ventricular size with normal right ventricular systolic function.  The estimated PA systolic pressure is 47 mmHg.  Normal central venous pressure (3 mmHg).  There is pulmonary hypertension.     Vitals  X-Ray Chest PA And Lateral  Status: Final result     MyChart Results Release    MyChart Status: Active  Results Release     PACS Images for [facility] [place] [facility] Viewer     Show images for X-Ray Chest PA And Lateral  All Reviewers List    Daren Lassiter MD on 6/13/2023 12:28     X-Ray " Chest PA And Lateral  Order: [id number]  Status: Final result     Visible to patient: Yes (not seen)     Next appt: 01/25/2024 at 01:20 PM in Family Medicine ([facility] Knight DO)     Dx: Localized edema; Primary hypertension...     0 Result Notes  Details    Reading Physician Reading Date Result Priority   Rc Liu MD  [phone] 6/13/2023 Routine     Narrative & Impression  EXAMINATION:  XR CHEST PA AND LATERAL     CLINICAL HISTORY:  Pure hypercholesterolemia, unspecified     TECHNIQUE:  PA and lateral views of the chest were performed.     COMPARISON:  03/11/2022     FINDINGS:  The cardiomediastinal silhouette is not enlarged noting calcification and tortuosity of the aorta, stable.  There is prominent nipple shadow bilaterally..  There is no pleural effusion.  The trachea is midline.  The lungs are symmetrically expanded bilaterally with coarse central hilar interstitial attenuation, similar to the previous exam..  There is left basilar subsegmental atelectasis.  There is no pneumothorax.  The osseous structures are remarkable for degenerative change..     Impression:     1. Interstitial findings appears similar to the previous exam, superimposed edema is a consideration.  No large focal consolidation noting prominent nipple shadow bilaterally.        Electronically signed by: Rc Liu MD  Date:                                            06/13/2023  Time:                                           11:33           Exam Ended: 06/13/23 11:17         CT Chest Without Contrast  Status: Final result     MyChart Results Release    Snipd Status: Active  Results Release     PACS Images for [facility] Viewer     Show images for CT Chest Without Contrast  All Reviewers List    Alverto Green MD on 8/4/2023 16:19     CT Chest Without Contrast  Order: [id number]  Status: Final result     Visible to patient: Yes (seen)     Next appt: 01/25/2024 at 01:20 PM in Family Medicine ([facility]  Pacheco Knight DO)     Dx: ARREDONDO (dyspnea on exertion); Pulmonary ...     0 Result Notes  Details    Reading Physician Reading Date Result Priority   Rayshawn Luong III, MD  [phone] 7/27/2023 Routine     Narrative & Impression  EXAMINATION:  CT CHEST WITHOUT CONTRAST     CLINICAL HISTORY:  Lung nodule, multiple < 6mm;Lung nodule, 6-8mm;Lung nodules; Other forms of dyspnea     TECHNIQUE:  CT of the thorax was performed with low dose, lung screening protocol.  No contrast was administered.  Sagittal and coronal reconstructions were obtained.     COMPARISON:  None.     FINDINGS:  Comparison is CTA chest 03/11/2022.     There is a right thyroid nodule.  No mediastinal, hilar, or axillary adenopathy is seen.  There are coronary calcifications.  There is a left lobe liver cyst.  There are multiple bilateral renal cysts.  The adrenal glands are not enlarged.  There is diverticulosis.  The trachea and bronchi are patent.  There is severe emphysema.  There are calcified lung granulomas.  There are stable small pulmonary micro nodules.  Bones showed DJD.     Impression:     Lung-RADS Category:  2 - Benign Appearance or Behavior - continue annual screening with LDCT in 12 months.     Clinically or potentially clinically significant non lung cancer finding:  Coronary calcifications     Prior Lung Cancer Modifier:  None     Left lobe liver cyst.     Bilateral renal cysts.     Severe emphysema.     Diverticulosis.        Electronically signed by: Rayshawn Luong MD  Date:                                            07/27/2023  Time:                                           10:31           Exam Ended: 07/27/23 10:25           Assessment:     1. Primary hypertension    2. Pulmonary hypertension    3. RBBB    4. High cholesterol    5. Aortic atherosclerosis    6. Centrilobular emphysema    7. Stage 3 chronic kidney disease, unspecified whether stage 3a or 3b CKD    8. Type 2 diabetes mellitus with stage 3a  chronic kidney disease, without long-term current use of insulin    9. Localized edema    10. Orthostatic hypotension      Plan:   Diagnoses and all orders for this visit:    Primary hypertension    Pulmonary hypertension    RBBB    High cholesterol    Aortic atherosclerosis    Centrilobular emphysema    Stage 3 chronic kidney disease, unspecified whether stage 3a or 3b CKD    Type 2 diabetes mellitus with stage 3a chronic kidney disease, without long-term current use of insulin    Localized edema    Orthostatic hypotension     Pt has had episodes of lightheadedness which are most consistent with orthostatic hypotension and has had documented episodes of systolic blood pressure as low as 115 mm Hg.  Therefore, will continue the same regimen and not increase the hydralazine.    Same meds.    Note pt had severe pedal edema when taking a calcium channel blocker    RTC  6 months    Follow up in about 6 months (around 7/30/2024).

## 2024-02-12 ENCOUNTER — OFFICE VISIT (OUTPATIENT)
Dept: FAMILY MEDICINE | Facility: CLINIC | Age: 89
End: 2024-02-12
Payer: MEDICARE

## 2024-02-12 VITALS
SYSTOLIC BLOOD PRESSURE: 160 MMHG | HEART RATE: 80 BPM | OXYGEN SATURATION: 94 % | BODY MASS INDEX: 24.14 KG/M2 | DIASTOLIC BLOOD PRESSURE: 70 MMHG | HEIGHT: 70 IN | WEIGHT: 168.63 LBS

## 2024-02-12 DIAGNOSIS — R42 VERTIGO: Primary | ICD-10-CM

## 2024-02-12 PROCEDURE — 99999 PR PBB SHADOW E&M-EST. PATIENT-LVL V: CPT | Mod: PBBFAC,,, | Performed by: STUDENT IN AN ORGANIZED HEALTH CARE EDUCATION/TRAINING PROGRAM

## 2024-02-12 PROCEDURE — 99214 OFFICE O/P EST MOD 30 MIN: CPT | Mod: S$GLB,,, | Performed by: STUDENT IN AN ORGANIZED HEALTH CARE EDUCATION/TRAINING PROGRAM

## 2024-02-12 RX ORDER — MECLIZINE HCL 12.5 MG 12.5 MG/1
12.5 TABLET ORAL 3 TIMES DAILY PRN
Qty: 20 TABLET | Refills: 0 | Status: SHIPPED | OUTPATIENT
Start: 2024-02-12 | End: 2024-03-04

## 2024-02-12 NOTE — PROGRESS NOTES
Progress Note  [facility]    Subjective:     Husam Connolly is a 89 y.o. year old male who presents to clinic for dizziness    Dizziness started about 1 week ago with starting new inhaler. Felt like ears popped and dizziness went away then it came back. BP has been stable at 150-160/70's with increase in hydralazine. No congestion. In the morning when he takes his medication breathing becomes easier. Dizziness is like the world is spinning. Moving his head quickly makes the symptoms worse. Feels like vision is not as clear with the dizziness but doesn't seem blurry. Seeing ophthalmologist in March for annual f/u. Using nasal spray at home without any change in symptoms.     Patient Active Problem List    Diagnosis Date Noted    Orthostatic hypotension 01/30/2024    COPD, group B, by GOLD 2017 classification 08/23/2023    Pulmonary hypertension 07/24/2023    Shoulder arthritis 01/05/2023    Type 2 diabetes mellitus with peripheral vascular disease 11/29/2022    RBBB 11/29/2022    Pulmonary nodules     Dupuytren's contracture of left hand 03/21/2022    Centrilobular emphysema 03/12/2022    Small bowel obstruction due to adhesions 02/11/2022    Type 2 diabetes mellitus with stage 3a chronic kidney disease, without long-term current use of insulin 09/22/2021    Aortic atherosclerosis 05/11/2021    Chronic rhinitis 03/01/2021    S/P colostomy 03/01/2021    Localized edema 10/08/2020    Right foot drop 08/26/2020    High cholesterol 08/26/2020    Ventral hernia without obstruction or gangrene 02/27/2020    S/P cholecystectomy 07/01/2019    Stage 3 chronic kidney disease 12/13/2018    Hypertension     BPH (benign prostatic hyperplasia)         Review of patient's allergies indicates:   Allergen Reactions    Solarcaine [benzocaine-triclosan]     Sulfa (sulfonamide antibiotics)     Sulfamethoxazole     Trimethoprim     Hytrin [terazosin] Rash    Smz-tmp ds [sulfamethoxazole-trimethoprim]  Rash        Past Medical History:   Diagnosis Date    BPH (benign prostatic hyperplasia)     Diabetes mellitus (type 2)     Emphysema 2022    High cholesterol 2020    Hypertension     RBBB 2022    Small bowel obstruction 2019      Past Surgical History:   Procedure Laterality Date    ANASTOMOSIS OF ILEUM TO RECTUM  2019    Procedure: ANASTOMOSIS, COLO-RECTAL;  Surgeon: Fouzia Gardner MD;  Location: [facility] OR;  Service: General;;    CHOLECYSTECTOMY N/A 3/7/2022    Procedure: CHOLECYSTECTOMY;  Surgeon: Fouzia Gardner MD;  Location: [facility] OR;  Service: General;  Laterality: N/A;    COLOSTOMY  2019    Procedure: CREATION, COLOSTOMY;  Surgeon: Fouzia Gardner MD;  Location: [facility] OR;  Service: General;;    DUPUYTREN CONTRACTURE RELEASE Left 2022    Procedure: RELEASE, DUPUYTREN CONTRACTURE;  Surgeon: Slick Baldwin Jr., MD;  Location: [facility] OR;  Service: Orthopedics;  Laterality: Left;    EYE SURGERY      GSIELA PROCEDURE  2019    Procedure: GISELA PROCEDURE;  Surgeon: Fouzia Gardner MD;  Location: [facility] OR;  Service: General;;  Colon resection    LYSIS OF ADHESIONS N/A 3/7/2022    Procedure: LYSIS, ADHESIONS;  Surgeon: Fouzia Gardner MD;  Location: [facility];  Service: General;  Laterality: N/A;    TONSILLECTOMY        Family History   Problem Relation Age of Onset    Heart disease Father     Hypertension Father     Cancer Mother     Diabetes Paternal Aunt       Social History     Tobacco Use    Smoking status: Former     Current packs/day: 0.00     Average packs/day: 1 pack/day for 25.0 years (25.0 ttl pk-yrs)     Types: Cigarettes     Start date:      Quit date:      Years since quittin.1    Smokeless tobacco: Never   Substance Use Topics    Alcohol use: Not Currently     Alcohol/week: 5.0 standard drinks of alcohol     Types: 5 Cans of beer per week        Objective:     Vitals:    24 1330   BP: (!) 160/70   Pulse: 80   SpO2: (!)  "94%   Weight: 76.5 kg (168 lb 10.4 oz)   Height: 5' 10" (1.778 m)     BMI: 24.20    Gen: No apparent distress, well nourished and developed, appears stated age  CV: RRR, S1 and S2 present, no LE edema  Resp: CTAB, normal respiratory effort  HEENT: Clear fluid behind Tms b/l, not impacted by cerumen, congestion present, oropharynx mucosa pink and moist    Assessment/Plan:     Husam Connolly is a 89 y.o. year old male who presents to clinic for dizziness    1. Vertigo  Exam and history most consistent with vertigo likely 2/2 allergies. Discussed proper use of nasal spray. Vestibular exercises provided. Pt will let me know if not better in 1 week and I will refer to PT. Discussed this medication can make him sleepy which may increase fall risk so to use significant caution with using this medication. Medication, side effects and proper use discussed. Pt verbalized understanding and was in agreement with the plan.    - meclizine (ANTIVERT) 12.5 mg tablet; Take 1 tablet (12.5 mg total) by mouth 3 (three) times daily as needed for Dizziness.  Dispense: 20 tablet; Refill: 0        Follow-up: as previously scheduled      [facility] Knight, DO  Family Medicine        "

## 2024-02-26 DIAGNOSIS — J43.2 CENTRILOBULAR EMPHYSEMA: ICD-10-CM

## 2024-02-26 RX ORDER — TIOTROPIUM BROMIDE INHALATION SPRAY 3.12 UG/1
2 SPRAY, METERED RESPIRATORY (INHALATION) DAILY
Qty: 4 G | Refills: 6 | Status: SHIPPED | OUTPATIENT
Start: 2024-02-26 | End: 2024-03-28

## 2024-02-29 ENCOUNTER — LAB VISIT (OUTPATIENT)
Dept: LAB | Facility: HOSPITAL | Age: 89
End: 2024-02-29
Attending: STUDENT IN AN ORGANIZED HEALTH CARE EDUCATION/TRAINING PROGRAM
Payer: MEDICARE

## 2024-02-29 DIAGNOSIS — N18.32 STAGE 3B CHRONIC KIDNEY DISEASE: ICD-10-CM

## 2024-02-29 LAB
ANION GAP SERPL CALC-SCNC: 12 MMOL/L (ref 8–16)
BUN SERPL-MCNC: 35 MG/DL (ref 10–30)
CALCIUM SERPL-MCNC: 10.1 MG/DL (ref 8.7–10.5)
CHLORIDE SERPL-SCNC: 103 MMOL/L (ref 95–110)
CO2 SERPL-SCNC: 24 MMOL/L (ref 23–29)
CREAT SERPL-MCNC: 1.6 MG/DL (ref 0.5–1.4)
EST. GFR  (NO RACE VARIABLE): 40.2 ML/MIN/1.73 M^2
GLUCOSE SERPL-MCNC: 218 MG/DL (ref 70–110)
POTASSIUM SERPL-SCNC: 4.5 MMOL/L (ref 3.5–5.1)
SODIUM SERPL-SCNC: 139 MMOL/L (ref 136–145)

## 2024-02-29 PROCEDURE — 36415 COLL VENOUS BLD VENIPUNCTURE: CPT | Mod: PO | Performed by: STUDENT IN AN ORGANIZED HEALTH CARE EDUCATION/TRAINING PROGRAM

## 2024-02-29 PROCEDURE — 80048 BASIC METABOLIC PNL TOTAL CA: CPT | Performed by: STUDENT IN AN ORGANIZED HEALTH CARE EDUCATION/TRAINING PROGRAM

## 2024-03-01 PROBLEM — N18.32 STAGE 3B CHRONIC KIDNEY DISEASE: Status: ACTIVE | Noted: 2018-12-13

## 2024-03-04 ENCOUNTER — OFFICE VISIT (OUTPATIENT)
Dept: ORTHOPEDICS | Facility: CLINIC | Age: 89
End: 2024-03-04
Payer: MEDICARE

## 2024-03-04 VITALS — HEIGHT: 70 IN | BODY MASS INDEX: 24.2 KG/M2

## 2024-03-04 DIAGNOSIS — M19.019 SHOULDER ARTHRITIS: Primary | ICD-10-CM

## 2024-03-04 PROCEDURE — 99213 OFFICE O/P EST LOW 20 MIN: CPT | Mod: 25,S$GLB,, | Performed by: ORTHOPAEDIC SURGERY

## 2024-03-04 PROCEDURE — 20610 DRAIN/INJ JOINT/BURSA W/O US: CPT | Mod: LT,S$GLB,, | Performed by: ORTHOPAEDIC SURGERY

## 2024-03-04 PROCEDURE — 99999 PR PBB SHADOW E&M-EST. PATIENT-LVL III: CPT | Mod: PBBFAC,,, | Performed by: ORTHOPAEDIC SURGERY

## 2024-03-04 RX ORDER — TRIAMCINOLONE ACETONIDE 40 MG/ML
40 INJECTION, SUSPENSION INTRA-ARTICULAR; INTRAMUSCULAR
Status: COMPLETED | OUTPATIENT
Start: 2024-03-04 | End: 2024-03-04

## 2024-03-04 RX ADMIN — TRIAMCINOLONE ACETONIDE 40 MG: 40 INJECTION, SUSPENSION INTRA-ARTICULAR; INTRAMUSCULAR at 03:03

## 2024-03-04 NOTE — PROGRESS NOTES
Subjective:      Patient ID: Husam Connolly is a 89 y.o. male.  Chief Complaint: Follow-up and Pain of the Left Shoulder      HPI  Husam Connolly is a  89 y.o. male presenting today for follow up of left shoulder arthritis.  He reports that he is having a flare-up again   Did well with injection last year would like another injection   Also interested in therapy for range of motion and strengthening left arm   No numbness or tingling reported.    Review of patient's allergies indicates:   Allergen Reactions    Solarcaine [benzocaine-triclosan]     Sulfa (sulfonamide antibiotics)     Sulfamethoxazole     Trimethoprim     Hytrin [terazosin] Rash    Smz-tmp ds [sulfamethoxazole-trimethoprim] Rash         Current Outpatient Medications   Medication Sig Dispense Refill    aspirin (ECOTRIN) 81 MG EC tablet Take 81 mg by mouth once daily.      atorvastatin (LIPITOR) 40 MG tablet TAKE 1 TABLET(40 MG) BY MOUTH EVERY DAY 90 tablet 3    COMBIVENT RESPIMAT  mcg/actuation inhaler INHALE 2 PUFFS INTO THE LUNGS EVERY 6 HOURS AS NEEDED FOR SHORTNESS OF BREATH OR RESCUE 4 g 3    finasteride (PROSCAR) 5 mg tablet TAKE 1 TABLET BY MOUTH EVERY DAY (Patient taking differently: Take 5 mg by mouth every evening.) 90 tablet 1    fluticasone propionate (FLONASE) 50 mcg/actuation nasal spray 2 sprays (100 mcg total) by Each Nostril route once daily. (Patient taking differently: 2 sprays by Each Nostril route 2 (two) times a day.) 48 g 3    fluticasone-salmeterol diskus inhaler 100-50 mcg Inhale 1 puff into the lungs 2 (two) times daily. Controller 60 each 3    GARLIC ORAL Take 1 capsule by mouth once daily.      hydrALAZINE (APRESOLINE) 50 MG tablet Take 1 tablet (50 mg total) by mouth 3 (three) times daily. 270 tablet 3    losartan (COZAAR) 100 MG tablet Take 1 tablet (100 mg total) by mouth once daily. 90 tablet 3    metFORMIN (GLUCOPHAGE) 500 MG tablet Take 1 tablet (500 mg total) by mouth 2 (two) times daily with meals. 180  "tablet 3    multivitamin capsule Take 1 capsule by mouth once daily.      omega-3 fatty acids/fish oil (FISH OIL-OMEGA-3 FATTY ACIDS) 300-1,000 mg capsule Take 1 capsule by mouth once daily.      tiotropium bromide (SPIRIVA RESPIMAT) 2.5 mcg/actuation inhaler Inhale 2 puffs into the lungs Daily. Controller 4 g 6    vitamin D (VITAMIN D3) 1000 units Tab Take 1,000 Units by mouth once daily.       No current facility-administered medications for this visit.       Past Medical History:   Diagnosis Date    BPH (benign prostatic hyperplasia)     Diabetes mellitus (type 2)     Emphysema 03/12/2022    High cholesterol 08/26/2020    Hypertension     RBBB 11/29/2022    Small bowel obstruction 06/13/2019       Past Surgical History:   Procedure Laterality Date    ANASTOMOSIS OF ILEUM TO RECTUM  9/9/2019    Procedure: ANASTOMOSIS, COLO-RECTAL;  Surgeon: Fouzia Gardner MD;  Location: [facility] OR;  Service: General;;    CHOLECYSTECTOMY N/A 3/7/2022    Procedure: CHOLECYSTECTOMY;  Surgeon: Fouzia Gardner MD;  Location: [facility] OR;  Service: General;  Laterality: N/A;    COLOSTOMY  6/16/2019    Procedure: CREATION, COLOSTOMY;  Surgeon: Fouzia Gardner MD;  Location: [facility] OR;  Service: General;;    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    Procedure: RELEASE, DUPUYTREN CONTRACTURE;  Surgeon: Slick Baldwin Jr., MD;  Location: [facility] OR;  Service: Orthopedics;  Laterality: Left;    EYE SURGERY      GISELA PROCEDURE  6/16/2019    Procedure: GISELA PROCEDURE;  Surgeon: Fouzia Gardner MD;  Location: [facility] OR;  Service: General;;  Colon resection    LYSIS OF ADHESIONS N/A 3/7/2022    Procedure: LYSIS, ADHESIONS;  Surgeon: Fouzia Gardner MD;  Location: [facility] OR;  Service: General;  Laterality: N/A;    TONSILLECTOMY         OBJECTIVE:   PHYSICAL EXAM:  Height: 5' 10" (177.8 cm)    Vitals:    03/04/24 1505   Height: 5' 10" (1.778 m)   PainSc:   6   PainLoc: Shoulder     Ortho/SPM Exam  Examination left " shoulder no tenderness no swelling  Range of motion limited due to step stiffness and some pain   Neurologic exam intact strength a little bit weak some atrophy noted    RADIOGRAPHS:  None  Comments: I have personally reviewed the imaging and I agree with the above radiologist's report.    ASSESSMENT/PLAN:     IMPRESSION:  Severe arthritis left shoulder    PLAN:  I explained the nature of the problem to the patient   Recommended injection  After pause for time-out identified the left shoulder injected with Kenalog 40 mg 2 cc xylocaine sterile technique  Tolerated the procedure well without complication   I have also ordered some physical therapy for range of motion strengthening left shoulder and arm       FOLLOW UP:  1-2 months    Disclaimer: This note has been generated using voice-recognition software. There may be typographical errors that have been missed during proof-reading.

## 2024-03-05 ENCOUNTER — OFFICE VISIT (OUTPATIENT)
Dept: FAMILY MEDICINE | Facility: CLINIC | Age: 89
End: 2024-03-05
Payer: MEDICARE

## 2024-03-05 VITALS
HEIGHT: 70 IN | SYSTOLIC BLOOD PRESSURE: 142 MMHG | OXYGEN SATURATION: 94 % | BODY MASS INDEX: 23.95 KG/M2 | WEIGHT: 167.31 LBS | HEART RATE: 93 BPM | DIASTOLIC BLOOD PRESSURE: 59 MMHG

## 2024-03-05 DIAGNOSIS — J44.9 COPD, GROUP B, BY GOLD 2017 CLASSIFICATION: ICD-10-CM

## 2024-03-05 DIAGNOSIS — R42 VERTIGO: Primary | ICD-10-CM

## 2024-03-05 DIAGNOSIS — E11.22 TYPE 2 DIABETES MELLITUS WITH STAGE 3B CHRONIC KIDNEY DISEASE, WITHOUT LONG-TERM CURRENT USE OF INSULIN: ICD-10-CM

## 2024-03-05 DIAGNOSIS — N18.32 TYPE 2 DIABETES MELLITUS WITH STAGE 3B CHRONIC KIDNEY DISEASE, WITHOUT LONG-TERM CURRENT USE OF INSULIN: ICD-10-CM

## 2024-03-05 DIAGNOSIS — I1A.0 RESISTANT HYPERTENSION: Chronic | ICD-10-CM

## 2024-03-05 PROBLEM — E11.51 TYPE 2 DIABETES MELLITUS WITH PERIPHERAL VASCULAR DISEASE: Status: RESOLVED | Noted: 2022-11-29 | Resolved: 2024-03-05

## 2024-03-05 PROBLEM — J43.2 CENTRILOBULAR EMPHYSEMA: Status: RESOLVED | Noted: 2022-03-12 | Resolved: 2024-03-05

## 2024-03-05 PROBLEM — R60.0 LOCALIZED EDEMA: Status: RESOLVED | Noted: 2020-10-08 | Resolved: 2024-03-05

## 2024-03-05 PROCEDURE — 99999 PR PBB SHADOW E&M-EST. PATIENT-LVL V: CPT | Mod: PBBFAC,,, | Performed by: STUDENT IN AN ORGANIZED HEALTH CARE EDUCATION/TRAINING PROGRAM

## 2024-03-05 PROCEDURE — G2211 COMPLEX E/M VISIT ADD ON: HCPCS | Mod: S$GLB,,, | Performed by: STUDENT IN AN ORGANIZED HEALTH CARE EDUCATION/TRAINING PROGRAM

## 2024-03-05 PROCEDURE — 99214 OFFICE O/P EST MOD 30 MIN: CPT | Mod: S$GLB,,, | Performed by: STUDENT IN AN ORGANIZED HEALTH CARE EDUCATION/TRAINING PROGRAM

## 2024-03-05 RX ORDER — MECLIZINE HCL 12.5 MG 12.5 MG/1
12.5 TABLET ORAL 3 TIMES DAILY PRN
Qty: 20 TABLET | Refills: 2 | Status: SHIPPED | OUTPATIENT
Start: 2024-03-05 | End: 2024-03-26

## 2024-03-05 NOTE — PROGRESS NOTES
Progress Note  [facility]    Subjective:     Husam Connolly is a 89 y.o. year old male with PMHx of CKD 3b, COPD, HTN, HLD, BPH, T2DM who presents to clinic for f/u    Vertigo: Saw urologist who recommended stopping tamsulosin which helped some but still persisted. Tried taking meclizine which helped for a few days but now having some light headedness and that vision is not totally clear. With sitting in the recliner feels like dizziness gets worse. Has not fallen. BP has been 150-160's, stable through all of this. Has not noticed any change in symptoms with BP fluctuating.    T2DM: Doesn't check sugar at home. Recent blood work and urine microalbumin with worsening of kidney function from CKD 3a to 3b, stable. Last Hgb A1C 7.5% a month ago. Currently taking metformin 500mg BID.     COPD: Has not noticed any improvement with the addition of the third inhaler. Continues to be very short of breath with little exertion.     Patient Active Problem List    Diagnosis Date Noted    Orthostatic hypotension 01/30/2024    COPD, group B, by GOLD 2017 classification 08/23/2023    Pulmonary hypertension 07/24/2023    Shoulder arthritis 01/05/2023    Type 2 diabetes mellitus with peripheral vascular disease 11/29/2022    RBBB 11/29/2022    Pulmonary nodules     Dupuytren's contracture of left hand 03/21/2022    Centrilobular emphysema 03/12/2022    Small bowel obstruction due to adhesions 02/11/2022    Type 2 diabetes mellitus with stage 3a chronic kidney disease, without long-term current use of insulin 09/22/2021    Aortic atherosclerosis 05/11/2021    Chronic rhinitis 03/01/2021    S/P colostomy 03/01/2021    Localized edema 10/08/2020    Right foot drop 08/26/2020    High cholesterol 08/26/2020    Ventral hernia without obstruction or gangrene 02/27/2020    S/P cholecystectomy 07/01/2019    Stage 3b chronic kidney disease 12/13/2018    Hypertension     BPH (benign prostatic hyperplasia)          Review of patient's allergies indicates:   Allergen Reactions    Solarcaine [benzocaine-triclosan]     Sulfa (sulfonamide antibiotics)     Sulfamethoxazole     Trimethoprim     Hytrin [terazosin] Rash    Smz-tmp ds [sulfamethoxazole-trimethoprim] Rash        Past Medical History:   Diagnosis Date    BPH (benign prostatic hyperplasia)     Diabetes mellitus (type 2)     Emphysema 03/12/2022    High cholesterol 08/26/2020    Hypertension     RBBB 11/29/2022    Small bowel obstruction 06/13/2019      Past Surgical History:   Procedure Laterality Date    ANASTOMOSIS OF ILEUM TO RECTUM  9/9/2019    Procedure: ANASTOMOSIS, COLO-RECTAL;  Surgeon: Fouzia Gardner MD;  Location: [facility] OR;  Service: General;;    CHOLECYSTECTOMY N/A 3/7/2022    Procedure: CHOLECYSTECTOMY;  Surgeon: Fouzia Gardner MD;  Location: [facility] OR;  Service: General;  Laterality: N/A;    COLOSTOMY  6/16/2019    Procedure: CREATION, COLOSTOMY;  Surgeon: Fouzia Gardner MD;  Location: [facility] OR;  Service: General;;    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    Procedure: RELEASE, DUPUYTREN CONTRACTURE;  Surgeon: Slick Baldwin Jr., MD;  Location: [facility] OR;  Service: Orthopedics;  Laterality: Left;    EYE SURGERY      GISELA PROCEDURE  6/16/2019    Procedure: GISELA PROCEDURE;  Surgeon: Fouzia Gardner MD;  Location: [facility] OR;  Service: General;;  Colon resection    LYSIS OF ADHESIONS N/A 3/7/2022    Procedure: LYSIS, ADHESIONS;  Surgeon: Fouzia Gardner MD;  Location: [facility];  Service: General;  Laterality: N/A;    TONSILLECTOMY        Family History   Problem Relation Age of Onset    Heart disease Father     Hypertension Father     Cancer Mother     Diabetes Paternal Aunt       Social History     Tobacco Use    Smoking status: Former     Current packs/day: 0.00     Average packs/day: 1 pack/day for 25.0 years (25.0 ttl pk-yrs)     Types: Cigarettes     Start date: 1946     Quit date: 1971     Years since quitting:  "53.2    Smokeless tobacco: Never   Substance Use Topics    Alcohol use: Not Currently     Alcohol/week: 5.0 standard drinks of alcohol     Types: 5 Cans of beer per week        Objective:     Vitals:    03/05/24 1058   BP: (!) 142/59   BP Location: Right arm   Patient Position: Sitting   BP Method: Large (Manual)   Pulse: 93   SpO2: (!) 94%   Weight: 75.9 kg (167 lb 5.3 oz)   Height: 5' 10" (1.778 m)     BMI: 24.01    Gen: No apparent distress, well nourished and developed, appears stated age  CV: RRR, S1 and S2 present, no LE edema  Resp: CTAB, normal respiratory effort      Assessment/Plan:     Husam Connolly is a 89 y.o. year old male who presents to clinic for f/u    1. Vertigo  Given no improvement with nasal spray and home vestibular rehab will refer to ENT and formal vestibular rehab. Pt verbalized understanding and was in agreement with the plan.    - Ambulatory referral/consult to ENT; Future  - Ambulatory referral/consult to Physical/Occupational Therapy; Future  - meclizine (ANTIVERT) 12.5 mg tablet; Take 1 tablet (12.5 mg total) by mouth 3 (three) times daily as needed for Dizziness.  Dispense: 20 tablet; Refill: 2  - Ambulatory referral/consult to Audiology; Future    2. COPD, group B, by GOLD 2017 classification  Given no improvement with escalation of inhalers will refer to pulm for further assessment. Pt verbalized understanding and was in agreement with the plan.    - Ambulatory referral/consult to Pulmonology; Future    3. Type 2 diabetes mellitus with stage 3b chronic kidney disease, without long-term current use of insulin  With worsening kidney function need to discontinue metformin. Plan to transition to jardiance. Medication, side effects and proper use discussed. Pt verbalized understanding and was in agreement with the plan.    - empagliflozin (JARDIANCE) 10 mg tablet; Take 1 tablet (10 mg total) by mouth once daily.  Dispense: 30 tablet; Refill: 11    4. Resistant hypertension  Working " with cardiology on management of this. BP improved today compared to last visit.       Follow-up: 3 months for diabetes.       Williams Knight DO  Family Medicine

## 2024-03-11 ENCOUNTER — CLINICAL SUPPORT (OUTPATIENT)
Dept: REHABILITATION | Facility: HOSPITAL | Age: 89
End: 2024-03-11
Attending: ORTHOPAEDIC SURGERY
Payer: MEDICARE

## 2024-03-11 DIAGNOSIS — M62.81 MUSCLE WEAKNESS: Primary | ICD-10-CM

## 2024-03-11 DIAGNOSIS — M19.019 SHOULDER ARTHRITIS: ICD-10-CM

## 2024-03-11 DIAGNOSIS — M25.512 PAIN IN JOINT OF LEFT SHOULDER: ICD-10-CM

## 2024-03-11 DIAGNOSIS — M25.612 SHOULDER STIFFNESS, LEFT: ICD-10-CM

## 2024-03-11 PROCEDURE — 97165 OT EVAL LOW COMPLEX 30 MIN: CPT | Mod: PN

## 2024-03-11 PROCEDURE — 97110 THERAPEUTIC EXERCISES: CPT | Mod: PN

## 2024-03-11 PROCEDURE — 97140 MANUAL THERAPY 1/> REGIONS: CPT | Mod: PN

## 2024-03-11 NOTE — PLAN OF CARE
[facility]  Initial Evaluation     Date: 3/11/2024  Name: Husam Connolly  Clinic Number: [id number]    Therapy Diagnosis:   Encounter Diagnoses   Name Primary?    Shoulder arthritis     Muscle weakness Yes    Shoulder stiffness, left     Pain in joint of left shoulder      Physician: Slick Baldwin Jr., *    Physician Orders: Eval and Treat  Medical Diagnosis: M19.019 (ICD-10-CM) - Shoulder arthritis  Surgical Procedure and Date: n/a / Date of Injury/Onset: ongoing for years  Evaluation Date: 3/11/2024  Insurance Authorization Period Expiration: 12/31/2024  Plan of Care Expiration: 5/17/2024  Date of Return to MD: tbd  Visit # / Visits authorized: 1 / 1  FOTO: initial eval     Precautions:  Standard and Diabetes    Time In: 10:15 am  Time Out: 10:50 am  Total Appointment Time (timed & untimed codes): 35 minutes    Subjective     Date of Onset: ongoing for months     History of Current Condition/Mechanism of Injury: Husam reports: Patient has a hx of L shoulder arthritis and recently experienced a flare up. He returned to the MD and received an injection on 3/4/2024. Since then he has felt a little better.     Involved Side: left  Dominant Side: Right  Imaging: xray 1/5/2024 FINDINGS:  Left glenohumeral articulation is maintained with advanced joint space narrowing with DJD.  AC joint is intact.  No acute fracture, dislocation, or osseous destruction.  Prior Therapy: not for this condition, he did attend therapy a few years ago for a dupuytren's contracture of the hand    Occupation:    Working presently: retired  Duties: B/IADLs    Functional Limitations/Social History:    Previous functional status includes: Independent with all ADLs.     Current Functional Status   Home/Living environment: lives alone      Limitation of Functional Status as follows:   ADLs/IADLs:     - Feeding: mod I    - Bathing: mod I    - Dressing/Grooming: mod I    - Driving: mod I     Leisure:  n/a    Pain:  Functional Pain Scale Rating 0-10: Current 3/10, worst 7/10, best 3/10   Location: L shoulder   Description: Tight, aching  Aggravating Factors: Lifting [place]  Easing Factors: rest    Patient's Goals for Therapy: regain motion in the L shoulder     Medical History:   Past Medical History:   Diagnosis Date    BPH (benign prostatic hyperplasia)     Diabetes mellitus (type 2)     Emphysema 03/12/2022    High cholesterol 08/26/2020    Hypertension     RBBB 11/29/2022    Small bowel obstruction 06/13/2019       Surgical History:    has a past surgical history that includes Tonsillectomy; Eye surgery; David procedure (6/16/2019); 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    Medications:   has a current medication list which includes the following prescription(s): aspirin, atorvastatin, combivent respimat, empagliflozin, finasteride, fluticasone propionate, fluticasone-salmeterol 100-50 mcg/dose, garlic, hydralazine, losartan, meclizine, multivitamin, fish oil-omega-3 fatty acids, spiriva respimat, and vitamin d.    Allergies:   Review of patient's allergies indicates:   Allergen Reactions    Solarcaine [benzocaine-triclosan]     Sulfa (sulfonamide antibiotics)     Sulfamethoxazole     Trimethoprim     Hytrin [terazosin] Rash    Smz-tmp ds [sulfamethoxazole-trimethoprim] Rash          Objective     Sensation Test: Patient denies any numbness/tingling    Observation/Inspection:  rounded shoulders mild atrophy in the UE, full ROM of the RUE, stiffness of the Left, especially with ABD and ER    Range of Motion/Strength:   Shoulder Left  3/11/2024 Left Right  3/11/2024 Right  Pain/Dysfunction with Movement    AROM PROM AROM PROM    Flexion 115  full     Extension 58  full     Abduction 90  full             Fxnl IR L ear  full     Fxnl ER L5  full     Elbow flex/ext WNL   full     NT=Not tested  ROM Comments:   There is popping and crepitus with passive shoulder ranging, especially with ER    Manual Muscle Testing  Shoulder Left  3/11/2024 Right  3/11/2024   Pain/Dysfunction with Testing            Flexion 4/5 5/5      Extension 5/5 5/5      Abduction 3+/5 5/5              Fxnl IR 5.5 5/5      Fxnl ER 3+/5 5/5      Elbow flex/ext 4-/5 5/5          Painful Arc: min    Tenderness upon Palpation:      Positive: AC joint    Special Tests:  AC Joint Left Right   AC Joint Compression Scarf Test + -   Empty Can Test - -   Drop Arm test - -   Bear Hug NT NT   Crystal's NT NT   Hawkin's Kenndy + -   Neer's Test + -   Yergason's NT NT     Scapular Control/Dyskinesis:    Normal / Subtle / Obvious  Comments    Left  normal     Right  normal      Intake Outcome Measure for FOTO shoulder Survey    Therapist reviewed FOTO scores for Husam Connolly on 3/11/2024.   FOTO documents entered into [employer] - see Media section.    Intake Score: 48%         Treatment     Total Treatment time (time-based codes) separate from Evaluation: 20 minutes    Husam received the following supervised modalities after being cleared for contradictions for 5 minutes:   -Patient tolerates MHP x 5 min in order to decrease pain and increase soft tissue extensibility in preparation for ROM, concurrent with HEP review.      Huasm received the following manual therapy techniques for 10 minutes:   -consisting of patient supine for L biceps and upper trapezius soft tissue mobilization, pectoralis lift, subscapularis myofascial release and stretch, PROM with endrange stretching, glenohumeral joint inferior anterior posterior glides grade I-II, gentle shoulder oscillations     Husam received therapeutic exercises for 10 minutes including:  - HEP review  - supine cane AAROM flexion, chest press and ER  - scapular retraction  - shoulder isometrics  - wall slides    Patient Education and Home Exercises      Education provided:   - HEP  in place     Written Home Exercises Provided: yes.  Exercises were reviewed and Husam was able to demonstrate them prior to the end of the session.  Husam demonstrated good  understanding of the education provided. See EMR under Patient Instructions for exercises provided during therapy sessions.     Pt was advised to perform these exercises free of pain, and to stop performing them if pain occurs.    Patient/Family Education: role of OT, goals for OT, scheduling/cancellations - pt verbalized understanding. Discussed insurance limitations with patient.    Assessment     Husam Connolly is a 89 y.o. male referred to outpatient occupational therapy and presents with a medical diagnosis of M19.019 (ICD-10-CM) - Shoulder arthritis. Pain is worse with lifting overhead and reaching with the LUE. There is a good bit of popping and crepitus with L shoulder ROM, which is not described as painful, but more discomfort. He did receive a CSI last week in the shoulder which has provided a little relief.        Assessment of Occupational Performance   Performance Deficits    Physical:  Joint Mobility  Joint Stability  Muscle Power/Strength  Muscle Endurance   Strength  Gross Motor Coordination  Postural Control  Pain    Cognitive:  No Deficits    Psychosocial:    No Deficits     Following medical record review it is determined that pt will benefit from occupational therapy services in order to maximize pain free and/or functional use of left shoulder. The following goals were discussed with the patient and patient is in agreement with them as to be addressed in the treatment plan. The patient's rehab potential is Good.     Anticipated barriers to occupational therapy: age, length of time untreated    Plan of care discussed with patient: Yes  Patient's spiritual, cultural and educational needs considered and patient is agreeable to the plan of care and goals as stated below:     Medical Necessity is demonstrated by the  following  Occupational Profile/History  Co-morbidities and personal factors that may impact the plan of care [x] LOW: Brief chart review  [] MODERATE: Expanded chart review   [] HIGH: Extensive chart review    Moderate / High Support Documentation: n/a     Examination  Performance deficits relating to physical, cognitive or psychosocial skills that result in activity limitations and/or participation restrictions  [x] LOW: addressing 1-3 Performance deficits  [] MODERATE: 3-5 Performance deficits  [] HIGH: 5+ Performance deficits (please support below)    Moderate / High Support Documentation: n/a     Treatment Options [x] LOW: Limited options  [] MODERATE: Several options  [] HIGH: Multiple options      Decision Making/ Complexity Score: low       The following goals were discussed with the patient and patient is in agreement with them as to be addressed in the treatment plan.     Short Term Goals to be met in 4 weeks:   1) Initiate Hep   2) Pt will increase L shoulder AROM by 10 degrees grossly for improved performance with overhead ADL's  3) Pt will report 4/10 pain in (L)shoulder at worst  4) Pt will demonstrate increased MMT to 4/5 grossly L shoulder  5) Patient will be able to achieve less than or equal to 40% on FOTO shoulder survey demonstrating overall improved functional ability with upper extremity.     Long Term Goals to be met by discharge:  1) Independent with HEP  2) Pt will demonstrate (L) shoulder AROM WNL grossly for [place] with ADL's  3) Pt will demonstrate (L) shoulder MMT WNL grossly for [place] with functional activities  4) Independent and pain free with ADL's and IADL's  5) Patient will be able to achieve less than or equal to 30% on FOTO shoulder survey demonstrating overall improved functional ability with upper extremity.       Plan   Plan of Care Certification: 3/11/2024 to 5/17/2024.     Outpatient Occupational Therapy 1 times weekly for 10 weeks to include the following  interventions: Paraffin, Fluidotherapy, Manual therapy/joint mobilizations, Modalities for pain management, US 3 mhz, Therapeutic exercises/activities., Iontophoresis with 2.0 cc Dexamethasone, Strengthening, Orthotic Fabrication/Fit/Training, Edema Control, Scar Management, Wound Care, Electrical Modalities, Joint Protection, and Energy Conservation.      Christina Ramos, OT      I CERTIFY THE NEED FOR THESE SERVICES FURNISHED UNDER THIS PLAN OF TREATMENT AND WHILE UNDER MY CARE  Physician's comments:      Physician's Signature: ___________________________________________________

## 2024-03-15 NOTE — PROGRESS NOTES
"  Occupational Therapy Daily Treatment Note     Name: Husam Connolly  Clinic Number: [id number]    Therapy Diagnosis:   Encounter Diagnoses   Name Primary?    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   Response to previous treatment:decreased pain and increased ROM  Functional change: compliant with HEP    Pain: 1/10  Location: left shoulder      Objective     Husam received the following supervised modalities after being cleared for contradictions for 10 minutes:   - Patient tolerates MHP x 10 min in order to decrease pain and increase soft tissue extensibility in preparation for ROM, concurrent with assessment of deficits.       Husam received the following manual therapy techniques for 10 minutes:   -consisting of patient supine for R biceps and upper trapezius soft tissue mobilization, pectoralis lift, subscapularis myofascial release and stretch, PROM with endrange stretching, glenohumeral joint inferior anterior posterior glides grade I-II, gentle shoulder oscillations     Husam received therapeutic exercises for 15 minutes including:  - therapist A PROM  - supine 0# dowel flexion, chest press, ER  - yellow tband (+HEP)   -rows    -ER/IR   -shld ext  - RTC isometrics     Husam " "participated in dynamic functional therapeutic activities to improve functional performance for 15 minutes, including:  - UBE L1 for ax tolerance 3 min each direction  - periscapular stabilization   -wall slides with lift off   -ball on wall ABCs   -scapular retraction    Home Exercises and Education Provided     Education provided:   - HEP in place   - Progress towards goals     Written Home Exercises Provided: Patient instructed to cont prior HEP.  Exercises were reviewed and Husam was able to demonstrate them prior to the end of the session.  Husam demonstrated good  understanding of the HEP provided.   .   See EMR under Patient Instructions for exercises provided prior visit.        Assessment     Upon arrival, patient states that he feels like that exercises have been helping - he notes that he has better mobility and decreased pain. He is still having popping in the shoulder with overhead motion, this is not painful, but more so discomfort. Patient tolerates P/AA/AROM exs to per protocol to increase functional and available ROM of L shoulder. Patient demos increase in ROM post exs and reports feeling the shoulder "loosening up". I add some light tband strengthening which are april well. Patient given a band and written exercises to include with HEP regimen.  Pt would continue to benefit from skilled OT.Continue POC and progress as tolerated per protocol.       Husam is progressing well towards his goals and there are no updates to goals at this time. Pt prognosis is Good.     Pt will continue to benefit from skilled outpatient occupational therapy to address the deficits listed in the problem list on initial evaluation provide pt/family education and to maximize pt's level of independence in the home and community environment.     Anticipated barriers to occupational therapy: severity of arthritis in the shoulder    Pt's spiritual, cultural and educational needs considered and pt agreeable to plan of care and " goals.    Goals:  Short Term Goals to be met in 4 weeks:   1) Initiate Hep  Met, 3/18/2024  2) Pt will increase L shoulder AROM by 10 degrees grossly for improved performance with overhead ADL's  3) Pt will report 4/10 pain in (L)shoulder at worst  4) Pt will demonstrate increased MMT to 4/5 grossly L shoulder  5) Patient will be able to achieve less than or equal to 40% on FOTO shoulder survey demonstrating overall improved functional ability with upper extremity.      Long Term Goals to be met by discharge:  1) Independent with HEP  2) Pt will demonstrate (L) shoulder AROM WNL grossly for [place] with ADL's  3) Pt will demonstrate (L) shoulder MMT WNL grossly for [place] with functional activities  4) Independent and pain free with ADL's and IADL's  5) Patient will be able to achieve less than or equal to 30% on FOTO shoulder survey demonstrating overall improved functional ability with upper extremity.     Plan   Continue skilled OT POC and progress per protocol as tolerated.   Updates/Grading for next session: continue progressing       Christina Ramos OT

## 2024-03-18 ENCOUNTER — CLINICAL SUPPORT (OUTPATIENT)
Dept: REHABILITATION | Facility: HOSPITAL | Age: 89
End: 2024-03-18
Payer: MEDICARE

## 2024-03-18 DIAGNOSIS — M25.612 SHOULDER STIFFNESS, LEFT: ICD-10-CM

## 2024-03-18 DIAGNOSIS — M25.512 PAIN IN JOINT OF LEFT SHOULDER: ICD-10-CM

## 2024-03-18 DIAGNOSIS — M62.81 MUSCLE WEAKNESS: Primary | ICD-10-CM

## 2024-03-18 PROCEDURE — 97140 MANUAL THERAPY 1/> REGIONS: CPT | Mod: PN

## 2024-03-18 PROCEDURE — 97530 THERAPEUTIC ACTIVITIES: CPT | Mod: PN

## 2024-03-18 PROCEDURE — 97110 THERAPEUTIC EXERCISES: CPT | Mod: PN

## 2024-03-21 LAB
LEFT EYE DM RETINOPATHY: NEGATIVE
RIGHT EYE DM RETINOPATHY: NEGATIVE

## 2024-03-22 NOTE — PROGRESS NOTES
"  Occupational Therapy Daily Treatment Note     Name: Husam Connolly  Clinic Number: [id number]    Therapy Diagnosis:   Encounter Diagnoses   Name Primary?    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   Response to previous treatment:decreased pain and increased ROM  Functional change: compliant with HEP    Pain: 1/10  Location: left shoulder      Objective   Sensation Test: Patient denies any numbness/tingling     Observation/Inspection:  rounded shoulders mild atrophy in the UE, full ROM of the RUE, stiffness of the Left, especially with ABD and ER     Range of Motion/Strength:   Shoulder Left    3/11/24 Left    3/25/24 Right    3/11/24 Right  Pain/Dysfunction with Movement     AROM AROM AROM PROM     Flexion 115  120 full       Extension 58  60 full       Abduction 90  100 full                     Fxnl IR L ear  occip bone full       Fxnl ER L5  L5 full       Elbow flex/ext WNL  WNL full       NT=Not tested  ROM Comments:   There is popping and crepitus with passive shoulder ranging, especially with ER     Manual Muscle Testing  Shoulder Left  3/11/2024 Right  3/11/2024     Pain/Dysfunction with Testing                  Flexion 4/5 5/5         Extension " 5/5 5/5         Abduction 3+/5 5/5                       Fxnl IR 5.5 5/5         Fxnl ER 3+/5 5/5         Elbow flex/ext 4-/5 5/5               Painful Arc: min     Tenderness upon Palpation:      Positive: AC joint     Special Tests:  AC Joint Left Right   AC Joint Compression Scarf Test + -   Empty Can Test - -   Drop Arm test - -   Bear Hug NT NT   Crystal's NT NT   Hawkin's Kenndy + -   Neer's Test + -   Yergason's NT NT      Scapular Control/Dyskinesis:     Normal / Subtle / Obvious  Comments    Left  normal     Right  normal        Intake Outcome Measure for FOTO shoulder Survey     Therapist reviewed FOTO scores for Husam Connolly on 3/11/2024.   FOTO documents entered into [facility] [employer] - see Media section.     Intake Score: 48%           Treatment      Husam received the following supervised modalities after being cleared for contradictions for 10 minutes:   - Patient tolerates MHP x 10 min in order to decrease pain and increase soft tissue extensibility in preparation for ROM, concurrent with assessment of deficits.       Husam received the following manual therapy techniques for 10 minutes:   -consisting of patient supine for R biceps and upper trapezius soft tissue mobilization, pectoralis lift, subscapularis myofascial release and stretch, PROM with endrange stretching, glenohumeral joint inferior anterior posterior glides grade I-II, gentle shoulder oscillations     Husam received therapeutic exercises for 15 minutes including:  - therapist A PROM  - supine 2# dowel flexion, chest press c serratus punch , ER  - 2# shoulder levers, extension, bicep curls   - IR strap stretch   - yellow tband    -rows    -ER/IR   -shld ext  - RTC isometrics     Husam participated in dynamic functional therapeutic activities to improve functional performance for 15 minutes, including:  - pulleys x 4 min   - periscapular stabilization   -wall slides with lift off   -ball on wall ABCs   -scapular retraction    Home Exercises and  Education Provided     Education provided:   - HEP in place   - Progress towards goals     Written Home Exercises Provided: Patient instructed to cont prior HEP.  Exercises were reviewed and Husam was able to demonstrate them prior to the end of the session.  Husam demonstrated good  understanding of the HEP provided.   .   See EMR under Patient Instructions for exercises provided prior visit.        Assessment   Upon arrival, patient states that this is the best mobility he has had in his shoulder in a while.  Patient tolerates P/AA/AROM exs to per protocol to increase functional and available ROM of L shoulder. Patient demos increase in ROM post exs and reports no difficulty with added resistance to supine shoulder activities. He does well with all established activities as well as new ones added. He doesn't report any true pain, but more so discomfort in the L shoulder. Per assessment, he has made some fxnl ROM gains in the shoulder. Pt would continue to benefit from skilled OT. Continue POC and progress as tolerated per protocol.       Husam is progressing well towards his goals and there are no updates to goals at this time. Pt prognosis is Good.     Pt will continue to benefit from skilled outpatient occupational therapy to address the deficits listed in the problem list on initial evaluation provide pt/family education and to maximize pt's level of independence in the home and community environment.     Anticipated barriers to occupational therapy: severity of arthritis in the shoulder    Pt's spiritual, cultural and educational needs considered and pt agreeable to plan of care and goals.    Goals:  Short Term Goals to be met in 4 weeks:   1) Initiate Hep  Met, 3/25/2024  2) Pt will increase L shoulder AROM by 10 degrees grossly for improved performance with overhead ADL's Met, 3/25/2024  3) Pt will report 4/10 pain in (L)shoulder at worst Met, 3/25/2024  4) Pt will demonstrate increased MMT to 4/5 grossly  L shoulder Not met 3/25/2024, continue progressing   5) Patient will be able to achieve less than or equal to 40% on FOTO shoulder survey demonstrating overall improved functional ability with upper extremity.  Not met 3/25/2024, continue progressing      Long Term Goals to be met by discharge:  1) Independent with HEP  2) Pt will demonstrate (L) shoulder AROM WNL grossly for [place] with ADL's  3) Pt will demonstrate (L) shoulder MMT WNL grossly for [place] with functional activities  4) Independent and pain free with ADL's and IADL's  5) Patient will be able to achieve less than or equal to 30% on FOTO shoulder survey demonstrating overall improved functional ability with upper extremity.     Plan   Continue skilled OT POC and progress per protocol as tolerated.   Updates/Grading for next session: continue progressing       Christina Ramos OT

## 2024-03-25 ENCOUNTER — CLINICAL SUPPORT (OUTPATIENT)
Dept: REHABILITATION | Facility: HOSPITAL | Age: 89
End: 2024-03-25
Payer: MEDICARE

## 2024-03-25 DIAGNOSIS — M25.512 PAIN IN JOINT OF LEFT SHOULDER: ICD-10-CM

## 2024-03-25 DIAGNOSIS — M25.612 SHOULDER STIFFNESS, LEFT: ICD-10-CM

## 2024-03-25 DIAGNOSIS — M62.81 MUSCLE WEAKNESS: Primary | ICD-10-CM

## 2024-03-25 PROCEDURE — 97110 THERAPEUTIC EXERCISES: CPT | Mod: PN

## 2024-03-25 PROCEDURE — 97140 MANUAL THERAPY 1/> REGIONS: CPT | Mod: PN

## 2024-03-25 PROCEDURE — 97530 THERAPEUTIC ACTIVITIES: CPT | Mod: PN

## 2024-03-26 DIAGNOSIS — R42 VERTIGO: ICD-10-CM

## 2024-03-26 RX ORDER — MECLIZINE HCL 12.5 MG 12.5 MG/1
TABLET ORAL
Qty: 20 TABLET | Refills: 2 | Status: SHIPPED | OUTPATIENT
Start: 2024-03-26

## 2024-03-28 ENCOUNTER — OFFICE VISIT (OUTPATIENT)
Dept: PULMONOLOGY | Facility: CLINIC | Age: 89
End: 2024-03-28
Payer: MEDICARE

## 2024-03-28 VITALS
WEIGHT: 163.56 LBS | SYSTOLIC BLOOD PRESSURE: 153 MMHG | OXYGEN SATURATION: 95 % | BODY MASS INDEX: 23.42 KG/M2 | HEART RATE: 73 BPM | DIASTOLIC BLOOD PRESSURE: 73 MMHG | HEIGHT: 70 IN

## 2024-03-28 DIAGNOSIS — R91.1 PULMONARY NODULE: Primary | ICD-10-CM

## 2024-03-28 DIAGNOSIS — J44.9 COPD, GROUP B, BY GOLD 2017 CLASSIFICATION: ICD-10-CM

## 2024-03-28 PROCEDURE — 99999 PR PBB SHADOW E&M-EST. PATIENT-LVL III: CPT | Mod: PBBFAC,,, | Performed by: INTERNAL MEDICINE

## 2024-03-28 PROCEDURE — G2211 COMPLEX E/M VISIT ADD ON: HCPCS | Mod: S$GLB,,, | Performed by: INTERNAL MEDICINE

## 2024-03-28 PROCEDURE — 99214 OFFICE O/P EST MOD 30 MIN: CPT | Mod: S$GLB,,, | Performed by: INTERNAL MEDICINE

## 2024-03-28 RX ORDER — ALBUTEROL SULFATE 90 UG/1
2 AEROSOL, METERED RESPIRATORY (INHALATION) EVERY 4 HOURS PRN
Qty: 18 G | Refills: 11 | Status: SHIPPED | OUTPATIENT
Start: 2024-03-28 | End: 2025-03-28

## 2024-03-28 NOTE — PROGRESS NOTES
Subjective:       Patient ID: Husam Connolly is a 89 y.o. male.  The patient's last visit with me was on Visit date not found.     Last seen in pulm clinic 6/22/23    He has had his Breo stopped and switched to Wixella and then had Spiriva added. He noted he has not had good control of his breathing since Breo stopped. He notes significant exercise limitation and ARREDONDO at 100 feet. No SOB at rest. Mild cough. No other complaints or concerns. Remains active and independent.  Review of Systems    Objective:      Vitals:    03/28/24 1158   BP: (!) 153/73   Pulse: 73     Wt Readings from Last 3 Encounters:   03/28/24 74.2 kg (163 lb 9.3 oz)   03/05/24 75.9 kg (167 lb 5.3 oz)   02/12/24 76.5 kg (168 lb 10.4 oz)     Temp Readings from Last 3 Encounters:   07/24/23 97.6 °F (36.4 °C)   06/09/23 97.6 °F (36.4 °C)   05/19/23 97.6 °F (36.4 °C)     BP Readings from Last 3 Encounters:   03/28/24 (!) 153/73   03/05/24 (!) 142/59   02/12/24 (!) 160/70     Pulse Readings from Last 3 Encounters:   03/28/24 73   03/05/24 93   02/12/24 80       Physical Exam   Constitutional: He appears well-developed and well-nourished.   Pulmonary/Chest: Breath sounds normal. He has no wheezes.   Vitals reviewed.    CBC  Lab Results   Component Value Date    WBC 8.01 01/26/2024    HGB 13.8 (L) 01/26/2024    HCT 43.1 01/26/2024    MCV 90 01/26/2024     01/26/2024         CMP  Sodium   Date Value Ref Range Status   02/29/2024 139 136 - 145 mmol/L Final     Potassium   Date Value Ref Range Status   02/29/2024 4.5 3.5 - 5.1 mmol/L Final     Chloride   Date Value Ref Range Status   02/29/2024 103 95 - 110 mmol/L Final     CO2   Date Value Ref Range Status   02/29/2024 24 23 - 29 mmol/L Final     Glucose   Date Value Ref Range Status   02/29/2024 218 (H) 70 - 110 mg/dL Final     BUN   Date Value Ref Range Status   02/29/2024 35 (H) 10 - 30 mg/dL Final     Creatinine   Date Value Ref Range Status   02/29/2024 1.6 (H) 0.5 - 1.4 mg/dL Final  "    Calcium   Date Value Ref Range Status   02/29/2024 10.1 8.7 - 10.5 mg/dL Final     Total Protein   Date Value Ref Range Status   01/26/2024 6.5 6.0 - 8.4 g/dL Final     Albumin   Date Value Ref Range Status   01/26/2024 3.6 3.5 - 5.2 g/dL Final     Total Bilirubin   Date Value Ref Range Status   01/26/2024 0.9 0.1 - 1.0 mg/dL Final     Comment:     For infants and newborns, interpretation of results should be based  on gestational age, weight and in agreement with clinical  observations.    Premature Infant recommended reference ranges:  Up to 24 hours.............<8.0 mg/dL  Up to 48 hours............<12.0 mg/dL  3-5 days..................<15.0 mg/dL  6-29 days.................<15.0 mg/dL       Alkaline Phosphatase   Date Value Ref Range Status   01/26/2024 73 55 - 135 U/L Final     AST   Date Value Ref Range Status   01/26/2024 18 10 - 40 U/L Final     ALT   Date Value Ref Range Status   01/26/2024 15 10 - 44 U/L Final     Anion Gap   Date Value Ref Range Status   02/29/2024 12 8 - 16 mmol/L Final     eGFR   Date Value Ref Range Status   02/29/2024 40.2 (A) >60 mL/min/1.73 m^2 Final       ABG  No results found for: "PH", "PO2", "PCO2"        Personal Diagnostic Review  I have personally reviewed the following data and added my own interpretation as below:  CT of chest performed on 2023 without contrast revealed stable lung nodules.  Pulmonary function tests: significant fixed obstruction      3/28/2024    11:58 AM 3/5/2024    10:58 AM 3/4/2024     3:05 PM 2/12/2024     1:30 PM 1/30/2024    11:27 AM 1/25/2024     1:08 PM 1/2/2024     1:05 PM   Pulmonary Function Tests   SpO2 95 % 94 %  94 % 95 % 98 % 95 %   Height 5' 10" (1.778 m) 5' 10" (1.778 m) 5' 10" (1.778 m) 5' 10" (1.778 m) 5' 10" (1.778 m) 5' 10" (1.778 m) 5' 10" (1.778 m)   Weight 74.2 kg (163 lb 9.3 oz) 75.9 kg (167 lb 5.3 oz)  76.5 kg (168 lb 10.4 oz) 77.6 kg (170 lb 15.5 oz) 77.7 kg (171 lb 4.8 oz) 76.8 kg (169 lb 3.3 oz)   BMI (Calculated) 23.5 24  " 24.2 24.5 24.6 24.3         Assessment:       1. Pulmonary nodules    2. COPD, group B, by GOLD 2017 classification        Outpatient Encounter Medications as of 3/28/2024   Medication Sig Dispense Refill    aspirin (ECOTRIN) 81 MG EC tablet Take 81 mg by mouth once daily.      atorvastatin (LIPITOR) 40 MG tablet TAKE 1 TABLET(40 MG) BY MOUTH EVERY DAY 90 tablet 3    empagliflozin (JARDIANCE) 10 mg tablet Take 1 tablet (10 mg total) by mouth once daily. 30 tablet 11    finasteride (PROSCAR) 5 mg tablet TAKE 1 TABLET BY MOUTH EVERY DAY (Patient taking differently: Take 5 mg by mouth every evening.) 90 tablet 1    fluticasone propionate (FLONASE) 50 mcg/actuation nasal spray 2 sprays (100 mcg total) by Each Nostril route once daily. (Patient taking differently: 2 sprays by Each Nostril route 2 (two) times a day.) 48 g 3    GARLIC ORAL Take 1 capsule by mouth once daily.      hydrALAZINE (APRESOLINE) 50 MG tablet Take 1 tablet (50 mg total) by mouth 3 (three) times daily. 270 tablet 3    losartan (COZAAR) 100 MG tablet Take 1 tablet (100 mg total) by mouth once daily. 90 tablet 3    meclizine (ANTIVERT) 12.5 mg tablet TAKE 1 TABLET(12.5 MG) BY MOUTH THREE TIMES DAILY AS NEEDED FOR DIZZINESS 20 tablet 2    multivitamin capsule Take 1 capsule by mouth once daily.      omega-3 fatty acids/fish oil (FISH OIL-OMEGA-3 FATTY ACIDS) 300-1,000 mg capsule Take 1 capsule by mouth once daily.      vitamin D (VITAMIN D3) 1000 units Tab Take 1,000 Units by mouth once daily.      [DISCONTINUED] COMBIVENT RESPIMAT  mcg/actuation inhaler INHALE 2 PUFFS INTO THE LUNGS EVERY 6 HOURS AS NEEDED FOR SHORTNESS OF BREATH OR RESCUE 4 g 3    [DISCONTINUED] fluticasone-salmeterol diskus inhaler 100-50 mcg Inhale 1 puff into the lungs 2 (two) times daily. Controller 60 each 3    [DISCONTINUED] tiotropium bromide (SPIRIVA RESPIMAT) 2.5 mcg/actuation inhaler Inhale 2 puffs into the lungs Daily. Controller 4 g 6    albuterol  (VENTOLIN HFA) 90 mcg/actuation inhaler Inhale 2 puffs into the lungs every 4 (four) hours as needed for Wheezing. Rescue 18 g 11    fluticasone-umeclidin-vilanter (TRELEGY ELLIPTA) 100-62.5-25 mcg DsDv Inhale 1 puff into the lungs once daily. 180 each 3    inhalation spacing device Use as directed for inhalation. 1 each 0    [DISCONTINUED] meclizine (ANTIVERT) 12.5 mg tablet Take 1 tablet (12.5 mg total) by mouth 3 (three) times daily as needed for Dizziness. 20 tablet 2     No facility-administered encounter medications on file as of 3/28/2024.     1. COPD, group B, by GOLD 2017 classification  - Ambulatory referral/consult to Pulmonology    2. Pulmonary nodules    Plan:     Problem List Items Addressed This Visit          Pulmonary    Pulmonary nodules - Primary    Current Assessment & Plan     Previously stable. Given advanced age, no fu needed         COPD, group B, by GOLD 2017 classification    Current Assessment & Plan     Significant chronic condition to be followed longitudinally with following long term treatment plan.     Significant obstructionon prior PFTs. 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  Trelegy 100 to streamline his inhalers and doubt he is getting any benefit from Wixella as would need near normal lung function.  PRN albuterol and avoid ipratropium.  If persistent signfiicant symptoms, would benefit from pulmonary rehab as he is still very independent.              No follow-ups on file.    Future Appointments   Date Time Provider Department Center   4/1/2024 10:15 AM Christina Ramos, OT [facility]   4/8/2024 10:15 AM Christina Ramos OT [facility]   4/11/2024  1:45 PM Fouzia Gardenr MD [facility]   4/15/2024 10:15 AM Christina Ramos OT [facility]   4/16/2024 10:30 AM Chioma Paz, YISSEL [facility]   5/6/2024  2:50 PM Slick Baldwin Jr., MD [facility]    5/8/2024 10:00 AM Danae Bustos CCC-A [facility]   5/8/2024 10:40 AM Lynn Florez NP [facility] [place] [facility]   5/21/2024 10:30 AM Laura Chacon MD [facility]   6/5/2024  9:40 AM Williams Knight DO [facility]         Farhan SABINE Dumont MD

## 2024-03-28 NOTE — PROGRESS NOTES
"  Occupational Therapy Daily Treatment Note     Name: Husam Connolly  Clinic Number: [id number]    Therapy Diagnosis:   Encounter Diagnoses   Name Primary?    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   Response to previous treatment:decreased pain and increased ROM  Functional change: compliant with HEP    Pain: 1/10  Location: left shoulder      Objective   Sensation Test: Patient denies any numbness/tingling     Observation/Inspection:  rounded shoulders mild atrophy in the UE, full ROM of the RUE, stiffness of the Left, especially with ABD and ER     Range of Motion/Strength:   Shoulder Left    3/11/24 Left    3/25/24 Right    3/11/24   Pain/Dysfunction with Movement     AROM AROM AROM      Flexion 115  120 full       Extension 58  60 full       Abduction 90  100 full                     Fxnl IR L ear  occip bone full       Fxnl ER L5  L5 full       Elbow flex/ext WNL  WNL full       NT=Not tested  ROM Comments:   There is popping and crepitus with passive shoulder ranging, especially with ER     Manual Muscle Testing  Shoulder Left  3/11/2024 Right  3/11/2024     Pain/Dysfunction with Testing                  Flexion 4/5 5/5         Extension 5/5 5/5      "    Abduction 3+/5 5/5                       Fxnl IR 5.5 5/5         Fxnl ER 3+/5 5/5         Elbow flex/ext 4-/5 5/5               Painful Arc: min     Tenderness upon Palpation:      Positive: AC joint     Special Tests:  AC Joint Left Right   AC Joint Compression Scarf Test + -   Empty Can Test - -   Drop Arm test - -   Bear Hug NT NT   Crystal's NT NT   Hawkin's Kenndy + -   Neer's Test + -   Yergason's NT NT      Scapular Control/Dyskinesis:     Normal / Subtle / Obvious  Comments    Left  normal     Right  normal        Intake Outcome Measure for FOTO shoulder Survey     Therapist reviewed FOTO scores for Husam Connolly on 3/11/2024.   FOTO documents entered into [employer] - see Media section.     Intake Score: 48%           Treatment      Husam received the following supervised modalities after being cleared for contradictions for 5 minutes:   - Patient tolerates MHP x 5 min in order to decrease pain and increase soft tissue extensibility in preparation for ROM, concurrent with assessment of deficits.       Husam received the following manual therapy techniques for 10 minutes:   -consisting of patient supine for L biceps and upper trapezius soft tissue mobilization, pectoralis lift, subscapularis myofascial release and stretch, PROM with endrange stretching, glenohumeral joint inferior anterior posterior glides grade I-II, gentle shoulder oscillations     Husam received therapeutic exercises for 15 minutes including:  - therapist A PROM  - supine 2# dowel flexion, chest press c serratus punch , ER  - 2# shoulder levers, extension, bicep curls   - IR strap stretch   - red tband    -rows    -ER/IR   -shld ext   -lat pull downs    -D2 ext   -standing flexion with YTB     Husam participated in dynamic functional therapeutic activities to improve functional performance for 15 minutes, including:  - pulleys x 3 min   - periscapular stabilization   -wall slides with lift off YTB   -supine ABCs 1#   -wall clocks  "YTB    Home Exercises and Education Provided     Education provided:   - HEP in place   - Progress towards goals     Written Home Exercises Provided: Patient instructed to cont prior HEP.  Exercises were reviewed and Husam was able to demonstrate them prior to the end of the session.  Husam demonstrated good  understanding of the HEP provided.   .   See EMR under Patient Instructions for exercises provided prior visit.        Assessment   Upon arrival, patient states that his L shoulder has been feeling better; he had a 1 hour massage over the weekend which helped "loosen up" his shoulder a little bit. Patient tolerates P/AA/AROM exs to per protocol to increase functional and available ROM of L shoulder. Patient demos increase in ROM post exs and reports no pain. There is still a lot of popping in the L shoulder with any ROM - this is not painful, but more discomfort. He does well with all established activities as well as new strengthening added today. Pt would continue to benefit from skilled OT. Continue POC and progress as tolerated per protocol.       Husam is progressing well towards his goals and there are no updates to goals at this time. Pt prognosis is Good.     Pt will continue to benefit from skilled outpatient occupational therapy to address the deficits listed in the problem list on initial evaluation provide pt/family education and to maximize pt's level of independence in the home and community environment.     Anticipated barriers to occupational therapy: severity of arthritis in the shoulder    Pt's spiritual, cultural and educational needs considered and pt agreeable to plan of care and goals.    Goals:  Short Term Goals to be met in 4 weeks:   1) Initiate Hep  Met, 4/1/2024  2) Pt will increase L shoulder AROM by 10 degrees grossly for improved performance with overhead ADL's Met, 4/1/2024  3) Pt will report 4/10 pain in (L)shoulder at worst Met, 4/1/2024  4) Pt will demonstrate increased MMT " to 4/5 grossly L shoulder Not met 4/1/2024, continue progressing   5) Patient will be able to achieve less than or equal to 40% on FOTO shoulder survey demonstrating overall improved functional ability with upper extremity.  Not met 4/1/2024, continue progressing      Long Term Goals to be met by discharge:  1) Independent with HEP Not met 4/1/2024, continue progressing   2) Pt will demonstrate (L) shoulder AROM WNL grossly for [place] with ADL's Not met 4/1/2024, continue progressing   3) Pt will demonstrate (L) shoulder MMT WNL grossly for [place] with functional activities Not met 4/1/2024, continue progressing   4) Independent and pain free with ADL's and IADL's Not met 4/1/2024, continue progressing   5) Patient will be able to achieve less than or equal to 30% on FOTO shoulder survey demonstrating overall improved functional ability with upper extremity.  Not met 4/1/2024, continue progressing     Plan   Continue skilled OT POC and progress per protocol as tolerated.   Updates/Grading for next session: continue progressing       Christina Ramos, OT

## 2024-03-28 NOTE — ASSESSMENT & PLAN NOTE
Significant chronic condition to be followed longitudinally with following long term treatment plan.     Significant obstructionon prior PFTs. 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  Trelegy 100 to streamline his inhalers and doubt he is getting any benefit from [place] as would need near normal lung function.  PRN albuterol and avoid ipratropium.  If persistent signfiicant symptoms, would benefit from pulmonary rehab as he is still very independent.

## 2024-04-01 ENCOUNTER — CLINICAL SUPPORT (OUTPATIENT)
Dept: REHABILITATION | Facility: HOSPITAL | Age: 89
End: 2024-04-01
Payer: MEDICARE

## 2024-04-01 DIAGNOSIS — M62.81 MUSCLE WEAKNESS: Primary | ICD-10-CM

## 2024-04-01 DIAGNOSIS — M25.512 PAIN IN JOINT OF LEFT SHOULDER: ICD-10-CM

## 2024-04-01 DIAGNOSIS — M25.612 SHOULDER STIFFNESS, LEFT: ICD-10-CM

## 2024-04-01 PROCEDURE — 97140 MANUAL THERAPY 1/> REGIONS: CPT | Mod: PN

## 2024-04-01 PROCEDURE — 97110 THERAPEUTIC EXERCISES: CPT | Mod: PN

## 2024-04-01 PROCEDURE — 97530 THERAPEUTIC ACTIVITIES: CPT | Mod: PN

## 2024-04-05 NOTE — PROGRESS NOTES
"  Occupational Therapy Daily Treatment Note     Name: Husam Connolly  Clinic Number: [id number]    Therapy Diagnosis:   Encounter Diagnoses   Name Primary?    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   Response to previous treatment:decreased pain and increased ROM  Functional change: compliant with HEP    Pain: 1/10  Location: left shoulder      Objective   Sensation Test: Patient denies any numbness/tingling     Observation/Inspection:  rounded shoulders mild atrophy in the UE, full ROM of the RUE, stiffness of the Left, especially with ABD and ER     Range of Motion/Strength:   Shoulder Left    3/11/24 Left    3/25/24 Right    3/11/24  Left    4/8 Pain/Dysfunction with Movement     AROM AROM AROM AROM     Flexion 115  120 full  120     Extension 58  60 full  60     Abduction 90  100 full  105                   Fxnl IR L ear  occip bone full  occip     Fxnl ER L5  L5 full  L5     Elbow flex/ext WNL  WNL full  WNL     NT=Not tested  ROM Comments:   There is popping and crepitus with passive shoulder ranging, especially with ER     Manual Muscle Testing  Shoulder Left  3/11/2024 Right  3/11/2024  Left  4/8/24   Pain/Dysfunction with Testing                  Flexion 4/5 5/5  5/5  "      Extension 5/5 5/5  5/5       Abduction 3+/5 5/5  4+/5                     Fxnl IR 5.5 5/5  5/5       Fxnl ER 3+/5 5/5  5/5       Elbow flex/ext 4-/5 5/5  5/5             Painful Arc: min     Tenderness upon Palpation:      Positive: AC joint     Special Tests:  AC Joint Left Right   AC Joint Compression Scarf Test + -   Empty Can Test - -   Drop Arm test - -   Bear Hug NT NT   Crystal's NT NT   Hawkin's Kenndy + -   Neer's Test + -   Yergason's NT NT      Scapular Control/Dyskinesis:     Normal / Subtle / Obvious  Comments    Left  normal     Right  normal        Intake Outcome Measure for FOTO shoulder Survey     Therapist reviewed FOTO scores for Husam Connolly on 3/11/2024.   FOTO documents entered into [employer] - see Media section.     Intake Score: 48%           Treatment      Husam received the following supervised modalities after being cleared for contradictions for 5 minutes:   - Patient tolerates MHP x 5 min in order to decrease pain and increase soft tissue extensibility in preparation for ROM, concurrent with assessment of deficits.       Husam received the following manual therapy techniques for 10 minutes:   -consisting of patient supine for L biceps and upper trapezius soft tissue mobilization, pectoralis lift, subscapularis myofascial release and stretch, PROM with endrange stretching, glenohumeral joint inferior anterior posterior glides grade I-II, gentle shoulder oscillations     Husam received therapeutic exercises for 15 minutes including:  - therapist A PROM  - supine 2# dowel flexion, chest press c serratus punch , ER  - 2# shoulder levers, extension, bicep curls   - SL ER and ABD 1#  - green tband    -rows    -ER/IR   -shld ext   -lat pull downs    -YTB lenny ER and [place]    Husam participated in dynamic functional therapeutic activities to improve functional performance for 15 minutes, including:  - pulleys x 3 min   - periscapular stabilization   -ball walk ups with physioball   -supine  ABCs 1#   -wall clocks YTB       Home Exercises and Education Provided     Education provided:   - HEP in place   - Progress towards goals     Written Home Exercises Provided: Patient instructed to cont prior HEP.  Exercises were reviewed and Husam was able to demonstrate them prior to the end of the session.  Husam demonstrated good  understanding of the HEP provided.   .   See EMR under Patient Instructions for exercises provided prior visit.        Assessment   Patient states that his L UE has been doing much better and hasn't had as much popping/cracking. He still has some occasional pain with OH lifting/reaching, but this has improved. Per assessment, he has made some improvements with ROM, but mostly with MMT. He still has some weakness and stiffness but for his age and severity of arthritis, he is doing really well. Patient tolerates P/AA/AROM exs to per protocol to increase functional and available ROM of L shoulder. Patient demos increase in ROM post exs and reports no pain. There is still a lot of popping in the L shoulder with any ROM - this is not painful, but more discomfort. He does well with all established activities as well as new strengthening added today. Pt would continue to benefit from skilled OT. Continue POC and progress as tolerated per protocol.       Husam is progressing well towards his goals and there are no updates to goals at this time. Pt prognosis is Good.     Pt will continue to benefit from skilled outpatient occupational therapy to address the deficits listed in the problem list on initial evaluation provide pt/family education and to maximize pt's level of independence in the home and community environment.     Anticipated barriers to occupational therapy: severity of arthritis in the shoulder    Pt's spiritual, cultural and educational needs considered and pt agreeable to plan of care and goals.    Goals:  Short Term Goals to be met in 4 weeks:   1) Initiate Hep  Met,  4/8/2024  2) Pt will increase L shoulder AROM by 10 degrees grossly for improved performance with overhead ADL's Met, 4/8/2024  3) Pt will report 4/10 pain in (L)shoulder at worst Met, 4/8/2024  4) Pt will demonstrate increased MMT to 4/5 grossly L shoulderMet, 4/8/2024  5) Patient will be able to achieve less than or equal to 40% on FOTO shoulder survey demonstrating overall improved functional ability with upper extremity.  Not met 4/8/2024, continue progressing      Long Term Goals to be met by discharge:  1) Independent with HEP Not met 4/8/2024, continue progressing   2) Pt will demonstrate (L) shoulder AROM WNL grossly for [place] with ADL's Not met 4/8/2024, continue progressing   3) Pt will demonstrate (L) shoulder MMT WNL grossly for [place] with functional activities Not met 4/8/2024, continue progressing   4) Independent and pain free with ADL's and IADL's Not met 4/8/2024, continue progressing   5) Patient will be able to achieve less than or equal to 30% on FOTO shoulder survey demonstrating overall improved functional ability with upper extremity.  Not met 4/8/2024, continue progressing     Plan   Continue skilled OT POC and progress per protocol as tolerated.   Updates/Grading for next session: continue progressing       Christina Ramos, OT

## 2024-04-08 ENCOUNTER — CLINICAL SUPPORT (OUTPATIENT)
Dept: REHABILITATION | Facility: HOSPITAL | Age: 89
End: 2024-04-08
Payer: MEDICARE

## 2024-04-08 DIAGNOSIS — M62.81 MUSCLE WEAKNESS: Primary | ICD-10-CM

## 2024-04-08 DIAGNOSIS — M25.612 SHOULDER STIFFNESS, LEFT: ICD-10-CM

## 2024-04-08 DIAGNOSIS — M25.512 PAIN IN JOINT OF LEFT SHOULDER: ICD-10-CM

## 2024-04-08 PROCEDURE — 97140 MANUAL THERAPY 1/> REGIONS: CPT | Mod: PN

## 2024-04-08 PROCEDURE — 97530 THERAPEUTIC ACTIVITIES: CPT | Mod: PN

## 2024-04-08 PROCEDURE — 97110 THERAPEUTIC EXERCISES: CPT | Mod: PN

## 2024-04-15 ENCOUNTER — PATIENT OUTREACH (OUTPATIENT)
Dept: ADMINISTRATIVE | Facility: HOSPITAL | Age: 89
End: 2024-04-15
Payer: MEDICARE

## 2024-04-15 ENCOUNTER — PATIENT MESSAGE (OUTPATIENT)
Dept: ADMINISTRATIVE | Facility: HOSPITAL | Age: 89
End: 2024-04-15
Payer: MEDICARE

## 2024-04-15 ENCOUNTER — CLINICAL SUPPORT (OUTPATIENT)
Dept: REHABILITATION | Facility: HOSPITAL | Age: 89
End: 2024-04-15
Payer: MEDICARE

## 2024-04-15 DIAGNOSIS — M25.612 SHOULDER STIFFNESS, LEFT: ICD-10-CM

## 2024-04-15 DIAGNOSIS — M62.81 MUSCLE WEAKNESS: Primary | ICD-10-CM

## 2024-04-15 DIAGNOSIS — M25.512 PAIN IN JOINT OF LEFT SHOULDER: ICD-10-CM

## 2024-04-15 PROCEDURE — 97530 THERAPEUTIC ACTIVITIES: CPT | Mod: PN

## 2024-04-15 PROCEDURE — 97140 MANUAL THERAPY 1/> REGIONS: CPT | Mod: PN

## 2024-04-15 PROCEDURE — 97110 THERAPEUTIC EXERCISES: CPT | Mod: PN

## 2024-04-15 NOTE — LETTER
AUTHORIZATION FOR RELEASE OF   CONFIDENTIAL INFORMATION    To whom it may concern,    We are seeing Husam Connolly, date of birth 1931, in the clinic at [facility]. Williams Knight DO is the patient's PCP. Husam Connolly has an outstanding lab/procedure at the time we reviewed his chart. In order to help keep his health information updated, he has authorized us to request the following medical record(s):                                                ( X )  EYE EXAM                   Please fax records to [facility], Johnson, Alexi J, DO, [phone]     I        Patient Name: Husam Connolly  : 1931  Patient Phone #: [phone]

## 2024-04-15 NOTE — PROGRESS NOTES
Population Health Chart Review & Patient Outreach Details      Additional Pop Health Notes:               Updates Requested / Reviewed:      Updated Care Coordination Note, Care Everywhere, External Sources: [facility] and [facility], and Immunizations Reconciliation Completed or Queried: [place]         Health Maintenance Topics Overdue:      [facility] Score: 3     Eye Exam  Lipid Panel  Uncontrolled BP    Tetanus Vaccine  RSV Vaccine                  Health Maintenance Topic(s) Outreach Outcomes & Actions Taken:    Eye Exam - Outreach Outcomes & Actions Taken  : External Records Requested & Care Team Updated if Applicable

## 2024-04-16 ENCOUNTER — CLINICAL SUPPORT (OUTPATIENT)
Dept: REHABILITATION | Facility: HOSPITAL | Age: 89
End: 2024-04-16
Attending: STUDENT IN AN ORGANIZED HEALTH CARE EDUCATION/TRAINING PROGRAM
Payer: MEDICARE

## 2024-04-16 DIAGNOSIS — R42 VERTIGO: ICD-10-CM

## 2024-04-16 PROCEDURE — 95992 CANALITH REPOSITIONING PROC: CPT | Mod: PN

## 2024-04-16 PROCEDURE — 97161 PT EVAL LOW COMPLEX 20 MIN: CPT | Mod: PN

## 2024-04-16 NOTE — PLAN OF CARE
"[facility]  Physical Therapy Neurological Rehabilitation Initial Evaluation     Name: Husam Connolly  Clinic Number: [id number]    Therapy Diagnosis:   Encounter Diagnosis   Name Primary?    Vertigo      Physician: Williams Knight DO    Physician Orders: PT Eval and Treat   Medical Diagnosis from Referral: Vertigo [R42]   Evaluation Date: 4/16/2024  Authorization Period Expiration: 12/31/2024  Plan of Care Expiration: 5/24/2024  Progress Note Due: 5/16/2024  Date of Surgery: N/A  Visit # / Visits authorized: 1/1  FOTO: 1/3    Precautions: Standard    Time In: 10:35AM  Time Out: 11:15AM  Total Billable Time: 40 minutes (1 low eval; 1 CR)    Subjective      Date of onset: 3-4 months ago    History of current condition - Husam reports: Dizziness is "off and on" at this point  Primary care prescribed meclizine. Has not taken it in about 3-4 days. Still feeling off balance but not necessarily dizzy. Room spinning dizziness usually occurred when he would bend over, but this has largely resolved. Momentary dizziness also used to occur when he turns his head quickly, but this is also steadily improving. Notices occasional path deviation when he walks as well.      Imaging: No recent/pertinent imaging    Prior Therapy: just discharged from ortho occupational therapy for chronic shoulder pain  Social History: lives alone/  Falls:  denies  DME: [employer]  Home Environment: two-story home but goes upstairs infrequently   Exercise Routine / History: recent stint of occupational therapy   Family Present at time of Eval: no   Occupation: retired  Prior Level of Function: independent   Current Level of Function: independent     Pain:  Current 0/10, worst 1/10, best 0/10   Location: left shoulder   Description: Aching  Aggravating Factors: excess overhead movement  Easing Factors:  recent bout of occupational therapy    Patient's goals: assess and address dizziness/imbalance     Medical " History:   Past Medical History:   Diagnosis Date    BPH (benign prostatic hyperplasia)     Diabetes mellitus (type 2)     Emphysema 03/12/2022    High cholesterol 08/26/2020    Hypertension     RBBB 11/29/2022    Small bowel obstruction 06/13/2019     Surgical History:   Husam Connolly  has a past surgical history that includes Tonsillectomy; Eye surgery; David procedure (6/16/2019); 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    Medications:   Husam has a current medication list which includes the following prescription(s): albuterol, aspirin, atorvastatin, empagliflozin, finasteride, fluticasone propionate, fluticasone-umeclidin-vilanter, garlic, hydralazine, inhalation spacing device, losartan, meclizine, multivitamin, fish oil-omega-3 fatty acids, and vitamin d.    Allergies:   Review of patient's allergies indicates:   Allergen Reactions    Solarcaine [benzocaine-triclosan]     Sulfa (sulfonamide antibiotics)     Sulfamethoxazole     Trimethoprim     Hytrin [terazosin] Rash    Smz-tmp ds [sulfamethoxazole-trimethoprim] Rash      Objective      History of Current Symptoms   Triggers: bending forward/turning head quickly (but this has been improving)   Alleviating Factors: avoiding aggravating factors   Description of symptoms: spinning   Onset: 3-4 months ago   Frequency: daily but has    Duration: seconds    Positional changes: see above   Limitations due to symptoms: decreased comfort with functional mobility    Objective   - Follows commands: 100% of time   - Speech: no deficits     Mental status: alert, oriented to person, place, and time, normal mood, behavior, speech, dress, motor activity, and thought processes  Appearance: Casually dressed  Behavior:  calm, cooperative, and adequate rapport can be established  Attention Span and Concentration:   Normal    POSITIONAL CANAL TESTING  Looking for nystagmus (slow drift to affected side with quick correction away)    [place] Hallpike (posterior / CL anterior)   Right: negative   Left: negative  Horizontal Canals   Right: ageotropic nystagmus and mild symptoms   Left: ageotropic nystagmus and more intense/greater symptoms here (but patient still says these symptoms are very mild compared to initial attacks)  Treatment Performed: right Gufoni x 2    [place]/Lean Test: Slight beat toward right ear in lean     Intake Outcome Measure for FOTO Dizziness Handicap Inventory Survey    Therapist reviewed FOTO scores for Husam Connolly on 4/16/2024.   FOTO report - see Media section or FOTO account episode details.    Intake Score: 18%       Treatment     Total Treatment time separate from Evaluation: 20 minutes    Husam received the treatments listed below:      Canalith repositioning for 20 minutes including the following:  - Right Gufoni maneuver x 2   - Post canalith repositioning sleeping recommendations    Patient Education and Home Exercises     Education provided:   - PT plan of care  - Basic anatomy/physiology of vestibular system    Written Home Exercises Provided: N/A    Assessment     Husam is a 89 y.o. male referred to outpatient Physical Therapy with a medical diagnosis of Vertigo [R42. Patient symptoms have largely resolved but today he presents with mild signs/symptoms of right horizontal canal cupulolithiasis. He responded well to canalith repositioning with decreased symptoms but mild ageotropic nystagmus still present. He would benefit from continued PT services to ensure full resolution of BPPV and to rule out any concomitant vestibular hypofunction.     Patient prognosis is Excellent.   Patient will benefit from skilled outpatient Physical Therapy to address the deficits stated above and in the chart below, provide patient /family education, and to maximize patient's level of independence.     Plan of care  discussed with patient: Yes  Patient's spiritual, cultural and educational needs considered and patient is agreeable to the plan of care and goals as stated below:     Anticipated Barriers for therapy: Age    Medical Necessity is demonstrated by the following  History  Co-morbidities and personal factors that may impact the plan of care [] LOW: no personal factors / co-morbidities  [] MODERATE: 1-2 personal factors / co-morbidities  [x] HIGH: 3+ personal factors / co-morbidities    Moderate / High Support Documentation:   Co-morbidities affecting plan of care:   Diabetes mellitus (type 2)   Emphysema   Hypertension   RBBB     Personal Factors:   age     Examination  Body Structures and Functions, activity limitations and participation restrictions that may impact the plan of care [x] LOW: addressing 1-2 elements  [] MODERATE: 3+ elements  [] HIGH: 4+ elements (please support below)    Moderate / High Support Documentation: See above     Clinical Presentation [x] LOW: stable  [] MODERATE: Evolving  [] HIGH: Unstable     Decision Making/ Complexity Score: low       Goals:  Short Term Goals = Long Term Goals: 5 weeks   Patient will display negative Alex-Hallpike and Roll Tests bilaterally in order to fully resolve BPPV and improve comfort with bed mobility  Patient will score </=8% on Dizziness Handicap Inventory in order to improve general activity tolerance and quality of life    Plan     Plan of care Certification: 4/16/2024 to 5/24/2024.    Outpatient Physical Therapy up to 2 times weekly for 5 weeks to include the following interventions: Neuromuscular Re-ed, Patient Education, Therapeutic Activities, and Canalith Repositioning .     RUBINA PIRES PT        Physician's Signature: _________________________________________ Date: ________________

## 2024-04-19 ENCOUNTER — PATIENT OUTREACH (OUTPATIENT)
Dept: ADMINISTRATIVE | Facility: HOSPITAL | Age: 89
End: 2024-04-19
Payer: MEDICARE

## 2024-04-19 NOTE — PROGRESS NOTES
Population Health Chart Review & Patient Outreach Details      Additional Pop Health Notes:    External eye exam uploaded and hyperlinked.           Updates Requested / Reviewed:      Updated Care Coordination Note, Care Everywhere, and Immunizations Reconciliation Completed or Queried: [facility] Topics Overdue:      [facility] Score: 2     Lipid Panel  Uncontrolled BP    Tetanus Vaccine  RSV Vaccine                  Health Maintenance Topic(s) Outreach Outcomes & Actions Taken:    Eye Exam - Outreach Outcomes & Actions Taken  : Diabetic Eye External Records Uploaded, Care Team & History Updated if Applicable

## 2024-04-21 DIAGNOSIS — E78.5 HLD (HYPERLIPIDEMIA): ICD-10-CM

## 2024-04-22 DIAGNOSIS — N40.0 BENIGN NON-NODULAR PROSTATIC HYPERPLASIA WITHOUT LOWER URINARY TRACT SYMPTOMS: ICD-10-CM

## 2024-04-23 RX ORDER — ATORVASTATIN CALCIUM 40 MG/1
40 TABLET, FILM COATED ORAL DAILY
Qty: 90 TABLET | Refills: 3 | Status: SHIPPED | OUTPATIENT
Start: 2024-04-23

## 2024-04-23 RX ORDER — FINASTERIDE 5 MG/1
5 TABLET, FILM COATED ORAL DAILY
Qty: 90 TABLET | Refills: 3 | Status: SHIPPED | OUTPATIENT
Start: 2024-04-23

## 2024-05-06 ENCOUNTER — OFFICE VISIT (OUTPATIENT)
Dept: ORTHOPEDICS | Facility: CLINIC | Age: 89
End: 2024-05-06
Payer: MEDICARE

## 2024-05-06 VITALS — HEIGHT: 70 IN | BODY MASS INDEX: 23.47 KG/M2

## 2024-05-06 DIAGNOSIS — M19.019 SHOULDER ARTHRITIS: Primary | ICD-10-CM

## 2024-05-06 PROCEDURE — 1101F PT FALLS ASSESS-DOCD LE1/YR: CPT | Mod: CPTII,S$GLB,, | Performed by: ORTHOPAEDIC SURGERY

## 2024-05-06 PROCEDURE — 1159F MED LIST DOCD IN RCRD: CPT | Mod: CPTII,S$GLB,, | Performed by: ORTHOPAEDIC SURGERY

## 2024-05-06 PROCEDURE — 99999 PR PBB SHADOW E&M-EST. PATIENT-LVL III: CPT | Mod: PBBFAC,,, | Performed by: ORTHOPAEDIC SURGERY

## 2024-05-06 PROCEDURE — 99213 OFFICE O/P EST LOW 20 MIN: CPT | Mod: S$GLB,,, | Performed by: ORTHOPAEDIC SURGERY

## 2024-05-06 PROCEDURE — 3288F FALL RISK ASSESSMENT DOCD: CPT | Mod: CPTII,S$GLB,, | Performed by: ORTHOPAEDIC SURGERY

## 2024-05-06 PROCEDURE — 1126F AMNT PAIN NOTED NONE PRSNT: CPT | Mod: CPTII,S$GLB,, | Performed by: ORTHOPAEDIC SURGERY

## 2024-05-06 NOTE — PROGRESS NOTES
Subjective:      Patient ID: Husam Connolly is a 89 y.o. male.  Chief Complaint: Follow-up of the Left Shoulder      HPI  Husam Connolly is a  89 y.o. male presenting today for follow up of left shoulder arthritis.  He reports that he is much improved after the injection last visit   The therapy has also helped   Still having some stiffness and mild soreness.    Review of patient's allergies indicates:   Allergen Reactions    Solarcaine [benzocaine-triclosan]     Sulfa (sulfonamide antibiotics)     Sulfamethoxazole     Trimethoprim     Hytrin [terazosin] Rash    Smz-tmp ds [sulfamethoxazole-trimethoprim] Rash         Current Outpatient Medications   Medication Sig Dispense Refill    albuterol (VENTOLIN HFA) 90 mcg/actuation inhaler Inhale 2 puffs into the lungs every 4 (four) hours as needed for Wheezing. Rescue 18 g 11    aspirin (ECOTRIN) 81 MG EC tablet Take 81 mg by mouth once daily.      atorvastatin (LIPITOR) 40 MG tablet Take 1 tablet (40 mg total) by mouth once daily. 90 tablet 3    empagliflozin (JARDIANCE) 10 mg tablet Take 1 tablet (10 mg total) by mouth once daily. 30 tablet 11    finasteride (PROSCAR) 5 mg tablet Take 1 tablet (5 mg total) by mouth once daily. 90 tablet 3    fluticasone propionate (FLONASE) 50 mcg/actuation nasal spray 2 sprays (100 mcg total) by Each Nostril route once daily. (Patient taking differently: 2 sprays by Each Nostril route 2 (two) times a day.) 48 g 3    fluticasone-umeclidin-vilanter (TRELEGY ELLIPTA) 100-62.5-25 mcg DsDv Inhale 1 puff into the lungs once daily. 180 each 3    GARLIC ORAL Take 1 capsule by mouth once daily.      hydrALAZINE (APRESOLINE) 50 MG tablet Take 1 tablet (50 mg total) by mouth 3 (three) times daily. 270 tablet 3    inhalation spacing device Use as directed for inhalation. 1 each 0    losartan (COZAAR) 100 MG tablet Take 1 tablet (100 mg total) by mouth once daily. 90 tablet 3    multivitamin capsule Take 1 capsule by mouth once daily.       "omega-3 fatty acids/fish oil (FISH OIL-OMEGA-3 FATTY ACIDS) 300-1,000 mg capsule Take 1 capsule by mouth once daily.      vitamin D (VITAMIN D3) 1000 units Tab Take 1,000 Units by mouth once daily.      meclizine (ANTIVERT) 12.5 mg tablet TAKE 1 TABLET(12.5 MG) BY MOUTH THREE TIMES DAILY AS NEEDED FOR DIZZINESS (Patient not taking: Reported on 5/6/2024) 20 tablet 2     No current facility-administered medications for this visit.       Past Medical History:   Diagnosis Date    BPH (benign prostatic hyperplasia)     Diabetes mellitus (type 2)     Emphysema 03/12/2022    High cholesterol 08/26/2020    Hypertension     RBBB 11/29/2022    Small bowel obstruction 06/13/2019       Past Surgical History:   Procedure Laterality Date    ANASTOMOSIS OF ILEUM TO RECTUM  9/9/2019    Procedure: ANASTOMOSIS, COLO-RECTAL;  Surgeon: Fouzia Gardner MD;  Location: [facility] OR;  Service: General;;    CHOLECYSTECTOMY N/A 3/7/2022    Procedure: CHOLECYSTECTOMY;  Surgeon: Fouzia Gardner MD;  Location: [facility] OR;  Service: General;  Laterality: N/A;    COLOSTOMY  6/16/2019    Procedure: CREATION, COLOSTOMY;  Surgeon: Fouzia Gardner MD;  Location: [facility] OR;  Service: General;;    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    Procedure: RELEASE, DUPUYTREN CONTRACTURE;  Surgeon: Slick Baldwin Jr., MD;  Location: [facility] OR;  Service: Orthopedics;  Laterality: Left;    EYE SURGERY      GISELA PROCEDURE  6/16/2019    Procedure: GISELA PROCEDURE;  Surgeon: Fouzia Gardner MD;  Location: [facility] OR;  Service: General;;  Colon resection    LYSIS OF ADHESIONS N/A 3/7/2022    Procedure: LYSIS, ADHESIONS;  Surgeon: Fouzia Gardner MD;  Location: [facility] OR;  Service: General;  Laterality: N/A;    TONSILLECTOMY         OBJECTIVE:   PHYSICAL EXAM:  Height: 5' 10" (177.8 cm)    Vitals:    05/06/24 1515   Height: 5' 10" (1.778 m)   PainSc: 0-No pain   PainLoc: Shoulder     Ortho/SPM Exam  Examination left shoulder no tenderness no " swelling range of motion is slightly decreased there is some stiffness   No crepitation   Strength a little bit weak   No instability  Neurologic exam intact       RADIOGRAPHS:  None  Comments: I have personally reviewed the imaging and I agree with the above radiologist's report.    ASSESSMENT/PLAN:     IMPRESSION:  Left shoulder arthritis severe    PLAN:  He is doing well so I have just recommended that we keep an eye on his symptoms   He takes Advil or Tylenol for pain   Continue with a home exercise program but avoid heavy lifting    FOLLOW UP:  2-3 months    Disclaimer: This note has been generated using voice-recognition software. There may be typographical errors that have been missed during proof-reading.

## 2024-05-08 ENCOUNTER — OFFICE VISIT (OUTPATIENT)
Dept: OTOLARYNGOLOGY | Facility: CLINIC | Age: 89
End: 2024-05-08
Payer: MEDICARE

## 2024-05-08 ENCOUNTER — CLINICAL SUPPORT (OUTPATIENT)
Dept: OTOLARYNGOLOGY | Facility: CLINIC | Age: 89
End: 2024-05-08
Payer: MEDICARE

## 2024-05-08 VITALS
SYSTOLIC BLOOD PRESSURE: 135 MMHG | DIASTOLIC BLOOD PRESSURE: 58 MMHG | WEIGHT: 166.25 LBS | BODY MASS INDEX: 23.85 KG/M2 | HEART RATE: 86 BPM

## 2024-05-08 DIAGNOSIS — R42 VERTIGO: ICD-10-CM

## 2024-05-08 DIAGNOSIS — H93.293 IMPAIRMENT OF AUDITORY DISCRIMINATION OF BOTH EARS: ICD-10-CM

## 2024-05-08 DIAGNOSIS — H90.3 SENSORINEURAL HEARING LOSS, BILATERAL: Primary | ICD-10-CM

## 2024-05-08 DIAGNOSIS — H81.12 BENIGN PAROXYSMAL POSITIONAL VERTIGO OF LEFT EAR: Primary | ICD-10-CM

## 2024-05-08 DIAGNOSIS — Z97.4 WEARS HEARING AID IN BOTH EARS: ICD-10-CM

## 2024-05-08 DIAGNOSIS — H90.3 SENSORINEURAL HEARING LOSS (SNHL) OF BOTH EARS: ICD-10-CM

## 2024-05-08 PROCEDURE — 1159F MED LIST DOCD IN RCRD: CPT | Mod: CPTII,S$GLB,, | Performed by: NURSE PRACTITIONER

## 2024-05-08 PROCEDURE — 92557 COMPREHENSIVE HEARING TEST: CPT | Mod: S$GLB,,, | Performed by: PHYSICIAN ASSISTANT

## 2024-05-08 PROCEDURE — 3288F FALL RISK ASSESSMENT DOCD: CPT | Mod: CPTII,S$GLB,, | Performed by: NURSE PRACTITIONER

## 2024-05-08 PROCEDURE — 1126F AMNT PAIN NOTED NONE PRSNT: CPT | Mod: CPTII,S$GLB,, | Performed by: NURSE PRACTITIONER

## 2024-05-08 PROCEDURE — 99204 OFFICE O/P NEW MOD 45 MIN: CPT | Mod: 25,S$GLB,, | Performed by: NURSE PRACTITIONER

## 2024-05-08 PROCEDURE — 1160F RVW MEDS BY RX/DR IN RCRD: CPT | Mod: CPTII,S$GLB,, | Performed by: NURSE PRACTITIONER

## 2024-05-08 PROCEDURE — 99999 PR PBB SHADOW E&M-EST. PATIENT-LVL IV: CPT | Mod: PBBFAC,,, | Performed by: NURSE PRACTITIONER

## 2024-05-08 PROCEDURE — 99999 PR PBB SHADOW E&M-EST. PATIENT-LVL I: CPT | Mod: PBBFAC,,, | Performed by: PHYSICIAN ASSISTANT

## 2024-05-08 PROCEDURE — 92567 TYMPANOMETRY: CPT | Mod: S$GLB,,, | Performed by: PHYSICIAN ASSISTANT

## 2024-05-08 PROCEDURE — 1101F PT FALLS ASSESS-DOCD LE1/YR: CPT | Mod: CPTII,S$GLB,, | Performed by: NURSE PRACTITIONER

## 2024-05-08 PROCEDURE — 95992 CANALITH REPOSITIONING PROC: CPT | Mod: S$GLB,,, | Performed by: NURSE PRACTITIONER

## 2024-05-08 NOTE — PATIENT INSTRUCTIONS
"  Patient Instruction After Office Treatments (Epley or Semont maneuvers)    After the maneuver is performed wait 10 minutes before going home to avoid quick spins. It takes time for the debris to settle in the correct location (think of a snow globe). Avoid driving yourself home.        Sleep semi-recumbent for the next few nights.  Sleep at a 45 degree angle (eg. Chair).  Stay vertical during the day.  Do not got to the dentist or hairdresser.       No exercise for a week.    For one week, avoid any head movements that could provoke your dizziness.  Use a couple of pillows when you sleep.  Avoid sleeping on the "bad" side.  Avoid quick head movements. Act as if you're wearing an  invisible neck brace.    Wait at least 2 days before doing the Berman-Daroff exercise or home epley maneuver unless otherwise instructed by your physician. Complete the exercises 3 times before bed that way if you are dizzy symptoms can resolve while sleeping. Repeat every night for a week.    At one week post-treatment, put yourself in the position that usually makes you dizzy under conditions in which you can't fall or hurt yourself. Let your doctor know how you did.      Helpful Websites and Links:    Epley maneuver (Right) video  Https://www.youtube.com/watch?v=llvUbxEoadQ    Epley maneuver (Left) video  Https://www.youGetMeMediaube.com/watch?v=pohGPB16vCD    Berman Daroff exercise video  https://www.youtube.com/watch?v=dmLSoJMnD00    Bakari Marshall MD  [email or web address]-and-[email or web address]/disorders/bppv/bppv.[email or web address]       "

## 2024-05-08 NOTE — PROGRESS NOTES
Husam Connolly, a 89 y.o. male, was seen today in the clinic for an audiologic evaluation.  Patients main complaint was dizziness which started in January 2024.  He reports his dizziness is  worse when changing head position. He reports hearing loss that has been progressing over the years.  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  He currently wears bilateral hearing aids with success but forgets to put them on some days.  He does struggle to hear conversation particularly in the presence of background noise and will turn the TV louder to hear better.    Audiogram results revealed a mild to profound sloping sensorineural hearing loss bilaterally.  Speech reception thresholds were noted at 25 dB in the right ear and 25 dB in the left ear.  Speech discrimination scores were 64% in the right ear and 56% in the left ear.  Tympanometry revealed Type A in the right ear and Type A in the left ear.     Recommendations:  Otologic evaluation  Annual audiogram  Hearing protection when in noise  Continued and consistent use of bilateral amplification

## 2024-05-08 NOTE — PROGRESS NOTES
Chief Complaint   Patient presents with    Dizziness        HPI:   The patient who is referred to me by Dr. Williams Knight in consultation for evaluation of dizziness. The symptoms started several months ago and have gradually improved. The sensation is also described as: spinning sensation.The attacks occur intermittently and last a few seconds. Positions that worsen symptoms: turning head.  Associated ear symptoms: hearing loss. Associated CNS symptoms: none. Recent infections: none. Head trauma: denied. Drug ingestion prior to onset: none. 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 Previous treatment includes: vestibular therapy and meclizine. He stopped meclizine a few months ago.   Response to this treatment has been good   Patient denies a history of migraine headaches.  He has hx of hearing loss and has bilateral hearing aids. He admits to forgetting to wear them on a regular basis.     Past Medical History:   Diagnosis Date    BPH (benign prostatic hyperplasia)     Diabetes mellitus (type 2)     Emphysema 2022    High cholesterol 2020    Hypertension     RBBB 2022    Small bowel obstruction 2019     Social History     Socioeconomic History    Marital status:    Tobacco Use    Smoking status: Former     Current packs/day: 0.00     Average packs/day: 1 pack/day for 25.0 years (25.0 ttl pk-yrs)     Types: Cigarettes     Start date:      Quit date:      Years since quittin.3    Smokeless tobacco: Never   Substance and Sexual Activity    Alcohol use: Not Currently     Alcohol/week: 5.0 standard drinks of alcohol     Types: 5 Cans of beer per week    Drug use: No    Sexual activity: Yes     Partners: Female     Social Determinants of Health     Financial Resource Strain: Low Risk  (2023)    Overall Financial Resource Strain (CARDIA)     Difficulty of Paying Living Expenses: Not very hard   Food Insecurity: No Food Insecurity (2024)    Hunger  Vital Sign     Worried About Running Out of Food in the Last Year: Never true     Ran Out of Food in the Last Year: Never true   Transportation Needs: No Transportation Needs (1/27/2024)    PRAPARE - Transportation     Lack of Transportation (Medical): No     Lack of Transportation (Non-Medical): No   Physical Activity: Inactive (1/27/2024)    Exercise Vital Sign     Days of Exercise per Week: 0 days     Minutes of Exercise per Session: 0 min   Stress: No Stress Concern Present (1/27/2024)    [employer] - Occupational Stress Questionnaire     Feeling of Stress : Not at all   Housing Stability: High Risk (2/20/2024)    Housing Stability Vital Sign     Unable to Pay for Housing in the Last Year: No     Number of Places Lived in the Last Year: 3     Unstable Housing in the Last Year: No     Past Surgical History:   Procedure Laterality Date    ANASTOMOSIS OF ILEUM TO RECTUM  9/9/2019    Procedure: ANASTOMOSIS, COLO-RECTAL;  Surgeon: Fouzia Gardner MD;  Location: [facility];  Service: General;;    CHOLECYSTECTOMY N/A 3/7/2022    Procedure: CHOLECYSTECTOMY;  Surgeon: Fouzia Gardner MD;  Location: [facility] [place];  Service: General;  Laterality: N/A;    COLOSTOMY  6/16/2019    Procedure: CREATION, COLOSTOMY;  Surgeon: Fouzia Gardner MD;  Location: [facility] OR;  Service: General;;    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    Procedure: RELEASE, DUPUYTREN CONTRACTURE;  Surgeon: Slick Baldwin Jr., MD;  Location: [facility] OR;  Service: Orthopedics;  Laterality: Left;    EYE SURGERY      GISELA PROCEDURE  6/16/2019    Procedure: GISELA PROCEDURE;  Surgeon: Fouzia Gardner MD;  Location: [facility] OR;  Service: General;;  Colon resection    LYSIS OF ADHESIONS N/A 3/7/2022    Procedure: LYSIS, ADHESIONS;  Surgeon: Fouzia Gardner MD;  Location: [facility] OR;  Service: General;  Laterality: N/A;    TONSILLECTOMY       Family History   Problem Relation Name Age of Onset    Heart disease  Father      Hypertension Father      Cancer Mother cancer breas varian     Diabetes Paternal Aunt             Review of Systems  General: negative for chills, fever or weight loss  Psychological: negative for mood changes or depression  Ophthalmic: negative for blurry vision, photophobia or eye pain  ENT: see HPI  Respiratory: no cough, shortness of breath, or wheezing  Cardiovascular: no chest pain or dyspnea on exertion  Gastrointestinal: no abdominal pain, change in bowel habits, or black/ bloody stools  Musculoskeletal: negative for gait disturbance or muscular weakness  Neurological: no syncope or seizures; no ataxia  Dermatological: negative for pruritis,  rash and jaundice  Hematologic/lymphatic: no easy bruising, no new adenopathy    Physical Exam:    Vitals:    05/08/24 1020   BP: (!) 135/58   Pulse: 86       Constitutional: Well appearing / communicating without difficutly.  NAD.  Eyes: EOM I Bilaterally  Head/Face: Normocephalic.  Negative paranasal sinus pressure/tenderness.  Salivary glands WNL.  House Brackmann I Bilaterally.    Right Ear: Auricle normal appearance. External Auditory Canal within normal limits no lesions or masses,TM w/o masses/lesions/perforations. TM mobility noted.   Left Ear: Auricle normal appearance. External Auditory Canal within normal limits no lesions or masses,TM w/o masses/lesions/perforations. TM mobility noted.  Vestibular exam: negativehead thrust; negative Fukuda step test; negative Romberg; negative Right Dixx- Hallpike; positive left Dixx- Hallpike  Nose: No gross nasal septal deviation. Inferior Turbinates 3+ bilaterally. No septal perforation. No masses/lesions. External nasal skin appears normal without masses/lesions.  Oral Cavity: Gingiva/lips within normal limits.  Dentition/gingiva healthy appearing. Mucus membranes moist. Floor of mouth soft, no masses palpated. Oral Tongue mobile. Hard Palate appears normal.    Oropharynx: Base of tongue appears normal. No  masses/lesions noted. Tonsillar fossa/pharyngeal wall without lesions. Posterior oropharynx WNL.  Soft palate without masses. Midline uvula.   Neck/Lymphatic: No LAD I-VI bilaterally.  No thyromegaly.  No masses noted on exam.    Mirror laryngoscopy/nasopharyngoscopy: Active gag reflex.  Unable to perform.    Neuro/Psychiatric: AOx3.  Normal mood and affect.   Cardiovascular: Normal carotid pulses bilaterally, no increasing jugular venous distention noted at cervical region bilaterally.    Respiratory: Normal respiratory effort, no stridor, no retractions noted.      Audiogram: Reviewed and interpreted by me, and discussed with the patient today.    Audiogram results revealed a mild to profound sloping sensorineural hearing loss bilaterally.  Speech reception thresholds were noted at 25 dB in the right ear and 25 dB in the left ear.  Speech discrimination scores were 64% in the right ear and 56% in the left ear.  Tympanometry revealed Type A in the right ear and Type A in the left ear.       Assessment:    ICD-10-CM ICD-9-CM    1. Benign paroxysmal positional vertigo of left ear  H81.12 386.11       2. Vertigo  R42 780.4 Ambulatory referral/consult to ENT      3. Sensorineural hearing loss (SNHL) of both ears  H90.3 389.18       4. Wears hearing aid in both ears  Z97.4 [id number]         The primary encounter diagnosis was Benign paroxysmal positional vertigo of left ear. Diagnoses of Vertigo, Sensorineural hearing loss (SNHL) of both ears, and Wears hearing aid in both ears were also pertinent to this visit.      Plan:  No orders of the defined types were placed in this encounter.    We had a long discussion regarding the relevant anatomy and pathology relevant to BPPV.  We discussed that in the ear there are three semicircular canals that detect rotational movement.  BPPV occurs as a result of otoconia, tiny crystals of calcium carbonate that are a normal part of the inner ears anatomy, detaching from their normal  anatomic position and collecting in one of the semicircular canals.  When the head moves, the otoconia shift. This stimulates the cupula to send false signals to the brain, producing vertigo and triggering nystagmus (involuntary eye movements).  I have performed CRM of the left ear and gave the patient necessary post-procedure instructions at that time.  -provided a printout of the post-Eply maneuver instructions   -continue with vestibular therapy    -We reviewed the patient's recent audiogram and hearing loss in detail.   We also discussed the use hearing protection when exposed to loud noise, including lawn equipment. Recommend wearing hearing aids and audiogram in 1 year.    -follow up 1 year or sooner as needed      Lynn Florez, NP

## 2024-05-17 ENCOUNTER — CLINICAL SUPPORT (OUTPATIENT)
Dept: REHABILITATION | Facility: HOSPITAL | Age: 89
End: 2024-05-17
Payer: MEDICARE

## 2024-05-17 DIAGNOSIS — R42 VERTIGO: Primary | ICD-10-CM

## 2024-05-17 PROCEDURE — 97112 NEUROMUSCULAR REEDUCATION: CPT | Mod: PN,59

## 2024-05-17 PROCEDURE — 95992 CANALITH REPOSITIONING PROC: CPT | Mod: PN

## 2024-05-17 NOTE — PROGRESS NOTES
[facility]   Physical Therapy Treatment Note      Name: Husam Connolly  [facility] Number: [id number]    Therapy Diagnosis:   Encounter Diagnosis   Name Primary?    Vertigo Yes     Physician: Williams Knight DO    Visit Date: 5/17/2024    Physician Orders: PT Eval and Treat   Medical Diagnosis from Referral: Vertigo [R42]   Evaluation Date: 4/16/2024  Authorization Period Expiration: 12/31/2024  Plan of Care Expiration: 5/24/2024  Progress Note Due: 5/16/2024  Date of Surgery: N/A  Visit # / Visits authorized: 1/20 (+eval)  FOTO: 1/3     Precautions: Standard     Time In: 1430  Time Out: 1512  Total Billable Time: 42 minutes(1 NR, 1 CR)     PTA Visit #: 0/5       Subjective     Patient reports: Only having slight dizziness right after waking up. Once up and moving no more true dizziness, just some unsteadiness.   Response to previous treatment: no adverse affect   Functional change: decreased dizziness    Pain: 0/10  Location:  n/a     Objective      POSITIONAL CANAL TESTING    Alex Hallpike (posterior / CL anterior)              Right: negative to symptoms, ageotropic nystagmus - fatigues with target on wall              Left: negative  Horizontal Canals              Right: negative              Left: positive spinning sensation, ageotropic nystagmus    After treatment: left and right horizontal positive ageotrophic nystagmus  Treatment Performed: see below    Functional Gait Assessment:    1. Gait on level surface (6m) =  2               (3) Normal: less than 5.5 sec, no A.D., no imbalance, normal gait pattern, deviates< 6in              (2) Mild impairment: 7-5.6 sec, uses A.D., mild gait deviations, or deviates 6-10 in              (1) Moderate impairment: > 7 sec, slow speed, imbalance, deviates 10-15 in.              (0) Severe impairment: needs assist, deviates >15 in, reach/touch wall  2. Change in Gait Speed = 2              (3) Normal: smooth change w/o loss of balance or gait  deviation, deviates < 6 in, significant difference between speeds              (2) Mild impairment: changes speed, but demonstrates mild gait deviations, deviates 6-10 in, OR no deviations but unable to significantly speed, OR uses A.D.              (1) Moderate impairment: minor changes to speed, OR changes speed w/ significant deviations, deviates 10-15 in, OR  Changes speed , but loses balance & recovers              (0) Severe impairment: cannot change speed, deviates >15 in, or loses balance & needs assist  3. Gait with horizontal head turns  = 1              (3) Normal: no change in gait, deviates <6 in              (2) Mild impairment: slight change in speed, deviates 6-10 in, OR uses A.D.              (1) Moderate impairment: moderate change in speed, deviates 10-15 in              (0) Severe impairment: severe disruption of gait, deviates >15in  4. Gait with vertical head turns = 2              (3) Normal: no change in gait, deviates <6 in              (2) Mild impairment: slight change in speed, deviates 6-10 in OR uses A.D.              (1) Moderate impairment: moderate change in speed, deviates 10-15 in              (0) Severe impairment: severe disruption of gait, deviates >15 in  5. Gait with pivot turns = 3              (3) Normal: performs safely in 3 sec, no LOB              (2) Mild impairment: performs in >3 sec & no LOB, OR turns safely & requires several steps to regain LOB              (1) Moderate impairment: turns slow, OR requires several small steps for balance following turn & stop              (0) Severe impairment: cannot turn safely, needs assist  6. Step over obstacle = 3              (3) Normal: steps over 2 stacked boxes w/o change in speed or LOB              (2) Mild impairment: able to step over 1 box w/o change in speed or LOB              (1) Moderate impairment: steps over 1 box but must slow down, may require VC              (0) Severe impairment: cannot perform w/o assist  7.  Gait with Narrow SONIA = 0              (3) Normal: 10 steps no staggering              (2) Mild impairment: 7-9 steps              (1) Moderate impairment: 4-7 steps              (0) Severe impairment: < 4 steps or cannot perform w/o assist  8. Gait with eyes closed = 2              (3) Normal: < 7 sec, no A.D., no LOB, normal gait pattern, deviates <6 in              (2) Mild impairment: 7.1-9 sec, mild gait deviations, deviates 6-10 in              (1) Moderate impairment: > 9 sec, abnormal pattern, LOB, deviates 10-15 in              (0) Severe impairment: cannot perform w/o assist, LOB, deviates >15in  9. Ambulating Backwards = 2              (3) Normal: no A.D., no LOB, normal gait pattern, deviates <6in              (2) Mild impairment: uses A.D., slower speed, mild gait deviations, deviates 6-10 in              (1) Moderate impairment: slow speed, abnormal gait pattern, LOB, deviates 10-15 in              (0) Severe impairment: severe gait deviations or LOB, deviates >15in  10. Steps = 2              (3) Normal: alternating feet, no rail              (2) Mild Impairment: alternating feet, uses rail              (1) Moderate impairment: step-to, uses rail              (0) Severe impairment: cannot perform safely     Score 19/30      Score:   <22/30 fall risk   <20/30 fall risk in older adults   <18/30 fall risk in Parkinsons       Treatment     Husam received the treatments listed below:      Canalith Repositioning Treatment x 30 minutes:      Left Casani Maneuver x2 trials - 2 min in each position.   -moderate nystagmus in position 1 first trial   -mild nystagmus in position 1 2nd trial   No symptoms upon returning to sit each trial       Patient participated in neuromuscular re-education activities to improve: Balance and Coordination for 10 minutes. The following activities were included:   See Objective Functional Gait Assessment above.       Patient Education and Home Exercises       Education provided:  Educated on recommendation for habituation/balance training.     Written Home Exercises Provided: none provided  Assessment     Mr. Connolly participated well with therapy session. 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 Educated pt on recommendation for ongoing therapy for balance and habituation and he was agreeable. Schedule slip left with . He has notable balance deficits with 19/30 Functional Gait Assessment score.     Husam Is progressing well towards his goals.   Patient prognosis is Fair.     Patient will continue to benefit from skilled outpatient physical therapy to address the deficits listed in the problem list box on initial evaluation, provide pt/family education and to maximize pt's level of independence in the home and community environment.     Patient's spiritual, cultural and educational needs considered and pt agreeable to plan of care and goals.     Anticipated barriers to physical therapy: Age    Goals: Goals:  Short Term Goals = Long Term Goals: 5 weeks   Patient will display negative Alex-Hallpike and Roll Tests bilaterally in order to fully resolve BPPV and improve comfort with bed mobility  Patient will score </=8% on Dizziness Handicap Inventory in order to improve general activity tolerance and quality of life  3.   Pt will improve Functional Gait Assessment (FGA) score to at least 22/30 for increased independence with home and community ambulation.         Plan     Continue plan of care (POC) with balance/habituation training.     Astrid Fitzgerald, PT

## 2024-05-21 ENCOUNTER — OFFICE VISIT (OUTPATIENT)
Dept: SLEEP MEDICINE | Facility: CLINIC | Age: 89
End: 2024-05-21
Attending: PSYCHIATRY & NEUROLOGY
Payer: MEDICARE

## 2024-05-21 VITALS
WEIGHT: 162.31 LBS | SYSTOLIC BLOOD PRESSURE: 144 MMHG | HEIGHT: 70 IN | DIASTOLIC BLOOD PRESSURE: 64 MMHG | BODY MASS INDEX: 23.24 KG/M2 | HEART RATE: 100 BPM

## 2024-05-21 DIAGNOSIS — R53.83 FATIGUE, UNSPECIFIED TYPE: ICD-10-CM

## 2024-05-21 DIAGNOSIS — G47.30 SLEEP APNEA, UNSPECIFIED TYPE: Primary | ICD-10-CM

## 2024-05-21 PROCEDURE — 99204 OFFICE O/P NEW MOD 45 MIN: CPT | Mod: S$GLB,,, | Performed by: PSYCHIATRY & NEUROLOGY

## 2024-05-21 PROCEDURE — 1101F PT FALLS ASSESS-DOCD LE1/YR: CPT | Mod: CPTII,S$GLB,, | Performed by: PSYCHIATRY & NEUROLOGY

## 2024-05-21 PROCEDURE — 3288F FALL RISK ASSESSMENT DOCD: CPT | Mod: CPTII,S$GLB,, | Performed by: PSYCHIATRY & NEUROLOGY

## 2024-05-21 PROCEDURE — 99999 PR PBB SHADOW E&M-EST. PATIENT-LVL III: CPT | Mod: PBBFAC,,, | Performed by: PSYCHIATRY & NEUROLOGY

## 2024-05-21 NOTE — PATIENT INSTRUCTIONS
SLEEP LAB (Sabrina or Nick) will contact you to schedulethe sleep study. Their number is [phone] (ext 2). Please call them if you do not hear from them in 2 weeks from now.  [facility] is located on 7th floor of the [facility]; [facility] in [facility] ( 3rd floor [facility]).    SLEEP CLINIC (my assistant) will call you when the sleep study results are ready or I will message you through the portal with the results as we have discussed - if you have not heard from us by 2 weeks from the date of the study, or you can use [facility] to contact me.    Our clinic phone number is [phone] (ext 1)       You are advised to abstain from driving should you feel sleepy or drowsy.\

## 2024-05-21 NOTE — PROGRESS NOTES
Husam Connolly is a 89 y.o. male with history of COPD  is here to be evaluated for a sleep disorder; referred by Williams Knight DO.    The patient reports snoring, interrupted sleep, excessive daytime sleepiness, and excessive daytime fatigue since severalyears ago.   Husam Connolly denied  gasping for air and choking  .    The patient does not feel rested upon awakening. Takes occasional naps.       Recently been suffering from vertigo - undergoing work up now.       The patient  denied difficulty  with falling or staying asleep.    His COPD limits her from strenuous activity during the day. Not using home oxygen.    Husam Connolly  denies symptoms concerning for parasomnia except for occasional somniloquy.  The patient  denies auxiliary symptoms of narcolepsy including sleep onset paralysis, hypnagogic hallucinations, sleep attacks and cataplexy.    Husam Connolly denied symptoms concerning for RLS; nocturnal leg movements have not been noticed.   The patient reports leg cramps.           Medications pertinent to sleep  disorders taken currently: -no  Previous  medications taken  for sleep disorders:  -    Sleep studies  no      Occupation:retired  Bed partner: mitzi wife  Exercise routine: OT for the shoulder        5/15/2024     1:21 PM   EPWORTH SLEEPINESS SCALE TOTAL SCORE    Total score 7             5/15/2024     1:21 PM   EPWORTH SLEEPINESS SCALE   Sitting and reading 1   Watching TV 1   Sitting, inactive in a public place (e.g. a theatre or a meeting) 1   As a passenger in a car for an hour without a break 1   Lying down to rest in the afternoon when circumstances permit 2   Sitting and talking to someone 0   Sitting quietly after a lunch without alcohol 1   In a car, while stopped for a few minutes in traffic 0   Total score 7           1/25/2024   PHQ-9 Depression Patient Health Questionnaire   Over the last two weeks how often have you been bothered by little interest or pleasure in doing  things 0   Over the last two weeks how often have you been bothered by feeling down, depressed or hopeless 0              5/15/2024     1:21 PM   EPWORTH SLEEPINESS SCALE   Sitting and reading 1   Watching TV 1   Sitting, inactive in a public place (e.g. a theatre or a meeting) 1   As a passenger in a car for an hour without a break 1   Lying down to rest in the afternoon when circumstances permit 2   Sitting and talking to someone 0   Sitting quietly after a lunch without alcohol 1   In a car, while stopped for a few minutes in traffic 0   Total score 7         5/15/2024     1:31 PM   Sleep Clinic New Patient   What time do you go to bed on a week day? (Give a range) 10 - 11PM   What time do you go to bed on a day off? (Give a range) 10 - 11 PM   How long does it take you to fall asleep? (Give a range) 5 - 10 minutes   On average, how many times per night do you wake up? Once   How long does it take you to fall back into sleep? (Give a range) 5 - 10 minutes   What time do you wake up to start your day on a week day? (Give a range) 6 - 8 AM   What time do you wake up to start your day on a day off? (Give a range) 6 - 8 AM   What time do you get out of bed? (Give a range) 6 - 8 AM   On average, how many hours do you sleep? 7 - 8   On average, how many naps do you take per day? one   Rate your sleep quality from 0 to 5 (0-poor, 5-great). 4   Have you experienced:  N/a   How much weight have you lost or gained (in lbs.) in the last year? 0   On average, how many times per night do you go to the bathroom?  once   Have you ever had a sleep study/CPAP machine/surgery for sleep apnea? No   Have you ever had a CPAP machine for sleep apnea? No   Have you ever had surgery for sleep apnea? No           5/15/2024     1:31 PM   Sleep Clinic ROS    Difficulty breathing through the nose?  No   Sore throat or dry mouth in the morning? Yes   Irregular or very fast heart beat?  No   Shortness of breath?  Yes   Acid reflux? No   Body  aches and pains?  Yes   Morning headaches? No   Dizziness? Yes   Mood changes?  No   Do you exercise?  Sometimes   Do you feel like moving your legs a lot?  Sometimes       DME:         PAST MEDICAL HISTORY:    Active Ambulatory Problems     Diagnosis Date Noted    Resistant hypertension     BPH (benign prostatic hyperplasia)     Stage 3b chronic kidney disease 12/13/2018    S/P cholecystectomy 07/01/2019    Ventral hernia without obstruction or gangrene 02/27/2020    Right foot drop 08/26/2020    High cholesterol 08/26/2020    Chronic rhinitis 03/01/2021    S/P colostomy 03/01/2021    Aortic atherosclerosis 05/11/2021    Type 2 diabetes mellitus with stage 3b chronic kidney disease, without long-term current use of insulin 09/22/2021    Small bowel obstruction due to adhesions 02/11/2022    Dupuytren's contracture of left hand 03/21/2022    Pulmonary nodules     RBBB 11/29/2022    Shoulder arthritis 01/05/2023    Pulmonary hypertension 07/24/2023    COPD, group B, by GOLD 2017 classification 08/23/2023    Orthostatic hypotension 01/30/2024    Vertigo 03/05/2024    Muscle weakness 03/11/2024    Shoulder stiffness, left 03/11/2024    Pain in joint of left shoulder 03/11/2024     Resolved Ambulatory Problems     Diagnosis Date Noted    Diverticulitis, intestine large 04/08/2013    Bowel obstruction 05/30/2013    Rotator cuff sprain 06/02/2014    Hypercholesteremia 12/01/2014    Hyperlipidemia     Diverticulitis     Anticoagulant long-term use     Edema 12/18/2015    Type 2 diabetes mellitus with complication 12/18/2015    Allergic rhinitis 06/24/2016    Wheezing 06/14/2018    Small bowel obstruction 06/13/2019    Hypokalemia 09/16/2019    Dyspnea on exertion 09/16/2019    Wound cellulitis 09/19/2019    Wound dehiscence 11/07/2019    Type 2 diabetes mellitus with other specified complication 08/26/2020    Chest pain of uncertain etiology 10/08/2020    Localized edema 10/08/2020    White coat syndrome with diagnosis of  hypertension 11/10/2020    New onset right bundle branch block (RBBB) 05/11/2021    SBO (small bowel obstruction) 05/31/2021    Encounter for nasogastric tube placement     Cholecystitis 03/07/2022    Calculus of gallbladder without cholecystitis without obstruction 03/07/2022    Calculus of gallbladder with acute on chronic cholecystitis without obstruction 03/09/2022    Centrilobular emphysema 03/12/2022    SOB (shortness of breath)     Dyspnea     Stiffness of finger joint, left 11/03/2022    Weakness of left hand 11/03/2022    Type 2 diabetes mellitus with peripheral vascular disease 11/29/2022    Dyspnea on exertion 11/29/2022     Past Medical History:   Diagnosis Date    Diabetes mellitus (type 2)     Hypertension                 PAST SURGICAL HISTORY:    Past Surgical History:   Procedure Laterality Date    ANASTOMOSIS OF ILEUM TO RECTUM  9/9/2019    Procedure: ANASTOMOSIS, COLO-RECTAL;  Surgeon: Fouzia Gardner MD;  Location: [facility];  Service: General;;    CHOLECYSTECTOMY N/A 3/7/2022    Procedure: CHOLECYSTECTOMY;  Surgeon: Fouzia Gardner MD;  Location: [facility];  Service: General;  Laterality: N/A;    COLOSTOMY  6/16/2019    Procedure: CREATION, COLOSTOMY;  Surgeon: Fouzia Gardner MD;  Location: [facility] OR;  Service: General;;    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    Procedure: RELEASE, DUPUYTREN CONTRACTURE;  Surgeon: Slick Baldwin Jr., MD;  Location: [facility] OR;  Service: Orthopedics;  Laterality: Left;    EYE SURGERY      GISELA PROCEDURE  6/16/2019    Procedure: GISELA PROCEDURE;  Surgeon: Fouzia Gardner MD;  Location: [facility] OR;  Service: General;;  Colon resection    LYSIS OF ADHESIONS N/A 3/7/2022    Procedure: LYSIS, ADHESIONS;  Surgeon: Fouzia Gardner MD;  Location: [facility] OR;  Service: General;  Laterality: N/A;    TONSILLECTOMY           FAMILY HISTORY:                Family History   Problem Relation Name Age of Onset    Heart disease Father      Hypertension  Father      Cancer Mother cancer breas varian     Diabetes Paternal Aunt         SOCIAL HISTORY:          Tobacco:   Social History     Tobacco Use   Smoking Status Former    Current packs/day: 0.00    Average packs/day: 1 pack/day for 25.0 years (25.0 ttl pk-yrs)    Types: Cigarettes    Start date:     Quit date:     Years since quittin.4   Smokeless Tobacco Never       alcohol use:    Social History     Substance and Sexual Activity   Alcohol Use Not Currently    Alcohol/week: 5.0 standard drinks of alcohol    Types: 5 Cans of beer per week                   ALLERGIES:    Review of patient's allergies indicates:   Allergen Reactions    Solarcaine [benzocaine-triclosan]     Sulfa (sulfonamide antibiotics)     Sulfamethoxazole     Trimethoprim     Hytrin [terazosin] Rash    Smz-tmp ds [sulfamethoxazole-trimethoprim] Rash       CURRENT MEDICATIONS:    Current Outpatient Medications   Medication Sig Dispense Refill    albuterol (VENTOLIN HFA) 90 mcg/actuation inhaler Inhale 2 puffs into the lungs every 4 (four) hours as needed for Wheezing. Rescue 18 g 11    aspirin (ECOTRIN) 81 MG EC tablet Take 81 mg by mouth once daily.      atorvastatin (LIPITOR) 40 MG tablet Take 1 tablet (40 mg total) by mouth once daily. 90 tablet 3    empagliflozin (JARDIANCE) 10 mg tablet Take 1 tablet (10 mg total) by mouth once daily. 30 tablet 11    finasteride (PROSCAR) 5 mg tablet Take 1 tablet (5 mg total) by mouth once daily. 90 tablet 3    fluticasone propionate (FLONASE) 50 mcg/actuation nasal spray 2 sprays (100 mcg total) by Each Nostril route once daily. (Patient taking differently: 2 sprays by Each Nostril route 2 (two) times a day.) 48 g 3    fluticasone-umeclidin-vilanter (TRELEGY ELLIPTA) 100-62.5-25 mcg DsDv Inhale 1 puff into the lungs once daily. 180 each 3    GARLIC ORAL Take 1 capsule by mouth once daily.      hydrALAZINE (APRESOLINE) 50 MG tablet Take 1 tablet (50 mg total) by mouth 3 (three) times daily.  "270 tablet 3    inhalation spacing device Use as directed for inhalation. 1 each 0    losartan (COZAAR) 100 MG tablet Take 1 tablet (100 mg total) by mouth once daily. 90 tablet 3    meclizine (ANTIVERT) 12.5 mg tablet TAKE 1 TABLET(12.5 MG) BY MOUTH THREE TIMES DAILY AS NEEDED FOR DIZZINESS 20 tablet 2    multivitamin capsule Take 1 capsule by mouth once daily.      omega-3 fatty acids/fish oil (FISH OIL-OMEGA-3 FATTY ACIDS) 300-1,000 mg capsule Take 1 capsule by mouth once daily.      vitamin D (VITAMIN D3) 1000 units Tab Take 1,000 Units by mouth once daily.       No current facility-administered medications for this visit.                    PHYSICAL EXAM:  BP (!) 144/64 (BP Location: Left arm, Patient Position: Sitting, BP Method: Large (Automatic))   Pulse 100   Ht 5' 10" (1.778 m)   Wt 73.6 kg (162 lb 4.8 oz)   BMI 23.29 kg/m²   GENERAL: Normal development, well groomed.  HEENT:   HEENT:  Conjunctivae are non-erythematous; Pupils equal, round, and reactive to light; Nose is symmetrical; Nasal mucosa is pink and moist; Septum is midline; Inferior turbinates are normal; Nasal airflow is normal; Posterior pharynx is pink; Modified Mallampati:III; Posterior palate is low; Tonsils not visualized; Uvula is wide and elongated;Tongue is enlarged; Dentition is fair; No TMJ tenderness; Jaw opening and protrusion without click and without discomfort.  NECK: Supple. Neck circumference is 17 inches. No thyromegaly. No palpable nodes.     SKIN: On face and neck: No abrasions, no rashes, no lesions.  No subcutaneous nodules are palpable.  RESPIRATORY: Chest is clear to auscultation.  Normal chest expansion and non-labored breathing at rest.  CARDIOVASCULAR: Normal S1, S2.  No murmurs, gallops or rubs. No carotid bruits bilaterally.  No edema. No clubbing. No cyanosis.    NEURO: Oriented to time, place and person. Normal attention span and concentration. Gait normal.    PSYCH: Affect is full. Mood is " normal  MUSCULOSKELETAL: Moves 4 extremities. Gait normal.           ASSESSMENT:    1. Sleep Apnea NEC. The patient symptomatically has  snoring, excessive daytime sleepiness, and excessive daytime fatigue  with exam findings of crowded airway. The patient has medical co-morbidities of diabetes, hyperlipidemia, and hypertension  which can be worsened by LC. This warrants further investigation for possible obstructive sleep apnea.              PLAN:    Diagnostic: Polysomnogram in lab. The nature of this procedure and its indication was discussed with the patient. We will notify the patient about sleep study resuts via My Chart.        During our discussion today, we talked about the etiology of  LC as well as the potential ramifications of untreated sleep apnea, which could include daytime sleepiness, hypertension, heart disease and/or stroke.  We discussed potential treatment options, which could include weight loss, body positioning, continuous positive airway pressure (CPAP), or referral for surgical consideration. Meanwhile, he  is urged to avoid supine sleep, weight gain and alcoholic beverages since all of these can worsen LC.     The patient was given open opportunity to ask questions and/or express concerns about treatment plan. Two point patient identifier confirmed.       Precautions: The patient was advised to abstain from driving should he feel sleepy or drowsy.    Follow up: MD after the sleep study has been completed.     ESS ([place] Sleepiness Scale) and other sleep medicine related questionnaires were reviewed with the patient.      46-minute visit. >50% spent counseling patient and coordination of care.  The patient was  cautioned against drowsy driving.

## 2024-05-24 ENCOUNTER — TELEPHONE (OUTPATIENT)
Dept: SLEEP MEDICINE | Facility: OTHER | Age: 89
End: 2024-05-24
Payer: MEDICARE

## 2024-05-27 ENCOUNTER — TELEPHONE (OUTPATIENT)
Dept: SLEEP MEDICINE | Facility: OTHER | Age: 89
End: 2024-05-27
Payer: MEDICARE

## 2024-05-30 ENCOUNTER — CLINICAL SUPPORT (OUTPATIENT)
Dept: REHABILITATION | Facility: HOSPITAL | Age: 89
End: 2024-05-30
Payer: MEDICARE

## 2024-05-30 DIAGNOSIS — R42 VERTIGO: Primary | ICD-10-CM

## 2024-05-30 PROCEDURE — 97112 NEUROMUSCULAR REEDUCATION: CPT | Mod: PN,59

## 2024-05-30 NOTE — PROGRESS NOTES
[facility]   Physical Therapy Treatment Note      Name: Husam Connolly  [facility] Number: [id number]    Therapy Diagnosis:   No diagnosis found.    Physician: Williams Knight DO    Visit Date: 5/30/2024    Physician Orders: PT Eval and Treat   Medical Diagnosis from Referral: Vertigo [R42]   Evaluation Date: 4/16/2024  Authorization Period Expiration: 12/31/2024  Plan of Care Expiration: 5/24/2024  Progress Note Due: 5/16/2024  Date of Surgery: N/A  Visit # / Visits authorized: 1/20 (+eval)  FOTO: 1/3     Precautions: Standard     Time In: 1430  Time Out: 1512  Total Billable Time: 42 minutes(1 NR, 1 CR)     PTA Visit #: 0/5       Subjective     Patient reports: Only having slight dizziness right after waking up. Once up and moving no more true dizziness, just some unsteadiness.   Response to previous treatment: no adverse affect   Functional change: decreased dizziness    Pain: 0/10  Location:  n/a     Objective      POSITIONAL CANAL TESTING    [place] Hallpike (posterior / CL anterior)              Right: negative to symptoms, ageotropic nystagmus - fatigues with target on wall              Left: negative  Horizontal Canals              Right: negative              Left: positive spinning sensation, ageotropic nystagmus    After treatment: left and right horizontal positive ageotrophic nystagmus  Treatment Performed: see below    Functional Gait Assessment:    1. Gait on level surface (6m) =  2               (3) Normal: less than 5.5 sec, no A.D., no imbalance, normal gait pattern, deviates< 6in              (2) Mild impairment: 7-5.6 sec, uses A.D., mild gait deviations, or deviates 6-10 in              (1) Moderate impairment: > 7 sec, slow speed, imbalance, deviates 10-15 in.              (0) Severe impairment: needs assist, deviates >15 in, reach/touch wall  2. Change in Gait Speed = 2              (3) Normal: smooth change w/o loss of balance or gait deviation, deviates < 6 in,  significant difference between speeds              (2) Mild impairment: changes speed, but demonstrates mild gait deviations, deviates 6-10 in, OR no deviations but unable to significantly speed, OR uses A.D.              (1) Moderate impairment: minor changes to speed, OR changes speed w/ significant deviations, deviates 10-15 in, OR  Changes speed , but loses balance & recovers              (0) Severe impairment: cannot change speed, deviates >15 in, or loses balance & needs assist  3. Gait with horizontal head turns  = 1              (3) Normal: no change in gait, deviates <6 in              (2) Mild impairment: slight change in speed, deviates 6-10 in, OR uses A.D.              (1) Moderate impairment: moderate change in speed, deviates 10-15 in              (0) Severe impairment: severe disruption of gait, deviates >15in  4. Gait with vertical head turns = 2              (3) Normal: no change in gait, deviates <6 in              (2) Mild impairment: slight change in speed, deviates 6-10 in OR uses A.D.              (1) Moderate impairment: moderate change in speed, deviates 10-15 in              (0) Severe impairment: severe disruption of gait, deviates >15 in  5. Gait with pivot turns = 3              (3) Normal: performs safely in 3 sec, no LOB              (2) Mild impairment: performs in >3 sec & no LOB, OR turns safely & requires several steps to regain LOB              (1) Moderate impairment: turns slow, OR requires several small steps for balance following turn & stop              (0) Severe impairment: cannot turn safely, needs assist  6. Step over obstacle = 3              (3) Normal: steps over 2 stacked boxes w/o change in speed or LOB              (2) Mild impairment: able to step over 1 box w/o change in speed or LOB              (1) Moderate impairment: steps over 1 box but must slow down, may require VC              (0) Severe impairment: cannot perform w/o assist  7. Gait with Narrow SONIA = 0               (3) Normal: 10 steps no staggering              (2) Mild impairment: 7-9 steps              (1) Moderate impairment: 4-7 steps              (0) Severe impairment: < 4 steps or cannot perform w/o assist  8. Gait with eyes closed = 2              (3) Normal: < 7 sec, no A.D., no LOB, normal gait pattern, deviates <6 in              (2) Mild impairment: 7.1-9 sec, mild gait deviations, deviates 6-10 in              (1) Moderate impairment: > 9 sec, abnormal pattern, LOB, deviates 10-15 in              (0) Severe impairment: cannot perform w/o assist, LOB, deviates >15in  9. Ambulating Backwards = 2              (3) Normal: no A.D., no LOB, normal gait pattern, deviates <6in              (2) Mild impairment: uses A.D., slower speed, mild gait deviations, deviates 6-10 in              (1) Moderate impairment: slow speed, abnormal gait pattern, LOB, deviates 10-15 in              (0) Severe impairment: severe gait deviations or LOB, deviates >15in  10. Steps = 2              (3) Normal: alternating feet, no rail              (2) Mild Impairment: alternating feet, uses rail              (1) Moderate impairment: step-to, uses rail              (0) Severe impairment: cannot perform safely     Score 19/30      Score:   <22/30 fall risk   <20/30 fall risk in older adults   <18/30 fall risk in Parkinsons       Treatment     Husam received the treatments listed below:      Canalith Repositioning Treatment x 30 minutes:      Left Casani Maneuver x2 trials - 2 min in each position.   -moderate nystagmus in position 1 first trial   -mild nystagmus in position 1 2nd trial   No symptoms upon returning to sit each trial       Patient participated in neuromuscular re-education activities to improve: Balance and Coordination for 10 minutes. The following activities were included:   See Objective Functional Gait Assessment above.       Patient Education and Home Exercises       Education provided: Educated on recommendation  for habituation/balance training.     Written Home Exercises Provided: none provided  Assessment     Mr. Connolly participated well with therapy session. 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 Educated pt on recommendation for ongoing therapy for balance and habituation and he was agreeable. Schedule slip left with . He has notable balance deficits with 19/30 Functional Gait Assessment score.     Husam Is progressing well towards his goals.   Patient prognosis is Fair.     Patient will continue to benefit from skilled outpatient physical therapy to address the deficits listed in the problem list box on initial evaluation, provide pt/family education and to maximize pt's level of independence in the home and community environment.     Patient's spiritual, cultural and educational needs considered and pt agreeable to plan of care and goals.     Anticipated barriers to physical therapy: Age    Goals: Goals:  Short Term Goals = Long Term Goals: 5 weeks   Patient will display negative Wayne-Hallpike and Roll Tests bilaterally in order to fully resolve BPPV and improve comfort with bed mobility  Patient will score </=8% on Dizziness Handicap Inventory in order to improve general activity tolerance and quality of life  3.   Pt will improve Functional Gait Assessment (FGA) score to at least 22/30 for increased independence with home and community ambulation.         Plan     Continue plan of care (POC) with balance/habituation training.     RUBINA PIRES, PT

## 2024-05-30 NOTE — PROGRESS NOTES
"[facility]   Physical Therapy Treatment Note      Name: Husam Connolly  [facility] Number: [id number]    Therapy Diagnosis:   Encounter Diagnosis   Name Primary?    Vertigo Yes       Physician: Williams Knight DO    Visit Date: 5/30/2024    Physician Orders: PT Eval and Treat   Medical Diagnosis from Referral: Vertigo [R42]   Evaluation Date: 4/16/2024  Authorization Period Expiration: 12/31/2024  Plan of Care Expiration: 5/24/2024  Progress Note Due: 5/16/2024  Date of Surgery: N/A  Visit # / Visits authorized: 2/20 (+eval)  FOTO: 1/3     Precautions: Standard     Time In: 3:15PM  Time Out: 4:00PM  Total Billable Time: 45 minutes (3NMR)     PTA Visit #: 0/5       Subjective     Patient reports: No new complaints today; celebrating his 89rd birthday with family this weekend.  Response to previous treatment: no adverse affect   Functional change: decreased dizziness    Pain: 0/10  Location:  n/a     Objective      m-CTSIB (each position held 30 seconds for pass)  - Condition 1 (eyes open, solid surface): P min sway  - Condition 2 (eyes closed, solid surface): P mod sway  - Condition 3 (eyes open, foam surface): P mod sway  - Condition 4 (eyes closed, foam surface): F immediate      Treatment     Husam received the treatments listed below:      Patient participated in neuromuscular re-education activities to improve: Balance and Coordination for 45 minutes. The following activities were included:   - mCTSIB  - Normal base of support on Airex + horizontal head turns 3x30"  - Normal base of support on Airex + vertical head turns 3x30"  - Normal base of support on Airex + eyes closed 3x30"  - Modified single leg stance on 6" step 2x30" each leg leading  - Sit to stands from 24" plyobox standing on Airex 2x8  - Walking + horizontal head turns 4x45'  - Walking + vertical head turns 4x45'  - Reciprocal step taps to 6" stair 3x30"  - Session ended on treadmill (patient request) x 5 minutes at speed " setting 1.5    Patient Education and Home Exercises       Education provided: Educated on recommendation for habituation/balance training.     Written Home Exercises Provided: none provided  Assessment     Husam tolerated session with appropriate responses and no adverse effect. Occasional rest breaks required due to dyspnea on exertion/chronic emphysema but patient able to recover appropriately after rest. High reliance on vision for static postural control noted. He is challenged appropriately by activities with head turns, on unstable surfaces, and/or with eyes closed. Instructed upon additional balance HEP items on pillow for recreation of unstable surface. He would benefit from continued PT services to achieve functional goals.    Husam Is progressing well towards his goals.   Patient prognosis is Fair.     Patient will continue to benefit from skilled outpatient physical therapy to address the deficits listed in the problem list box on initial evaluation, provide pt/family education and to maximize pt's level of independence in the home and community environment.     Patient's spiritual, cultural and educational needs considered and pt agreeable to plan of care and goals.     Anticipated barriers to physical therapy: Age    Goals: Goals:  Short Term Goals = Long Term Goals: 5 weeks   Patient will display negative Alex-Hallpike and Roll Tests bilaterally in order to fully resolve BPPV and improve comfort with bed mobility  Patient will score </=8% on Dizziness Handicap Inventory in order to improve general activity tolerance and quality of life  Patient will improve Functional Gait Assessment (FGA) score to at least 22/30 for increased independence with home and community ambulation.     Patient will score >/=15 seconds on condition 4 of mCTSIB in order to improve postural control in dim/dark environments     Plan     Continue plan of care (POC) with balance/habituation training.     RUBINA PIRES, PT

## 2024-06-05 ENCOUNTER — TELEPHONE (OUTPATIENT)
Dept: FAMILY MEDICINE | Facility: CLINIC | Age: 89
End: 2024-06-05

## 2024-06-05 ENCOUNTER — OFFICE VISIT (OUTPATIENT)
Dept: FAMILY MEDICINE | Facility: CLINIC | Age: 89
End: 2024-06-05
Payer: MEDICARE

## 2024-06-05 ENCOUNTER — HOSPITAL ENCOUNTER (OUTPATIENT)
Dept: SLEEP MEDICINE | Facility: HOSPITAL | Age: 89
Discharge: HOME | End: 2024-06-05
Attending: PSYCHIATRY & NEUROLOGY
Payer: MEDICARE

## 2024-06-05 VITALS
HEIGHT: 70 IN | HEART RATE: 82 BPM | OXYGEN SATURATION: 95 % | BODY MASS INDEX: 23.26 KG/M2 | DIASTOLIC BLOOD PRESSURE: 60 MMHG | WEIGHT: 162.5 LBS | SYSTOLIC BLOOD PRESSURE: 146 MMHG

## 2024-06-05 DIAGNOSIS — N18.32 TYPE 2 DIABETES MELLITUS WITH STAGE 3B CHRONIC KIDNEY DISEASE, WITHOUT LONG-TERM CURRENT USE OF INSULIN: Primary | ICD-10-CM

## 2024-06-05 DIAGNOSIS — G47.31 CENTRAL SLEEP APNEA: Primary | ICD-10-CM

## 2024-06-05 DIAGNOSIS — G47.30 SLEEP APNEA, UNSPECIFIED TYPE: ICD-10-CM

## 2024-06-05 DIAGNOSIS — R42 VERTIGO: ICD-10-CM

## 2024-06-05 DIAGNOSIS — I95.1 ORTHOSTATIC HYPOTENSION: ICD-10-CM

## 2024-06-05 DIAGNOSIS — B37.2 YEAST INFECTION OF THE SKIN: ICD-10-CM

## 2024-06-05 DIAGNOSIS — N18.32 STAGE 3B CHRONIC KIDNEY DISEASE: ICD-10-CM

## 2024-06-05 DIAGNOSIS — I45.10 RBBB: ICD-10-CM

## 2024-06-05 DIAGNOSIS — G47.33 OSA (OBSTRUCTIVE SLEEP APNEA): ICD-10-CM

## 2024-06-05 DIAGNOSIS — R53.83 FATIGUE, UNSPECIFIED TYPE: ICD-10-CM

## 2024-06-05 DIAGNOSIS — D69.2 SENILE PURPURA: ICD-10-CM

## 2024-06-05 DIAGNOSIS — E11.22 TYPE 2 DIABETES MELLITUS WITH STAGE 3B CHRONIC KIDNEY DISEASE, WITHOUT LONG-TERM CURRENT USE OF INSULIN: Primary | ICD-10-CM

## 2024-06-05 PROCEDURE — 95810 POLYSOM 6/> YRS 4/> PARAM: CPT

## 2024-06-05 PROCEDURE — 1159F MED LIST DOCD IN RCRD: CPT | Mod: CPTII,S$GLB,, | Performed by: STUDENT IN AN ORGANIZED HEALTH CARE EDUCATION/TRAINING PROGRAM

## 2024-06-05 PROCEDURE — 3288F FALL RISK ASSESSMENT DOCD: CPT | Mod: CPTII,S$GLB,, | Performed by: STUDENT IN AN ORGANIZED HEALTH CARE EDUCATION/TRAINING PROGRAM

## 2024-06-05 PROCEDURE — 1101F PT FALLS ASSESS-DOCD LE1/YR: CPT | Mod: CPTII,S$GLB,, | Performed by: STUDENT IN AN ORGANIZED HEALTH CARE EDUCATION/TRAINING PROGRAM

## 2024-06-05 PROCEDURE — 99999 PR PBB SHADOW E&M-EST. PATIENT-LVL V: CPT | Mod: PBBFAC,,, | Performed by: STUDENT IN AN ORGANIZED HEALTH CARE EDUCATION/TRAINING PROGRAM

## 2024-06-05 PROCEDURE — G2211 COMPLEX E/M VISIT ADD ON: HCPCS | Mod: S$GLB,,, | Performed by: STUDENT IN AN ORGANIZED HEALTH CARE EDUCATION/TRAINING PROGRAM

## 2024-06-05 PROCEDURE — 1160F RVW MEDS BY RX/DR IN RCRD: CPT | Mod: CPTII,S$GLB,, | Performed by: STUDENT IN AN ORGANIZED HEALTH CARE EDUCATION/TRAINING PROGRAM

## 2024-06-05 PROCEDURE — 3077F SYST BP >= 140 MM HG: CPT | Mod: CPTII,S$GLB,, | Performed by: STUDENT IN AN ORGANIZED HEALTH CARE EDUCATION/TRAINING PROGRAM

## 2024-06-05 PROCEDURE — 2023F DILAT RTA XM W/O RTNOPTHY: CPT | Mod: CPTII,S$GLB,, | Performed by: STUDENT IN AN ORGANIZED HEALTH CARE EDUCATION/TRAINING PROGRAM

## 2024-06-05 PROCEDURE — 3078F DIAST BP <80 MM HG: CPT | Mod: CPTII,S$GLB,, | Performed by: STUDENT IN AN ORGANIZED HEALTH CARE EDUCATION/TRAINING PROGRAM

## 2024-06-05 PROCEDURE — 1126F AMNT PAIN NOTED NONE PRSNT: CPT | Mod: CPTII,S$GLB,, | Performed by: STUDENT IN AN ORGANIZED HEALTH CARE EDUCATION/TRAINING PROGRAM

## 2024-06-05 PROCEDURE — 99214 OFFICE O/P EST MOD 30 MIN: CPT | Mod: S$GLB,,, | Performed by: STUDENT IN AN ORGANIZED HEALTH CARE EDUCATION/TRAINING PROGRAM

## 2024-06-05 RX ORDER — GLIPIZIDE 5 MG/1
5 TABLET, FILM COATED, EXTENDED RELEASE ORAL
Qty: 90 TABLET | Refills: 3 | Status: SHIPPED | OUTPATIENT
Start: 2024-06-05 | End: 2025-06-05

## 2024-06-05 RX ORDER — LANCETS
EACH MISCELLANEOUS
Qty: 200 EACH | Refills: 2 | Status: SHIPPED | OUTPATIENT
Start: 2024-06-05

## 2024-06-05 RX ORDER — DOXYLAMINE SUCCINATE 25 MG
TABLET ORAL 2 TIMES DAILY
Qty: 106 G | Refills: 0 | Status: SHIPPED | OUTPATIENT
Start: 2024-06-05

## 2024-06-05 NOTE — PROGRESS NOTES
Progress Note  [facility]    Subjective:     Husam Connolly is a 89 y.o. year old male who presents to clinic for T2DM    Diabetes: Has been having some skin yeast with the jardiance. Has occurred daily. Ran out of test strips so hasn't been checking sugar. Jardiance also very expensive.     Vertigo: Doing therapy and no longer having dizzy spells. Working on balance currently and noticing a lot of improvement in breathing and daily function.     Sleep: Getting sleep apnea test done this evening.     RBBB/orthostatic hypotension: Previously seeing cardiology. Cardiologist retired, needs to establish with new physician.     Patient Active Problem List    Diagnosis Date Noted    Muscle weakness 03/11/2024    Shoulder stiffness, left 03/11/2024    Pain in joint of left shoulder 03/11/2024    Vertigo 03/05/2024    Orthostatic hypotension 01/30/2024    COPD, group B, by GOLD 2017 classification 08/23/2023    Pulmonary hypertension 07/24/2023    Shoulder arthritis 01/05/2023    RBBB 11/29/2022    Pulmonary nodules     Dupuytren's contracture of left hand 03/21/2022    Small bowel obstruction due to adhesions 02/11/2022    Type 2 diabetes mellitus with stage 3b chronic kidney disease, without long-term current use of insulin 09/22/2021    Aortic atherosclerosis 05/11/2021    Chronic rhinitis 03/01/2021    S/P colostomy 03/01/2021    Right foot drop 08/26/2020    High cholesterol 08/26/2020    Ventral hernia without obstruction or gangrene 02/27/2020    S/P cholecystectomy 07/01/2019    Stage 3b chronic kidney disease 12/13/2018    Resistant hypertension     BPH (benign prostatic hyperplasia)         Review of patient's allergies indicates:   Allergen Reactions    Solarcaine [benzocaine-triclosan]     Sulfa (sulfonamide antibiotics)     Sulfamethoxazole     Trimethoprim     Hytrin [terazosin] Rash    Smz-tmp ds [sulfamethoxazole-trimethoprim] Rash        Past Medical History:   Diagnosis  Date    BPH (benign prostatic hyperplasia)     Diabetes mellitus (type 2)     Emphysema 2022    High cholesterol 2020    Hypertension     RBBB 2022    Small bowel obstruction 2019      Past Surgical History:   Procedure Laterality Date    ANASTOMOSIS OF ILEUM TO RECTUM  2019    Procedure: ANASTOMOSIS, COLO-RECTAL;  Surgeon: Fouzia Gardner MD;  Location: [facility] OR;  Service: General;;    CHOLECYSTECTOMY N/A 3/7/2022    Procedure: CHOLECYSTECTOMY;  Surgeon: Fouzia Gardner MD;  Location: [facility] OR;  Service: General;  Laterality: N/A;    COLOSTOMY  2019    Procedure: CREATION, COLOSTOMY;  Surgeon: Fouzia Gardner MD;  Location: [facility] OR;  Service: General;;    DUPUYTREN CONTRACTURE RELEASE Left 2022    Procedure: RELEASE, DUPUYTREN CONTRACTURE;  Surgeon: Slick Baldwin Jr., MD;  Location: [facility] OR;  Service: Orthopedics;  Laterality: Left;    EYE SURGERY      GISELA PROCEDURE  2019    Procedure: GISELA PROCEDURE;  Surgeon: Fouzia Gardner MD;  Location: [facility] OR;  Service: General;;  Colon resection    LYSIS OF ADHESIONS N/A 3/7/2022    Procedure: LYSIS, ADHESIONS;  Surgeon: Fouzia Gardner MD;  Location: [facility];  Service: General;  Laterality: N/A;    TONSILLECTOMY        Family History   Problem Relation Name Age of Onset    Heart disease Father      Hypertension Father      Cancer Mother cancer breas varian     Diabetes Paternal Aunt        Social History     Tobacco Use    Smoking status: Former     Current packs/day: 0.00     Average packs/day: 1 pack/day for 25.0 years (25.0 ttl pk-yrs)     Types: Cigarettes     Start date:      Quit date:      Years since quittin.4     Passive exposure: Past    Smokeless tobacco: Never   Substance Use Topics    Alcohol use: Not Currently     Alcohol/week: 5.0 standard drinks of alcohol     Types: 5 Cans of beer per week        Objective:     Vitals:    24 0943 24 0951   BP: (!) 146/60  "(!) 146/60   BP Location: Left arm    Patient Position: Sitting    BP Method: Medium (Manual)    Pulse: 82    SpO2: 95%    Weight: 73.7 kg (162 lb 7.7 oz)    Height: 5' 10" (1.778 m)      BMI: 23.31    Gen: No apparent distress, well nourished and developed, appears stated age  CV: RRR, S1 and S2 present, no LE edema  Resp: CTAB, normal respiratory effort  Skin: Multiple superficial bruises with thinning of skin on extremities b/l    Assessment/Plan:     Husam Connolly is a 89 y.o. year old male who presents to clinic for T2DM    1. Type 2 diabetes mellitus with stage 3b chronic kidney disease, without long-term current use of insulin  Will discontinue jardiance given yeast infection. Considering cost will start glipizide. Unfortunately metfromin had to be discontinued due to CKD. Medication, side effects and proper use discussed. Pt verbalized understanding and was in agreement with the plan.    - blood sugar diagnostic Strp; To check sugar daily, to use with insurance preferred meter  Dispense: 200 each; Refill: 2  - lancets Misc; To check sugar daily, to use with insurance preferred meter  Dispense: 200 each; Refill: 2  - glipiZIDE 5 MG TR24; Take 1 tablet (5 mg total) by mouth daily with breakfast.  Dispense: 90 tablet; Refill: 3  - HEMOGLOBIN A1C; Future  - BASIC METABOLIC PANEL; Future  - LIPID PANEL; Future    2. Yeast infection of the skin  Medication, side effects and proper use discussed. Pt verbalized understanding and was in agreement with the plan.    - miconazole (MICOTIN) 2 % cream; Apply topically 2 (two) times daily.  Dispense: 106 g; Refill: 0    3. Stage 3b chronic kidney disease  Stable.   - BASIC METABOLIC PANEL; Future    4. RBBB  Will refer to new cardiologist for continued management.   - Ambulatory referral/consult to Cardiology; Future    5. Orthostatic hypotension  As above  - Ambulatory referral/consult to Cardiology; Future    6. Senile purpura  Stable.     7. Fatigue, unspecified " type  Pending sleep study today.     8. Vertigo  Doing well with rehab, resolved.      Follow-up: 3 months for T2DM      Williams Knight DO  Family Medicine

## 2024-06-05 NOTE — TELEPHONE ENCOUNTER
----- Message from Mandy Herrera sent at 6/5/2024 11:33 AM CDT -----  Type:  Pharmacy Calling to Clarify an RX    Pharmacy Name: [id number] - [place] - [address] AT [facility]  Prescription Name: lancets and test strips   What do they need to clarify?: No meter was sent over, wanted to make sure if dr needed to put in order for one, or is pt going to purchase on their own

## 2024-06-06 ENCOUNTER — CLINICAL SUPPORT (OUTPATIENT)
Dept: REHABILITATION | Facility: HOSPITAL | Age: 89
End: 2024-06-06
Payer: MEDICARE

## 2024-06-06 DIAGNOSIS — R42 VERTIGO: Primary | ICD-10-CM

## 2024-06-06 DIAGNOSIS — I48.0 PAROXYSMAL ATRIAL FIBRILLATION: Primary | ICD-10-CM

## 2024-06-06 PROBLEM — G47.30 SLEEP APNEA: Status: ACTIVE | Noted: 2024-06-06

## 2024-06-06 PROCEDURE — 97112 NEUROMUSCULAR REEDUCATION: CPT | Mod: PN,CQ

## 2024-06-06 NOTE — TELEPHONE ENCOUNTER
----- Message from Yoselyn Lemons sent at 6/6/2024 12:07 PM CDT -----  Type:  Pharmacy Calling to Clarify an RX    Name of Caller:Arabella  Pharmacy Name:[facility] #[id number] - [place], [place] - [address] AT [facility]  Prescription Name:glipiZIDE 5 MG TR24  What do they need to clarify?:The pt is allergic to sulfur.  Best Call Back Number:[phone]  fax [phone]  Additional Information: Pt is a t Pharmacy

## 2024-06-06 NOTE — PROGRESS NOTES
Education was done via explanation of sleep study process and procedure.  All questions were answered.    Pt did not meet criteria for CPAP. Some respiratory events were observed. REM sleep was obtained.    Low sat of 89% was observed in study. EKG revealed frequent PVC. Soft to moderate snoring was heard. Thank you letter was given in a.m.

## 2024-06-06 NOTE — PROGRESS NOTES
"[facility]   Physical Therapy Treatment Note      Name: Husam Connolly  [facility] Number: [id number]    Therapy Diagnosis:   Encounter Diagnosis   Name Primary?    Vertigo Yes       Physician: Williams Knight DO    Visit Date: 6/6/2024    Physician Orders: PT Eval and Treat   Medical Diagnosis from Referral: Vertigo [R42]   Evaluation Date: 4/16/2024  Authorization Period Expiration: 12/31/2024  Plan of Care Expiration: 5/24/2024  Progress Note Due: 5/16/2024  Date of Surgery: N/A  Visit # / Visits authorized: 2/20 (+eval)  FOTO: 1/3     Precautions: Standard     Time In: 3:15PM  Time Out: 4:00PM  Total Billable Time: 45 minutes (3NMR)     PTA Visit #: 0/5       Subjective     Patient reports: No new complaints today; celebrating his 89rd birthday with family this weekend.  Response to previofdus treatment: no adverse affect   Functional change: decreased dizziness    Pain: 0/10  Location:  n/a     Objective      m-CTSIB (each position held 30 seconds for pass)  - Condition 1 (eyes open, solid surface): P min sway  - Condition 2 (eyes closed, solid surface): P mod sway  - Condition 3 (eyes open, foam surface): P mod sway  - Condition 4 (eyes closed, foam surface): F immediate      Treatment     Husam received the treatments listed below:      Patient participated in neuromuscular re-education activities to improve: Balance and Coordination for 45 minutes. The following activities were included:   - Nustep level 3.0 for double peak for 8 minutes  - Normal base of support on Airex + horizontal head turns 3x30"  - Normal base of support on Airex + vertical head turns 3x30"  - Normal base of support on Airex + eyes closed 3x30"  - SLS trials with touchdown fingertip support 2x30" ea leg  - Modified single leg stance on 6" step 2x30" each leg leading  - Sit to stands from 24" plyobox standing on Airex 2x8  - Standing cone pick ups 2x10 from floor   - Walking + horizontal head turns 4x45'  - " "Walking + vertical head turns 4x45'  - Reciprocal step taps to 6" stair 3x30"  - Session ended on treadmill (patient request) x 5 minutes at speed setting 1.5    Patient Education and Home Exercises       Education provided: Educated on recommendation for habituation/balance training.     Written Home Exercises Provided: none provided  Assessment     Husam arrived to session without any complaints and was agreeable to treatment.  Good tolerance of treatment with progressions, noted in bold, completed without adverse response. Occasional rest breaks required due to dyspnea on exertion/chronic emphysema but patient able to recover appropriately after rest. Improvements with single leg focused activity.  No loss of balance or dizziness with overground ambulation with mild path deviation observed. He would benefit from continued PT services to achieve functional goals.    Husam Is progressing well towards his goals.   Patient prognosis is Fair.     Patient will continue to benefit from skilled outpatient physical therapy to address the deficits listed in the problem list box on initial evaluation, provide pt/family education and to maximize pt's level of independence in the home and community environment.     Patient's spiritual, cultural and educational needs considered and pt agreeable to plan of care and goals.     Anticipated barriers to physical therapy: Age    Goals: Goals:  Short Term Goals = Long Term Goals: 5 weeks   Patient will display negative Marlow-Hallpike and Roll Tests bilaterally in order to fully resolve BPPV and improve comfort with bed mobility  Patient will score </=8% on Dizziness Handicap Inventory in order to improve general activity tolerance and quality of life  Patient will improve Functional Gait Assessment (FGA) score to at least 22/30 for increased independence with home and community ambulation.     Patient will score >/=15 seconds on condition 4 of mCTSIB in order to improve postural " control in dim/dark environments     Plan     Continue plan of care (POC) with balance/habituation training.     Elizabeth Perdomo, PTA

## 2024-06-07 ENCOUNTER — OFFICE VISIT (OUTPATIENT)
Dept: CARDIOLOGY | Facility: CLINIC | Age: 89
End: 2024-06-07
Payer: MEDICARE

## 2024-06-07 VITALS
HEIGHT: 70 IN | OXYGEN SATURATION: 98 % | DIASTOLIC BLOOD PRESSURE: 65 MMHG | SYSTOLIC BLOOD PRESSURE: 142 MMHG | WEIGHT: 163.56 LBS | BODY MASS INDEX: 23.42 KG/M2 | HEART RATE: 83 BPM

## 2024-06-07 DIAGNOSIS — N18.31 TYPE 2 DIABETES MELLITUS WITH STAGE 3A CHRONIC KIDNEY DISEASE, WITHOUT LONG-TERM CURRENT USE OF INSULIN: ICD-10-CM

## 2024-06-07 DIAGNOSIS — I10 PRIMARY HYPERTENSION: Primary | ICD-10-CM

## 2024-06-07 DIAGNOSIS — N18.30 STAGE 3 CHRONIC KIDNEY DISEASE, UNSPECIFIED WHETHER STAGE 3A OR 3B CKD: ICD-10-CM

## 2024-06-07 DIAGNOSIS — I27.20 PULMONARY HYPERTENSION: ICD-10-CM

## 2024-06-07 DIAGNOSIS — I45.10 RBBB: ICD-10-CM

## 2024-06-07 DIAGNOSIS — E78.00 HIGH CHOLESTEROL: ICD-10-CM

## 2024-06-07 DIAGNOSIS — I95.1 ORTHOSTATIC HYPOTENSION: ICD-10-CM

## 2024-06-07 DIAGNOSIS — I70.0 AORTIC ATHEROSCLEROSIS: ICD-10-CM

## 2024-06-07 DIAGNOSIS — I48.0 PAROXYSMAL ATRIAL FIBRILLATION: ICD-10-CM

## 2024-06-07 DIAGNOSIS — J43.2 CENTRILOBULAR EMPHYSEMA: ICD-10-CM

## 2024-06-07 DIAGNOSIS — E11.22 TYPE 2 DIABETES MELLITUS WITH STAGE 3A CHRONIC KIDNEY DISEASE, WITHOUT LONG-TERM CURRENT USE OF INSULIN: ICD-10-CM

## 2024-06-07 PROCEDURE — 1126F AMNT PAIN NOTED NONE PRSNT: CPT | Mod: CPTII,S$GLB,, | Performed by: STUDENT IN AN ORGANIZED HEALTH CARE EDUCATION/TRAINING PROGRAM

## 2024-06-07 PROCEDURE — 1159F MED LIST DOCD IN RCRD: CPT | Mod: CPTII,S$GLB,, | Performed by: STUDENT IN AN ORGANIZED HEALTH CARE EDUCATION/TRAINING PROGRAM

## 2024-06-07 PROCEDURE — 1101F PT FALLS ASSESS-DOCD LE1/YR: CPT | Mod: CPTII,S$GLB,, | Performed by: STUDENT IN AN ORGANIZED HEALTH CARE EDUCATION/TRAINING PROGRAM

## 2024-06-07 PROCEDURE — 99215 OFFICE O/P EST HI 40 MIN: CPT | Mod: 25,S$GLB,, | Performed by: STUDENT IN AN ORGANIZED HEALTH CARE EDUCATION/TRAINING PROGRAM

## 2024-06-07 PROCEDURE — 1160F RVW MEDS BY RX/DR IN RCRD: CPT | Mod: CPTII,S$GLB,, | Performed by: STUDENT IN AN ORGANIZED HEALTH CARE EDUCATION/TRAINING PROGRAM

## 2024-06-07 PROCEDURE — 3288F FALL RISK ASSESSMENT DOCD: CPT | Mod: CPTII,S$GLB,, | Performed by: STUDENT IN AN ORGANIZED HEALTH CARE EDUCATION/TRAINING PROGRAM

## 2024-06-07 PROCEDURE — 99999 PR PBB SHADOW E&M-EST. PATIENT-LVL IV: CPT | Mod: PBBFAC,,, | Performed by: STUDENT IN AN ORGANIZED HEALTH CARE EDUCATION/TRAINING PROGRAM

## 2024-06-07 PROCEDURE — 93000 ELECTROCARDIOGRAM COMPLETE: CPT | Mod: S$GLB,,, | Performed by: STUDENT IN AN ORGANIZED HEALTH CARE EDUCATION/TRAINING PROGRAM

## 2024-06-07 NOTE — PROGRESS NOTES
Subjective:   @Patient ID:  Husam Connolly is a 89 y.o. male who presents for follow-up of No chief complaint on file.      HPI:   Mr. Connolly is a pleasant 89 yo gentleman here for follow up. LV visit was seen by Dr. Looney. Pmhx of HFpEF, DM, COPD, RBBB, HLD, CKD3. Previos visit patient reported dizziness/vertigo however, today he states he is feeling much better with no recent symptoms. He was on a SGLT2i but developed a yeast infection and was transition to glipizide. Voiced no CV complaints. Denies cp, sob, palpitations, presyncope/dizziness, syncope, orthopnea, PND, bendopnea, decrease ET, NVDC, fever, chills.          Echo 3/2022  · The left ventricle is normal in size with concentric remodeling and normal systolic function.  · The estimated ejection fraction is 55%.  · Grade I left ventricular diastolic dysfunction.  · There is abnormal septal wall motion.  · Normal right ventricular size with normal right ventricular systolic function.  · The estimated PA systolic pressure is 47 mmHg.  · Normal central venous pressure (3 mmHg).  · There is pulmonary hypertension.      Patient Active Problem List    Diagnosis Date Noted    Sleep apnea 06/06/2024    Senile purpura 06/05/2024    Muscle weakness 03/11/2024    Shoulder stiffness, left 03/11/2024    Pain in joint of left shoulder 03/11/2024    Vertigo 03/05/2024    Orthostatic hypotension 01/30/2024    COPD, group B, by GOLD 2017 classification 08/23/2023    Pulmonary hypertension 07/24/2023    Shoulder arthritis 01/05/2023    RBBB 11/29/2022    Pulmonary nodules     Dupuytren's contracture of left hand 03/21/2022    Small bowel obstruction due to adhesions 02/11/2022    Type 2 diabetes mellitus with stage 3b chronic kidney disease, without long-term current use of insulin 09/22/2021    Aortic atherosclerosis 05/11/2021    Chronic rhinitis 03/01/2021    S/P colostomy 03/01/2021    Right foot drop 08/26/2020    High cholesterol 08/26/2020    Ventral hernia  "without obstruction or gangrene 02/27/2020    S/P cholecystectomy 07/01/2019    Stage 3b chronic kidney disease 12/13/2018    Resistant hypertension     BPH (benign prostatic hyperplasia)                     LABS  CBC  @LABRCNTIP(WBC:3,RBC:3,HGB:3,HCT:3,PLT:3,MCV:3,MCH:3,MCHC:3)@  BMP  @LABRCNTIP(NA:3,K:3,CO2:3,CL:3,BUN:3,Creatinine:3,GLU:3,GFR:3)@    POCT-Glucose  No results found for: "POCTGLUCOSE"    @LABRCNTIP(Calcium:3,MG:3,PHOS:3)@  LFT  @LABRCNTIP(PROT:3,Albumin:3,,Bilitot:3,AST:3,Alkphos:3,ALT:3)@  GFR     COAGS  @LABRCNTIP(PT:3,INR:3,APTT:3)@  CE  @LABRCNTIP(troponini:3,cktotal:3,ckmb:3)@  ABGs  @[id number](PH,PCO2,PO2,HCO3,POCSATURATED,BE)@  BNP  @LABRCNTIP(BNP:3)@    LAST HbA1c  Lab Results   Component Value Date    HGBA1C 7.5 (H) 01/26/2024       Lipid panel  Lab Results   Component Value Date    CHOL 180 01/26/2023    CHOL 165 11/28/2022    CHOL 164 08/25/2021     Lab Results   Component Value Date    HDL 69 01/26/2023    HDL 59 11/28/2022    HDL 58 08/25/2021     Lab Results   Component Value Date    LDLCALC 88.2 01/26/2023    LDLCALC 78.6 11/28/2022    LDLCALC 75 08/25/2021     Lab Results   Component Value Date    TRIG 114 01/26/2023    TRIG 137 11/28/2022    TRIG 217 (H) 08/25/2021     Lab Results   Component Value Date    CHOLHDL 38.3 01/26/2023    CHOLHDL 35.8 11/28/2022    CHOLHDL 2.8 08/25/2021            Review of Systems   All other systems reviewed and are negative.      Objective:   General Appearance: Pleasant, NAD  Eyes: PERRL, EOMI, anicteric  ENT: MMM w/o erythema  Neck: Supple, JVP < 7cm w/o HJR, 2+ carotid pulses, no bruits.  Lungs: Normal inspiratory effort, clear to auscultation bilaterally.  Heart: Regular rate & rhythm, normal S1 and S2, no murmurs, rubs, or gallops.  Abdomen: Soft, non-tender, non-distended. Normal bowel sounds.   Skin/Extremities: No cyanosis, clubbing or edema. No rash or ecchymosis.  Peripheral pulses: 2+ radial, dorsalis pedis and posterior tibialis " bilaterally.  Neurologic: No gross cranial nerve or focal neurologic deficits.  Psychiatric: Appropriate, normal affect.          Assessment:     1. Primary hypertension    2. RBBB    3. Orthostatic hypotension    4. Paroxysmal atrial fibrillation    5. Aortic atherosclerosis    6. Stage 3 chronic kidney disease, unspecified whether stage 3a or 3b CKD    7. Pulmonary hypertension    8. High cholesterol    9. Centrilobular emphysema    10. Type 2 diabetes mellitus with stage 3a chronic kidney disease, without long-term current use of insulin        Plan:   Primary hypertension-- continue current medical management. BP at goal for age.      Pulmonary hypertension- continue to monitor.  Will do echocardiogram     RBBB- monitor     High cholesterol- continue diet controlled     Aortic atherosclerosis- continue asa     Centrilobular emphysema- follows pulm     Stage 3 chronic kidney disease, unspecified whether stage 3a or 3b CKD     Type 2 diabetes mellitus with stage 3a chronic kidney disease, without long-term current use of insulin     Orthostatic hypotension- Reported that this has improved significantly with no recent episodes.        Same meds.     Note pt had severe pedal edema when taking a calcium channel blocker     RTC  6 months     Weight loss  Exercise    Continue with current medical plan and lifestyle changes.  Return sooner for concerns or questions. If symptoms persist go to the ED  I have reviewed all pertinent data on this patient       She expressed verbal understanding and agreed with the plan      Follow up as scheduled. Return sooner for concerns or questions      I have reviewed the patient's medical history in detail and updated the computerized patient record.    Orders Placed This Encounter   Procedures    Echo     Standing Status:   Future     Standing Expiration Date:   6/7/2025     Scheduling Instructions:      06/17-06/28     Order Specific Question:   Release to patient     Answer:    Immediate       Follow up as scheduled. Return sooner for concerns or questions            He expressed verbal understanding and agreed with the plan        Patient's Medications   New Prescriptions    No medications on file   Previous Medications    ALBUTEROL (VENTOLIN HFA) 90 MCG/ACTUATION INHALER    Inhale 2 puffs into the lungs every 4 (four) hours as needed for Wheezing. Rescue    ASPIRIN (ECOTRIN) 81 MG EC TABLET    Take 81 mg by mouth once daily.    ATORVASTATIN (LIPITOR) 40 MG TABLET    Take 1 tablet (40 mg total) by mouth once daily.    BLOOD SUGAR DIAGNOSTIC STRP    To check sugar daily, to use with insurance preferred meter    FINASTERIDE (PROSCAR) 5 MG TABLET    Take 1 tablet (5 mg total) by mouth once daily.    FLUTICASONE PROPIONATE (FLONASE) 50 MCG/ACTUATION NASAL SPRAY    2 sprays (100 mcg total) by Each Nostril route once daily.    FLUTICASONE-UMECLIDIN-VILANTER (TRELEGY ELLIPTA) 100-62.5-25 MCG DSDV    Inhale 1 puff into the lungs once daily.    GARLIC ORAL    Take 1 capsule by mouth once daily.    GLIPIZIDE 5 MG TR24    Take 1 tablet (5 mg total) by mouth daily with breakfast.    HYDRALAZINE (APRESOLINE) 50 MG TABLET    Take 1 tablet (50 mg total) by mouth 3 (three) times daily.    INHALATION SPACING DEVICE    Use as directed for inhalation.    LANCETS MISC    To check sugar daily, to use with insurance preferred meter    LOSARTAN (COZAAR) 100 MG TABLET    Take 1 tablet (100 mg total) by mouth once daily.    MECLIZINE (ANTIVERT) 12.5 MG TABLET    TAKE 1 TABLET(12.5 MG) BY MOUTH THREE TIMES DAILY AS NEEDED FOR DIZZINESS    MICONAZOLE (MICOTIN) 2 % CREAM    Apply topically 2 (two) times daily.    MULTIVITAMIN CAPSULE    Take 1 capsule by mouth once daily.    OMEGA-3 FATTY ACIDS/FISH OIL (FISH OIL-OMEGA-3 FATTY ACIDS) 300-1,000 MG CAPSULE    Take 1 capsule by mouth once daily.    VITAMIN D (VITAMIN D3) 1000 UNITS TAB    Take 1,000 Units by mouth once daily.   Modified Medications    No medications on  file   Discontinued Medications    No medications on file        Tommy Elizabeth MD  Cardiovascular Disease [facility]

## 2024-06-07 NOTE — TELEPHONE ENCOUNTER
Called and spoke with the patient. Had a mild rash with sulfa drugs in the past. Would not anticipate him having issues with taking sulfonylureas given very mild allergic reaction. Discussed if he gets a rash to stop the medication and let me know. Called pharmacy and left a voicemail that it is safe to fill glipizide prescription.     Williams Knight, DO  Family Medicine

## 2024-06-09 LAB
OHS QRS DURATION: 110 MS
OHS QTC CALCULATION: 441 MS

## 2024-06-10 PROBLEM — G47.31 CENTRAL SLEEP APNEA: Status: ACTIVE | Noted: 2024-06-10

## 2024-06-10 PROBLEM — G47.33 OSA (OBSTRUCTIVE SLEEP APNEA): Status: ACTIVE | Noted: 2024-06-06

## 2024-06-10 PROCEDURE — 95810 POLYSOM 6/> YRS 4/> PARAM: CPT | Mod: 26,,, | Performed by: PSYCHIATRY & NEUROLOGY

## 2024-06-10 NOTE — PROCEDURES
"Diagnostic Report  [facility] [place]  [address], [place] [address]  Phone: [phone]  Fax: [phone]       Patient Name: WINNIE GARNICA Study Date: 6/5/2024   YOB: 1931 Patient MRN: [id number]   Age:  89 year     Sex: Male Referring Physician: MARLYN CHACON M.D   Height: 5' 10" Recording Tech: -   Weight: 160.9 lbs Scoring Tech: Isaiah Meyers RPSGT   BMI:  23.3 AASM:  1B   AHI: 16.7 Interpreting Physician: Laura Chacon MD     Low oxygen sat: 85.0%   RDI: 50.6        Polysomnogram Data: A full night polysomnogram recorded the standard physiologic parameters including EEG, EOG, EMG, EKG, nasal and oral airflow.  Respiratory parameters of chest and abdominal movements were recorded with Pe[place]-Crystal motion transducers.  Oxygen saturation was recorded by pulse oximetry.    Sleep architecture: This is a baseline polysomnogram. At light's out, the patient fell asleep in 41.7 minutes and slept for 70.3% of the time. Total sleep time (TST) was 319.0 minutes. 27.4% of TST was in Stage N1 sleep, 0.0% TST in slow wave sleep, and 16.0% TST in REM sleep. The REM latency was 81.0 minutes. Sleep architecture was significantly disrupted due to underlying sleep apnea.     Respiratory: Mild to moderate  snoring was present. The polysomnogram revealed a presence of 25 obstructive, 58 central, and - mixed apneas resulting in a Total Apnea index of 15.6 events per hour.  There were 6 hypopneas resulting in a Total Hypopnea index of 1.1 events per hour.  The combined Apnea/Hypopnea index was 16.7 events per hour.  There were a total of 180 RERA events resulting in a Respiratory Disturbance Index (RDI) of 50.6 events per hour.  Mean oxygen saturation was 91.7%.  The lowest oxygen saturation during sleep was 85.0%.  Time spent ?88% oxygen saturation was 5.5 minutes (1.2% of time).The patient did not qualify for a split night study due to an insufficient number of events in the first half of " "the study. Apneas index ws 15.7/hr; over 50% of apneas were central.      Motor movement / Parasomnia: There were significant  limb movements of sleep noted. The total limb movement index was 45.0 (21.4 with arousal).     Cardiac: Cardiac rhythm monitoring revealed a sinus rhythm.  with occasional PVCs. The average pulse rate was 58.6 bpm.  The minimum pulse rate was 42.0 bpm while the maximum pulse rate was 87.0 bpm.      Patient perception: On a post-sleep study questionnaire, the patient indicated that sleep was the same in the lab than compared to home.    IMPRESSION:  Central sleep apnea (G47.31). Obstructive Sleep Apnea (G47.33),  severe  based on AASM criteria. Met CMS criteria.    RECOMMENDATION:    CPAP titration study, if the patient is interested in this treatment modality.  In the interim, the patient should avoid supine position sleep, since sleep disordered breathing was most prevalent in this sleeping position.        I have reviewed the attached data report and the raw data tracings of this study epoch-by-epoch and have determined that the recording quality and scoring of events are sufficient to allow for interpretation and electronically signed by:      Laura Chacon MD 6/5/2024                              [facility]    Diagnostic PSG Report    Patient Name: WINNIE GARNICA Study Date: 6/5/2024   YOB: 1931 MRN #: [id number]   Age: 89 year JAYLEN #: [id number]   Sex: Male Referring Provider: MARLYN CHACON M.D   Height: 5' 10" Recording Tech: -   Weight: 160.9 lbs Scoring Tech: Isaiah Meyers RRT RPSGT   BMI: 23.3 Interpreting Physician: Laura Chacon MD   ESS: - Neck Circumference: -     Study Overview    Lights Off: 09:44:45 PM  Count Index   Lights On: 05:18:40 AM Awakenings: 47 8.8   Time in Bed: 453.9 min. Arousals: 306 57.6   Total Sleep Time: 319.0 min. Apneas & Hypopneas: 89 16.7    Sleep Efficiency: 70.3% Limb Movements: 239 45.0   Sleep Latency: 41.7 " min. Snores: 1 0.2   Wake After Sleep Onset: 93.0 min. Desaturations: 7 1.3    REM Latency from Sleep Onset: 81.0 min. Minimum SpO2 TST: 85.0%        Sleep Architecture   % of Time in Bed    Stages Time (mins) % Sleep Time   Wake 135.5    Stage N1 87.5 27.4%   Stage N2 180.5 56.6%   Stage N3 0.0 0.0%   REM 51.0 16.0%         Arousal Summary     NREM REM Sleep Index   Respiratory Arousals 63 17 80 15.0   PLM Arousals 111 - 111 20.9   Isolated Limb Movement Arousals 3 - 3 0.6   Spontaneous Arousals 110 8 118 22.2   Total 281 25 306 57.6       Limb Movement Summary     Count Index   Isolated Limb Movements 5 0.9   Periodic Limb Movements (PLMs) 234 44.0   Total Limb Movements 239 45.0         Respiratory Summary     By Sleep Stage By Body Position Total    NREM REM Supine Non-Supine    Time (min) 268.0 51.0 - 319.0 319.0           Obstructive Apnea 14 11 - 25 25   Mixed Apnea - - - - -   Central Apnea 56 2 - 58 58   Total Apneas 70 13 - 83 83   Total Apnea Index 15.7 15.3 - 15.6 15.6           Hypopnea 2 4 - 6 6   Hypopnea Index 0.4 4.7 - 1.1 1.1           Apnea & Hypopnea 72 17 - 89 89   Apnea & Hypopnea Index 16.1 20.0 - 16.7 16.7           RERAs 153 27 - 180 180   RERA Index 34.3 31.8 - 33.9 33.9           RDI 50.4 51.8 - 50.6 50.6      Scoring Criteria: Hypopneas scored at Choose an item.% desaturation criteria.    Respiratory Event Durations     Apnea Hypopnea    NREM REM NREM REM   Average (seconds) 13.4 12.8 16.8 16.9   Maximum (seconds) 19.3 16.0 17.4 21.3       Oxygen Saturation Summary     Wake NREM REM TST TIB   Average SpO2 92.4% 91.4% 91.3% 91.4% 91.7%   Minimum SpO2 86.0% 88.0% 85.0% 85.0% 85.0%   Maximum SpO2 99.0% 96.0% 96.0% 96.0% 99.0%       Oxygen Saturation Distribution    Range (%) Time in range (min) Time in range (%)   90.0 - 100.0 301.3 66.3%   80.0 - 90.0 152.3 33.5%   70.0 - 80.0 - -   60.0 - 70.0 - -   50.0 - 60.0 - -   0.0 - 50.0 - -   Time Spent ?88% SpO2    Range (%) Time in range (min)  Time in range (%)   0.0 - 88.0 5.5 1.2%          Count Index   Desaturations 7 1.3      Cardiac Summary     Wake NREM REM Sleep Total   Average Pulse Rate (BPM) 65.0 55.3 58.9 55.9 58.6   Minimum Pulse Rate (BPM) 43.0 42.0 44.0 42.0 42.0   Maximum Pulse Rate (BPM) 87.0 71.0 71.0 71.0 87.0     Pulse Rate Distribution    Range (bpm) Time in range (min) Time in range (%)   0.0 - 40.0 - -   40.0 - 60.0 270.9 59.7%   60.0 - 80.0 180.6 39.8%   80.0 - 100.0 2.6 0.6%   100.0 - 120.0 - -   120.0 - 140.0 - -   140.0 - 200.0 - -     EtCO2 Summary    Stage Min (mmHg) Average (mmHg) Max (mmHg)   Wake - - -   NREM(1+2+3) - - -   REM - - -     Range (mmHg) Time in range (min) Time in range (%)   20.0 - 40.0 - -   40.0 - 50.0 - -   50.0 - 55.0 - -   55.0 - 100.0 - -   Excluded data <20.0 & >65.0 454.5 100.0%     TcCO2 Summary    Stage Min (mmHg) Average (mmHg) Max (mmHg)   Wake - - -   NREM(1+2+3) - - -   REM - - -     Range (mmHg) Time in range (min) Time in range (%)   - - -   - - -   - - -   - - -   - - -     Comments    -

## 2024-06-11 ENCOUNTER — TELEPHONE (OUTPATIENT)
Dept: SLEEP MEDICINE | Facility: CLINIC | Age: 89
End: 2024-06-11
Payer: MEDICARE

## 2024-06-11 NOTE — TELEPHONE ENCOUNTER
Rosa Everett,     Please call the patient to inform re: recent sleep study was positive for  significant (severe)  obstructive sleep apnea  He will need another sleep study to determine CPAP settings - I have ordered the CPAP study for him, sleep lab will reach out to him to schedule.    Thank you,    MARLYN

## 2024-06-12 ENCOUNTER — CLINICAL SUPPORT (OUTPATIENT)
Dept: REHABILITATION | Facility: HOSPITAL | Age: 89
End: 2024-06-12
Payer: MEDICARE

## 2024-06-12 ENCOUNTER — TELEPHONE (OUTPATIENT)
Dept: SLEEP MEDICINE | Facility: CLINIC | Age: 89
End: 2024-06-12
Payer: MEDICARE

## 2024-06-12 DIAGNOSIS — R42 VERTIGO: Primary | ICD-10-CM

## 2024-06-12 PROCEDURE — 97112 NEUROMUSCULAR REEDUCATION: CPT | Mod: PN,CQ

## 2024-06-12 NOTE — PROGRESS NOTES
"[facility]   Physical Therapy Treatment Note      Name: Husam Connolly  [facility] Number: [id number]    Therapy Diagnosis:   Encounter Diagnosis   Name Primary?    Vertigo Yes       Physician: Williams Knight DO    Visit Date: 6/12/2024    Physician Orders: PT Eval and Treat   Medical Diagnosis from Referral: Vertigo [R42]   Evaluation Date: 4/16/2024  Authorization Period Expiration: 12/31/2024  Plan of Care Expiration: 5/24/2024  Progress Note Due: 5/16/2024  Date of Surgery: N/A  Visit # / Visits authorized: 4/20 (+eval)  FOTO: 1/3     Precautions: Standard     Time In: 10:30 AM  Time Out: 11:15 AM  Total Billable Time: 45 minutes (3NMR)     PTA Visit #: 2/5     Subjective     Patient reports: No new complaints today.  Response to previofdus treatment: no adverse affect   Functional change: decreased dizziness    Pain: 0/10  Location:  n/a     Objective        Treatment     Husam received the treatments listed below:      Patient participated in neuromuscular re-education activities to improve: Balance and Coordination for 45 minutes. The following activities were included:   - Nustep level 3.0 for double peak for 6 minutes  - Normal base of support on Airex + horizontal head turns 3x30"  - Normal base of support on Airex + vertical head turns 3x30"  - Normal base of support on Airex + eyes closed 3x30"  - SLS trials with touchdown fingertip support 2x30" ea leg  - Modified single leg stance on 6" step 2x30" each leg leading  - Sit to stands from 24" plyobox standing on Airex 2x8  - Walking cone pick ups x8 from floor   - Walking + horizontal head turns 4x45'  - Walking + vertical head turns 4x45'  - Reciprocal step taps to 6" stair 3x30"  - Session ended on treadmill (patient request) x 8 minutes at speed setting 2.0    Patient Education and Home Exercises       Education provided: Educated on recommendation for habituation/balance training.     Written Home Exercises Provided: none " provided  Assessment     Husam arrived to session without any complaints and was agreeable to treatment.  Good tolerance of treatment with progressions, noted in bold, completed without adverse response. Occasional rest breaks required due to dyspnea on exertion/chronic emphysema but patient able to recover appropriately after rest. Improvements with single leg focused activity.  No loss of balance or dizziness with overground ambulation with mild path deviation observed. He would benefit from continued PT services to achieve functional goals.    Husam Is progressing well towards his goals.   Patient prognosis is Fair.     Patient will continue to benefit from skilled outpatient physical therapy to address the deficits listed in the problem list box on initial evaluation, provide pt/family education and to maximize pt's level of independence in the home and community environment.     Patient's spiritual, cultural and educational needs considered and pt agreeable to plan of care and goals.     Anticipated barriers to physical therapy: Age    Goals: Goals:  Short Term Goals = Long Term Goals: 5 weeks   Patient will display negative Alex-Hallpike and Roll Tests bilaterally in order to fully resolve BPPV and improve comfort with bed mobility  Patient will score </=8% on Dizziness Handicap Inventory in order to improve general activity tolerance and quality of life  Patient will improve Functional Gait Assessment (FGA) score to at least 22/30 for increased independence with home and community ambulation.     Patient will score >/=15 seconds on condition 4 of mCTSIB in order to improve postural control in dim/dark environments     Plan     Continue plan of care (POC) with balance/habituation training.     Elizabeth Perdomo, PTA

## 2024-06-13 ENCOUNTER — TELEPHONE (OUTPATIENT)
Dept: FAMILY MEDICINE | Facility: CLINIC | Age: 89
End: 2024-06-13
Payer: MEDICARE

## 2024-06-13 DIAGNOSIS — E11.22 TYPE 2 DIABETES MELLITUS WITH STAGE 3B CHRONIC KIDNEY DISEASE, WITHOUT LONG-TERM CURRENT USE OF INSULIN: Primary | ICD-10-CM

## 2024-06-13 DIAGNOSIS — N18.32 TYPE 2 DIABETES MELLITUS WITH STAGE 3B CHRONIC KIDNEY DISEASE, WITHOUT LONG-TERM CURRENT USE OF INSULIN: Primary | ICD-10-CM

## 2024-06-13 RX ORDER — INSULIN PUMP SYRINGE, 3 ML
EACH MISCELLANEOUS
Qty: 1 EACH | Refills: 0 | Status: SHIPPED | OUTPATIENT
Start: 2024-06-13 | End: 2025-06-13

## 2024-06-13 NOTE — TELEPHONE ENCOUNTER
----- Message from Stephanie Chinchilla RN sent at 6/12/2024 12:41 PM CDT -----  Regarding: MRN: [id number]  Please see below.    Thanks  ----- Message -----  From: Rosey John  Sent: 6/12/2024  12:25 PM CDT  To: Eugene Bergman Staff    Type:  Pharmacy Calling to Clarify an RX    Name of Caller: Isabelle   Pharmacy Name:[facility]  Prescription Name: ACCU  meter  What do they need to clarify?: Pt needs an ACCU check meter  Best Call Back Number: [phone]  Additional Information: Pt is currently at the pharmacy waiting and would like a call back as soon as possible.

## 2024-06-14 ENCOUNTER — CLINICAL SUPPORT (OUTPATIENT)
Dept: REHABILITATION | Facility: HOSPITAL | Age: 89
End: 2024-06-14
Payer: MEDICARE

## 2024-06-14 DIAGNOSIS — R42 VERTIGO: Primary | ICD-10-CM

## 2024-06-14 PROCEDURE — 97530 THERAPEUTIC ACTIVITIES: CPT | Mod: PN

## 2024-06-14 NOTE — PROGRESS NOTES
[facility]   Physical Therapy Treatment Note      Name: Husam Connolly  [facility] Number: [id number]    Therapy Diagnosis:   Encounter Diagnosis   Name Primary?    Vertigo Yes     Physician: Williams Knight DO    Visit Date: 6/14/2024    Physician Orders: PT Eval and Treat   Medical Diagnosis from Referral: Vertigo [R42]   Evaluation Date: 4/16/2024  Authorization Period Expiration: 12/31/2024  Plan of Care Expiration: 5/24/2024  Progress Note Due: 5/16/2024  Date of Surgery: N/A  Visit # / Visits authorized: 4/20 (+eval)  FOTO: 1/3     Precautions: Standard     Time In: 9:00 AM  Time Out: 9:45 AM  Total Billable Time: 45 minutes total but 30 minutes of 1:1 time (2TA)     PTA Visit #: 0/5     Subjective     Patient reports: Ready for discharge today. No dizziness reported and also thinks his balance has improved to some extent.  Response to previofdus treatment: no adverse affect   Functional change: decreased dizziness    Pain: 0/10  Location:  n/a     Objective      m-CTSIB (each position held 30 seconds for pass)  - Condition 1 (eyes open, solid surface): P min sway  - Condition 2 (eyes closed, solid surface): P mod sway  - Condition 3 (eyes open, foam surface): P mod sway  - Condition 4 (eyes closed, foam surface): F 11 seconds with multiple attempts    Functional Gait Assessment:     1. Gait on level surface =  3   (3) Normal: less than 5.5 sec, no A.D., no imbalance, normal gait pattern, deviates <6in   (2) Mild impairment: 7-5.6 sec, uses A.D., mild gait deviations, or deviates 6-10 in   (1) Moderate impairment: > 7 sec, slow speed, imbalance, deviates 10-15 in.   (0) Severe impairment: needs assist, deviates >15 in, reach/touch wall  2. Change in Gait Speed = 3   (3) Normal: smooth change w/o loss of balance or gait deviation, deviates < 6 in, significant difference between speeds   (2) Mild impairment: changes speed, but demonstrates mild gait deviations, deviates 6-10 in, OR no  deviations but unable to significantly speed, OR uses A.D.   (1) Moderate impairment: minor changes to speed, OR changes speed w/ significant deviations, deviates 10-15 in, OR  Changes speed , but loses balance & recovers   (0) Severe impairment: cannot change speed, deviates >15 in, or loses balance & needs assist  3. Gait with horizontal head turns  = 3   (3) Normal: no change in gait, deviates <6 in   (2) Mild impairment: slight change in speed, deviates 6-10 in, OR uses A.D.   (1) Moderate impairment: moderate change in speed, deviates 10-15 in   (0) Severe impairment: severe disruption of gait, deviates >15in  4. Gait with vertical head turns = 2   (3) Normal: no change in gait, deviates <6 in   (2) Mild impairment: slight change in speed, deviates 6-10 in OR uses A.D.   (1) Moderate impairment: moderate change in speed, deviates 10-15 in   (0) Severe impairment: severe disruption of gait, deviates >15 in  5. Gait with pivot turns = 3   (3) Normal: performs safely in 3 sec, no LOB   (2) Mild impairment: performs in >3 sec & no LOB, OR turns safely & requires several steps to regain LOB   (1) Moderate impairment: turns slow, OR requires several small steps for balance following turn & stop   (0) Severe impairment: cannot turn safely, needs assist  6. Step over obstacle = 3   (3) Normal: steps over 2 stacked boxes w/o change in speed or LOB   (2) Mild impairment: able to step over 1 box w/o change in speed or LOB   (1) Moderate impairment: steps over 1 box but must slow down, may require VC   (0) Severe impairment: cannot perform w/o assist  7. Gait with Narrow SONIA = 1   (3) Normal: 10 steps no staggering   (2) Mild impairment: 7-9 steps   (1) Moderate impairment: 4-7 steps   (0) Severe impairment: < 4 steps or cannot perform w/o assist  8. Gait with eyes closed = 1   (3) Normal: < 7 sec, no A.D., no LOB, normal gait pattern, deviates <6 in   (2) Mild impairment: 7.1-9 sec, mild gait deviations, deviates 6-10  in   (1) Moderate impairment: > 9 sec, abnormal pattern, LOB, deviates 10-15 in   (0) Severe impairment: cannot perform w/o assist, LOB, deviates >15in  9. Ambulating Backwards = 2   (3) Normal: no A.D., no LOB, normal gait pattern, deviates <6in   (2) Mild impairment: uses A.D., slower speed, mild gait deviations, deviates 6-10 in   (1) Moderate impairment: slow speed, abnormal gait pattern, LOB, deviates 10-15 in   (0) Severe impairment: severe gait deviations or LOB, deviates >15in  10. Steps = 2   (3) Normal: alternating feet, no rail   (2) Mild Impairment: alternating feet, uses rail   (1) Moderate impairment: step-to, uses rail   (0) Severe impairment: cannot perform safely    Score 23/30     Cutoffs:   <22/30 fall risk in older adults  <18/30 fall risk in Parkinsons    MDC/MCID:  Stroke = 4.2 points (MDC)  Vestibular = 6 points (MDC)  Geriatric = 4 points (MCID)  Parkinson's = 4 points (MDC)    FOTO/DHI: 24% but patient answering in context of other medical conditions as well    Treatment     Husam received the treatments listed below:      Patient participated in therapeutic activities to improve functional performance 45 minutes. The following activities were included:   - Goal reassessment (see above)  - Patient education on continuing regular cardiovascular fitness per ACSM guidelines for weekly activity  - Session ended on treadmill x 8 minutes at speed setting 2.0    Patient Education and Home Exercises       Education provided: Educated on recommendation for habituation/balance training.     Written Home Exercises Provided: none provided  Assessment     Husam arrived to session ready for discharge. He reports continued absence of dizziness and improvement in perceived balance. Per FGA, fall risk with community mobility tasks has improved. Sensory integration still impaired, but patient's advanced age is likely a factor. Due to degree of goal achievement coupled with subjective improvement in balance  confidence, patient is appropriate for discharge at this time.    Husam Is progressing well towards his goals.   Patient prognosis is Fair.     Patient's spiritual, cultural and educational needs considered and pt agreeable to plan of care and goals.     Anticipated barriers to physical therapy: Age    Goals: Goals:  Short Term Goals = Long Term Goals: 5 weeks   Patient will display negative [place]-Hallpike and Roll Tests bilaterally in order to fully resolve BPPV and improve comfort with bed mobility (met)  Patient will score </=8% on Dizziness Handicap Inventory in order to improve general activity tolerance and quality of life (progressing, not met)  Patient will improve Functional Gait Assessment (FGA) score to at least 22/30 for increased independence with home and community ambulation. (met)  Patient will score >/=15 seconds on condition 4 of mCTSIB in order to improve postural control in dim/dark environments (progressing, not met)    Plan     Discharge PT    RUBINA PIRES, PT

## 2024-06-17 ENCOUNTER — HOSPITAL ENCOUNTER (OUTPATIENT)
Dept: CARDIOLOGY | Facility: HOSPITAL | Age: 89
Discharge: HOME | End: 2024-06-17
Attending: STUDENT IN AN ORGANIZED HEALTH CARE EDUCATION/TRAINING PROGRAM
Payer: MEDICARE

## 2024-06-17 VITALS — BODY MASS INDEX: 23.34 KG/M2 | WEIGHT: 163 LBS | HEIGHT: 70 IN

## 2024-06-17 DIAGNOSIS — I95.1 ORTHOSTATIC HYPOTENSION: ICD-10-CM

## 2024-06-17 DIAGNOSIS — I45.10 RBBB: ICD-10-CM

## 2024-06-17 LAB
ASCENDING AORTA: 3.23 CM
AV INDEX (PROSTH): 0.61
AV MEAN GRADIENT: 4 MMHG
AV PEAK GRADIENT: 7 MMHG
AV VALVE AREA BY VELOCITY RATIO: 2.64 CM²
AV VALVE AREA: 2.42 CM²
AV VELOCITY RATIO: 0.66
BSA FOR ECHO PROCEDURE: 1.91 M2
CV ECHO LV RWT: 0.44 CM
DOP CALC AO PEAK VEL: 1.34 M/S
DOP CALC AO VTI: 31.8 CM
DOP CALC LVOT AREA: 4 CM2
DOP CALC LVOT DIAMETER: 2.25 CM
DOP CALC LVOT PEAK VEL: 0.89 M/S
DOP CALC LVOT STROKE VOLUME: 77.1 CM3
DOP CALC MV VTI: 36.8 CM
DOP CALCLVOT PEAK VEL VTI: 19.4 CM
E WAVE DECELERATION TIME: 167.74 MSEC
E/A RATIO: 0.62
E/E' RATIO: 14 M/S
ECHO LV POSTERIOR WALL: 1.05 CM (ref 0.6–1.1)
FRACTIONAL SHORTENING: 35 % (ref 28–44)
INTERVENTRICULAR SEPTUM: 0.89 CM (ref 0.6–1.1)
IVC DIAMETER: 1.13 CM
LA MAJOR: 5.3 CM
LA MINOR: 5 CM
LA WIDTH: 3.1 CM
LEFT ATRIUM SIZE: 3.78 CM
LEFT ATRIUM VOLUME INDEX MOD: 20 ML/M2
LEFT ATRIUM VOLUME INDEX: 26.8 ML/M2
LEFT ATRIUM VOLUME MOD: 38.18 CM3
LEFT ATRIUM VOLUME: 51.25 CM3
LEFT INTERNAL DIMENSION IN SYSTOLE: 3.12 CM (ref 2.1–4)
LEFT VENTRICLE DIASTOLIC VOLUME INDEX: 56.09 ML/M2
LEFT VENTRICLE DIASTOLIC VOLUME: 107.13 ML
LEFT VENTRICLE MASS INDEX: 85 G/M2
LEFT VENTRICLE SYSTOLIC VOLUME INDEX: 20.2 ML/M2
LEFT VENTRICLE SYSTOLIC VOLUME: 38.5 ML
LEFT VENTRICULAR INTERNAL DIMENSION IN DIASTOLE: 4.79 CM (ref 3.5–6)
LEFT VENTRICULAR MASS: 162.77 G
LV LATERAL E/E' RATIO: 13 M/S
LV SEPTAL E/E' RATIO: 15.17 M/S
LVOT MG: 2.13 MMHG
LVOT MV: 0.72 CM/S
MV MEAN GRADIENT: 2 MMHG
MV PEAK A VEL: 1.46 M/S
MV PEAK E VEL: 0.91 M/S
MV PEAK GRADIENT: 7 MMHG
MV VALVE AREA BY CONTINUITY EQUATION: 2.1 CM2
OHS LV EJECTION FRACTION SIMPSONS BIPLANE MOD: 64 %
PISA MRMAX VEL: 4.03 M/S
PISA TR MAX VEL: 3.69 M/S
PULM VEIN S/D RATIO: 1.19
PV MV: 0.72 M/S
PV PEAK D VEL: 0.27 M/S
PV PEAK GRADIENT: 5 MMHG
PV PEAK S VEL: 0.32 M/S
PV PEAK VELOCITY: 1.17 M/S
RA MAJOR: 4.47 CM
RA PRESSURE ESTIMATED: 3 MMHG
RA WIDTH: 4.4 CM
RV TB RVSP: 7 MMHG
RV TISSUE DOPPLER FREE WALL SYSTOLIC VELOCITY 1 (APICAL 4 CHAMBER VIEW): 13.89 CM/S
SINUS: 3.07 CM
STJ: 1.8 CM
TDI LATERAL: 0.07 M/S
TDI SEPTAL: 0.06 M/S
TDI: 0.07 M/S
TR MAX PG: 54 MMHG
TRICUSPID ANNULAR PLANE SYSTOLIC EXCURSION: 2.33 CM
TV REST PULMONARY ARTERY PRESSURE: 57 MMHG
Z-SCORE OF LEFT VENTRICULAR DIMENSION IN END DIASTOLE: -1.15
Z-SCORE OF LEFT VENTRICULAR DIMENSION IN END SYSTOLE: -0.47

## 2024-06-17 PROCEDURE — 93306 TTE W/DOPPLER COMPLETE: CPT

## 2024-06-17 PROCEDURE — 93306 TTE W/DOPPLER COMPLETE: CPT | Mod: 26,,, | Performed by: INTERNAL MEDICINE

## 2024-07-03 ENCOUNTER — HOSPITAL ENCOUNTER (INPATIENT)
Facility: HOSPITAL | Age: 89
LOS: 2 days | Discharge: HOME HEALTH | DRG: 389 | End: 2024-07-06
Attending: STUDENT IN AN ORGANIZED HEALTH CARE EDUCATION/TRAINING PROGRAM | Admitting: INTERNAL MEDICINE
Payer: MEDICARE

## 2024-07-03 DIAGNOSIS — M21.371 RIGHT FOOT DROP: ICD-10-CM

## 2024-07-03 DIAGNOSIS — K56.609 SBO (SMALL BOWEL OBSTRUCTION): Primary | ICD-10-CM

## 2024-07-03 DIAGNOSIS — K56.50 SMALL BOWEL OBSTRUCTION DUE TO ADHESIONS: ICD-10-CM

## 2024-07-03 DIAGNOSIS — J44.9 COPD, GROUP B, BY GOLD 2017 CLASSIFICATION: ICD-10-CM

## 2024-07-03 DIAGNOSIS — M19.019 SHOULDER ARTHRITIS: ICD-10-CM

## 2024-07-03 PROCEDURE — 99285 EMERGENCY DEPT VISIT HI MDM: CPT

## 2024-07-04 LAB
ALBUMIN SERPL BCP-MCNC: 3.9 G/DL (ref 3.5–5.2)
ALP SERPL-CCNC: 81 U/L (ref 55–135)
ALT SERPL W/O P-5'-P-CCNC: 16 U/L (ref 10–44)
ANION GAP SERPL CALC-SCNC: 14 MMOL/L (ref 8–16)
AST SERPL-CCNC: 20 U/L (ref 10–40)
BASOPHILS # BLD AUTO: 0.04 K/UL (ref 0–0.2)
BASOPHILS NFR BLD: 0.3 % (ref 0–1.9)
BILIRUB SERPL-MCNC: 0.9 MG/DL (ref 0.1–1)
BILIRUB UR QL STRIP: NEGATIVE
BNP SERPL-MCNC: 121 PG/ML (ref 0–99)
BUN SERPL-MCNC: 31 MG/DL (ref 10–30)
CALCIUM SERPL-MCNC: 10.1 MG/DL (ref 8.7–10.5)
CHLORIDE SERPL-SCNC: 104 MMOL/L (ref 95–110)
CHOLEST SERPL-MCNC: 131 MG/DL (ref 120–199)
CHOLEST/HDLC SERPL: 2.3 {RATIO} (ref 2–5)
CLARITY UR: CLEAR
CO2 SERPL-SCNC: 22 MMOL/L (ref 23–29)
COLOR UR: YELLOW
CREAT SERPL-MCNC: 1.7 MG/DL (ref 0.5–1.4)
DIFFERENTIAL METHOD BLD: ABNORMAL
EOSINOPHIL # BLD AUTO: 0.1 K/UL (ref 0–0.5)
EOSINOPHIL NFR BLD: 0.9 % (ref 0–8)
ERYTHROCYTE [DISTWIDTH] IN BLOOD BY AUTOMATED COUNT: 12.9 % (ref 11.5–14.5)
EST. GFR  (NO RACE VARIABLE): 37 ML/MIN/1.73 M^2
ESTIMATED AVG GLUCOSE: 143 MG/DL (ref 68–131)
GLUCOSE SERPL-MCNC: 208 MG/DL (ref 70–110)
GLUCOSE UR QL STRIP: ABNORMAL
HBA1C MFR BLD: 6.6 % (ref 4–5.6)
HCT VFR BLD AUTO: 48.6 % (ref 40–54)
HDLC SERPL-MCNC: 56 MG/DL (ref 40–75)
HDLC SERPL: 42.7 % (ref 20–50)
HGB BLD-MCNC: 16.5 G/DL (ref 14–18)
HGB UR QL STRIP: NEGATIVE
IMM GRANULOCYTES # BLD AUTO: 0.05 K/UL (ref 0–0.04)
IMM GRANULOCYTES NFR BLD AUTO: 0.3 % (ref 0–0.5)
KETONES UR QL STRIP: NEGATIVE
LACTATE SERPL-SCNC: 1.8 MMOL/L (ref 0.5–2.2)
LACTATE SERPL-SCNC: 2.5 MMOL/L (ref 0.5–2.2)
LACTATE SERPL-SCNC: 3.2 MMOL/L (ref 0.5–2.2)
LDLC SERPL CALC-MCNC: 50.6 MG/DL (ref 63–159)
LEUKOCYTE ESTERASE UR QL STRIP: NEGATIVE
LIPASE SERPL-CCNC: 17 U/L (ref 4–60)
LYMPHOCYTES # BLD AUTO: 1.2 K/UL (ref 1–4.8)
LYMPHOCYTES NFR BLD: 8 % (ref 18–48)
MCH RBC QN AUTO: 30.4 PG (ref 27–31)
MCHC RBC AUTO-ENTMCNC: 34 G/DL (ref 32–36)
MCV RBC AUTO: 90 FL (ref 82–98)
MONOCYTES # BLD AUTO: 1.1 K/UL (ref 0.3–1)
MONOCYTES NFR BLD: 7.5 % (ref 4–15)
NEUTROPHILS # BLD AUTO: 12.3 K/UL (ref 1.8–7.7)
NEUTROPHILS NFR BLD: 83 % (ref 38–73)
NITRITE UR QL STRIP: NEGATIVE
NONHDLC SERPL-MCNC: 75 MG/DL
NRBC BLD-RTO: 0 /100 WBC
PH UR STRIP: 5 [PH] (ref 5–8)
PLATELET # BLD AUTO: 200 K/UL (ref 150–450)
PMV BLD AUTO: 10.7 FL (ref 9.2–12.9)
POCT GLUCOSE: 145 MG/DL (ref 70–110)
POCT GLUCOSE: 151 MG/DL (ref 70–110)
POCT GLUCOSE: 160 MG/DL (ref 70–110)
POCT GLUCOSE: 177 MG/DL (ref 70–110)
POTASSIUM SERPL-SCNC: 4.4 MMOL/L (ref 3.5–5.1)
PROT SERPL-MCNC: 7.5 G/DL (ref 6–8.4)
PROT UR QL STRIP: ABNORMAL
RBC # BLD AUTO: 5.42 M/UL (ref 4.6–6.2)
SODIUM SERPL-SCNC: 140 MMOL/L (ref 136–145)
SP GR UR STRIP: 1.02 (ref 1–1.03)
TRIGL SERPL-MCNC: 122 MG/DL (ref 30–150)
URN SPEC COLLECT METH UR: ABNORMAL
UROBILINOGEN UR STRIP-ACNC: NEGATIVE EU/DL
WBC # BLD AUTO: 14.8 K/UL (ref 3.9–12.7)

## 2024-07-04 PROCEDURE — 25000003 PHARM REV CODE 250: Performed by: STUDENT IN AN ORGANIZED HEALTH CARE EDUCATION/TRAINING PROGRAM

## 2024-07-04 PROCEDURE — 83036 HEMOGLOBIN GLYCOSYLATED A1C: CPT | Performed by: INTERNAL MEDICINE

## 2024-07-04 PROCEDURE — 87040 BLOOD CULTURE FOR BACTERIA: CPT

## 2024-07-04 PROCEDURE — 99900035 HC TECH TIME PER 15 MIN (STAT)

## 2024-07-04 PROCEDURE — 96374 THER/PROPH/DIAG INJ IV PUSH: CPT

## 2024-07-04 PROCEDURE — 80053 COMPREHEN METABOLIC PANEL: CPT | Performed by: STUDENT IN AN ORGANIZED HEALTH CARE EDUCATION/TRAINING PROGRAM

## 2024-07-04 PROCEDURE — 83605 ASSAY OF LACTIC ACID: CPT | Mod: 91 | Performed by: STUDENT IN AN ORGANIZED HEALTH CARE EDUCATION/TRAINING PROGRAM

## 2024-07-04 PROCEDURE — 81003 URINALYSIS AUTO W/O SCOPE: CPT | Performed by: STUDENT IN AN ORGANIZED HEALTH CARE EDUCATION/TRAINING PROGRAM

## 2024-07-04 PROCEDURE — 85025 COMPLETE CBC W/AUTO DIFF WBC: CPT | Performed by: STUDENT IN AN ORGANIZED HEALTH CARE EDUCATION/TRAINING PROGRAM

## 2024-07-04 PROCEDURE — 25500020 PHARM REV CODE 255: Performed by: STUDENT IN AN ORGANIZED HEALTH CARE EDUCATION/TRAINING PROGRAM

## 2024-07-04 PROCEDURE — 83690 ASSAY OF LIPASE: CPT | Performed by: STUDENT IN AN ORGANIZED HEALTH CARE EDUCATION/TRAINING PROGRAM

## 2024-07-04 PROCEDURE — 80061 LIPID PANEL: CPT

## 2024-07-04 PROCEDURE — 83605 ASSAY OF LACTIC ACID: CPT | Mod: 91

## 2024-07-04 PROCEDURE — 94761 N-INVAS EAR/PLS OXIMETRY MLT: CPT

## 2024-07-04 PROCEDURE — 63600175 PHARM REV CODE 636 W HCPCS

## 2024-07-04 PROCEDURE — 11000001 HC ACUTE MED/SURG PRIVATE ROOM

## 2024-07-04 PROCEDURE — 63600175 PHARM REV CODE 636 W HCPCS: Performed by: STUDENT IN AN ORGANIZED HEALTH CARE EDUCATION/TRAINING PROGRAM

## 2024-07-04 PROCEDURE — 83880 ASSAY OF NATRIURETIC PEPTIDE: CPT

## 2024-07-04 PROCEDURE — 96375 TX/PRO/DX INJ NEW DRUG ADDON: CPT

## 2024-07-04 RX ORDER — LIDOCAINE HYDROCHLORIDE 20 MG/ML
5 SOLUTION OROPHARYNGEAL
Status: ACTIVE | OUTPATIENT
Start: 2024-07-04 | End: 2024-07-04

## 2024-07-04 RX ORDER — SODIUM CHLORIDE 0.9 % (FLUSH) 0.9 %
10 SYRINGE (ML) INJECTION EVERY 12 HOURS PRN
Status: DISCONTINUED | OUTPATIENT
Start: 2024-07-04 | End: 2024-07-06 | Stop reason: HOSPADM

## 2024-07-04 RX ORDER — IBUPROFEN 200 MG
16 TABLET ORAL
Status: DISCONTINUED | OUTPATIENT
Start: 2024-07-04 | End: 2024-07-04

## 2024-07-04 RX ORDER — NALOXONE HCL 0.4 MG/ML
0.02 VIAL (ML) INJECTION
Status: DISCONTINUED | OUTPATIENT
Start: 2024-07-04 | End: 2024-07-06 | Stop reason: HOSPADM

## 2024-07-04 RX ORDER — MORPHINE SULFATE 2 MG/ML
2 INJECTION, SOLUTION INTRAMUSCULAR; INTRAVENOUS
Status: DISCONTINUED | OUTPATIENT
Start: 2024-07-04 | End: 2024-07-04

## 2024-07-04 RX ORDER — INSULIN ASPART 100 [IU]/ML
0-5 INJECTION, SOLUTION INTRAVENOUS; SUBCUTANEOUS
Status: DISCONTINUED | OUTPATIENT
Start: 2024-07-04 | End: 2024-07-04

## 2024-07-04 RX ORDER — LABETALOL HYDROCHLORIDE 5 MG/ML
10 INJECTION, SOLUTION INTRAVENOUS 4 TIMES DAILY PRN
Status: DISCONTINUED | OUTPATIENT
Start: 2024-07-04 | End: 2024-07-06 | Stop reason: HOSPADM

## 2024-07-04 RX ORDER — MORPHINE SULFATE 4 MG/ML
4 INJECTION, SOLUTION INTRAMUSCULAR; INTRAVENOUS
Status: DISCONTINUED | OUTPATIENT
Start: 2024-07-04 | End: 2024-07-04

## 2024-07-04 RX ORDER — ENOXAPARIN SODIUM 100 MG/ML
30 INJECTION SUBCUTANEOUS EVERY 24 HOURS
Status: DISCONTINUED | OUTPATIENT
Start: 2024-07-04 | End: 2024-07-06 | Stop reason: HOSPADM

## 2024-07-04 RX ORDER — INSULIN ASPART 100 [IU]/ML
0-5 INJECTION, SOLUTION INTRAVENOUS; SUBCUTANEOUS EVERY 6 HOURS PRN
Status: DISCONTINUED | OUTPATIENT
Start: 2024-07-04 | End: 2024-07-06 | Stop reason: HOSPADM

## 2024-07-04 RX ORDER — IPRATROPIUM BROMIDE AND ALBUTEROL SULFATE 2.5; .5 MG/3ML; MG/3ML
3 SOLUTION RESPIRATORY (INHALATION) EVERY 4 HOURS PRN
Status: DISCONTINUED | OUTPATIENT
Start: 2024-07-04 | End: 2024-07-06 | Stop reason: HOSPADM

## 2024-07-04 RX ORDER — MORPHINE SULFATE 4 MG/ML
4 INJECTION, SOLUTION INTRAMUSCULAR; INTRAVENOUS EVERY 4 HOURS PRN
Status: DISCONTINUED | OUTPATIENT
Start: 2024-07-04 | End: 2024-07-04

## 2024-07-04 RX ORDER — SODIUM CHLORIDE, SODIUM LACTATE, POTASSIUM CHLORIDE, CALCIUM CHLORIDE 600; 310; 30; 20 MG/100ML; MG/100ML; MG/100ML; MG/100ML
INJECTION, SOLUTION INTRAVENOUS CONTINUOUS
Status: DISCONTINUED | OUTPATIENT
Start: 2024-07-04 | End: 2024-07-06

## 2024-07-04 RX ORDER — GLUCAGON 1 MG
1 KIT INJECTION
Status: DISCONTINUED | OUTPATIENT
Start: 2024-07-04 | End: 2024-07-06 | Stop reason: HOSPADM

## 2024-07-04 RX ORDER — IBUPROFEN 200 MG
24 TABLET ORAL
Status: DISCONTINUED | OUTPATIENT
Start: 2024-07-04 | End: 2024-07-04

## 2024-07-04 RX ORDER — FAMOTIDINE 10 MG/ML
40 INJECTION INTRAVENOUS ONCE
Status: COMPLETED | OUTPATIENT
Start: 2024-07-04 | End: 2024-07-04

## 2024-07-04 RX ORDER — GLUCAGON 1 MG
1 KIT INJECTION
Status: DISCONTINUED | OUTPATIENT
Start: 2024-07-04 | End: 2024-07-04

## 2024-07-04 RX ORDER — ONDANSETRON HYDROCHLORIDE 2 MG/ML
4 INJECTION, SOLUTION INTRAVENOUS EVERY 8 HOURS PRN
Status: DISCONTINUED | OUTPATIENT
Start: 2024-07-04 | End: 2024-07-06 | Stop reason: HOSPADM

## 2024-07-04 RX ORDER — ONDANSETRON HYDROCHLORIDE 2 MG/ML
4 INJECTION, SOLUTION INTRAVENOUS EVERY 6 HOURS PRN
Status: COMPLETED | OUTPATIENT
Start: 2024-07-04 | End: 2024-07-04

## 2024-07-04 RX ORDER — SODIUM CHLORIDE 9 MG/ML
1000 INJECTION, SOLUTION INTRAVENOUS
Status: COMPLETED | OUTPATIENT
Start: 2024-07-04 | End: 2024-07-04

## 2024-07-04 RX ADMIN — ENOXAPARIN SODIUM 30 MG: 30 INJECTION SUBCUTANEOUS at 05:07

## 2024-07-04 RX ADMIN — LABETALOL HYDROCHLORIDE 10 MG: 5 INJECTION INTRAVENOUS at 05:07

## 2024-07-04 RX ADMIN — SODIUM CHLORIDE 1000 ML: 9 INJECTION, SOLUTION INTRAVENOUS at 04:07

## 2024-07-04 RX ADMIN — SODIUM CHLORIDE, POTASSIUM CHLORIDE, SODIUM LACTATE AND CALCIUM CHLORIDE: 600; 310; 30; 20 INJECTION, SOLUTION INTRAVENOUS at 05:07

## 2024-07-04 RX ADMIN — ONDANSETRON 4 MG: 2 INJECTION INTRAMUSCULAR; INTRAVENOUS at 12:07

## 2024-07-04 RX ADMIN — LABETALOL HYDROCHLORIDE 10 MG: 5 INJECTION INTRAVENOUS at 06:07

## 2024-07-04 RX ADMIN — ONDANSETRON 4 MG: 2 INJECTION INTRAMUSCULAR; INTRAVENOUS at 09:07

## 2024-07-04 RX ADMIN — FAMOTIDINE 40 MG: 10 INJECTION, SOLUTION INTRAVENOUS at 12:07

## 2024-07-04 RX ADMIN — DIATRIZOATE MEGLUMINE AND DIATRIZOATE SODIUM 100 ML: 660; 100 LIQUID ORAL; RECTAL at 09:07

## 2024-07-04 RX ADMIN — LABETALOL HYDROCHLORIDE 10 MG: 5 INJECTION INTRAVENOUS at 12:07

## 2024-07-04 NOTE — H&P
[facility] Medicine H&P Note     Admitting Team: [facility] Hospitalist Team B  Attending Physician: Lauren  Resident: Ana  Intern: Emil    Date of Admit: 7/3/2024    Chief Complaint     Abdominal pain and nausea for 1 day    Subjective:      History of Present Illness:  Husam Connolly is a 89 y.o.  male with a past medical history of BPH, HFpEF, T2DM (A1c 7.5), Hypertension, RBBB, and multiple small bowel obstructions who presented to [facility] on 7/3/2024 with a primary complaint of abdominal pain and nausea for less than a day.    Patient was in their normal state of health until yesterday afternoon when he began to experience sharp, intermittent abdominal pains. He described the pain as a 5/10 sharp, crampy pain. The pain was associated with some reflux and nausea. Denies symptoms of chest pains, shortness of breath, diarrhea, fevers, or chills. Patient has a history of repeat SBOs which presented similiarly so he reported to the ED. According to him he has daily bowel movements and had not missed any prior.     Past Medical History:  HFpEF  BPH  T2DM  COPD  HLD  CKD3  PAD   PAH  RBBB    Past Surgical History:  Past Surgical History:   Procedure Laterality Date    ANASTOMOSIS OF ILEUM TO RECTUM  9/9/2019    Procedure: ANASTOMOSIS, COLO-RECTAL;  Surgeon: Fouzia Gardner MD;  Location: [facility] OR;  Service: General;;    CHOLECYSTECTOMY N/A 3/7/2022    Procedure: CHOLECYSTECTOMY;  Surgeon: Fouzia Gardner MD;  Location: [facility] OR;  Service: General;  Laterality: N/A;    COLOSTOMY  6/16/2019    Procedure: CREATION, COLOSTOMY;  Surgeon: Fouzia Gardner MD;  Location: [facility] OR;  Service: General;;    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    Procedure: RELEASE, DUPUYTREN CONTRACTURE;  Surgeon: Slick Baldwin Jr., MD;  Location: [facility] OR;  Service: Orthopedics;  Laterality: Left;    EYE SURGERY      GISELA PROCEDURE  6/16/2019    Procedure: GISELA PROCEDURE;  Surgeon: Fouzia  "VAIBHAV Gardner MD;  Location: [facility] OR;  Service: General;;  Colon resection    LYSIS OF ADHESIONS N/A 3/7/2022    Procedure: LYSIS, ADHESIONS;  Surgeon: Fouzia Gardner MD;  Location: [facility] OR;  Service: General;  Laterality: N/A;    TONSILLECTOMY         Allergies:  Review of patient's allergies indicates:   Allergen Reactions    Solarcaine [benzocaine-triclosan]     Sulfa (sulfonamide antibiotics)     Sulfamethoxazole     Trimethoprim     Hytrin [terazosin] Rash    Jardiance [empagliflozin] Rash     Yeast infection    Smz-tmp ds [sulfamethoxazole-trimethoprim] Rash       Home Medications:  Albuterol   ASA 81  Lipitor 80  D3  Finasteride 5  Flonase  Trelegy   Glipizide 5  Hydralazine 50 TID  Losartan 100    Family History:  Family History   Problem Relation Name Age of Onset    Heart disease Father      Hypertension Father      Cancer Mother cancer breas varian     Diabetes Paternal Aunt         Social History:  Tobacco- 30 pack year smoker, quit in 40s  Alcohol- 1-2 standard drinks a day  Illicits- denies    Review of Systems:  Pertinent items are noted in HPI. All other systems are reviewed and are negative.    Health Maintaince :   Primary Care Physician: Williams Knight     Immunizations:   TDap  UTD   Flu  NUTD  Pna UTD    Cancer Screening:  Colonoscopy: aged out      Objective:   Last 24 Hour Vital Signs:  BP  Min: 153/68  Max: 190/95  Temp  Av.1 °F (36.7 °C)  Min: 97.7 °F (36.5 °C)  Max: 98.4 °F (36.9 °C)  Pulse  Av.5  Min: 72  Max: 102  Resp  Av.7  Min: 18  Max: 20  SpO2  Av.8 %  Min: 92 %  Max: 96 %  Height  Av' 9" (175.3 cm)  Min: 5' 9" (175.3 cm)  Max: 5' 9" (175.3 cm)  Weight  Av.5 kg (166 lb 6 oz)  Min: 73.9 kg (163 lb)  Max: 77 kg (169 lb 12.1 oz)  Body mass index is 25.07 kg/m².  No intake/output data recorded.    Physical Examination:  Gen: AAOx3, NAD, pleasant on interaction  HEENT: head normocephalic, atraumatic; PERRL; EOMI; trachea midline  Cardiac: regular rate and " rhythm; no murmurs, rubs, or gallops, JVP 8  Resp: minor crackles in lower lung fields b/l, no wheezing; normal work of breathing  GI: distended, diffusely tender to palpation throughout, hyperactive bowel sounds   Extremities: no clubbing or swelling noted, +2 pitting edema from ankles - shins   Skin: no rashes or bruises noted  Neuro: AAOx3, CN III-VII, IX-XII intact; strength 5/5 in all extremities; sensation intact to light touch in all extremities       Laboratory:  Most Recent Data:  CBC:   Lab Results   Component Value Date    WBC 14.80 (H) 07/04/2024    HGB 16.5 07/04/2024    HCT 48.6 07/04/2024     07/04/2024    MCV 90 07/04/2024    RDW 12.9 07/04/2024     BMP:   Lab Results   Component Value Date     07/04/2024    K 4.4 07/04/2024     07/04/2024    CO2 22 (L) 07/04/2024    BUN 31 (H) 07/04/2024    CREATININE 1.7 (H) 07/04/2024     (H) 07/04/2024    CALCIUM 10.1 07/04/2024    MG 2.4 01/26/2023    PHOS 1.8 (L) 09/21/2022     LFTs:   Lab Results   Component Value Date    PROT 7.5 07/04/2024    ALBUMIN 3.9 07/04/2024    BILITOT 0.9 07/04/2024    AST 20 07/04/2024    ALKPHOS 81 07/04/2024    ALT 16 07/04/2024     Coags:   Lab Results   Component Value Date    INR 1.1 09/10/2019     FLP:   Lab Results   Component Value Date    CHOL 180 01/26/2023    HDL 69 01/26/2023    LDLCALC 88.2 01/26/2023    TRIG 114 01/26/2023    CHOLHDL 38.3 01/26/2023     DM:   Lab Results   Component Value Date    HGBA1C 7.5 (H) 01/26/2024    HGBA1C 7.5 (H) 09/19/2023    HGBA1C 7.7 (H) 01/26/2023    MICROALBUR 0.7 08/25/2021    LDLCALC 88.2 01/26/2023    CREATININE 1.7 (H) 07/04/2024     Thyroid:   Lab Results   Component Value Date    TSH 1.020 01/26/2024     Anemia:   Lab Results   Component Value Date    ZZLLFOBU70 387 11/28/2022     Cardiac:   Lab Results   Component Value Date    TROPONINI 0.020 03/06/2022    BNP 84 05/19/2023     Urinalysis:   Lab Results   Component Value Date    COLORU Yellow 07/04/2024  "   SPECGRAV 1.020 07/04/2024    NITRITE Negative 07/04/2024    KETONESU Negative 07/04/2024    UROBILINOGEN Negative 07/04/2024    WBCUA 3 09/19/2022       Trended Lab Data:  Recent Labs   Lab 07/04/24  0022   WBC 14.80*   HGB 16.5   HCT 48.6      MCV 90   RDW 12.9      K 4.4      CO2 22*   BUN 31*   CREATININE 1.7*   *   PROT 7.5   ALBUMIN 3.9   BILITOT 0.9   AST 20   ALKPHOS 81   ALT 16       Trended Cardiac Data:  No results for input(s): "TROPONINI", "CKTOTAL", "CKMB", "BNP" in the last 168 hours.    Microbiology Data:  UA - +1 glucose     Other Results:  EKG (my interpretation): Not obtained     Radiology:  Imaging Results              XR NG/OG tube placement check, non-radiologist performed (In process)                       CT Abdomen Pelvis  Without Contrast (Final result)  Result time 07/04/24 01:29:08      Final result by Inez Hamm MD (07/04/24 01:29:08)                   Impression:      Small-bowel obstruction with transition point in the right lower quadrant.  Similar findings noted on prior CT abdomen pelvis 09/19/2022    Diverticulosis without evidence of diverticulitis.  Postoperative changes with anastomotic sutures in the descending colon.    Multiple simple renal cysts.    Additional incidental nonacute findings as above.    This report was flagged in Epic as abnormal.      Electronically signed by: Inez Hamm  Date:    07/04/2024  Time:    01:29               Narrative:    EXAMINATION:  CT ABDOMEN PELVIS WITHOUT CONTRAST    CLINICAL HISTORY:  Abdominal pain, acute, nonlocalized;    TECHNIQUE:  Low dose axial images, sagittal and coronal reformations were obtained from the lung bases to the pubic symphysis, Oral contrast was not administered .    COMPARISON:  CT abdomen pelvis 09/19/2022    FINDINGS:  Heart: Normal in size. No pericardial effusion.    Lung Bases: Well aerated, without consolidation or pleural fluid.  Mild basilar " atelectasis/scarring.    Liver: Normal in size and attenuation.  Stable cystic foci in the left lower lobe.    Gallbladder: No calcified gallstones.    Bile Ducts: No evidence of dilated ducts.    Pancreas: No mass or peripancreatic fat stranding.    Spleen: Unremarkable.    Adrenals: Unremarkable.    Kidneys/ Ureters: Multiple bilateral renal cysts are noted which are simple.  There is no evidence of nephrolithiasis or hydronephrosis.  Vascular calcifications noted..    Bladder: No evidence of wall thickening.    Reproductive organs: Prostate is enlarged..    GI Tract/Mesentery: There is a small hiatal hernia.  There are loops of dilated small bowel in the right and left lower quadrants with transition point in the right lower quadrant and some evidence of tethering.  There is note of postoperative changes with sutures along the distal descending colon.  Extensive diverticulosis of the distal colon noted is.  There is no convincing diverticulitis.  Appendix is not conspicuous.    Peritoneal Space: No ascites. No free air.    Retroperitoneum: No significant adenopathy.    Abdominal wall: Bilateral inguinal fat containing hernias are noted..    Vasculature: There is moderate atherosclerosis of the aorta and its imaged branches.  No aneurysm.    Bones: No acute fracture.  Spondylosis of the spine and degenerative changes of the hips bilaterally.                                       Assessment:     Husam Connolly is a 89 y.o.  male with a past medical history of BPH, HFpEF, T2DM (A1c 7.5), Hypertension, RBBB, and multiple small bowel obstructions who presented to [facility] on 7/3/2024 with a primary complaint of abdominal pain and nausea for less than a day. General Surgery consulted in the ED, recommending medical management at this time. Admitted to [facility] for Partial Small Bowel obstruction      Plan:     Partial Small Bowel Obstruction   Lactic acidosis  - Hx of multiple obstructions  2/2 adhesions from abdominal surgeries  - CT abd / Pelvis significant for small bowel obstruction with transition point   - General Surgery consulted in the ED, recommended medical management at this time  - Lactic acid trending upward 2.5 -> 3.2  - initial exam slightly tender but not tense, no guarding or other concerning feature.   - exam unchanged, patient comfortable, passing gas  Plan  - NG tube placed to suction  - made NPO  - General Surgery consulted, appreciate recs  - Fluid replacement with LR     Leukocytosis   - WBC 14  - afebrile, not septic, only endorsing nausea and abdmonial pain  - no findings on CT Ab / Pelvis concerning for infection  - UA noninfectious appearing, cxr clear  - Likely 2/2 constipation and stress  Plan  - Continue to monitor    HFpEF  RBBB  - last echo 6/07/24 with good LVEF  - physical exam notable for +2 pitting edema to shins and elevated JVP  - BNP ordered 127, patient not obese  Plan  - continue to monitor     CKD3  - sCr 1.7, appears at baseline  Plan  - Avoid nephrotoxic medications    COPD  - managed with albuterol rescue and trelegy at home  Plan  - duoneb while inpatient     HLD  - on Lipitor 40 at home  Plan  - holding while NPO    T2DM   - A1c 7.5  - On glipizide 5 at home  Plan  - Holding while NPO  - SSI    BPH  - on finasteride 5 at home  Plan  - holding while NPO    Healthcare maintenance  -PCP is Scot Knight    Fluids:   Electrolytes: None  Nutrition: NPO  PPx: Lovenox  Code status: Full    Dispo: pending resolution of symptoms and bowel movements     Vikram Bang MD  \[facility]\"" Internal Medicine HO-III    \[facility]\"" Medicine Hospitalist Pager numbers:   \[facility]\"" Hospitalist Medicine Team A (Sunday/Yasmin): [phone]  \[facility]\"" Hospitalist Medicine Team B (Melida/Lauren):  [phone]

## 2024-07-04 NOTE — PROGRESS NOTES
07/04/24 0443   Admission   Initial VN Admission Questions Complete   Shift   Virtual Nurse - Patient Verbalized Approval Of Camera Use;VN Rounding   Safety/Activity   Patient Rounds bed in low position;call light in patient/parent reach;clutter free environment maintained;visualized patient;placement of personal items at bedside   Safety Promotion/Fall Prevention assistive device/personal item within reach;bed alarm set;Fall Risk reviewed with patient/family;side rails raised x 2   Positioning   Body Position supine   Head of Bed (HOB) Positioning HOB at 30-45 degrees     VN cued in to pt's room with permission. Pt's daughter, Nina, at bedside. Admission questions completed with pt.  Plan of care reviewed with pt and daughter. Pt denies any questions or cneeds at this time. Call bell w/in reach. Instructed to call for needs/assist.

## 2024-07-04 NOTE — CONSULTS
[facility]  General Surgery  Consult Note    Patient Name: Husam Connolly  MRN: [id number]  Code Status: Full Code  Admission Date: 7/3/2024  Hospital Length of Stay: 0 days  Attending Physician: Easton Aguilar MD  Primary Care Provider: Williams Knight DO    Patient information was obtained from patient, past medical records, and ER records.     Inpatient consult to General surgery  Consult performed by: Rosalino Fuentes MD  Consult ordered by: Allison Chase DO        Subjective:     Principal Problem: SBO (small bowel obstruction)    History of Present Illness: Husam Connolly is a 89 y.o. male with a past medical history of BPH, HFpEF, T2DM (A1c 7.5), Hypertension, RBBB, and multiple small bowel obstructions who presented to [facility] on 7/3/2024 with a primary complaint of abdominal pain and nausea for less than a day. Has prior history of SBO and diverticulitis s/p ex-lap/Wiseman's on 6/16/19 and colostomy reversal on 9/9/19 with Dr. Gardner.     Patient was in their normal state of health until yesterday afternoon when he began to experience sharp, intermittent abdominal pains. He described the pain as a 5/10 sharp, crampy pain. The pain was associated with some reflux and nausea. Denies symptoms of chest pains, shortness of breath, diarrhea, fevers, or chills. Patient has a history of repeat SBOs which presented similiarly so he reported to the ED. General surgery consulted for SBO. NGT placed yesterday with minimal output overnight. Patient states that he feels much better this am and has been passing flatus.    No current facility-administered medications on file prior to encounter.     Current Outpatient Medications on File Prior to Encounter   Medication Sig    albuterol (VENTOLIN HFA) 90 mcg/actuation inhaler Inhale 2 puffs into the lungs every 4 (four) hours as needed for Wheezing. Rescue    aspirin (ECOTRIN) 81 MG EC tablet Take 81 mg by mouth once daily.     atorvastatin (LIPITOR) 40 MG tablet Take 1 tablet (40 mg total) by mouth once daily.    finasteride (PROSCAR) 5 mg tablet Take 1 tablet (5 mg total) by mouth once daily.    fluticasone propionate (FLONASE) 50 mcg/actuation nasal spray 2 sprays (100 mcg total) by Each Nostril route once daily. (Patient taking differently: 2 sprays by Each Nostril route 2 (two) times a day.)    fluticasone-umeclidin-vilanter (TRELEGY ELLIPTA) 100-62.5-25 mcg DsDv Inhale 1 puff into the lungs once daily.    GARLIC ORAL Take 1 capsule by mouth once daily.    glipiZIDE 5 MG TR24 Take 1 tablet (5 mg total) by mouth daily with breakfast.    hydrALAZINE (APRESOLINE) 50 MG tablet Take 1 tablet (50 mg total) by mouth 3 (three) times daily.    losartan (COZAAR) 100 MG tablet Take 1 tablet (100 mg total) by mouth once daily.    multivitamin capsule Take 1 capsule by mouth once daily.    omega-3 fatty acids/fish oil (FISH OIL-OMEGA-3 FATTY ACIDS) 300-1,000 mg capsule Take 1 capsule by mouth once daily.    vitamin D (VITAMIN D3) 1000 units Tab Take 1,000 Units by mouth once daily.    blood sugar diagnostic Strp To check sugar daily, to use with insurance preferred meter    blood-glucose meter kit Use as instructed    inhalation spacing device Use as directed for inhalation.    lancets Misc To check sugar daily, to use with insurance preferred meter    meclizine (ANTIVERT) 12.5 mg tablet TAKE 1 TABLET(12.5 MG) BY MOUTH THREE TIMES DAILY AS NEEDED FOR DIZZINESS (Patient not taking: Reported on 6/7/2024)    [DISCONTINUED] miconazole (MICOTIN) 2 % cream Apply topically 2 (two) times daily.       Review of patient's allergies indicates:   Allergen Reactions    Solarcaine [benzocaine-triclosan]     Sulfa (sulfonamide antibiotics)     Sulfamethoxazole     Trimethoprim     Hytrin [terazosin] Rash    Jardiance [empagliflozin] Rash     Yeast infection    Smz-tmp ds [sulfamethoxazole-trimethoprim] Rash       Past Medical History:   Diagnosis Date    BPH  (benign prostatic hyperplasia)     Diabetes mellitus (type 2)     Emphysema 2022    High cholesterol 2020    Hypertension     RBBB 2022    Small bowel obstruction 2019     Past Surgical History:   Procedure Laterality Date    ANASTOMOSIS OF ILEUM TO RECTUM  2019    Procedure: ANASTOMOSIS, COLO-RECTAL;  Surgeon: Fouzia Gardner MD;  Location: [facility] OR;  Service: General;;    CHOLECYSTECTOMY N/A 3/7/2022    Procedure: CHOLECYSTECTOMY;  Surgeon: Fouzia Gardner MD;  Location: [facility] OR;  Service: General;  Laterality: N/A;    COLOSTOMY  2019    Procedure: CREATION, COLOSTOMY;  Surgeon: Fouzia Gardner MD;  Location: [facility] OR;  Service: General;;    DUPUYTREN CONTRACTURE RELEASE Left 2022    Procedure: RELEASE, DUPUYTREN CONTRACTURE;  Surgeon: Slick Baldwin Jr., MD;  Location: [facility] OR;  Service: Orthopedics;  Laterality: Left;    EYE SURGERY      GISELA PROCEDURE  2019    Procedure: GISELA PROCEDURE;  Surgeon: Fouzia Gardner MD;  Location: [facility] OR;  Service: General;;  Colon resection    LYSIS OF ADHESIONS N/A 3/7/2022    Procedure: LYSIS, ADHESIONS;  Surgeon: Fouzia Gardner MD;  Location: [facility];  Service: General;  Laterality: N/A;    TONSILLECTOMY       Family History       Problem Relation (Age of Onset)    Cancer Mother    Diabetes Paternal Aunt    Heart disease Father    Hypertension Father          Tobacco Use    Smoking status: Former     Current packs/day: 0.00     Average packs/day: 1 pack/day for 25.0 years (25.0 ttl pk-yrs)     Types: Cigarettes     Start date:      Quit date:      Years since quittin.5     Passive exposure: Past    Smokeless tobacco: Never   Substance and Sexual Activity    Alcohol use: Not Currently     Alcohol/week: 5.0 standard drinks of alcohol     Types: 5 Cans of beer per week    Drug use: No    Sexual activity: Yes     Partners: Female     ROS: a comprehensive review of systems was done that was  negative unless otherwise stated in the HPI  Objective:     Vital Signs (Most Recent):  Temp: 98 °F (36.7 °C) (07/04/24 0749)  Pulse: 75 (07/04/24 0749)  Resp: 18 (07/04/24 0749)  BP: (!) 165/75 (07/04/24 0749)  SpO2: (!) 91 % (07/04/24 0749) Vital Signs (24h Range):  Temp:  [97.7 °F (36.5 °C)-98.4 °F (36.9 °C)] 98 °F (36.7 °C)  Pulse:  [] 75  Resp:  [18-20] 18  SpO2:  [91 %-96 %] 91 %  BP: (153-190)/(68-95) 165/75     Weight: 77 kg (169 lb 12.1 oz)  Body mass index is 25.07 kg/m².     Physical Exam  Constitutional:       General: He is not in acute distress.     Appearance: He is not ill-appearing.   HENT:      Nose:      Comments: NGT in place, minimal gastric appearing output  Cardiovascular:      Rate and Rhythm: Normal rate and regular rhythm.   Pulmonary:      Effort: Pulmonary effort is normal.   Abdominal:      General: There is distension (mild).      Palpations: Abdomen is soft.      Tenderness: There is no abdominal tenderness. There is no guarding or rebound.   Neurological:      Mental Status: He is alert.            I have reviewed all pertinent lab results within the past 24 hours.  CBC:   Recent Labs   Lab 07/04/24  0022   WBC 14.80*   RBC 5.42   HGB 16.5   HCT 48.6      MCV 90   MCH 30.4   MCHC 34.0     BMP:   Recent Labs   Lab 07/04/24  0022   *      K 4.4      CO2 22*   BUN 31*   CREATININE 1.7*   CALCIUM 10.1       Significant Diagnostics:  I have reviewed all pertinent imaging results/findings within the past 24 hours.    Assessment/Plan:     * SBO (small bowel obstruction)  89 y.o. male with a past medical history of BPH, HFpEF, T2DM (A1c 7.5), Hypertension, RBBB, and multiple small bowel obstructions here for SBO. Has history of ex-lap/Wiseman's and subsequent reversal with Dr. Gardner in 2019. Feels better this am, minimal NGT output, and passing flatus. Will proceed with gastrografin challenge today.    No acute surgical intervention indicated at this  time  Gastrografin challenge today, will follow up  NPO  Rest per primary  Surgery with follow      VTE Risk Mitigation (From admission, onward)           Ordered     enoxaparin injection 30 mg  Daily         07/04/24 0435     IP VTE HIGH RISK PATIENT  Once         07/04/24 0435     Place sequential compression device  Until discontinued         07/04/24 0435                    Thank you for your consult. I will follow-up with patient. Please contact us if you have any additional questions.    Rosalino Fuentes MD  General Surgery  [facility] - Telemetry

## 2024-07-04 NOTE — ED PROVIDER NOTES
ED Provider Note - 7/3/2024    History     Chief Complaint   Patient presents with    Abdominal Pain     Patient reports severe intermittent abd cramping that started this evening with nausea and acid reflux. Denies vomiting, fever, diarrhea, urinary symptoms.        HPI     Husam Connolly is a 89 y.o. year old male with past medical and surgical history as seen below, presenting with chief complaint of abdominal pain.  Associated with distention.  Belching with bitter taste in the back of his mouth.  Patient with history of multiple prior small bowel obstructions with prior colostomy s/p reversal.  He was concerned for recurrent obstruction.  Pain is sharp and intense but brief.  Intermittent nausea as well.  Last meal was at 3:00 p.m..        Past Medical History:   Diagnosis Date    BPH (benign prostatic hyperplasia)     Diabetes mellitus (type 2)     Emphysema 03/12/2022    High cholesterol 08/26/2020    Hypertension     RBBB 11/29/2022    Small bowel obstruction 06/13/2019     Past Surgical History:   Procedure Laterality Date    ANASTOMOSIS OF ILEUM TO RECTUM  9/9/2019    Procedure: ANASTOMOSIS, COLO-RECTAL;  Surgeon: Fouzia Gardner MD;  Location: [facility] OR;  Service: General;;    CHOLECYSTECTOMY N/A 3/7/2022    Procedure: CHOLECYSTECTOMY;  Surgeon: Fouzia Gardner MD;  Location: [facility] OR;  Service: General;  Laterality: N/A;    COLOSTOMY  6/16/2019    Procedure: CREATION, COLOSTOMY;  Surgeon: Fouzia Gardner MD;  Location: [facility] OR;  Service: General;;    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    Procedure: RELEASE, DUPUYTREN CONTRACTURE;  Surgeon: Slick Baldwin Jr., MD;  Location: [facility] OR;  Service: Orthopedics;  Laterality: Left;    EYE SURGERY      GISELA PROCEDURE  6/16/2019    Procedure: GISELA PROCEDURE;  Surgeon: Fouzia Gardner MD;  Location: [facility] OR;  Service: General;;  Colon resection    LYSIS OF ADHESIONS N/A 3/7/2022    Procedure: LYSIS, ADHESIONS;  Surgeon:  Fouzia Gardner MD;  Location: [facility] OR;  Service: General;  Laterality: N/A;    TONSILLECTOMY           Family History   Problem Relation Name Age of Onset    Heart disease Father      Hypertension Father      Cancer Mother cancer breas varian     Diabetes Paternal Aunt       Social History     Tobacco Use    Smoking status: Former     Current packs/day: 0.00     Average packs/day: 1 pack/day for 25.0 years (25.0 ttl pk-yrs)     Types: Cigarettes     Start date:      Quit date:      Years since quittin.5     Passive exposure: Past    Smokeless tobacco: Never   Substance Use Topics    Alcohol use: Not Currently     Alcohol/week: 5.0 standard drinks of alcohol     Types: 5 Cans of beer per week    Drug use: No     Social Determinants of Health with Concerns     Tobacco Use: Medium Risk (2024)    Patient History     Smoking Tobacco Use: Former     Smokeless Tobacco Use: Never     Passive Exposure: Past   Alcohol Use: Patient Unable To Answer (2024)    AUDIT-C     Frequency of Alcohol Consumption: Patient unable to answer     Average Number of Drinks: Patient unable to answer     Frequency of Binge Drinking: Patient unable to answer   Financial Resource Strain: Patient Unable To Answer (2024)    Overall Financial Resource Strain (CARDIA)     Difficulty of Paying Living Expenses: Patient unable to answer   Physical Activity: Unknown (2024)    Exercise Vital Sign     Days of Exercise per Week: 0 days     Minutes of Exercise per Session: Patient unable to answer   Stress: Patient Unable To Answer (2024)    [employer] - Occupational Stress Questionnaire     Feeling of Stress : Patient unable to answer      Review of patient's allergies indicates:   Allergen Reactions    Solarcaine [benzocaine-triclosan]     Sulfa (sulfonamide antibiotics)     Sulfamethoxazole     Trimethoprim     Hytrin [terazosin] Rash    Jardiance [empagliflozin] Rash     Yeast infection     Smz-tmp ds [sulfamethoxazole-trimethoprim] Rash       Review of Systems     A full Review of Systems (ROS) was performed and was negative unless otherwise stated in the HPI.      Physical Exam     Vitals:    07/05/24 2008 07/05/24 2354 07/06/24 0000 07/06/24 0005   BP:   (!) 129/58    BP Location:   Right arm    Patient Position:   Lying    Pulse:  63 67    Resp:   20    Temp:   98.1 °F (36.7 °C)    TempSrc:   Oral    SpO2: (!) 93%  (!) 93% (!) 94%   Weight:       Height:            Physical Exam    Nursing note and vitals reviewed.  Constitutional: He appears well-developed and well-nourished. No distress.   HENT:   Head: Normocephalic and atraumatic.   Right Ear: External ear normal.   Left Ear: External ear normal.   Nose: Nose normal.   Eyes: Conjunctivae and EOM are normal. Pupils are equal, round, and reactive to light.   Neck: Neck supple.   Normal range of motion.  Cardiovascular:  Normal rate, regular rhythm and intact distal pulses.           Pulmonary/Chest: Breath sounds normal. No respiratory distress.   Abdominal: He exhibits distension. Bowel sounds are decreased. A surgical scar is present. There is no rebound and no guarding.   Musculoskeletal:         General: No edema. Normal range of motion.      Cervical back: Normal range of motion and neck supple.     Neurological: He is alert and oriented to person, place, and time. He has normal strength. No cranial nerve deficit or sensory deficit.   Skin: Skin is warm and dry. No rash noted.   Psychiatric: He has a normal mood and affect. Thought content normal.         Lab Results- Independently reviewed by myself      Labs Reviewed   CBC W/ AUTO DIFFERENTIAL - Abnormal; Notable for the following components:       Result Value    WBC 14.80 (*)     Gran # (ANC) 12.3 (*)     Immature Grans (Abs) 0.05 (*)     Mono # 1.1 (*)     Gran % 83.0 (*)     Lymph % 8.0 (*)     All other components within normal limits   COMPREHENSIVE METABOLIC PANEL - Abnormal;  Notable for the following components:    CO2 22 (*)     Glucose 208 (*)     BUN 31 (*)     Creatinine 1.7 (*)     eGFR 37 (*)     All other components within normal limits   URINALYSIS, REFLEX TO URINE CULTURE - Abnormal; Notable for the following components:    Protein, UA Trace (*)     Glucose, UA 1+ (*)     All other components within normal limits    Narrative:     Specimen Source->Urine   LACTIC ACID, PLASMA - Abnormal; Notable for the following components:    Lactate (Lactic Acid) 2.5 (*)     All other components within normal limits   LIPASE           Imaging     Imaging Results              XR NG/OG tube placement check, non-radiologist performed (Final result)  Result time 07/04/24 15:13:15      Final result by RADIOLOGIST, VIRTUAL (07/04/24 15:13:15)                   Impression:      NG tube in the distal stomach or proximal duodenum.      Electronically signed by: Virtual Radiologist  Date:    07/04/2024  Time:    15:13               Narrative:    EXAMINATION:  XR Abdomen    CLINICAL HISTORY:  Device placement; Gi device; Nasogastric tube    TECHNIQUE:  Imaging protocol: Radiologic exam of the abdomen. Views: 3 or more views.    COMPARISON:  CT ABDOMEN PELVIS WITHOUT CONTRAST 7/4/2024 12:44 AM    FINDINGS:  Tubes, catheters and devices: NG tube in the distal stomach or proximal duodenum. Gastrointestinal tract: Stomach is slightly distended. Intraperitoneal space: No free air. Bones/joints: No acute finding.                                        CT Abdomen Pelvis  Without Contrast (Final result)  Result time 07/04/24 01:29:08      Final result by Inez Hamm MD (07/04/24 01:29:08)                   Impression:      Small-bowel obstruction with transition point in the right lower quadrant.  Similar findings noted on prior CT abdomen pelvis 09/19/2022    Diverticulosis without evidence of diverticulitis.  Postoperative changes with anastomotic sutures in the descending colon.    Multiple simple  renal cysts.    Additional incidental nonacute findings as above.    This report was flagged in Epic as abnormal.      Electronically signed by: Inez Hamm  Date:    07/04/2024  Time:    01:29               Narrative:    EXAMINATION:  CT ABDOMEN PELVIS WITHOUT CONTRAST    CLINICAL HISTORY:  Abdominal pain, acute, nonlocalized;    TECHNIQUE:  Low dose axial images, sagittal and coronal reformations were obtained from the lung bases to the pubic symphysis, Oral contrast was not administered .    COMPARISON:  CT abdomen pelvis 09/19/2022    FINDINGS:  Heart: Normal in size. No pericardial effusion.    Lung Bases: Well aerated, without consolidation or pleural fluid.  Mild basilar atelectasis/scarring.    Liver: Normal in size and attenuation.  Stable cystic foci in the left lower lobe.    Gallbladder: No calcified gallstones.    Bile Ducts: No evidence of dilated ducts.    Pancreas: No mass or peripancreatic fat stranding.    Spleen: Unremarkable.    Adrenals: Unremarkable.    Kidneys/ Ureters: Multiple bilateral renal cysts are noted which are simple.  There is no evidence of nephrolithiasis or hydronephrosis.  Vascular calcifications noted..    Bladder: No evidence of wall thickening.    Reproductive organs: Prostate is enlarged..    GI Tract/Mesentery: There is a small hiatal hernia.  There are loops of dilated small bowel in the right and left lower quadrants with transition point in the right lower quadrant and some evidence of tethering.  There is note of postoperative changes with sutures along the distal descending colon.  Extensive diverticulosis of the distal colon noted is.  There is no convincing diverticulitis.  Appendix is not conspicuous.    Peritoneal Space: No ascites. No free air.    Retroperitoneum: No significant adenopathy.    Abdominal wall: Bilateral inguinal fat containing hernias are noted..    Vasculature: There is moderate atherosclerosis of the aorta and its imaged branches.  No  aneurysm.    Bones: No acute fracture.  Spondylosis of the spine and degenerative changes of the hips bilaterally.                                                 ED Course         Procedures         Orders Placed This Encounter    Blood culture    Blood culture    CT Abdomen Pelvis  Without Contrast    XR NG/OG tube placement check, non-radiologist performed    X-Ray KUB    X-Ray KUB    XR NG/OG tube placement check, non-radiologist performed    X-Ray Abdomen AP 1 View    CBC W/ AUTO DIFFERENTIAL    Comp. Metabolic Panel    Lipase    Urinalysis, Reflex to Urine Culture Urine, Clean Catch    Lactic acid, plasma    Lactic acid, plasma    Hemoglobin A1c if not done in past 3 months    Lipid panel    Brain natriuretic peptide    Lactic acid, plasma    CBC auto differential    Comprehensive metabolic panel    Magnesium    Diet Clear Liquid Standard Tray    NG tube - insert    Nasogastric tube    Cardiac Monitoring - Adult    Tele: Maintain IV access while on telemetry - Adult    Vital signs    Bladder scan    Notify Physician    Place sequential compression device    Recheck Blood Glucose:    If any glucose result is less than 50 or greater than 400:    If 2nd result is less than 50 or greater than 400:    If glucose greater than 400 mg/dL treat per correction scale.  If glucose remains elevated above 400 mg/dL at next scheduled check, notify provider    Re-check Blood Glucose    Nursing communication    Nursing communication    Nursing communication    Full code    Inpatient consult to General surgery    OT evaluate and treat    Incentive spirometry    Inhalation Treatment Q6H PRN    Cardiac monitoring strips    Insert peripheral IV    Possible Hospitalization    Admit to Inpatient    POCT glucose    POCT glucose    POCT glucose    POCT glucose    POCT glucose    POCT glucose    POCT glucose    POCT glucose    POCT glucose    famotidine (PF) injection 40 mg    ondansetron injection 4 mg    LIDOcaine viscous HCl 2% oral  solution 5 mL    0.9%  NaCl infusion    lactated ringers infusion    sodium chloride 0.9% flush 10 mL    naloxone 0.4 mg/mL injection 0.02 mg    enoxaparin injection 30 mg    ondansetron injection 4 mg    albuterol-ipratropium 2.5 mg-0.5 mg/3 mL nebulizer solution 3 mL    labetaloL injection 10 mg    glucagon (human recombinant) injection 1 mg    insulin aspart U-100 pen 0-5 Units    dextrose 10% bolus 125 mL 125 mL    diatrizoate meglumineand-diatrizoate sodium (GASTROVIEW) solution 100 mL    fluticasone furoate-vilanteroL 100-25 mcg/dose diskus inhaler 1 puff    diatrizoate meglumineand-diatrizoate sodium (GASTROVIEW) solution 100 mL    aspirin EC tablet 81 mg    atorvastatin tablet 40 mg    finasteride tablet 5 mg    hydrALAZINE tablet 50 mg    losartan tablet 100 mg    IP VTE HIGH RISK PATIENT    Progressive Mobility Protocol (mobilize patient to their highest level of functioning at least twice daily)    Up in chair          ED Course as of 07/06/24 0235   Thu Jul 04, 2024   0135 CT Abdomen Pelvis  Without Contrast(!)  Small-bowel obstruction with transition point in the right lower quadrant.  Similar findings noted on prior CT abdomen pelvis 09/19/2022     Diverticulosis without evidence of diverticulitis.  Postoperative changes with anastomotic sutures in the descending colon.     Multiple simple renal cysts.     Additional incidental nonacute findings as above.   [HL]   0145 Creatinine(!): 1.7  Chronic, baseline CKD. [HL]   0145 WBC(!): 14.80  New leukocytosis possibly secondary to pain. [HL]   0149 Discussed with General surgery resident, agrees with plan of NG tube placement to low intermittent wall suction, maintenance fluids, NPO status and they will be happy to consult with primary admission to medicine. [HL]   0252 Patient discussed with [facility] resident.  Aware of patient and agreeable with plan of hospitalization at this time for ongoing medical management.  NG tube placed and x-ray  independently interpreted by me does show appropriate placement.  Initial lactate of 2.5.  Will start on maintenance at a low rate with plan of repeat lactate in 4 hours. [HL]      ED Course User Index  [HL] Allison Chase, DO              Medical Decision Making       The patient's list of active medical problems, social history, medications, and allergies as documented per RN staff has been reviewed.           Medical Decision Making  Amount and/or Complexity of Data Reviewed  Labs: ordered.  Radiology: ordered.    Risk  Prescription drug management.  Decision regarding hospitalization.         Additional MDM:   Differential Diagnosis:   Symptom: Abdominal pain. <> The follow diagnoses were considered and will be evaluated: Appendicitis, Cystitis, Diverticulitis, Gastroenteritis, Gastroparesis, Ileus, Mesenteric Adenitis, Peritonitis, Psoas Abscess and Small Bowel Obstruction.                  Clinical Impression         Disposition   ED Disposition Condition    Admit             Diagnosis    ICD-10-CM ICD-9-CM   1. Small bowel obstruction due to adhesions  K56.50 560.81   2. 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 SBO (small bowel obstruction)  K56.609 560.9           Fermin Hickman MD        07/06/2024          DISCLAIMER: This note was prepared with [employer] voice recognition transcription software. Garbled syntax, mangled pronouns, and other bizarre constructions may be attributed to that software system.       Fermin Hickman MD  07/06/24 7948

## 2024-07-04 NOTE — NURSING
Patient experiencing nausea, Episodes of vomiting noted. Patient reconnected to low int wall suction.

## 2024-07-04 NOTE — HPI
Husam Connolly is a 89 y.o. male with a past medical history of BPH, HFpEF, T2DM (A1c 7.5), Hypertension, RBBB, and multiple small bowel obstructions who presented to [facility] on 7/3/2024 with a primary complaint of abdominal pain and nausea for less than a day. Has prior history of SBO and diverticulitis s/p ex-lap/Wiseman's on 6/16/19 and colostomy reversal on 9/9/19 with Dr. Gardner.     Patient was in their normal state of health until yesterday afternoon when he began to experience sharp, intermittent abdominal pains. He described the pain as a 5/10 sharp, crampy pain. The pain was associated with some reflux and nausea. Denies symptoms of chest pains, shortness of breath, diarrhea, fevers, or chills. Patient has a history of repeat SBOs which presented similiarly so he reported to the ED. General surgery consulted for SBO. NGT placed yesterday with minimal output overnight. Patient states that he feels much better this am and has been passing flatus.

## 2024-07-04 NOTE — ASSESSMENT & PLAN NOTE
89 y.o. male with a past medical history of BPH, HFpEF, T2DM (A1c 7.5), Hypertension, RBBB, and multiple small bowel obstructions here for SBO. Has history of ex-lap/Wiseman's and subsequent reversal with Dr. Gardner in 2019. Feels better this am, minimal NGT output, and passing flatus. Will proceed with gastrografin challenge today.    No acute surgical intervention indicated at this time  Gastrografin challenge today, will follow up  NPO  Rest per primary  Surgery with follow

## 2024-07-04 NOTE — SUBJECTIVE & OBJECTIVE
No current facility-administered medications on file prior to encounter.     Current Outpatient Medications on File Prior to Encounter   Medication Sig    albuterol (VENTOLIN HFA) 90 mcg/actuation inhaler Inhale 2 puffs into the lungs every 4 (four) hours as needed for Wheezing. Rescue    aspirin (ECOTRIN) 81 MG EC tablet Take 81 mg by mouth once daily.    atorvastatin (LIPITOR) 40 MG tablet Take 1 tablet (40 mg total) by mouth once daily.    finasteride (PROSCAR) 5 mg tablet Take 1 tablet (5 mg total) by mouth once daily.    fluticasone propionate (FLONASE) 50 mcg/actuation nasal spray 2 sprays (100 mcg total) by Each Nostril route once daily. (Patient taking differently: 2 sprays by Each Nostril route 2 (two) times a day.)    fluticasone-umeclidin-vilanter (TRELEGY ELLIPTA) 100-62.5-25 mcg DsDv Inhale 1 puff into the lungs once daily.    GARLIC ORAL Take 1 capsule by mouth once daily.    glipiZIDE 5 MG TR24 Take 1 tablet (5 mg total) by mouth daily with breakfast.    hydrALAZINE (APRESOLINE) 50 MG tablet Take 1 tablet (50 mg total) by mouth 3 (three) times daily.    losartan (COZAAR) 100 MG tablet Take 1 tablet (100 mg total) by mouth once daily.    multivitamin capsule Take 1 capsule by mouth once daily.    omega-3 fatty acids/fish oil (FISH OIL-OMEGA-3 FATTY ACIDS) 300-1,000 mg capsule Take 1 capsule by mouth once daily.    vitamin D (VITAMIN D3) 1000 units Tab Take 1,000 Units by mouth once daily.    blood sugar diagnostic Strp To check sugar daily, to use with insurance preferred meter    blood-glucose meter kit Use as instructed    inhalation spacing device Use as directed for inhalation.    lancets Misc To check sugar daily, to use with insurance preferred meter    meclizine (ANTIVERT) 12.5 mg tablet TAKE 1 TABLET(12.5 MG) BY MOUTH THREE TIMES DAILY AS NEEDED FOR DIZZINESS (Patient not taking: Reported on 6/7/2024)    [DISCONTINUED] miconazole (MICOTIN) 2 % cream Apply topically 2 (two) times daily.        Review of patient's allergies indicates:   Allergen Reactions    Solarcaine [benzocaine-triclosan]     Sulfa (sulfonamide antibiotics)     Sulfamethoxazole     Trimethoprim     Hytrin [terazosin] Rash    Jardiance [empagliflozin] Rash     Yeast infection    Smz-tmp ds [sulfamethoxazole-trimethoprim] Rash       Past Medical History:   Diagnosis Date    BPH (benign prostatic hyperplasia)     Diabetes mellitus (type 2)     Emphysema 03/12/2022    High cholesterol 08/26/2020    Hypertension     RBBB 11/29/2022    Small bowel obstruction 06/13/2019     Past Surgical History:   Procedure Laterality Date    ANASTOMOSIS OF ILEUM TO RECTUM  9/9/2019    Procedure: ANASTOMOSIS, COLO-RECTAL;  Surgeon: Fouzia Gardner MD;  Location: [facility] OR;  Service: General;;    CHOLECYSTECTOMY N/A 3/7/2022    Procedure: CHOLECYSTECTOMY;  Surgeon: Fouzia Gardner MD;  Location: [facility] OR;  Service: General;  Laterality: N/A;    COLOSTOMY  6/16/2019    Procedure: CREATION, COLOSTOMY;  Surgeon: Fouzia Gardner MD;  Location: [facility] OR;  Service: General;;    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    Procedure: RELEASE, DUPUYTREN CONTRACTURE;  Surgeon: Slick Baldwin Jr., MD;  Location: [facility] OR;  Service: Orthopedics;  Laterality: Left;    EYE SURGERY      GISELA PROCEDURE  6/16/2019    Procedure: GISELA PROCEDURE;  Surgeon: Fouzia Gardner MD;  Location: [facility] OR;  Service: General;;  Colon resection    LYSIS OF ADHESIONS N/A 3/7/2022    Procedure: LYSIS, ADHESIONS;  Surgeon: Fouzia Gardner MD;  Location: [facility];  Service: General;  Laterality: N/A;    TONSILLECTOMY       Family History       Problem Relation (Age of Onset)    Cancer Mother    Diabetes Paternal Aunt    Heart disease Father    Hypertension Father          Tobacco Use    Smoking status: Former     Current packs/day: 0.00     Average packs/day: 1 pack/day for 25.0 years (25.0 ttl pk-yrs)     Types: Cigarettes     Start date: 1946     Quit  date:      Years since quittin.5     Passive exposure: Past    Smokeless tobacco: Never   Substance and Sexual Activity    Alcohol use: Not Currently     Alcohol/week: 5.0 standard drinks of alcohol     Types: 5 Cans of beer per week    Drug use: No    Sexual activity: Yes     Partners: Female     ROS: a comprehensive review of systems was done that was negative unless otherwise stated in the HPI  Objective:     Vital Signs (Most Recent):  Temp: 98 °F (36.7 °C) (24)  Pulse: 75 (24)  Resp: 18 (24)  BP: (!) 165/75 (24)  SpO2: (!) 91 % (24) Vital Signs (24h Range):  Temp:  [97.7 °F (36.5 °C)-98.4 °F (36.9 °C)] 98 °F (36.7 °C)  Pulse:  [] 75  Resp:  [18-20] 18  SpO2:  [91 %-96 %] 91 %  BP: (153-190)/(68-95) 165/75     Weight: 77 kg (169 lb 12.1 oz)  Body mass index is 25.07 kg/m².     Physical Exam  Constitutional:       General: He is not in acute distress.     Appearance: He is not ill-appearing.   HENT:      Nose:      Comments: NGT in place, minimal gastric appearing output  Cardiovascular:      Rate and Rhythm: Normal rate and regular rhythm.   Pulmonary:      Effort: Pulmonary effort is normal.   Abdominal:      General: There is distension (mild).      Palpations: Abdomen is soft.      Tenderness: There is no abdominal tenderness. There is no guarding or rebound.   Neurological:      Mental Status: He is alert.            I have reviewed all pertinent lab results within the past 24 hours.  CBC:   Recent Labs   Lab 24  002   WBC 14.80*   RBC 5.42   HGB 16.5   HCT 48.6      MCV 90   MCH 30.4   MCHC 34.0     BMP:   Recent Labs   Lab 24  0022   *      K 4.4      CO2 22*   BUN 31*   CREATININE 1.7*   CALCIUM 10.1       Significant Diagnostics:  I have reviewed all pertinent imaging results/findings within the past 24 hours.

## 2024-07-04 NOTE — PLAN OF CARE
Problem: Adult Inpatient Plan of Care  Goal: Plan of Care Review  7/4/2024 0526 by Reema Rivera, RN  Chart reviewed and care plan initiated

## 2024-07-05 LAB
ALBUMIN SERPL BCP-MCNC: 3.4 G/DL (ref 3.5–5.2)
ALP SERPL-CCNC: 70 U/L (ref 55–135)
ALT SERPL W/O P-5'-P-CCNC: 11 U/L (ref 10–44)
ANION GAP SERPL CALC-SCNC: 15 MMOL/L (ref 8–16)
AST SERPL-CCNC: 16 U/L (ref 10–40)
BASOPHILS # BLD AUTO: 0.02 K/UL (ref 0–0.2)
BASOPHILS NFR BLD: 0.2 % (ref 0–1.9)
BILIRUB SERPL-MCNC: 1.1 MG/DL (ref 0.1–1)
BUN SERPL-MCNC: 26 MG/DL (ref 10–30)
CALCIUM SERPL-MCNC: 9.9 MG/DL (ref 8.7–10.5)
CHLORIDE SERPL-SCNC: 104 MMOL/L (ref 95–110)
CO2 SERPL-SCNC: 26 MMOL/L (ref 23–29)
CREAT SERPL-MCNC: 1.6 MG/DL (ref 0.5–1.4)
DIFFERENTIAL METHOD BLD: ABNORMAL
EOSINOPHIL # BLD AUTO: 0 K/UL (ref 0–0.5)
EOSINOPHIL NFR BLD: 0.2 % (ref 0–8)
ERYTHROCYTE [DISTWIDTH] IN BLOOD BY AUTOMATED COUNT: 12.9 % (ref 11.5–14.5)
EST. GFR  (NO RACE VARIABLE): 40 ML/MIN/1.73 M^2
GLUCOSE SERPL-MCNC: 157 MG/DL (ref 70–110)
HCT VFR BLD AUTO: 46.9 % (ref 40–54)
HGB BLD-MCNC: 15.1 G/DL (ref 14–18)
IMM GRANULOCYTES # BLD AUTO: 0.02 K/UL (ref 0–0.04)
IMM GRANULOCYTES NFR BLD AUTO: 0.2 % (ref 0–0.5)
LYMPHOCYTES # BLD AUTO: 1.2 K/UL (ref 1–4.8)
LYMPHOCYTES NFR BLD: 11.1 % (ref 18–48)
MCH RBC QN AUTO: 29.5 PG (ref 27–31)
MCHC RBC AUTO-ENTMCNC: 32.2 G/DL (ref 32–36)
MCV RBC AUTO: 92 FL (ref 82–98)
MONOCYTES # BLD AUTO: 1.3 K/UL (ref 0.3–1)
MONOCYTES NFR BLD: 11.4 % (ref 4–15)
NEUTROPHILS # BLD AUTO: 8.6 K/UL (ref 1.8–7.7)
NEUTROPHILS NFR BLD: 76.9 % (ref 38–73)
NRBC BLD-RTO: 0 /100 WBC
PLATELET # BLD AUTO: 196 K/UL (ref 150–450)
PMV BLD AUTO: 11.5 FL (ref 9.2–12.9)
POCT GLUCOSE: 146 MG/DL (ref 70–110)
POCT GLUCOSE: 155 MG/DL (ref 70–110)
POCT GLUCOSE: 155 MG/DL (ref 70–110)
POTASSIUM SERPL-SCNC: 4.9 MMOL/L (ref 3.5–5.1)
PROT SERPL-MCNC: 6.5 G/DL (ref 6–8.4)
RBC # BLD AUTO: 5.11 M/UL (ref 4.6–6.2)
SODIUM SERPL-SCNC: 145 MMOL/L (ref 136–145)
WBC # BLD AUTO: 11.1 K/UL (ref 3.9–12.7)

## 2024-07-05 PROCEDURE — 25500020 PHARM REV CODE 255

## 2024-07-05 PROCEDURE — 97535 SELF CARE MNGMENT TRAINING: CPT

## 2024-07-05 PROCEDURE — 97116 GAIT TRAINING THERAPY: CPT

## 2024-07-05 PROCEDURE — 36415 COLL VENOUS BLD VENIPUNCTURE: CPT

## 2024-07-05 PROCEDURE — 25000242 PHARM REV CODE 250 ALT 637 W/ HCPCS

## 2024-07-05 PROCEDURE — 97165 OT EVAL LOW COMPLEX 30 MIN: CPT

## 2024-07-05 PROCEDURE — 94799 UNLISTED PULMONARY SVC/PX: CPT | Mod: XB

## 2024-07-05 PROCEDURE — 25000003 PHARM REV CODE 250

## 2024-07-05 PROCEDURE — 80053 COMPREHEN METABOLIC PANEL: CPT

## 2024-07-05 PROCEDURE — 11000001 HC ACUTE MED/SURG PRIVATE ROOM

## 2024-07-05 PROCEDURE — 94640 AIRWAY INHALATION TREATMENT: CPT

## 2024-07-05 PROCEDURE — 85025 COMPLETE CBC W/AUTO DIFF WBC: CPT

## 2024-07-05 PROCEDURE — 97161 PT EVAL LOW COMPLEX 20 MIN: CPT

## 2024-07-05 PROCEDURE — 99232 SBSQ HOSP IP/OBS MODERATE 35: CPT | Mod: ,,, | Performed by: STUDENT IN AN ORGANIZED HEALTH CARE EDUCATION/TRAINING PROGRAM

## 2024-07-05 PROCEDURE — 27000221 HC OXYGEN, UP TO 24 HOURS

## 2024-07-05 PROCEDURE — 63600175 PHARM REV CODE 636 W HCPCS

## 2024-07-05 PROCEDURE — 99900035 HC TECH TIME PER 15 MIN (STAT)

## 2024-07-05 PROCEDURE — 94761 N-INVAS EAR/PLS OXIMETRY MLT: CPT

## 2024-07-05 RX ORDER — FINASTERIDE 5 MG/1
5 TABLET, FILM COATED ORAL DAILY
Status: DISCONTINUED | OUTPATIENT
Start: 2024-07-06 | End: 2024-07-06 | Stop reason: HOSPADM

## 2024-07-05 RX ORDER — ATORVASTATIN CALCIUM 40 MG/1
40 TABLET, FILM COATED ORAL DAILY
Status: DISCONTINUED | OUTPATIENT
Start: 2024-07-06 | End: 2024-07-06 | Stop reason: HOSPADM

## 2024-07-05 RX ORDER — FLUTICASONE FUROATE AND VILANTEROL 100; 25 UG/1; UG/1
1 POWDER RESPIRATORY (INHALATION) DAILY
Status: DISCONTINUED | OUTPATIENT
Start: 2024-07-05 | End: 2024-07-06 | Stop reason: HOSPADM

## 2024-07-05 RX ORDER — HYDRALAZINE HYDROCHLORIDE 25 MG/1
50 TABLET, FILM COATED ORAL 3 TIMES DAILY
Status: DISCONTINUED | OUTPATIENT
Start: 2024-07-05 | End: 2024-07-06 | Stop reason: HOSPADM

## 2024-07-05 RX ORDER — LOSARTAN POTASSIUM 50 MG/1
100 TABLET ORAL DAILY
Status: DISCONTINUED | OUTPATIENT
Start: 2024-07-05 | End: 2024-07-06 | Stop reason: HOSPADM

## 2024-07-05 RX ORDER — ASPIRIN 81 MG/1
81 TABLET ORAL DAILY
Status: DISCONTINUED | OUTPATIENT
Start: 2024-07-06 | End: 2024-07-06 | Stop reason: HOSPADM

## 2024-07-05 RX ADMIN — FLUTICASONE FUROATE AND VILANTEROL TRIFENATATE 1 PUFF: 100; 25 POWDER RESPIRATORY (INHALATION) at 01:07

## 2024-07-05 RX ADMIN — DIATRIZOATE MEGLUMINE AND DIATRIZOATE SODIUM 100 ML: 660; 100 LIQUID ORAL; RECTAL at 10:07

## 2024-07-05 RX ADMIN — IPRATROPIUM BROMIDE AND ALBUTEROL SULFATE 3 ML: .5; 3 SOLUTION RESPIRATORY (INHALATION) at 08:07

## 2024-07-05 RX ADMIN — SODIUM CHLORIDE, POTASSIUM CHLORIDE, SODIUM LACTATE AND CALCIUM CHLORIDE: 600; 310; 30; 20 INJECTION, SOLUTION INTRAVENOUS at 08:07

## 2024-07-05 RX ADMIN — HYDRALAZINE HYDROCHLORIDE 50 MG: 25 TABLET ORAL at 08:07

## 2024-07-05 RX ADMIN — SODIUM CHLORIDE, POTASSIUM CHLORIDE, SODIUM LACTATE AND CALCIUM CHLORIDE: 600; 310; 30; 20 INJECTION, SOLUTION INTRAVENOUS at 01:07

## 2024-07-05 RX ADMIN — LOSARTAN POTASSIUM 100 MG: 50 TABLET, FILM COATED ORAL at 05:07

## 2024-07-05 RX ADMIN — SODIUM CHLORIDE, POTASSIUM CHLORIDE, SODIUM LACTATE AND CALCIUM CHLORIDE: 600; 310; 30; 20 INJECTION, SOLUTION INTRAVENOUS at 11:07

## 2024-07-05 RX ADMIN — ENOXAPARIN SODIUM 30 MG: 30 INJECTION SUBCUTANEOUS at 05:07

## 2024-07-05 NOTE — PT/OT/SLP EVAL
Physical Therapy Evaluation, Treatment and Discharge Note    Patient Name:  Husam Connolly   MRN:  [id number]    Recommendations:     Discharge Recommendations: Low Intensity Therapy (OP PT)  Discharge Equipment Recommendations: none   Barriers to discharge: None    Assessment:     Husam Connolly is a 89 y.o. male admitted with a medical diagnosis of SBO (small bowel obstruction).     PT evaluation completed. Pt's PLOF: independent with no AD. Pt at this time requires SBA with bed mobility and ambulation with no AD; pt requires CGA/HHA with step navigation (per pt has handrail for stairs in home). Pt's SpO2 declines to 87% on RA post ambulation/stair navigation but improves to 90% with seated rested break and cues for proper breathing technique; nursing notified. Pt has no further skilled acute PT needs; recommending low intensity therapy- OP PT due to L shoulder pain/limited ROM to maximize functional independence.     Recent Surgery: * No surgery found *      Plan:     During this hospitalization, patient does not require further acute PT services.  Please re-consult if situation changes.      Subjective     Chief Complaint: no complaints  Patient/Family Comments/goals: to return home to [facility]  Pain/Comfort:  Pain Rating 1: 0/10  Pain Rating Post-Intervention 1: 0/10    Patients cultural, spiritual, Rastafari conflicts given the current situation: no    Living Environment:  Lives alone in 2 story home with 1 small step to enter; bed and bath downstairs (pt states he goes upstairs as needed to use computer- 16 steps with RHR). WIS with built in bench.  Prior to admission, patients level of function was Independent with no AD.  Equipment used at home: none.  DME owned (not currently used): none.  Upon discharge, patient will have assistance from family as needed.    Objective:     Communicated with Nurse prior to session.  Patient found HOB elevated with bed alarm, peripheral IV, telemetry, NG tube upon PT entry  "to room.    General Precautions: Standard, fall    Orthopedic Precautions:N/A   Braces: N/A  Respiratory Status: Room air    Exams:  Cognitive Exam:  Patient is oriented to Person, Place, Time, and Situation  Sensation:    -       Intact  light/touch to BLE; does report occasional numbness to BLE when laying in bed with sheets over legs  RLE ROM: WFL  RLE Strength: WFL  LLE ROM: WFL  LLE Strength: WFL    Functional Mobility:  Bed Mobility:     Supine to Sit: independence  Transfers:     Sit to Stand:  independence with no AD  Gait: 50ft with use of no AD; and CGA/SBA  Stairs:  Pt ascended/descended 6" curb step with R HHA with CGA; x6 reps    AM-PAC 6 CLICK MOBILITY  Total Score:21       Treatment and Education:  Pt and daughter educated on role of PT.  Pt with limited endurance post ambulation and step navigation; SpO2 87% on RA post ambulation/stair navigation but improves to 90% with seated rested break; cues for proper breathing technique; pt says this happens often due to COPD (pt reports he monitors his SpO2 when at home).   Pt/daughter have concerns/complaints of pt's LUE shoulder stiffness/limited ROM; would appreciate OP PT referral.   Imaging staff present at end of session to assess pt.    AM-PAC 6 CLICK MOBILITY  Total Score:21     Patient left sitting edge of bed with all lines intact, call button in reach, Nurse/MD notified, and daughter/imaging staff present.    GOALS:   Multidisciplinary Problems       Physical Therapy Goals          Problem: Physical Therapy    Goal Priority Disciplines Outcome Goal Variances Interventions   Physical Therapy Goal     PT, PT/OT Adequate for Care Transition                         History:     Past Medical History:   Diagnosis Date    BPH (benign prostatic hyperplasia)     Diabetes mellitus (type 2)     Emphysema 03/12/2022    High cholesterol 08/26/2020    Hypertension     RBBB 11/29/2022    Small bowel obstruction 06/13/2019       Past Surgical History:   Procedure " Laterality Date    ANASTOMOSIS OF ILEUM TO RECTUM  9/9/2019    Procedure: ANASTOMOSIS, COLO-RECTAL;  Surgeon: Fouzia Gardner MD;  Location: [facility] OR;  Service: General;;    CHOLECYSTECTOMY N/A 3/7/2022    Procedure: CHOLECYSTECTOMY;  Surgeon: Fouzia Gardner MD;  Location: [facility];  Service: General;  Laterality: N/A;    COLOSTOMY  6/16/2019    Procedure: CREATION, COLOSTOMY;  Surgeon: Fouzia Gardner MD;  Location: [facility] OR;  Service: General;;    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    Procedure: RELEASE, DUPUYTREN CONTRACTURE;  Surgeon: Slick Baldwin Jr., MD;  Location: [facility];  Service: Orthopedics;  Laterality: Left;    EYE SURGERY      GISELA PROCEDURE  6/16/2019    Procedure: GISELA PROCEDURE;  Surgeon: Fouzia Gardner MD;  Location: [facility];  Service: General;;  Colon resection    LYSIS OF ADHESIONS N/A 3/7/2022    Procedure: LYSIS, ADHESIONS;  Surgeon: Fouzia Gardner MD;  Location: [facility];  Service: General;  Laterality: N/A;    TONSILLECTOMY         Time Tracking:     PT Received On: 07/05/24  PT Start Time: 1430     PT Stop Time: 1456  PT Total Time (min): 26 min     Billable Minutes: Evaluation 10 and Gait Training 16      07/05/2024

## 2024-07-05 NOTE — PROGRESS NOTES
[facility]  General Surgery  Progress Note    Subjective:     History of Present Illness:  Husam Connolly is a 89 y.o. male with a past medical history of BPH, HFpEF, T2DM (A1c 7.5), Hypertension, RBBB, and multiple small bowel obstructions who presented to [facility] on 7/3/2024 with a primary complaint of abdominal pain and nausea for less than a day. Has prior history of SBO and diverticulitis s/p ex-lap/Wiseman's on 6/16/19 and colostomy reversal on 9/9/19 with Dr. Gardner.     Patient was in their normal state of health until yesterday afternoon when he began to experience sharp, intermittent abdominal pains. He described the pain as a 5/10 sharp, crampy pain. The pain was associated with some reflux and nausea. Denies symptoms of chest pains, shortness of breath, diarrhea, fevers, or chills. Patient has a history of repeat SBOs which presented similiarly so he reported to the ED. General surgery consulted for SBO. NGT placed yesterday with minimal output overnight. Patient states that he feels much better this am and has been passing flatus.    Post-Op Info:  * No surgery found *         Interval History: Mr. Connolly underwent attemtped gastrograffin challenge yesterday, however he vomited shortly after administration. His NG also became dislodged at somepoint in the late afternoon/evening and was replaced. Output overnight nearly 2L. He does endorse continued flatulence and had one small bowel movement yesterday, but remains distended and does not feel as though the obstruction has resolved at this point. VSS. Leukocytosis resolved.     Medications:  Continuous Infusions:   lactated ringers   Intravenous Continuous 100 mL/hr at 07/05/24 0130 New Bag at 07/05/24 0130     Scheduled Meds:   enoxparin  30 mg Subcutaneous Daily     PRN Meds:  Current Facility-Administered Medications:     albuterol-ipratropium, 3 mL, Nebulization, Q4H PRN    dextrose 10%, 12.5 g, Intravenous, PRN     glucagon (human recombinant), 1 mg, Intramuscular, PRN    insulin aspart U-100, 0-5 Units, Subcutaneous, Q6H PRN    labetalol, 10 mg, Intravenous, QID PRN    naloxone, 0.02 mg, Intravenous, PRN    ondansetron, 4 mg, Intravenous, Q8H PRN    sodium chloride 0.9%, 10 mL, Intravenous, Q12H PRN     Review of patient's allergies indicates:   Allergen Reactions    Solarcaine [benzocaine-triclosan]     Sulfa (sulfonamide antibiotics)     Sulfamethoxazole     Trimethoprim     Hytrin [terazosin] Rash    Jardiance [empagliflozin] Rash     Yeast infection    Smz-tmp ds [sulfamethoxazole-trimethoprim] Rash     Objective:     Vital Signs (Most Recent):  Temp: 98.1 °F (36.7 °C) (07/05/24 0802)  Pulse: 88 (07/05/24 0818)  Resp: 19 (07/05/24 0818)  BP: (!) 185/75 (07/05/24 0802)  SpO2: (!) 87 % (07/05/24 0818) Vital Signs (24h Range):  Temp:  [97.6 °F (36.4 °C)-98.3 °F (36.8 °C)] 98.1 °F (36.7 °C)  Pulse:  [67-88] 88  Resp:  [18-20] 19  SpO2:  [87 %-95 %] 87 %  BP: (155-205)/(70-92) 185/75     Weight: 77 kg (169 lb 12.1 oz)  Body mass index is 25.07 kg/m².    Intake/Output - Last 3 Shifts         07/03 0700 07/04 0659 07/04 0700 07/05 0659 07/05 0700 07/06 0659    P.O.  0     I.V. (mL/kg)  1238.5 (16.1)     NG/GT  150     Total Intake(mL/kg)  1388.5 (18)     Urine (mL/kg/hr)  425 (0.2)     Drains  2400 400    Total Output  2825 400    Net  -1436.5 -400                    Physical Exam  Constitutional:       General: He is not in acute distress.     Appearance: He is not ill-appearing.   HENT:      Nose:      Comments: NGT in place, thin yellow output.   Cardiovascular:      Rate and Rhythm: Normal rate and regular rhythm.   Pulmonary:      Effort: Pulmonary effort is normal.   Abdominal:      General: There is distension (mild).      Palpations: Abdomen is soft.      Tenderness: There is no abdominal tenderness. There is no guarding or rebound.      Comments: Multiple abdominal scars present from prior surgieries   Neurological:       Mental Status: He is alert.          Significant Labs:  I have reviewed all pertinent lab results within the past 24 hours.    Significant Diagnostics:  I have reviewed all pertinent imaging results/findings within the past 24 hours.  Assessment/Plan:     * SBO (small bowel obstruction)  89 y.o. male with a past medical history of BPH, HFpEF, T2DM (A1c 7.5), Hypertension, RBBB, and multiple small bowel obstructions here for SBO. Has history of ex-lap/Wiseman's and subsequent reversal with Dr. Gardner in 2019. GGC was attempted yesterday, however he vomited shortly after administration so interpreting the success/failure of management in the context of one bowel movement yesterday is difficult.     Will repeat gastrografin challenge today  NPO, mIVF  Daily labs, keep K > 4, Mag > 2  General surgery to continue to follow  Remainder of care per primary         Jose Lewis MD  General Surgery  [place] [facility]

## 2024-07-05 NOTE — PLAN OF CARE
SOCIAL WORK DISCHARGE PLANNING ASSESSMENT    SW completed discharge planning assessment with pt and pt's daughter Nina ([phone]). Pt and Nina were easily engaged and education on the role of  was provided. Pt reported he lives alone but has great support from family and friends and advised Nina will provide assistance as needed after returning home. Pt is independent with his ADL's. Pt has no DME and is not current with  services. Pt drives himself to doctor appointments and Nina will provide transportation home following discharge. No needs for community resources were reported. Pt and Nina were encouraged to call with any questions or concerns. Pt and Nina verbalized understanding.     Future Appointments   Date Time Provider Department Center   7/18/2024  2:30 PM Slick Baldwin Jr., MD [facility] ORTHO [facility]   9/3/2024 11:15 AM LAB, [facility]   9/6/2024 11:00 AM Williams Knight DO [facility]   9/13/2024  2:00 PM Tommy Payton MD [facility] CARDIO [facility]   10/10/2024  1:00 PM Fouzia Gardner MD [facility]        Patient Active Problem List   Diagnosis    Resistant hypertension    BPH (benign prostatic hyperplasia)    Stage 3b chronic kidney disease    S/P cholecystectomy    Ventral hernia without obstruction or gangrene    Right foot drop    High cholesterol    Chronic rhinitis    S/P colostomy    Aortic atherosclerosis    SBO (small bowel obstruction)    Type 2 diabetes mellitus with stage 3b chronic kidney disease, without long-term current use of insulin    Small bowel obstruction due to adhesions    Dupuytren's contracture of left hand    Pulmonary nodules    RBBB    Shoulder arthritis    Pulmonary hypertension    COPD, group B, by GOLD 2017 classification    Orthostatic hypotension    Vertigo    Muscle weakness    Shoulder stiffness, left    Pain in joint of left shoulder    Senile purpura    Sleep apnea     Central sleep apnea        07/05/24 1134   Discharge Assessment   Assessment Type Discharge Planning Assessment   Confirmed/corrected address, phone number and insurance Yes   Confirmed Demographics Correct on Facesheet   Source of Information patient;family  (pt and pt's daughter Nina)   If unable to respond/provide information was family/caregiver contacted? No Contact Information Available   Communicated JOSE with patient/caregiver Date not available/Unable to determine   Reason For Admission small bowel obstruction   People in Home alone   Facility Arrived From: home   Do you expect to return to your current living situation? Yes   Do you have help at home or someone to help you manage your care at home? Yes   Who are your caregiver(s) and their phone number(s)? pt's daughter Nina [phone]   Prior to hospitilization cognitive status: Alert/Oriented   Current cognitive status: Alert/Oriented   Walking or Climbing Stairs Difficulty no   Dressing/Bathing Difficulty no   Home Accessibility wheelchair accessible   Home Layout Able to live on 1st floor   Equipment Currently Used at Home none   Readmission within 30 days? No   Patient currently being followed by outpatient case management? No   Do you currently have service(s) that help you manage your care at home? No   Do you take prescription medications? Yes   Do you have prescription coverage? Yes   Coverage PHN   Do you have any problems affording any of your prescribed medications? No   Is the patient taking medications as prescribed? yes   Who is going to help you get home at discharge? pt's daughter Nina [phone]   How do you get to doctors appointments? car, drives self   Are you on dialysis? No   Do you take coumadin? No   Discharge Plan A Home with family   Discharge Plan B Home Health   DME Needed Upon Discharge    (TBD)   Discharge Plan discussed with: Patient;Adult children   Transition of Care Barriers None   Physical Activity    On average, how many minutes do you engage in exercise at this level? Pt Unable   Financial Resource Strain   How hard is it for you to pay for the very basics like food, housing, medical care, and heating? Pt Unable   Housing Stability   In the last 12 months, was there a time when you were not able to pay the mortgage or rent on time? N   At any time in the past 12 months, were you homeless or living in a shelter (including now)? N   Transportation Needs   Has the lack of transportation kept you from medical appointments, meetings, work or from getting things needed for daily living? No   Food Insecurity   Within the past 12 months, you worried that your food would run out before you got the money to buy more. Never true   Within the past 12 months, the food you bought just didn't last and you didn't have money to get more. Never true   Stress   Do you feel stress - tense, restless, nervous, or anxious, or unable to sleep at night because your mind is troubled all the time - these days? Pt Unable   Social Isolation   How often do you feel lonely or isolated from those around you?  Never   Alcohol Use   Q1: How often do you have a drink containing alcohol? Pt Unable   Q2: How many drinks containing alcohol do you have on a typical day when you are drinking? Pt Unable   Q3: How often do you have six or more drinks on one occasion? Pt Unable   Utilities   In the past 12 months has the electric, gas, oil, or water company threatened to shut off services in your home? No   Health Literacy   How often do you need to have someone help you when you read instructions, pamphlets, or other written material from your doctor or pharmacy? Never

## 2024-07-05 NOTE — DISCHARGE INSTRUCTIONS
As you head home, we encourage you to increas your protein intake as we're concerned that even though your weight has been fairly stable, you are at riskf or becoming more frail and with so much activity ahead of you, we want the best chance for continued independence and that means strength building/maintaining muscle.     Recommend 0.7-1g of protein per pound of body weight per day - so if you're at 160lb, 112g protein per day (at 0.7g) or 160g protein per day   - this would be about 30-g at means and an additional high protein snack   Ideas:   Breakfast:   3-scrambled eggs cooked down with some ground turkey and fresh spinach all in a skillet together, season with your favorite spices    3-4 eggs with greek yogurt and some blueberries, add a tbs of peanut butter (all natural, peanut-only or just peanuts and salt) type   Breakfast or snack:   protein smoothie with peanut butter, 1/2c greek yogurt, protein powder and fresh spinach with frozen banana slices and a sprinkle of cinnamon .  Lunch/dinners:   Canned tuna or chicken-salad (canned tuna or chicken + chopped celery, green onion, greek yogurt (2-4 tbs) and add some avocado/grapes/hard boiled eggs/shredded carrots for extra flavor) and eat over salad or in a sandwich or with crackers/apples/baby carrots  Grilled chicken, burgers, ground meat cooked down into tacos, as greek with serving over hummus and fresh jules and chickpeas, as a stir-panda with steamed carrots,broccoli,mushrooms and leftover rice or noodles      Other ways to add protein - chickpeas, blackbeans, lentils, adding a scrambled or hard-boiled egg or fried egg on top of stir-fries, sandwiches or salads, grilling chicken and roasting up vegetables (broccoli has great protein in it!)     Other idea: bone broth - 1 c has 9g protein in it and is a great way to make your gravies, soups, stews more easily digestible, flavorful and nutrient-dense as well as protein  You can drink it like tea as well  when your stomach is upset and many find it has a calming/soothing nature due to the high glycine and amino acids

## 2024-07-05 NOTE — SUBJECTIVE & OBJECTIVE
Interval History: Mr. Connolly underwent attemtped gastrograffin challenge yesterday, however he vomited shortly after administration. His NG also became dislodged at somepoint in the late afternoon/evening and was replaced. Output overnight nearly 2L. He does endorse continued flatulence and had one small bowel movement yesterday, but remains distended and does not feel as though the obstruction has resolved at this point. VSS. Leukocytosis resolved.     Medications:  Continuous Infusions:   lactated ringers   Intravenous Continuous 100 mL/hr at 07/05/24 0130 New Bag at 07/05/24 0130     Scheduled Meds:   enoxparin  30 mg Subcutaneous Daily     PRN Meds:  Current Facility-Administered Medications:     albuterol-ipratropium, 3 mL, Nebulization, Q4H PRN    dextrose 10%, 12.5 g, Intravenous, PRN    glucagon (human recombinant), 1 mg, Intramuscular, PRN    insulin aspart U-100, 0-5 Units, Subcutaneous, Q6H PRN    labetalol, 10 mg, Intravenous, QID PRN    naloxone, 0.02 mg, Intravenous, PRN    ondansetron, 4 mg, Intravenous, Q8H PRN    sodium chloride 0.9%, 10 mL, Intravenous, Q12H PRN     Review of patient's allergies indicates:   Allergen Reactions    Solarcaine [benzocaine-triclosan]     Sulfa (sulfonamide antibiotics)     Sulfamethoxazole     Trimethoprim     Hytrin [terazosin] Rash    Jardiance [empagliflozin] Rash     Yeast infection    Smz-tmp ds [sulfamethoxazole-trimethoprim] Rash     Objective:     Vital Signs (Most Recent):  Temp: 98.1 °F (36.7 °C) (07/05/24 0802)  Pulse: 88 (07/05/24 0818)  Resp: 19 (07/05/24 0818)  BP: (!) 185/75 (07/05/24 0802)  SpO2: (!) 87 % (07/05/24 0818) Vital Signs (24h Range):  Temp:  [97.6 °F (36.4 °C)-98.3 °F (36.8 °C)] 98.1 °F (36.7 °C)  Pulse:  [67-88] 88  Resp:  [18-20] 19  SpO2:  [87 %-95 %] 87 %  BP: (155-205)/(70-92) 185/75     Weight: 77 kg (169 lb 12.1 oz)  Body mass index is 25.07 kg/m².    Intake/Output - Last 3 Shifts         07/03 0700 07/04 0659 07/04 0700 07/05 0659  07/05 0700  07/06 0659    P.O.  0     I.V. (mL/kg)  1238.5 (16.1)     NG/GT  150     Total Intake(mL/kg)  1388.5 (18)     Urine (mL/kg/hr)  425 (0.2)     Drains  2400 400    Total Output  2825 400    Net  -1436.5 -400                    Physical Exam  Constitutional:       General: He is not in acute distress.     Appearance: He is not ill-appearing.   HENT:      Nose:      Comments: NGT in place, thin yellow output.   Cardiovascular:      Rate and Rhythm: Normal rate and regular rhythm.   Pulmonary:      Effort: Pulmonary effort is normal.   Abdominal:      General: There is distension (mild).      Palpations: Abdomen is soft.      Tenderness: There is no abdominal tenderness. There is no guarding or rebound.      Comments: Multiple abdominal scars present from prior surgieries   Neurological:      Mental Status: He is alert.          Significant Labs:  I have reviewed all pertinent lab results within the past 24 hours.    Significant Diagnostics:  I have reviewed all pertinent imaging results/findings within the past 24 hours.

## 2024-07-05 NOTE — PLAN OF CARE
Problem: Physical Therapy  Goal: Physical Therapy Goal  Outcome: Adequate for Care Transition     PT evaluation completed. Pt's PLOF: independent with no AD. Pt at this time requires SBA with bed mobility and ambulation with no AD; pt requires CGA/HHA with step navigation (per pt has handrail for stairs in home). Pt's SpO2 declines to 87% on RA post ambulation/stair navigation but improves to 90% with seated rested break and cues for proper breathing technique; nursing notified. Pt has no further skilled acute PT needs; recommending low intensity therapy- OP PT due to L shoulder pain/limited ROM to maximize functional independence.

## 2024-07-05 NOTE — ASSESSMENT & PLAN NOTE
89 y.o. male with a past medical history of BPH, HFpEF, T2DM (A1c 7.5), Hypertension, RBBB, and multiple small bowel obstructions here for SBO. Has history of ex-lap/Wiseman's and subsequent reversal with Dr. Gardner in 2019. GGC was attempted yesterday, however he vomited shortly after administration so interpreting the success/failure of management in the context of one bowel movement yesterday is difficult.     Will repeat gastrografin challenge today  NPO, mIVF  Daily labs, keep K > 4, Mag > 2  General surgery to continue to follow  Remainder of care per primary

## 2024-07-05 NOTE — PLAN OF CARE
Problem: Occupational Therapy  Goal: Occupational Therapy Goal  Description: Goals to be met by: 7/15/2024     Patient will increase functional independence with ADLs by performing:    UE Dressing with Modified [place].  LE Dressing with Modified [place].  Grooming while standing at sink with Modified [place].  Toileting from toilet with Modified [place] for hygiene and clothing management.   Step transfer with Modified [place]  Toilet transfer to toilet with Modified [place].    Outcome: Progressing

## 2024-07-05 NOTE — PT/OT/SLP EVAL
Occupational Therapy   Evaluation, tx    Name: Husam Connolly  MRN: [id number]  Admitting Diagnosis: SBO (small bowel obstruction)  Recent Surgery: * No surgery found *    The primary encounter diagnosis was Small bowel obstruction due to adhesions. Diagnoses of COPD, group B, by GOLD 2017 classification and SBO (small bowel obstruction) were also pertinent to this visit.    Recommendations:     Discharge Recommendations: Low Intensity Therapy  Discharge Equipment Recommendations:  none  Barriers to discharge:  None    Assessment:     Husam Connolly is a 89 y.o. male with a medical diagnosis of SBO (small bowel obstruction).  He presents with Performance deficits affecting function: weakness, impaired endurance, impaired self care skills, impaired functional mobility, gait instability, impaired balance, decreased safety awareness, impaired cardiopulmonary response to activity, decreased upper extremity function, decreased ROM.      Pt had no c/o pain. NG tube in place on wall suction. Pt Ox 4. Motivated to participate in OT. He was supervision for bed mobility supine>sit with side rail and sit<>stand supervision without a device. Pt performed LE dressing supervision to don/doff socks seated EOB. Pt has chronic left shld limited ROM. G/hygiene supervision standing at sink. Pt ambulated supervision to sink ~5ft and then additional 10ft to BS chair with IV pole in tow and NG tubing temporarily disconnected from wall suction per nurse ok to reach BS chair on opposite side of bed. Furniture in room rearranged to allow NG tubing to reach BS chair where pt remained sitting while daughter visiting in room.  Pt is NPO for feeding. Pt would benefit from Low Intensity Therapy at discharge. Continue with OT POC.    Rehab Prognosis: Good; patient would benefit from acute skilled OT services to address these deficits and reach maximum level of function.       Plan:     Patient to be seen 3 x/week to address the above listed  problems via self-care/home management, therapeutic activities, therapeutic exercises  Plan of Care Expires: 08/05/24  Plan of Care Reviewed with: patient, daughter    Subjective     Chief Complaint: none  Patient/Family Comments/goals: pt agreeable.    Occupational Profile:  Living Environment: pt lives alone with lots of family support in a 2SH with 1 steps at front and garage entrance; 1 flight of stairs to second floor computer room where pt visits 1 x week; otherwise bedroom and full bathroom  on first floor.  Previous level of function: Indep-Mod I self care, meals prep, laundry, drives self to MD appt, does grocery shopping  Roles and Routines: father, active lifestyle  Equipment Used at Home: bath bench, grab bar (walk n shower with built n seat. Pt has access to RW, SPC and RW that belonged to his wife but he was not needing to use PTO.)  Assistance upon Discharge: daughter, family    Pain/Comfort:  Pain Rating 1: 0/10  Pain Rating Post-Intervention 1: 0/10    Patients cultural, spiritual, Buddhism conflicts given the current situation: no    Objective:     Communicated with: nurse prior to session.  Patient found HOB elevated with bed alarm, peripheral IV, telemetry, NG tube upon OT entry to room.    General Precautions: Standard, NPO, diabetic  Orthopedic Precautions: N/A  Braces: N/A  Respiratory Status: Room air    Occupational Performance:    Bed Mobility:    Patient completed Rolling/Turning to Left with  supervision and with side rail  Patient completed Scooting/Bridging with supervision and with side rail  Patient completed Supine to Sit with supervision and with side rail    Functional Mobility/Transfers:  Patient completed Sit <> Stand Transfer with supervision  with  no assistive device   Patient completed Bed <> Chair Transfer using Step Transfer technique with supervision and IV pole in tow with no assistive device  Functional Mobility: ambulated Supervision 5ft bed>sink and after brief  seated rest on EOB while OT  arranged furniture and disconnected NG tubing from wall suction,he ambulated additional 10ft pushing IV pole to BS chair on opposite side of bed.    Activities of Daily Living:  Feeding:  NPO, NG tube on wall suction.   Grooming: supervision washed face, cleaned mouth with oral swab standing at sink. Vc to                       gently wash around NG tube to avoid dislodging.   Lower Body Dressing: supervision donned/doffed socks seated EOB; pt does not wear socks at home.   Toileting: pt declined use     Cognitive/Visual Perceptual:  Cognitive/Psychosocial Skills:     -       Oriented to: Person, Place, Time, and Situation   -       Follows Commands/attention:Follows two-step commands  -       Communication: clear/fluent  -       Memory: ongoing assessment  -       Safety awareness/insight to disability: impaired and moves quickly/slightly impulsive but receptive to teachings to slow down to minimize fall risk and NG tube issues   -       Mood/Affect/Coping skills/emotional control: Cooperative  Visual/Perceptual:      -Intact .    Physical Exam:  Balance:    -       sitting: good  dynamic good-  standing good  dynamic: good-  Postural examination/scapula alignment:    -       Rounded shoulders  -       Forward head  Dominant hand:    -       right  Upper Extremity Range of Motion:     -       Right Upper Extremity: WFL  -       Left Upper Extremity: WFL except R shld ext rotation and flexion  Upper Extremity Strength:    -       Right Upper Extremity: WFL  -       Left Upper Extremity: WFL   Strength:    -       Right Upper Extremity: WFL  -       Left Upper Extremity: WFL    AMPAC 6 Click ADL:  AMPAC Total Score: 22    Treatment & Education:  Purpose of OT and POC.  Pt and daughter educated in safety awareness regarding NG tube positioning to prevent dislodging during transfer to BS chair, g/hygiene and short distance ambulation in room. They verbalized understanding.  Pt seen for  safe transfers and simple self care activities retraining as stated above.  All questions/ concerns addressed within scope.  Impt of OOB activity. Pt agreeable to sit UIC while daughter visiting. Pt instructed in ankle pumps while seated in BS chair. He demonstrated Indep return.   Explained to pt and daughter to call staff for BTB assist. They verbalized understanding.  Nurse made aware and in room at time.   Patient left up in chair with all lines intact, call button in reach, nurse, daughter notified, and nurse present    GOALS:   Multidisciplinary Problems       Occupational Therapy Goals          Problem: Occupational Therapy    Goal Priority Disciplines Outcome Interventions   Occupational Therapy Goal     OT, PT/OT Progressing    Description: Goals to be met by: 7/15/2024     Patient will increase functional independence with ADLs by performing:    UE Dressing with Modified [place].  LE Dressing with Modified [place].  Grooming while standing at sink with Modified [place].  Toileting from toilet with Modified [place] for hygiene and clothing management.   Step transfer with Modified [place]  Toilet transfer to toilet with Modified [place].                         History:     Past Medical History:   Diagnosis Date    BPH (benign prostatic hyperplasia)     Diabetes mellitus (type 2)     Emphysema 03/12/2022    High cholesterol 08/26/2020    Hypertension     RBBB 11/29/2022    Small bowel obstruction 06/13/2019         Past Surgical History:   Procedure Laterality Date    ANASTOMOSIS OF ILEUM TO RECTUM  9/9/2019    Procedure: ANASTOMOSIS, COLO-RECTAL;  Surgeon: Fouzia Gardner MD;  Location: [facility] OR;  Service: General;;    CHOLECYSTECTOMY N/A 3/7/2022    Procedure: CHOLECYSTECTOMY;  Surgeon: Fouzia Gardner MD;  Location: [facility] OR;  Service: General;  Laterality: N/A;    COLOSTOMY  6/16/2019    Procedure: CREATION, COLOSTOMY;  Surgeon: Fouzia Gardner MD;  Location:  [facility] OR;  Service: General;;    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    Procedure: RELEASE, DUPUYTREN CONTRACTURE;  Surgeon: Slick Baldwin Jr., MD;  Location: [facility];  Service: Orthopedics;  Laterality: Left;    EYE SURGERY      GISELA PROCEDURE  6/16/2019    Procedure: GISELA PROCEDURE;  Surgeon: Fouzia Gardner MD;  Location: [facility];  Service: General;;  Colon resection    LYSIS OF ADHESIONS N/A 3/7/2022    Procedure: LYSIS, ADHESIONS;  Surgeon: Fouzia Gardner MD;  Location: [facility];  Service: General;  Laterality: N/A;    TONSILLECTOMY         Time Tracking:     OT Date of Treatment: 07/05/24  OT Start Time: 1554  OT Stop Time: 1611  OT Total Time (min): 17 min    Billable Minutes:Evaluation 9  Self Care/Home Management 8  Total Time 17    7/5/2024

## 2024-07-05 NOTE — PROGRESS NOTES
[facility] Medicine Progress Note    Primary Team: \[facility]\"" Hospitalist Team B  Attending Physician: Easton Aguilar MD  Resident: Ana  Intern: Emil    Subjective:      Felt like he slept well overnight;     Yesterday had 1 episode of emesis shortly after gastrograffin so potentially will repeat study today   No further emesis since then     He feels ok today, NGT to suction; no appetite, has been passing flatus; states he had 1 stool yesterday after drink  Reports abdominal discomfort   No increase WOB but was satting 85% while lyign in bed with poor positioning, came up to 89-88% with reposition and deep breaths    Comfortable with staying to wait while we discuss next steps with surgery      Objective:     Last 24 Hour Vital Signs:  BP  Min: 155/70  Max: 205/87  Temp  Av °F (36.7 °C)  Min: 97.6 °F (36.4 °C)  Max: 98.3 °F (36.8 °C)  Pulse  Av.4  Min: 67  Max: 80  Resp  Av.6  Min: 18  Max: 20  SpO2  Av.7 %  Min: 88 %  Max: 95 %  I/O last 3 completed shifts:  In: 1388.5 [I.V.:1238.5; NG/GT:150]  Out: 2825 [Urine:425; Drains:2400]    Physical Examination:   General: A&Ox3, resting comfortably in bed  HEENT: EOMI, moist mucus membranes w/ no erythema noted, no facial asymmetry noted  Neck: Trachea midline, no masses, no lymphadenopathy  Cardiovascular: Regular rate and rhythm, normal S1 and S2, no murmurs, rubs or gallops  does not have crackles, non-displaced PMI, improved JVD and edema 1+ today  Pulm: good WOB but bilateral expiratory wheezes and poor air movement   Abdomen: Soft, mildly-tender, mildly-distended; no organomegaly; hypoactive BS in RUQ, normoactive BS throughout remainder  Skin: bilateral forearms with scattered ecchymosis (stable)   Extremities: Atraumatic, no cyanosis or edema   Psychiatric: Normal mood and affect, thought content normal      Laboratory:  Laboratory Data Reviewed: yes  Pertinent Findings:  LACTATE: 2.5 -> 3.2 -> 1.8  CREATININE: 1.7 -> 1.6 (baseline)   T bili:  0.9 -> 1.1    Microbiology Data Reviewed: yes  Pertinent Findings:  Ngtd X 1D (BLOOD CX 7/04 2/2 ELEVAED LACTATE)     Other Results:     Radiology Data Reviewed: yes  Pertinent Findings:    KUB for SBFT in plan section    Initial CT 7/04/24  CT Abdomen Pelvis  Without Contrast   Final Result   Abnormal      Small-bowel obstruction with transition point in the right lower quadrant.  Similar findings noted on prior CT abdomen pelvis 09/19/2022      Diverticulosis without evidence of diverticulitis.  Postoperative changes with anastomotic sutures in the descending colon.      Multiple simple renal cysts.      Additional incidental nonacute findings as above.      This report was flagged in Epic as abnormal.         Electronically signed by: Inez Hamm   Date:    07/04/2024   Time:    01:29            Current Medications:     Infusions:   lactated ringers   Intravenous Continuous 100 mL/hr at 07/05/24 0130 New Bag at 07/05/24 0130        Scheduled:   enoxparin  30 mg Subcutaneous Daily        PRN:    Current Facility-Administered Medications:     albuterol-ipratropium, 3 mL, Nebulization, Q4H PRN    dextrose 10%, 12.5 g, Intravenous, PRN    glucagon (human recombinant), 1 mg, Intramuscular, PRN    insulin aspart U-100, 0-5 Units, Subcutaneous, Q6H PRN    labetalol, 10 mg, Intravenous, QID PRN    naloxone, 0.02 mg, Intravenous, PRN    ondansetron, 4 mg, Intravenous, Q8H PRN    sodium chloride 0.9%, 10 mL, Intravenous, Q12H PRN    Antibiotics and Day Number of Therapy:  none    Lines and Day Number of Therapy:  pIV R     Assessment:     Husam Connolly is a 89 y.o.male with  a past medical history of BPH, HFpEF, T2DM (A1c 7.5), Hypertension, RBBB, and multiple small bowel obstructions 2/2 extensive adhesions from prior abd surgeries (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 Gardner) who presented to [facility] on 7/3/2024 with a primary complaint of abdominal pain and  nausea for less than a day. General Surgery consulted in the ED. Admitted to [facility] for Partial Small Bowel obstruction, s/p Gastrogaffin assay yesterday, pending recs from surgery as did not fully complete it/was equivocal due to large vol emesis shortly after. At present, HDS stable, non-surgical abdomen with improved lactate, potential repeat Gastrogaffin tday     Plan:     Partial Small Bowel Obstruction   Lactic acidosis (improving)   - Hx of multiple obstructions 2/2 adhesions from abdominal surgeries  - CT abd / Pelvis significant for small bowel obstruction with transition point    - initial exam slightly tender but not tense, no guarding or other concerning feature.   - exam unchanged, patient comfortable, passing gas  Plan  - Initially: NG tube placed to suction, made NPO, minimal output; remains mildly tender and mildly distended, good BS  - General Surgery consulted, attempted Gastrogaffin assay yesterday; did not fully perform as large vol emesis, did have 1 small stool after; will repeat today   - KUB initial 1443: persistent dilated B loops, no obvious free air, no gastric perforation:   - NGT remains on suction  - Fluid replacement with LR   - Lactic acid trending 2.5 -> 3.2 ->1.8  Repeat gastrogaffin     Leukocytosis   - WBC 14  - afebrile, not septic, only endorsing nausea and abdmonial pain  - no findings on CT Ab / Pelvis concerning for infection  - UA noninfectious appearing, cxr clear  - Likely 2/2 constipation and stress  Plan  - Continue to monitor, improved today, bcx ngtd     HFpEF  RBBB  - last echo 6/07/24 with good LVEF  - physical exam notable for +2 pitting edema to shins and elevated JVP  - BNP ordered 127, patient not obese  Plan  - continue to monitor, does not have crackles, non-displaced PMI, improved JVD and edema 1+ today     CKD3  - sCr 1.7, appears at baseline  Plan  - Avoid nephrotoxic medications  - Cr 1.6 today     COPD  - managed with albuterol rescue and trelegy at  home  Plan  - duoneb while inpatient scheduled today for RT to monitor q6h  - started breo tonight then daily while inpatient 2/2 diffuse wheezes and low SpO2 today     HLD  - on Lipitor 40 at home  Plan  - holding while NPO, will restart prn      T2DM   - A1c 7.5  - On glipizide 5 at home  Plan  - Holding while NPO; will use SSI  - A1C 6.6% (avg Bg 143)     BPH  - on finasteride 5 at home  Plan  - holding while NPO     Healthcare maintenance  -PCP is Scot Knight     Fluids: po encouraged after /h yesterday  Electrolytes: None  Nutrition: NPO  PPx: Lovenox  Code status: Full     Dispo: pending resolution of symptoms and bowel movements; will repeat Gastrogaffin today     Kamille Stein MD  [facility] Medicine HO-II    [facility] Hospitalist Pager numbers:   [facility] Hospitalist Medicine Team A (Sunday/Yasmin): [phone]  [facility] Hospitalist Medicine Team B (Melida/Lauren):  [phone]

## 2024-07-06 VITALS
SYSTOLIC BLOOD PRESSURE: 137 MMHG | OXYGEN SATURATION: 97 % | RESPIRATION RATE: 18 BRPM | BODY MASS INDEX: 25.14 KG/M2 | HEART RATE: 77 BPM | HEIGHT: 69 IN | TEMPERATURE: 98 F | DIASTOLIC BLOOD PRESSURE: 62 MMHG | WEIGHT: 169.75 LBS

## 2024-07-06 PROBLEM — K56.50 SMALL BOWEL OBSTRUCTION DUE TO ADHESIONS: Status: RESOLVED | Noted: 2022-02-11 | Resolved: 2024-07-06

## 2024-07-06 PROBLEM — R42 VERTIGO: Status: RESOLVED | Noted: 2024-03-05 | Resolved: 2024-07-06

## 2024-07-06 PROBLEM — K56.609 SBO (SMALL BOWEL OBSTRUCTION): Status: RESOLVED | Noted: 2021-05-31 | Resolved: 2024-07-06

## 2024-07-06 PROBLEM — I95.1 ORTHOSTATIC HYPOTENSION: Status: RESOLVED | Noted: 2024-01-30 | Resolved: 2024-07-06

## 2024-07-06 LAB
ALBUMIN SERPL BCP-MCNC: 2.6 G/DL (ref 3.5–5.2)
ALP SERPL-CCNC: 57 U/L (ref 55–135)
ALT SERPL W/O P-5'-P-CCNC: 12 U/L (ref 10–44)
ANION GAP SERPL CALC-SCNC: 9 MMOL/L (ref 8–16)
AST SERPL-CCNC: 15 U/L (ref 10–40)
BASOPHILS # BLD AUTO: 0.02 K/UL (ref 0–0.2)
BASOPHILS NFR BLD: 0.3 % (ref 0–1.9)
BILIRUB SERPL-MCNC: 0.9 MG/DL (ref 0.1–1)
BUN SERPL-MCNC: 30 MG/DL (ref 10–30)
CALCIUM SERPL-MCNC: 8.7 MG/DL (ref 8.7–10.5)
CHLORIDE SERPL-SCNC: 108 MMOL/L (ref 95–110)
CO2 SERPL-SCNC: 24 MMOL/L (ref 23–29)
CREAT SERPL-MCNC: 1.7 MG/DL (ref 0.5–1.4)
DIFFERENTIAL METHOD BLD: ABNORMAL
EOSINOPHIL # BLD AUTO: 0.2 K/UL (ref 0–0.5)
EOSINOPHIL NFR BLD: 2.6 % (ref 0–8)
ERYTHROCYTE [DISTWIDTH] IN BLOOD BY AUTOMATED COUNT: 12.9 % (ref 11.5–14.5)
EST. GFR  (NO RACE VARIABLE): 37 ML/MIN/1.73 M^2
GLUCOSE SERPL-MCNC: 121 MG/DL (ref 70–110)
HCT VFR BLD AUTO: 40.1 % (ref 40–54)
HGB BLD-MCNC: 13.1 G/DL (ref 14–18)
IMM GRANULOCYTES # BLD AUTO: 0.02 K/UL (ref 0–0.04)
IMM GRANULOCYTES NFR BLD AUTO: 0.3 % (ref 0–0.5)
LYMPHOCYTES # BLD AUTO: 1.3 K/UL (ref 1–4.8)
LYMPHOCYTES NFR BLD: 17.1 % (ref 18–48)
MAGNESIUM SERPL-MCNC: 2 MG/DL (ref 1.6–2.6)
MCH RBC QN AUTO: 29.6 PG (ref 27–31)
MCHC RBC AUTO-ENTMCNC: 32.7 G/DL (ref 32–36)
MCV RBC AUTO: 91 FL (ref 82–98)
MONOCYTES # BLD AUTO: 1 K/UL (ref 0.3–1)
MONOCYTES NFR BLD: 13.5 % (ref 4–15)
NEUTROPHILS # BLD AUTO: 5.1 K/UL (ref 1.8–7.7)
NEUTROPHILS NFR BLD: 66.2 % (ref 38–73)
NRBC BLD-RTO: 0 /100 WBC
PLATELET # BLD AUTO: 155 K/UL (ref 150–450)
PMV BLD AUTO: 11.5 FL (ref 9.2–12.9)
POCT GLUCOSE: 113 MG/DL (ref 70–110)
POCT GLUCOSE: 120 MG/DL (ref 70–110)
POCT GLUCOSE: 148 MG/DL (ref 70–110)
POTASSIUM SERPL-SCNC: 3.9 MMOL/L (ref 3.5–5.1)
PROT SERPL-MCNC: 5.3 G/DL (ref 6–8.4)
RBC # BLD AUTO: 4.42 M/UL (ref 4.6–6.2)
SODIUM SERPL-SCNC: 141 MMOL/L (ref 136–145)
WBC # BLD AUTO: 7.73 K/UL (ref 3.9–12.7)

## 2024-07-06 PROCEDURE — 99900035 HC TECH TIME PER 15 MIN (STAT)

## 2024-07-06 PROCEDURE — 25000003 PHARM REV CODE 250

## 2024-07-06 PROCEDURE — 85025 COMPLETE CBC W/AUTO DIFF WBC: CPT

## 2024-07-06 PROCEDURE — 94640 AIRWAY INHALATION TREATMENT: CPT

## 2024-07-06 PROCEDURE — 83735 ASSAY OF MAGNESIUM: CPT

## 2024-07-06 PROCEDURE — 94799 UNLISTED PULMONARY SVC/PX: CPT

## 2024-07-06 PROCEDURE — 99232 SBSQ HOSP IP/OBS MODERATE 35: CPT | Mod: ,,, | Performed by: SURGERY

## 2024-07-06 PROCEDURE — 80053 COMPREHEN METABOLIC PANEL: CPT

## 2024-07-06 PROCEDURE — 94761 N-INVAS EAR/PLS OXIMETRY MLT: CPT

## 2024-07-06 PROCEDURE — 36415 COLL VENOUS BLD VENIPUNCTURE: CPT

## 2024-07-06 RX ADMIN — HYDRALAZINE HYDROCHLORIDE 50 MG: 25 TABLET ORAL at 09:07

## 2024-07-06 RX ADMIN — FLUTICASONE FUROATE AND VILANTEROL TRIFENATATE 1 PUFF: 100; 25 POWDER RESPIRATORY (INHALATION) at 07:07

## 2024-07-06 RX ADMIN — ATORVASTATIN CALCIUM 40 MG: 40 TABLET, FILM COATED ORAL at 09:07

## 2024-07-06 RX ADMIN — LOSARTAN POTASSIUM 100 MG: 50 TABLET, FILM COATED ORAL at 09:07

## 2024-07-06 RX ADMIN — ASPIRIN 81 MG: 81 TABLET, COATED ORAL at 09:07

## 2024-07-06 RX ADMIN — FINASTERIDE 5 MG: 5 TABLET, FILM COATED ORAL at 09:07

## 2024-07-06 NOTE — PLAN OF CARE
Problem: Adult Inpatient Plan of Care  Goal: Plan of Care Review  Outcome: Met  Goal: Patient-Specific Goal (Individualized)  Outcome: Met  Goal: Absence of Hospital-Acquired Illness or Injury  Outcome: Met  Goal: Optimal Comfort and Wellbeing  Outcome: Met  Goal: Readiness for Transition of Care  Outcome: Met     Problem: Diabetes Comorbidity  Goal: Blood Glucose Level Within Targeted Range  Outcome: Met     Problem: Fall Injury Risk  Goal: Absence of Fall and Fall-Related Injury  Outcome: Met     Problem: Intestinal Obstruction  Goal: Optimal Bowel Function  Outcome: Met  Goal: Fluid and Electrolyte Balance  Outcome: Met  Goal: Absence of Infection Signs and Symptoms  Outcome: Met  Goal: Optimize Nutrition Status  Outcome: Met  Goal: Optimal Pain Control and Function  Outcome: Met     Problem: Comorbidity Management  Goal: Maintenance of COPD Symptom Control  Outcome: Met  Goal: Blood Pressure in Desired Range  Outcome: Met  Goal: Maintenance of Heart Failure Symptom Control  Outcome: Met     Pt has been cleared for discharge   PT is AAOX4,   no acute changes noted during shift,    no skin breakdown noted,   hourly rounds made during shift,    VSS. No falls noted. Fall precautions remain.   Pain assessed. No pain noted.   Pt resting comfortably in bed. Call light in reach.

## 2024-07-06 NOTE — NURSING
Patient aaox4, denies any pain or SOB. VS are WNL. Medications tolerated well. Bed alarm on and active. Patient denies any needs at this time. Instructed to use call light. Will continue with plan of care.

## 2024-07-06 NOTE — PLAN OF CARE
Problem: Adult Inpatient Plan of Care  Goal: Plan of Care Review  Outcome: Progressing  Goal: Patient-Specific Goal (Individualized)  Outcome: Progressing  Goal: Absence of Hospital-Acquired Illness or Injury  Outcome: Progressing  Goal: Optimal Comfort and Wellbeing  Outcome: Progressing  Goal: Readiness for Transition of Care  Outcome: Progressing     Problem: Diabetes Comorbidity  Goal: Blood Glucose Level Within Targeted Range  Outcome: Progressing     Problem: Fall Injury Risk  Goal: Absence of Fall and Fall-Related Injury  Outcome: Progressing     Problem: Intestinal Obstruction  Goal: Optimal Bowel Function  Outcome: Progressing  Goal: Fluid and Electrolyte Balance  Outcome: Progressing  Goal: Absence of Infection Signs and Symptoms  Outcome: Progressing  Goal: Optimize Nutrition Status  Outcome: Progressing  Goal: Optimal Pain Control and Function  Outcome: Progressing     Problem: Comorbidity Management  Goal: Maintenance of COPD Symptom Control  Outcome: Progressing  Goal: Blood Pressure in Desired Range  Outcome: Progressing  Goal: Maintenance of Heart Failure Symptom Control  Outcome: Progressing

## 2024-07-06 NOTE — PROGRESS NOTES
[facility] Medicine Progress Note    Primary Team: \[facility]\"" Hospitalist Team B  Attending Physician: Easton Aguilar MD  Resident: Nick Acosta MD  Intern: Eloy Collins MD    Subjective:      No acute events reported overnight. Patient afebrile. Restarted home BP medications yesterday, pressures now stable in 120-130s/60s. Patient passed gastrografin challenge, s/p NG tube removal. Tolerating clear liquid diet with no nausea or vomiting. Patient also reports multiple bowel movement episodes. States he is feeling a lot better. Denies abdominal pain, chest pain, fevers, chills.      Objective:     Last 24 Hour Vital Signs:  BP  Min: 129/58  Max: 152/69  Temp  Av.9 °F (36.6 °C)  Min: 97.4 °F (36.3 °C)  Max: 98.4 °F (36.9 °C)  Pulse  Av  Min: 56  Max: 85  Resp  Av.5  Min: 18  Max: 20  SpO2  Av.2 %  Min: 89 %  Max: 96 %  I/O last 3 completed shifts:  In: 0   Out: 2350 [Urine:250; Drains:2100]    Physical Examination:  Physical Exam  Vitals and nursing note reviewed.   Constitutional:       General: He is not in acute distress.     Appearance: Normal appearance. He is not ill-appearing.   Cardiovascular:      Rate and Rhythm: Normal rate and regular rhythm.      Heart sounds: No murmur heard.     No friction rub. No gallop.   Pulmonary:      Effort: Pulmonary effort is normal.      Breath sounds: Normal breath sounds. No wheezing.   Abdominal:      General: Bowel sounds are normal.      Comments: Mild abdominal distension. Non tender to palpation.    Musculoskeletal:         General: No tenderness.      Right lower leg: No edema.      Left lower leg: No edema.   Neurological:      General: No focal deficit present.      Mental Status: He is alert and oriented to person, place, and time.         Laboratory:  Laboratory Data Reviewed:  Pertinent Findings:  Hgb 13.1 from 15.1    Microbiology Data Reviewed:  Pertinent Findings:  BCx x2 No growth to date day 2    Other Results:  EKG (my interpretation):  None    Radiology Data Reviewed:   Pertinent Findings:  Xray abd AP: Esophagogastric tube tip projects at the proximal stomach. Oral contrast seen to the level of colon.     Current Medications:       Scheduled:   aspirin  81 mg Oral Daily    atorvastatin  40 mg Oral Daily    enoxparin  30 mg Subcutaneous Daily    finasteride  5 mg Oral Daily    fluticasone furoate-vilanteroL  1 puff Inhalation Daily    hydrALAZINE  50 mg Oral TID    losartan  100 mg Oral Daily        PRN:    Current Facility-Administered Medications:     albuterol-ipratropium, 3 mL, Nebulization, Q4H PRN    dextrose 10%, 12.5 g, Intravenous, PRN    glucagon (human recombinant), 1 mg, Intramuscular, PRN    insulin aspart U-100, 0-5 Units, Subcutaneous, Q6H PRN    labetalol, 10 mg, Intravenous, QID PRN    naloxone, 0.02 mg, Intravenous, PRN    ondansetron, 4 mg, Intravenous, Q8H PRN    sodium chloride 0.9%, 10 mL, Intravenous, Q12H PRN        Assessment:     Husam Connolly is a 89 y.o.male with  a past medical history of BPH, HFpEF, T2DM (A1c 7.5), Hypertension, RBBB, and multiple small bowel obstructions 2/2 extensive adhesions from prior abd surgeries (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 Gardner) who presented to [facility] on 7/3/2024 with a primary complaint of abdominal pain and nausea for less than a day. General Surgery consulted in the ED. Admitted to [facility] for Partial Small Bowel obstruction. Patient passed gastrografin challenge. NG tube removed and tolerating clear liquids with reports of multiple bowel movements last night. Hemodynamically stable and will advance diet as tolerated.      Plan:     #Partial Small Bowel Obstruction   #Lactic acidosis (improving)  -  Hx of multiple obstructions 2/2 adhesions from abdominal surgeries  - CT abd / Pelvis significant for small bowel obstruction with transition point    - Gen surgery consulted and following.    - VIRIDIANA initial 1443:  persistent dilated B loops, no obvious free air, no gastric perforation:   - Patient s/p gastrografin challenge. NG tube removed, tolerating PO clear liquids well  - reports multiple bowel movements last night  - Lactic acid trending 2.5 -> 3.2 ->1.8   Plan   - advance diet to PO solids   - stop IV fluids      #Leukocytosis (resolved)  - WBC 14.8 on admit  - afebrile and not septic, no concerns of infection per imaging  - UA unremarkable with clear CXray   - BCx no growth to date day 2  Plan  - trend vitals and cbc  - continue to monitor      #HFpEF  #RBBB  - last echo 6/07/24 with good LVEF  - physical exam notable for +2 pitting edema to shins and elevated JVP  - BNP ordered 127, patient not obese  Plan  - continue to monitor, does not have crackles, non-displaced PMI, improved JVD and edema 1+ today      #CKD3   - sCr 1.7, appears at baseline  Plan  - Avoid nephrotoxic medications  - Cr 1.7 today      #COPD   - managed with albuterol rescue and trelegy at home  Plan  - duoneb while inpatient scheduled today for RT to monitor q6h  - started breo tonight then daily while inpatient 2/2 diffuse wheezes and low SpO2 today      #HLD   - on Lipitor 40 at home  Plan  - diet now advanced to solids. Assess if patient tolerates and if so, can restart home medication      #T2DM   - A1c 7.5  - On glipizide 5 at home  Plan  - On SSI  - A1C 6.6% (avg Bg 143)  - restart home medications upon discharge and if patient can tolerate PO solids    #BPH   - on finasteride 5 at home  Plan  - restart once patient tolerates PO solids upon discharge        Diet: Diabetic  DVT Prophylaxis: lovenox  Code: full    Dispo: passed gastrografin challenge, s/p NG tube removal. Pending progression of PO diet, DC upon tolerating solids with       Eloy Collins MD  [facility] Internal Medicine HO-I    [facility] Medicine Hospitalist Pager numbers:   [facility] Hospitalist Medicine Team A (Sunday/Yasmin): [phone]  [facility] Hospitalist Medicine Team B (Melida/Lauren):   [phone]

## 2024-07-06 NOTE — PROGRESS NOTES
[place] - Telemetry      HOME HEALTH ORDERS  FACE TO FACE ENCOUNTER    Patient Name: Husam Connolly  YOB: 1931    PCP: Williams Knight DO   PCP Address: [address] / [place] [address]  PCP Phone Number: [phone]  PCP Fax: [phone]    Encounter Date: 7/3/24    Admit to Home Health    Diagnoses:  Active Hospital Problems   No active problems to display.      Resolved Hospital Problems    Diagnosis Date Resolved POA    *SBO (small bowel obstruction) [K56.609] 07/06/2024 Yes       Follow Up Appointments:  Future Appointments   Date Time Provider Department Center   7/18/2024  2:30 PM Slick Baldwin Jr., MD [facility] ORTHO [facility]   9/3/2024 11:15 AM LAB, [facility] [place]   9/6/2024 11:00 AM Williams Knight DO [facility]   9/13/2024  2:00 PM Tommy Payton MD [facility] CARDIO [place] [facility]   10/10/2024  1:00 PM Fouzia Gardner MD [facility] [place]       Allergies:  Review of patient's allergies indicates:   Allergen Reactions    Solarcaine [benzocaine-triclosan]     Sulfa (sulfonamide antibiotics)     Sulfamethoxazole     Trimethoprim     Hytrin [terazosin] Rash    Jardiance [empagliflozin] Rash     Yeast infection    Smz-tmp ds [sulfamethoxazole-trimethoprim] Rash       Medications: Review discharge medications with patient and family and provide education.    Current Facility-Administered Medications   Medication Dose Route Frequency Provider Last Rate Last Admin    albuterol-ipratropium 2.5 mg-0.5 mg/3 mL nebulizer solution 3 mL  3 mL Nebulization Q4H PRN Vikram Bang MD   3 mL at 07/05/24 0818    aspirin EC tablet 81 mg  81 mg Oral Daily Mitchel Perez MD   81 mg at 07/06/24 0926    atorvastatin tablet 40 mg  40 mg Oral Daily Mitchel Perez MD   40 mg at 07/06/24 0926    dextrose 10% bolus 125 mL 125 mL  12.5 g Intravenous PRN Easton Aguilar MD        enoxaparin injection 30 mg  30 mg Subcutaneous Daily Vikram Bang MD   30 mg at 07/05/24 1721     finasteride tablet 5 mg  5 mg Oral Daily Mitchel Perez MD   5 mg at 07/06/24 0926    fluticasone furoate-vilanteroL 100-25 mcg/dose diskus inhaler 1 puff  1 puff Inhalation Daily Kamille Stein MD   1 puff at 07/06/24 0719    glucagon (human recombinant) injection 1 mg  1 mg Intramuscular PRN Easton Aguilar MD        hydrALAZINE tablet 50 mg  50 mg Oral TID Mitchel Perez MD   50 mg at 07/06/24 0926    insulin aspart U-100 pen 0-5 Units  0-5 Units Subcutaneous Q6H PRN Easton Aguilar MD        labetaloL injection 10 mg  10 mg Intravenous QID PRN Vikram Bang MD   10 mg at 07/04/24 1835    losartan tablet 100 mg  100 mg Oral Daily Mitchel Perez MD   100 mg at 07/06/24 0926    naloxone 0.4 mg/mL injection 0.02 mg  0.02 mg Intravenous PRN Vikram Bang MD        ondansetron injection 4 mg  4 mg Intravenous Q8H PRN Vikram Bang MD   4 mg at 07/04/24 2110    sodium chloride 0.9% flush 10 mL  10 mL Intravenous Q12H PRN Vikram Bang MD         Current Discharge Medication List        CONTINUE these medications which have NOT CHANGED    Details   albuterol (VENTOLIN HFA) 90 mcg/actuation inhaler Inhale 2 puffs into the lungs every 4 (four) hours as needed for Wheezing. Rescue  Qty: 18 g, Refills: 11      aspirin (ECOTRIN) 81 MG EC tablet Take 81 mg by mouth once daily.      atorvastatin (LIPITOR) 40 MG tablet Take 1 tablet (40 mg total) by mouth once daily.  Qty: 90 tablet, Refills: 3    Associated Diagnoses: HLD (hyperlipidemia)      finasteride (PROSCAR) 5 mg tablet Take 1 tablet (5 mg total) by mouth once daily.  Qty: 90 tablet, Refills: 3    Associated Diagnoses: Benign non-nodular prostatic hyperplasia without lower urinary tract symptoms      fluticasone propionate (FLONASE) 50 mcg/actuation nasal spray 2 sprays (100 mcg total) by Each Nostril route once daily.  Qty: 48 g, Refills: 3    Associated Diagnoses: Chronic rhinitis      fluticasone-umeclidin-vilanter (TRELEGY ELLIPTA) 100-62.5-25 mcg DsDv  Inhale 1 puff into the lungs once daily.  Qty: 180 each, Refills: 3      GARLIC ORAL Take 1 capsule by mouth once daily.      glipiZIDE 5 MG TR24 Take 1 tablet (5 mg total) by mouth daily with breakfast.  Qty: 90 tablet, Refills: 3    Associated Diagnoses: Type 2 diabetes mellitus with stage 3b chronic kidney disease, without long-term current use of insulin      hydrALAZINE (APRESOLINE) 50 MG tablet Take 1 tablet (50 mg total) by mouth 3 (three) times daily.  Qty: 270 tablet, Refills: 3    Comments: .      losartan (COZAAR) 100 MG tablet Take 1 tablet (100 mg total) by mouth once daily.  Qty: 90 tablet, Refills: 3    Comments: .      multivitamin capsule Take 1 capsule by mouth once daily.      omega-3 fatty acids/fish oil (FISH OIL-OMEGA-3 FATTY ACIDS) 300-1,000 mg capsule Take 1 capsule by mouth once daily.      vitamin D (VITAMIN D3) 1000 units Tab Take 1,000 Units by mouth once daily.      blood sugar diagnostic Strp To check sugar daily, to use with insurance preferred meter  Qty: 200 each, Refills: 2    Associated Diagnoses: Type 2 diabetes mellitus with stage 3b chronic kidney disease, without long-term current use of insulin      blood-glucose meter kit Use as instructed  Qty: 1 each, Refills: 0    Associated Diagnoses: Type 2 diabetes mellitus with stage 3b chronic kidney disease, without long-term current use of insulin      inhalation spacing device Use as directed for inhalation.  Qty: 1 each, Refills: 0      lancets Misc To check sugar daily, to use with insurance preferred meter  Qty: 200 each, Refills: 2    Associated Diagnoses: Type 2 diabetes mellitus with stage 3b chronic kidney disease, without long-term current use of insulin      meclizine (ANTIVERT) 12.5 mg tablet TAKE 1 TABLET(12.5 MG) BY MOUTH THREE TIMES DAILY AS NEEDED FOR DIZZINESS  Qty: 20 tablet, Refills: 2    Associated Diagnoses: Vertigo               I have seen and examined this patient within the last 30 days. My clinical findings  that support the need for the home health skilled services and home bound status are the following:no   Weakness/numbness causing balance and gait disturbance due to Weakness/Debility making it taxing to leave home.     Diet:   diabetic diet 1800 calorie    Labs:  none    Referrals/ Consults  Physical Therapy to evaluate and treat. Evaluate for home safety and equipment needs; Establish/upgrade home exercise program. Perform / instruct on therapeutic exercises, gait training, transfer training, and Range of Motion.  Occupational Therapy to evaluate and treat. Evaluate home environment for safety and equipment needs. Perform/Instruct on transfers, ADL training, ROM, and therapeutic exercises.    Activities:   activity as tolerated    Nursing:   Agency to admit patient within 24 hours of hospital discharge unless specified on physician order or at patient request    SN to complete comprehensive assessment including routine vital signs. Instruct on disease process and s/s of complications to report to MD. Review/verify medication list sent home with the patient at time of discharge  and instruct patient/caregiver as needed. Frequency may be adjusted depending on start of care date.     Skilled nurse to perform up to 3 visits PRN for symptoms related to diagnosis    Notify MD if SBP > 160 or < 90; DBP > 90 or < 50; HR > 120 or < 50; Temp > 101; O2 < 88%;    Ok to schedule additional visits based on staff availability and patient request on consecutive days within the home health episode.    When multiple disciplines ordered:    Start of Care occurs on Sunday - Wednesday schedule remaining discipline evaluations as ordered on separate consecutive days following the start of care.    Thursday SOC -schedule subsequent evaluations Friday and Monday the following week.     Friday - Saturday SOC - schedule subsequent discipline evaluations on consecutive days starting Monday of the following week.    For all post-discharge  communication and subsequent orders please contact patient's primary care physician. If unable to reach primary care physician or do not receive response within 30 minutes, please contact [facility] On Call for clinical staff order clarification    Miscellaneous   none    Home Health Aide:  Physical Therapy Three times weekly and Occupational Therapy Three times weekly    Wound Care Orders  no    I certify that this patient is confined to his home and needs physical therapy and occupational therapy.    Nick Acosta MD  [facility] Internal Medicine HO-III

## 2024-07-06 NOTE — PLAN OF CARE
1105  DC order noted. Previously scheduled appt with Dr Slick Baldwin (ortho surg) on 7/18/2024 at 1430 noted. Message sent to Dr Williams Knight's (PCP)  requesting a hospfu appt. Patient will be notified of appt date & time. Information added to the pt's discharge paperwork.     1125  Patient Awake & alert in bed when CM rounded via [employer]. No family present. Patient in agreement with plan to discharge home today, denied the need for assistance with transportation at time of discharge, & verbalized understanding that he will be notified of a hospital follow up appointment with Dr Williams Knight. Pt stated he is supposed to be receiving HH services following discharge but did not have a HH preference.     Message sent to Dr Acosta (U B) informing of above & requesting HH orders. Awaiting response.     1140  HH referrals sent via [employer]. Awaiting response.     1200  HH orders sent via [employer]. CM paged on-call nurse with [facility] to question status of referral. Awaiting response.     1205  CM was informed by Nina dsouza/[facility] that the pt has been accepted & that services will start on Thursday 7/11/2024. Information added to the pt's discharge paperwork.     Message sent to nurse Cayla & virtual nurse Nina informing that the pt is cleared to discharge.       Will continue to follow.

## 2024-07-06 NOTE — ASSESSMENT & PLAN NOTE
89 y.o. male with a past medical history of BPH, HFpEF, T2DM (A1c 7.5), Hypertension, RBBB, and multiple small bowel obstructions here for SBO. Has history of ex-lap/Wiseman's and subsequent reversal with Dr. Gardner in 2019. GGC was attempted yesterday, however he vomited shortly after administration so interpreting the success/failure of management in the context of one bowel movement yesterday is difficult.     Passed GGC and having BMs  Advance to reg diet, dc IVF  Daily labs, keep K > 4, Mag > 2  General surgery to continue to follow  Remainder of care per primary

## 2024-07-06 NOTE — DISCHARGE SUMMARY
[facility] Hospital Medicine Discharge Summary    Primary Team: [facility] Hospitalist Team B  Attending Physician: Easton Aguilar MD  Resident: Ana  Intern: Emil    Date of Admit: 7/3/2024  Date of Discharge: 7/6/2024    Discharge to: Home  Condition: stable    Discharge Diagnoses     Partial small bowel obstruction  HFpEF  RBBB  CKD3  COPD  HLD  T2DM  BPM      Consultants and Procedures     Consultants:  General Surgery     Procedures:   Gastrografin follow through     Imaging:  CT Abdomen Pelvis without Contrast; KUB     Brief History of Present Illness      Husam Connolly is a 89 y.o.male with  a past medical history of BPH, HFpEF, T2DM (A1c 6.6%), Hypertension, RBBB, and multiple small bowel obstructions 2/2 extensive adhesions from prior abd surgeries (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 Gardner) who presented to [facility] on 7/3/2024 with a primary complaint of abdominal pain and nausea for less than a day. General Surgery consulted in the ED, was admitted to [facility] for Partial Small Bowel obstruction. Patient has ongoing ability to pass flatus. Is poor surgical candidate due to age, frailty, and risk of worsening propensity of developing SBO due to many abdominal surgeries. Was HDS stable, non-surgical abdomen with improved lactate after trending, remained NPO with NGT to suction. Performed gastrografin assay x 2 due to failing first 2/2 emesis. Passed the second gastrografin challenge and had NG tube removed. Had multiple bowel movement episodes after gastrografin assay with improvement in symptoms. Patient tolerated advancements in diet from clears to solids. Restarted home BP medications with BP normalizing to [phone]. Stable for discharge with home health PT/OT.    For the full HPI please refer to the History & Physical from this admission.    Hospital Course By Problem with Pertinent Findings     Partial Small Bowel Obstruction   Lactic acidosis  (improving)   - Exam unchanged, patient comfortable, passing gas, mildly distended.   - Hx of multiple obstructions 2/2 adhesions from abdominal surgeries  - CT abd / Pelvis significant for small bowel obstruction with transition point    - initial exam slightly tender but not tense, no guarding or other concerning feature.   Plan  - General Surgery consulted, Gastrogaffin assay   - placed on NG tube initially for decompression. Passed gastrografin challenge, NG tubed removed and patient tolerating PO solid liquids      Leukocytosis (resolved)  - WBC 14  - afebrile, not septic, only endorsing nausea and abdmonial pain  - no findings on CT Ab / Pelvis concerning for infection  - UA noninfectious appearing, cxr clear  - Likely 2/2 constipation and stress  Plan  - Continue to monitor, improved today, bcx ngtd     HFpEF  RBBB  - last echo 6/07/24 with good LVEF  - physical exam notable for +2 pitting edema to shins and elevated JVP  - BNP ordered 127, patient not obese  Plan  - continue to monitor, does not have crackles, non-displaced PMI, improved JVD and edema 1+ today     CKD3  - sCr 1.7, appears at baseline  Plan  - Avoid nephrotoxic medications  - Cr 1.7 on discahrge     COPD  - managed with albuterol rescue and trelegy at home  Plan  - duoneb while inpatient scheduled today for RT to monitor q6h  - started breo tonight then daily while inpatient 2/2 diffuse wheezes and low SpO2 today     HLD  - on Lipitor 40 at home  Plan  - restart upon discharge     T2DM   - A1c 7.5  - On glipizide 5 at home  Plan  - restart upon discharge  - A1C 6.6% (avg Bg 143)     BPH  - on finasteride 5 at home  Plan  - restart upon discharge     Healthcare maintenance  -PCP is Scot Knight    Discharge Medications        Medication List        CONTINUE taking these medications      albuterol 90 mcg/actuation inhaler  Commonly known as: VENTOLIN HFA  Inhale 2 puffs into the lungs every 4 (four) hours as needed for Wheezing. Rescue      aspirin 81 MG EC tablet  Commonly known as: ECOTRIN     atorvastatin 40 MG tablet  Commonly known as: LIPITOR  Take 1 tablet (40 mg total) by mouth once daily.     blood sugar diagnostic Strp  To check sugar daily, to use with insurance preferred meter     blood-glucose meter kit  Use as instructed     finasteride 5 mg tablet  Commonly known as: PROSCAR  Take 1 tablet (5 mg total) by mouth once daily.     fish oil-omega-3 fatty acids 300-1,000 mg capsule     fluticasone propionate 50 mcg/actuation nasal spray  Commonly known as: FLONASE  2 sprays (100 mcg total) by Each Nostril route once daily.     fluticasone-umeclidin-vilanter 100-62.5-25 mcg Dsdv  Commonly known as: TRELEGY ELLIPTA  Inhale 1 puff into the lungs once daily.     GARLIC ORAL     glipiZIDE 5 MG Tr24  Take 1 tablet (5 mg total) by mouth daily with breakfast.     hydrALAZINE 50 MG tablet  Commonly known as: APRESOLINE  Take 1 tablet (50 mg total) by mouth 3 (three) times daily.     lancets Misc  To check sugar daily, to use with insurance preferred meter     losartan 100 MG tablet  Commonly known as: COZAAR  Take 1 tablet (100 mg total) by mouth once daily.     multivitamin capsule     OPTICHAMBER BASHIR [facility]  Generic drug: inhalation spacing device  Use as directed for inhalation.     vitamin D 1000 units Tab  Commonly known as: VITAMIN D3            ASK your doctor about these medications      meclizine 12.5 mg tablet  Commonly known as: ANTIVERT  TAKE 1 TABLET(12.5 MG) BY MOUTH THREE TIMES DAILY AS NEEDED FOR DIZZINESS              Discharge Information:   Diet:  Increase as tolerated, increase protein (see discharge instructions for tips)     Physical Activity:  Recommend increased physical activity              Instructions:  1. Take all medications as prescribed  2. Keep all follow-up appointments  3. Return to the hospital or call your primary care physicians if any worsening symptoms such as fever, chest pain, shortness of breath, return of  symptoms, or any other concerns.    Follow-Up Appointments:  Date Time Provider Department Center   7/18/2024  2:30 PM Slick Baldwin Jr., MD [facility] ORTHO [facility]   9/3/2024 11:15 AM LAB, [facility] [place]   9/6/2024 11:00 AM Williams Knight DO [facility]   9/13/2024  2:00 PM Tommy Payton MD [facility] CARDIO [facility]   10/10/2024  1:00 PM Fouzia Gardner MD [facility] Fouzia Collins MD  [facility] Internal Medicine PGY-1

## 2024-07-06 NOTE — PLAN OF CARE
[facility] - Telemetry  Discharge Final Note    Primary Care Provider: Williams Knight DO    Expected Discharge Date: 7/6/2024    Final Discharge Note (most recent)       Final Note - 07/06/24 1509          Final Note    Assessment Type Final Discharge Note (P)      Anticipated Discharge Disposition Home-Health Care Svc (P)    [facility]-NO    Hospital Resources/Appts/Education Provided Appointments scheduled and added to AVS (P)         Post-Acute Status    Post-Acute Authorization Home Health (P)      Home Health Status Set-up Complete/Auth obtained (P)    [facility]-NO                      Contact Info       Slick Baldwin Jr., MD   Specialty: Hand Surgery, Orthopedic Surgery    [address]  [address]  [place] [address]   Phone: [phone]       Next Steps: Follow up on 7/25/2024    Instructions: at 2:30pm; previously scheduled ortho surg appointment    Williams Knight DO   Specialty: Family Medicine   Relationship: PCP - General    [address]  [place] [address]   Phone: [phone]       Next Steps: Follow up    Instructions: Patient will be notified of a PCP hospital follow up appointment.    [facility]   Specialty: Home Health Services, Home Therapy Services, Home Living Aide Services    [address]  [place] [address]   Phone: [phone]       Next Steps: Follow up on 7/11/2024    Instructions: will provide home health services

## 2024-07-06 NOTE — PROGRESS NOTES
[facility]  General Surgery  Progress Note    Subjective:     History of Present Illness:  Husam Connolly is a 89 y.o. male with a past medical history of BPH, HFpEF, T2DM (A1c 7.5), Hypertension, RBBB, and multiple small bowel obstructions who presented to [facility] on 7/3/2024 with a primary complaint of abdominal pain and nausea for less than a day. Has prior history of SBO and diverticulitis s/p ex-lap/Wiseman's on 6/16/19 and colostomy reversal on 9/9/19 with Dr. Gardner.     Patient was in their normal state of health until yesterday afternoon when he began to experience sharp, intermittent abdominal pains. He described the pain as a 5/10 sharp, crampy pain. The pain was associated with some reflux and nausea. Denies symptoms of chest pains, shortness of breath, diarrhea, fevers, or chills. Patient has a history of repeat SBOs which presented similiarly so he reported to the ED. General surgery consulted for SBO. NGT placed yesterday with minimal output overnight. Patient states that he feels much better this am and has been passing flatus.    Post-Op Info:  * No surgery found *         Interval History: GGC yesterday without n/v and showing contrast in colon. Had 5 bowel movements overnight and denies n/v. Tolerating CLD without issue.    Medications:  Continuous Infusions:  Scheduled Meds:   aspirin  81 mg Oral Daily    atorvastatin  40 mg Oral Daily    enoxparin  30 mg Subcutaneous Daily    finasteride  5 mg Oral Daily    fluticasone furoate-vilanteroL  1 puff Inhalation Daily    hydrALAZINE  50 mg Oral TID    losartan  100 mg Oral Daily     PRN Meds:  Current Facility-Administered Medications:     albuterol-ipratropium, 3 mL, Nebulization, Q4H PRN    dextrose 10%, 12.5 g, Intravenous, PRN    glucagon (human recombinant), 1 mg, Intramuscular, PRN    insulin aspart U-100, 0-5 Units, Subcutaneous, Q6H PRN    labetalol, 10 mg, Intravenous, QID PRN    naloxone, 0.02 mg,  Intravenous, PRN    ondansetron, 4 mg, Intravenous, Q8H PRN    sodium chloride 0.9%, 10 mL, Intravenous, Q12H PRN     Review of patient's allergies indicates:   Allergen Reactions    Solarcaine [benzocaine-triclosan]     Sulfa (sulfonamide antibiotics)     Sulfamethoxazole     Trimethoprim     Hytrin [terazosin] Rash    Jardiance [empagliflozin] Rash     Yeast infection    Smz-tmp ds [sulfamethoxazole-trimethoprim] Rash     Objective:     Vital Signs (Most Recent):  Temp: 97.8 °F (36.6 °C) (07/06/24 0726)  Pulse: 74 (07/06/24 0726)  Resp: 20 (07/06/24 0726)  BP: (!) 144/68 (07/06/24 0726)  SpO2: (!) 94 % (07/06/24 0726) Vital Signs (24h Range):  Temp:  [97.4 °F (36.3 °C)-98.4 °F (36.9 °C)] 97.8 °F (36.6 °C)  Pulse:  [56-85] 74  Resp:  [18-20] 20  SpO2:  [89 %-96 %] 94 %  BP: (129-152)/(58-69) 144/68     Weight: 77 kg (169 lb 12.1 oz)  Body mass index is 25.07 kg/m².    Intake/Output - Last 3 Shifts         07/04 0700  07/05 0659 07/05 0700  07/06 0659 07/06 0700  07/07 0659    P.O. 0 0     I.V. (mL/kg) 1238.5 (16.1)      NG/      Total Intake(mL/kg) 1388.5 (18) 0 (0)     Urine (mL/kg/hr) 425 (0.2) 50 (0)     Drains 2400 400     Other  0     Stool  0     Total Output 2825 450     Net -1436.5 -450            Urine Occurrence  1 x 1 x    Stool Occurrence  4 x 1 x             Physical Exam  Constitutional:       General: He is not in acute distress.     Appearance: He is not ill-appearing.   HENT:      Nose:      Comments: NGT removed  Cardiovascular:      Rate and Rhythm: Normal rate and regular rhythm.   Pulmonary:      Effort: Pulmonary effort is normal.   Abdominal:      General: There is no distension.      Palpations: Abdomen is soft.      Tenderness: There is no abdominal tenderness. There is no guarding or rebound.      Comments: Multiple abdominal scars present from prior surgieries   Neurological:      Mental Status: He is alert.          Significant Labs:  I have reviewed all pertinent lab results  within the past 24 hours.  CBC:   Recent Labs   Lab 07/06/24  0208   WBC 7.73   RBC 4.42*   HGB 13.1*   HCT 40.1      MCV 91   MCH 29.6   MCHC 32.7     BMP:   Recent Labs   Lab 07/06/24  0208   *      K 3.9      CO2 24   BUN 30   CREATININE 1.7*   CALCIUM 8.7   MG 2.0       Significant Diagnostics:  I have reviewed all pertinent imaging results/findings within the past 24 hours.  Assessment/Plan:     * SBO (small bowel obstruction)  89 y.o. male with a past medical history of BPH, HFpEF, T2DM (A1c 7.5), Hypertension, RBBB, and multiple small bowel obstructions here for SBO. Has history of ex-lap/Wiseman's and subsequent reversal with Dr. Gardner in 2019. GGC was attempted yesterday, however he vomited shortly after administration so interpreting the success/failure of management in the context of one bowel movement yesterday is difficult.     Passed GGC and having BMs  Advance to reg diet, dc IVF  Daily labs, keep K > 4, Mag > 2  General surgery to continue to follow  Remainder of care per primary         Rosalino Fuentes MD  General Surgery  [place] - Telemetry

## 2024-07-06 NOTE — PLAN OF CARE
Pt on RA with documented sats. Lung expansion therapy. The proper method of use, as well as anticipated side effects, of this metered-dose inhaler are discussed and demonstrated to the patient. Will continue to monitor.

## 2024-07-06 NOTE — SUBJECTIVE & OBJECTIVE
Interval History: GGC yesterday without n/v and showing contrast in colon. Had 5 bowel movements overnight and denies n/v. Tolerating CLD without issue.    Medications:  Continuous Infusions:  Scheduled Meds:   aspirin  81 mg Oral Daily    atorvastatin  40 mg Oral Daily    enoxparin  30 mg Subcutaneous Daily    finasteride  5 mg Oral Daily    fluticasone furoate-vilanteroL  1 puff Inhalation Daily    hydrALAZINE  50 mg Oral TID    losartan  100 mg Oral Daily     PRN Meds:  Current Facility-Administered Medications:     albuterol-ipratropium, 3 mL, Nebulization, Q4H PRN    dextrose 10%, 12.5 g, Intravenous, PRN    glucagon (human recombinant), 1 mg, Intramuscular, PRN    insulin aspart U-100, 0-5 Units, Subcutaneous, Q6H PRN    labetalol, 10 mg, Intravenous, QID PRN    naloxone, 0.02 mg, Intravenous, PRN    ondansetron, 4 mg, Intravenous, Q8H PRN    sodium chloride 0.9%, 10 mL, Intravenous, Q12H PRN     Review of patient's allergies indicates:   Allergen Reactions    Solarcaine [benzocaine-triclosan]     Sulfa (sulfonamide antibiotics)     Sulfamethoxazole     Trimethoprim     Hytrin [terazosin] Rash    Jardiance [empagliflozin] Rash     Yeast infection    Smz-tmp ds [sulfamethoxazole-trimethoprim] Rash     Objective:     Vital Signs (Most Recent):  Temp: 97.8 °F (36.6 °C) (07/06/24 0726)  Pulse: 74 (07/06/24 0726)  Resp: 20 (07/06/24 0726)  BP: (!) 144/68 (07/06/24 0726)  SpO2: (!) 94 % (07/06/24 0726) Vital Signs (24h Range):  Temp:  [97.4 °F (36.3 °C)-98.4 °F (36.9 °C)] 97.8 °F (36.6 °C)  Pulse:  [56-85] 74  Resp:  [18-20] 20  SpO2:  [89 %-96 %] 94 %  BP: (129-152)/(58-69) 144/68     Weight: 77 kg (169 lb 12.1 oz)  Body mass index is 25.07 kg/m².    Intake/Output - Last 3 Shifts         07/04 0700 07/05 0659 07/05 0700 07/06 0659 07/06 0700 07/07 0659    P.O. 0 0     I.V. (mL/kg) 1238.5 (16.1)      NG/      Total Intake(mL/kg) 1388.5 (18) 0 (0)     Urine (mL/kg/hr) 425 (0.2) 50 (0)     Drains 2400 400      Other  0     Stool  0     Total Output 2825 450     Net -1436.5 -450            Urine Occurrence  1 x 1 x    Stool Occurrence  4 x 1 x             Physical Exam  Constitutional:       General: He is not in acute distress.     Appearance: He is not ill-appearing.   HENT:      Nose:      Comments: NGT removed  Cardiovascular:      Rate and Rhythm: Normal rate and regular rhythm.   Pulmonary:      Effort: Pulmonary effort is normal.   Abdominal:      General: There is no distension.      Palpations: Abdomen is soft.      Tenderness: There is no abdominal tenderness. There is no guarding or rebound.      Comments: Multiple abdominal scars present from prior surgieries   Neurological:      Mental Status: He is alert.          Significant Labs:  I have reviewed all pertinent lab results within the past 24 hours.  CBC:   Recent Labs   Lab 07/06/24  0208   WBC 7.73   RBC 4.42*   HGB 13.1*   HCT 40.1      MCV 91   MCH 29.6   MCHC 32.7     BMP:   Recent Labs   Lab 07/06/24  0208   *      K 3.9      CO2 24   BUN 30   CREATININE 1.7*   CALCIUM 8.7   MG 2.0       Significant Diagnostics:  I have reviewed all pertinent imaging results/findings within the past 24 hours.

## 2024-07-09 LAB
BACTERIA BLD CULT: NORMAL
BACTERIA BLD CULT: NORMAL

## 2024-07-10 ENCOUNTER — DOCUMENTATION ONLY (OUTPATIENT)
Dept: REHABILITATION | Facility: HOSPITAL | Age: 89
End: 2024-07-10

## 2024-07-10 PROBLEM — G89.29 CHRONIC LEFT SHOULDER PAIN: Status: ACTIVE | Noted: 2024-07-10

## 2024-07-10 PROBLEM — M25.512 CHRONIC LEFT SHOULDER PAIN: Status: ACTIVE | Noted: 2024-07-10

## 2024-07-10 NOTE — PROGRESS NOTES
Husam arrived to his OT eval today. He had a previous bout of OT for the same L shoulder pain about 2 months ago. He was a little confused because he discussed scheduling home health therapy with his doctor. The home health agency said he could not do both at the same time and he would rather proceed with HH therapy at this time. We reviewed his previous HEP during our discussion today and he agrees to continue this while beginning HH.     Christina Ramos, OT

## 2024-07-12 ENCOUNTER — PATIENT OUTREACH (OUTPATIENT)
Dept: ADMINISTRATIVE | Facility: CLINIC | Age: 89
End: 2024-07-12
Payer: MEDICARE

## 2024-07-12 ENCOUNTER — TELEPHONE (OUTPATIENT)
Dept: FAMILY MEDICINE | Facility: CLINIC | Age: 89
End: 2024-07-12
Payer: MEDICARE

## 2024-07-12 DIAGNOSIS — E11.22 TYPE 2 DIABETES MELLITUS WITH STAGE 3B CHRONIC KIDNEY DISEASE, WITHOUT LONG-TERM CURRENT USE OF INSULIN: ICD-10-CM

## 2024-07-12 DIAGNOSIS — N18.32 TYPE 2 DIABETES MELLITUS WITH STAGE 3B CHRONIC KIDNEY DISEASE, WITHOUT LONG-TERM CURRENT USE OF INSULIN: ICD-10-CM

## 2024-07-12 RX ORDER — INSULIN PUMP SYRINGE, 3 ML
EACH MISCELLANEOUS
Qty: 1 EACH | Refills: 0 | Status: SHIPPED | OUTPATIENT
Start: 2024-07-12 | End: 2025-07-12

## 2024-07-12 NOTE — PROGRESS NOTES
C3 nurse spoke with Husam Connolly for a TCC post hospital discharge follow up call. The patient has a scheduled HOSFU with Dr. Gina Renee on 7/16/24 at 1500. No messages routed at this time.

## 2024-07-12 NOTE — TELEPHONE ENCOUNTER
----- Message from Flakita Machuca MA sent at 7/12/2024  3:00 PM CDT -----  Regarding: FW: pt needs glucometer    ----- Message -----  From: Klaudia Thornton RN  Sent: 7/12/2024  12:26 PM CDT  To: [facility] Staff  Subject: pt needs glucometer                              Good afternoon,    I am a discharge nurse who just spoke to Husam Connolly.     Pt states his glucometer isn't working and he has not checked his blood glucose level since he was in the hospital on 7/6/2024.     Please contact the pt regarding this issue.     Thank you for your time,     Klaudia Thornton, RN  Transitional Care Nurse

## 2024-07-16 ENCOUNTER — OFFICE VISIT (OUTPATIENT)
Dept: PRIMARY CARE CLINIC | Facility: CLINIC | Age: 89
End: 2024-07-16
Payer: MEDICARE

## 2024-07-16 VITALS
BODY MASS INDEX: 25.5 KG/M2 | OXYGEN SATURATION: 95 % | SYSTOLIC BLOOD PRESSURE: 192 MMHG | HEIGHT: 69 IN | DIASTOLIC BLOOD PRESSURE: 82 MMHG | WEIGHT: 172.19 LBS | HEART RATE: 86 BPM

## 2024-07-16 DIAGNOSIS — R60.0 LOCALIZED EDEMA: ICD-10-CM

## 2024-07-16 DIAGNOSIS — I70.0 AORTIC ATHEROSCLEROSIS: ICD-10-CM

## 2024-07-16 DIAGNOSIS — I10 PRIMARY HYPERTENSION: ICD-10-CM

## 2024-07-16 DIAGNOSIS — K56.50 SMALL BOWEL OBSTRUCTION DUE TO ADHESIONS: Primary | ICD-10-CM

## 2024-07-16 DIAGNOSIS — I10 WHITE COAT SYNDROME WITH DIAGNOSIS OF HYPERTENSION: ICD-10-CM

## 2024-07-16 DIAGNOSIS — I48.0 PAROXYSMAL ATRIAL FIBRILLATION: Primary | ICD-10-CM

## 2024-07-16 DIAGNOSIS — E78.00 HIGH CHOLESTEROL: ICD-10-CM

## 2024-07-16 PROCEDURE — 1160F RVW MEDS BY RX/DR IN RCRD: CPT | Mod: CPTII,S$GLB,, | Performed by: INTERNAL MEDICINE

## 2024-07-16 PROCEDURE — 1126F AMNT PAIN NOTED NONE PRSNT: CPT | Mod: CPTII,S$GLB,, | Performed by: INTERNAL MEDICINE

## 2024-07-16 PROCEDURE — 1159F MED LIST DOCD IN RCRD: CPT | Mod: CPTII,S$GLB,, | Performed by: INTERNAL MEDICINE

## 2024-07-16 PROCEDURE — 1111F DSCHRG MED/CURRENT MED MERGE: CPT | Mod: CPTII,S$GLB,, | Performed by: INTERNAL MEDICINE

## 2024-07-16 PROCEDURE — 99214 OFFICE O/P EST MOD 30 MIN: CPT | Mod: S$GLB,,, | Performed by: INTERNAL MEDICINE

## 2024-07-16 PROCEDURE — 1101F PT FALLS ASSESS-DOCD LE1/YR: CPT | Mod: CPTII,S$GLB,, | Performed by: INTERNAL MEDICINE

## 2024-07-16 PROCEDURE — 3288F FALL RISK ASSESSMENT DOCD: CPT | Mod: CPTII,S$GLB,, | Performed by: INTERNAL MEDICINE

## 2024-07-16 PROCEDURE — 99999 PR PBB SHADOW E&M-EST. PATIENT-LVL IV: CPT | Mod: PBBFAC,,, | Performed by: INTERNAL MEDICINE

## 2024-07-16 RX ORDER — TAMSULOSIN HYDROCHLORIDE 0.4 MG/1
1 CAPSULE ORAL
COMMUNITY
Start: 2024-05-17

## 2024-07-16 NOTE — PROGRESS NOTES
[facility]   New Visit Progress Note   Recent Hospital Discharge     PRESENTING HISTORY     Chief Complaint/Reason for Admission:  Follow up Hospital Discharge   PCP: Williams Knight DO    History of Present Illness:  Mr. Husam Connolly is a 89 y.o. male who was recently admitted to the hospital.    [facility] Medicine Discharge Summary  Primary Team: [facility] Hospitalist Team B  Attending Physician: Easton Aguilar MD  Resident: Ana  Intern: Emil  Date of Admit: 7/3/2024  Date of Discharge: 7/6/2024  Discharge to: Home  Condition: stable  ___________________________________________________________________    Today:  [facility] follow up.  Recently hospitalized for management of small bowel obstruction.  Admitted to [facility] Medicine service with General Surgery consultation.  NG tube placed for decompression.  Electrolytes and fluid status managed.   Gastrograffin challenge performed.  Patient experienced emesis initially but passed second challenge.   NG tube discontinued and diet advanced.  Patient discharged to home.     Patient unaccompanied today.  Ambulatory and independent with ADL's.  Brought all medication bottles for review and reports compliance.  He has resumed his regular diet.   Having regular bowel movements.   No NV or ABD pain.     Review of Systems  General ROS: negative for chills, fever or weight loss  Psychological ROS: negative for hallucination, depression or suicidal ideation  Ophthalmic ROS: negative for blurry vision, photophobia or eye pain  ENT ROS: negative for epistaxis, sore throat or rhinorrhea  Respiratory ROS: no cough, shortness of breath, or wheezing  Cardiovascular ROS: no chest pain or dyspnea on exertion  Gastrointestinal ROS: no abdominal pain, change in bowel habits, or black/ bloody stools  Genito-Urinary ROS: no dysuria, trouble voiding, or hematuria  Musculoskeletal ROS: negative for gait disturbance or muscular  weakness  Neurological ROS: no syncope or seizures; no ataxia  Dermatological ROS: negative for pruritis, rash and jaundice          PAST HISTORY:     Past Medical History:   Diagnosis Date    BPH (benign prostatic hyperplasia)     Diabetes mellitus (type 2)     Emphysema 03/12/2022    High cholesterol 08/26/2020    Hypertension     RBBB 11/29/2022    Small bowel obstruction 06/13/2019       Past Surgical History:   Procedure Laterality Date    ANASTOMOSIS OF ILEUM TO RECTUM  9/9/2019    Procedure: ANASTOMOSIS, COLO-RECTAL;  Surgeon: Fouzia Gardner MD;  Location: [facility];  Service: General;;    CHOLECYSTECTOMY N/A 3/7/2022    Procedure: CHOLECYSTECTOMY;  Surgeon: Fouzia Gardner MD;  Location: [facility] OR;  Service: General;  Laterality: N/A;    COLOSTOMY  6/16/2019    Procedure: CREATION, COLOSTOMY;  Surgeon: Fouzia Gardner MD;  Location: [facility];  Service: General;;    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    Procedure: RELEASE, DUPUYTREN CONTRACTURE;  Surgeon: Slick Baldwin Jr., MD;  Location: [facility];  Service: Orthopedics;  Laterality: Left;    EYE SURGERY      GISELA PROCEDURE  6/16/2019    Procedure: GISELA PROCEDURE;  Surgeon: Fouzia Gardner MD;  Location: [facility];  Service: General;;  Colon resection    LYSIS OF ADHESIONS N/A 3/7/2022    Procedure: LYSIS, ADHESIONS;  Surgeon: Fouzia Gardner MD;  Location: [facility];  Service: General;  Laterality: N/A;    TONSILLECTOMY         Family History   Problem Relation Name Age of Onset    Heart disease Father      Hypertension Father      Cancer Mother cancer breas varian     Diabetes Paternal Aunt           MEDICATIONS & ALLERGIES:     Current Outpatient Medications on File Prior to Visit   Medication Sig Dispense Refill    albuterol (VENTOLIN HFA) 90 mcg/actuation inhaler Inhale 2 puffs into the lungs every 4 (four) hours as needed for Wheezing. Rescue 18 g 11    aspirin (ECOTRIN) 81 MG EC tablet Take 81 mg by mouth once  daily.      atorvastatin (LIPITOR) 40 MG tablet Take 1 tablet (40 mg total) by mouth once daily. 90 tablet 3    blood sugar diagnostic Strp To check sugar daily, to use with insurance preferred meter 200 each 2    blood-glucose meter kit Use as instructed 1 each 0    finasteride (PROSCAR) 5 mg tablet Take 1 tablet (5 mg total) by mouth once daily. 90 tablet 3    fluticasone propionate (FLONASE) 50 mcg/actuation nasal spray 2 sprays (100 mcg total) by Each Nostril route once daily. 48 g 3    fluticasone-umeclidin-vilanter (TRELEGY ELLIPTA) 100-62.5-25 mcg DsDv Inhale 1 puff into the lungs once daily. 180 each 3    GARLIC ORAL Take 1 capsule by mouth once daily.      glipiZIDE 5 MG TR24 Take 1 tablet (5 mg total) by mouth daily with breakfast. 90 tablet 3    hydrALAZINE (APRESOLINE) 50 MG tablet Take 1 tablet (50 mg total) by mouth 3 (three) times daily. 270 tablet 3    lancets Misc To check sugar daily, to use with insurance preferred meter 200 each 2    losartan (COZAAR) 100 MG tablet Take 1 tablet (100 mg total) by mouth once daily. 90 tablet 3    multivitamin capsule Take 1 capsule by mouth once daily.      omega-3 fatty acids/fish oil (FISH OIL-OMEGA-3 FATTY ACIDS) 300-1,000 mg capsule Take 1 capsule by mouth once daily.      tamsulosin (FLOMAX) 0.4 mg Cap Take 1 capsule by mouth.      vitamin D (VITAMIN D3) 1000 units Tab Take 1,000 Units by mouth once daily.      inhalation spacing device Use as directed for inhalation. 1 each 0    meclizine (ANTIVERT) 12.5 mg tablet TAKE 1 TABLET(12.5 MG) BY MOUTH THREE TIMES DAILY AS NEEDED FOR DIZZINESS (Patient not taking: Reported on 6/7/2024) 20 tablet 2     No current facility-administered medications on file prior to visit.        Review of patient's allergies indicates:   Allergen Reactions    Solarcaine [benzocaine-triclosan]     Sulfa (sulfonamide antibiotics)     Sulfamethoxazole     Trimethoprim     Hytrin [terazosin] Rash    Jardiance [empagliflozin] Rash      "Yeast infection    Smz-tmp ds [sulfamethoxazole-trimethoprim] Rash       OBJECTIVE:     Vital Signs:  BP (!) 192/82 (BP Location: Right arm, Patient Position: Sitting, BP Method: Large (Automatic))   Pulse 86   Ht 5' 9" (1.753 m)   Wt 78.1 kg (172 lb 2.9 oz)   SpO2 95%   BMI 25.43 kg/m²   Wt Readings from Last 3 Encounters:   07/16/24 1513 78.1 kg (172 lb 2.9 oz)   07/04/24 0345 77 kg (169 lb 12.1 oz)   07/03/24 2344 73.9 kg (163 lb)   06/17/24 0838 73.9 kg (163 lb)     Body mass index is 25.43 kg/m².   95%    Physical Exam:  BP (!) 192/82 (BP Location: Right arm, Patient Position: Sitting, BP Method: Large (Automatic))   Pulse 86   Ht 5' 9" (1.753 m)   Wt 78.1 kg (172 lb 2.9 oz)   SpO2 95%   BMI 25.43 kg/m²   General appearance: alert, cooperative, no distress  Constitutional:Oriented to person, place, and time  + appears well-developed and well-nourished.   HEENT: Normocephalic, atraumatic, neck symmetrical, no nasal discharge   Eyes: conjunctivae/corneas clear, PERRL, EOM's intact  Lungs: clear to auscultation bilaterally, no dullness to percussion bilaterally  Heart: regular rate and rhythm without rub; no displacement of the PMI   Abdomen: soft, non-tender; bowel sounds normoactive; no organomegaly  + multiple ABD scars consistent with surgical history   Extremities: extremities symmetric; no clubbing, cyanosis, or edema  Integument: Skin color, texture, turgor normal; no rashes; hair distrubution normal  Neurologic: Alert and oriented X 3, normal strength, normal coordination and gait  Psychiatric: no pressured speech; normal affect; no evidence of impaired cognition     Laboratory  Lab Results   Component Value Date    WBC 7.73 07/06/2024    HGB 13.1 (L) 07/06/2024    HCT 40.1 07/06/2024    MCV 91 07/06/2024     07/06/2024     BMP  Lab Results   Component Value Date     07/06/2024    K 3.9 07/06/2024     07/06/2024    CO2 24 07/06/2024    BUN 30 07/06/2024    CREATININE 1.7 (H) " 07/06/2024    CALCIUM 8.7 07/06/2024    ANIONGAP 9 07/06/2024    EGFRNORACEVR 37 (A) 07/06/2024     Lab Results   Component Value Date    ALT 12 07/06/2024    AST 15 07/06/2024    ALKPHOS 57 07/06/2024    BILITOT 0.9 07/06/2024     Lab Results   Component Value Date    INR 1.1 09/10/2019    INR 1.1 04/18/2015    INR 1.0 05/30/2013     Lab Results   Component Value Date    HGBA1C 6.6 (H) 07/04/2024       Diagnostic Results:    CT ABD Pelvis 7/4/24:  Small-bowel obstruction with transition point in the right lower quadrant.  Similar findings noted on prior CT abdomen pelvis 09/19/2022  Diverticulosis without evidence of diverticulitis.  Postoperative changes with anastomotic sutures in the descending colon.  Multiple simple renal cysts.  Additional incidental nonacute findings as above.      TRANSITION OF CARE:     [facility] Contact Note: 7/12/24     Family and/or Caretaker present at visit?  No.  Diagnostic tests reviewed/disposition: I have reviewed all completed as well as pending diagnostic tests at the time of discharge.  Disease/illness education: Yes  Home health/community services discussion/referrals: Patient does not have home health established from hospital visit.  They do not need home health.  If needed, we will set up home health for the patient.   Establishment or re-establishment of referral orders for community resources: No other necessary community resources.   Discussion with other health care providers: No discussion with other health care providers necessary.     ASSESSMENT & PLAN:       Small bowel obstruction due to adhesions  - recent hospitalization as above  - patient with multiple prior ABD surgeries including ex-lap/Wiseman's 6/16/19 and colostomy reversal 9/9/19   - responded to NG tube decompression and supportive care  - see General Surgery team 101/10/24    Hypertension  - not a goal presently but patient tells me todays reading is an outlier and non consistent with his home  readings  - continue current medication regimen  - continue ambulatory monitoring   - dietary sodium restriction     Patient will be released from hospital follow up clinic.  He will see his PCP, Dr Williams Knight, 9/6/24.     Instructions for the patient:      Scheduled Follow-up :  Future Appointments   Date Time Provider Department Center   7/18/2024  2:30 PM Slick Baldwin Jr., MD [facility] ORTHO [facility]   9/3/2024 11:15 AM [facility] [place]   9/6/2024 11:00 AM Williams Knight DO [facility]   9/13/2024  2:00 PM Tommy Payton MD [facility] CARDIO [facility]   9/30/2024  1:20 PM Farhan Dumont MD [facility] [place]   10/10/2024  1:00 PM Fouzia Gardner MD [facility] [place]       Post Visit Medication List:     Medication List            Accurate as of July 16, 2024  4:06 PM. If you have any questions, ask your nurse or doctor.                CONTINUE taking these medications      albuterol 90 mcg/actuation inhaler  Commonly known as: VENTOLIN HFA  Inhale 2 puffs into the lungs every 4 (four) hours as needed for Wheezing. Rescue     aspirin 81 MG EC tablet  Commonly known as: ECOTRIN     atorvastatin 40 MG tablet  Commonly known as: LIPITOR  Take 1 tablet (40 mg total) by mouth once daily.     blood sugar diagnostic Strp  To check sugar daily, to use with insurance preferred meter     blood-glucose meter kit  Use as instructed     finasteride 5 mg tablet  Commonly known as: PROSCAR  Take 1 tablet (5 mg total) by mouth once daily.     fish oil-omega-3 fatty acids 300-1,000 mg capsule     fluticasone propionate 50 mcg/actuation nasal spray  Commonly known as: FLONASE  2 sprays (100 mcg total) by Each Nostril route once daily.     fluticasone-umeclidin-vilanter 100-62.5-25 mcg Dsdv  Commonly known as: TRELEGY ELLIPTA  Inhale 1 puff into the lungs once daily.     GARLIC ORAL     glipiZIDE 5 MG Tr24  Take 1 tablet (5 mg total) by mouth daily with breakfast.      hydrALAZINE 50 MG tablet  Commonly known as: APRESOLINE  Take 1 tablet (50 mg total) by mouth 3 (three) times daily.     lancets Misc  To check sugar daily, to use with insurance preferred meter     losartan 100 MG tablet  Commonly known as: COZAAR  Take 1 tablet (100 mg total) by mouth once daily.     multivitamin capsule     OPTICHAMBER BASHIR [facility]  Generic drug: inhalation spacing device  Use as directed for inhalation.     tamsulosin 0.4 mg Cap  Commonly known as: FLOMAX     vitamin D 1000 units Tab  Commonly known as: VITAMIN D3              Signing Physician:  Gina Renee MD

## 2024-07-17 RX ORDER — LOSARTAN POTASSIUM 100 MG/1
100 TABLET ORAL DAILY
Qty: 90 TABLET | Refills: 3 | Status: SHIPPED | OUTPATIENT
Start: 2024-07-17 | End: 2025-07-17

## 2024-07-18 ENCOUNTER — OFFICE VISIT (OUTPATIENT)
Dept: ORTHOPEDICS | Facility: CLINIC | Age: 89
End: 2024-07-18
Payer: MEDICARE

## 2024-07-18 VITALS — HEIGHT: 69 IN | BODY MASS INDEX: 25.47 KG/M2 | WEIGHT: 171.94 LBS

## 2024-07-18 DIAGNOSIS — M19.019 SHOULDER ARTHRITIS: Primary | ICD-10-CM

## 2024-07-18 PROCEDURE — 1159F MED LIST DOCD IN RCRD: CPT | Mod: CPTII,S$GLB,, | Performed by: ORTHOPAEDIC SURGERY

## 2024-07-18 PROCEDURE — 99213 OFFICE O/P EST LOW 20 MIN: CPT | Mod: 25,S$GLB,, | Performed by: ORTHOPAEDIC SURGERY

## 2024-07-18 PROCEDURE — 99999 PR PBB SHADOW E&M-EST. PATIENT-LVL III: CPT | Mod: PBBFAC,,, | Performed by: ORTHOPAEDIC SURGERY

## 2024-07-18 PROCEDURE — 1111F DSCHRG MED/CURRENT MED MERGE: CPT | Mod: CPTII,S$GLB,, | Performed by: ORTHOPAEDIC SURGERY

## 2024-07-18 PROCEDURE — 1101F PT FALLS ASSESS-DOCD LE1/YR: CPT | Mod: CPTII,S$GLB,, | Performed by: ORTHOPAEDIC SURGERY

## 2024-07-18 PROCEDURE — 1125F AMNT PAIN NOTED PAIN PRSNT: CPT | Mod: CPTII,S$GLB,, | Performed by: ORTHOPAEDIC SURGERY

## 2024-07-18 PROCEDURE — 20610 DRAIN/INJ JOINT/BURSA W/O US: CPT | Mod: LT,S$GLB,, | Performed by: ORTHOPAEDIC SURGERY

## 2024-07-18 PROCEDURE — 3288F FALL RISK ASSESSMENT DOCD: CPT | Mod: CPTII,S$GLB,, | Performed by: ORTHOPAEDIC SURGERY

## 2024-07-18 RX ORDER — TRIAMCINOLONE ACETONIDE 40 MG/ML
40 INJECTION, SUSPENSION INTRA-ARTICULAR; INTRAMUSCULAR
Status: COMPLETED | OUTPATIENT
Start: 2024-07-18 | End: 2024-07-18

## 2024-07-18 RX ADMIN — TRIAMCINOLONE ACETONIDE 40 MG: 40 INJECTION, SUSPENSION INTRA-ARTICULAR; INTRAMUSCULAR at 02:07

## 2024-07-18 NOTE — PROGRESS NOTES
Subjective:      Patient ID: Husam Connolly is a 89 y.o. male.  Chief Complaint: Follow-up of the Left Shoulder      HPI  Husam Connolly is a  89 y.o. male presenting today for follow up of severe arthritis left shoulder.  He reports that he is having a flare-up in the shoulder   He would like injection   Not interested in surgery at the present time.    Review of patient's allergies indicates:   Allergen Reactions    Solarcaine [benzocaine-triclosan]     Sulfa (sulfonamide antibiotics)     Sulfamethoxazole     Trimethoprim     Hytrin [terazosin] Rash    Jardiance [empagliflozin] Rash     Yeast infection    Smz-tmp ds [sulfamethoxazole-trimethoprim] Rash         Current Outpatient Medications   Medication Sig Dispense Refill    albuterol (VENTOLIN HFA) 90 mcg/actuation inhaler Inhale 2 puffs into the lungs every 4 (four) hours as needed for Wheezing. Rescue 18 g 11    aspirin (ECOTRIN) 81 MG EC tablet Take 81 mg by mouth once daily.      atorvastatin (LIPITOR) 40 MG tablet Take 1 tablet (40 mg total) by mouth once daily. 90 tablet 3    blood sugar diagnostic Strp To check sugar daily, to use with insurance preferred meter 200 each 2    blood-glucose meter kit Use as instructed 1 each 0    finasteride (PROSCAR) 5 mg tablet Take 1 tablet (5 mg total) by mouth once daily. 90 tablet 3    fluticasone propionate (FLONASE) 50 mcg/actuation nasal spray 2 sprays (100 mcg total) by Each Nostril route once daily. 48 g 3    fluticasone-umeclidin-vilanter (TRELEGY ELLIPTA) 100-62.5-25 mcg DsDv Inhale 1 puff into the lungs once daily. 180 each 3    GARLIC ORAL Take 1 capsule by mouth once daily.      glipiZIDE 5 MG TR24 Take 1 tablet (5 mg total) by mouth daily with breakfast. 90 tablet 3    hydrALAZINE (APRESOLINE) 50 MG tablet Take 1 tablet (50 mg total) by mouth 3 (three) times daily. 270 tablet 3    inhalation spacing device Use as directed for inhalation. 1 each 0    lancets Misc To check sugar daily, to use with  "insurance preferred meter 200 each 2    losartan (COZAAR) 100 MG tablet Take 1 tablet (100 mg total) by mouth once daily. 90 tablet 3    multivitamin capsule Take 1 capsule by mouth once daily.      omega-3 fatty acids/fish oil (FISH OIL-OMEGA-3 FATTY ACIDS) 300-1,000 mg capsule Take 1 capsule by mouth once daily.      tamsulosin (FLOMAX) 0.4 mg Cap Take 1 capsule by mouth.      vitamin D (VITAMIN D3) 1000 units Tab Take 1,000 Units by mouth once daily.       No current facility-administered medications for this visit.       Past Medical History:   Diagnosis Date    BPH (benign prostatic hyperplasia)     Diabetes mellitus (type 2)     Emphysema 03/12/2022    High cholesterol 08/26/2020    Hypertension     RBBB 11/29/2022    Small bowel obstruction 06/13/2019       Past Surgical History:   Procedure Laterality Date    ANASTOMOSIS OF ILEUM TO RECTUM  9/9/2019    Procedure: ANASTOMOSIS, COLO-RECTAL;  Surgeon: Fouzia Gardner MD;  Location: [facility] OR;  Service: General;;    CHOLECYSTECTOMY N/A 3/7/2022    Procedure: CHOLECYSTECTOMY;  Surgeon: Fouzia Gardner MD;  Location: [facility] OR;  Service: General;  Laterality: N/A;    COLOSTOMY  6/16/2019    Procedure: CREATION, COLOSTOMY;  Surgeon: Fouzia Gardner MD;  Location: [facility] OR;  Service: General;;    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    Procedure: RELEASE, DUPUYTREN CONTRACTURE;  Surgeon: Slick Baldwin Jr., MD;  Location: [facility] OR;  Service: Orthopedics;  Laterality: Left;    EYE SURGERY      GISELA PROCEDURE  6/16/2019    Procedure: GISELA PROCEDURE;  Surgeon: Fouzia Gardner MD;  Location: [facility] OR;  Service: General;;  Colon resection    LYSIS OF ADHESIONS N/A 3/7/2022    Procedure: LYSIS, ADHESIONS;  Surgeon: Fouzia Gardner MD;  Location: [facility] OR;  Service: General;  Laterality: N/A;    TONSILLECTOMY         OBJECTIVE:   PHYSICAL EXAM:  Height: 5' 9" (175.3 cm) Weight: 78 kg (171 lb 15.3 oz)  Vitals:    07/18/24 1421   Weight: 78 " "kg (171 lb 15.3 oz)   Height: 5' 9" (1.753 m)   PainSc:   1     Ortho/SPM Exam  Examination left shoulder no tenderness no swelling   Range of motion limited  Mild crepitation   Significant stiffness noted       RADIOGRAPHS:  None  Comments: I have personally reviewed the imaging and I agree with the above radiologist's report.    ASSESSMENT/PLAN:     IMPRESSION:  Severe arthritis left shoulder    PLAN:  He would like injection today   After pause for time-out identified the left shoulder injected with Kenalog 40 mg 2 cc xylocaine sterile technique   Tolerated the procedure well without complication   Continue current medications    FOLLOW UP:  3-4 months    Disclaimer: This note has been generated using voice-recognition software. There may be typographical errors that have been missed during proof-reading.     "

## 2024-09-03 ENCOUNTER — LAB VISIT (OUTPATIENT)
Dept: LAB | Facility: HOSPITAL | Age: 89
End: 2024-09-03
Attending: STUDENT IN AN ORGANIZED HEALTH CARE EDUCATION/TRAINING PROGRAM
Payer: MEDICARE

## 2024-09-03 ENCOUNTER — TELEPHONE (OUTPATIENT)
Dept: CARDIOLOGY | Facility: CLINIC | Age: 89
End: 2024-09-03
Payer: MEDICARE

## 2024-09-03 DIAGNOSIS — E11.22 TYPE 2 DIABETES MELLITUS WITH STAGE 3B CHRONIC KIDNEY DISEASE, WITHOUT LONG-TERM CURRENT USE OF INSULIN: ICD-10-CM

## 2024-09-03 DIAGNOSIS — N18.32 TYPE 2 DIABETES MELLITUS WITH STAGE 3B CHRONIC KIDNEY DISEASE, WITHOUT LONG-TERM CURRENT USE OF INSULIN: ICD-10-CM

## 2024-09-03 DIAGNOSIS — N18.32 STAGE 3B CHRONIC KIDNEY DISEASE: ICD-10-CM

## 2024-09-03 LAB
ANION GAP SERPL CALC-SCNC: 11 MMOL/L (ref 8–16)
BUN SERPL-MCNC: 33 MG/DL (ref 10–30)
CALCIUM SERPL-MCNC: 9.4 MG/DL (ref 8.7–10.5)
CHLORIDE SERPL-SCNC: 107 MMOL/L (ref 95–110)
CHOLEST SERPL-MCNC: 145 MG/DL (ref 120–199)
CHOLEST/HDLC SERPL: 2.5 {RATIO} (ref 2–5)
CO2 SERPL-SCNC: 20 MMOL/L (ref 23–29)
CREAT SERPL-MCNC: 1.6 MG/DL (ref 0.5–1.4)
EST. GFR  (NO RACE VARIABLE): 39.9 ML/MIN/1.73 M^2
ESTIMATED AVG GLUCOSE: 128 MG/DL (ref 68–131)
GLUCOSE SERPL-MCNC: 176 MG/DL (ref 70–110)
HBA1C MFR BLD: 6.1 % (ref 4–5.6)
HDLC SERPL-MCNC: 57 MG/DL (ref 40–75)
HDLC SERPL: 39.3 % (ref 20–50)
LDLC SERPL CALC-MCNC: 62.6 MG/DL (ref 63–159)
NONHDLC SERPL-MCNC: 88 MG/DL
POTASSIUM SERPL-SCNC: 4.2 MMOL/L (ref 3.5–5.1)
SODIUM SERPL-SCNC: 138 MMOL/L (ref 136–145)
TRIGL SERPL-MCNC: 127 MG/DL (ref 30–150)

## 2024-09-03 PROCEDURE — 80048 BASIC METABOLIC PNL TOTAL CA: CPT | Performed by: STUDENT IN AN ORGANIZED HEALTH CARE EDUCATION/TRAINING PROGRAM

## 2024-09-03 PROCEDURE — 83036 HEMOGLOBIN GLYCOSYLATED A1C: CPT | Performed by: STUDENT IN AN ORGANIZED HEALTH CARE EDUCATION/TRAINING PROGRAM

## 2024-09-03 PROCEDURE — 36415 COLL VENOUS BLD VENIPUNCTURE: CPT | Mod: PO | Performed by: STUDENT IN AN ORGANIZED HEALTH CARE EDUCATION/TRAINING PROGRAM

## 2024-09-03 PROCEDURE — 80061 LIPID PANEL: CPT | Performed by: STUDENT IN AN ORGANIZED HEALTH CARE EDUCATION/TRAINING PROGRAM

## 2024-09-03 NOTE — TELEPHONE ENCOUNTER
Spoke with patient to reschedule 9/13 appointment with Dr Elizabeth to 9/20 at 1:40 pm. Patient accepted appointment.

## 2024-09-06 ENCOUNTER — OFFICE VISIT (OUTPATIENT)
Dept: FAMILY MEDICINE | Facility: CLINIC | Age: 89
End: 2024-09-06
Payer: MEDICARE

## 2024-09-06 VITALS
HEART RATE: 86 BPM | DIASTOLIC BLOOD PRESSURE: 74 MMHG | SYSTOLIC BLOOD PRESSURE: 160 MMHG | WEIGHT: 166.69 LBS | BODY MASS INDEX: 24.69 KG/M2 | HEIGHT: 69 IN | OXYGEN SATURATION: 93 %

## 2024-09-06 DIAGNOSIS — R42 VERTIGO: ICD-10-CM

## 2024-09-06 DIAGNOSIS — Z23 IMMUNIZATION DUE: ICD-10-CM

## 2024-09-06 DIAGNOSIS — G47.33 OSA (OBSTRUCTIVE SLEEP APNEA): ICD-10-CM

## 2024-09-06 DIAGNOSIS — N18.32 TYPE 2 DIABETES MELLITUS WITH STAGE 3B CHRONIC KIDNEY DISEASE, WITHOUT LONG-TERM CURRENT USE OF INSULIN: Primary | ICD-10-CM

## 2024-09-06 DIAGNOSIS — I1A.0 RESISTANT HYPERTENSION: ICD-10-CM

## 2024-09-06 DIAGNOSIS — N18.32 STAGE 3B CHRONIC KIDNEY DISEASE: ICD-10-CM

## 2024-09-06 DIAGNOSIS — E11.22 TYPE 2 DIABETES MELLITUS WITH STAGE 3B CHRONIC KIDNEY DISEASE, WITHOUT LONG-TERM CURRENT USE OF INSULIN: Primary | ICD-10-CM

## 2024-09-06 PROCEDURE — 99999 PR PBB SHADOW E&M-EST. PATIENT-LVL IV: CPT | Mod: PBBFAC,,, | Performed by: STUDENT IN AN ORGANIZED HEALTH CARE EDUCATION/TRAINING PROGRAM

## 2024-09-06 NOTE — PROGRESS NOTES
Progress Note  [facility]    Subjective:     Husam Connolly is a 89 y.o. year old male with current diagnoses of centrilobular emphysema, pulmonary nodules (stable on recent CT), HTN, HLD, aortic atherosclerosis, pulmonary HTN, CKD 3, BPH, T2DM, RBBB, s/p small bowel resection with re-anastimosis 2/2 adhesions  who presents to clinic for f/u    T2DM: Glipizide started last visit, doing well with this. Hgb A1C improved to 6.1%. Sugar has been 120-130 at home.     Vertigo: Overall improved. Rarely has issues with vertigo recently. Doing well.     LC: Sleep study diagnosed with sleep apnea. Has not been called about getting study done for CPAP machine. Pt not interested in pursuing CPAP any further, doesn't feel fatigued and feels he has the energy he needs during the day.     HTN: Seeing cardiology. No CP, LE edema or SOB.     Patient Active Problem List    Diagnosis Date Noted    Chronic left shoulder pain 07/10/2024    Central sleep apnea 06/10/2024    LC (obstructive sleep apnea) 06/06/2024    Senile purpura 06/05/2024    Muscle weakness 03/11/2024    Shoulder stiffness, left 03/11/2024    Pain in joint of left shoulder 03/11/2024    COPD, group B, by GOLD 2017 classification 08/23/2023    Pulmonary hypertension 07/24/2023    Shoulder arthritis 01/05/2023    RBBB 11/29/2022    Pulmonary nodules     Dupuytren's contracture of left hand 03/21/2022    Type 2 diabetes mellitus with stage 3b chronic kidney disease, without long-term current use of insulin 09/22/2021    Aortic atherosclerosis 05/11/2021    Chronic rhinitis 03/01/2021    S/P colostomy 03/01/2021    Right foot drop 08/26/2020    High cholesterol 08/26/2020    Ventral hernia without obstruction or gangrene 02/27/2020    S/P cholecystectomy 07/01/2019    Stage 3b chronic kidney disease 12/13/2018    Resistant hypertension     BPH (benign prostatic hyperplasia)         Review of patient's allergies indicates:   Allergen  Reactions    Solarcaine [benzocaine-triclosan]     Sulfa (sulfonamide antibiotics)     Sulfamethoxazole     Trimethoprim     Hytrin [terazosin] Rash    Jardiance [empagliflozin] Rash     Yeast infection    Smz-tmp ds [sulfamethoxazole-trimethoprim] Rash        Past Medical History:   Diagnosis Date    BPH (benign prostatic hyperplasia)     Diabetes mellitus (type 2)     Emphysema 03/12/2022    High cholesterol 08/26/2020    Hypertension     RBBB 11/29/2022    Small bowel obstruction 06/13/2019      Past Surgical History:   Procedure Laterality Date    ANASTOMOSIS OF ILEUM TO RECTUM  9/9/2019    Procedure: ANASTOMOSIS, COLO-RECTAL;  Surgeon: Fouzia Gardner MD;  Location: [facility] OR;  Service: General;;    CHOLECYSTECTOMY N/A 3/7/2022    Procedure: CHOLECYSTECTOMY;  Surgeon: Fouzia Gardner MD;  Location: [facility] OR;  Service: General;  Laterality: N/A;    COLOSTOMY  6/16/2019    Procedure: CREATION, COLOSTOMY;  Surgeon: Fouzia Gardner MD;  Location: [facility];  Service: General;;    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    Procedure: RELEASE, DUPUYTREN CONTRACTURE;  Surgeon: Slick Badlwin Jr., MD;  Location: [facility] OR;  Service: Orthopedics;  Laterality: Left;    EYE SURGERY      GISELA PROCEDURE  6/16/2019    Procedure: GISELA PROCEDURE;  Surgeon: Fouzia Gardner MD;  Location: [facility];  Service: General;;  Colon resection    LYSIS OF ADHESIONS N/A 3/7/2022    Procedure: LYSIS, ADHESIONS;  Surgeon: Fouzia Gardner MD;  Location: [facility];  Service: General;  Laterality: N/A;    TONSILLECTOMY        Family History   Problem Relation Name Age of Onset    Heart disease Father      Hypertension Father      Cancer Mother cancer breas varian     Diabetes Paternal Aunt        Social History     Tobacco Use    Smoking status: Former     Current packs/day: 0.00     Average packs/day: 1 pack/day for 25.0 years (25.0 ttl pk-yrs)     Types: Cigarettes     Start date: 1946     Quit date: 1971      "Years since quittin.7     Passive exposure: Past    Smokeless tobacco: Never   Substance Use Topics    Alcohol use: Not Currently     Alcohol/week: 5.0 standard drinks of alcohol     Types: 5 Cans of beer per week        Objective:     Vitals:    24 1030 24 1033   BP: (!) 168/70 (!) 160/74   BP Location: Right arm Left arm   Patient Position: Sitting Sitting   BP Method: Medium (Manual) Medium (Manual)   Pulse: 86    SpO2: (!) 93%    Weight: 75.6 kg (166 lb 10.7 oz)    Height: 5' 9" (1.753 m)      BMI: 24.61    Gen: No apparent distress, well nourished and developed, appears stated age  CV: RRR, S1 and S2 present, no LE edema  Resp: CTAB, normal respiratory effort    Assessment/Plan:     Husam Connolly is a 89 y.o. year old male who presents to clinic for f/u    1. Type 2 diabetes mellitus with stage 3b chronic kidney disease, without long-term current use of insulin  Well controlled on current regimen.    - HEMOGLOBIN A1C; Future  - Comprehensive metabolic panel; Future  - TSH; Future  - Microalbumin/Creatinine Ratio, Urine; Future    2. Immunization due  Agreeable.   - influenza (Fluad) 45 mcg/0.5 mL vaccine 0.5 mL    3. Stage 3b chronic kidney disease  Stable.   - Comprehensive metabolic panel; Future  - CBC auto differential; Future    4. LC (obstructive sleep apnea)  Declined further intervention or treatment at this time.     5. Vertigo  Doing well.     6. Resistant hypertension  Asymptomatic. Should continue to see cardiology.      Follow-up: 6 months for T2DM    I spent a total of 30 minutes on the day of the visit.This includes face to face time and non-face to face time preparing to see the patient (eg, review of tests), obtaining and/or reviewing separately obtained history, documenting clinical information in the electronic or other health record, independently interpreting results and communicating results to the patient/family/caregiver, or care coordinator.      Williams Knight, " DO  Family Medicine

## 2024-09-19 NOTE — PT/OT/SLP PROGRESS
Occupational Therapy   Treatment    Name: Husam Connolly  MRN: [id number]  Admitting Diagnosis:  Small bowel obstruction  8 Days Post-Op    Recommendations:     Discharge Recommendations: home health PT, home health OT  Discharge Equipment Recommendations:  (TBD)  Barriers to discharge:  None    Assessment:   Pt has achieved most established goals & appropriate for d/c-->home upon medical stability. Cont w/ OT per POC.    Husam Connolly is a 88 y.o. male with a medical diagnosis of Small bowel obstruction.  He presents with . Performance deficits affecting function are weakness, impaired endurance, impaired self care skills, pain, impaired skin.     Rehab Prognosis:  Good; patient would benefit from acute skilled OT services to address these deficits and reach maximum level of function.       Plan:     Patient to be seen 5 x/week to address the above listed problems via self-care/home management, therapeutic exercises, therapeutic activities  · Plan of Care Expires: 07/17/19  · Plan of Care Reviewed with: patient, daughter, spouse    Subjective     Pain/Comfort:  · Pain Rating 1: 0/10  · Pain Rating Post-Intervention 1: 0/10    Objective:     Communicated with: charbel prior to session.  Patient found up in chair with colostomy upon OT entry to room.    General Precautions: Standard, fall   Orthopedic Precautions:N/A   Braces: N/A     Occupational Performance:     Bed Mobility:    ·      Functional Mobility/Transfers:  · Patient completed Sit <> Stand Transfer with supervision  with  no assistive device   · Functional Mobility:     Activities of Daily Living:  · Lower Body Dressing: supervision doff/don socks      AMPAC 6 Click ADL: 22    Treatment & Education:  Pt found [facility] & agreeable to OT this AM. Pt perf the following: chair pushups w/ 10 sec hold 1x10 reps ea; doff/don socks at b/s chair level via [place] w/ Sup-MI. Edu/tx re: HEP, BSC use at home, general safety techs, energy conservation techs, endurance  tx. Pt/fly verbalized understanding. Provided red Tband w/ handout.    Patient left up in chair with all lines intact, call button in reach, nsg notified and fly presentEducation:      GOALS:   Multidisciplinary Problems     Occupational Therapy Goals        Problem: Occupational Therapy Goal    Goal Priority Disciplines Outcome Interventions   Occupational Therapy Goal     OT, PT/OT Ongoing (interventions implemented as appropriate)    Description:  Goals to be met by: 07/17     Patient will increase functional independence with ADLs by performing:    UE Dressing with Supervision.  LE Dressing with Supervision.--MET 06/24  Grooming while standing at sink with Supervision.--MET 06/20  Toileting from toilet with Supervision for hygiene and clothing management.--MET 06/24   Toilet transfer to toilet with Supervision--MET 06/24.  Increased functional strength to WFL for ADLs.                        Time Tracking:     OT Date of Treatment: 06/24/19  OT Start Time: 1127  OT Stop Time: 1150  OT Total Time (min): 23 min    Billable Minutes:Self Care/Home Management 10  Therapeutic Exercise 13  Total Time 23    SIMON Gonzalez  6/24/2019     1.73

## 2024-09-20 ENCOUNTER — OFFICE VISIT (OUTPATIENT)
Dept: CARDIOLOGY | Facility: CLINIC | Age: 89
End: 2024-09-20
Payer: MEDICARE

## 2024-09-20 VITALS
SYSTOLIC BLOOD PRESSURE: 174 MMHG | WEIGHT: 163 LBS | BODY MASS INDEX: 24.14 KG/M2 | HEART RATE: 80 BPM | OXYGEN SATURATION: 97 % | DIASTOLIC BLOOD PRESSURE: 85 MMHG | HEIGHT: 69 IN

## 2024-09-20 DIAGNOSIS — I27.20 PULMONARY HYPERTENSION: ICD-10-CM

## 2024-09-20 DIAGNOSIS — I10 WHITE COAT SYNDROME WITH DIAGNOSIS OF HYPERTENSION: ICD-10-CM

## 2024-09-20 DIAGNOSIS — N18.32 STAGE 3B CHRONIC KIDNEY DISEASE: ICD-10-CM

## 2024-09-20 DIAGNOSIS — I45.10 RBBB: ICD-10-CM

## 2024-09-20 DIAGNOSIS — I70.0 AORTIC ATHEROSCLEROSIS: ICD-10-CM

## 2024-09-20 DIAGNOSIS — I10 PRIMARY HYPERTENSION: Primary | ICD-10-CM

## 2024-09-20 DIAGNOSIS — R60.0 LOCALIZED EDEMA: ICD-10-CM

## 2024-09-20 DIAGNOSIS — E78.00 HIGH CHOLESTEROL: ICD-10-CM

## 2024-09-20 DIAGNOSIS — J44.9 COPD, GROUP B, BY GOLD 2017 CLASSIFICATION: ICD-10-CM

## 2024-09-20 PROCEDURE — 99214 OFFICE O/P EST MOD 30 MIN: CPT | Mod: S$GLB,,, | Performed by: STUDENT IN AN ORGANIZED HEALTH CARE EDUCATION/TRAINING PROGRAM

## 2024-09-20 PROCEDURE — 3288F FALL RISK ASSESSMENT DOCD: CPT | Mod: CPTII,S$GLB,, | Performed by: STUDENT IN AN ORGANIZED HEALTH CARE EDUCATION/TRAINING PROGRAM

## 2024-09-20 PROCEDURE — 1159F MED LIST DOCD IN RCRD: CPT | Mod: CPTII,S$GLB,, | Performed by: STUDENT IN AN ORGANIZED HEALTH CARE EDUCATION/TRAINING PROGRAM

## 2024-09-20 PROCEDURE — 1101F PT FALLS ASSESS-DOCD LE1/YR: CPT | Mod: CPTII,S$GLB,, | Performed by: STUDENT IN AN ORGANIZED HEALTH CARE EDUCATION/TRAINING PROGRAM

## 2024-09-20 PROCEDURE — 99999 PR PBB SHADOW E&M-EST. PATIENT-LVL IV: CPT | Mod: PBBFAC,,, | Performed by: STUDENT IN AN ORGANIZED HEALTH CARE EDUCATION/TRAINING PROGRAM

## 2024-09-20 PROCEDURE — 1126F AMNT PAIN NOTED NONE PRSNT: CPT | Mod: CPTII,S$GLB,, | Performed by: STUDENT IN AN ORGANIZED HEALTH CARE EDUCATION/TRAINING PROGRAM

## 2024-09-20 PROCEDURE — 1160F RVW MEDS BY RX/DR IN RCRD: CPT | Mod: CPTII,S$GLB,, | Performed by: STUDENT IN AN ORGANIZED HEALTH CARE EDUCATION/TRAINING PROGRAM

## 2024-09-20 PROCEDURE — G2211 COMPLEX E/M VISIT ADD ON: HCPCS | Mod: S$GLB,,, | Performed by: STUDENT IN AN ORGANIZED HEALTH CARE EDUCATION/TRAINING PROGRAM

## 2024-09-20 NOTE — PROGRESS NOTES
Subjective:   @Patient ID:  Husam Connolly is a 89 y.o. male who presents for follow-up of No chief complaint on file.      HPI:   Mr. Connolly is a pleasant 89 yo gentleman here for follow up. LV visit was seen by Dr. Looney. Pmhx of HFpEF, DM, emphysema/COPD, RBBB, HLD, CKD3 here for follow up. Continue to voiced ARREDONDO with exertion and is barely function- minimal ambulation 2/2 sob. BP at home is between 130-150s. Tolerating meds s/o s/e.  Voiced no CV complaints. Denies cp, sob, palpitations, presyncope/dizziness, syncope, orthopnea, PND, bendopnea, decrease ET, NVDC, fever, chills.        Left Ventricle: The left ventricle is normal in size. There is concentric remodeling. There is normal systolic function. Biplane (2D) method of discs ejection fraction is 64%.    Right Ventricle: Normal right ventricular cavity size. Wall thickness is normal. Systolic function is normal.    Aortic Valve: There is mild aortic valve sclerosis.    Tricuspid Valve: There is mild regurgitation.    Pulmonary Artery: The estimated pulmonary artery systolic pressure is 57 mmHg.    IVC/SVC: Normal venous pressure at 3 mmHg.       Echo 3/2022  · The left ventricle is normal in size with concentric remodeling and normal systolic function.  · The estimated ejection fraction is 55%.  · Grade I left ventricular diastolic dysfunction.  · There is abnormal septal wall motion.  · Normal right ventricular size with normal right ventricular systolic function.  · The estimated PA systolic pressure is 47 mmHg.  · Normal central venous pressure (3 mmHg).  · There is pulmonary hypertension.      Patient Active Problem List    Diagnosis Date Noted    Chronic left shoulder pain 07/10/2024    Central sleep apnea 06/10/2024    LC (obstructive sleep apnea) 06/06/2024    Senile purpura 06/05/2024    Muscle weakness 03/11/2024    Shoulder stiffness, left 03/11/2024    Pain in joint of left shoulder 03/11/2024    COPD, group B, by GOLD 2017 classification  "08/23/2023    Pulmonary hypertension 07/24/2023    Shoulder arthritis 01/05/2023    RBBB 11/29/2022    Pulmonary nodules     Dupuytren's contracture of left hand 03/21/2022    Type 2 diabetes mellitus with stage 3b chronic kidney disease, without long-term current use of insulin 09/22/2021    Aortic atherosclerosis 05/11/2021    Chronic rhinitis 03/01/2021    S/P colostomy 03/01/2021    Right foot drop 08/26/2020    High cholesterol 08/26/2020    Ventral hernia without obstruction or gangrene 02/27/2020    S/P cholecystectomy 07/01/2019    Stage 3b chronic kidney disease 12/13/2018    Resistant hypertension     BPH (benign prostatic hyperplasia)          LABS  CBC  @LABRCNTIP(WBC:3,RBC:3,HGB:3,HCT:3,PLT:3,MCV:3,MCH:3,MCHC:3)@  BMP  @LABRCNTIP(NA:3,K:3,CO2:3,CL:3,BUN:3,Creatinine:3,GLU:3,GFR:3)@    POCT-Glucose  No results found for: "POCTGLUCOSE"    @LABRCNTIP(Calcium:3,MG:3,PHOS:3)@  LFT  @LABRCNTIP(PROT:3,Albumin:3,,Bilitot:3,AST:3,Alkphos:3,ALT:3)@  GFR     COAGS  @LABRCNTIP(PT:3,INR:3,APTT:3)@  CE  @LABRCNTIP(troponini:3,cktotal:3,ckmb:3)@  ABGs  @[id number](PH,PCO2,PO2,HCO3,POCSATURATED,BE)@  BNP  @LABRCNTIP(BNP:3)@    LAST HbA1c  Lab Results   Component Value Date    HGBA1C 6.1 (H) 09/03/2024       Lipid panel  Lab Results   Component Value Date    CHOL 145 09/03/2024    CHOL 131 07/04/2024    CHOL 180 01/26/2023     Lab Results   Component Value Date    HDL 57 09/03/2024    HDL 56 07/04/2024    HDL 69 01/26/2023     Lab Results   Component Value Date    LDLCALC 62.6 (L) 09/03/2024    LDLCALC 50.6 (L) 07/04/2024    LDLCALC 88.2 01/26/2023     Lab Results   Component Value Date    TRIG 127 09/03/2024    TRIG 122 07/04/2024    TRIG 114 01/26/2023     Lab Results   Component Value Date    CHOLHDL 39.3 09/03/2024    CHOLHDL 42.7 07/04/2024    CHOLHDL 38.3 01/26/2023            Review of Systems   All other systems reviewed and are negative.      Objective:   General Appearance: Pleasant, NAD  Eyes: PERRL, EOMI, " anicteric  ENT: MMM w/o erythema  Neck: Supple, JVP < 7cm w/o HJR, 2+ carotid pulses, no bruits.  Lungs: Normal inspiratory effort, clear to auscultation bilaterally.  Heart: Regular rate & rhythm, normal S1 and S2, no murmurs, rubs, or gallops.  Abdomen: Soft, non-tender, non-distended. Normal bowel sounds.   Skin/Extremities: No cyanosis, clubbing or edema. No rash or ecchymosis.  Peripheral pulses: 2+ radial, dorsalis pedis and posterior tibialis bilaterally.  Neurologic: No gross cranial nerve or focal neurologic deficits.  Psychiatric: Appropriate, normal affect.          Assessment:     1. Primary hypertension    2. Aortic atherosclerosis    3. White coat syndrome with diagnosis of hypertension    4. Localized edema    5. High cholesterol    6. COPD, group B, by GOLD 2017 classification    7. RBBB    8. Pulmonary hypertension    9. Stage 3b chronic kidney disease          Plan:   Primary hypertension-- continue current medical management. BP at goal for age. Home bp is at goal per patient.       Pulmonary hypertension/Centrilobular emphysema- continue to monitor.  Follows Dr. Dumont.     RBBB- monitor     High cholesterol- continue diet controlled     Aortic atherosclerosis- continue asa      Stage 3 chronic kidney disease, unspecified whether stage 3a or 3b CKD     Type 2 diabetes mellitus with stage 3a chronic kidney disease, without long-term current use of insulin     Orthostatic hypotension- improved.       Weight loss  Exercise    Continue with current medical plan and lifestyle changes.  Return sooner for concerns or questions. If symptoms persist go to the ED  I have reviewed all pertinent data on this patient       She expressed verbal understanding and agreed with the plan      Follow up as scheduled. Return sooner for concerns or questions      I have reviewed the patient's medical history in detail and updated the computerized patient record.    No orders of the defined types were placed in this  encounter.      Follow up as scheduled. Return sooner for concerns or questions            He expressed verbal understanding and agreed with the plan        Patient's Medications   New Prescriptions    No medications on file   Previous Medications    ALBUTEROL (VENTOLIN HFA) 90 MCG/ACTUATION INHALER    Inhale 2 puffs into the lungs every 4 (four) hours as needed for Wheezing. Rescue    ASPIRIN (ECOTRIN) 81 MG EC TABLET    Take 81 mg by mouth once daily.    ATORVASTATIN (LIPITOR) 40 MG TABLET    Take 1 tablet (40 mg total) by mouth once daily.    BLOOD SUGAR DIAGNOSTIC STRP    To check sugar daily, to use with insurance preferred meter    BLOOD-GLUCOSE METER KIT    Use as instructed    FINASTERIDE (PROSCAR) 5 MG TABLET    Take 1 tablet (5 mg total) by mouth once daily.    FLUTICASONE PROPIONATE (FLONASE) 50 MCG/ACTUATION NASAL SPRAY    2 sprays (100 mcg total) by Each Nostril route once daily.    FLUTICASONE-UMECLIDIN-VILANTER (TRELEGY ELLIPTA) 100-62.5-25 MCG DSDV    Inhale 1 puff into the lungs once daily.    GARLIC ORAL    Take 1 capsule by mouth once daily.    GLIPIZIDE 5 MG TR24    Take 1 tablet (5 mg total) by mouth daily with breakfast.    HYDRALAZINE (APRESOLINE) 50 MG TABLET    Take 1 tablet (50 mg total) by mouth 3 (three) times daily.    INHALATION SPACING DEVICE    Use as directed for inhalation.    LANCETS MISC    To check sugar daily, to use with insurance preferred meter    LOSARTAN (COZAAR) 100 MG TABLET    Take 1 tablet (100 mg total) by mouth once daily.    MULTIVITAMIN CAPSULE    Take 1 capsule by mouth once daily.    OMEGA-3 FATTY ACIDS/FISH OIL (FISH OIL-OMEGA-3 FATTY ACIDS) 300-1,000 MG CAPSULE    Take 1 capsule by mouth once daily.    TAMSULOSIN (FLOMAX) 0.4 MG CAP    Take 1 capsule by mouth.    VITAMIN D (VITAMIN D3) 1000 UNITS TAB    Take 1,000 Units by mouth once daily.   Modified Medications    No medications on file   Discontinued Medications    No medications on file        Tommy  MD Clara  Cardiovascular Disease [facility]

## 2024-09-30 ENCOUNTER — OFFICE VISIT (OUTPATIENT)
Dept: PULMONOLOGY | Facility: CLINIC | Age: 89
End: 2024-09-30
Payer: MEDICARE

## 2024-09-30 VITALS
SYSTOLIC BLOOD PRESSURE: 138 MMHG | OXYGEN SATURATION: 96 % | WEIGHT: 161.63 LBS | HEART RATE: 75 BPM | BODY MASS INDEX: 23.94 KG/M2 | HEIGHT: 69 IN | DIASTOLIC BLOOD PRESSURE: 68 MMHG

## 2024-09-30 DIAGNOSIS — J44.9 COPD, GROUP B, BY GOLD 2017 CLASSIFICATION: Primary | ICD-10-CM

## 2024-09-30 DIAGNOSIS — R91.1 PULMONARY NODULE: ICD-10-CM

## 2024-09-30 PROCEDURE — 1159F MED LIST DOCD IN RCRD: CPT | Mod: CPTII,S$GLB,, | Performed by: INTERNAL MEDICINE

## 2024-09-30 PROCEDURE — 1125F AMNT PAIN NOTED PAIN PRSNT: CPT | Mod: CPTII,S$GLB,, | Performed by: INTERNAL MEDICINE

## 2024-09-30 PROCEDURE — G2211 COMPLEX E/M VISIT ADD ON: HCPCS | Mod: S$GLB,,, | Performed by: INTERNAL MEDICINE

## 2024-09-30 PROCEDURE — 3288F FALL RISK ASSESSMENT DOCD: CPT | Mod: CPTII,S$GLB,, | Performed by: INTERNAL MEDICINE

## 2024-09-30 PROCEDURE — 99214 OFFICE O/P EST MOD 30 MIN: CPT | Mod: S$GLB,,, | Performed by: INTERNAL MEDICINE

## 2024-09-30 PROCEDURE — 99999 PR PBB SHADOW E&M-EST. PATIENT-LVL III: CPT | Mod: PBBFAC,,, | Performed by: INTERNAL MEDICINE

## 2024-09-30 PROCEDURE — 1101F PT FALLS ASSESS-DOCD LE1/YR: CPT | Mod: CPTII,S$GLB,, | Performed by: INTERNAL MEDICINE

## 2024-09-30 NOTE — PROGRESS NOTES
Subjective:       Patient ID: Husam Connolly is a 89 y.o. male.  The patient's last visit with me was on Visit date not found.     Doing well with Trelegy daily. No wheezing. Stable ARREDONDO with moderate activity.    Not as active outside of the home as he is worried he will get too out of breath to get back to his house. Discussed wheeled walker with seat. Discussed pulmonary rehab. He states he will think about these options.  Review of Systems    Objective:      Vitals:    09/30/24 1322   BP: 138/68   Pulse: 75     Wt Readings from Last 3 Encounters:   09/30/24 73.3 kg (161 lb 9.6 oz)   09/20/24 73.9 kg (163 lb)   09/06/24 75.6 kg (166 lb 10.7 oz)     Temp Readings from Last 3 Encounters:   07/06/24 98 °F (36.7 °C) (Oral)   07/24/23 97.6 °F (36.4 °C)   06/09/23 97.6 °F (36.4 °C)     BP Readings from Last 3 Encounters:   09/30/24 138/68   09/20/24 (!) 174/85   09/06/24 (!) 160/74     Pulse Readings from Last 3 Encounters:   09/30/24 75   09/20/24 80   09/06/24 86       Physical Exam   Constitutional: He appears well-developed and well-nourished.   Pulmonary/Chest: Effort normal. He has no wheezes.   Vitals reviewed.    CBC  Lab Results   Component Value Date    WBC 7.73 07/06/2024    HGB 13.1 (L) 07/06/2024    HCT 40.1 07/06/2024    MCV 91 07/06/2024     07/06/2024         CMP  Sodium   Date Value Ref Range Status   09/03/2024 138 136 - 145 mmol/L Final     Potassium   Date Value Ref Range Status   09/03/2024 4.2 3.5 - 5.1 mmol/L Final     Chloride   Date Value Ref Range Status   09/03/2024 107 95 - 110 mmol/L Final     CO2   Date Value Ref Range Status   09/03/2024 20 (L) 23 - 29 mmol/L Final     Glucose   Date Value Ref Range Status   09/03/2024 176 (H) 70 - 110 mg/dL Final     BUN   Date Value Ref Range Status   09/03/2024 33 (H) 10 - 30 mg/dL Final     Creatinine   Date Value Ref Range Status   09/03/2024 1.6 (H) 0.5 - 1.4 mg/dL Final     Calcium   Date Value Ref Range Status   09/03/2024 9.4 8.7 - 10.5  "mg/dL Final     Total Protein   Date Value Ref Range Status   07/06/2024 5.3 (L) 6.0 - 8.4 g/dL Final     Albumin   Date Value Ref Range Status   07/06/2024 2.6 (L) 3.5 - 5.2 g/dL Final     Total Bilirubin   Date Value Ref Range Status   07/06/2024 0.9 0.1 - 1.0 mg/dL Final     Comment:     For infants and newborns, interpretation of results should be based  on gestational age, weight and in agreement with clinical  observations.    Premature Infant recommended reference ranges:  Up to 24 hours.............<8.0 mg/dL  Up to 48 hours............<12.0 mg/dL  3-5 days..................<15.0 mg/dL  6-29 days.................<15.0 mg/dL       Alkaline Phosphatase   Date Value Ref Range Status   07/06/2024 57 55 - 135 U/L Final     AST   Date Value Ref Range Status   07/06/2024 15 10 - 40 U/L Final     ALT   Date Value Ref Range Status   07/06/2024 12 10 - 44 U/L Final     Anion Gap   Date Value Ref Range Status   09/03/2024 11 8 - 16 mmol/L Final     eGFR   Date Value Ref Range Status   09/03/2024 39.9 (A) >60 mL/min/1.73 m^2 Final       ABG  No results found for: "PH", "PO2", "PCO2"        Personal Diagnostic Review  I have personally reviewed the following data and added my own interpretation as below:  Cardiology note reviewed        9/30/2024     1:22 PM 9/20/2024     1:19 PM 9/6/2024    10:30 AM 7/18/2024     2:21 PM 7/16/2024     3:13 PM 7/6/2024    11:07 AM 7/6/2024     7:26 AM   Pulmonary Function Tests   SpO2 96 % 97 % 93 %  95 % 97 % 94 %   Height 5' 9" (1.753 m) 5' 9" (1.753 m) 5' 9" (1.753 m) 5' 9" (1.753 m) 5' 9" (1.753 m)     Weight 73.3 kg (161 lb 9.6 oz) 73.9 kg (163 lb) 75.6 kg (166 lb 10.7 oz) 78 kg (171 lb 15.3 oz) 78.1 kg (172 lb 2.9 oz)     BMI (Calculated) 23.9 24.1 24.6 25.4 25.4           Assessment:       1. COPD, group B, by GOLD 2017 classification    2. Pulmonary nodules        Outpatient Encounter Medications as of 9/30/2024   Medication Sig Dispense Refill    albuterol (VENTOLIN HFA) 90 " mcg/actuation inhaler Inhale 2 puffs into the lungs every 4 (four) hours as needed for Wheezing. Rescue 18 g 11    aspirin (ECOTRIN) 81 MG EC tablet Take 81 mg by mouth once daily.      atorvastatin (LIPITOR) 40 MG tablet Take 1 tablet (40 mg total) by mouth once daily. 90 tablet 3    blood sugar diagnostic Strp To check sugar daily, to use with insurance preferred meter 200 each 2    blood-glucose meter kit Use as instructed 1 each 0    finasteride (PROSCAR) 5 mg tablet Take 1 tablet (5 mg total) by mouth once daily. 90 tablet 3    fluticasone propionate (FLONASE) 50 mcg/actuation nasal spray 2 sprays (100 mcg total) by Each Nostril route once daily. 48 g 3    fluticasone-umeclidin-vilanter (TRELEGY ELLIPTA) 100-62.5-25 mcg DsDv Inhale 1 puff into the lungs once daily. 180 each 3    GARLIC ORAL Take 1 capsule by mouth once daily.      glipiZIDE 5 MG TR24 Take 1 tablet (5 mg total) by mouth daily with breakfast. 90 tablet 3    hydrALAZINE (APRESOLINE) 50 MG tablet Take 1 tablet (50 mg total) by mouth 3 (three) times daily. 270 tablet 3    inhalation spacing device Use as directed for inhalation. 1 each 0    lancets Misc To check sugar daily, to use with insurance preferred meter 200 each 2    losartan (COZAAR) 100 MG tablet Take 1 tablet (100 mg total) by mouth once daily. 90 tablet 3    multivitamin capsule Take 1 capsule by mouth once daily.      omega-3 fatty acids/fish oil (FISH OIL-OMEGA-3 FATTY ACIDS) 300-1,000 mg capsule Take 1 capsule by mouth once daily.      tamsulosin (FLOMAX) 0.4 mg Cap Take 1 capsule by mouth.      vitamin D (VITAMIN D3) 1000 units Tab Take 1,000 Units by mouth once daily.       No facility-administered encounter medications on file as of 9/30/2024.     1. COPD, group B, by GOLD 2017 classification    2. Pulmonary nodules    Plan:     Problem List Items Addressed This Visit          Pulmonary    Pulmonary nodules    Current Assessment & Plan     Previously stable. Given  advanced age, no fu needed         COPD, group B, by GOLD 2017 classification - Primary    Current Assessment & Plan     Significant chronic condition to be followed longitudinally with following long term treatment plan.     Significant obstructionon prior PFTs. 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  Trelegy 100, PRN albuterol and avoid ipratropium.  If persistent signfiicant symptoms, would benefit from pulmonary rehab as he is still very independent.  -currently not interested in this but will increase activity at home.            Please note Overview Notes are historic documentation. Please review A/P for current updates.  Follow up in about 6 months (around 3/30/2025).    Future Appointments   Date Time Provider Department Center   10/10/2024  1:00 PM Fouzia Gardner MD [facility]   10/17/2024  1:20 PM Slick Baldwin Jr., MD [facility]   3/10/2025  7:00 AM [facility]   3/10/2025  7:30 AM LAB, [facility]   3/17/2025 11:00 AM [facility]

## 2024-09-30 NOTE — ASSESSMENT & PLAN NOTE
Significant chronic condition to be followed longitudinally with following long term treatment plan.     Significant obstructionon prior PFTs. 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  Trelegy 100, PRN albuterol and avoid ipratropium.  If persistent signfiicant symptoms, would benefit from pulmonary rehab as he is still very independent.  -currently not interested in this but will increase activity at home.

## 2024-10-08 ENCOUNTER — EXTERNAL HOME HEALTH (OUTPATIENT)
Dept: HOME HEALTH SERVICES | Facility: HOSPITAL | Age: 89
End: 2024-10-08
Payer: MEDICARE

## 2024-10-12 DIAGNOSIS — M25.512 LEFT SHOULDER PAIN, UNSPECIFIED CHRONICITY: Primary | ICD-10-CM

## 2024-10-17 ENCOUNTER — HOSPITAL ENCOUNTER (OUTPATIENT)
Dept: RADIOLOGY | Facility: HOSPITAL | Age: 89
Discharge: HOME | End: 2024-10-17
Attending: ORTHOPAEDIC SURGERY
Payer: MEDICARE

## 2024-10-17 ENCOUNTER — OFFICE VISIT (OUTPATIENT)
Dept: ORTHOPEDICS | Facility: CLINIC | Age: 89
End: 2024-10-17
Payer: MEDICARE

## 2024-10-17 VITALS — BODY MASS INDEX: 23.86 KG/M2 | HEIGHT: 69 IN

## 2024-10-17 DIAGNOSIS — M19.019 SHOULDER ARTHRITIS: Primary | ICD-10-CM

## 2024-10-17 DIAGNOSIS — M25.512 LEFT SHOULDER PAIN, UNSPECIFIED CHRONICITY: ICD-10-CM

## 2024-10-17 PROCEDURE — 73030 X-RAY EXAM OF SHOULDER: CPT | Mod: 26,LT,, | Performed by: STUDENT IN AN ORGANIZED HEALTH CARE EDUCATION/TRAINING PROGRAM

## 2024-10-17 PROCEDURE — 99999 PR PBB SHADOW E&M-EST. PATIENT-LVL III: CPT | Mod: PBBFAC,,, | Performed by: ORTHOPAEDIC SURGERY

## 2024-10-17 PROCEDURE — 73030 X-RAY EXAM OF SHOULDER: CPT | Mod: TC,PN,LT

## 2024-10-17 RX ORDER — TRIAMCINOLONE ACETONIDE 40 MG/ML
40 INJECTION, SUSPENSION INTRA-ARTICULAR; INTRAMUSCULAR
Status: COMPLETED | OUTPATIENT
Start: 2024-10-17 | End: 2024-10-17

## 2024-10-17 RX ORDER — CELECOXIB 200 MG/1
200 CAPSULE ORAL DAILY
Qty: 30 CAPSULE | Refills: 2 | Status: SHIPPED | OUTPATIENT
Start: 2024-10-17 | End: 2024-10-17

## 2024-10-17 RX ADMIN — TRIAMCINOLONE ACETONIDE 40 MG: 40 INJECTION, SUSPENSION INTRA-ARTICULAR; INTRAMUSCULAR at 01:10

## 2024-10-17 NOTE — PROGRESS NOTES
Subjective:      Patient ID: Husam Connolly is a 89 y.o. male.  Chief Complaint: Pain and Follow-up of the Left Shoulder      HPI  Husam Connolly is a  89 y.o. male presenting today for follow up of left shoulder arthritis.  He reports that he is having a flare-up again   Would like an injection   .    Review of patient's allergies indicates:   Allergen Reactions    Solarcaine [benzocaine-triclosan]     Sulfa (sulfonamide antibiotics)     Sulfamethoxazole     Trimethoprim     Hytrin [terazosin] Rash    Jardiance [empagliflozin] Rash     Yeast infection    Smz-tmp ds [sulfamethoxazole-trimethoprim] Rash         Current Outpatient Medications   Medication Sig Dispense Refill    albuterol (VENTOLIN HFA) 90 mcg/actuation inhaler Inhale 2 puffs into the lungs every 4 (four) hours as needed for Wheezing. Rescue 18 g 11    aspirin (ECOTRIN) 81 MG EC tablet Take 81 mg by mouth once daily.      atorvastatin (LIPITOR) 40 MG tablet Take 1 tablet (40 mg total) by mouth once daily. 90 tablet 3    blood sugar diagnostic Strp To check sugar daily, to use with insurance preferred meter 200 each 2    blood-glucose meter kit Use as instructed 1 each 0    finasteride (PROSCAR) 5 mg tablet Take 1 tablet (5 mg total) by mouth once daily. 90 tablet 3    fluticasone propionate (FLONASE) 50 mcg/actuation nasal spray 2 sprays (100 mcg total) by Each Nostril route once daily. 48 g 3    fluticasone-umeclidin-vilanter (TRELEGY ELLIPTA) 100-62.5-25 mcg DsDv Inhale 1 puff into the lungs once daily. 180 each 3    GARLIC ORAL Take 1 capsule by mouth once daily.      glipiZIDE 5 MG TR24 Take 1 tablet (5 mg total) by mouth daily with breakfast. 90 tablet 3    hydrALAZINE (APRESOLINE) 50 MG tablet Take 1 tablet (50 mg total) by mouth 3 (three) times daily. 270 tablet 3    inhalation spacing device Use as directed for inhalation. 1 each 0    lancets Misc To check sugar daily, to use with insurance preferred meter 200 each 2    losartan (COZAAR)  "100 MG tablet Take 1 tablet (100 mg total) by mouth once daily. 90 tablet 3    multivitamin capsule Take 1 capsule by mouth once daily.      omega-3 fatty acids/fish oil (FISH OIL-OMEGA-3 FATTY ACIDS) 300-1,000 mg capsule Take 1 capsule by mouth once daily.      tamsulosin (FLOMAX) 0.4 mg Cap Take 1 capsule by mouth.      vitamin D (VITAMIN D3) 1000 units Tab Take 1,000 Units by mouth once daily.       No current facility-administered medications for this visit.       Past Medical History:   Diagnosis Date    BPH (benign prostatic hyperplasia)     Diabetes mellitus (type 2)     Emphysema 03/12/2022    High cholesterol 08/26/2020    Hypertension     RBBB 11/29/2022    Small bowel obstruction 06/13/2019       Past Surgical History:   Procedure Laterality Date    ANASTOMOSIS OF ILEUM TO RECTUM  9/9/2019    Procedure: ANASTOMOSIS, COLO-RECTAL;  Surgeon: Fouzia Gardner MD;  Location: [facility] OR;  Service: General;;    CHOLECYSTECTOMY N/A 3/7/2022    Procedure: CHOLECYSTECTOMY;  Surgeon: Fouzia Gardner MD;  Location: [facility] OR;  Service: General;  Laterality: N/A;    COLOSTOMY  6/16/2019    Procedure: CREATION, COLOSTOMY;  Surgeon: Fouzia Gardner MD;  Location: [facility] OR;  Service: General;;    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    Procedure: RELEASE, DUPUYTREN CONTRACTURE;  Surgeon: Slick Baldwin Jr., MD;  Location: [facility] OR;  Service: Orthopedics;  Laterality: Left;    EYE SURGERY      GISELA PROCEDURE  6/16/2019    Procedure: GISELA PROCEDURE;  Surgeon: Fouzia Gardner MD;  Location: [facility] OR;  Service: General;;  Colon resection    LYSIS OF ADHESIONS N/A 3/7/2022    Procedure: LYSIS, ADHESIONS;  Surgeon: Fouzia Gardner MD;  Location: [facility] OR;  Service: General;  Laterality: N/A;    TONSILLECTOMY         OBJECTIVE:   PHYSICAL EXAM:  Height: 5' 9" (175.3 cm)    Vitals:    10/17/24 1316   Height: 5' 9" (1.753 m)   PainSc:   4   PainLoc: Shoulder     Ortho/SPM Exam  Examination left " shoulder no tenderness no swelling   Range of motion limited due to stiffness and crepitation   Strength slightly decreased   Neurologic exam intact       RADIOGRAPHS:  None  Comments: I have personally reviewed the imaging and I agree with the above radiologist's report.    ASSESSMENT/PLAN:     IMPRESSION:  Severe arthritis left shoulder glenohumeral    PLAN:  Patient would like injection today   After pause for time-out identified the left shoulder injected with Kenalog 40 mg 2 cc xylocaine sterile technique   Tolerated the procedure well without complication   Continue current medications   Follow-up 3-4 months       FOLLOW UP:  3-4 months    Disclaimer: This note has been generated using voice-recognition software. There may be typographical errors that have been missed during proof-reading.

## 2024-12-23 ENCOUNTER — OCHSNER VIRTUAL EMERGENCY DEPARTMENT (OUTPATIENT)
Facility: CLINIC | Age: 89
End: 2024-12-23
Payer: MEDICARE

## 2024-12-23 ENCOUNTER — HOSPITAL ENCOUNTER (INPATIENT)
Facility: HOSPITAL | Age: 89
LOS: 3 days | Discharge: HOME | DRG: 871 | End: 2024-12-26
Attending: EMERGENCY MEDICINE | Admitting: INTERNAL MEDICINE
Payer: MEDICARE

## 2024-12-23 ENCOUNTER — OFFICE VISIT (OUTPATIENT)
Dept: URGENT CARE | Facility: CLINIC | Age: 89
End: 2024-12-23
Payer: MEDICARE

## 2024-12-23 VITALS
BODY MASS INDEX: 23.85 KG/M2 | WEIGHT: 161 LBS | OXYGEN SATURATION: 87 % | HEART RATE: 111 BPM | RESPIRATION RATE: 22 BRPM | SYSTOLIC BLOOD PRESSURE: 170 MMHG | TEMPERATURE: 99 F | HEIGHT: 69 IN | DIASTOLIC BLOOD PRESSURE: 75 MMHG

## 2024-12-23 DIAGNOSIS — R06.02 SHORTNESS OF BREATH: ICD-10-CM

## 2024-12-23 DIAGNOSIS — R79.89 ELEVATED TROPONIN: ICD-10-CM

## 2024-12-23 DIAGNOSIS — N18.32 STAGE 3B CHRONIC KIDNEY DISEASE: ICD-10-CM

## 2024-12-23 DIAGNOSIS — R09.02 HYPOXIA: Primary | ICD-10-CM

## 2024-12-23 DIAGNOSIS — N40.1 BENIGN PROSTATIC HYPERPLASIA WITH LOWER URINARY TRACT SYMPTOMS, SYMPTOM DETAILS UNSPECIFIED: ICD-10-CM

## 2024-12-23 DIAGNOSIS — R05.9 COUGH, UNSPECIFIED TYPE: ICD-10-CM

## 2024-12-23 DIAGNOSIS — R09.02 HYPOXIA: ICD-10-CM

## 2024-12-23 DIAGNOSIS — J18.9 PNEUMONIA OF RIGHT LOWER LOBE DUE TO INFECTIOUS ORGANISM: Primary | ICD-10-CM

## 2024-12-23 DIAGNOSIS — J18.9 COMMUNITY ACQUIRED PNEUMONIA: ICD-10-CM

## 2024-12-23 DIAGNOSIS — J44.9 CHRONIC OBSTRUCTIVE PULMONARY DISEASE, UNSPECIFIED COPD TYPE: ICD-10-CM

## 2024-12-23 DIAGNOSIS — A41.9 SEPSIS, DUE TO UNSPECIFIED ORGANISM, UNSPECIFIED WHETHER ACUTE ORGAN DYSFUNCTION PRESENT: ICD-10-CM

## 2024-12-23 DIAGNOSIS — J96.01 ACUTE RESPIRATORY FAILURE WITH HYPOXIA: ICD-10-CM

## 2024-12-23 DIAGNOSIS — J44.1 COPD EXACERBATION: ICD-10-CM

## 2024-12-23 LAB
ALBUMIN SERPL BCP-MCNC: 3.6 G/DL (ref 3.5–5.2)
ALP SERPL-CCNC: 80 U/L (ref 40–150)
ALT SERPL W/O P-5'-P-CCNC: 14 U/L (ref 10–44)
ANION GAP SERPL CALC-SCNC: 13 MMOL/L (ref 8–16)
AST SERPL-CCNC: 21 U/L (ref 10–40)
BASOPHILS # BLD AUTO: 0.02 K/UL (ref 0–0.2)
BASOPHILS NFR BLD: 0.1 % (ref 0–1.9)
BILIRUB SERPL-MCNC: 1.1 MG/DL (ref 0.1–1)
BILIRUB UR QL STRIP: NEGATIVE
BNP SERPL-MCNC: 90 PG/ML (ref 0–99)
BUN SERPL-MCNC: 26 MG/DL (ref 10–30)
CALCIUM SERPL-MCNC: 9.5 MG/DL (ref 8.7–10.5)
CHLORIDE SERPL-SCNC: 106 MMOL/L (ref 95–110)
CLARITY UR: CLEAR
CO2 SERPL-SCNC: 23 MMOL/L (ref 23–29)
COLOR UR: YELLOW
CREAT SERPL-MCNC: 1.6 MG/DL (ref 0.5–1.4)
CTP QC/QA: YES
CTP QC/QA: YES
DIFFERENTIAL METHOD BLD: ABNORMAL
EOSINOPHIL # BLD AUTO: 0 K/UL (ref 0–0.5)
EOSINOPHIL NFR BLD: 0.1 % (ref 0–8)
ERYTHROCYTE [DISTWIDTH] IN BLOOD BY AUTOMATED COUNT: 13.3 % (ref 11.5–14.5)
EST. GFR  (NO RACE VARIABLE): 40 ML/MIN/1.73 M^2
ESTIMATED AVG GLUCOSE: 120 MG/DL (ref 68–131)
GLUCOSE SERPL-MCNC: 144 MG/DL (ref 70–110)
GLUCOSE UR QL STRIP: NEGATIVE
HBA1C MFR BLD: 5.8 % (ref 4–5.6)
HCT VFR BLD AUTO: 45.9 % (ref 40–54)
HGB BLD-MCNC: 15.3 G/DL (ref 14–18)
HGB UR QL STRIP: NEGATIVE
IMM GRANULOCYTES # BLD AUTO: 0.04 K/UL (ref 0–0.04)
IMM GRANULOCYTES NFR BLD AUTO: 0.3 % (ref 0–0.5)
KETONES UR QL STRIP: ABNORMAL
LACTATE SERPL-SCNC: 2.8 MMOL/L (ref 0.5–2.2)
LDH SERPL L TO P-CCNC: 1.2 MMOL/L (ref 0.5–2.2)
LEUKOCYTE ESTERASE UR QL STRIP: NEGATIVE
LYMPHOCYTES # BLD AUTO: 0.9 K/UL (ref 1–4.8)
LYMPHOCYTES NFR BLD: 6.2 % (ref 18–48)
MAGNESIUM SERPL-MCNC: 2 MG/DL (ref 1.6–2.6)
MCH RBC QN AUTO: 30.5 PG (ref 27–31)
MCHC RBC AUTO-ENTMCNC: 33.3 G/DL (ref 32–36)
MCV RBC AUTO: 92 FL (ref 82–98)
MONOCYTES # BLD AUTO: 1.2 K/UL (ref 0.3–1)
MONOCYTES NFR BLD: 8.7 % (ref 4–15)
NEUTROPHILS # BLD AUTO: 11.8 K/UL (ref 1.8–7.7)
NEUTROPHILS NFR BLD: 84.6 % (ref 38–73)
NITRITE UR QL STRIP: NEGATIVE
NRBC BLD-RTO: 0 /100 WBC
OHS QRS DURATION: 106 MS
OHS QTC CALCULATION: 441 MS
PCO2 BLDA: 39 MMHG (ref 35–45)
PH SMN: 7.39 [PH] (ref 7.35–7.45)
PH UR STRIP: 5 [PH] (ref 5–8)
PLATELET # BLD AUTO: 180 K/UL (ref 150–450)
PMV BLD AUTO: 11 FL (ref 9.2–12.9)
PO2 BLDA: 33.8 MMHG (ref 40–60)
POC BASE DEFICIT: -1.2 MMOL/L (ref -2–2)
POC HCO3: 23.6 MMOL/L (ref 24–28)
POC MOLECULAR INFLUENZA A AGN: NEGATIVE
POC MOLECULAR INFLUENZA B AGN: NEGATIVE
POC PERFORMED BY: ABNORMAL
POC SATURATED O2: 62.8 % (ref 95–100)
POCT GLUCOSE: 149 MG/DL (ref 70–110)
POCT GLUCOSE: 245 MG/DL (ref 70–110)
POTASSIUM SERPL-SCNC: 4.4 MMOL/L (ref 3.5–5.1)
PROCALCITONIN SERPL IA-MCNC: 0.06 NG/ML
PROT SERPL-MCNC: 7 G/DL (ref 6–8.4)
PROT UR QL STRIP: ABNORMAL
RBC # BLD AUTO: 5.01 M/UL (ref 4.6–6.2)
SARS-COV-2 RDRP RESP QL NAA+PROBE: NEGATIVE
SODIUM SERPL-SCNC: 142 MMOL/L (ref 136–145)
SP GR UR STRIP: 1.02 (ref 1–1.03)
SPECIMEN SOURCE: ABNORMAL
TROPONIN I SERPL DL<=0.01 NG/ML-MCNC: 0.03 NG/ML (ref 0–0.03)
TROPONIN I SERPL DL<=0.01 NG/ML-MCNC: 0.06 NG/ML (ref 0–0.03)
URN SPEC COLLECT METH UR: ABNORMAL
UROBILINOGEN UR STRIP-ACNC: NEGATIVE EU/DL
WBC # BLD AUTO: 13.95 K/UL (ref 3.9–12.7)

## 2024-12-23 PROCEDURE — 94640 AIRWAY INHALATION TREATMENT: CPT

## 2024-12-23 PROCEDURE — 27000221 HC OXYGEN, UP TO 24 HOURS

## 2024-12-23 PROCEDURE — 85025 COMPLETE CBC W/AUTO DIFF WBC: CPT | Performed by: EMERGENCY MEDICINE

## 2024-12-23 PROCEDURE — 25000242 PHARM REV CODE 250 ALT 637 W/ HCPCS

## 2024-12-23 PROCEDURE — 87502 INFLUENZA DNA AMP PROBE: CPT

## 2024-12-23 PROCEDURE — 25000242 PHARM REV CODE 250 ALT 637 W/ HCPCS: Performed by: EMERGENCY MEDICINE

## 2024-12-23 PROCEDURE — 93005 ELECTROCARDIOGRAM TRACING: CPT

## 2024-12-23 PROCEDURE — 99900035 HC TECH TIME PER 15 MIN (STAT)

## 2024-12-23 PROCEDURE — 83735 ASSAY OF MAGNESIUM: CPT | Performed by: EMERGENCY MEDICINE

## 2024-12-23 PROCEDURE — 81003 URINALYSIS AUTO W/O SCOPE: CPT | Performed by: EMERGENCY MEDICINE

## 2024-12-23 PROCEDURE — 96375 TX/PRO/DX INJ NEW DRUG ADDON: CPT

## 2024-12-23 PROCEDURE — 63600175 PHARM REV CODE 636 W HCPCS: Performed by: INTERNAL MEDICINE

## 2024-12-23 PROCEDURE — 80053 COMPREHEN METABOLIC PANEL: CPT | Performed by: EMERGENCY MEDICINE

## 2024-12-23 PROCEDURE — 83036 HEMOGLOBIN GLYCOSYLATED A1C: CPT

## 2024-12-23 PROCEDURE — 99285 EMERGENCY DEPT VISIT HI MDM: CPT | Mod: 25

## 2024-12-23 PROCEDURE — 84145 PROCALCITONIN (PCT): CPT | Performed by: EMERGENCY MEDICINE

## 2024-12-23 PROCEDURE — 93010 ELECTROCARDIOGRAM REPORT: CPT | Mod: ,,, | Performed by: INTERNAL MEDICINE

## 2024-12-23 PROCEDURE — 87040 BLOOD CULTURE FOR BACTERIA: CPT | Mod: 59 | Performed by: EMERGENCY MEDICINE

## 2024-12-23 PROCEDURE — 96361 HYDRATE IV INFUSION ADD-ON: CPT

## 2024-12-23 PROCEDURE — 25000003 PHARM REV CODE 250

## 2024-12-23 PROCEDURE — 25000003 PHARM REV CODE 250: Performed by: EMERGENCY MEDICINE

## 2024-12-23 PROCEDURE — 94761 N-INVAS EAR/PLS OXIMETRY MLT: CPT

## 2024-12-23 PROCEDURE — 63600175 PHARM REV CODE 636 W HCPCS

## 2024-12-23 PROCEDURE — 82803 BLOOD GASES ANY COMBINATION: CPT

## 2024-12-23 PROCEDURE — 84484 ASSAY OF TROPONIN QUANT: CPT | Mod: 91 | Performed by: EMERGENCY MEDICINE

## 2024-12-23 PROCEDURE — 82962 GLUCOSE BLOOD TEST: CPT

## 2024-12-23 PROCEDURE — 25000003 PHARM REV CODE 250: Performed by: INTERNAL MEDICINE

## 2024-12-23 PROCEDURE — 63600175 PHARM REV CODE 636 W HCPCS: Performed by: EMERGENCY MEDICINE

## 2024-12-23 PROCEDURE — 99215 OFFICE O/P EST HI 40 MIN: CPT | Mod: S$GLB,,,

## 2024-12-23 PROCEDURE — 83880 ASSAY OF NATRIURETIC PEPTIDE: CPT | Performed by: EMERGENCY MEDICINE

## 2024-12-23 PROCEDURE — 83605 ASSAY OF LACTIC ACID: CPT | Performed by: EMERGENCY MEDICINE

## 2024-12-23 PROCEDURE — 11000001 HC ACUTE MED/SURG PRIVATE ROOM

## 2024-12-23 PROCEDURE — 96365 THER/PROPH/DIAG IV INF INIT: CPT

## 2024-12-23 PROCEDURE — 87635 SARS-COV-2 COVID-19 AMP PRB: CPT | Performed by: EMERGENCY MEDICINE

## 2024-12-23 PROCEDURE — 83605 ASSAY OF LACTIC ACID: CPT

## 2024-12-23 PROCEDURE — 84484 ASSAY OF TROPONIN QUANT: CPT

## 2024-12-23 RX ORDER — FINASTERIDE 5 MG/1
5 TABLET, FILM COATED ORAL DAILY
Status: DISCONTINUED | OUTPATIENT
Start: 2024-12-23 | End: 2024-12-26 | Stop reason: HOSPADM

## 2024-12-23 RX ORDER — LOSARTAN POTASSIUM 50 MG/1
100 TABLET ORAL DAILY
Status: DISCONTINUED | OUTPATIENT
Start: 2024-12-24 | End: 2024-12-26 | Stop reason: HOSPADM

## 2024-12-23 RX ORDER — TAMSULOSIN HYDROCHLORIDE 0.4 MG/1
0.4 CAPSULE ORAL DAILY
Status: DISCONTINUED | OUTPATIENT
Start: 2024-12-23 | End: 2024-12-26 | Stop reason: HOSPADM

## 2024-12-23 RX ORDER — IPRATROPIUM BROMIDE AND ALBUTEROL SULFATE 2.5; .5 MG/3ML; MG/3ML
9 SOLUTION RESPIRATORY (INHALATION)
Status: COMPLETED | OUTPATIENT
Start: 2024-12-23 | End: 2024-12-23

## 2024-12-23 RX ORDER — IPRATROPIUM BROMIDE AND ALBUTEROL SULFATE 2.5; .5 MG/3ML; MG/3ML
3 SOLUTION RESPIRATORY (INHALATION) EVERY 4 HOURS
Status: DISCONTINUED | OUTPATIENT
Start: 2024-12-23 | End: 2024-12-26 | Stop reason: HOSPADM

## 2024-12-23 RX ORDER — IBUPROFEN 200 MG
24 TABLET ORAL
Status: DISCONTINUED | OUTPATIENT
Start: 2024-12-23 | End: 2024-12-26 | Stop reason: HOSPADM

## 2024-12-23 RX ORDER — GUAIFENESIN 600 MG/1
600 TABLET, EXTENDED RELEASE ORAL 2 TIMES DAILY
Status: DISCONTINUED | OUTPATIENT
Start: 2024-12-23 | End: 2024-12-26 | Stop reason: HOSPADM

## 2024-12-23 RX ORDER — CEFTRIAXONE 2 G/1
2 INJECTION, POWDER, FOR SOLUTION INTRAMUSCULAR; INTRAVENOUS ONCE
Status: COMPLETED | OUTPATIENT
Start: 2024-12-23 | End: 2024-12-23

## 2024-12-23 RX ORDER — ENOXAPARIN SODIUM 100 MG/ML
30 INJECTION SUBCUTANEOUS EVERY 24 HOURS
Status: DISCONTINUED | OUTPATIENT
Start: 2024-12-23 | End: 2024-12-26

## 2024-12-23 RX ORDER — PREDNISONE 20 MG/1
40 TABLET ORAL DAILY
Status: DISCONTINUED | OUTPATIENT
Start: 2024-12-23 | End: 2024-12-26 | Stop reason: HOSPADM

## 2024-12-23 RX ORDER — ATORVASTATIN CALCIUM 40 MG/1
40 TABLET, FILM COATED ORAL DAILY
Status: DISCONTINUED | OUTPATIENT
Start: 2024-12-23 | End: 2024-12-26 | Stop reason: HOSPADM

## 2024-12-23 RX ORDER — INSULIN ASPART 100 [IU]/ML
0-5 INJECTION, SOLUTION INTRAVENOUS; SUBCUTANEOUS
Status: DISCONTINUED | OUTPATIENT
Start: 2024-12-23 | End: 2024-12-26 | Stop reason: HOSPADM

## 2024-12-23 RX ORDER — IBUPROFEN 200 MG
16 TABLET ORAL
Status: DISCONTINUED | OUTPATIENT
Start: 2024-12-23 | End: 2024-12-26 | Stop reason: HOSPADM

## 2024-12-23 RX ORDER — LOSARTAN POTASSIUM 50 MG/1
100 TABLET ORAL ONCE
Status: COMPLETED | OUTPATIENT
Start: 2024-12-23 | End: 2024-12-23

## 2024-12-23 RX ORDER — ASPIRIN 81 MG/1
81 TABLET ORAL DAILY
Status: DISCONTINUED | OUTPATIENT
Start: 2024-12-23 | End: 2024-12-26 | Stop reason: HOSPADM

## 2024-12-23 RX ORDER — GLUCAGON 1 MG
1 KIT INJECTION
Status: DISCONTINUED | OUTPATIENT
Start: 2024-12-23 | End: 2024-12-26 | Stop reason: HOSPADM

## 2024-12-23 RX ADMIN — FINASTERIDE 5 MG: 5 TABLET, FILM COATED ORAL at 01:12

## 2024-12-23 RX ADMIN — ASPIRIN 81 MG: 81 TABLET, COATED ORAL at 01:12

## 2024-12-23 RX ADMIN — SODIUM CHLORIDE 2109 ML: 9 INJECTION, SOLUTION INTRAVENOUS at 11:12

## 2024-12-23 RX ADMIN — VANCOMYCIN HYDROCHLORIDE 1250 MG: 1.25 INJECTION, POWDER, LYOPHILIZED, FOR SOLUTION INTRAVENOUS at 07:12

## 2024-12-23 RX ADMIN — PIPERACILLIN AND TAZOBACTAM 4.5 G: 4; .5 INJECTION, POWDER, LYOPHILIZED, FOR SOLUTION INTRAVENOUS; PARENTERAL at 02:12

## 2024-12-23 RX ADMIN — AZITHROMYCIN MONOHYDRATE 500 MG: 500 INJECTION, POWDER, LYOPHILIZED, FOR SOLUTION INTRAVENOUS at 11:12

## 2024-12-23 RX ADMIN — IPRATROPIUM BROMIDE AND ALBUTEROL SULFATE 3 ML: .5; 3 SOLUTION RESPIRATORY (INHALATION) at 04:12

## 2024-12-23 RX ADMIN — CEFTRIAXONE SODIUM 2 G: 2 INJECTION, POWDER, FOR SOLUTION INTRAMUSCULAR; INTRAVENOUS at 10:12

## 2024-12-23 RX ADMIN — IPRATROPIUM BROMIDE AND ALBUTEROL SULFATE 9 ML: .5; 3 SOLUTION RESPIRATORY (INHALATION) at 11:12

## 2024-12-23 RX ADMIN — PIPERACILLIN AND TAZOBACTAM 4.5 G: 4; .5 INJECTION, POWDER, LYOPHILIZED, FOR SOLUTION INTRAVENOUS; PARENTERAL at 10:12

## 2024-12-23 RX ADMIN — ENOXAPARIN SODIUM 30 MG: 30 INJECTION SUBCUTANEOUS at 07:12

## 2024-12-23 RX ADMIN — TAMSULOSIN HYDROCHLORIDE 0.4 MG: 0.4 CAPSULE ORAL at 03:12

## 2024-12-23 RX ADMIN — INSULIN ASPART 1 UNITS: 100 INJECTION, SOLUTION INTRAVENOUS; SUBCUTANEOUS at 10:12

## 2024-12-23 RX ADMIN — PREDNISONE 40 MG: 20 TABLET ORAL at 03:12

## 2024-12-23 RX ADMIN — LOSARTAN POTASSIUM 100 MG: 50 TABLET, FILM COATED ORAL at 10:12

## 2024-12-23 RX ADMIN — ATORVASTATIN CALCIUM 40 MG: 40 TABLET, FILM COATED ORAL at 01:12

## 2024-12-23 RX ADMIN — GUAIFENESIN 600 MG: 600 TABLET, EXTENDED RELEASE ORAL at 08:12

## 2024-12-23 RX ADMIN — IPRATROPIUM BROMIDE AND ALBUTEROL SULFATE 3 ML: .5; 3 SOLUTION RESPIRATORY (INHALATION) at 07:12

## 2024-12-23 RX ADMIN — IPRATROPIUM BROMIDE AND ALBUTEROL SULFATE 3 ML: .5; 3 SOLUTION RESPIRATORY (INHALATION) at 11:12

## 2024-12-23 NOTE — H&P
[facility] Medicine H&P Note     Admitting Team: [facility] Hospitalist Team   Attending Physician: Sunday  Resident: Dalila  Intern: Bruce    Date of Admit: 12/23/2024    Chief Complaint     SOB, dry cough, night sweats, chills for 7-10 days    Subjective:      History of Present Illness:  Husam Connolly is a 89 y.o. male with a PMHx of HTN, T2DM, COPD, CKD3b, BPH,  HLD. The patient presented on 12/23/2024 for evaluation of 7-10 day history of dry cough, SOB, night sweats, and chills for 7-10 days. He says he developed a cough about 10 days ago that has progressed and kept him from sleeping. He has COPD but is not on O2 at home. He used his usual inhalers (Trelegy during day and albuterol at night) without relief. He has since developed night sweats, chills, and decreased appetite. Patient denies diarrhea, nausea, vomiting, chest pain, pleuritic pain, fever, or known sick contacts.       Past Medical History:  Past Medical History:   Diagnosis Date    BPH (benign prostatic hyperplasia)     Diabetes mellitus (type 2)     Emphysema 03/12/2022    High cholesterol 08/26/2020    Hypertension     RBBB 11/29/2022    Small bowel obstruction 06/13/2019       Past Surgical History:  Past Surgical History:   Procedure Laterality Date    ANASTOMOSIS OF ILEUM TO RECTUM  9/9/2019    Procedure: ANASTOMOSIS, COLO-RECTAL;  Surgeon: Fouzia Gardner MD;  Location: [facility] OR;  Service: General;;    CHOLECYSTECTOMY N/A 3/7/2022    Procedure: CHOLECYSTECTOMY;  Surgeon: Fouzia Gardner MD;  Location: [facility] OR;  Service: General;  Laterality: N/A;    COLOSTOMY  6/16/2019    Procedure: CREATION, COLOSTOMY;  Surgeon: Fouzia Gardner MD;  Location: [facility] OR;  Service: General;;    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    Procedure: RELEASE, DUPUYTREN CONTRACTURE;  Surgeon: Slick Baldwin Jr., MD;  Location: [facility] OR;  Service: Orthopedics;  Laterality: Left;    EYE SURGERY      GISELA PROCEDURE  6/16/2019    Procedure:  GISELA PROCEDURE;  Surgeon: Fouzia Gardner MD;  Location: [facility] OR;  Service: General;;  Colon resection    LYSIS OF ADHESIONS N/A 3/7/2022    Procedure: LYSIS, ADHESIONS;  Surgeon: Fouzia Gardner MD;  Location: [facility] OR;  Service: General;  Laterality: N/A;    TONSILLECTOMY         Social History:  Tobacco: former - approx 1PPD for 15 years  Alcohol: 1-2 beers or wine per day  Drugs: Denies    Family History:  Family History   Problem Relation Name Age of Onset    Heart disease Father      Hypertension Father      Cancer Mother cancer breas varian     Diabetes Paternal Aunt         Home Medications:  Atorvastatin 40mg daily  Albuterol QHS  Aspirin 81mg daily  Finasteride 5mg daily  Hydralazine PRN  Losartan 100mg daily  Glipizide 5mg daily  Trelegy in the morning    Allergies:  Review of patient's allergies indicates:   Allergen Reactions    Solarcaine [benzocaine-triclosan]     Sulfa (sulfonamide antibiotics)     Sulfamethoxazole     Trimethoprim     Hytrin [terazosin] Rash    Jardiance [empagliflozin] Rash     Yeast infection    Smz-tmp ds [sulfamethoxazole-trimethoprim] Rash       Review of Systems:  ROS    Health Care Maintenance :   Primary Care Physician: Williams Knight DO     Immunizations:   Immunization History   Administered Date(s) Administered    COVID-19, MRNA, LN-S, PF (Pfizer) (Purple Cap) 01/16/2021, 02/06/2021, 10/29/2021    Influenza - Quadrivalent - High Dose - PF (65 years and older) 10/08/2020, 09/22/2021, 09/29/2022    Influenza - Quadrivalent - PF *Preferred* (6 months and older) 09/14/2013    Influenza - Trivalent - Fluad - Adjuvanted - PF (65 years and older 10/07/2019, 09/06/2024    Influenza - Trivalent - Fluzone High Dose - PF (65 years and older) 09/20/2014, 10/15/2015, 09/26/2016, 10/05/2017    Pneumococcal 08/07/2019    Zoster 07/21/2011    Zoster Recombinant 10/14/2020, 03/01/2021        Objective:   Last 24 Hour Vital Signs:  BP  Min: 170/75  Max: 207/89  Temp  Avg:  "98.7 °F (37.1 °C)  Min: 98.6 °F (37 °C)  Max: 98.7 °F (37.1 °C)  Pulse  Av  Min: 93  Max: 111  Resp  Av.3  Min: 18  Max: 37  SpO2  Av.9 %  Min: 87 %  Max: 99 %  Height  Av' 9.02" (175.3 cm)  Min: 5' 9.02" (175.3 cm)  Max: 5' 9.02" (175.3 cm)  Weight  Av.7 kg (158 lb)  Min: 70.3 kg (155 lb)  Max: 73 kg (161 lb)  Body mass index is 22.88 kg/m².  No intake/output data recorded.    Physical Examination:  Physical Exam  Vitals reviewed.   Constitutional:       General: He is not in acute distress.     Comments: Appears flushed   HENT:      Head: Normocephalic and atraumatic.      Mouth/Throat:      Mouth: Mucous membranes are dry.   Eyes:      General: No scleral icterus.     Extraocular Movements: Extraocular movements intact.      Pupils: Pupils are equal, round, and reactive to light.   Cardiovascular:      Rate and Rhythm: Regular rhythm. Tachycardia present.      Pulses: Normal pulses.      Heart sounds: No murmur heard.     No friction rub. No gallop.   Pulmonary:      Effort: Tachypnea present.      Comments: Diffuse expiratory wheezing auscultated in all lung zones and inspiratory rales noted to RLL  Abdominal:      General: Abdomen is flat. There is no distension.      Tenderness: There is no abdominal tenderness.   Musculoskeletal:         General: Normal range of motion.      Comments: Trace dependent pitting edema LLE   Skin:     General: Skin is warm and dry.   Neurological:      General: No focal deficit present.      Mental Status: He is alert.   Psychiatric:         Mood and Affect: Mood normal.          Laboratory:  Most Recent Data:  CBC:   Lab Results   Component Value Date    WBC 13.95 (H) 2024    HGB 15.3 2024    HCT 45.9 2024     2024    MCV 92 2024    RDW 13.3 2024     BMP:   Lab Results   Component Value Date     2024    K 4.4 2024     2024    CO2 23 2024    BUN 26 2024    CREATININE 1.6 " (H) 12/23/2024     (H) 12/23/2024    CALCIUM 9.5 12/23/2024    MG 2.0 12/23/2024    PHOS 1.8 (L) 09/21/2022     LFTs:   Lab Results   Component Value Date    PROT 7.0 12/23/2024    ALBUMIN 3.6 12/23/2024    BILITOT 1.1 (H) 12/23/2024    AST 21 12/23/2024    ALKPHOS 80 12/23/2024    ALT 14 12/23/2024     Coags:   Lab Results   Component Value Date    INR 1.1 09/10/2019     FLP:   Lab Results   Component Value Date    CHOL 145 09/03/2024    HDL 57 09/03/2024    LDLCALC 62.6 (L) 09/03/2024    TRIG 127 09/03/2024    CHOLHDL 39.3 09/03/2024     DM:   Lab Results   Component Value Date    HGBA1C 6.1 (H) 09/03/2024    HGBA1C 6.6 (H) 07/04/2024    HGBA1C 7.5 (H) 01/26/2024    MICROALBUR 0.7 08/25/2021    LDLCALC 62.6 (L) 09/03/2024    CREATININE 1.6 (H) 12/23/2024     Thyroid:   Lab Results   Component Value Date    TSH 1.020 01/26/2024     Anemia:   Lab Results   Component Value Date    YABOSJDY61 387 11/28/2022     Cardiac:   Lab Results   Component Value Date    TROPONINI 0.028 (H) 12/23/2024    BNP 90 12/23/2024     Urinalysis:   Lab Results   Component Value Date    COLORU Yellow 12/23/2024    SPECGRAV 1.020 12/23/2024    NITRITE Negative 12/23/2024    KETONESU 1+ (A) 12/23/2024    UROBILINOGEN Negative 12/23/2024    WBCUA 3 09/19/2022       Microbiology Data:   COVID- negative  Flu - negative  BC x2 pending    Radiology:  CXR- no acute cardiopulmonary process noted per radiology, but appears there may be a developing process in RLL    Other Results:  EKG (my interpretation): sinus tachycardia with PVC's    Assessment:     Husam Connolly is a 89 y.o. male with a PMHx of HTN, HLD, COPD, BPH, CKD3B, T2DM presenting with 7-10day history of SOB, dry cough, night sweats, chills, and decreased appetite. Patient admitted for treatment of suspected CAP.      Plan:     CAP vs COPD Exacerbation  Leukocytosis  Patient presents with 7-10 day history of dry cough, SOB, chills, night sweats, and decreased appetite. Used his  home COPD inhalers without relief. Denies known sick contacts, pleuritic pain, fever, N/V/D. On presentation, satting 81% on RA, tachycardic in the 120s. Labs significant for leukocytosis of 13.95. CXR without evidence of acute cardiopulmonary process but concern for developing RLL process. On Exam, expiratory wheezing noted diffusely along with inspiratory crackles in RLL, tachypneic, flushed. CURB-65 score of 2 indicates moderate risk group, appropriate for inpatient treatment. PSI/PORT score 103 indicating Risk Class IV   - s/p cef, azithro  - Flu/COVID negative  - patient meets sepsis criteria  - follow up BC's  - started on vanc, zosyn given sepsis criteria  - continue DuoNebs  - wean O2 as tolerated to maintain O2 sat of 88-92%    COPD vs COPD exacerbation  PFT's in 5/2022 indicate moderate to severe obstruction. Concern for possible COPD exacerbation.  - on Trelegy and albuterol at home  - Continue DuoNebs while inpatient  - wean O2 as tolerated to maintain O2 sat of 88-92%  - started on steroids for possible COPD exacerbation    Elevated Troponin  Troponin mildly elevated at 0.028. EKG with sinus tachycardia. Likely represents demand ischemia in the setting of hypoxia and tachycardia.  - trend trop until it peaks    HTN  - on Losartan and PRN hydralazine at home  - Continue home losartan    HLD  Patient has hx of HLD. Last FLP with chol 145  HDL 57 LDL 62.6  - continue home atorvastatin, ASA    BPH  - continue home Flomax, finasteride    CKD3b  BL Cr of 1.6-1.7. Cr 1.6 on admission  - renally dose medications  - continue to monitor UOP/Cr    T2DM  - last A1C 3mos ago 6.1%  - patient on glipizide at home  - follow up repeat A1C  - goal -180 while inpatient  - LDSSI  - diabetic diet    DVT PPx: renally dosed lovenox  Diet: Diabetic  Code: Full    Disposition: admit for IV abx, O2 therapy      Christina Barrera MD, STANISLAW  [facility] Internal Medicine PGY1     [facility] Medicine Hospitalist Pager numbers:   [facility]  Hospitalist Medicine Team A (Sunday/Yasmin): [phone]  [facility] Hospitalist Medicine Team B (Melida/Lauren):  [phone]

## 2024-12-23 NOTE — PLAN OF CARE-OVED
[facility] Emergency Department Plan of Care Note    Referral source: Urgent Care      Reason for consult: Shortness of Breath      Additional History: Hx of COPD not, on home O2, hypooxic to 85% with wet breath sounds      Disposition recommended: Emergency Department

## 2024-12-23 NOTE — PROGRESS NOTES
Pharmacokinetic Initial Assessment: IV Vancomycin    Assessment/Plan:    Initiate intravenous vancomycin with loading dose of 1250 mg once with subsequent doses when random concentrations are less than 20 mcg/mL  Desired empiric serum trough concentration is 10 to 20 mcg/mL  Draw vancomycin random level on 12/24/24 at 1500.  Pharmacy will continue to follow and monitor vancomycin.      Please contact pharmacy at extension [phone] with any questions regarding this assessment.     Thank you for the consult,   Hesham Marrufo       Patient brief summary:  Husam Connolly is a 89 y.o. male initiated on antimicrobial therapy with IV Vancomycin for treatment of suspected lower respiratory infection    Drug Allergies:   Review of patient's allergies indicates:   Allergen Reactions    Solarcaine [benzocaine-triclosan]     Sulfa (sulfonamide antibiotics)     Sulfamethoxazole     Trimethoprim     Hytrin [terazosin] Rash    Jardiance [empagliflozin] Rash     Yeast infection    Smz-tmp ds [sulfamethoxazole-trimethoprim] Rash       Actual Body Weight:   70.3    Renal Function:   Estimated Creatinine Clearance: 28.7 mL/min (A) (based on SCr of 1.6 mg/dL (H)).,     Dialysis Method (if applicable):  N/A    CBC (last 72 hours):  Recent Labs   Lab Result Units 12/23/24  0957   WBC K/uL 13.95*   Hemoglobin g/dL 15.3   Hematocrit % 45.9   Platelets K/uL 180   Gran % % 84.6*   Lymph % % 6.2*   Mono % % 8.7   Eosinophil % % 0.1   Basophil % % 0.1   Differential Method  Automated       Metabolic Panel (last 72 hours):  Recent Labs   Lab Result Units 12/23/24  0941 12/23/24  1051   Sodium mmol/L 142  --    Potassium mmol/L 4.4  --    Chloride mmol/L 106  --    CO2 mmol/L 23  --    Glucose mg/dL 144*  --    Glucose, UA   --  Negative   BUN mg/dL 26  --    Creatinine mg/dL 1.6*  --    Albumin g/dL 3.6  --    Total Bilirubin mg/dL 1.1*  --    Alkaline Phosphatase U/L 80  --    AST U/L 21  --    ALT U/L 14  --    Magnesium mg/dL 2.0  --   "      Drug levels (last 3 results):  No results for input(s): "VANCOMYCINRA", "VANCORANDOM", "VANCOMYCINPE", "VANCOPEAK", "VANCOMYCINTR", "VANCOTROUGH" in the last 72 hours.    Microbiologic Results:  Microbiology Results (last 7 days)       Procedure Component Value Units Date/Time    Blood culture x two cultures. Draw prior to antibiotics. [[id number]] Collected: 12/23/24 1000    Order Status: Sent Specimen: Blood from Peripheral, Wrist, Right Updated: 12/23/24 1321    Blood culture x two cultures. Draw prior to antibiotics. [[id number]] Collected: 12/23/24 0940    Order Status: Sent Specimen: Blood from Peripheral, Antecubital, Left Updated: 12/23/24 1321    Influenza A & B by Molecular [[id number]]     Order Status: Canceled Specimen: Nasopharyngeal Swab             "

## 2024-12-23 NOTE — PHARMACY MED REC
"      [facility]           Pharmacy  Admission Medication History     The home medication history was taken by Gladis Redd.      Medication history obtained from Medications listed below were obtained from: Patient/family.    Based on information gathered for medication list, you may go to "Admission" then "Reconcile Home Medications" tabs to review and/or act upon those items.     The home medication list has been updated by the Pharmacy department.   Please read ALL comments highlighted in yellow.   Please address this information as you see fit.    Feel free to contact us if you have any questions or require assistance.        No current facility-administered medications on file prior to encounter.     Current Outpatient Medications on File Prior to Encounter   Medication Sig Dispense Refill    albuterol (VENTOLIN HFA) 90 mcg/actuation inhaler Inhale 2 puffs into the lungs every 4 (four) hours as needed for Wheezing. Rescue 18 g 11    aspirin (ECOTRIN) 81 MG EC tablet Take 81 mg by mouth once daily.      atorvastatin (LIPITOR) 40 MG tablet Take 1 tablet (40 mg total) by mouth once daily. 90 tablet 3    finasteride (PROSCAR) 5 mg tablet Take 1 tablet (5 mg total) by mouth once daily. 90 tablet 3    fluticasone-umeclidin-vilanter (TRELEGY ELLIPTA) 100-62.5-25 mcg DsDv Inhale 1 puff into the lungs once daily. 180 each 3    glipiZIDE 5 MG TR24 Take 1 tablet (5 mg total) by mouth daily with breakfast. 90 tablet 3    hydrALAZINE (APRESOLINE) 50 MG tablet Take 1 tablet (50 mg total) by mouth 3 (three) times daily. 270 tablet 3    losartan (COZAAR) 100 MG tablet Take 1 tablet (100 mg total) by mouth once daily. 90 tablet 3    multivitamin capsule Take 1 capsule by mouth once daily.      omega-3 fatty acids/fish oil (FISH OIL-OMEGA-3 FATTY ACIDS) 300-1,000 mg capsule Take 1 capsule by mouth once daily.      tamsulosin (FLOMAX) 0.4 mg Cap Take 0.4 mg by mouth once daily.      vitamin D (VITAMIN D3) " 1000 units Tab Take 1,000 Units by mouth once daily.      blood sugar diagnostic Strp To check sugar daily, to use with insurance preferred meter 200 each 2    blood-glucose meter kit Use as instructed 1 each 0    fluticasone propionate (FLONASE) 50 mcg/actuation nasal spray 2 sprays (100 mcg total) by Each Nostril route once daily. 48 g 3    GARLIC ORAL Take 1 capsule by mouth once daily.      inhalation spacing device Use as directed for inhalation. 1 each 0    lancets Misc To check sugar daily, to use with insurance preferred meter 200 each 2       Please address this information as you see fit.  Feel free to contact us if you have any questions or require assistance.    Gladis Redd  [phone]            .

## 2024-12-23 NOTE — ED PROVIDER NOTES
Encounter Date: 12/23/2024       History     Chief Complaint   Patient presents with    Pneumonia     Patient has had a non productive cough, feels rattling in chest when breathing, went to urgent care and was told he had something in one lung. O2 saturations 89-90% on RA. Patient does have htn did not take bp meds this am     Patient is a 89-year-old male with a history of COPD who complains of a dry cough over the past few days.  He has also had shortness of breath and subjective fever.  He went to an urgent care this morning but was referred directly to the ED for concerns of pneumonia.  He presents with oxygen saturations of 89-90% on room air.  Patient is not on home oxygen.      Review of patient's allergies indicates:   Allergen Reactions    Solarcaine [benzocaine-triclosan]     Sulfa (sulfonamide antibiotics)     Sulfamethoxazole     Trimethoprim     Hytrin [terazosin] Rash    Jardiance [empagliflozin] Rash     Yeast infection    Smz-tmp ds [sulfamethoxazole-trimethoprim] Rash     Past Medical History:   Diagnosis Date    BPH (benign prostatic hyperplasia)     Diabetes mellitus (type 2)     Emphysema 03/12/2022    High cholesterol 08/26/2020    Hypertension     RBBB 11/29/2022    Small bowel obstruction 06/13/2019     Past Surgical History:   Procedure Laterality Date    ANASTOMOSIS OF ILEUM TO RECTUM  9/9/2019    Procedure: ANASTOMOSIS, COLO-RECTAL;  Surgeon: Fouzia Gardner MD;  Location: [facility] OR;  Service: General;;    CHOLECYSTECTOMY N/A 3/7/2022    Procedure: CHOLECYSTECTOMY;  Surgeon: Fouzia Gardner MD;  Location: [facility] OR;  Service: General;  Laterality: N/A;    COLOSTOMY  6/16/2019    Procedure: CREATION, COLOSTOMY;  Surgeon: Fouzia Gardner MD;  Location: [facility] OR;  Service: General;;    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    Procedure: RELEASE, DUPUYTREN CONTRACTURE;  Surgeon: Slick Baldwin Jr., MD;  Location: [facility] OR;  Service: Orthopedics;  Laterality: Left;    EYE  SURGERY      GISEAL PROCEDURE  2019    Procedure: GISELA PROCEDURE;  Surgeon: Fouzia Gardner MD;  Location: [facility] OR;  Service: General;;  Colon resection    LYSIS OF ADHESIONS N/A 3/7/2022    Procedure: LYSIS, ADHESIONS;  Surgeon: Fouzia Gardner MD;  Location: [facility] OR;  Service: General;  Laterality: N/A;    TONSILLECTOMY       Family History   Problem Relation Name Age of Onset    Heart disease Father      Hypertension Father      Cancer Mother cancer breas varian     Diabetes Paternal Aunt       Social History     Tobacco Use    Smoking status: Former     Current packs/day: 0.00     Average packs/day: 1 pack/day for 25.0 years (25.0 ttl pk-yrs)     Types: Cigarettes     Start date:      Quit date:      Years since quittin.0     Passive exposure: Past    Smokeless tobacco: Never   Substance Use Topics    Alcohol use: Not Currently     Alcohol/week: 5.0 standard drinks of alcohol     Types: 5 Cans of beer per week    Drug use: No     Review of Systems   Constitutional:         Subjective fever   Respiratory:  Positive for cough and shortness of breath.    Cardiovascular:  Negative for chest pain.   Gastrointestinal:  Negative for vomiting.   All other systems reviewed and are negative.      Physical Exam     Initial Vitals [24 0844]   BP Pulse Resp Temp SpO2   (!) 207/89 104 (!) 24 98.7 °F (37.1 °C) (!) 90 %      MAP       --         Physical Exam    Nursing note and vitals reviewed.  Constitutional: No distress.   Neck: Neck supple.   Cardiovascular:            Tachycardic.   Pulmonary/Chest:   Coarse breath sounds bilaterally with faint expiratory wheezing in all lung fields.   Abdominal: Abdomen is soft.   Musculoskeletal:         General: No edema.      Cervical back: Neck supple.     Neurological: He is alert and oriented to person, place, and time.   Skin: Skin is warm and dry.   Psychiatric: Thought content normal.         ED Course   Critical Care    Date/Time:  12/23/2024 12:36 PM    Performed by: Cedric Reyes MD  Authorized by: Cedric Reyes MD  Direct patient critical care time: 60 minutes  Additional history critical care time: 5 minutes  Ordering / reviewing critical care time: 15 minutes  Documentation critical care time: 15 minutes  Consulting other physicians critical care time: 5 minutes  Total critical care time (exclusive of procedural time) : 100 minutes  Critical care was time spent personally by me on the following activities: examination of patient, obtaining history from patient or surrogate, ordering and performing treatments and interventions, discussions with primary provider, ordering and review of laboratory studies, ordering and review of radiographic studies, pulse oximetry, re-evaluation of patient's condition and review of old charts.        Labs Reviewed   CBC W/ AUTO DIFFERENTIAL - Abnormal       Result Value    WBC 13.95 (*)     RBC 5.01      Hemoglobin 15.3      Hematocrit 45.9      MCV 92      MCH 30.5      MCHC 33.3      RDW 13.3      Platelets 180      MPV 11.0      Immature Granulocytes 0.3      Gran # (ANC) 11.8 (*)     Immature Grans (Abs) 0.04      Lymph # 0.9 (*)     Mono # 1.2 (*)     Eos # 0.0      Baso # 0.02      nRBC 0      Gran % 84.6 (*)     Lymph % 6.2 (*)     Mono % 8.7      Eosinophil % 0.1      Basophil % 0.1      Differential Method Automated     COMPREHENSIVE METABOLIC PANEL - Abnormal    Sodium 142      Potassium 4.4      Chloride 106      CO2 23      Glucose 144 (*)     BUN 26      Creatinine 1.6 (*)     Calcium 9.5      Total Protein 7.0      Albumin 3.6      Total Bilirubin 1.1 (*)     Alkaline Phosphatase 80      AST 21      ALT 14      eGFR 40 (*)     Anion Gap 13     URINALYSIS, REFLEX TO URINE CULTURE - Abnormal    Specimen UA Urine, Clean Catch      Color, UA Yellow      Appearance, UA Clear      pH, UA 5.0      Specific Gravity, UA 1.020      Protein, UA Trace (*)     Glucose, UA Negative       Ketones, UA 1+ (*)     Bilirubin (UA) Negative      Occult Blood UA Negative      Nitrite, UA Negative      Urobilinogen, UA Negative      Leukocytes, UA Negative      Narrative:     Specimen Source->Urine   TROPONIN I - Abnormal    Troponin I 0.028 (*)    CULTURE, BLOOD   CULTURE, BLOOD   MAGNESIUM    Magnesium 2.0     B-TYPE NATRIURETIC PEPTIDE    BNP 90     PROCALCITONIN    Procalcitonin 0.06     LACTIC ACID, PLASMA   SARS-COV-2 RDRP GENE    POC Rapid COVID Negative       Acceptable Yes     POCT INFLUENZA A/B MOLECULAR    POC Molecular Influenza A Ag Negative      POC Molecular Influenza B Ag Negative       Acceptable Yes       EKG Readings: (Independently Interpreted)   Initial Reading: No STEMI. Rhythm: Normal Sinus Rhythm. Heart Rate: 100. Ectopy: PVCs. ST Segments: Normal ST Segments.     ECG Results              EKG 12-lead (In process)        Collection Time Result Time QRS Duration OHS QTC Calculation    12/23/24 09:00:20 12/23/24 11:16:04 106 441                     In process by Interface, Lab In [facility] (12/23/24 11:16:12)                   Narrative:    Test Reason : Z13.6,    Vent. Rate : 100 BPM     Atrial Rate : 100 BPM     P-R Int : 160 ms          QRS Dur : 106 ms      QT Int : 342 ms       P-R-T Axes :  70 -20  80 degrees    QTcB Int : 441 ms    Sinus rhythm with frequent and consecutive Premature ventricular complexes  Abnormal ECG  When compared with ECG of 07-Jun-2024 13:20,  Premature ventricular complexes are now Present    Referred By: AAAREFERRAL SELF           Confirmed By:                                   Imaging Results              X-Ray Chest AP Portable (Final result)  Result time 12/23/24 11:31:22      Final result by Ty Armstrong MD (12/23/24 11:31:22)                   Impression:      No focal consolidation or detrimental change when compared with 06/13/2023.    Pulmonary emphysema.      Electronically signed by: Ty Armstrong  "MD  Date:    12/23/2024  Time:    11:31               Narrative:    EXAMINATION:  XR CHEST AP PORTABLE    CLINICAL HISTORY:  Provided history is "Sepsis;  ".    TECHNIQUE:  One view of the chest.    COMPARISON:  06/13/2023.    FINDINGS:  Cardiac wires overlie the chest.  Cardiomediastinal silhouette is stable.  Atherosclerotic calcifications overlie the aortic arch.  Coarse interstitial lung markings but no large focal consolidation.  Pulmonary emphysema.  No detrimental change in lung aeration.                                  This patient does have evidence of infective focus  My overall impression is sepsis.  Source: Respiratory  Antibiotics given-   Antibiotics (72h ago, onward)      Start     Stop Route Frequency Ordered    12/23/24 1830  vancomycin 1,250 mg in 0.9% NaCl 250 mL IVPB (admixture device)         -- IV Once 12/23/24 1428    12/23/24 1445  piperacillin-tazobactam (ZOSYN) 4.5 g in D5W 100 mL IVPB (MB+)  ( Pneumonia - Moderate-High MDR )         12/28/24 1444 IV Every 8 hours (non-standard times) 12/23/24 1341    12/23/24 1438  vancomycin - pharmacy to dose  ( Pneumonia - Moderate-High MDR )        Placed in "And" Linked Group    -- IV pharmacy to manage frequency 12/23/24 1341          Latest lactate reviewed-  Recent Labs   Lab 12/23/24  1123   POCLAC 1.2     Organ dysfunction indicated by  none    Fluid challenge Actual Body weight- Patient will receive 30ml/kg actual body weight to calculate fluid bolus for treatment of septic shock.     Post- resuscitation assessment Yes Perfusion exam was performed within 6 hours of septic shock presentation after bolus shows Adequate tissue perfusion assessed by non-invasive monitoring            Medications   losartan tablet 100 mg (100 mg Oral Given 12/23/24 1045)   sodium chloride 0.9% bolus 2,109 mL 2,109 mL (2,109 mLs Intravenous New Bag 12/23/24 1115)   cefTRIAXone injection 2 g (2 g Intravenous Given 12/23/24 1046)   azithromycin (ZITHROMAX) 500 mg in " 0.9% NaCl 250 mL IVPB (admixture device) (0 mg Intravenous Stopped 12/23/24 1208)   albuterol-ipratropium 2.5 mg-0.5 mg/3 mL nebulizer solution 9 mL (9 mLs Nebulization Given 12/23/24 1110)     Medical Decision Making  Emergent evaluation of a 89-year-old male with subjective fever and a congested cough.  Patient had low oxygen saturations at urgent care and was sent directly to the ED. White blood cell count is 13.95.  Questionable infiltrate in the right lower lobe on chest x-ray.  Patient was given IV fluids and antibiotics here in the emergency department.  I feel he will require admission and I have discussed this with the [facility] internal medicine resident.    Amount and/or Complexity of Data Reviewed  Labs: ordered.     Details: COVID and influenza swabs are negative.  CBC with a white blood cell count of 13.9.  COVID-19 and influenza swabs are negative.  BNP is 90.  Troponin is 0.028.  Radiology: ordered.     Details: Chest x-ray with questionable right lower lobe infiltrate.  Discussion of management or test interpretation with external provider(s): I have discussed the patient's presentation as well as vital signs, pertinent lab values and x-ray findings with the [facility] internal medicine resident.    Risk  Prescription drug management.  Decision regarding hospitalization.                                      Clinical Impression:  Final diagnoses:  [R05.9] Cough, unspecified type (Primary)  [J18.9] Pneumonia of right lower lobe due to infectious organism  [R09.02] Hypoxia                 Cedric Reyes MD  12/23/24 1236       Cedric Reyes MD  12/23/24 4385

## 2024-12-23 NOTE — PROGRESS NOTES
"Subjective:      Patient ID: Husam Connolly is a 89 y.o. male.    Vitals:  height is 5' 9.02" (1.753 m) and weight is 73 kg (161 lb). His oral temperature is 98.6 °F (37 °C). His blood pressure is 170/75 (abnormal) and his pulse is 111 (abnormal). His respiration is 22 (abnormal) and oxygen saturation is 87% (abnormal).     Chief Complaint: Cough (Shortness of breath. - Entered by patient)    This is a 89 y.o male with history fo COPD and emphysema who presents today with chief complaint of cough x1 week, sweats last night, reports SOB that started today. He does not have home oxygen and does not wear O2 normally at home, states he normal O2 usually runs around 93%.  His currently O2 is 88% of RA in triage.   He denies any measured fever, GI symptoms, or sore throat.  He states he feels a rattle in his chest.      Cough  This is a new problem. The current episode started in the past 7 days. The cough is Non-productive. Associated symptoms include chills and shortness of breath. Pertinent negatives include no chest pain, ear congestion, ear pain, fever, headaches, heartburn, hemoptysis, myalgias, nasal congestion, postnasal drip, rash, rhinorrhea, sore throat, sweats, weight loss or wheezing. He has tried OTC cough suppressant for the symptoms.       Constitution: Positive for chills and sweating. Negative for fever and generalized weakness.   HENT:  Negative for ear pain, postnasal drip, sinus pain and sore throat.    Neck: Negative for neck pain.   Cardiovascular:  Negative for chest pain.   Respiratory:  Positive for cough, COPD and shortness of breath. Negative for bloody sputum and wheezing.    Gastrointestinal:  Negative for abdominal pain and heartburn.   Musculoskeletal:  Negative for muscle ache.   Skin:  Negative for rash.   Neurological:  Negative for headaches.      Objective:     Physical Exam   Constitutional: He is oriented to person, place, and time. He appears well-developed. He is cooperative.  " Non-toxic appearance. He does not appear ill. No distress.      Comments:Alert and awake     HENT:   Head: Normocephalic and atraumatic.   Ears:   Right Ear: Hearing, tympanic membrane, external ear and ear canal normal.   Left Ear: Hearing, tympanic membrane, external ear and ear canal normal.   Nose: Nose normal. No mucosal edema, rhinorrhea or nasal deformity. No epistaxis. Right sinus exhibits no maxillary sinus tenderness and no frontal sinus tenderness. Left sinus exhibits no maxillary sinus tenderness and no frontal sinus tenderness.   Mouth/Throat: Uvula is midline, oropharynx is clear and moist and mucous membranes are normal. No trismus in the jaw. Normal dentition. No uvula swelling. No oropharyngeal exudate, posterior oropharyngeal edema or posterior oropharyngeal erythema.   Eyes: Conjunctivae and lids are normal. No scleral icterus.   Neck: Trachea normal and phonation normal. Neck supple. No edema present. No erythema present. No neck rigidity present.   Cardiovascular: Regular rhythm, normal heart sounds and normal pulses. Tachycardia present.   Pulmonary/Chest: Effort normal. Tachypnea noted. No respiratory distress. He has no decreased breath sounds. He has no wheezes. He has rhonchi (course breath sounds bilaterally, worse on the right side).   O2 sat drop to 86% with speaking         Comments: O2 sat drop to 86% with speaking    Abdominal: Normal appearance.   Musculoskeletal: Normal range of motion.         General: No deformity. Normal range of motion.   Neurological: He is alert and oriented to person, place, and time. He exhibits normal muscle tone. Coordination normal.   Skin: Skin is warm, dry, intact, not diaphoretic and not pale.   Psychiatric: His speech is normal and behavior is normal. Judgment and thought content normal.   Nursing note and vitals reviewed.      Assessment:     1. Hypoxia    2. Chronic obstructive pulmonary disease, unspecified COPD type    3. Shortness of breath         Plan:   On physical exam, patient is tachycardic and hypoxic.  Hypoxia worsens with speaking or movement.  He he has coarse breath sounds bilaterally, significantly worse on the right side.  Suspect he has pneumonia, discussed with [facility] physician and he agrees that an emergency room referral is appropriate.  Discussed with patient and daughter, they declined ambulance transportation, we will report to [facility] emergency department for further evaluation.    Hypoxia  -     Refer to Emergency Dept.    Chronic obstructive pulmonary disease, unspecified COPD type  -     Refer to Emergency Dept.    Shortness of breath  -     Refer to Emergency Dept.             Additional MDM:     Heart Failure Score:   COPD = Yes           none

## 2024-12-24 PROBLEM — J44.1 COPD EXACERBATION: Status: ACTIVE | Noted: 2024-12-24

## 2024-12-24 PROBLEM — J18.9 COMMUNITY ACQUIRED PNEUMONIA: Status: RESOLVED | Noted: 2024-12-24 | Resolved: 2024-12-24

## 2024-12-24 PROBLEM — R09.02 HYPOXIA: Status: ACTIVE | Noted: 2024-12-24

## 2024-12-24 PROBLEM — A41.9 SEPSIS: Status: ACTIVE | Noted: 2024-12-24

## 2024-12-24 PROBLEM — R05.9 COUGH: Status: RESOLVED | Noted: 2024-12-24 | Resolved: 2024-12-24

## 2024-12-24 PROBLEM — R79.89 ELEVATED TROPONIN: Status: ACTIVE | Noted: 2024-12-24

## 2024-12-24 PROBLEM — J96.01 ACUTE RESPIRATORY FAILURE WITH HYPOXIA: Status: ACTIVE | Noted: 2024-12-24

## 2024-12-24 PROBLEM — J18.9 PNEUMONIA OF RIGHT LOWER LOBE DUE TO INFECTIOUS ORGANISM: Status: ACTIVE | Noted: 2024-12-24

## 2024-12-24 PROBLEM — R05.9 COUGH: Status: ACTIVE | Noted: 2024-12-24

## 2024-12-24 LAB
ANION GAP SERPL CALC-SCNC: 12 MMOL/L (ref 8–16)
BASOPHILS # BLD AUTO: 0.01 K/UL (ref 0–0.2)
BASOPHILS NFR BLD: 0.1 % (ref 0–1.9)
BUN SERPL-MCNC: 29 MG/DL (ref 10–30)
CALCIUM SERPL-MCNC: 8.5 MG/DL (ref 8.7–10.5)
CHLORIDE SERPL-SCNC: 109 MMOL/L (ref 95–110)
CO2 SERPL-SCNC: 18 MMOL/L (ref 23–29)
CREAT SERPL-MCNC: 1.8 MG/DL (ref 0.5–1.4)
DIFFERENTIAL METHOD BLD: ABNORMAL
EOSINOPHIL # BLD AUTO: 0 K/UL (ref 0–0.5)
EOSINOPHIL NFR BLD: 0 % (ref 0–8)
ERYTHROCYTE [DISTWIDTH] IN BLOOD BY AUTOMATED COUNT: 13.3 % (ref 11.5–14.5)
EST. GFR  (NO RACE VARIABLE): 35 ML/MIN/1.73 M^2
GLUCOSE SERPL-MCNC: 270 MG/DL (ref 70–110)
HCT VFR BLD AUTO: 37.8 % (ref 40–54)
HGB BLD-MCNC: 12.6 G/DL (ref 14–18)
IMM GRANULOCYTES # BLD AUTO: 0.06 K/UL (ref 0–0.04)
IMM GRANULOCYTES NFR BLD AUTO: 0.4 % (ref 0–0.5)
LACTATE SERPL-SCNC: 2.1 MMOL/L (ref 0.5–2.2)
LYMPHOCYTES # BLD AUTO: 0.7 K/UL (ref 1–4.8)
LYMPHOCYTES NFR BLD: 5 % (ref 18–48)
MAGNESIUM SERPL-MCNC: 1.9 MG/DL (ref 1.6–2.6)
MCH RBC QN AUTO: 30.5 PG (ref 27–31)
MCHC RBC AUTO-ENTMCNC: 33.3 G/DL (ref 32–36)
MCV RBC AUTO: 92 FL (ref 82–98)
MONOCYTES # BLD AUTO: 1 K/UL (ref 0.3–1)
MONOCYTES NFR BLD: 6.9 % (ref 4–15)
NEUTROPHILS # BLD AUTO: 12.7 K/UL (ref 1.8–7.7)
NEUTROPHILS NFR BLD: 87.6 % (ref 38–73)
NRBC BLD-RTO: 0 /100 WBC
PHOSPHATE SERPL-MCNC: 3.1 MG/DL (ref 2.7–4.5)
PLATELET # BLD AUTO: 152 K/UL (ref 150–450)
PMV BLD AUTO: 11.5 FL (ref 9.2–12.9)
POCT GLUCOSE: 171 MG/DL (ref 70–110)
POCT GLUCOSE: 232 MG/DL (ref 70–110)
POCT GLUCOSE: 258 MG/DL (ref 70–110)
POCT GLUCOSE: 263 MG/DL (ref 70–110)
POTASSIUM SERPL-SCNC: 3.8 MMOL/L (ref 3.5–5.1)
RBC # BLD AUTO: 4.13 M/UL (ref 4.6–6.2)
SODIUM SERPL-SCNC: 139 MMOL/L (ref 136–145)
TROPONIN I SERPL DL<=0.01 NG/ML-MCNC: 0.03 NG/ML (ref 0–0.03)
WBC # BLD AUTO: 14.45 K/UL (ref 3.9–12.7)

## 2024-12-24 PROCEDURE — 94761 N-INVAS EAR/PLS OXIMETRY MLT: CPT

## 2024-12-24 PROCEDURE — 83605 ASSAY OF LACTIC ACID: CPT

## 2024-12-24 PROCEDURE — 97161 PT EVAL LOW COMPLEX 20 MIN: CPT

## 2024-12-24 PROCEDURE — 97165 OT EVAL LOW COMPLEX 30 MIN: CPT

## 2024-12-24 PROCEDURE — 36415 COLL VENOUS BLD VENIPUNCTURE: CPT

## 2024-12-24 PROCEDURE — 97530 THERAPEUTIC ACTIVITIES: CPT

## 2024-12-24 PROCEDURE — 27000221 HC OXYGEN, UP TO 24 HOURS

## 2024-12-24 PROCEDURE — 80048 BASIC METABOLIC PNL TOTAL CA: CPT

## 2024-12-24 PROCEDURE — 63600175 PHARM REV CODE 636 W HCPCS

## 2024-12-24 PROCEDURE — 25000242 PHARM REV CODE 250 ALT 637 W/ HCPCS

## 2024-12-24 PROCEDURE — 85025 COMPLETE CBC W/AUTO DIFF WBC: CPT

## 2024-12-24 PROCEDURE — 25000003 PHARM REV CODE 250

## 2024-12-24 PROCEDURE — 94640 AIRWAY INHALATION TREATMENT: CPT

## 2024-12-24 PROCEDURE — 21400001 HC TELEMETRY ROOM

## 2024-12-24 PROCEDURE — 84100 ASSAY OF PHOSPHORUS: CPT

## 2024-12-24 PROCEDURE — 84484 ASSAY OF TROPONIN QUANT: CPT

## 2024-12-24 PROCEDURE — 94668 MNPJ CHEST WALL SBSQ: CPT

## 2024-12-24 PROCEDURE — 83735 ASSAY OF MAGNESIUM: CPT

## 2024-12-24 PROCEDURE — 97116 GAIT TRAINING THERAPY: CPT

## 2024-12-24 PROCEDURE — 99900035 HC TECH TIME PER 15 MIN (STAT)

## 2024-12-24 PROCEDURE — 94667 MNPJ CHEST WALL 1ST: CPT

## 2024-12-24 RX ORDER — CEFTRIAXONE 1 G/1
1 INJECTION, POWDER, FOR SOLUTION INTRAMUSCULAR; INTRAVENOUS
Status: DISCONTINUED | OUTPATIENT
Start: 2024-12-24 | End: 2024-12-26 | Stop reason: HOSPADM

## 2024-12-24 RX ADMIN — GUAIFENESIN 600 MG: 600 TABLET, EXTENDED RELEASE ORAL at 09:12

## 2024-12-24 RX ADMIN — IPRATROPIUM BROMIDE AND ALBUTEROL SULFATE 3 ML: .5; 3 SOLUTION RESPIRATORY (INHALATION) at 11:12

## 2024-12-24 RX ADMIN — INSULIN ASPART 2 UNITS: 100 INJECTION, SOLUTION INTRAVENOUS; SUBCUTANEOUS at 03:12

## 2024-12-24 RX ADMIN — PIPERACILLIN AND TAZOBACTAM 4.5 G: 4; .5 INJECTION, POWDER, LYOPHILIZED, FOR SOLUTION INTRAVENOUS; PARENTERAL at 06:12

## 2024-12-24 RX ADMIN — CEFTRIAXONE SODIUM 1 G: 1 INJECTION, POWDER, FOR SOLUTION INTRAMUSCULAR; INTRAVENOUS at 10:12

## 2024-12-24 RX ADMIN — IPRATROPIUM BROMIDE AND ALBUTEROL SULFATE 3 ML: .5; 3 SOLUTION RESPIRATORY (INHALATION) at 12:12

## 2024-12-24 RX ADMIN — INSULIN ASPART 1 UNITS: 100 INJECTION, SOLUTION INTRAVENOUS; SUBCUTANEOUS at 08:12

## 2024-12-24 RX ADMIN — FINASTERIDE 5 MG: 5 TABLET, FILM COATED ORAL at 09:12

## 2024-12-24 RX ADMIN — ASPIRIN 81 MG: 81 TABLET, COATED ORAL at 09:12

## 2024-12-24 RX ADMIN — ENOXAPARIN SODIUM 30 MG: 30 INJECTION SUBCUTANEOUS at 03:12

## 2024-12-24 RX ADMIN — IPRATROPIUM BROMIDE AND ALBUTEROL SULFATE 3 ML: .5; 3 SOLUTION RESPIRATORY (INHALATION) at 03:12

## 2024-12-24 RX ADMIN — INSULIN ASPART 3 UNITS: 100 INJECTION, SOLUTION INTRAVENOUS; SUBCUTANEOUS at 06:12

## 2024-12-24 RX ADMIN — AZITHROMYCIN MONOHYDRATE 500 MG: 500 INJECTION, POWDER, LYOPHILIZED, FOR SOLUTION INTRAVENOUS at 10:12

## 2024-12-24 RX ADMIN — LOSARTAN POTASSIUM 100 MG: 50 TABLET, FILM COATED ORAL at 09:12

## 2024-12-24 RX ADMIN — GUAIFENESIN 600 MG: 600 TABLET, EXTENDED RELEASE ORAL at 08:12

## 2024-12-24 RX ADMIN — TAMSULOSIN HYDROCHLORIDE 0.4 MG: 0.4 CAPSULE ORAL at 09:12

## 2024-12-24 RX ADMIN — ATORVASTATIN CALCIUM 40 MG: 40 TABLET, FILM COATED ORAL at 09:12

## 2024-12-24 RX ADMIN — IPRATROPIUM BROMIDE AND ALBUTEROL SULFATE 3 ML: .5; 3 SOLUTION RESPIRATORY (INHALATION) at 07:12

## 2024-12-24 RX ADMIN — PREDNISONE 40 MG: 20 TABLET ORAL at 09:12

## 2024-12-24 NOTE — NURSING
Pt tranferred from ED. VSS. Ox4. O2 therapy continued. Pt SOB with exertion. Daughters at the bedside. Waffle overlay placed on mattress. Blanchable redness noted to buttocks, lenny heels and upper back. Heels elevated. Safety m/t.

## 2024-12-24 NOTE — NURSING
Patient is AAOX4, able to make all needs known. 20 G IV intact to right forearm, patent and flushes without difficulty. He tolerated all scheduled nit medications with no problems. Patient requires assistance x1. No c/o pain at present time. Skin intact, redness noted to buttocks, back and bilateral heels. Staff educated patient on all medications received, safety maintained, will continue with plan of care.

## 2024-12-24 NOTE — PLAN OF CARE
Problem: Adult Inpatient Plan of Care  Goal: Patient-Specific Goal (Individualized)  Outcome: Progressing     Problem: Adult Inpatient Plan of Care  Goal: Plan of Care Review  Outcome: Progressing  Goal: Patient-Specific Goal (Individualized)  Outcome: Progressing  Goal: Absence of Hospital-Acquired Illness or Injury  Outcome: Progressing  Goal: Optimal Comfort and Wellbeing  Outcome: Progressing  Goal: Readiness for Transition of Care  Outcome: Progressing     Problem: Diabetes Comorbidity  Goal: Blood Glucose Level Within Targeted Range  Outcome: Progressing     Problem: COPD (Chronic Obstructive Pulmonary Disease)  Goal: Optimal Chronic Illness Coping  Outcome: Progressing  Goal: Optimal Level of Functional [place]  Outcome: Progressing  Goal: Absence of Infection Signs and Symptoms  Outcome: Progressing  Goal: Improved Oral Intake  Outcome: Progressing  Goal: Effective Oxygenation and Ventilation  Outcome: Progressing     Problem: Pneumonia  Goal: Fluid Balance  Outcome: Progressing  Goal: Resolution of Infection Signs and Symptoms  Outcome: Progressing  Goal: Effective Oxygenation and Ventilation  Outcome: Progressing     Problem: Fall Injury Risk  Goal: Absence of Fall and Fall-Related Injury  Outcome: Progressing     Problem: Comorbidity Management  Goal: Maintenance of COPD Symptom Control  Outcome: Progressing  Goal: Blood Pressure in Desired Range  Outcome: Progressing     Problem: Skin Injury Risk Increased  Goal: Skin Health and Integrity  Outcome: Progressing     Problem: Sepsis/Septic Shock  Goal: Optimal Coping  Outcome: Progressing  Goal: Absence of Bleeding  Outcome: Progressing  Goal: Blood Glucose Level Within Targeted Range  Outcome: Progressing  Goal: Absence of Infection Signs and Symptoms  Outcome: Progressing  Goal: Optimal Nutrition Intake  Outcome: Progressing

## 2024-12-24 NOTE — PLAN OF CARE
Problem: Physical Therapy  Goal: Physical Therapy Goal  Description: Goals to be met by: 25     Patient will increase functional independence with mobility by performin. Bed to chair transfer with [place] using No Assistive Device  2. Gait  x 150 feet with [place] using No Assistive Device.     Outcome: Progressing     PT/OT co-evaluation completed. Pt's PLOF: Independent with no AD. Pt at this requires CGA/SBA with mobility with no AD; SpO2 monitored with mobility will pt on 2L O2. PT recommending low intensity therapy-  PT. Therapy will continue to progress pt as able.

## 2024-12-24 NOTE — PLAN OF CARE
Pt would benefit from [facility] OT services in order to maximize functional independence. Recommending low intensity therapy upon d/c ( OT/PT). Pt on 2L NC throughout session, spO2 dropping to 85-86% after functional mobility, increases to 90-93% at rest with PLB. Will progress as able.     Problem: Occupational Therapy  Goal: Occupational Therapy Goal  Description: Goals to be met by: 01/24/2025     Patient will increase functional independence with ADLs by performing:    Grooming while standing with [place].  Toileting from toilet with [place] for hygiene and clothing management.   Step transfer with [place]  Toilet transfer to toilet with [place].    Outcome: Progressing

## 2024-12-24 NOTE — PT/OT/SLP EVAL
Physical Therapy Evaluation and Treatment    Patient Name:  Husam Connolly   MRN:  [id number]    Recommendations:     Discharge Recommendations: Low Intensity Therapy (HH PT)   Discharge Equipment Recommendations: none   Barriers to discharge: None    Assessment:     Husam Connolly is a 89 y.o. male admitted with a medical diagnosis of The primary encounter diagnosis was Pneumonia of right lower lobe due to infectious organism. Diagnoses of Cough, unspecified type, Hypoxia, Community acquired pneumonia, Sepsis, due to unspecified organism, unspecified whether acute organ dysfunction present, Acute respiratory failure with hypoxia, COPD exacerbation, Elevated troponin, Stage 3b chronic kidney disease, and Benign prostatic hyperplasia with lower urinary tract symptoms, symptom details unspecified were also pertinent to this visit. He presents with the following impairments/functional limitations: impaired endurance, impaired balance.    PT/OT co-evaluation completed. Pt's PLOF: Independent with no AD. Pt at this requires CGA/SBA with mobility with no AD; SpO2 monitored with mobility while pt on 2L O2. PT recommending low intensity therapy- HH PT. Therapy will continue to progress pt as able.     Rehab Prognosis: Good; patient would benefit from acute skilled PT services to address these deficits and reach maximum level of function.    Recent Surgery: * No surgery found *      Plan:     During this hospitalization, patient to be seen 2 x/week to address the identified rehab impairments via gait training, therapeutic activities, therapeutic exercises, neuromuscular re-education and progress toward the following goals:    Plan of Care Expires:  01/24/25    Subjective     Chief Complaint: coughing   Patient/Family Comments/goals: to return home to OF  Pain/Comfort:  Pain Rating 1: 0/10  Pain Rating Post-Intervention 1: 0/10    Patients cultural, spiritual, Orthodoxy conflicts given the current situation: no    Living  Environment:  Lives alone in 2 story home with small step to enter. (Does not go upstairs often- per daughter they will be moving computer down to first floor). WIS with built in seat.   Prior to admission, patients level of function was Independent with no AD.  Equipment used at home:  (access to RW, SPC and w/c).  DME owned (not currently used): none.  Upon discharge, patient will have assistance from family.    Objective:     Communicated with Nurse prior to session.  Patient found HOB elevated with bed alarm, oxygen, peripheral IV  upon PT entry to room.    General Precautions: Standard, fall  Orthopedic Precautions:N/A   Braces: N/A  Respiratory Status: Nasal cannula, flow 2 L/min; 96% initially; declines to 86% post ambulation with recovery to 93% in ~1 minute with verbal cues to improve breathing pattern    Exams:  Cognitive Exam:  Patient is oriented to Person, Place, Time, and Situation  Sensation:    -       Intact  light/touch to BLE  RLE ROM: WFL  RLE Strength: WFL  LLE ROM: WFL  LLE Strength: WFL    Functional Mobility:  Bed Mobility:     Supine to Sit: stand by assistance  Sit to Supine: stand by assistance  Transfers:     Sit to Stand:  contact guard assistance with no AD  Gait: ~100ft with no AD; CGA; SpO2 monitored with ambulation.       AM-PAC 6 CLICK MOBILITY  Total Score:20       Treatment & Education:  Pt educated on role of PT.   Pt's SpO2 initially on 2L O2; 96% initially; declines to 86% post ambulation with recovery to 93% in ~1 minute with verbal cues to improve breathing pattern.   Pt educated on use of call button for mobility in room; pt understanding.     Patient left HOB elevated with all lines intact, call button in reach, bed alarm on, Nurse notified, and daughter present.    GOALS:   Multidisciplinary Problems       Physical Therapy Goals          Problem: Physical Therapy    Goal Priority Disciplines Outcome Interventions   Physical Therapy Goal     PT, PT/OT Progressing     Description: Goals to be met by: 25     Patient will increase functional independence with mobility by performin. Bed to chair transfer with [place] using No Assistive Device  2. Gait  x 150 feet with [place] using No Assistive Device.                          History:     Past Medical History:   Diagnosis Date    BPH (benign prostatic hyperplasia)     Diabetes mellitus (type 2)     Emphysema 2022    High cholesterol 2020    Hypertension     RBBB 2022    Small bowel obstruction 2019       Past Surgical History:   Procedure Laterality Date    ANASTOMOSIS OF ILEUM TO RECTUM  2019    Procedure: ANASTOMOSIS, COLO-RECTAL;  Surgeon: Fouzia Gardner MD;  Location: [facility];  Service: General;;    CHOLECYSTECTOMY N/A 3/7/2022    Procedure: CHOLECYSTECTOMY;  Surgeon: Fouzia Gardner MD;  Location: [facility];  Service: General;  Laterality: N/A;    COLOSTOMY  2019    Procedure: CREATION, COLOSTOMY;  Surgeon: Fouzia Gardner MD;  Location: [facility];  Service: General;;    DUPUYTREN CONTRACTURE RELEASE Left 2022    Procedure: RELEASE, DUPUYTREN CONTRACTURE;  Surgeon: Slick Baldwin Jr., MD;  Location: [facility];  Service: Orthopedics;  Laterality: Left;    EYE SURGERY      GISELA PROCEDURE  2019    Procedure: GISELA PROCEDURE;  Surgeon: Fouzia Gardner MD;  Location: [facility];  Service: General;;  Colon resection    LYSIS OF ADHESIONS N/A 3/7/2022    Procedure: LYSIS, ADHESIONS;  Surgeon: Fouzia Gardner MD;  Location: [facility];  Service: General;  Laterality: N/A;    TONSILLECTOMY         Time Tracking:     PT Received On: 24  PT Start Time: 1126     PT Stop Time: 1147  PT Total Time (min): 21 min With OT    Billable Minutes: Evaluation 10 and Gait Training 10      2024

## 2024-12-24 NOTE — PROGRESS NOTES
"[facility] Medicine Progress Note    Primary Team: [facility] Hospitalist Team A  Attending Physician: Yasmin  Resident: Dalila  Intern: Bruce    Subjective:      No acute events overnight. Patient resting comfortably this morning eating breakfast. He says his SOB is improved and cough is less bothersome than yesterday.     Objective:     Last 24 Hour Vital Signs:  BP  Min: 118/66  Max: 207/89  Temp  Av.1 °F (36.7 °C)  Min: 97.5 °F (36.4 °C)  Max: 99.1 °F (37.3 °C)  Pulse  Av.8  Min: 60  Max: 122  Resp  Av.8  Min: 16  Max: 37  SpO2  Av %  Min: 90 %  Max: 99 %  Height  Av' 9" (175.3 cm)  Min: 5' 9" (175.3 cm)  Max: 5' 9" (175.3 cm)  Weight  Av.3 kg (154 lb 15.9 oz)  Min: 70.3 kg (154 lb 15.7 oz)  Max: 70.3 kg (155 lb)  I/O last 3 completed shifts:  In: 2350.3 [IV Piggyback:2350.3]  Out: 250 [Urine:250]    Physical Examination:  Physical Exam  Vitals reviewed.   Constitutional:       General: He is not in acute distress.     Appearance: He is well-developed.   HENT:      Head: Normocephalic and atraumatic.      Mouth/Throat:      Mouth: Mucous membranes are moist.   Eyes:      Extraocular Movements: Extraocular movements intact.      Pupils: Pupils are equal, round, and reactive to light.      Comments: Arcus senilis present   Cardiovascular:      Rate and Rhythm: Normal rate and regular rhythm.      Heart sounds: No murmur heard.     No friction rub. No gallop.   Pulmonary:      Effort: Pulmonary effort is normal. No respiratory distress.      Comments: Diffuse expiratory wheezing noted, inspiratory crackles present in RLL  Abdominal:      General: Abdomen is flat. There is no distension.      Tenderness: There is no abdominal tenderness. There is no guarding.   Musculoskeletal:      Cervical back: Normal range of motion.   Skin:     General: Skin is warm and dry.   Neurological:      Mental Status: He is alert.          Laboratory:  Laboratory Data Reviewed: yes  Microbiology Data " Reviewed: yes  Pertinent Findings:  None    Other Results:  EKG (my interpretation): sinus tachycardia.    Radiology Data Reviewed: yes  Pertinent Findings:  CXR without acute cardiopulmonary process but appears there may be a developing RLL process    Current Medications:     Infusions:       Scheduled:   albuterol-ipratropium  3 mL Nebulization Q4H    aspirin  81 mg Oral Daily    atorvastatin  40 mg Oral Daily    azithromycin  500 mg Intravenous Q24H    enoxparin  30 mg Subcutaneous Daily    finasteride  5 mg Oral Daily    guaiFENesin  600 mg Oral BID    losartan  100 mg Oral Daily    piperacillin-tazobactam (Zosyn) IV (PEDS and ADULTS) (extended infusion is not appropriate)  4.5 g Intravenous Q8H    predniSONE  40 mg Oral Daily    tamsulosin  0.4 mg Oral Daily        PRN:    Current Facility-Administered Medications:     dextrose 50%, 12.5 g, Intravenous, PRN    dextrose 50%, 25 g, Intravenous, PRN    glucagon (human recombinant), 1 mg, Intramuscular, PRN    glucose, 16 g, Oral, PRN    glucose, 24 g, Oral, PRN    insulin aspart U-100, 0-5 Units, Subcutaneous, QID (AC + HS) PRN    Pharmacy to dose Vancomycin consult, , , Once **AND** vancomycin - pharmacy to dose, , Intravenous, pharmacy to manage frequency    Antibiotics and Day Number of Therapy:  Ceftriaxone 12/23 dc'd  Azithromycin 12/23-  Zosyn 12/23-  Vanc 12/23- dc'd    Lines and Day Number of Therapy:  PIV    Assessment:     Husam Connolly is a 89 y.o.male with  Patient Active Problem List    Diagnosis Date Noted    Community acquired pneumonia 12/24/2024    Cough 12/24/2024    Hypoxia 12/24/2024    Sepsis 12/24/2024    Chronic left shoulder pain 07/10/2024    Central sleep apnea 06/10/2024    LC (obstructive sleep apnea) 06/06/2024    Senile purpura 06/05/2024    Muscle weakness 03/11/2024    Shoulder stiffness, left 03/11/2024    Pain in joint of left shoulder 03/11/2024    COPD, group B, by GOLD 2017 classification 08/23/2023    Pulmonary  hypertension 07/24/2023    Shoulder arthritis 01/05/2023    RBBB 11/29/2022    Pulmonary nodules     Dupuytren's contracture of left hand 03/21/2022    Type 2 diabetes mellitus with stage 3b chronic kidney disease, without long-term current use of insulin 09/22/2021    Aortic atherosclerosis 05/11/2021    Chronic rhinitis 03/01/2021    S/P colostomy 03/01/2021    Right foot drop 08/26/2020    High cholesterol 08/26/2020    Ventral hernia without obstruction or gangrene 02/27/2020    S/P cholecystectomy 07/01/2019    Stage 3b chronic kidney disease 12/13/2018    Resistant hypertension     BPH (benign prostatic hyperplasia)         Plan:     Acute Hypoxic Respiratory Failure  CAP  COPD Exacerbation  Leukocytosis  Patient presents with 7-10 day history of dry cough, SOB, chills, night sweats, and decreased appetite. Used his home COPD inhalers without relief. Denies known sick contacts, pleuritic pain, fever, N/V/D. On presentation, satting 81% on RA, tachycardic in the 120s. Labs significant for leukocytosis of 13.95. CXR without evidence of acute cardiopulmonary process but concern for developing RLL process. On Exam, expiratory wheezing noted diffusely along with inspiratory crackles in RLL, tachypneic, flushed. CURB-65 score of 2 indicates moderate risk group, appropriate for inpatient treatment. PSI/PORT score 103 indicating Risk Class IV   - s/p cef, azithro in ED  - Flu/COVID negative  - patient met sepsis criteria on admission  - started on vanc, zosyn given sepsis criteria  - follow up BC's- NGTD  - lactic elevated at 2.8 but repeat WNL  - de-escalating to ceftriaxone, azithro today  - continue azithromycin  - continue DuoNebs  - wean O2 as tolerated to maintain O2 sat of 88-92%  - stable on 2L O2 this morning     COPD exacerbation  PFT's in 5/2022 indicate moderate to severe obstruction. Concern for possible COPD exacerbation.  - on Trelegy and albuterol at home  - Continue DuoNebs while inpatient  - continue  guaifenesin  - wean O2 as tolerated to maintain O2 sat of 88-92%  - started on steroids for possible COPD exacerbation  - CPT with DuoNebs  - continue cef, azithro     Elevated Troponin  Troponin mildly elevated at 0.028. EKG with sinus tachycardia. Likely represents demand ischemia in the setting of hypoxia and tachycardia.  - troponin peaked at 0.057     HTN  - on Losartan and PRN hydralazine at home  - Continue home losartan     HLD  Patient has hx of HLD. Last FLP with chol 145  HDL 57 LDL 62.6  - continue home atorvastatin, ASA     BPH  - continue home Flomax, finasteride     CKD3b  BL Cr of 1.6-1.7. Cr 1.6 on admission  - renally dose medications  - continue to monitor UOP/Cr     T2DM  - last A1C 3mos ago 6.1%  - patient on glipizide at home  - follow up repeat A1C  - goal -180 while inpatient  - LDSSI  - diabetic diet     DVT PPx: renally dosed lovenox  Diet: Diabetic  Code: Full     Disposition: continue to wean O2 as tolerated        Christina Barrera MD, STANISLAW  [facility] Internal Medicine PGY1     [facility] Medicine Hospitalist Pager numbers:   [facility] Hospitalist Medicine Team A (Sunday/Yasmin): [phone]  [facility] Hospitalist Medicine Team B (Melida/Lauren):  [phone]

## 2024-12-24 NOTE — PT/OT/SLP EVAL
Occupational Therapy   Evaluation    Name: Husam Connolly  MRN: [id number]  Admitting Diagnosis: <principal problem not specified>  Recent Surgery: * No surgery found *      Recommendations:     Discharge Recommendations: Low Intensity Therapy (HH OT/PT & increased family assist as needed)  Discharge Equipment Recommendations:  none  Barriers to discharge:   (Pt requires increased level of assistance)    Assessment:     Husam Connolly is a 89 y.o. male with a medical diagnosis of <principal problem not specified>.  He presents with The primary encounter diagnosis was Pneumonia of right lower lobe due to infectious organism. Diagnoses of Cough, unspecified type, Hypoxia, Community acquired pneumonia, Sepsis, due to unspecified organism, unspecified whether acute organ dysfunction present, Acute respiratory failure with hypoxia, COPD exacerbation, Elevated troponin, Stage 3b chronic kidney disease, and Benign prostatic hyperplasia with lower urinary tract symptoms, symptom details unspecified were also pertinent to this visit. Performance deficits affecting function: impaired endurance, impaired cardiopulmonary response to activity, impaired balance, impaired self care skills, impaired functional mobility.      Rehab Prognosis: Good; patient would benefit from acute skilled OT services to address these deficits and reach maximum level of function.       Plan:     Patient to be seen 2 x/week to address the above listed problems via self-care/home management, therapeutic activities, therapeutic exercises  Plan of Care Expires: 01/24/25  Plan of Care Reviewed with: patient, daughter    Subjective     Chief Complaint: SOB, cough  Patient/Family Comments/goals: return to PLOF    Occupational Profile:  Lives alone in 2 story home with small step to enter. (Does not go upstairs often- per daughter they will be moving computer down to first floor). WIS with built in seat.   Prior to admission, patients level of function was  Independent with no AD.  Equipment used at home:  (access to RW, SPC and w/c).  DME owned (not currently used): none.    Upon discharge, patient will have assistance from family.    Pain/Comfort:  Pain Rating 1: 0/10    Patients cultural, spiritual, Buddhist conflicts given the current situation: no    Objective:     Communicated with: nsroberto carlos prior to session.  Patient found HOB elevated with bed alarm, oxygen, peripheral IV upon OT entry to room.    General Precautions: Standard, fall  Orthopedic Precautions: N/A  Braces: N/A  Respiratory Status: Nasal cannula, flow 2 L/min    Occupational Performance:    Bed Mobility:    Patient completed Supine to Sit with stand by assistance  Patient completed Sit to Supine with stand by assistance    Functional Mobility/Transfers:  Patient completed Sit <> Stand Transfer with contact guard assistance  with  no assistive device   Functional Mobility: Pt performing functional mobility in room/hallway with CGA & no AD; pulse ox donned and spO2 being monitored. \    Cognitive/Visual Perceptual:  Cognitive/Psychosocial Skills:     -       Oriented to: Person, Place, Time, and Situation   -       Follows Commands/attention:Follows multistep  commands  -       Memory: No Deficits noted  -       Mood/Affect/Coping skills/emotional control: Cooperative and Pleasant    Physical Exam:  Upper Extremity Strength:    -       Right Upper Extremity: WFL  -       Left Upper Extremity: WFL   Strength:    -       Right Upper Extremity: WFL  -       Left Upper Extremity: WFL  *sensation intact to light touch    AMPAC 6 Click ADL:  AMPAC Total Score: 21    Treatment & Education:  Pt would benefit from cont OT services in order to maximize functional independence.   At rest on 2L NC spO2 96%.  EOB spO2 96% on 2L NC.   Pt performing functional mobility as above.   Pt educated on PLB during ax & during recovery period.   Pt on 2L NC throughout session, spO2 dropping to 85-86% after functional  mobility, increases to 90-93% at rest with PLB.   Will progress as able.     Patient left HOB elevated with all lines intact, call button in reach, bed alarm on, and nsg notified & daughter present    GOALS:   Multidisciplinary Problems       Occupational Therapy Goals          Problem: Occupational Therapy    Goal Priority Disciplines Outcome Interventions   Occupational Therapy Goal     OT, PT/OT Progressing    Description: Goals to be met by: 01/24/2025     Patient will increase functional independence with ADLs by performing:    Grooming while standing with [place].  Toileting from toilet with [place] for hygiene and clothing management.   Step transfer with [place]  Toilet transfer to toilet with [place].                         History:     Past Medical History:   Diagnosis Date    BPH (benign prostatic hyperplasia)     Diabetes mellitus (type 2)     Emphysema 03/12/2022    High cholesterol 08/26/2020    Hypertension     RBBB 11/29/2022    Small bowel obstruction 06/13/2019         Past Surgical History:   Procedure Laterality Date    ANASTOMOSIS OF ILEUM TO RECTUM  9/9/2019    Procedure: ANASTOMOSIS, COLO-RECTAL;  Surgeon: Fouzia Gardner MD;  Location: [facility] OR;  Service: General;;    CHOLECYSTECTOMY N/A 3/7/2022    Procedure: CHOLECYSTECTOMY;  Surgeon: Fouzia Gardner MD;  Location: [facility] OR;  Service: General;  Laterality: N/A;    COLOSTOMY  6/16/2019    Procedure: CREATION, COLOSTOMY;  Surgeon: Fouzia Gardner MD;  Location: [facility] OR;  Service: General;;    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    Procedure: RELEASE, DUPUYTREN CONTRACTURE;  Surgeon: Slick Baldwin Jr., MD;  Location: [facility] OR;  Service: Orthopedics;  Laterality: Left;    EYE SURGERY      GISELA PROCEDURE  6/16/2019    Procedure: GISELA PROCEDURE;  Surgeon: Fouzia Gardner MD;  Location: [facility] OR;  Service: General;;  Colon resection    LYSIS OF ADHESIONS N/A 3/7/2022    Procedure: LYSIS,  ADHESIONS;  Surgeon: Fouzia Gardner MD;  Location: [facility];  Service: General;  Laterality: N/A;    TONSILLECTOMY         Time Tracking:     OT Date of Treatment: 12/24/24  OT Start Time: 1126  OT Stop Time: 1148  OT Total Time (min): 22 min    Billable Minutes:Evaluation 10  Therapeutic Activity 12    12/24/2024

## 2024-12-24 NOTE — PLAN OF CARE
SW met with pt at bedside this AM to complete DCA. Pt reported that he lives at home alone but will have his drt Nina [phone] help transport home home at time of d/c. Pt still drives and is fully independent in his ADL's. Pt does have a RW, CANE, and WC at home from his wife if he feels he needs it. Pt does not receive any HD or HH services. Pt did not have any additional questions or concerns for sw at this time. White board updated with CM name and contact information.  Discharge brochure provided.  Pt encouraged to call with any questions or concerns.  Cm will continue to follow pt through transitions of care and assist with any discharge needs.    1:40pm SW sent HH referrals via Epic.    FLORENCIO Sy  [phone]    Future Appointments   Date Time Provider Department Center   1/23/2025  1:00 PM Slick Baldwin Jr., MD [facility] ORTHO [facility]   3/10/2025  7:00 AM SPECIMEN, SHAMAR TO SPECLAB [facility]   3/10/2025  7:30 AM LABDEL LAB [facility]   3/17/2025 11:00 AM Williams Knight,  [facility]   4/29/2025  1:30 PM Fouzia Gardner MD [facility]        12/24/24 0853   Discharge Assessment   Assessment Type Discharge Planning Assessment   Confirmed/corrected address, phone number and insurance Yes   Confirmed Demographics Correct on Facesheet   Source of Information patient   When was your last doctors appointment?   (per pt a few months)   Communicated JOSE with patient/caregiver Yes   Reason For Admission pneumonia   People in Home alone   Facility Arrived From: home   Do you expect to return to your current living situation? Yes   Do you have help at home or someone to help you manage your care at home? No   Prior to hospitilization cognitive status: Alert/Oriented   Current cognitive status: Alert/Oriented   Walking or Climbing Stairs Difficulty no   Dressing/Bathing Difficulty no   Do you have any problems with: Errands/Grocery   Home Accessibility  wheelchair accessible   Home Layout Able to live on 1st floor   Equipment Currently Used at Home none   Readmission within 30 days? No   Patient currently being followed by outpatient case management? No   Do you currently have service(s) that help you manage your care at home? No   Do you take prescription medications? Yes   Do you have prescription coverage? Yes   Coverage phn   Do you have any problems affording any of your prescribed medications? No   Is the patient taking medications as prescribed? yes   Who is going to help you get home at discharge? shanthi Wood [phone]   How do you get to doctors appointments? agency   Are you on dialysis? No   Do you take coumadin? No   Discharge Plan A Home with family   DME Needed Upon Discharge  none   Discharge Plan discussed with: Patient   Transition of Care Barriers None   Health Literacy   How often do you need to have someone help you when you read instructions, pamphlets, or other written material from your doctor or pharmacy? Often

## 2024-12-24 NOTE — PLAN OF CARE
12/23/24 1846   Admission   Initial VN Admission Questions Complete   Communication Issues? None   Shift   Virtual Nurse - Patient Verbalized Approval Of Camera Use   Safety/Activity   Patient Rounds visualized patient;placement of personal items at bedside;bed in low position;bed wheels locked;call light in patient/parent reach;clutter free environment maintained;ID band on

## 2024-12-25 LAB
ANION GAP SERPL CALC-SCNC: 11 MMOL/L (ref 8–16)
BASOPHILS # BLD AUTO: 0.01 K/UL (ref 0–0.2)
BASOPHILS NFR BLD: 0.1 % (ref 0–1.9)
BUN SERPL-MCNC: 33 MG/DL (ref 10–30)
CALCIUM SERPL-MCNC: 8.8 MG/DL (ref 8.7–10.5)
CHLORIDE SERPL-SCNC: 110 MMOL/L (ref 95–110)
CO2 SERPL-SCNC: 19 MMOL/L (ref 23–29)
CREAT SERPL-MCNC: 1.4 MG/DL (ref 0.5–1.4)
DIFFERENTIAL METHOD BLD: ABNORMAL
EOSINOPHIL # BLD AUTO: 0 K/UL (ref 0–0.5)
EOSINOPHIL NFR BLD: 0 % (ref 0–8)
ERYTHROCYTE [DISTWIDTH] IN BLOOD BY AUTOMATED COUNT: 13.2 % (ref 11.5–14.5)
EST. GFR  (NO RACE VARIABLE): 47 ML/MIN/1.73 M^2
GLUCOSE SERPL-MCNC: 224 MG/DL (ref 70–110)
HCT VFR BLD AUTO: 37.1 % (ref 40–54)
HGB BLD-MCNC: 12.2 G/DL (ref 14–18)
IMM GRANULOCYTES # BLD AUTO: 0.04 K/UL (ref 0–0.04)
IMM GRANULOCYTES NFR BLD AUTO: 0.4 % (ref 0–0.5)
LYMPHOCYTES # BLD AUTO: 0.8 K/UL (ref 1–4.8)
LYMPHOCYTES NFR BLD: 6.8 % (ref 18–48)
MAGNESIUM SERPL-MCNC: 2.1 MG/DL (ref 1.6–2.6)
MCH RBC QN AUTO: 30.1 PG (ref 27–31)
MCHC RBC AUTO-ENTMCNC: 32.9 G/DL (ref 32–36)
MCV RBC AUTO: 92 FL (ref 82–98)
MONOCYTES # BLD AUTO: 1 K/UL (ref 0.3–1)
MONOCYTES NFR BLD: 8.5 % (ref 4–15)
NEUTROPHILS # BLD AUTO: 9.5 K/UL (ref 1.8–7.7)
NEUTROPHILS NFR BLD: 84.2 % (ref 38–73)
NRBC BLD-RTO: 0 /100 WBC
PLATELET # BLD AUTO: 154 K/UL (ref 150–450)
PMV BLD AUTO: 10.9 FL (ref 9.2–12.9)
POCT GLUCOSE: 217 MG/DL (ref 70–110)
POCT GLUCOSE: 221 MG/DL (ref 70–110)
POCT GLUCOSE: 291 MG/DL (ref 70–110)
POCT GLUCOSE: 310 MG/DL (ref 70–110)
POTASSIUM SERPL-SCNC: 4.3 MMOL/L (ref 3.5–5.1)
RBC # BLD AUTO: 4.05 M/UL (ref 4.6–6.2)
SODIUM SERPL-SCNC: 140 MMOL/L (ref 136–145)
WBC # BLD AUTO: 11.25 K/UL (ref 3.9–12.7)

## 2024-12-25 PROCEDURE — 25000003 PHARM REV CODE 250

## 2024-12-25 PROCEDURE — 94668 MNPJ CHEST WALL SBSQ: CPT

## 2024-12-25 PROCEDURE — 94640 AIRWAY INHALATION TREATMENT: CPT

## 2024-12-25 PROCEDURE — 36415 COLL VENOUS BLD VENIPUNCTURE: CPT

## 2024-12-25 PROCEDURE — 27000221 HC OXYGEN, UP TO 24 HOURS

## 2024-12-25 PROCEDURE — 25000242 PHARM REV CODE 250 ALT 637 W/ HCPCS

## 2024-12-25 PROCEDURE — 63600175 PHARM REV CODE 636 W HCPCS

## 2024-12-25 PROCEDURE — 85025 COMPLETE CBC W/AUTO DIFF WBC: CPT

## 2024-12-25 PROCEDURE — 99900035 HC TECH TIME PER 15 MIN (STAT)

## 2024-12-25 PROCEDURE — 21400001 HC TELEMETRY ROOM

## 2024-12-25 PROCEDURE — 80048 BASIC METABOLIC PNL TOTAL CA: CPT

## 2024-12-25 PROCEDURE — 94761 N-INVAS EAR/PLS OXIMETRY MLT: CPT

## 2024-12-25 PROCEDURE — 83735 ASSAY OF MAGNESIUM: CPT

## 2024-12-25 RX ADMIN — INSULIN ASPART 2 UNITS: 100 INJECTION, SOLUTION INTRAVENOUS; SUBCUTANEOUS at 06:12

## 2024-12-25 RX ADMIN — FINASTERIDE 5 MG: 5 TABLET, FILM COATED ORAL at 08:12

## 2024-12-25 RX ADMIN — IPRATROPIUM BROMIDE AND ALBUTEROL SULFATE 3 ML: .5; 3 SOLUTION RESPIRATORY (INHALATION) at 08:12

## 2024-12-25 RX ADMIN — ENOXAPARIN SODIUM 30 MG: 30 INJECTION SUBCUTANEOUS at 05:12

## 2024-12-25 RX ADMIN — INSULIN ASPART 2 UNITS: 100 INJECTION, SOLUTION INTRAVENOUS; SUBCUTANEOUS at 08:12

## 2024-12-25 RX ADMIN — IPRATROPIUM BROMIDE AND ALBUTEROL SULFATE 3 ML: .5; 3 SOLUTION RESPIRATORY (INHALATION) at 12:12

## 2024-12-25 RX ADMIN — IPRATROPIUM BROMIDE AND ALBUTEROL SULFATE 3 ML: .5; 3 SOLUTION RESPIRATORY (INHALATION) at 11:12

## 2024-12-25 RX ADMIN — IPRATROPIUM BROMIDE AND ALBUTEROL SULFATE 3 ML: .5; 3 SOLUTION RESPIRATORY (INHALATION) at 03:12

## 2024-12-25 RX ADMIN — CEFTRIAXONE SODIUM 1 G: 1 INJECTION, POWDER, FOR SOLUTION INTRAMUSCULAR; INTRAVENOUS at 09:12

## 2024-12-25 RX ADMIN — TAMSULOSIN HYDROCHLORIDE 0.4 MG: 0.4 CAPSULE ORAL at 08:12

## 2024-12-25 RX ADMIN — LOSARTAN POTASSIUM 100 MG: 50 TABLET, FILM COATED ORAL at 08:12

## 2024-12-25 RX ADMIN — INSULIN ASPART 3 UNITS: 100 INJECTION, SOLUTION INTRAVENOUS; SUBCUTANEOUS at 03:12

## 2024-12-25 RX ADMIN — INSULIN ASPART 2 UNITS: 100 INJECTION, SOLUTION INTRAVENOUS; SUBCUTANEOUS at 12:12

## 2024-12-25 RX ADMIN — GUAIFENESIN 600 MG: 600 TABLET, EXTENDED RELEASE ORAL at 08:12

## 2024-12-25 RX ADMIN — AZITHROMYCIN MONOHYDRATE 500 MG: 500 INJECTION, POWDER, LYOPHILIZED, FOR SOLUTION INTRAVENOUS at 08:12

## 2024-12-25 RX ADMIN — ASPIRIN 81 MG: 81 TABLET, COATED ORAL at 08:12

## 2024-12-25 RX ADMIN — PREDNISONE 40 MG: 20 TABLET ORAL at 08:12

## 2024-12-25 RX ADMIN — ATORVASTATIN CALCIUM 40 MG: 40 TABLET, FILM COATED ORAL at 08:12

## 2024-12-25 NOTE — PLAN OF CARE
Problem: Adult Inpatient Plan of Care  Goal: Plan of Care Review  Outcome: Progressing  Goal: Patient-Specific Goal (Individualized)  Outcome: Progressing  Goal: Absence of Hospital-Acquired Illness or Injury  Outcome: Progressing  Goal: Optimal Comfort and Wellbeing  Outcome: Progressing  Goal: Readiness for Transition of Care  Outcome: Progressing     Problem: Diabetes Comorbidity  Goal: Blood Glucose Level Within Targeted Range  Outcome: Progressing     Problem: COPD (Chronic Obstructive Pulmonary Disease)  Goal: Optimal Chronic Illness Coping  Outcome: Progressing  Goal: Optimal Level of Functional [place]  Outcome: Progressing  Goal: Absence of Infection Signs and Symptoms  Outcome: Progressing  Goal: Improved Oral Intake  Outcome: Progressing  Goal: Effective Oxygenation and Ventilation  Outcome: Progressing     Problem: Pneumonia  Goal: Fluid Balance  Outcome: Progressing  Goal: Resolution of Infection Signs and Symptoms  Outcome: Progressing  Goal: Effective Oxygenation and Ventilation  Outcome: Progressing     Problem: Fall Injury Risk  Goal: Absence of Fall and Fall-Related Injury  Outcome: Progressing     Problem: Comorbidity Management  Goal: Maintenance of COPD Symptom Control  Outcome: Progressing  Goal: Blood Pressure in Desired Range  Outcome: Progressing     Problem: Skin Injury Risk Increased  Goal: Skin Health and Integrity  Outcome: Progressing     Problem: Sepsis/Septic Shock  Goal: Optimal Coping  Outcome: Progressing  Goal: Absence of Bleeding  Outcome: Progressing  Goal: Blood Glucose Level Within Targeted Range  Outcome: Progressing  Goal: Absence of Infection Signs and Symptoms  Outcome: Progressing  Goal: Optimal Nutrition Intake  Outcome: Progressing

## 2024-12-25 NOTE — PROGRESS NOTES
[facility] Medicine Progress Note    Primary Team: hospitals Hospitalist Team A  Attending Physician: Yasmin  Resident: Dalila  Intern: Bruce    Subjective:      No acute events overnight. Patient resting comfortably this morning without complaint. He says overall he feels much better. Satting mid 90s on RA this morning on weaning trial.     Objective:     Last 24 Hour Vital Signs:  BP  Min: 131/61  Max: 177/79  Temp  Av.6 °F (36.4 °C)  Min: 97.4 °F (36.3 °C)  Max: 97.8 °F (36.6 °C)  Pulse  Av.8  Min: 73  Max: 85  Resp  Av.8  Min: 18  Max: 20  SpO2  Av.9 %  Min: 94 %  Max: 98 %  Weight  Av.4 kg (172 lb 13.5 oz)  Min: 78.4 kg (172 lb 13.5 oz)  Max: 78.4 kg (172 lb 13.5 oz)  I/O last 3 completed shifts:  In: 778 [P.O.:480; IV Piggyback:298]  Out: 975 [Urine:975]    Physical Examination:  Physical Exam  Vitals reviewed.   Constitutional:       General: He is not in acute distress.     Appearance: He is well-developed.   HENT:      Head: Normocephalic and atraumatic.      Mouth/Throat:      Mouth: Mucous membranes are moist.   Eyes:      Extraocular Movements: Extraocular movements intact.      Pupils: Pupils are equal, round, and reactive to light.      Comments: Arcus senilis present   Cardiovascular:      Rate and Rhythm: Normal rate and regular rhythm.      Heart sounds: No murmur heard.     No friction rub. No gallop.   Pulmonary:      Effort: Pulmonary effort is normal. No respiratory distress.      Comments: Diffuse expiratory wheezing noted, most prominent in RLL  Abdominal:      General: Abdomen is flat. There is no distension.      Tenderness: There is no abdominal tenderness. There is no guarding.   Musculoskeletal:      Cervical back: Normal range of motion.   Skin:     General: Skin is warm and dry.   Neurological:      Mental Status: He is alert.          Laboratory:  Laboratory Data Reviewed: yes  Microbiology Data Reviewed: yes  Pertinent Findings:  None    Other Results:  EKG  (my interpretation): sinus tachycardia.    Radiology Data Reviewed: yes  Pertinent Findings:  CXR without acute cardiopulmonary process but appears there may be a developing RLL process    Current Medications:     Infusions:       Scheduled:   albuterol-ipratropium  3 mL Nebulization Q4H    aspirin  81 mg Oral Daily    atorvastatin  40 mg Oral Daily    azithromycin  500 mg Intravenous Q24H    cefTRIAXone (Rocephin) IV (PEDS and ADULTS)  1 g Intravenous Q24H    enoxparin  30 mg Subcutaneous Daily    finasteride  5 mg Oral Daily    guaiFENesin  600 mg Oral BID    losartan  100 mg Oral Daily    predniSONE  40 mg Oral Daily    tamsulosin  0.4 mg Oral Daily        PRN:    Current Facility-Administered Medications:     dextrose 50%, 12.5 g, Intravenous, PRN    dextrose 50%, 25 g, Intravenous, PRN    glucagon (human recombinant), 1 mg, Intramuscular, PRN    glucose, 16 g, Oral, PRN    glucose, 24 g, Oral, PRN    insulin aspart U-100, 0-5 Units, Subcutaneous, QID (AC + HS) PRN    Antibiotics and Day Number of Therapy:  Ceftriaxone 12/23-  Azithromycin 12/23-  Zosyn 12/23-12/24  Vanc 12/23- dc'd    Lines and Day Number of Therapy:  PIV    Assessment:     Husam Connolly is a 89 y.o.male with  Patient Active Problem List    Diagnosis Date Noted    Acute respiratory failure with hypoxia 12/24/2024    Sepsis 12/24/2024    COPD exacerbation 12/24/2024    Elevated troponin 12/24/2024    Chronic left shoulder pain 07/10/2024    Central sleep apnea 06/10/2024    LC (obstructive sleep apnea) 06/06/2024    Senile purpura 06/05/2024    Muscle weakness 03/11/2024    Shoulder stiffness, left 03/11/2024    Pain in joint of left shoulder 03/11/2024    COPD, group B, by GOLD 2017 classification 08/23/2023    Pulmonary hypertension 07/24/2023    Shoulder arthritis 01/05/2023    RBBB 11/29/2022    Pulmonary nodules     Dupuytren's contracture of left hand 03/21/2022    Type 2 diabetes mellitus with stage 3b chronic kidney disease, without  long-term current use of insulin 09/22/2021    Aortic atherosclerosis 05/11/2021    Chronic rhinitis 03/01/2021    S/P colostomy 03/01/2021    Right foot drop 08/26/2020    High cholesterol 08/26/2020    Ventral hernia without obstruction or gangrene 02/27/2020    S/P cholecystectomy 07/01/2019    Stage 3b chronic kidney disease 12/13/2018    Resistant hypertension     BPH (benign prostatic hyperplasia)         Plan:     Acute Hypoxic Respiratory Failure  CAP  COPD Exacerbation  Leukocytosis  Patient presents with 7-10 day history of dry cough, SOB, chills, night sweats, and decreased appetite. Used his home COPD inhalers without relief. Denies known sick contacts, pleuritic pain, fever, N/V/D. On presentation, satting 81% on RA, tachycardic in the 120s. Labs significant for leukocytosis of 13.95. CXR without evidence of acute cardiopulmonary process but concern for developing RLL process. On Exam, expiratory wheezing noted diffusely along with inspiratory crackles in RLL, tachypneic, flushed. CURB-65 score of 2 indicates moderate risk group, appropriate for inpatient treatment. PSI/PORT score 103 indicating Risk Class IV   - s/p cef, azithro in ED  - Flu/COVID negative  - patient met sepsis criteria on admission  - started on vanc, zosyn given sepsis criteria  - follow up BC's- NGTD  - lactic elevated at 2.8 but repeat WNL  - continue ceftriaxone, azithromycin  - continue DuoNebs, CPT, guaifenesin  - weaned O2 this AM and patient satting mid 90s     COPD exacerbation  PFT's in 5/2022 indicate moderate to severe obstruction. Concern for possible COPD exacerbation.  - on Trelegy and albuterol at home  - Continue DuoNebs, CPT  - continue guaifenesin  - started on steroids for possible COPD exacerbation  - continue cef, azithro  - weaned O2 this AM and patient satting mid 90s  - 6min walk test to be completed tomorrow to assess need for home O2     Elevated Troponin  Troponin mildly elevated at 0.028. EKG with sinus  tachycardia. Likely represents demand ischemia in the setting of hypoxia and tachycardia.  - troponin peaked at 0.057     HTN  - on Losartan and PRN hydralazine at home  - Continue home losartan     HLD  Patient has hx of HLD. Last FLP with chol 145  HDL 57 LDL 62.6  - continue home atorvastatin, ASA     BPH  - continue home Flomax, finasteride     CKD3b  BL Cr of 1.6-1.7. Cr 1.6 on admission  - renally dose medications  - continue to monitor UOP/Cr     T2DM  - last A1C 3mos ago 6.1%  - patient on glipizide at home  - follow up repeat A1C  - goal -180 while inpatient  - LDSSI  - diabetic diet     DVT PPx: renally dosed lovenox  Diet: Diabetic  Code: Full     Disposition: monitor off O2, walk test and possible dc home tomorrow        Christina Barrera MD, STANISLAW  [facility] Internal Medicine PGY1     [facility] Medicine Hospitalist Pager numbers:   [facility] Hospitalist Medicine Team A (Sunday/Yasmin): [phone]  [facility] Hospitalist Medicine Team B (Melida/Lauren):  [phone]

## 2024-12-25 NOTE — PLAN OF CARE
Pt received on 2lpm NC with SpO2 95%. No respiratory distress noted. Will continue to monitor. The proper method of use, as well as anticipated side effects, of this aerosol treatment are discussed and demonstrated to the patient.

## 2024-12-25 NOTE — PLAN OF CARE
The proper method of use, as well as anticipated side effects, of this aerosol treatment are discussed and demonstrated to the patient.   Cpt  Will continue to monitor.

## 2024-12-25 NOTE — PLAN OF CARE
Problem: Adult Inpatient Plan of Care  Goal: Plan of Care Review  Outcome: Progressing     Problem: Adult Inpatient Plan of Care  Goal: Optimal Comfort and Wellbeing  Outcome: Progressing       Plan of care reviewed with patient and family. All verbalized understanding. Medicated per MAR. Instructed to use call light for assistance. Call light in reach. Bed alarm set.

## 2024-12-26 VITALS
HEIGHT: 69 IN | OXYGEN SATURATION: 94 % | HEART RATE: 87 BPM | SYSTOLIC BLOOD PRESSURE: 165 MMHG | BODY MASS INDEX: 25.67 KG/M2 | DIASTOLIC BLOOD PRESSURE: 76 MMHG | RESPIRATION RATE: 18 BRPM | WEIGHT: 173.31 LBS | TEMPERATURE: 98 F

## 2024-12-26 PROBLEM — J96.01 ACUTE RESPIRATORY FAILURE WITH HYPOXIA: Status: RESOLVED | Noted: 2024-12-24 | Resolved: 2024-12-26

## 2024-12-26 PROBLEM — R79.89 ELEVATED TROPONIN: Status: RESOLVED | Noted: 2024-12-24 | Resolved: 2024-12-26

## 2024-12-26 PROBLEM — A41.9 SEPSIS: Status: RESOLVED | Noted: 2024-12-24 | Resolved: 2024-12-26

## 2024-12-26 LAB
ANION GAP SERPL CALC-SCNC: 11 MMOL/L (ref 8–16)
BASOPHILS # BLD AUTO: 0.02 K/UL (ref 0–0.2)
BASOPHILS NFR BLD: 0.2 % (ref 0–1.9)
BUN SERPL-MCNC: 34 MG/DL (ref 10–30)
CALCIUM SERPL-MCNC: 8.8 MG/DL (ref 8.7–10.5)
CHLORIDE SERPL-SCNC: 110 MMOL/L (ref 95–110)
CO2 SERPL-SCNC: 19 MMOL/L (ref 23–29)
CREAT SERPL-MCNC: 1.4 MG/DL (ref 0.5–1.4)
DIFFERENTIAL METHOD BLD: ABNORMAL
EOSINOPHIL # BLD AUTO: 0 K/UL (ref 0–0.5)
EOSINOPHIL NFR BLD: 0 % (ref 0–8)
ERYTHROCYTE [DISTWIDTH] IN BLOOD BY AUTOMATED COUNT: 13.1 % (ref 11.5–14.5)
EST. GFR  (NO RACE VARIABLE): 47 ML/MIN/1.73 M^2
GLUCOSE SERPL-MCNC: 269 MG/DL (ref 70–110)
HCT VFR BLD AUTO: 38 % (ref 40–54)
HGB BLD-MCNC: 12.5 G/DL (ref 14–18)
IMM GRANULOCYTES # BLD AUTO: 0.11 K/UL (ref 0–0.04)
IMM GRANULOCYTES NFR BLD AUTO: 1.1 % (ref 0–0.5)
LYMPHOCYTES # BLD AUTO: 0.9 K/UL (ref 1–4.8)
LYMPHOCYTES NFR BLD: 9.5 % (ref 18–48)
MAGNESIUM SERPL-MCNC: 2.1 MG/DL (ref 1.6–2.6)
MCH RBC QN AUTO: 29.8 PG (ref 27–31)
MCHC RBC AUTO-ENTMCNC: 32.9 G/DL (ref 32–36)
MCV RBC AUTO: 91 FL (ref 82–98)
MONOCYTES # BLD AUTO: 0.8 K/UL (ref 0.3–1)
MONOCYTES NFR BLD: 7.9 % (ref 4–15)
NEUTROPHILS # BLD AUTO: 7.9 K/UL (ref 1.8–7.7)
NEUTROPHILS NFR BLD: 81.3 % (ref 38–73)
NRBC BLD-RTO: 0 /100 WBC
PLATELET # BLD AUTO: 174 K/UL (ref 150–450)
PMV BLD AUTO: 11.5 FL (ref 9.2–12.9)
POCT GLUCOSE: 189 MG/DL (ref 70–110)
POCT GLUCOSE: 256 MG/DL (ref 70–110)
POTASSIUM SERPL-SCNC: 3.8 MMOL/L (ref 3.5–5.1)
RBC # BLD AUTO: 4.2 M/UL (ref 4.6–6.2)
SODIUM SERPL-SCNC: 140 MMOL/L (ref 136–145)
WBC # BLD AUTO: 9.7 K/UL (ref 3.9–12.7)

## 2024-12-26 PROCEDURE — 25000003 PHARM REV CODE 250

## 2024-12-26 PROCEDURE — 99900035 HC TECH TIME PER 15 MIN (STAT)

## 2024-12-26 PROCEDURE — 97110 THERAPEUTIC EXERCISES: CPT | Mod: CQ

## 2024-12-26 PROCEDURE — 94640 AIRWAY INHALATION TREATMENT: CPT

## 2024-12-26 PROCEDURE — 63600175 PHARM REV CODE 636 W HCPCS

## 2024-12-26 PROCEDURE — 80048 BASIC METABOLIC PNL TOTAL CA: CPT

## 2024-12-26 PROCEDURE — 97110 THERAPEUTIC EXERCISES: CPT

## 2024-12-26 PROCEDURE — 83735 ASSAY OF MAGNESIUM: CPT

## 2024-12-26 PROCEDURE — 97116 GAIT TRAINING THERAPY: CPT | Mod: CQ

## 2024-12-26 PROCEDURE — 94668 MNPJ CHEST WALL SBSQ: CPT

## 2024-12-26 PROCEDURE — 25000242 PHARM REV CODE 250 ALT 637 W/ HCPCS

## 2024-12-26 PROCEDURE — 94761 N-INVAS EAR/PLS OXIMETRY MLT: CPT

## 2024-12-26 PROCEDURE — 85025 COMPLETE CBC W/AUTO DIFF WBC: CPT

## 2024-12-26 PROCEDURE — 36415 COLL VENOUS BLD VENIPUNCTURE: CPT

## 2024-12-26 RX ORDER — PREDNISONE 20 MG/1
40 TABLET ORAL DAILY
Qty: 2 TABLET | Refills: 0 | Status: SHIPPED | OUTPATIENT
Start: 2024-12-27 | End: 2025-01-06 | Stop reason: ALTCHOICE

## 2024-12-26 RX ORDER — AMOXICILLIN 875 MG/1
875 TABLET, FILM COATED ORAL 2 TIMES DAILY
Qty: 2 TABLET | Refills: 0 | Status: SHIPPED | OUTPATIENT
Start: 2024-12-27 | End: 2025-01-06 | Stop reason: ALTCHOICE

## 2024-12-26 RX ORDER — GUAIFENESIN 600 MG/1
600 TABLET, EXTENDED RELEASE ORAL 2 TIMES DAILY
Qty: 20 TABLET | Refills: 0 | Status: SHIPPED | OUTPATIENT
Start: 2024-12-27 | End: 2025-01-06 | Stop reason: ALTCHOICE

## 2024-12-26 RX ORDER — ENOXAPARIN SODIUM 100 MG/ML
40 INJECTION SUBCUTANEOUS EVERY 24 HOURS
Status: DISCONTINUED | OUTPATIENT
Start: 2024-12-26 | End: 2024-12-26 | Stop reason: HOSPADM

## 2024-12-26 RX ADMIN — FINASTERIDE 5 MG: 5 TABLET, FILM COATED ORAL at 08:12

## 2024-12-26 RX ADMIN — PREDNISONE 40 MG: 20 TABLET ORAL at 08:12

## 2024-12-26 RX ADMIN — ASPIRIN 81 MG: 81 TABLET, COATED ORAL at 08:12

## 2024-12-26 RX ADMIN — IPRATROPIUM BROMIDE AND ALBUTEROL SULFATE 3 ML: .5; 3 SOLUTION RESPIRATORY (INHALATION) at 08:12

## 2024-12-26 RX ADMIN — INSULIN ASPART 3 UNITS: 100 INJECTION, SOLUTION INTRAVENOUS; SUBCUTANEOUS at 06:12

## 2024-12-26 RX ADMIN — TAMSULOSIN HYDROCHLORIDE 0.4 MG: 0.4 CAPSULE ORAL at 08:12

## 2024-12-26 RX ADMIN — AZITHROMYCIN MONOHYDRATE 500 MG: 500 INJECTION, POWDER, LYOPHILIZED, FOR SOLUTION INTRAVENOUS at 08:12

## 2024-12-26 RX ADMIN — CEFTRIAXONE SODIUM 1 G: 1 INJECTION, POWDER, FOR SOLUTION INTRAMUSCULAR; INTRAVENOUS at 10:12

## 2024-12-26 RX ADMIN — GUAIFENESIN 600 MG: 600 TABLET, EXTENDED RELEASE ORAL at 08:12

## 2024-12-26 RX ADMIN — IPRATROPIUM BROMIDE AND ALBUTEROL SULFATE 3 ML: .5; 3 SOLUTION RESPIRATORY (INHALATION) at 11:12

## 2024-12-26 RX ADMIN — IPRATROPIUM BROMIDE AND ALBUTEROL SULFATE 3 ML: .5; 3 SOLUTION RESPIRATORY (INHALATION) at 04:12

## 2024-12-26 RX ADMIN — ATORVASTATIN CALCIUM 40 MG: 40 TABLET, FILM COATED ORAL at 08:12

## 2024-12-26 RX ADMIN — LOSARTAN POTASSIUM 100 MG: 50 TABLET, FILM COATED ORAL at 08:12

## 2024-12-26 NOTE — PLAN OF CARE
[facility]      HOME HEALTH ORDERS  FACE TO FACE ENCOUNTER    Patient Name: Husam Connolly  YOB: 1931    PCP: Williams Knight DO   PCP Address: [address] / [place] [address]  PCP Phone Number: [phone]  PCP Fax: [phone]    Encounter Date: 12/23/24    Admit to Home Health    Diagnoses:  Active Hospital Problems    Diagnosis  POA    *Acute respiratory failure with hypoxia [J96.01]  Yes    Sepsis [A41.9]  Yes    COPD exacerbation [J44.1]  Yes    Elevated troponin [R79.89]  Yes    Stage 3b chronic kidney disease [N18.32]  Yes    BPH (benign prostatic hyperplasia) [N40.0]  Yes      Resolved Hospital Problems    Diagnosis Date Resolved POA    Community acquired pneumonia [J18.9] 12/24/2024 No    Cough [R05.9] 12/24/2024 No       Follow Up Appointments:  Future Appointments   Date Time Provider Department Center   1/23/2025  1:00 PM Slick Baldwin Jr., MD [facility] ORTHO [facility]   3/10/2025  7:00 AM SPECIMEN, [facility] SPECLAB [facility]   3/10/2025  7:30 AM LAB, [facility]   3/17/2025 11:00 AM Williams Knight DO [facility]   4/29/2025  1:30 PM Fouzia Gardner MD [facility]       Allergies:  Review of patient's allergies indicates:   Allergen Reactions    Solarcaine [benzocaine-triclosan]     Sulfa (sulfonamide antibiotics)     Sulfamethoxazole     Trimethoprim     Hytrin [terazosin] Rash    Jardiance [empagliflozin] Rash     Yeast infection    Smz-tmp ds [sulfamethoxazole-trimethoprim] Rash       Medications: Review discharge medications with patient and family and provide education.    Current Facility-Administered Medications   Medication Dose Route Frequency Provider Last Rate Last Admin    albuterol-ipratropium 2.5 mg-0.5 mg/3 mL nebulizer solution 3 mL  3 mL Nebulization Q4H Christina Barrera MD   3 mL at 12/26/24 0804    aspirin EC tablet 81 mg  81 mg Oral Daily Christina Barrera MD   81 mg at 12/26/24 0810    atorvastatin tablet 40  mg  40 mg Oral Daily Christina Barrera MD   40 mg at 12/26/24 0810    azithromycin (ZITHROMAX) 500 mg in 0.9% NaCl 250 mL IVPB (admixture device)  500 mg Intravenous Q24H Christina Barrera  mL/hr at 12/26/24 0812 500 mg at 12/26/24 0812    cefTRIAXone injection 1 g  1 g Intravenous Q24H Christina Barrera MD   1 g at 12/25/24 0921    dextrose 50% injection 12.5 g  12.5 g Intravenous PRN Gpoal Conner MD        dextrose 50% injection 25 g  25 g Intravenous PRN Gopal Conner MD        enoxaparin injection 30 mg  30 mg Subcutaneous Daily Christina Barrera MD   30 mg at 12/25/24 1704    finasteride tablet 5 mg  5 mg Oral Daily Christina Barrera MD   5 mg at 12/26/24 0810    glucagon (human recombinant) injection 1 mg  1 mg Intramuscular PRN Gopal Conner MD        glucose chewable tablet 16 g  16 g Oral PRN Gopal Conner MD        glucose chewable tablet 24 g  24 g Oral PRN Gopal Conner MD        guaiFENesin 12 hr tablet 600 mg  600 mg Oral BID Gopal Conner MD   600 mg at 12/26/24 0810    insulin aspart U-100 pen 0-5 Units  0-5 Units Subcutaneous QID (AC + HS) PRN Gopal Conner MD   3 Units at 12/26/24 0601    losartan tablet 100 mg  100 mg Oral Daily Christina Barrera MD   100 mg at 12/26/24 0810    predniSONE tablet 40 mg  40 mg Oral Daily Gopal Conner MD   40 mg at 12/26/24 0810    tamsulosin 24 hr capsule 0.4 mg  0.4 mg Oral Daily Christina Barrera MD   0.4 mg at 12/26/24 0810        Medication List        ASK your doctor about these medications      albuterol 90 mcg/actuation inhaler  Commonly known as: VENTOLIN HFA  Inhale 2 puffs into the lungs every 4 (four) hours as needed for Wheezing. Rescue     aspirin 81 MG EC tablet  Commonly known as: ECOTRIN  Take 81 mg by mouth once daily.     atorvastatin 40 MG tablet  Commonly known as: LIPITOR  Take 1 tablet (40 mg total) by mouth once daily.     blood sugar diagnostic Strp  To check sugar daily, to use with insurance preferred meter     blood-glucose meter kit  Use as  instructed     finasteride 5 mg tablet  Commonly known as: PROSCAR  Take 1 tablet (5 mg total) by mouth once daily.     fish oil-omega-3 fatty acids 300-1,000 mg capsule  Take 1 capsule by mouth once daily.     fluticasone propionate 50 mcg/actuation nasal spray  Commonly known as: FLONASE  2 sprays (100 mcg total) by Each Nostril route once daily.     fluticasone-umeclidin-vilanter 100-62.5-25 mcg Dsdv  Commonly known as: TRELEGY ELLIPTA  Inhale 1 puff into the lungs once daily.     GARLIC ORAL  Take 1 capsule by mouth once daily.     glipiZIDE 5 MG Tr24  Take 1 tablet (5 mg total) by mouth daily with breakfast.     hydrALAZINE 50 MG tablet  Commonly known as: APRESOLINE  Take 1 tablet (50 mg total) by mouth 3 (three) times daily.     lancets Misc  To check sugar daily, to use with insurance preferred meter     losartan 100 MG tablet  Commonly known as: COZAAR  Take 1 tablet (100 mg total) by mouth once daily.     multivitamin capsule  Take 1 capsule by mouth once daily.     OPTICHAMBER BASHIR [facility]  Generic drug: inhalation spacing device  Use as directed for inhalation.     tamsulosin 0.4 mg Cap  Commonly known as: FLOMAX  Take 0.4 mg by mouth once daily.     vitamin D 1000 units Tab  Commonly known as: VITAMIN D3  Take 1,000 Units by mouth once daily.                I have seen and examined this patient within the last 30 days. My clinical findings that support the need for the home health skilled services and home bound status are the following:no   Medical restrictions requiring assistance of another human to leave home due to  Dyspnea on exertion (SOB).     Diet:   diabetic diet 2000 calorie    Labs:  None    Referrals/ Consults  Physical Therapy to evaluate and treat. Evaluate for home safety and equipment needs; Establish/upgrade home exercise program. Perform / instruct on therapeutic exercises, gait training, transfer training, and Range of Motion.    Activities:   activity as tolerated      When multiple  disciplines ordered:    Start of Care occurs on Sunday - Wednesday schedule remaining discipline evaluations as ordered on separate consecutive days following the start of care.    Thursday SOC -schedule subsequent evaluations Friday and Monday the following week.     Friday - Saturday SOC - schedule subsequent discipline evaluations on consecutive days starting Monday of the following week.    For all post-discharge communication and subsequent orders please contact patient's primary care physician.       Home Health Aide:  Physical Therapy Three times weekly    Wound Care Orders  no    I certify that this patient is confined to his home and needs physical therapy.

## 2024-12-26 NOTE — PLAN OF CARE
Introduced as VN and will be reviewing discharge instructions.  Educated patient on reason for admission, home medication list, and discharge instructions including when to return to ED and the following doctor appointments.  Education per flowsheet.  Opportunity given for questions and questions answered.   Nurse notified of   completion of discharge education.Patient is waiting for ride to arrive

## 2024-12-26 NOTE — PLAN OF CARE
Problem: Physical Therapy  Goal: Physical Therapy Goal  Description: Goals to be met by: 25     Patient will increase functional independence with mobility by performin. Bed to chair transfer with Isabella using No Assistive Device  2. Gait  x 150 feet with Isabella using No Assistive Device.     Outcome: Progressing

## 2024-12-26 NOTE — DISCHARGE SUMMARY
[facility] Hospital Medicine Discharge Summary    Primary Team: [facility] Hospitalist Team A  Attending Physician:Yasmin  Resident: Dalila  Intern: Bruce    Date of Admit: 12/23/2024  Date of Discharge: 12/26/2024    Discharge to: Home  Condition: stable    Discharge Diagnoses     Patient Active Problem List   Diagnosis    Resistant hypertension    BPH (benign prostatic hyperplasia)    Stage 3b chronic kidney disease    S/P cholecystectomy    Ventral hernia without obstruction or gangrene    Right foot drop    High cholesterol    Chronic rhinitis    S/P colostomy    Aortic atherosclerosis    Type 2 diabetes mellitus with stage 3b chronic kidney disease, without long-term current use of insulin    Dupuytren's contracture of left hand    Pulmonary nodules    RBBB    Shoulder arthritis    Pulmonary hypertension    COPD, group B, by GOLD 2017 classification    Muscle weakness    Shoulder stiffness, left    Pain in joint of left shoulder    Senile purpura    LC (obstructive sleep apnea)    Central sleep apnea    Chronic left shoulder pain    Acute respiratory failure with hypoxia    Sepsis    COPD exacerbation    Elevated troponin       Consultants and Procedures     Consultants:  None    Procedures:   None    Imaging:  CXR- emphysema, no focal consolidation or change noted when compared to previous imaging    Brief History of Present Illness & Summary of Hospital Course      Husam Connolly is a 89 y.o.male with a PMHx of COPD, HTN, T2DM, CKD3b, BPH, HLD who presented on 12/23/2024 for 7-10 day history of cough, SOB, malaise, decreased appetite. He has COPD but is not on O2 at home. He used his usual inhalers (Trelegy during day and albuterol at night) without relief. He has since developed night sweats, chills, and decreased appetite. Patient denies diarrhea, nausea, vomiting, chest pain, pleuritic pain, fever, or known sick contacts. Patient treated for suspected RLL pneumonia and COPD exacerbation with IV antibiotics,  DuoNebs, supplemental oxygen, chest PT, and guaifenesin. On the day of discharge, patient was afebrile with stable vital signs and will be discharged in stable condition to home or self care with close follow up.     For the full HPI please refer to the History & Physical from this admission.    Physical Examination:  Physical Exam  Vitals reviewed.   Constitutional:       General: He is not in acute distress.     Appearance: He is well-developed.   HENT:      Head: Normocephalic and atraumatic.      Mouth/Throat:      Mouth: Mucous membranes are moist.   Eyes:      Extraocular Movements: Extraocular movements intact.      Pupils: Pupils are equal, round, and reactive to light.      Comments: Arcus senilis present   Cardiovascular:      Rate and Rhythm: Normal rate and regular rhythm.      Heart sounds: No murmur heard.     No friction rub. No gallop.   Pulmonary:      Effort: Pulmonary effort is normal. No respiratory distress.      Comments: Diffuse expiratory wheezing noted, most prominent in RLL  Abdominal:      General: Abdomen is flat. There is no distension.      Tenderness: There is no abdominal tenderness. There is no guarding.   Musculoskeletal:      Cervical back: Normal range of motion.   Skin:     General: Skin is warm and dry.   Neurological:      Mental Status: He is alert.     Hospital Course By Problem with Pertinent Findings     Acute Hypoxic Respiratory Failure  CAP  COPD Exacerbation  Leukocytosis  Patient presents with 7-10 day history of dry cough, SOB, chills, night sweats, and decreased appetite. Used his home COPD inhalers without relief. Denies known sick contacts, pleuritic pain, fever, N/V/D. On presentation, satting 81% on RA, tachycardic in the 120s. Labs significant for leukocytosis of 13.95. CXR without evidence of acute cardiopulmonary process but concern for developing RLL process. On Exam, expiratory wheezing noted diffusely along with inspiratory crackles in RLL, tachypneic,  flushed. CURB-65 score of 2 indicates moderate risk group, appropriate for inpatient treatment. PSI/PORT score 103 indicating Risk Class IV   - s/p cef, azithro in ED  - Flu/COVID negative  - patient met sepsis criteria on admission  - started on vanc, zosyn given sepsis criteria  - follow up BC's- NGTD  - lactic elevated at 2.8 but repeat WNL  - azithro completed 12/26  - can transition ceftriaxone to amoxicillin for one more day of antibiotics  - continue DuoNebs, CPT, guaifenesin while inpatient  - patient has been stable on RA for over 24 hours without incident  - plan to complete 6 minute walk test today to assess need for home O2     COPD exacerbation  PFT's in 5/2022 indicate moderate to severe obstruction. Concern for possible COPD exacerbation.  - on Trelegy and albuterol at home  - Continue DuoNebs, CPT  - continue guaifenesin  - will send patient home with one more day of prednisone and one dose of amoxicillin  - azithro completed today  - can transition ceftriaxone to PO antibiotic in anticipation of discharge  - weaned O2 this AM and patient satting mid 90s  - plan to complete 6 minute walk test today to assess need for home O2     Elevated Troponin  Troponin mildly elevated at 0.028. EKG with sinus tachycardia. Likely represents demand ischemia in the setting of hypoxia and tachycardia.  - troponin peaked at 0.057     HTN  - on Losartan and PRN hydralazine at home  - Continue home losartan     HLD  Patient has hx of HLD. Last FLP with chol 145  HDL 57 LDL 62.6  - continue home atorvastatin, ASA     BPH  - continue home Flomax, finasteride     CKD3b  BL Cr of 1.6-1.7. Cr 1.6 on admission  - renally dose medications  - continue to monitor UOP/Cr     T2DM  - last A1C 3mos ago 6.1%  - patient on glipizide at home  - follow up repeat A1C  - goal -180 while inpatient  - LDSSI  - diabetic diet    Discharge Medications        Medication List        ASK your doctor about these medications       albuterol 90 mcg/actuation inhaler  Commonly known as: VENTOLIN HFA  Inhale 2 puffs into the lungs every 4 (four) hours as needed for Wheezing. Rescue     aspirin 81 MG EC tablet  Commonly known as: ECOTRIN     atorvastatin 40 MG tablet  Commonly known as: LIPITOR  Take 1 tablet (40 mg total) by mouth once daily.     blood sugar diagnostic Strp  To check sugar daily, to use with insurance preferred meter     blood-glucose meter kit  Use as instructed     finasteride 5 mg tablet  Commonly known as: PROSCAR  Take 1 tablet (5 mg total) by mouth once daily.     fish oil-omega-3 fatty acids 300-1,000 mg capsule     fluticasone propionate 50 mcg/actuation nasal spray  Commonly known as: FLONASE  2 sprays (100 mcg total) by Each Nostril route once daily.     fluticasone-umeclidin-vilanter 100-62.5-25 mcg Dsdv  Commonly known as: TRELEGY ELLIPTA  Inhale 1 puff into the lungs once daily.     GARLIC ORAL     glipiZIDE 5 MG Tr24  Take 1 tablet (5 mg total) by mouth daily with breakfast.     hydrALAZINE 50 MG tablet  Commonly known as: APRESOLINE  Take 1 tablet (50 mg total) by mouth 3 (three) times daily.     lancets Misc  To check sugar daily, to use with insurance preferred meter     losartan 100 MG tablet  Commonly known as: COZAAR  Take 1 tablet (100 mg total) by mouth once daily.     multivitamin capsule     OPTICHAMBER BASHIR [facility]  Generic drug: inhalation spacing device  Use as directed for inhalation.     tamsulosin 0.4 mg Cap  Commonly known as: FLOMAX     vitamin D 1000 units Tab  Commonly known as: VITAMIN D3               Discharge Information:   Diet:  Diabetic    Physical Activity:  As tolerated             Instructions:  1. Take all medications as prescribed  2. Keep all follow-up appointments  3. Return to the hospital or call your primary care physicians if any worsening symptoms such as fever, chest pain, shortness of breath, return of symptoms, or any other concerns.    Follow-Up Appointments:  PCP- need  discharge follow up appointment in 1-2 weeks  1/7/2025- priority care  1/23- ortho  3/10 lab visit  3/17 lab visit  3/17- family med  4/29 general surgery    Christina Barrera MD, STANISLAW   [facility] Internal Medicine PGY1

## 2024-12-26 NOTE — PLAN OF CARE
SW met with pt at bedside this AM to complete final d/c assessment. Per pt he plans on having his son-in-law Devin help transport him home at time of dc. Pt will not need home O2. Pt was approved by [facility] to receive their services at time of dc. Pt did not have any additional questions or concerns for sw at this time. Rounds completed on pt. All questions addressed. Bedside nurse to discuss d/c medications. Discussed importance to attend all f/u appts and take medications as prescribed. Verbalized understanding. Case Management to sign off.     Amauryjessica Hawthorne, MSARSLAN  [phone]    Future Appointments   Date Time Provider Department Center   1/7/2025 11:00 AM Gina Renee MD [facility] IMPRI [place] [facility]   1/23/2025  1:00 PM Slick Baldwin Jr., MD [facility] ORTHO [place] [facility]   3/10/2025  7:00 AM SPECIMENSHAMAR SPECLAB [place]   3/10/2025  7:30 AM LABDEL LAB [place]   3/17/2025 11:00 AM Williams Knight DO [facility]   4/29/2025  1:30 PM Fouzia Gardner MD [facility] [place]        12/26/24 1111   Final Note   Assessment Type Final Discharge Note   Anticipated Discharge Disposition Home-Health   What phone number can be called within the next 1-3 days to see how you are doing after discharge? [phone]   Post-Acute Status   Post-Acute Authorization Home Health   Home Health Status Set-up Complete/Auth obtained   Coverage phn   Discharge Delays None known at this time

## 2024-12-26 NOTE — PLAN OF CARE
Problem: Adult Inpatient Plan of Care  Goal: Patient-Specific Goal (Individualized)  Outcome: Progressing     Problem: Pneumonia  Goal: Effective Oxygenation and Ventilation  Outcome: Progressing     Problem: Fall Injury Risk  Goal: Absence of Fall and Fall-Related Injury  Outcome: Progressing     Problem: Skin Injury Risk Increased  Goal: Skin Health and Integrity  Outcome: Progressing     AAOx4. Medications administered per MAR. Tele monitored. Safety precautions maintained. Safety precautions maintained. Call bell within reach.

## 2024-12-26 NOTE — PLAN OF CARE
SW sent HH referrals via [facility]. Cm will continue to follow pt through transitions of care and assist with any discharge needs.    Amaury Christoph, MSW  [phone]    Future Appointments   Date Time Provider Department Center   1/23/2025  1:00 PM Slick Baldwin Jr., MD [facility] ORTHO [place] [facility]   3/10/2025  7:00 AM SPECIMEN, STEPHANIEPurdon YOUSUF SPECLAB [place]   3/10/2025  7:30 AM LABDEL LAB [place]   3/17/2025 11:00 AM Williams Knight DO [facility]   4/29/2025  1:30 PM Fouzia Gardner MD [facility] [place]        12/26/24 0802   Post-Acute Status   Post-Acute Authorization Home Health   Home Health Status Referrals Sent   Coverage PHN   Discharge Delays None known at this time   Discharge Plan   Discharge Plan A Home Health

## 2024-12-26 NOTE — NURSING
Blood glucose monitoring.  AVS printed and handed to patient.  PT/OT performed, Home eval done by PT (see report)  PIV removed intact.  Tele-monitoring disconnected.  Safety maintained.  Bed alarm on.  Call light within reach.  VN to review summary.

## 2024-12-26 NOTE — PROGRESS NOTES
[facility] Medicine Progress Note    Primary Team: [facility] Hospitalist Team A  Attending Physician: Yasmin  Resident: Dalila  Intern: Bruce    Subjective:      No acute events overnight. Patient resting comfortably this morning but endorses discomfort due to his cough. It continues to prevent him from achieving adequate sleep.      Objective:     Last 24 Hour Vital Signs:  BP  Min: 158/69  Max: 189/78  Temp  Av.7 °F (36.5 °C)  Min: 97 °F (36.1 °C)  Max: 98.2 °F (36.8 °C)  Pulse  Av.4  Min: 78  Max: 103  Resp  Av.5  Min: 17  Max: 22  SpO2  Av.4 %  Min: 89 %  Max: 96 %  Weight  Av.6 kg (173 lb 4.5 oz)  Min: 78.6 kg (173 lb 4.5 oz)  Max: 78.6 kg (173 lb 4.5 oz)  I/O last 3 completed shifts:  In: 480 [P.O.:480]  Out: 1300 [Urine:1300]    Physical Examination:  Physical Exam  Vitals reviewed.   Constitutional:       General: He is not in acute distress.     Appearance: He is well-developed.   HENT:      Head: Normocephalic and atraumatic.      Mouth/Throat:      Mouth: Mucous membranes are moist.   Eyes:      Extraocular Movements: Extraocular movements intact.      Pupils: Pupils are equal, round, and reactive to light.      Comments: Arcus senilis present   Cardiovascular:      Rate and Rhythm: Normal rate and regular rhythm.      Heart sounds: No murmur heard.     No friction rub. No gallop.   Pulmonary:      Effort: Pulmonary effort is normal. No respiratory distress.      Comments: Diffuse expiratory wheezing noted, most prominent in RLL  Abdominal:      General: Abdomen is flat. There is no distension.      Tenderness: There is no abdominal tenderness. There is no guarding.   Musculoskeletal:      Cervical back: Normal range of motion.   Skin:     General: Skin is warm and dry.   Neurological:      Mental Status: He is alert.          Laboratory:  Laboratory Data Reviewed: yes  Microbiology Data Reviewed: yes  Pertinent Findings:  None    Other Results:  EKG (my interpretation): sinus  tachycardia.    Radiology Data Reviewed: yes  Pertinent Findings:  CXR without acute cardiopulmonary process but appears there may be a developing RLL process    Current Medications:     Infusions:       Scheduled:   albuterol-ipratropium  3 mL Nebulization Q4H    aspirin  81 mg Oral Daily    atorvastatin  40 mg Oral Daily    azithromycin  500 mg Intravenous Q24H    cefTRIAXone (Rocephin) IV (PEDS and ADULTS)  1 g Intravenous Q24H    enoxparin  30 mg Subcutaneous Daily    finasteride  5 mg Oral Daily    guaiFENesin  600 mg Oral BID    losartan  100 mg Oral Daily    predniSONE  40 mg Oral Daily    tamsulosin  0.4 mg Oral Daily        PRN:    Current Facility-Administered Medications:     dextrose 50%, 12.5 g, Intravenous, PRN    dextrose 50%, 25 g, Intravenous, PRN    glucagon (human recombinant), 1 mg, Intramuscular, PRN    glucose, 16 g, Oral, PRN    glucose, 24 g, Oral, PRN    insulin aspart U-100, 0-5 Units, Subcutaneous, QID (AC + HS) PRN    Antibiotics and Day Number of Therapy:  Ceftriaxone 12/23-  Azithromycin 12/23-12/26  Zosyn 12/23-12/24  Vanc 12/23- dc'd    Lines and Day Number of Therapy:  PIV    Assessment:     Husam Connolly is a 89 y.o.male with  Patient Active Problem List    Diagnosis Date Noted    Acute respiratory failure with hypoxia 12/24/2024    Sepsis 12/24/2024    COPD exacerbation 12/24/2024    Elevated troponin 12/24/2024    Chronic left shoulder pain 07/10/2024    Central sleep apnea 06/10/2024    LC (obstructive sleep apnea) 06/06/2024    Senile purpura 06/05/2024    Muscle weakness 03/11/2024    Shoulder stiffness, left 03/11/2024    Pain in joint of left shoulder 03/11/2024    COPD, group B, by GOLD 2017 classification 08/23/2023    Pulmonary hypertension 07/24/2023    Shoulder arthritis 01/05/2023    RBBB 11/29/2022    Pulmonary nodules     Dupuytren's contracture of left hand 03/21/2022    Type 2 diabetes mellitus with stage 3b chronic kidney disease, without long-term current use  of insulin 09/22/2021    Aortic atherosclerosis 05/11/2021    Chronic rhinitis 03/01/2021    S/P colostomy 03/01/2021    Right foot drop 08/26/2020    High cholesterol 08/26/2020    Ventral hernia without obstruction or gangrene 02/27/2020    S/P cholecystectomy 07/01/2019    Stage 3b chronic kidney disease 12/13/2018    Resistant hypertension     BPH (benign prostatic hyperplasia)         Plan:     Acute Hypoxic Respiratory Failure  CAP  COPD Exacerbation  Leukocytosis  Patient presents with 7-10 day history of dry cough, SOB, chills, night sweats, and decreased appetite. Used his home COPD inhalers without relief. Denies known sick contacts, pleuritic pain, fever, N/V/D. On presentation, satting 81% on RA, tachycardic in the 120s. Labs significant for leukocytosis of 13.95. CXR without evidence of acute cardiopulmonary process but concern for developing RLL process. On Exam, expiratory wheezing noted diffusely along with inspiratory crackles in RLL, tachypneic, flushed. CURB-65 score of 2 indicates moderate risk group, appropriate for inpatient treatment. PSI/PORT score 103 indicating Risk Class IV   - s/p cef, azithro in ED  - Flu/COVID negative  - patient met sepsis criteria on admission  - started on vanc, zosyn given sepsis criteria  - follow up BC's- NGTD  - lactic elevated at 2.8 but repeat WNL  - azithro completed today  - can transition ceftriaxone to amoxicillin for one dose tomorrow in anticipation of discharge  - continue DuoNebs, CPT, guaifenesin while inpatient  - patient has been stable on RA for over 24 hours without incident  - plan to complete 6 minute walk test today to assess need for home O2     COPD exacerbation  PFT's in 5/2022 indicate moderate to severe obstruction. Concern for possible COPD exacerbation.  - on Trelegy and albuterol at home  - Continue DuoNebs, CPT  - continue guaifenesin  - will send patient home with one more day of prednisone and one dose of amoxicillin  - azithro  completed today  - can transition ceftriaxone to PO antibiotic in anticipation of discharge  - weaned O2 this AM and patient satting mid 90s  - plan to complete 6 minute walk test today to assess need for home O2     Elevated Troponin  Troponin mildly elevated at 0.028. EKG with sinus tachycardia. Likely represents demand ischemia in the setting of hypoxia and tachycardia.  - troponin peaked at 0.057     HTN  - on Losartan and PRN hydralazine at home  - Continue home losartan     HLD  Patient has hx of HLD. Last FLP with chol 145  HDL 57 LDL 62.6  - continue home atorvastatin, ASA     BPH  - continue home Flomax, finasteride     CKD3b  BL Cr of 1.6-1.7. Cr 1.6 on admission  - renally dose medications  - continue to monitor UOP/Cr     T2DM  - last A1C 3mos ago 6.1%  - patient on glipizide at home  - follow up repeat A1C  - goal -180 while inpatient  - LDSSI  - diabetic diet     DVT PPx: renally dosed lovenox  Diet: Diabetic  Code: Full     Disposition: discharge home        Christina Barrera MD, STANISLAW  [facility] Internal Medicine PGY1     [facility] Medicine Hospitalist Pager numbers:   [facility] Hospitalist Medicine Team A (Sunday/Yasmin): [phone]  [facility] Hospitalist Medicine Team B (Melida/Lauren):

## 2024-12-26 NOTE — PT/OT/SLP PROGRESS
Physical Therapy Treatment    Patient Name:  Husam Connolly   MRN:  [id number]    Recommendations:     Discharge Recommendations: Low Intensity Therapy  Discharge Equipment Recommendations: none  Barriers to discharge:  decreased mobility,strength and endurance    Assessment:     Husam Connolly is a 89 y.o. male admitted with a medical diagnosis of COPD exacerbation.  He presents with the following impairments/functional limitations: weakness, impaired endurance, impaired functional mobility, gait instability, impaired balance, decreased lower extremity function, impaired coordination,pt with improving status and will benefit from low intensity therapy upon discharge.    Rehab Prognosis: Good; patient would benefit from acute skilled PT services to address these deficits and reach maximum level of function.    Recent Surgery: * No surgery found *      Plan:     During this hospitalization, patient to be seen 2 x/week to address the identified rehab impairments via gait training, therapeutic activities, therapeutic exercises, neuromuscular re-education and progress toward the following goals:    Plan of Care Expires:  01/24/25    Subjective     Chief Complaint: n/a  Patient/Family Comments/goals: pt agreeable to rx.  Pain/Comfort:  Pain Rating 1: 0/10      Objective:     Communicated with nsg prior to session.  Patient found supine with bed alarm, peripheral IV, telemetry upon PT entry to room.     General Precautions: Standard, fall  Orthopedic Precautions: N/A  Braces: N/A  Respiratory Status: Room air     Functional Mobility:  Bed Mobility:     Supine to Sit: supervision  Sit to Supine: supervision  Transfers:     Sit to Stand:  supervision with no AD  Gait: amb ~50' X 2 w/o AD and SBA  Balance: fair standing balance      AM-PAC 6 CLICK MOBILITY  Turning over in bed (including adjusting bedclothes, sheets and blankets)?: 4  Sitting down on and standing up from a chair with arms (e.g., wheelchair, bedside commode,  etc.): 3  Moving from lying on back to sitting on the side of the bed?: 4  Moving to and from a bed to a chair (including a wheelchair)?: 3  Need to walk in hospital room?: 3  Climbing 3-5 steps with a railing?: 3  Basic Mobility Total Score: 20       Treatment & Education: le supine ex's x 10-12 reps inc: ap,hs,abd/add,slr.O2 sats rm air at rest:96%,during and post gait 94%,nsg notified.      Patient left supine with all lines intact, call button in reach, bed alarm on, and nsg notified..    GOALS: see general POC  Multidisciplinary Problems       Physical Therapy Goals          Problem: Physical Therapy    Goal Priority Disciplines Outcome Interventions   Physical Therapy Goal     PT, PT/OT Progressing    Description: Goals to be met by: 25     Patient will increase functional independence with mobility by performin. Bed to chair transfer with [place] using No Assistive Device  2. Gait  x 150 feet with [place] using No Assistive Device.                          Time Tracking:     PT Received On: 24  PT Start Time: 818     PT Stop Time: 842  PT Total Time (min): 24 min     Billable Minutes: Gait Training 14 and Therapeutic Exercise 10    Treatment Type: Treatment  PT/PTA: PTA     Number of PTA visits since last PT visit: 2024

## 2024-12-26 NOTE — PT/OT/SLP PROGRESS
Occupational Therapy   Treatment    Name: Huasm Connolly  MRN: [id number]  Admitting Diagnosis:  COPD exacerbation       Recommendations:     Discharge Recommendations: Low Intensity Therapy  Discharge Equipment Recommendations:  none  Barriers to discharge:  Other (Comment) (SOB upon exertion)    Assessment:     Husam Connolyl is a 89 y.o. male with a medical diagnosis of COPD exacerbation.  He presents with The primary encounter diagnosis was Pneumonia of right lower lobe due to infectious organism. Diagnoses of Cough, unspecified type, Hypoxia, Community acquired pneumonia, Sepsis, due to unspecified organism, unspecified whether acute organ dysfunction present, Acute respiratory failure with hypoxia, COPD exacerbation, Elevated troponin, Stage 3b chronic kidney disease, and Benign prostatic hyperplasia with lower urinary tract symptoms, symptom details unspecified were also pertinent to this visit. Performance deficits affecting function are impaired balance, weakness, impaired endurance, impaired self care skills, impaired functional mobility, decreased lower extremity function, decreased upper extremity function.     Rehab Prognosis:  Good; patient would benefit from acute skilled OT services to address these deficits and reach maximum level of function.       Plan:     Patient to be seen 3 x/week to address the above listed problems via self-care/home management, therapeutic activities, therapeutic exercises  Plan of Care Expires: 01/26/25  Plan of Care Reviewed with: patient    Subjective     Chief Complaint: Fatigue.   Patient/Family Comments/goals: Discharge home w/ Family support.   Pain/Comfort:  Pain Rating 1: 0/10  Pain Rating Post-Intervention 2: 0/10    Objective:     Communicated with: charbel prior to session.  Patient found supine with peripheral IV, telemetry upon OT entry to room.    General Precautions: Standard, fall    Orthopedic Precautions:N/A  Braces: N/A  Respiratory Status: Room air      Occupational Performance:     Bed Mobility:    Patient completed Supine to Sit with modified independence     Functional Mobility/Transfers:  Patient defers functional mobility 2/2 fatigue from previous PTA session.     Activities of Daily Living:  Patient declines ADLS, despite max encouragement.       [facility] 6 Click ADL: 21    Treatment & Education:  Patient educated on purpose/role of OT.   Patient declining OOB activity 2/2 fatigue, but agreeable to sit EOB w/ encouragement from OT and RN.   Patient on RA throughout session.   Patient sits EOB ~15 min with distance SPV.     Patient performs the following seated EOB using 4# dumbbell:   -bicep curls x10 reps  -internal/external rotation x10 reps   -shoulder flexion x10 reps   -BLE marches x10 reps  -BLE kicks x10 reps   Patient requires rest breaks as needed 2/2 SOB upon exertion.     OT educates patient on energy conservation strategies and recommended AE (long-handled sponge) for ADL tasks to improve I and safety upon discharge home.       Patient left sitting edge of bed with all lines intact, call button in reach, bed alarm on, and nsg present    GOALS:   Multidisciplinary Problems       Occupational Therapy Goals       Not on file              Multidisciplinary Problems (Resolved)          Problem: Occupational Therapy    Goal Priority Disciplines Outcome Interventions   Occupational Therapy Goal   (Resolved)     OT, PT/OT Met    Description: Goals to be met by: 01/24/2025     Patient will increase functional independence with ADLs by performing:    Grooming while standing with Preston.  Toileting from toilet with Preston for hygiene and clothing management.   Step transfer with Preston  Toilet transfer to toilet with Preston.                         Time Tracking:     OT Date of Treatment: 12/26/24  OT Start Time: 1151  OT Stop Time: 1214  OT Total Time (min): 23 min    Billable Minutes:Therapeutic Exercise 23    OT/TIN: OT     Number  of TIN visits since last OT visit: 0    12/26/2024

## 2024-12-26 NOTE — PLAN OF CARE
Medicare Message     Important Message from Medicare regarding Discharge Appeal Rights Explained to patient/caregiver; Signed/date by patient/caregiver   Date IMM was signed 12/24/2024   Time IMM was signed 1234

## 2024-12-27 ENCOUNTER — TELEPHONE (OUTPATIENT)
Dept: FAMILY MEDICINE | Facility: CLINIC | Age: 89
End: 2024-12-27
Payer: MEDICARE

## 2024-12-27 DIAGNOSIS — E11.22 TYPE 2 DIABETES MELLITUS WITH STAGE 3B CHRONIC KIDNEY DISEASE, WITHOUT LONG-TERM CURRENT USE OF INSULIN: ICD-10-CM

## 2024-12-27 DIAGNOSIS — N18.32 TYPE 2 DIABETES MELLITUS WITH STAGE 3B CHRONIC KIDNEY DISEASE, WITHOUT LONG-TERM CURRENT USE OF INSULIN: ICD-10-CM

## 2024-12-27 NOTE — TELEPHONE ENCOUNTER
Refill Decision Note   Husam Connolly  is requesting a refill authorization.    Brief Assessment and Rationale for Refill:   Approve       Medication Therapy Plan:  Acute care/admission documentation reviewed. No change in therapy. Ok to approve.      Extended chart review required: Yes   Comments:     Note composed:1:43 PM 12/27/2024

## 2024-12-27 NOTE — TELEPHONE ENCOUNTER
No care due was identified.  [facility] Embedded Care Due Messages. Reference number: [id number].   12/27/2024 8:08:06 AM CST

## 2024-12-27 NOTE — TELEPHONE ENCOUNTER
----- Message from Rubio sent at 12/27/2024  2:31 PM CST -----  Type:  Sooner Apoointment Request    Caller is requesting a sooner appointment.  Caller will accept being placed on the waitlist and is requesting a message be sent to doctor.  Name of Caller:pt  When is the first available appointment?03/17  Symptoms:hos[/fu  Would the patient rather a call back or a response via [facility]? call  Best Call Back Number:[phone]  Additional Information:

## 2024-12-27 NOTE — TELEPHONE ENCOUNTER
Return patient phone call. Spoke to patient. Patient needed hospital follow up. Appointment scheduled.

## 2024-12-28 LAB
BACTERIA BLD CULT: NORMAL
BACTERIA BLD CULT: NORMAL

## 2024-12-30 ENCOUNTER — PATIENT OUTREACH (OUTPATIENT)
Dept: ADMINISTRATIVE | Facility: CLINIC | Age: 89
End: 2024-12-30
Payer: MEDICARE

## 2024-12-30 NOTE — PROGRESS NOTES
C3 nurse spoke with Husam Connolly  for a TCC post hospital discharge follow up call. The patient has a scheduled HOS appointment with Elin Ellsworth MD on 01/06/2025 at 1 PM.

## 2025-01-06 ENCOUNTER — OFFICE VISIT (OUTPATIENT)
Dept: FAMILY MEDICINE | Facility: CLINIC | Age: OVER 89
End: 2025-01-06
Payer: MEDICARE

## 2025-01-06 VITALS
BODY MASS INDEX: 24.65 KG/M2 | HEART RATE: 83 BPM | HEIGHT: 69 IN | WEIGHT: 166.44 LBS | SYSTOLIC BLOOD PRESSURE: 148 MMHG | OXYGEN SATURATION: 96 % | DIASTOLIC BLOOD PRESSURE: 64 MMHG

## 2025-01-06 DIAGNOSIS — J44.1 COPD EXACERBATION: ICD-10-CM

## 2025-01-06 DIAGNOSIS — I1A.0 RESISTANT HYPERTENSION: ICD-10-CM

## 2025-01-06 DIAGNOSIS — J18.9 COMMUNITY ACQUIRED PNEUMONIA OF RIGHT LOWER LOBE OF LUNG: Primary | ICD-10-CM

## 2025-01-06 DIAGNOSIS — E11.22 TYPE 2 DIABETES MELLITUS WITH STAGE 3B CHRONIC KIDNEY DISEASE, WITHOUT LONG-TERM CURRENT USE OF INSULIN: ICD-10-CM

## 2025-01-06 DIAGNOSIS — N13.8 BENIGN PROSTATIC HYPERPLASIA WITH URINARY OBSTRUCTION: ICD-10-CM

## 2025-01-06 DIAGNOSIS — N18.32 TYPE 2 DIABETES MELLITUS WITH STAGE 3B CHRONIC KIDNEY DISEASE, WITHOUT LONG-TERM CURRENT USE OF INSULIN: ICD-10-CM

## 2025-01-06 DIAGNOSIS — J31.0 CHRONIC RHINITIS: ICD-10-CM

## 2025-01-06 DIAGNOSIS — J96.01 ACUTE RESPIRATORY FAILURE WITH HYPOXIA: ICD-10-CM

## 2025-01-06 DIAGNOSIS — R79.89 ELEVATED TROPONIN: ICD-10-CM

## 2025-01-06 DIAGNOSIS — N40.1 BENIGN PROSTATIC HYPERPLASIA WITH URINARY OBSTRUCTION: ICD-10-CM

## 2025-01-06 PROCEDURE — 1160F RVW MEDS BY RX/DR IN RCRD: CPT | Mod: CPTII,S$GLB,, | Performed by: FAMILY MEDICINE

## 2025-01-06 PROCEDURE — 1101F PT FALLS ASSESS-DOCD LE1/YR: CPT | Mod: CPTII,S$GLB,, | Performed by: FAMILY MEDICINE

## 2025-01-06 PROCEDURE — 1126F AMNT PAIN NOTED NONE PRSNT: CPT | Mod: CPTII,S$GLB,, | Performed by: FAMILY MEDICINE

## 2025-01-06 PROCEDURE — 99214 OFFICE O/P EST MOD 30 MIN: CPT | Mod: S$GLB,,, | Performed by: FAMILY MEDICINE

## 2025-01-06 PROCEDURE — 3288F FALL RISK ASSESSMENT DOCD: CPT | Mod: CPTII,S$GLB,, | Performed by: FAMILY MEDICINE

## 2025-01-06 PROCEDURE — 99999 PR PBB SHADOW E&M-EST. PATIENT-LVL IV: CPT | Mod: PBBFAC,,, | Performed by: FAMILY MEDICINE

## 2025-01-06 PROCEDURE — 1111F DSCHRG MED/CURRENT MED MERGE: CPT | Mod: CPTII,S$GLB,, | Performed by: FAMILY MEDICINE

## 2025-01-06 PROCEDURE — 1159F MED LIST DOCD IN RCRD: CPT | Mod: CPTII,S$GLB,, | Performed by: FAMILY MEDICINE

## 2025-01-06 RX ORDER — MUPIROCIN 20 MG/G
OINTMENT TOPICAL
COMMUNITY
Start: 2024-10-30

## 2025-01-06 NOTE — PROGRESS NOTES
.Subjective     Patient ID: Husam Connolly is a 89 y.o. male.    Chief Complaint: Hospital Follow Up    HPI  History of Present Illness    CHIEF COMPLAINT:  Husam presents today for follow-up after hospitalization for pneumonia.    RECENT HOSPITALIZATION:  He was hospitalized from December 23rd to December 26th for pneumonia, which resulted in hypoxia and COPD exacerbation. Cardiac enzymes were slightly elevated due to the pneumonia.    RESPIRATORY STATUS:  He reports oxygen levels drop to upper 80s (88-89%) with ambulation but improve to 93-95% with rest and deep breathing. SpO2 is 96% when sitting. He has a persistent dry cough following recent pneumonia. He denies difficulty breathing, nasal congestion, throat mucus, drainage, or need for supplemental oxygen at home.    CARDIOVASCULAR:  He reports bilateral leg swelling, more pronounced in the left leg, without calf pain, redness, or tenderness. Blood pressure typically runs in 140s/60s, with this morning's reading at 144/66.    DIABETES:  His diabetes is well controlled with glipizide, which he reports as more effective than previous metformin therapy. Morning glucose was 160. A1C has improved significantly from 7.7 to 5.8.    CONSTITUTIONAL:  He reports constant fatigue and lack of stamina but denies depression. His weight has been stable in the mid 160s for years. His appetite decreases when stressed, and he typically eats small portions.    CURRENT MEDICATIONS:  He uses Spiriva and Trelegy inhalers daily, with no recent need for Albuterol. Other medications include Finasteride and Tamsulosin for enlarged prostate, Flonase for nasal symptoms, Glipizide for diabetes, Losartan and Hydralazine for blood pressure, and low-dose aspirin for blood thinning.      ROS:  General: -fever, -chills, +fatigue, -weight gain, -weight loss, +loss of appetite  Eyes: -vision changes, -redness, -discharge  ENT: -ear pain, -nasal congestion, -sore throat  Cardiovascular: -chest  pain, -palpitations, -lower extremity edema  Respiratory: +cough, -shortness of breath, -difficulty breathing  Gastrointestinal: -abdominal pain, -nausea, -vomiting, -diarrhea, -constipation, -blood in stool  Genitourinary: -dysuria, -hematuria, -frequency  Musculoskeletal: -joint pain, -muscle pain  Skin: -rash, -lesion  Neurological: -headache, -dizziness, -numbness, -tingling  Psychiatric: -anxiety, -depression, -sleep difficulty        Objective     Vitals:    25 1303   BP: (!) 148/64   Pulse: 83      Current Outpatient Medications   Medication Sig Dispense Refill    albuterol (VENTOLIN HFA) 90 mcg/actuation inhaler Inhale 2 puffs into the lungs every 4 (four) hours as needed for Wheezing. Rescue 18 g 11    aspirin (ECOTRIN) 81 MG EC tablet Take 81 mg by mouth once daily.      atorvastatin (LIPITOR) 40 MG tablet Take 1 tablet (40 mg total) by mouth once daily. 90 tablet 3    blood sugar diagnostic (ACCU-CHEK GUIDE TEST STRIPS) Strp 1 strip by In Vitro route once daily. 100 strip 3    blood-glucose meter kit Use as instructed 1 each 0    finasteride (PROSCAR) 5 mg tablet Take 1 tablet (5 mg total) by mouth once daily. 90 tablet 3    fluticasone propionate (FLONASE) 50 mcg/actuation nasal spray 2 sprays (100 mcg total) by Each Nostril route once daily. 48 g 3    fluticasone-umeclidin-vilanter (TRELEGY ELLIPTA) 100-62.5-25 mcg DsDv Inhale 1 puff into the lungs once daily. 180 each 3    GARLIC ORAL Take 1 capsule by mouth once daily.      glipiZIDE 5 MG TR24 Take 1 tablet (5 mg total) by mouth daily with breakfast. 90 tablet 3    inhalation spacing device Use as directed for inhalation. 1 each 0    lancets Misc To check sugar daily, to use with insurance preferred meter 200 each 2    losartan (COZAAR) 100 MG tablet Take 1 tablet (100 mg total) by mouth once daily. 90 tablet 3    multivitamin capsule Take 1 capsule by mouth once daily.      mupirocin (BACTROBAN) 2 % ointment SMARTSI Application Topical 2-3  Times Daily      omega-3 fatty acids/fish oil (FISH OIL-OMEGA-3 FATTY ACIDS) 300-1,000 mg capsule Take 1 capsule by mouth once daily.      tamsulosin (FLOMAX) 0.4 mg Cap Take 0.4 mg by mouth once daily.      vitamin D (VITAMIN D3) 1000 units Tab Take 1,000 Units by mouth once daily.      hydrALAZINE (APRESOLINE) 50 MG tablet Take 1 tablet (50 mg total) by mouth 3 (three) times daily. 270 tablet 3    tiotropium bromide (SPIRIVA RESPIMAT) 2.5 mcg/actuation inhaler INHALE 2 PUFFS INTO THE LUNGS DAILY       No current facility-administered medications for this visit.      Physical Exam    Vitals: Weight: 166 lbs. Blood pressure: 144/66. SpO2: 96% at rest.  General: No acute distress. Well-developed. Well-nourished.  Eyes: EOMI. Sclerae anicteric.  HENT: Normocephalic. Atraumatic. Nares patent. Moist oral mucosa.  Ears: Bilateral TMs clear. Bilateral EACs clear.  Cardiovascular: Regular rate. Regular rhythm. No murmurs. No rubs. No gallops. Normal S1, S2.  Respiratory: Normal respiratory effort. Clear to auscultation bilaterally. No rales. No rhonchi. No wheezing.  Abdomen: Soft. Non-tender. Non-distended. Normoactive bowel sounds.  Musculoskeletal: No  obvious deformity.  Extremities: No lower extremity edema.  Neurological: Alert & oriented x3. No slurred speech. Normal gait.  Psychiatric: Normal mood. Normal affect. Good insight. Good judgment.  Skin: Warm. Dry. No rash.        Assessment and Plan     Community acquired pneumonia of right lower lobe of lung  - Completed antibiotics and steroids.  Symptoms resolved except dry cough and mild desaturations with activity.  Continue to monitor.    Acute respiratory failure with hypoxia  - Completed antibiotics and steroids.  Symptoms resolved except dry cough and mild desaturations with activity.  Continue to monitor.    COPD exacerbation  - Completed antibiotics and steroids.  Symptoms resolved except dry cough and mild desaturations with activity.  Continue to monitor.   Continue Spiriva, Trelegy and Albuterol as ordered.    Elevated troponin  - No further symptoms.  Continue to monitor.    Type 2 diabetes mellitus with stage 3b chronic kidney disease, without long-term current use of insulin  . Teaching on DMT2 including minimizing risk factors, diabetic diet, medications, exercise >150 minutes per week, and wt management.  Continue Glipizide as ordered.    Resistant hypertension  - Teaching on HTN including minimizing risk factors, low sodium diet, avoid salty foods such as processed meats/frozen meals/fast foods, exercise of at least 150 minutes per week and healthy weight/BMI management with wt loss recommended.  - Continue current medication of Losartan and Hydralazine.        Benign prostatic hyperplasia with urinary obstruction    Chronic rhinitis  Continue Flonase as needed.     Assessment & Plan    IMPRESSION:  - Assessed patient's recovery from recent hospitalization for pneumonia, COPD exacerbation, and hypoxia  - Evaluated ongoing respiratory symptoms, noting improvement in SpO2 (96% at rest) but persistent drops to upper 80s with activity  - Considered leg swelling likely circulatory-related rather than clot-related due to bilateral presentation and absence of calf pain/tenderness  - Reviewed diabetes management, noting excellent control with current glipizide regimen (A1C improved from 7.7 to 5.8)  - Monitored blood pressure, noting slight elevation from pre-hospitalization baseline  - Evaluated weight stability, ruling out concerns for underlying pathology    2 DIABETES MELLITUS WITH DIABETIC CHRONIC KIDNEY DISEASE:  - Continue glipizide for diabetes management.  - Diabetes is well-controlled based on HbA1c improvement from 7.7 a year ago to 5.8 in the most recent check.  - Metformin and Jardiance were previously discontinued due to side effects.  - Husam's glucose level was 160 this morning.  - Ordered labs for March 10th.  - Instructed patient to maintain current  medication regimen.    ATRIAL FIBRILLATION:  - Continue low-dose (81 mg) aspirin for atrial fibrillation management.    EXACERBATION:  - Continue current inhaler regimen: Spiriva as scheduled, Trelegy in the morning, Spiriva mist at night before bed, and Albuterol as needed for rescue.  - Discontinued guaifenesin (Mucinex).  - Continue fluticasone (Flonase generic) as needed for nasal symptoms.  - Husam reports oxygen levels dropping to upper 80s when ambulating, returning to 93-95% when resting.  - Current exam shows normal lung sounds and 96% SpO2 when sitting relaxed.  - Husam has a cough but no dyspnea or nasal congestion.  - Educated patient about acute vs. chronic conditions regarding intermittent clear rhinorrhea and discussed potential triggers like cold weather and dust from heating systems.  - Noted recent COPD exacerbation during hospital stay and completion of Prednisone course after discharge.    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    HYPERTENSION:  - Continue Losartan daily and Hydralazine 3 times daily for blood pressure management.    SWELLING:  - Advise patient to watch salt intake and elevate feet when sitting to help reduce leg swelling.    MANAGEMENT:  - Explained normal weight distribution changes with aging.    UP:  - Follow up on March 17th as scheduled with Dr. Knight.  - Husam to discuss transitioning care to Dr. Ellsworth after June 1st when Dr. Knight leaves.         This note was generated with the assistance of ambient listening technology. Verbal consent was obtained by the patient and accompanying visitor(s) for the recording of patient appointment to facilitate this note. I attest to having reviewed and edited the generated note for accuracy, though some syntax or spelling errors may persist. Please contact the author of this note for any clarification.

## 2025-01-23 ENCOUNTER — TELEPHONE (OUTPATIENT)
Dept: ORTHOPEDICS | Facility: CLINIC | Age: OVER 89
End: 2025-01-23
Payer: MEDICARE

## 2025-01-23 ENCOUNTER — PATIENT MESSAGE (OUTPATIENT)
Dept: ORTHOPEDICS | Facility: CLINIC | Age: OVER 89
End: 2025-01-23
Payer: MEDICARE

## 2025-01-23 NOTE — TELEPHONE ENCOUNTER
----- Message from Dipti sent at 1/23/2025  8:37 AM CST -----  Pt Call back    Who called: pt  Best call back #:  [phone]  Note: pt was calling to confirm his 1/23/25 appt that he said was for a follow up; I informed pt that that appt was canceled and offered to reschedule; pt accepted but then told me that appt was probably for an injection; please let pt know if he will be reschedule and for which date, thank you

## 2025-02-10 DIAGNOSIS — I10 WHITE COAT SYNDROME WITH DIAGNOSIS OF HYPERTENSION: ICD-10-CM

## 2025-02-10 DIAGNOSIS — I10 PRIMARY HYPERTENSION: Primary | ICD-10-CM

## 2025-02-10 DIAGNOSIS — I48.0 PAROXYSMAL ATRIAL FIBRILLATION: ICD-10-CM

## 2025-02-10 DIAGNOSIS — I70.0 AORTIC ATHEROSCLEROSIS: ICD-10-CM

## 2025-02-10 RX ORDER — HYDRALAZINE HYDROCHLORIDE 50 MG/1
50 TABLET, FILM COATED ORAL 3 TIMES DAILY
Qty: 270 TABLET | Refills: 3 | Status: ON HOLD | OUTPATIENT
Start: 2025-02-10 | End: 2026-02-10

## 2025-02-10 NOTE — TELEPHONE ENCOUNTER
----- Message from Carlo sent at 2/10/2025  9:34 AM CST -----  Contact: Patient  Type:  Patient Call          Who Called: patient         Does the patient know what this is regarding?: Requesting a call back about having a refill on Rx   hydrALAZINE (APRESOLINE) 50 MG tablet ; please advise         Would the patient rather a call back or a response via [facility]?call           Best Call Back Number: [phone]             Additional Information:   [id number] - [place] - [address] AT [facility] [address]  [address] [place] [address]  Phone: [phone] Fax: [phone]

## 2025-02-13 ENCOUNTER — HOSPITAL ENCOUNTER (OUTPATIENT)
Facility: HOSPITAL | Age: OVER 89
Discharge: HOME | End: 2025-02-16
Attending: STUDENT IN AN ORGANIZED HEALTH CARE EDUCATION/TRAINING PROGRAM | Admitting: FAMILY MEDICINE
Payer: MEDICARE

## 2025-02-13 DIAGNOSIS — R79.89 TROPONIN I ABOVE REFERENCE RANGE: ICD-10-CM
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
## 2025-02-13 DIAGNOSIS — R07.89 CHEST TIGHTNESS: ICD-10-CM

## 2025-02-13 DIAGNOSIS — R53.83 FATIGUE: ICD-10-CM

## 2025-02-13 DIAGNOSIS — R55 SYNCOPE, UNSPECIFIED SYNCOPE TYPE: ICD-10-CM

## 2025-02-13 DIAGNOSIS — R79.89 ELEVATED TROPONIN: ICD-10-CM

## 2025-02-13 PROBLEM — E11.3299 CONTROLLED TYPE 2 DIABETES MELLITUS WITH MILD NONPROLIFERATIVE RETINOPATHY WITHOUT MACULAR EDEMA, WITHOUT LONG-TERM CURRENT USE OF INSULIN: Status: ACTIVE | Noted: 2025-02-13

## 2025-02-13 PROBLEM — N18.32 TYPE 2 DIABETES MELLITUS WITH STAGE 3B CHRONIC KIDNEY DISEASE, WITHOUT LONG-TERM CURRENT USE OF INSULIN: Status: RESOLVED | Noted: 2021-09-22 | Resolved: 2025-02-13

## 2025-02-13 PROBLEM — J43.9 PULMONARY EMPHYSEMA: Status: ACTIVE | Noted: 2025-02-13

## 2025-02-13 PROBLEM — E11.9 TYPE 2 DIABETES MELLITUS, WITHOUT LONG-TERM CURRENT USE OF INSULIN: Status: ACTIVE | Noted: 2025-02-13

## 2025-02-13 PROBLEM — E11.22 TYPE 2 DIABETES MELLITUS WITH STAGE 3B CHRONIC KIDNEY DISEASE, WITHOUT LONG-TERM CURRENT USE OF INSULIN: Status: RESOLVED | Noted: 2021-09-22 | Resolved: 2025-02-13

## 2025-02-13 PROBLEM — E11.3299 CONTROLLED TYPE 2 DIABETES MELLITUS WITH MILD NONPROLIFERATIVE RETINOPATHY WITHOUT MACULAR EDEMA, WITHOUT LONG-TERM CURRENT USE OF INSULIN: Status: RESOLVED | Noted: 2025-02-13 | Resolved: 2025-02-13

## 2025-02-13 LAB
ALBUMIN SERPL BCP-MCNC: 3.7 G/DL (ref 3.5–5.2)
ALP SERPL-CCNC: 70 U/L (ref 40–150)
ALT SERPL W/O P-5'-P-CCNC: 19 U/L (ref 10–44)
ANION GAP SERPL CALC-SCNC: 17 MMOL/L (ref 8–16)
AST SERPL-CCNC: 24 U/L (ref 10–40)
BASOPHILS # BLD AUTO: 0.03 K/UL (ref 0–0.2)
BASOPHILS NFR BLD: 0.2 % (ref 0–1.9)
BILIRUB SERPL-MCNC: 1.2 MG/DL (ref 0.1–1)
BILIRUB UR QL STRIP: NEGATIVE
BUN SERPL-MCNC: 38 MG/DL (ref 10–30)
CALCIUM SERPL-MCNC: 9.6 MG/DL (ref 8.7–10.5)
CHLORIDE SERPL-SCNC: 108 MMOL/L (ref 95–110)
CLARITY UR: CLEAR
CO2 SERPL-SCNC: 16 MMOL/L (ref 23–29)
COLOR UR: YELLOW
CREAT SERPL-MCNC: 1.8 MG/DL (ref 0.5–1.4)
DIFFERENTIAL METHOD BLD: ABNORMAL
EOSINOPHIL # BLD AUTO: 0 K/UL (ref 0–0.5)
EOSINOPHIL NFR BLD: 0.1 % (ref 0–8)
ERYTHROCYTE [DISTWIDTH] IN BLOOD BY AUTOMATED COUNT: 13.6 % (ref 11.5–14.5)
EST. GFR  (NO RACE VARIABLE): 35 ML/MIN/1.73 M^2
GLUCOSE SERPL-MCNC: 124 MG/DL (ref 70–110)
GLUCOSE UR QL STRIP: NEGATIVE
HCT VFR BLD AUTO: 45.1 % (ref 40–54)
HGB BLD-MCNC: 15 G/DL (ref 14–18)
HGB UR QL STRIP: NEGATIVE
IMM GRANULOCYTES # BLD AUTO: 0.04 K/UL (ref 0–0.04)
IMM GRANULOCYTES NFR BLD AUTO: 0.3 % (ref 0–0.5)
KETONES UR QL STRIP: ABNORMAL
LEUKOCYTE ESTERASE UR QL STRIP: NEGATIVE
LYMPHOCYTES # BLD AUTO: 0.8 K/UL (ref 1–4.8)
LYMPHOCYTES NFR BLD: 6.6 % (ref 18–48)
MAGNESIUM SERPL-MCNC: 2 MG/DL (ref 1.6–2.6)
MCH RBC QN AUTO: 30.2 PG (ref 27–31)
MCHC RBC AUTO-ENTMCNC: 33.3 G/DL (ref 32–36)
MCV RBC AUTO: 91 FL (ref 82–98)
MONOCYTES # BLD AUTO: 0.9 K/UL (ref 0.3–1)
MONOCYTES NFR BLD: 7.1 % (ref 4–15)
NEUTROPHILS # BLD AUTO: 10.4 K/UL (ref 1.8–7.7)
NEUTROPHILS NFR BLD: 85.7 % (ref 38–73)
NITRITE UR QL STRIP: NEGATIVE
NRBC BLD-RTO: 0 /100 WBC
PH UR STRIP: 6 [PH] (ref 5–8)
PLATELET # BLD AUTO: 189 K/UL (ref 150–450)
PMV BLD AUTO: 10.8 FL (ref 9.2–12.9)
POTASSIUM SERPL-SCNC: 4.2 MMOL/L (ref 3.5–5.1)
PROT SERPL-MCNC: 6.7 G/DL (ref 6–8.4)
PROT UR QL STRIP: ABNORMAL
RBC # BLD AUTO: 4.97 M/UL (ref 4.6–6.2)
SODIUM SERPL-SCNC: 141 MMOL/L (ref 136–145)
SP GR UR STRIP: 1.02 (ref 1–1.03)
TROPONIN I SERPL DL<=0.01 NG/ML-MCNC: 0.03 NG/ML (ref 0–0.03)
URN SPEC COLLECT METH UR: ABNORMAL
UROBILINOGEN UR STRIP-ACNC: NEGATIVE EU/DL
WBC # BLD AUTO: 12.11 K/UL (ref 3.9–12.7)

## 2025-02-13 PROCEDURE — 81003 URINALYSIS AUTO W/O SCOPE: CPT

## 2025-02-13 PROCEDURE — 85025 COMPLETE CBC W/AUTO DIFF WBC: CPT

## 2025-02-13 PROCEDURE — G0378 HOSPITAL OBSERVATION PER HR: HCPCS

## 2025-02-13 PROCEDURE — 99285 EMERGENCY DEPT VISIT HI MDM: CPT | Mod: 25

## 2025-02-13 PROCEDURE — 83735 ASSAY OF MAGNESIUM: CPT

## 2025-02-13 PROCEDURE — 80053 COMPREHEN METABOLIC PANEL: CPT

## 2025-02-13 PROCEDURE — 84484 ASSAY OF TROPONIN QUANT: CPT

## 2025-02-13 PROCEDURE — 93005 ELECTROCARDIOGRAM TRACING: CPT

## 2025-02-13 RX ORDER — AMOXICILLIN 250 MG
1 CAPSULE ORAL 2 TIMES DAILY PRN
Status: DISCONTINUED | OUTPATIENT
Start: 2025-02-14 | End: 2025-02-16 | Stop reason: HOSPADM

## 2025-02-13 RX ORDER — ONDANSETRON HYDROCHLORIDE 2 MG/ML
4 INJECTION, SOLUTION INTRAVENOUS EVERY 8 HOURS PRN
Status: DISCONTINUED | OUTPATIENT
Start: 2025-02-14 | End: 2025-02-16 | Stop reason: HOSPADM

## 2025-02-13 RX ORDER — HYDRALAZINE HYDROCHLORIDE 25 MG/1
50 TABLET, FILM COATED ORAL 3 TIMES DAILY
Status: DISCONTINUED | OUTPATIENT
Start: 2025-02-14 | End: 2025-02-16 | Stop reason: HOSPADM

## 2025-02-13 RX ORDER — LOSARTAN POTASSIUM 50 MG/1
100 TABLET ORAL DAILY
Status: DISCONTINUED | OUTPATIENT
Start: 2025-02-14 | End: 2025-02-16 | Stop reason: HOSPADM

## 2025-02-13 RX ORDER — TALC
6 POWDER (GRAM) TOPICAL NIGHTLY PRN
Status: DISCONTINUED | OUTPATIENT
Start: 2025-02-14 | End: 2025-02-16 | Stop reason: HOSPADM

## 2025-02-13 RX ORDER — SODIUM CHLORIDE 0.9 % (FLUSH) 0.9 %
5 SYRINGE (ML) INJECTION
Status: DISCONTINUED | OUTPATIENT
Start: 2025-02-14 | End: 2025-02-16 | Stop reason: HOSPADM

## 2025-02-13 RX ORDER — FLUTICASONE PROPIONATE 50 MCG
2 SPRAY, SUSPENSION (ML) NASAL DAILY
Status: DISCONTINUED | OUTPATIENT
Start: 2025-02-14 | End: 2025-02-16 | Stop reason: HOSPADM

## 2025-02-13 RX ORDER — ATORVASTATIN CALCIUM 40 MG/1
40 TABLET, FILM COATED ORAL DAILY
Status: DISCONTINUED | OUTPATIENT
Start: 2025-02-14 | End: 2025-02-16 | Stop reason: HOSPADM

## 2025-02-13 RX ORDER — POLYETHYLENE GLYCOL 3350 17 G/17G
17 POWDER, FOR SOLUTION ORAL DAILY PRN
Status: DISCONTINUED | OUTPATIENT
Start: 2025-02-14 | End: 2025-02-16 | Stop reason: HOSPADM

## 2025-02-13 RX ORDER — IBUPROFEN 200 MG
16 TABLET ORAL
Status: DISCONTINUED | OUTPATIENT
Start: 2025-02-14 | End: 2025-02-16 | Stop reason: HOSPADM

## 2025-02-13 RX ORDER — NALOXONE HCL 0.4 MG/ML
0.02 VIAL (ML) INJECTION
Status: DISCONTINUED | OUTPATIENT
Start: 2025-02-14 | End: 2025-02-16 | Stop reason: HOSPADM

## 2025-02-13 RX ORDER — INSULIN ASPART 100 [IU]/ML
0-5 INJECTION, SOLUTION INTRAVENOUS; SUBCUTANEOUS
Status: DISCONTINUED | OUTPATIENT
Start: 2025-02-14 | End: 2025-02-16 | Stop reason: HOSPADM

## 2025-02-13 RX ORDER — ALBUTEROL SULFATE 90 UG/1
2 INHALANT RESPIRATORY (INHALATION) EVERY 4 HOURS PRN
Status: DISCONTINUED | OUTPATIENT
Start: 2025-02-14 | End: 2025-02-16 | Stop reason: HOSPADM

## 2025-02-13 RX ORDER — GLUCAGON 1 MG
1 KIT INJECTION
Status: DISCONTINUED | OUTPATIENT
Start: 2025-02-14 | End: 2025-02-16 | Stop reason: HOSPADM

## 2025-02-13 RX ORDER — HYDRALAZINE HYDROCHLORIDE 25 MG/1
50 TABLET, FILM COATED ORAL 3 TIMES DAILY
Status: DISCONTINUED | OUTPATIENT
Start: 2025-02-14 | End: 2025-02-13

## 2025-02-13 RX ORDER — ACETAMINOPHEN 325 MG/1
650 TABLET ORAL EVERY 4 HOURS PRN
Status: DISCONTINUED | OUTPATIENT
Start: 2025-02-14 | End: 2025-02-16 | Stop reason: HOSPADM

## 2025-02-13 RX ORDER — FINASTERIDE 5 MG/1
5 TABLET, FILM COATED ORAL DAILY
Status: DISCONTINUED | OUTPATIENT
Start: 2025-02-14 | End: 2025-02-16 | Stop reason: HOSPADM

## 2025-02-13 RX ORDER — TAMSULOSIN HYDROCHLORIDE 0.4 MG/1
0.4 CAPSULE ORAL DAILY
Status: DISCONTINUED | OUTPATIENT
Start: 2025-02-14 | End: 2025-02-16 | Stop reason: HOSPADM

## 2025-02-13 RX ORDER — IBUPROFEN 200 MG
24 TABLET ORAL
Status: DISCONTINUED | OUTPATIENT
Start: 2025-02-14 | End: 2025-02-16 | Stop reason: HOSPADM

## 2025-02-13 RX ORDER — FLUTICASONE FUROATE AND VILANTEROL 100; 25 UG/1; UG/1
1 POWDER RESPIRATORY (INHALATION) DAILY
Status: DISCONTINUED | OUTPATIENT
Start: 2025-02-14 | End: 2025-02-16 | Stop reason: HOSPADM

## 2025-02-13 NOTE — Clinical Note
Diagnosis: Fall, initial encounter [249535]   Future Attending Provider: TAO MCFARLANE [[id number]]   Is the patient being sent to ED Observation?: No   Special Needs:: No Special Needs [1]

## 2025-02-13 NOTE — ED NOTES
"Pt seen in room AAOx4, states he was not feeling like his self today after exercising and taking a shower. His vision was "cloudy" and his blood pressure was low. He states this has since resolved. Pt denies any nausea, vomiting, dizziness, or LOC.   " none

## 2025-02-13 NOTE — ED PROVIDER NOTES
"Encounter Date: 2/13/2025       History     Chief Complaint   Patient presents with    Fatigue     Pt presents to ED today via [facility] from home c/o " not feeling myself"   Pt reported to EMS "blood pressure " was low      89-year-old male with history of BPH, diabetes, emphysema, high right bundle-branch block, small-bowel obstruction who presents today for evaluation of fatigue.  Patient reports performing his normal activities this morning including eating breakfast and performing physical therapy.  He subsequently took a shower and when he got out he felt fatigued with some "cloudy" vision.  He subsequently checked his blood pressure was noted to be low, BG at home reportedly 139.  He called his son-in-law who advised he call EMS.  He denies chest pain, shortness of breath, dizziness, nausea, vomiting, LOC. He feels back to his normal self upon arrival to the ED.     The history is provided by the patient, medical records and a relative. No  was used.     Review of patient's allergies indicates:   Allergen Reactions    Solarcaine [benzocaine-triclosan]     Sulfa (sulfonamide antibiotics)     Sulfamethoxazole     Trimethoprim     Empagliflozin Rash and Other (See Comments)     Yeast infection    Hytrin [terazosin] Rash    Smz-tmp ds [sulfamethoxazole-trimethoprim] Rash     Past Medical History:   Diagnosis Date    BPH (benign prostatic hyperplasia)     Diabetes mellitus (type 2)     Emphysema 03/12/2022    High cholesterol 08/26/2020    Hypertension     RBBB 11/29/2022    Small bowel obstruction 06/13/2019     Past Surgical History:   Procedure Laterality Date    ANASTOMOSIS OF ILEUM TO RECTUM  9/9/2019    Procedure: ANASTOMOSIS, COLO-RECTAL;  Surgeon: Fouzia Gardner MD;  Location: [facility] OR;  Service: General;;    CHOLECYSTECTOMY N/A 3/7/2022    Procedure: CHOLECYSTECTOMY;  Surgeon: Fouzia Gardner MD;  Location: [facility] OR;  Service: General;  Laterality: N/A;    COLOSTOMY  6/16/2019    " Procedure: CREATION, COLOSTOMY;  Surgeon: Fouzia Gardner MD;  Location: [facility] OR;  Service: General;;    DUPUYTREN CONTRACTURE RELEASE Left 2022    Procedure: RELEASE, DUPUYTREN CONTRACTURE;  Surgeon: Slick Baldwin Jr., MD;  Location: [facility] OR;  Service: Orthopedics;  Laterality: Left;    EYE SURGERY      GISELA PROCEDURE  2019    Procedure: GISELA PROCEDURE;  Surgeon: Fouzia Gardner MD;  Location: [facility] OR;  Service: General;;  Colon resection    LYSIS OF ADHESIONS N/A 3/7/2022    Procedure: LYSIS, ADHESIONS;  Surgeon: Fouzia Gardner MD;  Location: [facility] OR;  Service: General;  Laterality: N/A;    TONSILLECTOMY       Family History   Problem Relation Name Age of Onset    Heart disease Father      Hypertension Father      Cancer Mother cancer breas varian     Diabetes Paternal Aunt       Social History     Tobacco Use    Smoking status: Former     Current packs/day: 0.00     Average packs/day: 1 pack/day for 25.0 years (25.0 ttl pk-yrs)     Types: Cigarettes     Start date:      Quit date:      Years since quittin.1     Passive exposure: Past    Smokeless tobacco: Never   Substance Use Topics    Alcohol use: Not Currently     Alcohol/week: 5.0 standard drinks of alcohol     Types: 5 Cans of beer per week    Drug use: No     Review of Systems   Constitutional:  Positive for fatigue. Negative for fever.   HENT:  Negative for sore throat.    Eyes:  Positive for visual disturbance.   Respiratory:  Negative for shortness of breath.    Cardiovascular:  Negative for chest pain.   Gastrointestinal:  Negative for nausea.   Genitourinary:  Negative for dysuria.   Musculoskeletal:  Negative for back pain.   Skin:  Negative for rash.   Neurological:  Negative for weakness.       Physical Exam     Initial Vitals [25 1326]   BP Pulse Resp Temp SpO2   123/60 87 18 97.8 °F (36.6 °C) 95 %      MAP       --         Physical Exam    Nursing note and vitals reviewed.  Constitutional: He  appears well-developed and well-nourished. He is not diaphoretic. No distress.   HENT:   Head:       Neck: Neck supple.   Cardiovascular:  Normal rate and intact distal pulses.           Pulmonary/Chest: No respiratory distress.   Abdominal: Abdomen is soft. He exhibits no distension. There is no abdominal tenderness.   Musculoskeletal:      Cervical back: Neck supple.        Legs:      Neurological: He is oriented to person, place, and time. He has normal strength. No cranial nerve deficit or sensory deficit. Coordination normal. GCS score is 15. GCS eye subscore is 4. GCS verbal subscore is 5. GCS motor subscore is 6.   Skin: Skin is warm and dry. Capillary refill takes less than 2 seconds.   Psychiatric: He has a normal mood and affect. His behavior is normal.         ED Course   Procedures  Labs Reviewed   CBC W/ AUTO DIFFERENTIAL - Abnormal       Result Value    WBC 12.11      RBC 4.97      Hemoglobin 15.0      Hematocrit 45.1      MCV 91      MCH 30.2      MCHC 33.3      RDW 13.6      Platelets 189      MPV 10.8      Immature Granulocytes 0.3      Gran # (ANC) 10.4 (*)     Immature Grans (Abs) 0.04      Lymph # 0.8 (*)     Mono # 0.9      Eos # 0.0      Baso # 0.03      nRBC 0      Gran % 85.7 (*)     Lymph % 6.6 (*)     Mono % 7.1      Eosinophil % 0.1      Basophil % 0.2      Differential Method Automated     COMPREHENSIVE METABOLIC PANEL - Abnormal    Sodium 141      Potassium 4.2      Chloride 108      CO2 16 (*)     Glucose 124 (*)     BUN 38 (*)     Creatinine 1.8 (*)     Calcium 9.6      Total Protein 6.7      Albumin 3.7      Total Bilirubin 1.2 (*)     Alkaline Phosphatase 70      AST 24      ALT 19      eGFR 35 (*)     Anion Gap 17 (*)    URINALYSIS, REFLEX TO URINE CULTURE - Abnormal    Specimen UA Urine, Clean Catch      Color, UA Yellow      Appearance, UA Clear      pH, UA 6.0      Specific Gravity, UA 1.020      Protein, UA Trace (*)     Glucose, UA Negative      Ketones, UA Trace (*)      Bilirubin (UA) Negative      Occult Blood UA Negative      Nitrite, UA Negative      Urobilinogen, UA Negative      Leukocytes, UA Negative      Narrative:     Specimen Source->Urine   TROPONIN I - Abnormal    Troponin I 0.030 (*)    MAGNESIUM    Magnesium 2.0            Imaging Results              CT Cervical Spine Without Contrast (Final result)  Result time 02/13/25 19:40:46      Final result by Toro Hamm MD (02/13/25 19:40:46)                   Impression:      Negative CT of the brain.    Cervical spondylosis and osteoporosis with no evidence of displaced fracture or soft tissue swelling or hematoma.    Nodule placing the majority of the right lobe of the thyroid gland.  Medical follow-up recommended.      Electronically signed by: Toro Hamm  Date:    02/13/2025  Time:    19:40               Narrative:    EXAMINATION:  CT HEAD WITHOUT CONTRAST; CT CERVICAL SPINE WITHOUT CONTRAST    CLINICAL HISTORY:  Head trauma, minor (Age >= 65y);; Neck trauma (Age >= 65y);    TECHNIQUE:  Low dose axial images were obtained through the head and cervical spine.  Coronal and sagittal reformations were also performed. Contrast was not administered.    COMPARISON:  Is a.    FINDINGS:  BRAIN:    Brain parenchyma appears normal in attenuation with normal gray-white matter differentiation.  No mass, mass effect or pathologic fluid collection.    Ventricles and basilar cisterns appear appropriate.    Calvarium intact.  Sinuses are clear with no significant middle ear or mastoid opacity.  Orbits and orbital contents unremarkable.    CERVICAL SPINE:    Alignment is anatomic.  There is no evidence of displaced fracture, soft tissue swelling or hematoma.  Diffuse osteopenia with auto fusion changes at C3-4 and C4-5.    Surrounding soft tissues noteworthy for vascular calcifications in the carotids and nodular mass replacing the majority of the right lobe of the thyroid gland with benign macro calcifications  centrally.    Cervical cranial and cervicothoracic junction appear intact                                       CT Head Without Contrast (Final result)  Result time 02/13/25 19:40:46      Final result by Toro Hamm MD (02/13/25 19:40:46)                   Impression:      Negative CT of the brain.    Cervical spondylosis and osteoporosis with no evidence of displaced fracture or soft tissue swelling or hematoma.    Nodule placing the majority of the right lobe of the thyroid gland.  Medical follow-up recommended.      Electronically signed by: Toro Hamm  Date:    02/13/2025  Time:    19:40               Narrative:    EXAMINATION:  CT HEAD WITHOUT CONTRAST; CT CERVICAL SPINE WITHOUT CONTRAST    CLINICAL HISTORY:  Head trauma, minor (Age >= 65y);; Neck trauma (Age >= 65y);    TECHNIQUE:  Low dose axial images were obtained through the head and cervical spine.  Coronal and sagittal reformations were also performed. Contrast was not administered.    COMPARISON:  Is a.    FINDINGS:  BRAIN:    Brain parenchyma appears normal in attenuation with normal gray-white matter differentiation.  No mass, mass effect or pathologic fluid collection.    Ventricles and basilar cisterns appear appropriate.    Calvarium intact.  Sinuses are clear with no significant middle ear or mastoid opacity.  Orbits and orbital contents unremarkable.    CERVICAL SPINE:    Alignment is anatomic.  There is no evidence of displaced fracture, soft tissue swelling or hematoma.  Diffuse osteopenia with auto fusion changes at C3-4 and C4-5.    Surrounding soft tissues noteworthy for vascular calcifications in the carotids and nodular mass replacing the majority of the right lobe of the thyroid gland with benign macro calcifications centrally.    Cervical cranial and cervicothoracic junction appear intact                                       X-Ray Chest AP Portable (Final result)  Result time 02/13/25 17:02:42      Final result by  Rc Liu MD (02/13/25 17:02:42)                   Impression:      1. Chronic appearing interstitial findings, no large focal consolidation.      Electronically signed by: Rc Liu MD  Date:    02/13/2025  Time:    17:02               Narrative:    EXAMINATION:  XR CHEST AP PORTABLE    CLINICAL HISTORY:  Other fatigue    TECHNIQUE:  Single frontal view of the chest was performed.    COMPARISON:  12/23/2024    FINDINGS:  The cardiomediastinal silhouette is not enlarged noting calcification of the aorta..  There is no pleural effusion.  The trachea is midline.  The lungs are symmetrically expanded bilaterally with mildly coarse interstitial attenuation, similar to the previous exam.  There is left basilar subsegmental atelectasis or scarring..  No large focal consolidation seen.  There is no pneumothorax.  The osseous structures are remarkable for degenerative change..                                       Medications - No data to display  Medical Decision Making  89-year-old male with history of BPH, diabetes, emphysema, high right bundle-branch block, small-bowel obstruction who presents today for evaluation of fatigue.  On exam patient is nontoxic appearing and in no acute distress.  Initial vitals are stable.  Lungs are clear to auscultation, respirations are even and unlabored.  Abdomen is soft and nontender.  Distal pulses are intact and equal.  No lower extremity edema.  No focal neurologic deficits.   He has a contusion with superficial abrasion to the back of his head, there is some bleeding.  It does appear new however patient does not recall falling or getting up from the ground.  No report of witnessed falls or injury.    Differential includes but isn't limited to:  Orthostatic hypotension, anemia, electrolyte abnormality, UTI, ACS, STEMI, sepsis, CVA, TIA, other    No leukocytosis.  H and H stable.  Sodium and potassium wnl.  BUN and creatinine elevated at 38 1.8, appears similar to  "prior.  GFR decreased at 35.  Magnesium is wnl.  No evidence of infection on UA.  Troponins elevated at 0.030 however history of elevation.  Chest x-ray without any acute findings.  CT head and C-spine without acute findings.  He presents with a questionable syncopal episode:  Head trauma but no recollection of falling or trauma.  I believe he would benefit from observation, family agrees.  Patient accepted by Hospital Medicine.  I discussed care of this patient with my attending, Dr. Hickman, who is in agreement with plan and dispo.    Amount and/or Complexity of Data Reviewed  Labs: ordered. Decision-making details documented in ED Course.  Radiology: ordered and independent interpretation performed. Decision-making details documented in ED Course.               ED Course as of 02/13/25 2042 Thu Feb 13, 2025   1559 WBC: 12.11 [EP]   1559 Hemoglobin: 15.0 [EP]   1559 Hematocrit: 45.1 [EP]   1617 Sodium: 141 [EP]   1617 Potassium: 4.2 [EP]   1617 BUN(!): 38 [EP]   1617 Creatinine(!): 1.8 [EP]   1617 eGFR(!): 35 [EP]   1618 Magnesium : 2.0 [EP]   1618 Urinalysis, Reflex to Urine Culture Urine, Clean Catch(!)  No evidence of infection [EP]   1629 Troponin I(!): 0.030 [EP]   1816 Troponin I(!)  History of similar troponin leak [KB]   1846 X-Ray Chest AP Portable  "1. Chronic appearing interstitial findings, no large focal consolidation." [EP]   1948 CT Head Without Contrast  "Negative CT of the brain.     Cervical spondylosis and osteoporosis with no evidence of displaced fracture or soft tissue swelling or hematoma.     Nodule placing the majority of the right lobe of the thyroid gland.  Medical follow-up recommended." [EP]      ED Course User Index  [EP] Vidal Meraz PA-C  [KB] Fermin Hickman MD                           Clinical Impression:  Final diagnoses:  [R53.83] Fatigue  [W19.XXXA] Fall, initial encounter (Primary)  [R55] Syncope, unspecified syncope type  [R79.89] Elevated troponin          ED " Disposition Condition    Observation Stable                Vidal Meraz PA-C  02/13/25 2042

## 2025-02-14 LAB
ALBUMIN SERPL BCP-MCNC: 3.1 G/DL (ref 3.5–5.2)
ALP SERPL-CCNC: 59 U/L (ref 40–150)
ALT SERPL W/O P-5'-P-CCNC: 19 U/L (ref 10–44)
ANION GAP SERPL CALC-SCNC: 11 MMOL/L (ref 8–16)
APICAL FOUR CHAMBER EJECTION FRACTION: 55 %
APICAL TWO CHAMBER EJECTION FRACTION: 56 %
ASCENDING AORTA: 3.28 CM
AST SERPL-CCNC: 22 U/L (ref 10–40)
AV INDEX (PROSTH): 0.88
AV MEAN GRADIENT: 2 MMHG
AV PEAK GRADIENT: 4 MMHG
AV VALVE AREA BY VELOCITY RATIO: 2.8 CM²
AV VALVE AREA: 3 CM²
AV VELOCITY RATIO: 0.8
BASOPHILS # BLD AUTO: 0.04 K/UL (ref 0–0.2)
BASOPHILS NFR BLD: 0.5 % (ref 0–1.9)
BILIRUB SERPL-MCNC: 1.3 MG/DL (ref 0.1–1)
BNP SERPL-MCNC: 230 PG/ML (ref 0–99)
BSA FOR ECHO PROCEDURE: 1.93 M2
BUN SERPL-MCNC: 32 MG/DL (ref 10–30)
CALCIUM SERPL-MCNC: 9 MG/DL (ref 8.7–10.5)
CHLORIDE SERPL-SCNC: 110 MMOL/L (ref 95–110)
CHOLEST SERPL-MCNC: 139 MG/DL (ref 120–199)
CHOLEST/HDLC SERPL: 2.6 {RATIO} (ref 2–5)
CO2 SERPL-SCNC: 20 MMOL/L (ref 23–29)
CREAT SERPL-MCNC: 1.5 MG/DL (ref 0.5–1.4)
CV ECHO LV RWT: 0.49 CM
DIFFERENTIAL METHOD BLD: ABNORMAL
DOP CALC AO PEAK VEL: 1 M/S
DOP CALC AO VTI: 18.8 CM
DOP CALC LVOT AREA: 3.5 CM2
DOP CALC LVOT DIAMETER: 2.1 CM
DOP CALC LVOT PEAK VEL: 0.8 M/S
DOP CALC LVOT STROKE VOLUME: 57.1 CM3
DOP CALC MV VTI: 33.7 CM
DOP CALCLVOT PEAK VEL VTI: 16.5 CM
E WAVE DECELERATION TIME: 521 MSEC
E/A RATIO: 0.34
E/E' RATIO: 9 M/S
ECHO LV POSTERIOR WALL: 1.1 CM (ref 0.6–1.1)
EOSINOPHIL # BLD AUTO: 0.4 K/UL (ref 0–0.5)
EOSINOPHIL NFR BLD: 4 % (ref 0–8)
ERYTHROCYTE [DISTWIDTH] IN BLOOD BY AUTOMATED COUNT: 13.4 % (ref 11.5–14.5)
EST. GFR  (NO RACE VARIABLE): 43 ML/MIN/1.73 M^2
ESTIMATED AVG GLUCOSE: 120 MG/DL (ref 68–131)
FIO2: 21 %
FOLATE SERPL-MCNC: 12.9 NG/ML (ref 4–24)
FRACTIONAL SHORTENING: 33.3 % (ref 28–44)
GLUCOSE SERPL-MCNC: 111 MG/DL (ref 70–110)
HBA1C MFR BLD: 5.8 % (ref 4–5.6)
HCT VFR BLD AUTO: 40.5 % (ref 40–54)
HDLC SERPL-MCNC: 54 MG/DL (ref 40–75)
HDLC SERPL: 38.8 % (ref 20–50)
HGB BLD-MCNC: 13.3 G/DL (ref 14–18)
HR MV ECHO: 71 BPM
IMM GRANULOCYTES # BLD AUTO: 0.02 K/UL (ref 0–0.04)
IMM GRANULOCYTES NFR BLD AUTO: 0.2 % (ref 0–0.5)
INTERVENTRICULAR SEPTUM: 1.1 CM (ref 0.6–1.1)
IVC DIAMETER: 2.16 CM
LDLC SERPL CALC-MCNC: 68.6 MG/DL (ref 63–159)
LEFT ATRIUM AREA SYSTOLIC (APICAL 2 CHAMBER): 18.49 CM2
LEFT ATRIUM AREA SYSTOLIC (APICAL 4 CHAMBER): 18 CM2
LEFT ATRIUM VOLUME INDEX MOD: 24 ML/M2
LEFT ATRIUM VOLUME MOD: 47 ML
LEFT INTERNAL DIMENSION IN SYSTOLE: 3 CM (ref 2.1–4)
LEFT VENTRICLE DIASTOLIC VOLUME INDEX: 47.62 ML/M2
LEFT VENTRICLE DIASTOLIC VOLUME: 91.43 ML
LEFT VENTRICLE END DIASTOLIC VOLUME APICAL 2 CHAMBER: 84.65 ML
LEFT VENTRICLE END DIASTOLIC VOLUME APICAL 4 CHAMBER: 74.4 ML
LEFT VENTRICLE END SYSTOLIC VOLUME APICAL 2 CHAMBER: 49.44 ML
LEFT VENTRICLE END SYSTOLIC VOLUME APICAL 4 CHAMBER: 44.38 ML
LEFT VENTRICLE MASS INDEX: 91.2 G/M2
LEFT VENTRICLE SYSTOLIC VOLUME INDEX: 18.3 ML/M2
LEFT VENTRICLE SYSTOLIC VOLUME: 35.21 ML
LEFT VENTRICULAR INTERNAL DIMENSION IN DIASTOLE: 4.5 CM (ref 3.5–6)
LEFT VENTRICULAR MASS: 175 G
LV LATERAL E/E' RATIO: 7.2 M/S
LV SEPTAL E/E' RATIO: 10.8 M/S
LVED V (TEICH): 91.43 ML
LVES V (TEICH): 35.21 ML
LVOT MG: 1.35 MMHG
LVOT MV: 0.54 CM/S
LYMPHOCYTES # BLD AUTO: 1 K/UL (ref 1–4.8)
LYMPHOCYTES NFR BLD: 11.9 % (ref 18–48)
MAGNESIUM SERPL-MCNC: 2 MG/DL (ref 1.6–2.6)
MCH RBC QN AUTO: 29.7 PG (ref 27–31)
MCHC RBC AUTO-ENTMCNC: 32.8 G/DL (ref 32–36)
MCV RBC AUTO: 90 FL (ref 82–98)
MONOCYTES # BLD AUTO: 0.9 K/UL (ref 0.3–1)
MONOCYTES NFR BLD: 10.9 % (ref 4–15)
MV A" WAVE DURATION": 174.12 MSEC
MV MEAN GRADIENT: 2 MMHG
MV PEAK A VEL: 1.27 M/S
MV PEAK E VEL: 0.43 M/S
MV PEAK GRADIENT: 7 MMHG
MV STENOSIS PRESSURE HALF TIME: 150.97 MS
MV VALVE AREA BY CONTINUITY EQUATION: 1.69 CM2
MV VALVE AREA P 1/2 METHOD: 1.46 CM2
NEUTROPHILS # BLD AUTO: 6.3 K/UL (ref 1.8–7.7)
NEUTROPHILS NFR BLD: 72.5 % (ref 38–73)
NONHDLC SERPL-MCNC: 85 MG/DL
NRBC BLD-RTO: 0 /100 WBC
OHS CV RV/LV RATIO: 1.09 CM
OHS LV EJECTION FRACTION SIMPSONS BIPLANE MOD: 57 %
PCO2 BLDA: 48.6 MMHG (ref 35–45)
PH SMN: 7.36 [PH] (ref 7.35–7.45)
PHOSPHATE SERPL-MCNC: 3 MG/DL (ref 2.7–4.5)
PISA TR MAX VEL: 4 M/S
PLATELET # BLD AUTO: 177 K/UL (ref 150–450)
PMV BLD AUTO: 10.6 FL (ref 9.2–12.9)
PO2 BLDA: 18.8 MMHG (ref 40–60)
POC BASE DEFICIT: 0.9 MMOL/L (ref -2–2)
POC HCO3: 27.2 MMOL/L (ref 24–28)
POC PERFORMED BY: ABNORMAL
POC SATURATED O2: 24.9 % (ref 95–100)
POCT GLUCOSE: 111 MG/DL (ref 70–110)
POCT GLUCOSE: 138 MG/DL (ref 70–110)
POCT GLUCOSE: 179 MG/DL (ref 70–110)
POCT GLUCOSE: 203 MG/DL (ref 70–110)
POTASSIUM SERPL-SCNC: 4.6 MMOL/L (ref 3.5–5.1)
PROT SERPL-MCNC: 6 G/DL (ref 6–8.4)
PULM VEIN S/D RATIO: 3.14
PV MV: 0.61 M/S
PV PEAK D VEL: 0.22 M/S
PV PEAK GRADIENT: 3 MMHG
PV PEAK S VEL: 0.69 M/S
PV PEAK VELOCITY: 0.87 M/S
RA PRESSURE ESTIMATED: 15 MMHG
RA VOL SYS: 49.69 ML
RBC # BLD AUTO: 4.48 M/UL (ref 4.6–6.2)
RIGHT ATRIAL AREA: 16.6 CM2
RIGHT ATRIUM VOLUME AREA LENGTH APICAL 4 CHAMBER: 44.33 ML
RIGHT VENTRICLE DIASTOLIC BASEL DIMENSION: 4.9 CM
RV TB RVSP: 19 MMHG
RV TISSUE DOPPLER FREE WALL SYSTOLIC VELOCITY 1 (APICAL 4 CHAMBER VIEW): 17.14 CM/S
SINUS: 3.24 CM
SODIUM SERPL-SCNC: 141 MMOL/L (ref 136–145)
SPECIMEN SOURCE: ABNORMAL
STJ: 2.96 CM
TDI LATERAL: 0.06 M/S
TDI SEPTAL: 0.04 M/S
TDI: 0.05 M/S
TR MAX PG: 64 MMHG
TRICUSPID ANNULAR PLANE SYSTOLIC EXCURSION: 2.66 CM
TRIGL SERPL-MCNC: 82 MG/DL (ref 30–150)
TROPONIN I SERPL DL<=0.01 NG/ML-MCNC: 0.03 NG/ML (ref 0–0.03)
TROPONIN I SERPL DL<=0.01 NG/ML-MCNC: 0.04 NG/ML (ref 0–0.03)
TSH SERPL DL<=0.005 MIU/L-ACNC: 0.45 UIU/ML (ref 0.4–4)
TV REST PULMONARY ARTERY PRESSURE: 79 MMHG
VIT B12 SERPL-MCNC: 496 PG/ML (ref 210–950)
WBC # BLD AUTO: 8.65 K/UL (ref 3.9–12.7)
Z-SCORE OF LEFT VENTRICULAR DIMENSION IN END DIASTOLE: -1.86
Z-SCORE OF LEFT VENTRICULAR DIMENSION IN END SYSTOLE: -0.84

## 2025-02-14 PROCEDURE — 63600175 PHARM REV CODE 636 W HCPCS

## 2025-02-14 PROCEDURE — 82607 VITAMIN B-12: CPT

## 2025-02-14 PROCEDURE — 84484 ASSAY OF TROPONIN QUANT: CPT

## 2025-02-14 PROCEDURE — 80061 LIPID PANEL: CPT

## 2025-02-14 PROCEDURE — 96372 THER/PROPH/DIAG INJ SC/IM: CPT

## 2025-02-14 PROCEDURE — 80053 COMPREHEN METABOLIC PANEL: CPT

## 2025-02-14 PROCEDURE — 84443 ASSAY THYROID STIM HORMONE: CPT

## 2025-02-14 PROCEDURE — 85025 COMPLETE CBC W/AUTO DIFF WBC: CPT

## 2025-02-14 PROCEDURE — 25000242 PHARM REV CODE 250 ALT 637 W/ HCPCS

## 2025-02-14 PROCEDURE — 82746 ASSAY OF FOLIC ACID SERUM: CPT

## 2025-02-14 PROCEDURE — 36415 COLL VENOUS BLD VENIPUNCTURE: CPT | Mod: XB

## 2025-02-14 PROCEDURE — 84425 ASSAY OF VITAMIN B-1: CPT

## 2025-02-14 PROCEDURE — 94761 N-INVAS EAR/PLS OXIMETRY MLT: CPT | Mod: XB

## 2025-02-14 PROCEDURE — 82803 BLOOD GASES ANY COMBINATION: CPT

## 2025-02-14 PROCEDURE — 83036 HEMOGLOBIN GLYCOSYLATED A1C: CPT

## 2025-02-14 PROCEDURE — 83880 ASSAY OF NATRIURETIC PEPTIDE: CPT

## 2025-02-14 PROCEDURE — 25000003 PHARM REV CODE 250

## 2025-02-14 PROCEDURE — 83735 ASSAY OF MAGNESIUM: CPT

## 2025-02-14 PROCEDURE — 36415 COLL VENOUS BLD VENIPUNCTURE: CPT

## 2025-02-14 PROCEDURE — 94640 AIRWAY INHALATION TREATMENT: CPT

## 2025-02-14 PROCEDURE — 84484 ASSAY OF TROPONIN QUANT: CPT | Mod: 91

## 2025-02-14 PROCEDURE — 99900035 HC TECH TIME PER 15 MIN (STAT)

## 2025-02-14 PROCEDURE — G0378 HOSPITAL OBSERVATION PER HR: HCPCS

## 2025-02-14 PROCEDURE — 84100 ASSAY OF PHOSPHORUS: CPT

## 2025-02-14 RX ORDER — ENOXAPARIN SODIUM 100 MG/ML
30 INJECTION SUBCUTANEOUS EVERY 24 HOURS
Status: DISCONTINUED | OUTPATIENT
Start: 2025-02-14 | End: 2025-02-16 | Stop reason: HOSPADM

## 2025-02-14 RX ADMIN — ATORVASTATIN CALCIUM 40 MG: 40 TABLET, FILM COATED ORAL at 08:02

## 2025-02-14 RX ADMIN — HYDRALAZINE HYDROCHLORIDE 50 MG: 25 TABLET ORAL at 12:02

## 2025-02-14 RX ADMIN — HYDRALAZINE HYDROCHLORIDE 50 MG: 25 TABLET ORAL at 08:02

## 2025-02-14 RX ADMIN — HYDRALAZINE HYDROCHLORIDE 50 MG: 25 TABLET ORAL at 02:02

## 2025-02-14 RX ADMIN — TAMSULOSIN HYDROCHLORIDE 0.4 MG: 0.4 CAPSULE ORAL at 08:02

## 2025-02-14 RX ADMIN — FINASTERIDE 5 MG: 5 TABLET, FILM COATED ORAL at 08:02

## 2025-02-14 RX ADMIN — ENOXAPARIN SODIUM 30 MG: 30 INJECTION SUBCUTANEOUS at 04:02

## 2025-02-14 RX ADMIN — LOSARTAN POTASSIUM 100 MG: 50 TABLET, FILM COATED ORAL at 08:02

## 2025-02-14 RX ADMIN — FLUTICASONE FUROATE AND VILANTEROL TRIFENATATE 1 PUFF: 100; 25 POWDER RESPIRATORY (INHALATION) at 09:02

## 2025-02-14 RX ADMIN — INSULIN ASPART 2 UNITS: 100 INJECTION, SOLUTION INTRAVENOUS; SUBCUTANEOUS at 04:02

## 2025-02-14 NOTE — PROGRESS NOTES
Future Appointments   Date Time Provider Department Center   2/20/2025 10:00 AM Slick Baldwin Jr., MD [facility] ORTHO [place] [facility]   2/25/2025 11:00 AM Gina Renee MD [facility] IMPRI [place] [facility]   3/10/2025  7:00 AM SPECIMEN, SHAMAR PETERSEN SPECLAB [place]   3/10/2025  7:30 AM LAB, DEL TO LAB [place]   3/17/2025 11:00 AM Williams Knight DO [facility]   4/1/2025 11:20 AM Tommy Payton MD [facility] CARDIO [facility]   4/3/2025 11:20 AM Farhan Dumont MD [facility] PULMON [place]   4/29/2025  1:30 PM Fouzia Gardner MD [facility] OCC [place]

## 2025-02-14 NOTE — ASSESSMENT & PLAN NOTE
Stable.  Patient has hx of HLD.  Lab Results   Component Value Date    CHOL 145 09/03/2024    CHOL 131 07/04/2024    CHOL 180 01/26/2023     Lab Results   Component Value Date    HDL 57 09/03/2024    HDL 56 07/04/2024    HDL 69 01/26/2023     Lab Results   Component Value Date    LDLCALC 62.6 (L) 09/03/2024    LDLCALC 50.6 (L) 07/04/2024    LDLCALC 88.2 01/26/2023     Lab Results   Component Value Date    TRIG 127 09/03/2024    TRIG 122 07/04/2024    TRIG 114 01/26/2023       Lab Results   Component Value Date    CHOLHDL 39.3 09/03/2024    CHOLHDL 42.7 07/04/2024    CHOLHDL 38.3 01/26/2023     Plan:  - Continue home atorvastatin, ASA  - Order new lipid panel

## 2025-02-14 NOTE — PLAN OF CARE
CM met with pt - he is AAO x 3, confirmed demographics   Daughter Nina Carson   at bedside   Pt lives alone -  daughter lives nearby and assists as needed   Pt drives, owns but doesn't use a st cane, rw.  shower has a built in bench   No HH prior to admit     Daughter will transport pt to home at d/c   Pharmacy - [facility]    Able to afford meds.    Dx:  Syncope      Future Appointments   Date Time Provider Department Center   2/20/2025 10:00 AM Slick Baldwin Jr., MD [facility] ORTHO [place] [facility]   2/25/2025 11:00 AM Gina Renee MD [facility] IMPRI [place] [facility]   3/10/2025  7:00 AM SPECIMEN, SHAMAR TO SPECLAB [place]   3/10/2025  7:30 AM LAB, DEL [facility] LAB [place]   3/17/2025 11:00 AM Williams Knight DO [facility] [place]   4/1/2025 11:20 AM Tommy Payton MD [facility] CARDIO [place] [facility]   4/3/2025 11:20 AM Farhan Dumont MD [facility] PULMON [place]   4/29/2025  1:30 PM Fouzia Gardner MD [facility]        02/14/25 [id number]   Discharge Planning   Assessment Type Discharge Planning Brief Assessment   Resource/Environmental Concerns none   Support Systems Children  (DAUGHTER NINA CARSON  )   Equipment Currently Used at Home none  (OWNS BUT DOESN'T USE A CANE, RW; HS WALK IN SHOWER WITH A BENCH)   Current Living Arrangements home   Patient/Family Anticipated Services at Transition none   DME Needed Upon Discharge  none   Discharge Plan A Home   Discharge Plan B Home;Home Health   Financial Resource Strain   How hard is it for you to pay for the very basics like food, housing, medical care, and heating? Not hard   Housing Stability   In the last 12 months, was there a time when you were not able to pay the mortgage or rent on time? N   At any time in the past 12 months, were you homeless or living in a shelter (including now)? N   Transportation Needs   Has the lack of transportation kept you from medical appointments, meetings, work or from  getting things needed for daily living? No   Food Insecurity   Within the past 12 months, you worried that your food would run out before you got the money to buy more. Never true   Within the past 12 months, the food you bought just didn't last and you didn't have money to get more. Never true   Social Isolation   How often do you feel lonely or isolated from those around you?  Never   Alcohol Use   Q1: How often do you have a drink containing alcohol? 4 or more ti   Utilities   In the past 12 months has the electric, gas, oil, or water company threatened to shut off services in your home? No

## 2025-02-14 NOTE — ASSESSMENT & PLAN NOTE
Stable. Elevated BP. Good oral intake. However, admits to poor water intake. High suspicion for vasovagal syncope vs orthostatic syncope  Cardiovascular examination: Negative for murmurs, arrhythmias, or signs of heart failure.  Neurological exam: Low concern for seizure activity or stroke.  Complete blood count (CBC), electrolytes (sodium, potassium, calcium). renal function, blood glucose,  unremarkable. Trop <0.030.  Low concern for cardiac etiology.  EKG in the ED: NSR without ischemic changes.   Head CT wo contrast in the ED: Cervical spondylosis and osteoporosis with no evidence of displaced fracture or soft tissue swelling or hematoma and a Nodule placing the majority of the right lobe of the thyroid gland. Chest x-ray: Chronic appearing interstitial findings, no large focal consolidation.    Last echo   Results for orders placed during the hospital encounter of 06/17/24    Echo    Interpretation Summary    Left Ventricle: The left ventricle is normal in size. There is concentric remodeling. There is normal systolic function. Biplane (2D) method of discs ejection fraction is 64%.    Right Ventricle: Normal right ventricular cavity size. Wall thickness is normal. Systolic function is normal.    Aortic Valve: There is mild aortic valve sclerosis.    Tricuspid Valve: There is mild regurgitation.    Pulmonary Artery: The estimated pulmonary artery systolic pressure is 57 mmHg.    IVC/SVC: Normal venous pressure at 3 mmHg.     Plan:   - Order ECHO  - Bilateral carotid ultrasound ordered  - Orthostatic pressures prior discharge  - Neuro checks q4hr  - Fall precautions   - PT/OT evaluation, appreciate recs  - BNP ordered

## 2025-02-14 NOTE — ASSESSMENT & PLAN NOTE
Creatine stable for now. BMP reviewed- noted Estimated Creatinine Clearance: 25.6 mL/min (A) (based on SCr of 1.8 mg/dL (H)). according to latest data. Based on current GFR, CKD stage is stage 3 - GFR 30-59.  Monitor UOP and serial BMP and adjust therapy as needed. Renally dose meds. Avoid nephrotoxic medications and procedures.

## 2025-02-14 NOTE — ASSESSMENT & PLAN NOTE
Troponin mildly elevated at 0.030. EKG with NSR. Likely represents demand ischemia in the setting of hypoxia OR reduced clearance secondary to CKD.Negative for chest pain of SOB.     Plan:  - Repeat troponin  - Monitor telemetry

## 2025-02-14 NOTE — PLAN OF CARE
MOON Message     Medicare Outpatient and Observation Notification regarding financial responsibility Explained to patient/caregiver; Signed/date by patient/caregiver   Date BRIGHT was signed 2/14/2025   Time BRIGHT was signed 1122

## 2025-02-14 NOTE — ASSESSMENT & PLAN NOTE
Stable.  Patient has hx of HLD.  Lab Results   Component Value Date    CHOL 139 02/14/2025    CHOL 145 09/03/2024    CHOL 131 07/04/2024     Lab Results   Component Value Date    HDL 54 02/14/2025    HDL 57 09/03/2024    HDL 56 07/04/2024     Lab Results   Component Value Date    LDLCALC 68.6 02/14/2025    LDLCALC 62.6 (L) 09/03/2024    LDLCALC 50.6 (L) 07/04/2024     Lab Results   Component Value Date    TRIG 82 02/14/2025    TRIG 127 09/03/2024    TRIG 122 07/04/2024       Lab Results   Component Value Date    CHOLHDL 38.8 02/14/2025    CHOLHDL 39.3 09/03/2024    CHOLHDL 42.7 07/04/2024       Plan:  - Continue home atorvastatin, ASA

## 2025-02-14 NOTE — PLAN OF CARE
Virtual Nurse note: Patient chart, labs, and vitals reviewed. Care plan and goals updated as needed.   VN to continue to be available as needed.     Problem: Adult Inpatient Plan of Care  Goal: Plan of Care Review  2/14/2025 1002 by Joelle Voss, RN  Outcome: Progressing  2/14/2025 1002 by Joelle Voss, RN  Outcome: Progressing  Goal: Patient-Specific Goal (Individualized)  2/14/2025 1002 by Joelle Voss, RN  Outcome: Progressing  2/14/2025 1002 by Joelle Voss, RN  Outcome: Progressing

## 2025-02-14 NOTE — ASSESSMENT & PLAN NOTE
Stable. No use of home O2 at home.   History of smoking 1ppd for 27yrs. Stopped at age 40.   PFT's in 5/2022 indicate moderate to severe obstruction.  Patient's COPD is controlled currently.  Patient is currently off COPD Pathway. Continue home inhalers and monitor respiratory status closely.     Plan:  - Order VBG  - Continue medical management with home albuterol and Breo inhaler while hospitalized.   - Avoid triggers  - Maintain O2 88-92%

## 2025-02-14 NOTE — NURSING
VIRTUAL NURSE: Pt arrived to unit. Permission received to turn camera to view patient. VIP model explained; Patient informed this VN will be working with bedside nurse and the rest of the care team.      Admission questions completed.  Plan of care reviewed with patient.  Educated patient on VTE and fall risk. Safety precautions in place. Call light within reach, side rails up x2.     Patient instucted to ask staff for assistance. Patient verbalized complete understanding.  Will continue to be available and intervene as needed.    Labs, notes, orders, and careplan reviewed.    02/14/25 0015   Admission   Initial VN Admission Questions Complete   Shift   Virtual Nurse - Rounding Complete   Virtual Nurse - Patient Verbalized Approval Of Camera Use;VN Rounding   Type of Frequent Check   Type Patient Rounds   Safety/Activity   Patient Rounds bed in low position;bed wheels locked;call light in patient/parent reach;ID band on;visualized patient   Safety Promotion/Fall Prevention assistive device/personal item within reach   Safety Precautions emergency equipment at bedside   Activity Assistance Provided assistance, 1 person   Positioning   Body Position position changed independently   Head of Bed (HOB) Positioning HOB elevated   Pain/Comfort/Sleep   Pain Rating (0-10): Rest 0

## 2025-02-14 NOTE — ASSESSMENT & PLAN NOTE
Stable.   Lab Results   Component Value Date    HGBA1C 5.8 (H) 02/14/2025     Plan:  - last A1C 2mo ago 5.8%  - patient on glipizide at home, will hold while hospitalized.   - follow up repeat A1C  - goal -180 while inpatient  - LDSSI  - diabetic diet

## 2025-02-14 NOTE — PROGRESS NOTES
"[facility] Medicine  Progress Note    Patient Name: Husam Connolly  MRN: [id number]  Patient Class: OP- Observation   Admission Date: 2/13/2025  Length of Stay: 0 days  Attending Physician: Cee Natarajan MD  Primary Care Provider: Williams Knight DO        Subjective     Principal Problem:Syncope        HPI:  89-year-old male with history of BPH, diabetes, emphysema, high right bundle-branch block, small-bowel obstruction, CKD3, HLD, HTN who presented to the ED with reports of fatigue. Per ED, patient reports performing his normal activities this morning including eating breakfast and performing physical therapy.  He subsequently took a shower and when he got out he felt fatigued with some "cloudy" vision.  He subsequently checked his blood pressure was noted to be low (90's/50's). BG at home reportedly 139.  He called his son-in-law who advised he call EMS. He feels back to his normal self upon arrival to the ED.     Patient currently states that he feels fine. Denies any fever/chills, SOB, chest pain, nausea, vomiting, diarrhea, constipation. Unsure if he LOC or trauma. Does not remember if he got of the floor or not. He does have a new abrasion on the back of his head. Only blood thinner is daily baby aspirin. Has been tolerating PO without any issues. Compliant with medications. At baseline patient AAOx3 and lives at home alone. At baseline, patient is able to do ADLs without assistance. Admits to 1 daily alcoholic beverage. Denies tobacco use or drug use. Past history of smoking 1ppd for 27yrs. Stopped at age 40.      In the ED, initial vitals /81, HR 76, Temp 97.8F, SpO2 92 on room air. CBC unremarkable CMP abnormal for Anion gap 17, BUN/Cr 38/1.8. Trop: 0.030. EKG: NSR without ischemic changes. UA trace of protein and ketones. The patient underwent workup including CT cervical and head Cervical spondylosis and osteoporosis with no evidence of displaced fracture or soft tissue " swelling or hematoma and a Nodule placing the majority of the right lobe of the thyroid gland. Chest x-ray: Chronic appearing interstitial findings, no large focal consolidation. Patient didn't receive medications in ED. Admitted to [facility] for evaluation of syncopal episode.    Overview/Hospital Course:  No notes on file    Interval History: NAEON. Pending echo. Carotid U/S ordered. No further syncopal episodes today.     Review of Systems   Constitutional:  Positive for fatigue. Negative for fever.   HENT:  Negative for congestion, ear pain, rhinorrhea and sore throat.    Eyes:  Negative for visual disturbance.   Respiratory:  Negative for chest tightness and shortness of breath.    Cardiovascular:  Negative for chest pain and palpitations.   Gastrointestinal:  Negative for abdominal distention, abdominal pain, constipation, diarrhea, nausea and vomiting.   Endocrine: Negative for polyuria.   Genitourinary:  Negative for difficulty urinating and dysuria.   Musculoskeletal: Negative.    Skin: Negative.    Neurological:  Negative for dizziness, syncope, facial asymmetry, speech difficulty, weakness, light-headedness, numbness and headaches.   Hematological:  Bruises/bleeds easily.     Objective:     Vital Signs (Most Recent):  Temp: 98.2 °F (36.8 °C) (02/14/25 0953)  Pulse: 95 (02/14/25 0956)  Resp: 20 (02/14/25 0934)  BP: (!) 150/70 (02/14/25 0956)  SpO2: (!) 87 % (02/14/25 0956) Vital Signs (24h Range):  Temp:  [97.8 °F (36.6 °C)-98.9 °F (37.2 °C)] 98.2 °F (36.8 °C)  Pulse:  [76-95] 95  Resp:  [15-27] 20  SpO2:  [86 %-95 %] 87 %  BP: (123-196)/(60-99) 150/70     Weight: 77.1 kg (169 lb 15.6 oz)  Body mass index is 25.1 kg/m².    Intake/Output Summary (Last 24 hours) at 2/14/2025 1116  Last data filed at 2/14/2025 0534  Gross per 24 hour   Intake 75 ml   Output 450 ml   Net -375 ml         Physical Exam  Constitutional:       General: He is not in acute distress.     Appearance: Normal appearance. He is  not ill-appearing.   HENT:      Head: Normocephalic. Abrasion present.      Comments: Echymossis present on scalp near occipital area     Right Ear: External ear normal.      Left Ear: External ear normal.      Nose: Nose normal. No congestion or rhinorrhea.      Mouth/Throat:      Mouth: Mucous membranes are moist.   Eyes:      Pupils: Pupils are equal, round, and reactive to light.   Cardiovascular:      Rate and Rhythm: Normal rate and regular rhythm.      Pulses: Normal pulses.      Heart sounds: Normal heart sounds. No murmur heard.  Pulmonary:      Effort: Pulmonary effort is normal. No respiratory distress.      Breath sounds: Normal breath sounds. No wheezing or rales.   Abdominal:      General: Abdomen is flat. Bowel sounds are normal. There is no distension.      Palpations: Abdomen is soft.      Tenderness: There is no abdominal tenderness.   Musculoskeletal:         General: Normal range of motion.      Cervical back: Normal range of motion and neck supple.      Right lower le+ Edema present.      Left lower le+ Edema present.   Skin:     General: Skin is warm and dry.      Findings: Bruising present.      Comments: Ecchymosis on forearms bilaterally.   Neurological:      Mental Status: He is alert and oriented to person, place, and time. Mental status is at baseline.      Cranial Nerves: No cranial nerve deficit.      Sensory: No sensory deficit.      Motor: No weakness.      Coordination: Coordination normal.      Gait: Gait normal.   Psychiatric:         Mood and Affect: Mood normal.           Significant Labs: All pertinent labs within the past 24 hours have been reviewed.  CBC:   Recent Labs   Lab 25  1548 25  0737   WBC 12.11 8.65   HGB 15.0 13.3*   HCT 45.1 40.5    177     CMP:   Recent Labs   Lab 25  1548 25  0737    141   K 4.2 4.6    110   CO2 16* 20*   * 111*   BUN 38* 32*   CREATININE 1.8* 1.5*   CALCIUM 9.6 9.0   PROT 6.7 6.0    ALBUMIN 3.7 3.1*   BILITOT 1.2* 1.3*   ALKPHOS 70 59   AST 24 22   ALT 19 19   ANIONGAP 17* 11       Significant Imaging: I have reviewed all pertinent imaging results/findings within the past 24 hours.  Imaging Results              CT Cervical Spine Without Contrast (Final result)  Result time 02/13/25 19:40:46      Final result by Toro Hamm MD (02/13/25 19:40:46)                   Impression:      Negative CT of the brain.    Cervical spondylosis and osteoporosis with no evidence of displaced fracture or soft tissue swelling or hematoma.    Nodule placing the majority of the right lobe of the thyroid gland.  Medical follow-up recommended.      Electronically signed by: Toro Hamm  Date:    02/13/2025  Time:    19:40               Narrative:    EXAMINATION:  CT HEAD WITHOUT CONTRAST; CT CERVICAL SPINE WITHOUT CONTRAST    CLINICAL HISTORY:  Head trauma, minor (Age >= 65y);; Neck trauma (Age >= 65y);    TECHNIQUE:  Low dose axial images were obtained through the head and cervical spine.  Coronal and sagittal reformations were also performed. Contrast was not administered.    COMPARISON:  Is a.    FINDINGS:  BRAIN:    Brain parenchyma appears normal in attenuation with normal gray-white matter differentiation.  No mass, mass effect or pathologic fluid collection.    Ventricles and basilar cisterns appear appropriate.    Calvarium intact.  Sinuses are clear with no significant middle ear or mastoid opacity.  Orbits and orbital contents unremarkable.    CERVICAL SPINE:    Alignment is anatomic.  There is no evidence of displaced fracture, soft tissue swelling or hematoma.  Diffuse osteopenia with auto fusion changes at C3-4 and C4-5.    Surrounding soft tissues noteworthy for vascular calcifications in the carotids and nodular mass replacing the majority of the right lobe of the thyroid gland with benign macro calcifications centrally.    Cervical cranial and cervicothoracic junction appear intact                                        CT Head Without Contrast (Final result)  Result time 02/13/25 19:40:46      Final result by Toro Hamm MD (02/13/25 19:40:46)                   Impression:      Negative CT of the brain.    Cervical spondylosis and osteoporosis with no evidence of displaced fracture or soft tissue swelling or hematoma.    Nodule placing the majority of the right lobe of the thyroid gland.  Medical follow-up recommended.      Electronically signed by: Toro Hamm  Date:    02/13/2025  Time:    19:40               Narrative:    EXAMINATION:  CT HEAD WITHOUT CONTRAST; CT CERVICAL SPINE WITHOUT CONTRAST    CLINICAL HISTORY:  Head trauma, minor (Age >= 65y);; Neck trauma (Age >= 65y);    TECHNIQUE:  Low dose axial images were obtained through the head and cervical spine.  Coronal and sagittal reformations were also performed. Contrast was not administered.    COMPARISON:  Is a.    FINDINGS:  BRAIN:    Brain parenchyma appears normal in attenuation with normal gray-white matter differentiation.  No mass, mass effect or pathologic fluid collection.    Ventricles and basilar cisterns appear appropriate.    Calvarium intact.  Sinuses are clear with no significant middle ear or mastoid opacity.  Orbits and orbital contents unremarkable.    CERVICAL SPINE:    Alignment is anatomic.  There is no evidence of displaced fracture, soft tissue swelling or hematoma.  Diffuse osteopenia with auto fusion changes at C3-4 and C4-5.    Surrounding soft tissues noteworthy for vascular calcifications in the carotids and nodular mass replacing the majority of the right lobe of the thyroid gland with benign macro calcifications centrally.    Cervical cranial and cervicothoracic junction appear intact                                       X-Ray Chest AP Portable (Final result)  Result time 02/13/25 17:02:42      Final result by Rc Liu MD (02/13/25 17:02:42)                   Impression:      1.  Chronic appearing interstitial findings, no large focal consolidation.      Electronically signed by: Rc Liu MD  Date:    02/13/2025  Time:    17:02               Narrative:    EXAMINATION:  XR CHEST AP PORTABLE    CLINICAL HISTORY:  Other fatigue    TECHNIQUE:  Single frontal view of the chest was performed.    COMPARISON:  12/23/2024    FINDINGS:  The cardiomediastinal silhouette is not enlarged noting calcification of the aorta..  There is no pleural effusion.  The trachea is midline.  The lungs are symmetrically expanded bilaterally with mildly coarse interstitial attenuation, similar to the previous exam.  There is left basilar subsegmental atelectasis or scarring..  No large focal consolidation seen.  There is no pneumothorax.  The osseous structures are remarkable for degenerative change..                                       Assessment and Plan     * Syncope  Stable. Elevated BP. Good oral intake. However, admits to poor water intake. High suspicion for vasovagal syncope vs orthostatic syncope  Cardiovascular examination: Negative for murmurs, arrhythmias, or signs of heart failure.  Neurological exam: Low concern for seizure activity or stroke.  Complete blood count (CBC), electrolytes (sodium, potassium, calcium). renal function, blood glucose,  unremarkable. Trop <0.030.  Low concern for cardiac etiology.  EKG in the ED: NSR without ischemic changes.   Head CT wo contrast in the ED: Cervical spondylosis and osteoporosis with no evidence of displaced fracture or soft tissue swelling or hematoma and a Nodule placing the majority of the right lobe of the thyroid gland. Chest x-ray: Chronic appearing interstitial findings, no large focal consolidation.    Last echo   Results for orders placed during the hospital encounter of 06/17/24    Echo    Interpretation Summary    Left Ventricle: The left ventricle is normal in size. There is concentric remodeling. There is normal systolic function. Biplane  (2D) method of discs ejection fraction is 64%.    Right Ventricle: Normal right ventricular cavity size. Wall thickness is normal. Systolic function is normal.    Aortic Valve: There is mild aortic valve sclerosis.    Tricuspid Valve: There is mild regurgitation.    Pulmonary Artery: The estimated pulmonary artery systolic pressure is 57 mmHg.    IVC/SVC: Normal venous pressure at 3 mmHg.     Plan:   - Order ECHO  - Bilateral carotid ultrasound ordered  - Orthostatic pressures prior discharge  - Neuro checks q4hr  - Fall precautions   - PT/OT evaluation, appreciate recs  - BNP ordered        Pulmonary emphysema  Stable. No use of home O2 at home.   History of smoking 1ppd for 27yrs. Stopped at age 40.   PFT's in 5/2022 indicate moderate to severe obstruction.  Patient's COPD is controlled currently.  Patient is currently off COPD Pathway. Continue home inhalers and monitor respiratory status closely.   VBG 7.357/48.6/18.8/27.2     Plan:  - Continue medical management with home albuterol and Breo inhaler while hospitalized.   - Avoid triggers  - Maintain O2 88-92%    Type 2 diabetes mellitus, without long-term current use of insulin  Stable.   Lab Results   Component Value Date    HGBA1C 5.8 (H) 02/14/2025     Plan:  - last A1C 2mo ago 5.8%  - patient on glipizide at home, will hold while hospitalized.   - follow up repeat A1C  - goal -180 while inpatient  - LDSSI  - diabetic diet    Elevated troponin  Troponin mildly elevated at 0.030. EKG with NSR. Likely represents demand ischemia in the setting of hypoxia OR reduced clearance secondary to CKD.Negative for chest pain of SOB.     Plan:  - Repeat troponin  - Monitor telemetry    High cholesterol  Stable.  Patient has hx of HLD.  Lab Results   Component Value Date    CHOL 139 02/14/2025    CHOL 145 09/03/2024    CHOL 131 07/04/2024     Lab Results   Component Value Date    HDL 54 02/14/2025    HDL 57 09/03/2024    HDL 56 07/04/2024     Lab Results   Component  Value Date    LDLCALC 68.6 02/14/2025    LDLCALC 62.6 (L) 09/03/2024    LDLCALC 50.6 (L) 07/04/2024     Lab Results   Component Value Date    TRIG 82 02/14/2025    TRIG 127 09/03/2024    TRIG 122 07/04/2024       Lab Results   Component Value Date    CHOLHDL 38.8 02/14/2025    CHOLHDL 39.3 09/03/2024    CHOLHDL 42.7 07/04/2024       Plan:  - Continue home atorvastatin, ASA    Stage 3b chronic kidney disease  Creatine stable for now. BMP reviewed- noted Estimated Creatinine Clearance: 30.8 mL/min (A) (based on SCr of 1.5 mg/dL (H)). according to latest data. Based on current GFR, CKD stage is stage 3 - GFR 30-59.  Monitor UOP and serial BMP and adjust therapy as needed. Renally dose meds. Avoid nephrotoxic medications and procedures.    BPH (benign prostatic hyperplasia)  Stable.     Plan:   - continue home Flomax, finasteride      VTE Risk Mitigation (From admission, onward)           Ordered     IP VTE HIGH RISK PATIENT  Once         02/13/25 2353     Place sequential compression device  Until discontinued         02/13/25 2353                    Discharge Planning   JOSE: 2/15/2025     Code Status: Full Code   Medical Readiness for Discharge Date:                      Jagdeep Davis MD  Department of Hospital Medicine   [facility]

## 2025-02-14 NOTE — SUBJECTIVE & OBJECTIVE
Interval History: NAEON. Pending echo. Carotid U/S ordered. No further syncopal episodes today.     Review of Systems   Constitutional:  Positive for fatigue. Negative for fever.   HENT:  Negative for congestion, ear pain, rhinorrhea and sore throat.    Eyes:  Negative for visual disturbance.   Respiratory:  Negative for chest tightness and shortness of breath.    Cardiovascular:  Negative for chest pain and palpitations.   Gastrointestinal:  Negative for abdominal distention, abdominal pain, constipation, diarrhea, nausea and vomiting.   Endocrine: Negative for polyuria.   Genitourinary:  Negative for difficulty urinating and dysuria.   Musculoskeletal: Negative.    Skin: Negative.    Neurological:  Negative for dizziness, syncope, facial asymmetry, speech difficulty, weakness, light-headedness, numbness and headaches.   Hematological:  Bruises/bleeds easily.     Objective:     Vital Signs (Most Recent):  Temp: 98.2 °F (36.8 °C) (02/14/25 0953)  Pulse: 95 (02/14/25 0956)  Resp: 20 (02/14/25 0934)  BP: (!) 150/70 (02/14/25 0956)  SpO2: (!) 87 % (02/14/25 0956) Vital Signs (24h Range):  Temp:  [97.8 °F (36.6 °C)-98.9 °F (37.2 °C)] 98.2 °F (36.8 °C)  Pulse:  [76-95] 95  Resp:  [15-27] 20  SpO2:  [86 %-95 %] 87 %  BP: (123-196)/(60-99) 150/70     Weight: 77.1 kg (169 lb 15.6 oz)  Body mass index is 25.1 kg/m².    Intake/Output Summary (Last 24 hours) at 2/14/2025 1119  Last data filed at 2/14/2025 0534  Gross per 24 hour   Intake 75 ml   Output 450 ml   Net -375 ml         Physical Exam  Constitutional:       General: He is not in acute distress.     Appearance: Normal appearance. He is not ill-appearing.   HENT:      Head: Normocephalic. Abrasion present.      Comments: Echymossis present on scalp near occipital area     Right Ear: External ear normal.      Left Ear: External ear normal.      Nose: Nose normal. No congestion or rhinorrhea.      Mouth/Throat:      Mouth: Mucous membranes are moist.   Eyes:      Pupils:  Pupils are equal, round, and reactive to light.   Cardiovascular:      Rate and Rhythm: Normal rate and regular rhythm.      Pulses: Normal pulses.      Heart sounds: Normal heart sounds. No murmur heard.  Pulmonary:      Effort: Pulmonary effort is normal. No respiratory distress.      Breath sounds: Normal breath sounds. No wheezing or rales.   Abdominal:      General: Abdomen is flat. Bowel sounds are normal. There is no distension.      Palpations: Abdomen is soft.      Tenderness: There is no abdominal tenderness.   Musculoskeletal:         General: Normal range of motion.      Cervical back: Normal range of motion and neck supple.      Right lower le+ Edema present.      Left lower le+ Edema present.   Skin:     General: Skin is warm and dry.      Findings: Bruising present.      Comments: Ecchymosis on forearms bilaterally.   Neurological:      Mental Status: He is alert and oriented to person, place, and time. Mental status is at baseline.      Cranial Nerves: No cranial nerve deficit.      Sensory: No sensory deficit.      Motor: No weakness.      Coordination: Coordination normal.      Gait: Gait normal.   Psychiatric:         Mood and Affect: Mood normal.           Significant Labs: All pertinent labs within the past 24 hours have been reviewed.  CBC:   Recent Labs   Lab 25  1548 25  0737   WBC 12.11 8.65   HGB 15.0 13.3*   HCT 45.1 40.5    177     CMP:   Recent Labs   Lab 25  1548 25  0737    141   K 4.2 4.6    110   CO2 16* 20*   * 111*   BUN 38* 32*   CREATININE 1.8* 1.5*   CALCIUM 9.6 9.0   PROT 6.7 6.0   ALBUMIN 3.7 3.1*   BILITOT 1.2* 1.3*   ALKPHOS 70 59   AST 24 22   ALT 19 19   ANIONGAP 17* 11       Significant Imaging: I have reviewed all pertinent imaging results/findings within the past 24 hours.  Imaging Results              CT Cervical Spine Without Contrast (Final result)  Result time 25 19:40:46      Final result by Hansel  oTro ANN MD (02/13/25 19:40:46)                   Impression:      Negative CT of the brain.    Cervical spondylosis and osteoporosis with no evidence of displaced fracture or soft tissue swelling or hematoma.    Nodule placing the majority of the right lobe of the thyroid gland.  Medical follow-up recommended.      Electronically signed by: Toro Hamm  Date:    02/13/2025  Time:    19:40               Narrative:    EXAMINATION:  CT HEAD WITHOUT CONTRAST; CT CERVICAL SPINE WITHOUT CONTRAST    CLINICAL HISTORY:  Head trauma, minor (Age >= 65y);; Neck trauma (Age >= 65y);    TECHNIQUE:  Low dose axial images were obtained through the head and cervical spine.  Coronal and sagittal reformations were also performed. Contrast was not administered.    COMPARISON:  Is a.    FINDINGS:  BRAIN:    Brain parenchyma appears normal in attenuation with normal gray-white matter differentiation.  No mass, mass effect or pathologic fluid collection.    Ventricles and basilar cisterns appear appropriate.    Calvarium intact.  Sinuses are clear with no significant middle ear or mastoid opacity.  Orbits and orbital contents unremarkable.    CERVICAL SPINE:    Alignment is anatomic.  There is no evidence of displaced fracture, soft tissue swelling or hematoma.  Diffuse osteopenia with auto fusion changes at C3-4 and C4-5.    Surrounding soft tissues noteworthy for vascular calcifications in the carotids and nodular mass replacing the majority of the right lobe of the thyroid gland with benign macro calcifications centrally.    Cervical cranial and cervicothoracic junction appear intact                                       CT Head Without Contrast (Final result)  Result time 02/13/25 19:40:46      Final result by Toro Hamm MD (02/13/25 19:40:46)                   Impression:      Negative CT of the brain.    Cervical spondylosis and osteoporosis with no evidence of displaced fracture or soft tissue swelling or  hematoma.    Nodule placing the majority of the right lobe of the thyroid gland.  Medical follow-up recommended.      Electronically signed by: Toro Hamm  Date:    02/13/2025  Time:    19:40               Narrative:    EXAMINATION:  CT HEAD WITHOUT CONTRAST; CT CERVICAL SPINE WITHOUT CONTRAST    CLINICAL HISTORY:  Head trauma, minor (Age >= 65y);; Neck trauma (Age >= 65y);    TECHNIQUE:  Low dose axial images were obtained through the head and cervical spine.  Coronal and sagittal reformations were also performed. Contrast was not administered.    COMPARISON:  Is a.    FINDINGS:  BRAIN:    Brain parenchyma appears normal in attenuation with normal gray-white matter differentiation.  No mass, mass effect or pathologic fluid collection.    Ventricles and basilar cisterns appear appropriate.    Calvarium intact.  Sinuses are clear with no significant middle ear or mastoid opacity.  Orbits and orbital contents unremarkable.    CERVICAL SPINE:    Alignment is anatomic.  There is no evidence of displaced fracture, soft tissue swelling or hematoma.  Diffuse osteopenia with auto fusion changes at C3-4 and C4-5.    Surrounding soft tissues noteworthy for vascular calcifications in the carotids and nodular mass replacing the majority of the right lobe of the thyroid gland with benign macro calcifications centrally.    Cervical cranial and cervicothoracic junction appear intact                                       X-Ray Chest AP Portable (Final result)  Result time 02/13/25 17:02:42      Final result by Rc Liu MD (02/13/25 17:02:42)                   Impression:      1. Chronic appearing interstitial findings, no large focal consolidation.      Electronically signed by: Rc Liu MD  Date:    02/13/2025  Time:    17:02               Narrative:    EXAMINATION:  XR CHEST AP PORTABLE    CLINICAL HISTORY:  Other fatigue    TECHNIQUE:  Single frontal view of the chest was  performed.    COMPARISON:  12/23/2024    FINDINGS:  The cardiomediastinal silhouette is not enlarged noting calcification of the aorta..  There is no pleural effusion.  The trachea is midline.  The lungs are symmetrically expanded bilaterally with mildly coarse interstitial attenuation, similar to the previous exam.  There is left basilar subsegmental atelectasis or scarring..  No large focal consolidation seen.  There is no pneumothorax.  The osseous structures are remarkable for degenerative change..

## 2025-02-14 NOTE — ASSESSMENT & PLAN NOTE
Stable.   Lab Results   Component Value Date    HGBA1C 5.8 (H) 12/23/2024     Plan:  - last A1C 2mo ago 5.8%  - patient on glipizide at home, will hold while hospitalized.   - follow up repeat A1C  - goal -180 while inpatient  - LDSSI  - diabetic diet

## 2025-02-14 NOTE — SUBJECTIVE & OBJECTIVE
Past Medical History:   Diagnosis Date    BPH (benign prostatic hyperplasia)     Diabetes mellitus (type 2)     Emphysema 03/12/2022    High cholesterol 08/26/2020    Hypertension     RBBB 11/29/2022    Small bowel obstruction 06/13/2019       Past Surgical History:   Procedure Laterality Date    ANASTOMOSIS OF ILEUM TO RECTUM  9/9/2019    Procedure: ANASTOMOSIS, COLO-RECTAL;  Surgeon: Fouzia Gardner MD;  Location: [facility] OR;  Service: General;;    CHOLECYSTECTOMY N/A 3/7/2022    Procedure: CHOLECYSTECTOMY;  Surgeon: Fouzia Gardner MD;  Location: [facility] OR;  Service: General;  Laterality: N/A;    COLOSTOMY  6/16/2019    Procedure: CREATION, COLOSTOMY;  Surgeon: Fouzia Gardner MD;  Location: [facility] OR;  Service: General;;    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    Procedure: RELEASE, DUPUYTREN CONTRACTURE;  Surgeon: Slick Baldwin Jr., MD;  Location: [facility] OR;  Service: Orthopedics;  Laterality: Left;    EYE SURGERY      GISELA PROCEDURE  6/16/2019    Procedure: GISELA PROCEDURE;  Surgeon: Fouzia Gardner MD;  Location: [facility] OR;  Service: General;;  Colon resection    LYSIS OF ADHESIONS N/A 3/7/2022    Procedure: LYSIS, ADHESIONS;  Surgeon: Fouzia Gardner MD;  Location: [facility];  Service: General;  Laterality: N/A;    TONSILLECTOMY         Review of patient's allergies indicates:   Allergen Reactions    Solarcaine [benzocaine-triclosan]     Sulfa (sulfonamide antibiotics)     Sulfamethoxazole     Trimethoprim     Empagliflozin Rash and Other (See Comments)     Yeast infection    Hytrin [terazosin] Rash    Smz-tmp ds [sulfamethoxazole-trimethoprim] Rash       No current facility-administered medications on file prior to encounter.     Current Outpatient Medications on File Prior to Encounter   Medication Sig    albuterol (VENTOLIN HFA) 90 mcg/actuation inhaler Inhale 2 puffs into the lungs every 4 (four) hours as needed for Wheezing. Rescue    aspirin (ECOTRIN) 81 MG EC tablet Take  81 mg by mouth once daily.    atorvastatin (LIPITOR) 40 MG tablet Take 1 tablet (40 mg total) by mouth once daily.    finasteride (PROSCAR) 5 mg tablet Take 1 tablet (5 mg total) by mouth once daily.    fluticasone propionate (FLONASE) 50 mcg/actuation nasal spray 2 sprays (100 mcg total) by Each Nostril route once daily.    fluticasone-umeclidin-vilanter (TRELEGY ELLIPTA) 100-62.5-25 mcg DsDv Inhale 1 puff into the lungs once daily.    GARLIC ORAL Take 1 capsule by mouth once daily.    glipiZIDE 5 MG TR24 Take 1 tablet (5 mg total) by mouth daily with breakfast.    hydrALAZINE (APRESOLINE) 50 MG tablet Take 1 tablet (50 mg total) by mouth 3 (three) times daily.    losartan (COZAAR) 100 MG tablet Take 1 tablet (100 mg total) by mouth once daily.    multivitamin capsule Take 1 capsule by mouth once daily.    omega-3 fatty acids/fish oil (FISH OIL-OMEGA-3 FATTY ACIDS) 300-1,000 mg capsule Take 1 capsule by mouth once daily.    tamsulosin (FLOMAX) 0.4 mg Cap Take 0.4 mg by mouth once daily.    vitamin D (VITAMIN D3) 1000 units Tab Take 1,000 Units by mouth once daily.    blood sugar diagnostic (ACCU-CHEK GUIDE TEST STRIPS) Strp 1 strip by In Vitro route once daily.    blood-glucose meter kit Use as instructed    inhalation spacing device Use as directed for inhalation.    lancets Misc To check sugar daily, to use with insurance preferred meter    mupirocin (BACTROBAN) 2 % ointment SMARTSI Application Topical 2-3 Times Daily     Family History       Problem Relation (Age of Onset)    Cancer Mother    Diabetes Paternal Aunt    Heart disease Father    Hypertension Father          Tobacco Use    Smoking status: Former     Current packs/day: 0.00     Average packs/day: 1 pack/day for 25.0 years (25.0 ttl pk-yrs)     Types: Cigarettes     Start date:      Quit date:      Years since quittin.1     Passive exposure: Past    Smokeless tobacco: Never   Substance and Sexual Activity    Alcohol use: Not Currently      Alcohol/week: 5.0 standard drinks of alcohol     Types: 5 Cans of beer per week    Drug use: No    Sexual activity: Yes     Partners: Female     Review of Systems   Constitutional:  Positive for fatigue. Negative for fever.   HENT:  Negative for congestion, ear pain, rhinorrhea and sore throat.    Eyes:  Negative for visual disturbance.   Respiratory:  Negative for chest tightness and shortness of breath.    Cardiovascular:  Negative for chest pain and palpitations.   Gastrointestinal:  Negative for abdominal distention, abdominal pain, constipation, diarrhea, nausea and vomiting.   Endocrine: Negative for polyuria.   Genitourinary:  Negative for difficulty urinating and dysuria.   Musculoskeletal: Negative.    Skin: Negative.    Neurological:  Positive for syncope. Negative for dizziness, facial asymmetry, speech difficulty, weakness, light-headedness, numbness and headaches.   Hematological:  Bruises/bleeds easily.     Objective:     Vital Signs (Most Recent):  Temp: 97.8 °F (36.6 °C) (02/13/25 1326)  Pulse: 76 (02/13/25 2240)  Resp: 18 (02/13/25 2240)  BP: (!) 196/81 (02/14/25 0020)  SpO2: (!) 92 % (02/13/25 2240) Vital Signs (24h Range):  Temp:  [97.8 °F (36.6 °C)] 97.8 °F (36.6 °C)  Pulse:  [76-91] 76  Resp:  [15-27] 18  SpO2:  [88 %-95 %] 92 %  BP: (123-196)/(60-99) 196/81     Weight: 77.1 kg (170 lb)  Body mass index is 25.1 kg/m².     Physical Exam  Constitutional:       General: He is not in acute distress.     Appearance: Normal appearance. He is not ill-appearing.   HENT:      Head: Normocephalic. Abrasion present.        Right Ear: External ear normal.      Left Ear: External ear normal.      Nose: Nose normal. No congestion or rhinorrhea.      Mouth/Throat:      Mouth: Mucous membranes are moist.   Eyes:      Pupils: Pupils are equal, round, and reactive to light.   Cardiovascular:      Rate and Rhythm: Normal rate and regular rhythm.      Pulses: Normal pulses.      Heart sounds: Normal heart  sounds. No murmur heard.  Pulmonary:      Effort: Pulmonary effort is normal. No respiratory distress.      Breath sounds: Normal breath sounds. No wheezing or rales.   Abdominal:      General: Abdomen is flat. Bowel sounds are normal. There is no distension.      Palpations: Abdomen is soft.      Tenderness: There is no abdominal tenderness.   Musculoskeletal:         General: Normal range of motion.      Cervical back: Normal range of motion and neck supple.      Right lower le+ Edema present.      Left lower le+ Edema present.   Skin:     General: Skin is warm and dry.      Findings: Bruising present.   Neurological:      Mental Status: He is alert and oriented to person, place, and time. Mental status is at baseline.      Cranial Nerves: No cranial nerve deficit.      Sensory: No sensory deficit.      Motor: No weakness.      Coordination: Coordination normal.      Gait: Gait normal.   Psychiatric:         Mood and Affect: Mood normal.              CRANIAL NERVES     CN III, IV, VI   Pupils are equal, round, and reactive to light.       Significant Labs: All pertinent labs within the past 24 hours have been reviewed.    Significant Imaging: I have reviewed all pertinent imaging results/findings within the past 24 hours.

## 2025-02-14 NOTE — ASSESSMENT & PLAN NOTE
Stable. Elevated BP. Good oral intake. However, admits to poor water intake. High suspicion for vasovagal syncope vs orthostatic syncope  Cardiovascular examination: Negative for murmurs, arrhythmias, or signs of heart failure.  Neurological exam: Low concern for seizure activity or stroke.  Complete blood count (CBC), electrolytes (sodium, potassium, calcium). renal function, blood glucose,  unremarkable. Trop <0.030.  Low concern for cardiac etiology.  EKG in the ED: NSR without ischemic changes.   Head CT wo contrast in the ED: Cervical spondylosis and osteoporosis with no evidence of displaced fracture or soft tissue swelling or hematoma and a Nodule placing the majority of the right lobe of the thyroid gland. Chest x-ray: Chronic appearing interstitial findings, no large focal consolidation.    Last echo   Results for orders placed during the hospital encounter of 06/17/24    Echo    Interpretation Summary    Left Ventricle: The left ventricle is normal in size. There is concentric remodeling. There is normal systolic function. Biplane (2D) method of discs ejection fraction is 64%.    Right Ventricle: Normal right ventricular cavity size. Wall thickness is normal. Systolic function is normal.    Aortic Valve: There is mild aortic valve sclerosis.    Tricuspid Valve: There is mild regurgitation.    Pulmonary Artery: The estimated pulmonary artery systolic pressure is 57 mmHg.    IVC/SVC: Normal venous pressure at 3 mmHg.     Plan:   - Order ECHO  - Orthostatic pressures prior discharge  - Neuro checks q4hr.   - Labs pending: TSH, Hgb A1c, B1, B9, B12, FTA, HIV  - Fall precautions   - PT/OT evaluation, appreciate recs  - BNP ordered

## 2025-02-14 NOTE — ASSESSMENT & PLAN NOTE
Stable. No use of home O2 at home.   History of smoking 1ppd for 27yrs. Stopped at age 40.   PFT's in 5/2022 indicate moderate to severe obstruction.  Patient's COPD is controlled currently.  Patient is currently off COPD Pathway. Continue home inhalers and monitor respiratory status closely.   VBG 7.357/48.6/18.8/27.2     Plan:  - Continue medical management with home albuterol and Breo inhaler while hospitalized.   - Avoid triggers  - Maintain O2 88-92%

## 2025-02-14 NOTE — NURSING
Dr Rosales notified of /76 HR 76 post hydralazine. Stated ok, no orders given. Pt denies any complaints.

## 2025-02-14 NOTE — PLAN OF CARE
Problem: Adult Inpatient Plan of Care  Goal: Plan of Care Review  Outcome: Progressing     Problem: Fall Injury Risk  Goal: Absence of Fall and Fall-Related Injury  Outcome: Progressing     Problem: Fatigue  Goal: Improved Activity Tolerance  Outcome: Progressing

## 2025-02-14 NOTE — PHARMACY MED REC
"    [facility]           Pharmacy  Admission Medication History     The home medication history was taken by Gladis Redd.      Medication history obtained from Medications listed below were obtained from: Patient/family.    Based on information gathered for medication list, you may go to "Admission" then "Reconcile Home Medications" tabs to review and/or act upon those items.     The home medication list has been updated by the Pharmacy department.   Please read ALL comments highlighted in yellow.   Please address this information as you see fit.    Feel free to contact us if you have any questions or require assistance.    The medications listed below were removed from the home medication list.  Please reorder if appropriate:    Patient reports NOT TAKING the following medication(s):  Spiriva Respimat 2.5 mcg/ actuation inhaler    No current facility-administered medications on file prior to encounter.     Current Outpatient Medications on File Prior to Encounter   Medication Sig Dispense Refill    albuterol (VENTOLIN HFA) 90 mcg/actuation inhaler Inhale 2 puffs into the lungs every 4 (four) hours as needed for Wheezing. Rescue 18 g 11    aspirin (ECOTRIN) 81 MG EC tablet Take 81 mg by mouth once daily.      atorvastatin (LIPITOR) 40 MG tablet Take 1 tablet (40 mg total) by mouth once daily. 90 tablet 3    finasteride (PROSCAR) 5 mg tablet Take 1 tablet (5 mg total) by mouth once daily. 90 tablet 3    fluticasone propionate (FLONASE) 50 mcg/actuation nasal spray 2 sprays (100 mcg total) by Each Nostril route once daily. 48 g 3    fluticasone-umeclidin-vilanter (TRELEGY ELLIPTA) 100-62.5-25 mcg DsDv Inhale 1 puff into the lungs once daily. 180 each 3    GARLIC ORAL Take 1 capsule by mouth once daily.      glipiZIDE 5 MG TR24 Take 1 tablet (5 mg total) by mouth daily with breakfast. 90 tablet 3    hydrALAZINE (APRESOLINE) 50 MG tablet Take 1 tablet (50 mg total) by mouth 3 (three) times daily. " 270 tablet 3    losartan (COZAAR) 100 MG tablet Take 1 tablet (100 mg total) by mouth once daily. 90 tablet 3    multivitamin capsule Take 1 capsule by mouth once daily.      omega-3 fatty acids/fish oil (FISH OIL-OMEGA-3 FATTY ACIDS) 300-1,000 mg capsule Take 1 capsule by mouth once daily.      tamsulosin (FLOMAX) 0.4 mg Cap Take 0.4 mg by mouth once daily.      vitamin D (VITAMIN D3) 1000 units Tab Take 1,000 Units by mouth once daily.      blood sugar diagnostic (ACCU-CHEK GUIDE TEST STRIPS) Strp 1 strip by In Vitro route once daily. 100 strip 3    blood-glucose meter kit Use as instructed 1 each 0    inhalation spacing device Use as directed for inhalation. 1 each 0    lancets Misc To check sugar daily, to use with insurance preferred meter 200 each 2    mupirocin (BACTROBAN) 2 % ointment SMARTSI Application Topical 2-3 Times Daily         Please address this information as you see fit.  Feel free to contact us if you have any questions or require assistance.    Gladis Redd  [phone]              .

## 2025-02-14 NOTE — HPI
"89-year-old male with history of BPH, diabetes, emphysema, high right bundle-branch block, small-bowel obstruction, CKD3, HLD, HTN who presented to the ED with reports of fatigue. Per ED, patient reports performing his normal activities this morning including eating breakfast and performing physical therapy.  He subsequently took a shower and when he got out he felt fatigued with some "cloudy" vision.  He subsequently checked his blood pressure was noted to be low (90's/50's). BG at home reportedly 139.  He called his son-in-law who advised he call EMS. He feels back to his normal self upon arrival to the ED.     Patient currently states that he feels fine. Denies any fever/chills, SOB, chest pain, nausea, vomiting, diarrhea, constipation. Unsure if he LOC or trauma. Does not remember if he got of the floor or not. He does have a new abrasion on the back of his head. Only blood thinner is daily baby aspirin. Has been tolerating PO without any issues. Compliant with medications. At baseline patient AAOx3 and lives at home alone. At baseline, patient is able to do ADLs without assistance. Admits to 1 daily alcoholic beverage. Denies tobacco use or drug use. Past history of smoking 1ppd for 27yrs. Stopped at age 40.      In the ED, initial vitals /81, HR 76, Temp 97.8F, SpO2 92 on room air. CBC unremarkable CMP abnormal for Anion gap 17, BUN/Cr 38/1.8. Trop: 0.030. EKG: NSR without ischemic changes. UA trace of protein and ketones. The patient underwent workup including CT cervical and head Cervical spondylosis and osteoporosis with no evidence of displaced fracture or soft tissue swelling or hematoma and a Nodule placing the majority of the right lobe of the thyroid gland. Chest x-ray: Chronic appearing interstitial findings, no large focal consolidation. Patient didn't receive medications in ED. Admitted to [facility] for evaluation of syncopal episode.  "

## 2025-02-14 NOTE — ASSESSMENT & PLAN NOTE
Creatine stable for now. BMP reviewed- noted Estimated Creatinine Clearance: 30.8 mL/min (A) (based on SCr of 1.5 mg/dL (H)). according to latest data. Based on current GFR, CKD stage is stage 3 - GFR 30-59.  Monitor UOP and serial BMP and adjust therapy as needed. Renally dose meds. Avoid nephrotoxic medications and procedures.

## 2025-02-15 LAB
ALBUMIN SERPL BCP-MCNC: 3 G/DL (ref 3.5–5.2)
ALP SERPL-CCNC: 59 U/L (ref 40–150)
ALT SERPL W/O P-5'-P-CCNC: 13 U/L (ref 10–44)
ANION GAP SERPL CALC-SCNC: 12 MMOL/L (ref 8–16)
AST SERPL-CCNC: 18 U/L (ref 10–40)
BASOPHILS # BLD AUTO: 0.05 K/UL (ref 0–0.2)
BASOPHILS NFR BLD: 0.6 % (ref 0–1.9)
BILIRUB SERPL-MCNC: 1.1 MG/DL (ref 0.1–1)
BUN SERPL-MCNC: 31 MG/DL (ref 10–30)
CALCIUM SERPL-MCNC: 8.8 MG/DL (ref 8.7–10.5)
CHLORIDE SERPL-SCNC: 109 MMOL/L (ref 95–110)
CO2 SERPL-SCNC: 20 MMOL/L (ref 23–29)
CREAT SERPL-MCNC: 1.5 MG/DL (ref 0.5–1.4)
D DIMER PPP IA.FEU-MCNC: 1.38 MG/L FEU
DIFFERENTIAL METHOD BLD: ABNORMAL
EOSINOPHIL # BLD AUTO: 0.5 K/UL (ref 0–0.5)
EOSINOPHIL NFR BLD: 6.1 % (ref 0–8)
ERYTHROCYTE [DISTWIDTH] IN BLOOD BY AUTOMATED COUNT: 13.4 % (ref 11.5–14.5)
EST. GFR  (NO RACE VARIABLE): 43 ML/MIN/1.73 M^2
GLUCOSE SERPL-MCNC: 122 MG/DL (ref 70–110)
HCT VFR BLD AUTO: 40.8 % (ref 40–54)
HGB BLD-MCNC: 13.3 G/DL (ref 14–18)
IMM GRANULOCYTES # BLD AUTO: 0.03 K/UL (ref 0–0.04)
IMM GRANULOCYTES NFR BLD AUTO: 0.4 % (ref 0–0.5)
LYMPHOCYTES # BLD AUTO: 1.3 K/UL (ref 1–4.8)
LYMPHOCYTES NFR BLD: 14.7 % (ref 18–48)
MAGNESIUM SERPL-MCNC: 1.9 MG/DL (ref 1.6–2.6)
MCH RBC QN AUTO: 30.3 PG (ref 27–31)
MCHC RBC AUTO-ENTMCNC: 32.6 G/DL (ref 32–36)
MCV RBC AUTO: 93 FL (ref 82–98)
MONOCYTES # BLD AUTO: 1.3 K/UL (ref 0.3–1)
MONOCYTES NFR BLD: 14.7 % (ref 4–15)
NEUTROPHILS # BLD AUTO: 5.4 K/UL (ref 1.8–7.7)
NEUTROPHILS NFR BLD: 63.5 % (ref 38–73)
NRBC BLD-RTO: 0 /100 WBC
OHS QRS DURATION: 112 MS
OHS QTC CALCULATION: 459 MS
PHOSPHATE SERPL-MCNC: 2.6 MG/DL (ref 2.7–4.5)
PLATELET # BLD AUTO: 164 K/UL (ref 150–450)
PMV BLD AUTO: 11.2 FL (ref 9.2–12.9)
POCT GLUCOSE: 125 MG/DL (ref 70–110)
POCT GLUCOSE: 133 MG/DL (ref 70–110)
POCT GLUCOSE: 162 MG/DL (ref 70–110)
POCT GLUCOSE: 243 MG/DL (ref 70–110)
POTASSIUM SERPL-SCNC: 4 MMOL/L (ref 3.5–5.1)
PROT SERPL-MCNC: 5.8 G/DL (ref 6–8.4)
RBC # BLD AUTO: 4.39 M/UL (ref 4.6–6.2)
SODIUM SERPL-SCNC: 141 MMOL/L (ref 136–145)
TROPONIN I SERPL DL<=0.01 NG/ML-MCNC: 0.03 NG/ML (ref 0–0.03)
TROPONIN I SERPL DL<=0.01 NG/ML-MCNC: 0.04 NG/ML (ref 0–0.03)
TROPONIN I SERPL DL<=0.01 NG/ML-MCNC: 0.05 NG/ML (ref 0–0.03)
WBC # BLD AUTO: 8.56 K/UL (ref 3.9–12.7)

## 2025-02-15 PROCEDURE — 84100 ASSAY OF PHOSPHORUS: CPT

## 2025-02-15 PROCEDURE — 93010 ELECTROCARDIOGRAM REPORT: CPT | Mod: ,,, | Performed by: INTERNAL MEDICINE

## 2025-02-15 PROCEDURE — 94761 N-INVAS EAR/PLS OXIMETRY MLT: CPT

## 2025-02-15 PROCEDURE — 94640 AIRWAY INHALATION TREATMENT: CPT | Mod: XB

## 2025-02-15 PROCEDURE — 83735 ASSAY OF MAGNESIUM: CPT

## 2025-02-15 PROCEDURE — 97165 OT EVAL LOW COMPLEX 30 MIN: CPT

## 2025-02-15 PROCEDURE — 84484 ASSAY OF TROPONIN QUANT: CPT | Mod: 91

## 2025-02-15 PROCEDURE — 25000003 PHARM REV CODE 250

## 2025-02-15 PROCEDURE — 85025 COMPLETE CBC W/AUTO DIFF WBC: CPT

## 2025-02-15 PROCEDURE — 97161 PT EVAL LOW COMPLEX 20 MIN: CPT | Performed by: PHYSICAL THERAPIST

## 2025-02-15 PROCEDURE — G0378 HOSPITAL OBSERVATION PER HR: HCPCS

## 2025-02-15 PROCEDURE — 36415 COLL VENOUS BLD VENIPUNCTURE: CPT | Mod: XB

## 2025-02-15 PROCEDURE — 93005 ELECTROCARDIOGRAM TRACING: CPT

## 2025-02-15 PROCEDURE — 85379 FIBRIN DEGRADATION QUANT: CPT | Performed by: STUDENT IN AN ORGANIZED HEALTH CARE EDUCATION/TRAINING PROGRAM

## 2025-02-15 PROCEDURE — 25500020 PHARM REV CODE 255: Performed by: STUDENT IN AN ORGANIZED HEALTH CARE EDUCATION/TRAINING PROGRAM

## 2025-02-15 PROCEDURE — 63600175 PHARM REV CODE 636 W HCPCS

## 2025-02-15 PROCEDURE — 99900035 HC TECH TIME PER 15 MIN (STAT)

## 2025-02-15 PROCEDURE — 96372 THER/PROPH/DIAG INJ SC/IM: CPT

## 2025-02-15 PROCEDURE — 84484 ASSAY OF TROPONIN QUANT: CPT

## 2025-02-15 PROCEDURE — 80053 COMPREHEN METABOLIC PANEL: CPT

## 2025-02-15 RX ADMIN — IOHEXOL 100 ML: 350 INJECTION, SOLUTION INTRAVENOUS at 03:02

## 2025-02-15 RX ADMIN — ENOXAPARIN SODIUM 30 MG: 30 INJECTION SUBCUTANEOUS at 05:02

## 2025-02-15 RX ADMIN — ATORVASTATIN CALCIUM 40 MG: 40 TABLET, FILM COATED ORAL at 10:02

## 2025-02-15 RX ADMIN — HYDRALAZINE HYDROCHLORIDE 50 MG: 25 TABLET ORAL at 09:02

## 2025-02-15 RX ADMIN — FLUTICASONE FUROATE AND VILANTEROL TRIFENATATE 1 PUFF: 100; 25 POWDER RESPIRATORY (INHALATION) at 08:02

## 2025-02-15 RX ADMIN — INSULIN ASPART 2 UNITS: 100 INJECTION, SOLUTION INTRAVENOUS; SUBCUTANEOUS at 12:02

## 2025-02-15 RX ADMIN — HYDRALAZINE HYDROCHLORIDE 50 MG: 25 TABLET ORAL at 10:02

## 2025-02-15 RX ADMIN — TAMSULOSIN HYDROCHLORIDE 0.4 MG: 0.4 CAPSULE ORAL at 10:02

## 2025-02-15 RX ADMIN — HYDRALAZINE HYDROCHLORIDE 50 MG: 25 TABLET ORAL at 03:02

## 2025-02-15 RX ADMIN — FINASTERIDE 5 MG: 5 TABLET, FILM COATED ORAL at 10:02

## 2025-02-15 RX ADMIN — LOSARTAN POTASSIUM 100 MG: 50 TABLET, FILM COATED ORAL at 10:02

## 2025-02-15 NOTE — PROGRESS NOTES
"[facility] Medicine  Progress Note    Patient Name: Husam Connolly  MRN: [id number]  Patient Class: OP- Observation   Admission Date: 2/13/2025  Length of Stay: 0 days  Attending Physician: Cee Natarajan MD  Primary Care Provider: Williams Knight DO        Subjective     Principal Problem:Elevated troponin        HPI:  89-year-old male with history of BPH, diabetes, emphysema, high right bundle-branch block, small-bowel obstruction, CKD3, HLD, HTN who presented to the ED with reports of fatigue. Per ED, patient reports performing his normal activities this morning including eating breakfast and performing physical therapy.  He subsequently took a shower and when he got out he felt fatigued with some "cloudy" vision.  He subsequently checked his blood pressure was noted to be low (90's/50's). BG at home reportedly 139.  He called his son-in-law who advised he call EMS. He feels back to his normal self upon arrival to the ED.     Patient currently states that he feels fine. Denies any fever/chills, SOB, chest pain, nausea, vomiting, diarrhea, constipation. Unsure if he LOC or trauma. Does not remember if he got of the floor or not. He does have a new abrasion on the back of his head. Only blood thinner is daily baby aspirin. Has been tolerating PO without any issues. Compliant with medications. At baseline patient AAOx3 and lives at home alone. At baseline, patient is able to do ADLs without assistance. Admits to 1 daily alcoholic beverage. Denies tobacco use or drug use. Past history of smoking 1ppd for 27yrs. Stopped at age 40.      In the ED, initial vitals /81, HR 76, Temp 97.8F, SpO2 92 on room air. CBC unremarkable CMP abnormal for Anion gap 17, BUN/Cr 38/1.8. Trop: 0.030. EKG: NSR without ischemic changes. UA trace of protein and ketones. The patient underwent workup including CT cervical and head Cervical spondylosis and osteoporosis with no evidence of displaced fracture or " soft tissue swelling or hematoma and a Nodule placing the majority of the right lobe of the thyroid gland. Chest x-ray: Chronic appearing interstitial findings, no large focal consolidation. Patient didn't receive medications in ED. Admitted to [facility] for evaluation of syncopal episode.    Overview/Hospital Course:  No notes on file    Interval History: Patient stating he feels well today. However echo results and troponin elevation concerning for PE. Will check d-dimer and repeat troponin today.     Review of Systems   Constitutional:  Negative for fatigue and fever.   HENT:  Negative for congestion, ear pain, rhinorrhea and sore throat.    Eyes:  Negative for visual disturbance.   Respiratory:  Negative for chest tightness and shortness of breath.    Cardiovascular:  Negative for chest pain and palpitations.   Gastrointestinal:  Negative for abdominal distention, abdominal pain, constipation, diarrhea, nausea and vomiting.   Endocrine: Negative for polyuria.   Genitourinary:  Negative for difficulty urinating and dysuria.   Musculoskeletal: Negative.    Skin: Negative.    Neurological:  Negative for dizziness, syncope, facial asymmetry, speech difficulty, weakness, light-headedness, numbness and headaches.   Hematological:  Bruises/bleeds easily.     Objective:     Vital Signs (Most Recent):  Temp: 97.7 °F (36.5 °C) (02/15/25 1128)  Pulse: 82 (02/15/25 1128)  Resp: 18 (02/15/25 1128)  BP: (!) 153/64 (02/15/25 1128)  SpO2: (!) 93 % (02/15/25 1128) Vital Signs (24h Range):  Temp:  [97.6 °F (36.4 °C)-98.1 °F (36.7 °C)] 97.7 °F (36.5 °C)  Pulse:  [70-84] 82  Resp:  [18-20] 18  SpO2:  [90 %-93 %] 93 %  BP: (150-167)/(64-75) 153/64     Weight: 76.7 kg (169 lb)  Body mass index is 24.96 kg/m².    Intake/Output Summary (Last 24 hours) at 2/15/2025 1344  Last data filed at 2/15/2025 0509  Gross per 24 hour   Intake --   Output 575 ml   Net -575 ml         Physical Exam  Constitutional:       General: He is not  in acute distress.     Appearance: Normal appearance. He is not ill-appearing.   HENT:      Head: Normocephalic. Abrasion present.      Comments: Echymossis present on scalp near occipital area     Right Ear: External ear normal.      Left Ear: External ear normal.      Nose: Nose normal. No congestion or rhinorrhea.      Mouth/Throat:      Mouth: Mucous membranes are moist.   Eyes:      Pupils: Pupils are equal, round, and reactive to light.   Cardiovascular:      Rate and Rhythm: Normal rate and regular rhythm.      Pulses: Normal pulses.      Heart sounds: Normal heart sounds. No murmur heard.  Pulmonary:      Effort: Pulmonary effort is normal. No respiratory distress.      Breath sounds: Normal breath sounds. No wheezing or rales.   Abdominal:      General: Abdomen is flat. Bowel sounds are normal. There is no distension.      Palpations: Abdomen is soft.      Tenderness: There is no abdominal tenderness.   Musculoskeletal:         General: Normal range of motion.      Cervical back: Normal range of motion and neck supple.      Right lower leg: No edema.      Left lower leg: No edema.   Skin:     General: Skin is warm and dry.      Findings: Bruising present.      Comments: Ecchymosis on forearms bilaterally.   Neurological:      Mental Status: He is alert and oriented to person, place, and time. Mental status is at baseline.      Cranial Nerves: No cranial nerve deficit.      Sensory: No sensory deficit.      Motor: No weakness.      Coordination: Coordination normal.      Gait: Gait normal.   Psychiatric:         Mood and Affect: Mood normal.             Significant Labs: All pertinent labs within the past 24 hours have been reviewed.    Significant Imaging: I have reviewed all pertinent imaging results/findings within the past 24 hours.    Assessment and Plan     * Elevated troponin  Troponin mildly elevated at 0.030. EKG with NSR. Likely represents demand ischemia in the setting of hypoxia OR reduced  clearance secondary to CKD.Negative for chest pain of SOB.   - Echo with new right ventricular enlargement and increased PASP from previous echo.    Plan:  - Repeat troponin  - Monitor telemetry  - F/u D-dimer to rule out PE    Syncope  Stable. Elevated BP. Good oral intake. However, admits to poor water intake. High suspicion for vasovagal syncope vs orthostatic syncope  Cardiovascular examination: Negative for murmurs, arrhythmias, or signs of heart failure.  Neurological exam: Low concern for seizure activity or stroke.  Complete blood count (CBC), electrolytes (sodium, potassium, calcium). renal function, blood glucose,  unremarkable. Trop <0.030.  Low concern for cardiac etiology.  EKG in the ED: NSR without ischemic changes.   Head CT wo contrast in the ED: Cervical spondylosis and osteoporosis with no evidence of displaced fracture or soft tissue swelling or hematoma and a Nodule placing the majority of the right lobe of the thyroid gland. Chest x-ray: Chronic appearing interstitial findings, no large focal consolidation.    Results for orders placed during the hospital encounter of 02/13/25  Echo  Interpretation Summary    Left Ventricle: The left ventricle is normal in size. There is concentric remodeling. There is low normal systolic function with a visually estimated ejection fraction of 50 - 55%. Unable to assess diastolic function due to poor image quality.    Right Ventricle: Right ventricular enlargement. Systolic function is normal.    Aortic Valve: There is mild aortic valve sclerosis.    Mitral Valve: Mildly calcified anterior subvalvular apparatus.    Tricuspid Valve: There is mild to moderate regurgitation.    Pulmonary Artery: There is pulmonary hypertension. The estimated pulmonary artery systolic pressure is 79 mmHg.    IVC/SVC: Elevated venous pressure at 15 mmHg.      - Echo as above  - US Carotids: WNL  - Orthostatics negative     Plan:   - Neuro checks q4hr  - Fall precautions   - PT/OT  evaluation, appreciate recs        Stage 3b chronic kidney disease  Creatine stable for now. BMP reviewed- noted Estimated Creatinine Clearance: 30.8 mL/min (A) (based on SCr of 1.5 mg/dL (H)). according to latest data. Based on current GFR, CKD stage is stage 3 - GFR 30-59.  Monitor UOP and serial BMP and adjust therapy as needed. Renally dose meds. Avoid nephrotoxic medications and procedures.    BPH (benign prostatic hyperplasia)  Stable.     Plan:   - continue home Flomax, finasteride    High cholesterol  Stable.  Patient has hx of HLD.  Lab Results   Component Value Date    CHOL 139 02/14/2025    CHOL 145 09/03/2024    CHOL 131 07/04/2024     Lab Results   Component Value Date    HDL 54 02/14/2025    HDL 57 09/03/2024    HDL 56 07/04/2024     Lab Results   Component Value Date    LDLCALC 68.6 02/14/2025    LDLCALC 62.6 (L) 09/03/2024    LDLCALC 50.6 (L) 07/04/2024     Lab Results   Component Value Date    TRIG 82 02/14/2025    TRIG 127 09/03/2024    TRIG 122 07/04/2024       Lab Results   Component Value Date    CHOLHDL 38.8 02/14/2025    CHOLHDL 39.3 09/03/2024    CHOLHDL 42.7 07/04/2024       Plan:  - Continue home atorvastatin, ASA    Type 2 diabetes mellitus, without long-term current use of insulin  Stable.   Lab Results   Component Value Date    HGBA1C 5.8 (H) 02/14/2025     Plan:  - last A1C 2mo ago 5.8%  - patient on glipizide at home, will hold while hospitalized.   - follow up repeat A1C  - goal -180 while inpatient  - LDSSI  - diabetic diet    Pulmonary emphysema  Stable. No use of home O2 at home.   History of smoking 1ppd for 27yrs. Stopped at age 40.   PFT's in 5/2022 indicate moderate to severe obstruction.  Patient's COPD is controlled currently.  Patient is currently off COPD Pathway. Continue home inhalers and monitor respiratory status closely.   VBG 7.357/48.6/18.8/27.2     Plan:  - Continue medical management with home albuterol and Breo inhaler while hospitalized.   - Avoid  triggers  - Maintain O2 88-92%      VTE Risk Mitigation (From admission, onward)           Ordered     enoxaparin injection 30 mg  Daily         02/14/25 1610     IP VTE HIGH RISK PATIENT  Once         02/14/25 1610     Place sequential compression device  Until discontinued         02/13/25 4973                    Discharge Planning   JOSE: 2/15/2025     Code Status: Full Code   Medical Readiness for Discharge Date:   Discharge Plan A: Home                Please place Justification for DME    _________________________________________  Jose Oden MD, PGY-1  [facility] - [place]

## 2025-02-15 NOTE — PLAN OF CARE
Problem: Physical Therapy  Goal: Physical Therapy Goal  Outcome: Met   Patient was able to perform bed mobility, transfers and gait without assist or challenge. He is at baseline and did not show continued acute physical therapy needs. Please re-order PT if status changes.

## 2025-02-15 NOTE — ASSESSMENT & PLAN NOTE
Troponin mildly elevated at 0.030. EKG with NSR. Likely represents demand ischemia in the setting of hypoxia OR reduced clearance secondary to CKD.Negative for chest pain of SOB.   - Echo with new right ventricular enlargement and increased PASP from previous echo.    Plan:  - Repeat troponin  - Monitor telemetry  - F/u D-dimer to rule out PE

## 2025-02-15 NOTE — ASSESSMENT & PLAN NOTE
Stable. Elevated BP. Good oral intake. However, admits to poor water intake. High suspicion for vasovagal syncope vs orthostatic syncope  Cardiovascular examination: Negative for murmurs, arrhythmias, or signs of heart failure.  Neurological exam: Low concern for seizure activity or stroke.  Complete blood count (CBC), electrolytes (sodium, potassium, calcium). renal function, blood glucose,  unremarkable. Trop <0.030.  Low concern for cardiac etiology.  EKG in the ED: NSR without ischemic changes.   Head CT wo contrast in the ED: Cervical spondylosis and osteoporosis with no evidence of displaced fracture or soft tissue swelling or hematoma and a Nodule placing the majority of the right lobe of the thyroid gland. Chest x-ray: Chronic appearing interstitial findings, no large focal consolidation.    Results for orders placed during the hospital encounter of 02/13/25  Echo  Interpretation Summary    Left Ventricle: The left ventricle is normal in size. There is concentric remodeling. There is low normal systolic function with a visually estimated ejection fraction of 50 - 55%. Unable to assess diastolic function due to poor image quality.    Right Ventricle: Right ventricular enlargement. Systolic function is normal.    Aortic Valve: There is mild aortic valve sclerosis.    Mitral Valve: Mildly calcified anterior subvalvular apparatus.    Tricuspid Valve: There is mild to moderate regurgitation.    Pulmonary Artery: There is pulmonary hypertension. The estimated pulmonary artery systolic pressure is 79 mmHg.    IVC/SVC: Elevated venous pressure at 15 mmHg.      - Echo as above  - US Carotids: WNL  - Orthostatics negative     Plan:   - Neuro checks q4hr  - Fall precautions   - PT/OT evaluation, appreciate recs

## 2025-02-15 NOTE — PT/OT/SLP EVAL
Occupational Therapy   Evaluation    Name: Husam Connolly  MRN: [id number]  Admitting Diagnosis: Syncope  Recent Surgery: * No surgery found *      Recommendations:     Discharge Recommendations: Low Intensity Therapy  Discharge Equipment Recommendations:  none  Barriers to discharge:  Decreased caregiver support    Assessment:   Pt presents w/ decreased overall endurance/conditioning, balance/mobility & coordination w/ subsequent decline in (I)/safety w/ BADLs, fxnl mobility & fxnl t/f's.  OT 2x/wk to increase phys/fxnl status & maximize potential to achieve established goals for d/c-->low intensity tx w/ no DME needs.    Husam Connolly is a 89 y.o. male with a medical diagnosis of Syncope.  He presents with performance deficits affecting function: weakness, impaired endurance, impaired self care skills, impaired functional mobility, gait instability, impaired balance, decreased coordination, decreased upper extremity function, pain, decreased ROM, impaired cardiopulmonary response to activity.      Rehab Prognosis: Good; patient would benefit from acute skilled OT services to address these deficits and reach maximum level of function.       Plan:     Patient to be seen 2 x/week to address the above listed problems via self-care/home management, therapeutic activities, therapeutic exercises  Plan of Care Expires: 03/15/25  Plan of Care Reviewed with: patient    Subjective     Chief Complaint: syncopal episode  Patient/Family Comments/goals: return home    Occupational Profile:  Living Environment: alone in 2SH w/ 1STE; bedroom & WIS w/ BIS on 1st level  Previous level of function: (I)  Roles and Routines: IADLs; drives  Equipment Used at Home: none, other (see comments) (has RW, SPC & WC for use; also has BIS in WIS)  Assistance upon Discharge: fly    Pain/Comfort:  Pain Rating 1: 1/10  Location - Side 1: Left  Location - Orientation 1: generalized  Location 1: shoulder  Pain Addressed 1: Reposition,  Distraction  Pain Rating Post-Intervention 1: 1/10    Patients cultural, spiritual, Oriental orthodox conflicts given the current situation:      Objective:     Communicated with: nsg prior to session.  Patient found supine with bed alarm, telemetry, peripheral IV upon OT entry to room.    General Precautions: Standard, fall  Orthopedic Precautions: N/A  Braces: N/A  Respiratory Status: Room air    Occupational Performance:    Bed Mobility:    Patient completed Supine to Sit with supervision and with side rail    Functional Mobility/Transfers:  Patient completed Sit <> Stand Transfer with supervision and stand by assistance  with  no assistive device   Patient completed Bed <> Chair Transfer using Step Transfer technique with supervision and stand by assistance with no assistive device  Functional Mobility: around room for ADLs w/o DME w/ S-SBA    Activities of Daily Living:  Grooming: stand by assistance standing at sink  Toileting: stand by assistance in standing 2/2 SOB after ~30 sec    Cognitive/Visual Perceptual:  AO4    No signif deficits noted    Physical Exam:  RUE WFL at 4/5  L shldr to ~90*; elb-->Ds WFL at 4/5    Sit balance: G-  Stand balance: G-    Sensation: grossly intact  Coordination: decreased GMC LUE  Endurance: F-    AMPAC 6 Click ADL:  AMPAC Total Score: 20    Treatment & Education:  Pt found in supine & agreeable to OT/PT co-eval this date.  Pt c/o 1/10 L shldr pain 2/2 OA & perf the following:  -sup-->EOB w/ Sup & maintained w/ Sup  -standing w/o DME w/ SBA for fxnl mobility w/in room w/ SBA  -toileting in standing w/ SBA; pt w/ noted SOB after ~30 sec standing 2/2 recent PN dx  -G/H standing at sink w/ SBA  -t/f-->b/s chair w/ S-SBA  Edu/tx re: energy conservation techs, PLBing, general safety techs & HEP & rec use of shower chair. Pt verbalized understanding.  Pt left [facility] w/ nsg made aware.      Patient left up in chair with all lines intact, call button in reach, and nsg notified    GOALS:    Multidisciplinary Problems       Occupational Therapy Goals          Problem: Occupational Therapy    Goal Priority Disciplines Outcome Interventions   Occupational Therapy Goal     OT, PT/OT Progressing    Description: Goals to be met by: 03/15     Patient will increase functional independence with ADLs by performing:    LE Dressing with Modified [place].  Grooming while standing at sink with Modified [place].  Toileting from toilet with Modified [place] for hygiene and clothing management.   Toilet transfer to toilet with Modified [place].  Increased functional strength to WFL for ADLs.                         DME Justifications:  No DME recommended requiring DME justifications    History:     Past Medical History:   Diagnosis Date    BPH (benign prostatic hyperplasia)     Diabetes mellitus (type 2)     Emphysema 03/12/2022    High cholesterol 08/26/2020    Hypertension     RBBB 11/29/2022    Small bowel obstruction 06/13/2019         Past Surgical History:   Procedure Laterality Date    ANASTOMOSIS OF ILEUM TO RECTUM  9/9/2019    Procedure: ANASTOMOSIS, COLO-RECTAL;  Surgeon: Fouzia Gardner MD;  Location: [facility] OR;  Service: General;;    CHOLECYSTECTOMY N/A 3/7/2022    Procedure: CHOLECYSTECTOMY;  Surgeon: Fouzia Gardner MD;  Location: [facility] OR;  Service: General;  Laterality: N/A;    COLOSTOMY  6/16/2019    Procedure: CREATION, COLOSTOMY;  Surgeon: Fozuia Gardner MD;  Location: [facility] OR;  Service: General;;    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    Procedure: RELEASE, DUPUYTREN CONTRACTURE;  Surgeon: Slick Baldwin Jr., MD;  Location: [facility] OR;  Service: Orthopedics;  Laterality: Left;    EYE SURGERY      GISELA PROCEDURE  6/16/2019    Procedure: GISELA PROCEDURE;  Surgeon: Fouzia Gardner MD;  Location: [facility] OR;  Service: General;;  Colon resection    LYSIS OF ADHESIONS N/A 3/7/2022    Procedure: LYSIS, ADHESIONS;  Surgeon: Fouzia Gardner MD;   Location: [facility];  Service: General;  Laterality: N/A;    TONSILLECTOMY         Time Tracking:     OT Date of Treatment: 02/15/25  OT Start Time: 0955  OT Stop Time: 1009  OT Total Time (min): 14 min    Billable Minutes:Evaluation 14  Total Time 14--co-sal w/ PT    2/15/2025

## 2025-02-15 NOTE — SUBJECTIVE & OBJECTIVE
Interval History: Patient stating he feels well today. However echo results and troponin elevation concerning for PE. Will check d-dimer and repeat troponin today.     Review of Systems   Constitutional:  Negative for fatigue and fever.   HENT:  Negative for congestion, ear pain, rhinorrhea and sore throat.    Eyes:  Negative for visual disturbance.   Respiratory:  Negative for chest tightness and shortness of breath.    Cardiovascular:  Negative for chest pain and palpitations.   Gastrointestinal:  Negative for abdominal distention, abdominal pain, constipation, diarrhea, nausea and vomiting.   Endocrine: Negative for polyuria.   Genitourinary:  Negative for difficulty urinating and dysuria.   Musculoskeletal: Negative.    Skin: Negative.    Neurological:  Negative for dizziness, syncope, facial asymmetry, speech difficulty, weakness, light-headedness, numbness and headaches.   Hematological:  Bruises/bleeds easily.     Objective:     Vital Signs (Most Recent):  Temp: 97.7 °F (36.5 °C) (02/15/25 1128)  Pulse: 82 (02/15/25 1128)  Resp: 18 (02/15/25 1128)  BP: (!) 153/64 (02/15/25 1128)  SpO2: (!) 93 % (02/15/25 1128) Vital Signs (24h Range):  Temp:  [97.6 °F (36.4 °C)-98.1 °F (36.7 °C)] 97.7 °F (36.5 °C)  Pulse:  [70-84] 82  Resp:  [18-20] 18  SpO2:  [90 %-93 %] 93 %  BP: (150-167)/(64-75) 153/64     Weight: 76.7 kg (169 lb)  Body mass index is 24.96 kg/m².    Intake/Output Summary (Last 24 hours) at 2/15/2025 1344  Last data filed at 2/15/2025 0509  Gross per 24 hour   Intake --   Output 575 ml   Net -575 ml         Physical Exam  Constitutional:       General: He is not in acute distress.     Appearance: Normal appearance. He is not ill-appearing.   HENT:      Head: Normocephalic. Abrasion present.      Comments: Echymossis present on scalp near occipital area     Right Ear: External ear normal.      Left Ear: External ear normal.      Nose: Nose normal. No congestion or rhinorrhea.      Mouth/Throat:      Mouth:  Mucous membranes are moist.   Eyes:      Pupils: Pupils are equal, round, and reactive to light.   Cardiovascular:      Rate and Rhythm: Normal rate and regular rhythm.      Pulses: Normal pulses.      Heart sounds: Normal heart sounds. No murmur heard.  Pulmonary:      Effort: Pulmonary effort is normal. No respiratory distress.      Breath sounds: Normal breath sounds. No wheezing or rales.   Abdominal:      General: Abdomen is flat. Bowel sounds are normal. There is no distension.      Palpations: Abdomen is soft.      Tenderness: There is no abdominal tenderness.   Musculoskeletal:         General: Normal range of motion.      Cervical back: Normal range of motion and neck supple.      Right lower leg: No edema.      Left lower leg: No edema.   Skin:     General: Skin is warm and dry.      Findings: Bruising present.      Comments: Ecchymosis on forearms bilaterally.   Neurological:      Mental Status: He is alert and oriented to person, place, and time. Mental status is at baseline.      Cranial Nerves: No cranial nerve deficit.      Sensory: No sensory deficit.      Motor: No weakness.      Coordination: Coordination normal.      Gait: Gait normal.   Psychiatric:         Mood and Affect: Mood normal.             Significant Labs: All pertinent labs within the past 24 hours have been reviewed.    Significant Imaging: I have reviewed all pertinent imaging results/findings within the past 24 hours.

## 2025-02-15 NOTE — PLAN OF CARE
Problem: Occupational Therapy  Goal: Occupational Therapy Goal  Description: Goals to be met by: 03/15     Patient will increase functional independence with ADLs by performing:    LE Dressing with Modified [place].  Grooming while standing at sink with Modified [place].  Toileting from toilet with Modified [place] for hygiene and clothing management.   Toilet transfer to toilet with Modified [place].  Increased functional strength to WFL for ADLs.    Outcome: Progressing   Pt found in supine & agreeable to OT/PT co-eval this date.  Pt c/o 1/10 L shldr pain 2/2 OA & perf the following:  -sup-->EOB w/ Sup & maintained w/ Sup  -standing w/o DME w/ SBA for fxnl mobility w/in room w/ SBA  -toileting in standing w/ SBA; pt w/ noted SOB after ~30 sec standing 2/2 recent PN dx  -G/H standing at sink w/ SBA  -t/f-->b/s chair w/ S-SBA  Edu/tx re: energy conservation techs, PLBing, general safety techs & HEP & rec use of shower chair. Pt verbalized understanding.  Pt left [facility] w/ nsg made aware.    Pt presents w/ decreased overall endurance/conditioning, balance/mobility & coordination w/ subsequent decline in (I)/safety w/ BADLs, fxnl mobility & fxnl t/f's.  OT 2x/wk to increase phys/fxnl status & maximize potential to achieve established goals for d/c-->low intensity tx w/ no DME needs.

## 2025-02-15 NOTE — PT/OT/SLP EVAL
Physical Therapy Evaluation and Discharge Note    Patient Name:  Husam Connolly   MRN:  [id number]    Recommendations:     Discharge Recommendations: Low Intensity Therapy  Discharge Equipment Recommendations: none   Barriers to discharge: None    Assessment:     Husam Connolly is a 89 y.o. male admitted with a medical diagnosis of Syncope. .  At this time, patient is functioning at their prior level of function and does not require further acute PT services.     Recent Surgery: * No surgery found *      Plan:     During this hospitalization, patient does not require further acute PT services.  Please re-consult if situation changes.      Subjective     Chief Complaint: feeling better, still gets a little lightheaded  Patient/Family Comments/goals: none present  Pain/Comfort:  Pain Rating 1: 1/10  Location - Side 1: Left  Location 1: shoulder    Patients cultural, spiritual, [employer] conflicts given the current situation: no    Living Environment:  Lives alone in 2 story home but all needs are now on 1st level, 1 stel to enter  Prior to admission, patients level of function was indep.  Equipment used at home: none.  DME owned (not currently used): rolling walker, single point cane, and wheelchair.  Upon discharge, patient will have assistance from family nearby.    Objective:     Communicated with nsg prior to session.  Patient found supine with bed alarm, telemetry, peripheral IV upon PT entry to room.    General Precautions: Standard, fall    Orthopedic Precautions:    Braces: N/A  Respiratory Status: Room air    Exams:  Gross Motor Coordination:  WFL  Sensation: -       Intact  Skin Integrity/Edema:  -       Skin integrity: Visible skin intact  RLE ROM: WFL  RLE Strength: WFL  LLE ROM: WFL  LLE Strength: WFL    Patient donned non slip socks and gait belt for OOB mobility    Functional Mobility:  Bed Mobility:  Rolling Left:  independence  Supine to Sit: independence  Transfers:  Sit to Stand:  modified  independence with hand-held assist  Gait: amb full lap without AD around NSG station with good pace and good control, min increase in SOB after but safe throughout    AM-PAC 6 CLICK MOBILITY  Total Score:23       Treatment and Education:  Educated on mobility with staff secondary to SOB  -HHPT needs    AM-PAC 6 CLICK MOBILITY  Total Score:23     Patient left up in chair with call button in reach.    GOALS:   Multidisciplinary Problems       Physical Therapy Goals       Not on file              Multidisciplinary Problems (Resolved)          Problem: Physical Therapy    Goal Priority Disciplines Outcome Interventions   Physical Therapy Goal   (Resolved)     PT, PT/OT Met                        DME Justifications:  No DME recommended requiring DME justifications    History:     Past Medical History:   Diagnosis Date    BPH (benign prostatic hyperplasia)     Diabetes mellitus (type 2)     Emphysema 03/12/2022    High cholesterol 08/26/2020    Hypertension     RBBB 11/29/2022    Small bowel obstruction 06/13/2019       Past Surgical History:   Procedure Laterality Date    ANASTOMOSIS OF ILEUM TO RECTUM  9/9/2019    Procedure: ANASTOMOSIS, COLO-RECTAL;  Surgeon: Fouzia Gardner MD;  Location: [facility] OR;  Service: General;;    CHOLECYSTECTOMY N/A 3/7/2022    Procedure: CHOLECYSTECTOMY;  Surgeon: Fouzia Gardner MD;  Location: [facility] OR;  Service: General;  Laterality: N/A;    COLOSTOMY  6/16/2019    Procedure: CREATION, COLOSTOMY;  Surgeon: Fouzia Gardner MD;  Location: [facility] OR;  Service: General;;    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    Procedure: RELEASE, DUPUYTREN CONTRACTURE;  Surgeon: Slick Baldwin Jr., MD;  Location: [facility] OR;  Service: Orthopedics;  Laterality: Left;    EYE SURGERY      GISELA PROCEDURE  6/16/2019    Procedure: GISELA PROCEDURE;  Surgeon: Fouzia Gardner MD;  Location: [facility] OR;  Service: General;;  Colon resection    LYSIS OF ADHESIONS N/A 3/7/2022    Procedure:  LYSIS, ADHESIONS;  Surgeon: Fouzia Gardner MD;  Location: [facility];  Service: General;  Laterality: N/A;    TONSILLECTOMY         Time Tracking:     PT Received On: 02/15/25  PT Start Time: 0955     PT Stop Time: 1009  PT Total Time (min): 14 min     Billable Minutes: Evaluation 14      02/15/2025

## 2025-02-16 VITALS
OXYGEN SATURATION: 91 % | HEIGHT: 69 IN | DIASTOLIC BLOOD PRESSURE: 77 MMHG | HEART RATE: 86 BPM | RESPIRATION RATE: 20 BRPM | WEIGHT: 165.13 LBS | TEMPERATURE: 98 F | BODY MASS INDEX: 24.46 KG/M2 | SYSTOLIC BLOOD PRESSURE: 186 MMHG

## 2025-02-16 LAB
ALBUMIN SERPL BCP-MCNC: 2.9 G/DL (ref 3.5–5.2)
ALP SERPL-CCNC: 60 U/L (ref 40–150)
ALT SERPL W/O P-5'-P-CCNC: 14 U/L (ref 10–44)
ANION GAP SERPL CALC-SCNC: 9 MMOL/L (ref 8–16)
AST SERPL-CCNC: 15 U/L (ref 10–40)
BASOPHILS # BLD AUTO: 0.04 K/UL (ref 0–0.2)
BASOPHILS NFR BLD: 0.4 % (ref 0–1.9)
BILIRUB SERPL-MCNC: 1 MG/DL (ref 0.1–1)
BUN SERPL-MCNC: 29 MG/DL (ref 10–30)
CALCIUM SERPL-MCNC: 8.8 MG/DL (ref 8.7–10.5)
CHLORIDE SERPL-SCNC: 107 MMOL/L (ref 95–110)
CO2 SERPL-SCNC: 22 MMOL/L (ref 23–29)
CREAT SERPL-MCNC: 1.5 MG/DL (ref 0.5–1.4)
DIFFERENTIAL METHOD BLD: ABNORMAL
EOSINOPHIL # BLD AUTO: 0.4 K/UL (ref 0–0.5)
EOSINOPHIL NFR BLD: 4.3 % (ref 0–8)
ERYTHROCYTE [DISTWIDTH] IN BLOOD BY AUTOMATED COUNT: 13.3 % (ref 11.5–14.5)
EST. GFR  (NO RACE VARIABLE): 43 ML/MIN/1.73 M^2
GLUCOSE SERPL-MCNC: 135 MG/DL (ref 70–110)
HCT VFR BLD AUTO: 40.9 % (ref 40–54)
HGB BLD-MCNC: 13.6 G/DL (ref 14–18)
IMM GRANULOCYTES # BLD AUTO: 0.03 K/UL (ref 0–0.04)
IMM GRANULOCYTES NFR BLD AUTO: 0.3 % (ref 0–0.5)
LYMPHOCYTES # BLD AUTO: 1.4 K/UL (ref 1–4.8)
LYMPHOCYTES NFR BLD: 14.6 % (ref 18–48)
MAGNESIUM SERPL-MCNC: 1.9 MG/DL (ref 1.6–2.6)
MCH RBC QN AUTO: 30.1 PG (ref 27–31)
MCHC RBC AUTO-ENTMCNC: 33.3 G/DL (ref 32–36)
MCV RBC AUTO: 91 FL (ref 82–98)
MONOCYTES # BLD AUTO: 1.4 K/UL (ref 0.3–1)
MONOCYTES NFR BLD: 14.4 % (ref 4–15)
NEUTROPHILS # BLD AUTO: 6.2 K/UL (ref 1.8–7.7)
NEUTROPHILS NFR BLD: 66 % (ref 38–73)
NRBC BLD-RTO: 0 /100 WBC
PHOSPHATE SERPL-MCNC: 2.7 MG/DL (ref 2.7–4.5)
PLATELET # BLD AUTO: 165 K/UL (ref 150–450)
PMV BLD AUTO: 11.3 FL (ref 9.2–12.9)
POCT GLUCOSE: 134 MG/DL (ref 70–110)
POTASSIUM SERPL-SCNC: 3.9 MMOL/L (ref 3.5–5.1)
PROT SERPL-MCNC: 5.9 G/DL (ref 6–8.4)
RBC # BLD AUTO: 4.52 M/UL (ref 4.6–6.2)
SODIUM SERPL-SCNC: 138 MMOL/L (ref 136–145)
WBC # BLD AUTO: 9.38 K/UL (ref 3.9–12.7)

## 2025-02-16 PROCEDURE — 94761 N-INVAS EAR/PLS OXIMETRY MLT: CPT

## 2025-02-16 PROCEDURE — G0378 HOSPITAL OBSERVATION PER HR: HCPCS

## 2025-02-16 PROCEDURE — 80053 COMPREHEN METABOLIC PANEL: CPT

## 2025-02-16 PROCEDURE — 25000003 PHARM REV CODE 250

## 2025-02-16 PROCEDURE — 84100 ASSAY OF PHOSPHORUS: CPT

## 2025-02-16 PROCEDURE — 99900035 HC TECH TIME PER 15 MIN (STAT)

## 2025-02-16 PROCEDURE — 36415 COLL VENOUS BLD VENIPUNCTURE: CPT

## 2025-02-16 PROCEDURE — 83735 ASSAY OF MAGNESIUM: CPT

## 2025-02-16 PROCEDURE — 85025 COMPLETE CBC W/AUTO DIFF WBC: CPT

## 2025-02-16 PROCEDURE — 94640 AIRWAY INHALATION TREATMENT: CPT | Mod: XB

## 2025-02-16 RX ADMIN — FLUTICASONE FUROATE AND VILANTEROL TRIFENATATE 1 PUFF: 100; 25 POWDER RESPIRATORY (INHALATION) at 08:02

## 2025-02-16 RX ADMIN — FINASTERIDE 5 MG: 5 TABLET, FILM COATED ORAL at 10:02

## 2025-02-16 RX ADMIN — ACETAMINOPHEN 650 MG: 325 TABLET ORAL at 10:02

## 2025-02-16 RX ADMIN — HYDRALAZINE HYDROCHLORIDE 50 MG: 25 TABLET ORAL at 10:02

## 2025-02-16 RX ADMIN — ATORVASTATIN CALCIUM 40 MG: 40 TABLET, FILM COATED ORAL at 10:02

## 2025-02-16 RX ADMIN — TAMSULOSIN HYDROCHLORIDE 0.4 MG: 0.4 CAPSULE ORAL at 10:02

## 2025-02-16 RX ADMIN — LOSARTAN POTASSIUM 100 MG: 50 TABLET, FILM COATED ORAL at 10:02

## 2025-02-16 NOTE — PLAN OF CARE
Pt will dc with  services. HH referral sent via [employer]. Pt will be contacted by  agency to schedule first appointment time/date. No additional cm needs. Pt will contact his daughter when she gets out of [employer] to transport him home.     Cleared from CM . Bedside Nurse and VN notified.    HH: [facility]    Future Appointments   Date Time Provider Department Center   2/20/2025 10:00 AM Slick Baldwin Jr., MD [facility] ORTHO [place] [facility]   2/25/2025 11:00 AM Gina Renee MD [facility] IMPRI [place] [facility]   3/10/2025  7:00 AM SPECIMEN, [facility] SPECLAB [place]   3/10/2025  7:30 AM LABDEL LAB [place]   3/17/2025 11:00 AM Williams Knight DO [facility] MED [place]   4/1/2025 11:20 AM Tommy Payton MD [facility] CARDIO [place] [facility]   4/3/2025 11:20 AM Farhan Dumont MD [facility] PULMON [place]   4/29/2025  1:30 PM Fouzia Gardner MD [facility] [place]        02/16/25 0920   Final Note   Assessment Type Final Discharge Note   Anticipated Discharge Disposition Home-[facility]   Hospital Resources/Appts/Education Provided Appointments scheduled and added to AVS   Post-Acute Status   Discharge Delays None known at this time

## 2025-02-16 NOTE — PROGRESS NOTES
Discharge orders noted. Additional clinical references attached. Patient's discharge instructions given by bedside RN and reviewed via this VN.  Education provided on new and previous medications, diagnosis, and follow-up appointments. No new medications ordered. Patient verbalized understanding and teach back method was used. Patient's ride/transportation home at bedside. All questions answered. Transport to [facility] requested. Floor nurse notified.              02/16/25 1111    Notification    Notified Of Discharge Status   Admission   Communication Issues? None   Shift   Virtual Nurse - Rounding Complete   Virtual Nurse - Patient Verbalized Approval Of Camera Use   Safety/Activity   Patient Rounds bed in low position;call light in patient/parent reach;clutter free environment maintained;visualized patient   Safety Promotion/Fall Prevention family to remain at bedside;side rails raised x 2   Activity Management Sitting at edge of bed - L2

## 2025-02-16 NOTE — DISCHARGE SUMMARY
"[facility] Medicine  Discharge Summary      Patient Name: Husam Connolly  MRN: [id number]  JAYLEN: [id number]  Patient Class: OP- Observation  Admission Date: 2/13/2025  Hospital Length of Stay: 0 days  Discharge Date and Time:  02/16/2025 11:29 AM  Attending Physician: No att. providers found   Discharging Provider: Jagdeep Davis MD  Primary Care Provider: Williams Knight DO    Primary Care Team: Networked reference to record PCT     HPI:   89-year-old male with history of BPH, diabetes, emphysema, high right bundle-branch block, small-bowel obstruction, CKD3, HLD, HTN who presented to the ED with reports of fatigue. Per ED, patient reports performing his normal activities this morning including eating breakfast and performing physical therapy.  He subsequently took a shower and when he got out he felt fatigued with some "cloudy" vision.  He subsequently checked his blood pressure was noted to be low (90's/50's). BG at home reportedly 139.  He called his son-in-law who advised he call EMS. He feels back to his normal self upon arrival to the ED.     Patient currently states that he feels fine. Denies any fever/chills, SOB, chest pain, nausea, vomiting, diarrhea, constipation. Unsure if he LOC or trauma. Does not remember if he got of the floor or not. He does have a new abrasion on the back of his head. Only blood thinner is daily baby aspirin. Has been tolerating PO without any issues. Compliant with medications. At baseline patient AAOx3 and lives at home alone. At baseline, patient is able to do ADLs without assistance. Admits to 1 daily alcoholic beverage. Denies tobacco use or drug use. Past history of smoking 1ppd for 27yrs. Stopped at age 40.      In the ED, initial vitals /81, HR 76, Temp 97.8F, SpO2 92 on room air. CBC unremarkable CMP abnormal for Anion gap 17, BUN/Cr 38/1.8. Trop: 0.030. EKG: NSR without ischemic changes. UA trace of protein and ketones. The patient underwent workup " including CT cervical and head Cervical spondylosis and osteoporosis with no evidence of displaced fracture or soft tissue swelling or hematoma and a Nodule placing the majority of the right lobe of the thyroid gland. Chest x-ray: Chronic appearing interstitial findings, no large focal consolidation. Patient didn't receive medications in ED. Admitted to [facility] for evaluation of syncopal episode.    * No surgery found *      Hospital Course:   HPI as above. Patient continued to deny further syncopal or presyncopal episodes since admissions. Echocardiogram was unremarkable with EF 50 - 55%, although indeterminate diastolic dysnfcion and elevated PA pressure to 79mmHg. Bilateral carotid U/S w/ no evidence of a hemodynamically significant carotid bifurcation stenosis. Patient had brief desaturation to low 90s in addition to elevate D-dimer prompting CTA chest, which was ultimately negative for PE. Patient continued to work with PT/OT during hospitalization. Recommended home health PT/OT which was placed at discharge. Instructed to follow up with PCP. ED return precautions discussed. All questions answered. Patient verbalized understanding.     Physical Exam  Constitutional:       General: He is not in acute distress.     Appearance: Normal appearance. He is not ill-appearing.   HENT:      Head: Abrasion present.      Comments: Echymossis present on scalp near occipital area     Right Ear: External ear normal.      Left Ear: External ear normal.      Nose: Nose normal. No congestion or rhinorrhea.      Mouth/Throat:      Mouth: Mucous membranes are moist.   Eyes:      Pupils: Pupils are equal, round, and reactive to light.   Cardiovascular:      Rate and Rhythm: Normal rate and regular rhythm.      Pulses: Normal pulses.      Heart sounds: Normal heart sounds. No murmur heard.  Pulmonary:      Effort: Pulmonary effort is normal. No respiratory distress.      Breath sounds: Normal breath sounds. No wheezing  or rales.   Abdominal:      General: Abdomen is flat. Bowel sounds are normal. There is no distension.      Palpations: Abdomen is soft.      Tenderness: There is no abdominal tenderness.   Musculoskeletal:         General: Normal range of motion.      Cervical back: Normal range of motion and neck supple.      Right lower leg: No edema.      Left lower leg: No edema.   Skin:     General: Skin is warm and dry.      Findings: Bruising present.      Comments: Ecchymosis on forearms bilaterally.   Neurological:      Mental Status: He is alert and oriented to person, place, and time. Mental status is at baseline.      Cranial Nerves: No cranial nerve deficit.      Sensory: No sensory deficit.      Motor: No weakness.      Coordination: Coordination normal.      Gait: Gait normal.   Psychiatric:         Mood and Affect: Mood normal.     Goals of Care Treatment Preferences:  Code Status: Full Code    Living Will: Yes              SDOH Screening:  The patient was screened for utility difficulties, food insecurity, transport difficulties, housing insecurity, and interpersonal safety and there were no concerns identified this admission.     Consults:     * Elevated troponin  Troponin mildly elevated at 0.030. EKG with NSR. Likely represents demand ischemia in the setting of hypoxia OR reduced clearance secondary to CKD.Negative for chest pain of SOB.   - Echo with new right ventricular enlargement and increased PASP from previous echo.    Plan:  - Repeat troponin  - Monitor telemetry  - F/u D-dimer to rule out PE    Pulmonary emphysema  Stable. No use of home O2 at home.   History of smoking 1ppd for 27yrs. Stopped at age 40.   PFT's in 5/2022 indicate moderate to severe obstruction.  Patient's COPD is controlled currently.  Patient is currently off COPD Pathway. Continue home inhalers and monitor respiratory status closely.   VBG 7.357/48.6/18.8/27.2     Plan:  - Continue medical management with home albuterol and Breo  inhaler while hospitalized.   - Avoid triggers  - Maintain O2 88-92%    Type 2 diabetes mellitus, without long-term current use of insulin  Stable.   Lab Results   Component Value Date    HGBA1C 5.8 (H) 02/14/2025     Plan:  - last A1C 2mo ago 5.8%  - patient on glipizide at home, will hold while hospitalized.   - follow up repeat A1C  - goal -180 while inpatient  - LDSSI  - diabetic diet    Syncope  Stable. Elevated BP. Good oral intake. However, admits to poor water intake. High suspicion for vasovagal syncope vs orthostatic syncope  Cardiovascular examination: Negative for murmurs, arrhythmias, or signs of heart failure.  Neurological exam: Low concern for seizure activity or stroke.  Complete blood count (CBC), electrolytes (sodium, potassium, calcium). renal function, blood glucose,  unremarkable. Trop <0.030.  Low concern for cardiac etiology.  EKG in the ED: NSR without ischemic changes.   Head CT wo contrast in the ED: Cervical spondylosis and osteoporosis with no evidence of displaced fracture or soft tissue swelling or hematoma and a Nodule placing the majority of the right lobe of the thyroid gland. Chest x-ray: Chronic appearing interstitial findings, no large focal consolidation.    Results for orders placed during the hospital encounter of 02/13/25  Echo  Interpretation Summary    Left Ventricle: The left ventricle is normal in size. There is concentric remodeling. There is low normal systolic function with a visually estimated ejection fraction of 50 - 55%. Unable to assess diastolic function due to poor image quality.    Right Ventricle: Right ventricular enlargement. Systolic function is normal.    Aortic Valve: There is mild aortic valve sclerosis.    Mitral Valve: Mildly calcified anterior subvalvular apparatus.    Tricuspid Valve: There is mild to moderate regurgitation.    Pulmonary Artery: There is pulmonary hypertension. The estimated pulmonary artery systolic pressure is 79 mmHg.     IVC/SVC: Elevated venous pressure at 15 mmHg.      - Echo as above  - US Carotids: WNL  - Orthostatics negative     Plan:   - Neuro checks q4hr  - Fall precautions   - PT/OT evaluation, appreciate recs        High cholesterol  Stable.  Patient has hx of HLD.  Lab Results   Component Value Date    CHOL 139 02/14/2025    CHOL 145 09/03/2024    CHOL 131 07/04/2024     Lab Results   Component Value Date    HDL 54 02/14/2025    HDL 57 09/03/2024    HDL 56 07/04/2024     Lab Results   Component Value Date    LDLCALC 68.6 02/14/2025    LDLCALC 62.6 (L) 09/03/2024    LDLCALC 50.6 (L) 07/04/2024     Lab Results   Component Value Date    TRIG 82 02/14/2025    TRIG 127 09/03/2024    TRIG 122 07/04/2024       Lab Results   Component Value Date    CHOLHDL 38.8 02/14/2025    CHOLHDL 39.3 09/03/2024    CHOLHDL 42.7 07/04/2024       Plan:  - Continue home atorvastatin, ASA    Stage 3b chronic kidney disease  Creatine stable for now. BMP reviewed- noted Estimated Creatinine Clearance: 30.8 mL/min (A) (based on SCr of 1.5 mg/dL (H)). according to latest data. Based on current GFR, CKD stage is stage 3 - GFR 30-59.  Monitor UOP and serial BMP and adjust therapy as needed. Renally dose meds. Avoid nephrotoxic medications and procedures.    BPH (benign prostatic hyperplasia)  Stable.     Plan:   - continue home Flomax, finasteride      Final Active Diagnoses:    Diagnosis Date Noted POA    PRINCIPAL PROBLEM:  Elevated troponin [R79.89] 12/24/2024 Yes    Syncope [R55] 02/13/2025 Yes    Type 2 diabetes mellitus, without long-term current use of insulin [E11.9] 02/13/2025 Yes    Pulmonary emphysema [J43.9] 02/13/2025 Yes    High cholesterol [E78.00] 08/26/2020 Yes     Chronic    Stage 3b chronic kidney disease [N18.32] 12/13/2018 Yes    BPH (benign prostatic hyperplasia) [N40.0]  Yes      Problems Resolved During this Admission:    Diagnosis Date Noted Date Resolved POA    Controlled type 2 diabetes mellitus 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    Specialties: Hand Surgery, Orthopedic Surgery  Why: 10:00 am  Contact information:  [address]  [address]  [place] [address]  [phone]               Gina Renee MD Follow up on 2/25/2025.    Specialty: Internal Medicine  Why: 11:00 am    [facility]  THIS PHYSICIAN SPECIALIZES IN EVALUATING PATIENTS WHO HAVE HAD A HOSPITAL VISIT.  Contact information:  [address]  [address]  [place] [address]  [phone]               Home Health Agency Follow up.    Why: Home Health Agency will contact patient to schedule first appointment time.date.             [facility] Follow up.    Specialties: Home Health Services, Home Therapy Services, Home Living Aide Services  Why: Patient will be contacted by Home Health Agency to schedule first appointment time/date.  Contact information:  [address]  [address]  [facility] [id number] [phone]                         Patient Instructions:      Ambulatory referral/consult to Home Health   Standing Status: Future   Referral Priority: Routine Referral Type: Home Health   Referral Reason: Specialty Services Required   Requested Specialty: Home Health Services   Number of Visits Requested: 1     Diet Adult Regular     Notify your health care provider if you experience any of the following:  temperature >100.4     Notify your health care provider if you experience any of the following:  persistent nausea and vomiting or diarrhea     Notify your health care provider if you  experience any of the following:  severe uncontrolled pain     Notify your health care provider if you experience any of the following:  redness, tenderness, or signs of infection (pain, swelling, redness, odor or green/yellow discharge around incision site)     Notify your health care provider if you experience any of the following:  difficulty breathing or increased cough     Notify your health care provider if you experience any of the following:  severe persistent headache     Notify your health care provider if you experience any of the following:  worsening rash     Notify your health care provider if you experience any of the following:  persistent dizziness, light-headedness, or visual disturbances     Activity as tolerated       Significant Diagnostic Studies: Labs: CMP   Recent Labs   Lab 02/15/25  0646 02/16/25  0656    138   K 4.0 3.9    107   CO2 20* 22*   * 135*   BUN 31* 29   CREATININE 1.5* 1.5*   CALCIUM 8.8 8.8   PROT 5.8* 5.9*   ALBUMIN 3.0* 2.9*   BILITOT 1.1* 1.0   ALKPHOS 59  --    AST 18 15   ALT 13 14   ANIONGAP 12 9    and CBC   Recent Labs   Lab 02/15/25  0647 02/16/25  0656   WBC 8.56 9.38   HGB 13.3* 13.6*   HCT 40.8 40.9    165       Pending Diagnostic Studies:       Procedure Component Value Units Date/Time    Vitamin B1 [[id number]] Collected: 02/14/25 0737    Order Status: Sent Lab Status: In process Updated: 02/14/25 1312    Specimen: Blood            Medications:  Reconciled Home Medications:      Medication List        CONTINUE taking these medications      albuterol 90 mcg/actuation inhaler  Commonly known as: VENTOLIN HFA  Inhale 2 puffs into the lungs every 4 (four) hours as needed for Wheezing. Rescue     aspirin 81 MG EC tablet  Commonly known as: ECOTRIN  Take 81 mg by mouth once daily.     atorvastatin 40 MG tablet  Commonly known as: LIPITOR  Take 1 tablet (40 mg total) by mouth once daily.     blood sugar diagnostic Strp  Commonly known as:  ACCU-CHEK GUIDE TEST STRIPS  1 strip by In Vitro route once daily.     blood-glucose meter kit  Use as instructed     finasteride 5 mg tablet  Commonly known as: PROSCAR  Take 1 tablet (5 mg total) by mouth once daily.     fish oil-omega-3 fatty acids 300-1,000 mg capsule  Take 1 capsule by mouth once daily.     fluticasone propionate 50 mcg/actuation nasal spray  Commonly known as: FLONASE  2 sprays (100 mcg total) by Each Nostril route once daily.     fluticasone-umeclidin-vilanter 100-62.5-25 mcg Dsdv  Commonly known as: TRELEGY ELLIPTA  Inhale 1 puff into the lungs once daily.     GARLIC ORAL  Take 1 capsule by mouth once daily.     hydrALAZINE 50 MG tablet  Commonly known as: APRESOLINE  Take 1 tablet (50 mg total) by mouth 3 (three) times daily.     lancets Misc  To check sugar daily, to use with insurance preferred meter     losartan 100 MG tablet  Commonly known as: COZAAR  Take 1 tablet (100 mg total) by mouth once daily.     multivitamin capsule  Take 1 capsule by mouth once daily.     mupirocin 2 % ointment  Commonly known as: BACTROBAN  SMARTSI Application Topical 2-3 Times Daily     [facility]  Generic drug: inhalation spacing device  Use as directed for inhalation.     tamsulosin 0.4 mg Cap  Commonly known as: FLOMAX  Take 0.4 mg by mouth once daily.     vitamin D 1000 units Tab  Commonly known as: VITAMIN D3  Take 1,000 Units by mouth once daily.            STOP taking these medications      glipiZIDE 5 MG Tr24              Indwelling Lines/Drains at time of discharge:   Lines/Drains/Airways       None                   Time spent on the discharge of patient: 35 minutes      Jagdeep Davis MD  Department of Hospital Medicine  [facility]

## 2025-02-16 NOTE — PLAN OF CARE
Problem: Adult Inpatient Plan of Care  Goal: Plan of Care Review  2/16/2025 1112 by Nina Ralph, RN  Outcome: Met  2/16/2025 0748 by Nina Ralph, RN  Outcome: Progressing  Goal: Patient-Specific Goal (Individualized)  Outcome: Met  Goal: Absence of Hospital-Acquired Illness or Injury  Outcome: Met  Goal: Optimal Comfort and Wellbeing  Outcome: Met  Goal: Readiness for Transition of Care  Outcome: Met

## 2025-02-16 NOTE — PLAN OF CARE
Problem: Adult Inpatient Plan of Care  Goal: Plan of Care Review  Outcome: Progressing  Goal: Patient-Specific Goal (Individualized)  Outcome: Progressing  Goal: Absence of Hospital-Acquired Illness or Injury  Outcome: Progressing  Goal: Optimal Comfort and Wellbeing  Outcome: Progressing  Goal: Readiness for Transition of Care  Outcome: Progressing     Problem: Fall Injury Risk  Goal: Absence of Fall and Fall-Related Injury  Outcome: Progressing     Problem: Comorbidity Management  Goal: Maintenance of COPD Symptom Control  Outcome: Progressing  Goal: Blood Pressure in Desired Range  Outcome: Progressing     Problem: Chronic Kidney Disease  Goal: Optimal Coping with Chronic Illness  Outcome: Progressing  Goal: Electrolyte Balance  Outcome: Progressing  Goal: Fluid Balance  Outcome: Progressing  Goal: Optimal Functional Ability  Outcome: Progressing  Goal: Absence of Anemia Signs and Symptoms  Outcome: Progressing  Goal: Optimal Oral Intake  Outcome: Progressing  Goal: Acceptable Pain Control  Outcome: Progressing  Goal: Minimize Renal Failure Effects  Outcome: Progressing     Problem: Fatigue  Goal: Improved Activity Tolerance  Outcome: Progressing     Problem: Wound  Goal: Optimal Coping  Outcome: Progressing  Goal: Optimal Functional Ability  Outcome: Progressing  Goal: Absence of Infection Signs and Symptoms  Outcome: Progressing  Goal: Improved Oral Intake  Outcome: Progressing  Goal: Optimal Pain Control and Function  Outcome: Progressing  Goal: Skin Health and Integrity  Outcome: Progressing  Goal: Optimal Wound Healing  Outcome: Progressing     Problem: Diabetes  Goal: Optimal Coping  Outcome: Progressing  Goal: Optimal Functional Ability  Outcome: Progressing  Goal: Blood Glucose Level Within Target Range  Outcome: Progressing  Goal: Minimize Risk of Hypoglycemia  Outcome: Progressing     Problem: Syncope  Goal: Absence of Syncopal Symptoms  Outcome: Progressing     Problem: Diabetes Comorbidity  Goal:  Blood Glucose Level Within Targeted Range  Outcome: Progressing

## 2025-02-16 NOTE — PLAN OF CARE
VIRTUAL NURSE:  Labs, notes, orders, and careplan reviewed.   VN to be available as needed.     Problem: Adult Inpatient Plan of Care  Goal: Plan of Care Review  Outcome: Progressing  Goal: Patient-Specific Goal (Individualized)  Outcome: Progressing

## 2025-02-16 NOTE — HOSPITAL COURSE
HPI as above. Patient continued to deny further syncopal or presyncopal episodes since admissions. Echocardiogram was unremarkable with EF 50 - 55%, although indeterminate diastolic dysnfcion and elevated PA pressure to 79mmHg. Bilateral carotid U/S w/ no evidence of a hemodynamically significant carotid bifurcation stenosis. Patient had brief desaturation to low 90s in addition to elevate D-dimer prompting CTA chest, which was ultimately negative for PE. Patient continued to work with PT/OT during hospitalization. Recommended home health PT/OT which was placed at discharge. Instructed to follow up with PCP. ED return precautions discussed. All questions answered. Patient verbalized understanding.

## 2025-02-18 ENCOUNTER — PATIENT OUTREACH (OUTPATIENT)
Dept: ADMINISTRATIVE | Facility: CLINIC | Age: OVER 89
End: 2025-02-18
Payer: MEDICARE

## 2025-02-18 LAB
OHS QRS DURATION: 110 MS
OHS QTC CALCULATION: 442 MS

## 2025-02-18 NOTE — PROGRESS NOTES
C3 nurse spoke with Husam Connolly  for a TCC post hospital discharge follow up call. The patient has a scheduled HOSFU appointment with Gina Renee MD on 2/25/2025 at 11 AM.

## 2025-02-19 LAB — VIT B1 BLD-MCNC: 100 UG/L (ref 38–122)

## 2025-02-20 ENCOUNTER — TELEPHONE (OUTPATIENT)
Dept: PODIATRY | Facility: CLINIC | Age: OVER 89
End: 2025-02-20
Payer: MEDICARE

## 2025-02-20 ENCOUNTER — OFFICE VISIT (OUTPATIENT)
Dept: ORTHOPEDICS | Facility: CLINIC | Age: OVER 89
End: 2025-02-20
Payer: MEDICARE

## 2025-02-20 VITALS — BODY MASS INDEX: 24.38 KG/M2 | HEIGHT: 69 IN

## 2025-02-20 DIAGNOSIS — M19.019 SHOULDER ARTHRITIS: Primary | ICD-10-CM

## 2025-02-20 RX ORDER — TRIAMCINOLONE ACETONIDE 40 MG/ML
40 INJECTION, SUSPENSION INTRA-ARTICULAR; INTRAMUSCULAR
Status: COMPLETED | OUTPATIENT
Start: 2025-02-20 | End: 2025-02-20

## 2025-02-20 RX ADMIN — TRIAMCINOLONE ACETONIDE 40 MG: 40 INJECTION, SUSPENSION INTRA-ARTICULAR; INTRAMUSCULAR at 10:02

## 2025-02-20 NOTE — PROGRESS NOTES
Subjective:      Patient ID: Husam Connolly is a 89 y.o. male.  Chief Complaint: Follow-up of the Left Shoulder      HPI  Husam Connolly is a  89 y.o. male presenting today for follow up of left shoulder arthritis severe.  He reports that he is having a flare-up again   He would like injection   Not interested in surgery.    Review of patient's allergies indicates:   Allergen Reactions    Solarcaine [benzocaine-triclosan]     Sulfa (sulfonamide antibiotics)     Sulfamethoxazole     Trimethoprim     Empagliflozin Rash and Other (See Comments)     Yeast infection    Hytrin [terazosin] Rash    Smz-tmp ds [sulfamethoxazole-trimethoprim] Rash         Current Medications[1]    Past Medical History:   Diagnosis Date    BPH (benign prostatic hyperplasia)     Diabetes mellitus (type 2)     Emphysema 03/12/2022    High cholesterol 08/26/2020    Hypertension     RBBB 11/29/2022    Small bowel obstruction 06/13/2019       Past Surgical History:   Procedure Laterality Date    ANASTOMOSIS OF ILEUM TO RECTUM  9/9/2019    Procedure: ANASTOMOSIS, COLO-RECTAL;  Surgeon: Fouzia Gardner MD;  Location: [facility] OR;  Service: General;;    CHOLECYSTECTOMY N/A 3/7/2022    Procedure: CHOLECYSTECTOMY;  Surgeon: Fouzia Gardner MD;  Location: [facility] OR;  Service: General;  Laterality: N/A;    COLOSTOMY  6/16/2019    Procedure: CREATION, COLOSTOMY;  Surgeon: Fouzia Gardner MD;  Location: [facility] OR;  Service: General;;    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    Procedure: RELEASE, DUPUYTREN CONTRACTURE;  Surgeon: Slick Baldwin Jr., MD;  Location: [facility] OR;  Service: Orthopedics;  Laterality: Left;    EYE SURGERY      GISELA PROCEDURE  6/16/2019    Procedure: GISELA PROCEDURE;  Surgeon: Fouzia Gardner MD;  Location: [facility] OR;  Service: General;;  Colon resection    LYSIS OF ADHESIONS N/A 3/7/2022    Procedure: LYSIS, ADHESIONS;  Surgeon: Fouzia Gardner MD;  Location: [facility] OR;  Service: General;  Laterality:  "N/A;    TONSILLECTOMY         OBJECTIVE:   PHYSICAL EXAM:  Height: 5' 9" (175.3 cm)    Vitals:    02/20/25 0943   Height: 5' 9" (1.753 m)   PainSc:   2   PainLoc: Shoulder     Ortho/SPM Exam  Examination left shoulder range of motion limited due to stiffness and pain   Mild crepitation   There is weakness as well   No instability       RADIOGRAPHS:  None  Comments: I have personally reviewed the imaging and I agree with the above radiologist's report.    ASSESSMENT/PLAN:     IMPRESSION:  Severe arthritis left shoulder    PLAN:  Patient would like injection today   After pause for time-out identified the left shoulder injected with Kenalog 40 mg 2 cc xylocaine sterile technique   Tolerated the procedure well without complication   Continue current medications    FOLLOW UP:  3-4 months    Disclaimer: This note has been generated using voice-recognition software. There may be typographical errors that have been missed during proof-reading.          [1]   Current Outpatient Medications   Medication Sig Dispense Refill    albuterol (VENTOLIN HFA) 90 mcg/actuation inhaler Inhale 2 puffs into the lungs every 4 (four) hours as needed for Wheezing. Rescue 18 g 11    aspirin (ECOTRIN) 81 MG EC tablet Take 81 mg by mouth once daily.      atorvastatin (LIPITOR) 40 MG tablet Take 1 tablet (40 mg total) by mouth once daily. 90 tablet 3    blood sugar diagnostic (ACCU-CHEK GUIDE TEST STRIPS) Strp 1 strip by In Vitro route once daily. 100 strip 3    blood-glucose meter kit Use as instructed 1 each 0    finasteride (PROSCAR) 5 mg tablet Take 1 tablet (5 mg total) by mouth once daily. 90 tablet 3    fluticasone propionate (FLONASE) 50 mcg/actuation nasal spray 2 sprays (100 mcg total) by Each Nostril route once daily. 48 g 3    fluticasone-umeclidin-vilanter (TRELEGY ELLIPTA) 100-62.5-25 mcg DsDv Inhale 1 puff into the lungs once daily. 180 each 3    GARLIC ORAL Take 1 capsule by mouth once daily.      hydrALAZINE (APRESOLINE) 50 MG " tablet Take 1 tablet (50 mg total) by mouth 3 (three) times daily. 270 tablet 3    inhalation spacing device Use as directed for inhalation. 1 each 0    lancets Misc To check sugar daily, to use with insurance preferred meter 200 each 2    losartan (COZAAR) 100 MG tablet Take 1 tablet (100 mg total) by mouth once daily. 90 tablet 3    multivitamin capsule Take 1 capsule by mouth once daily.      mupirocin (BACTROBAN) 2 % ointment       omega-3 fatty acids/fish oil (FISH OIL-OMEGA-3 FATTY ACIDS) 300-1,000 mg capsule Take 1 capsule by mouth once daily.      tamsulosin (FLOMAX) 0.4 mg Cap Take 0.4 mg by mouth once daily.      vitamin D (VITAMIN D3) 1000 units Tab Take 1,000 Units by mouth once daily.       No current facility-administered medications for this visit.

## 2025-02-20 NOTE — TELEPHONE ENCOUNTER
Spoke with Mr Mohsen to assist him with making an annual foot exam. Accepted appt date and time of 3/18/25 at 3 pm. No other needs voiced. Will place appt reminder in the mail. Verbalized understanding of clinic location.

## 2025-02-23 ENCOUNTER — HOSPITAL ENCOUNTER (INPATIENT)
Facility: HOSPITAL | Age: OVER 89
LOS: 2 days | Discharge: HOME | DRG: 389 | End: 2025-02-26
Attending: EMERGENCY MEDICINE | Admitting: FAMILY MEDICINE
Payer: MEDICARE

## 2025-02-23 DIAGNOSIS — R10.9 ABDOMINAL PAIN: ICD-10-CM

## 2025-02-23 DIAGNOSIS — K56.609 SMALL BOWEL OBSTRUCTION: Primary | ICD-10-CM

## 2025-02-23 DIAGNOSIS — Z01.89 ENCOUNTER FOR IMAGING STUDY TO CONFIRM NASOGASTRIC (NG) TUBE PLACEMENT: ICD-10-CM

## 2025-02-23 LAB
ALBUMIN SERPL BCP-MCNC: 3.8 G/DL (ref 3.5–5.2)
ALP SERPL-CCNC: 102 U/L (ref 40–150)
ALT SERPL W/O P-5'-P-CCNC: 41 U/L (ref 10–44)
ANION GAP SERPL CALC-SCNC: 14 MMOL/L (ref 8–16)
AST SERPL-CCNC: 42 U/L (ref 10–40)
BASOPHILS # BLD AUTO: 0.04 K/UL (ref 0–0.2)
BASOPHILS NFR BLD: 0.3 % (ref 0–1.9)
BILIRUB SERPL-MCNC: 0.8 MG/DL (ref 0.1–1)
BUN SERPL-MCNC: 40 MG/DL (ref 10–30)
CALCIUM SERPL-MCNC: 10 MG/DL (ref 8.7–10.5)
CHLORIDE SERPL-SCNC: 102 MMOL/L (ref 95–110)
CO2 SERPL-SCNC: 21 MMOL/L (ref 23–29)
CREAT SERPL-MCNC: 1.7 MG/DL (ref 0.5–1.4)
DIFFERENTIAL METHOD BLD: ABNORMAL
EOSINOPHIL # BLD AUTO: 0.1 K/UL (ref 0–0.5)
EOSINOPHIL NFR BLD: 0.6 % (ref 0–8)
ERYTHROCYTE [DISTWIDTH] IN BLOOD BY AUTOMATED COUNT: 13.1 % (ref 11.5–14.5)
EST. GFR  (NO RACE VARIABLE): 37 ML/MIN/1.73 M^2
GLUCOSE SERPL-MCNC: 292 MG/DL (ref 70–110)
HCT VFR BLD AUTO: 46.9 % (ref 40–54)
HGB BLD-MCNC: 15.9 G/DL (ref 14–18)
IMM GRANULOCYTES # BLD AUTO: 0.07 K/UL (ref 0–0.04)
IMM GRANULOCYTES NFR BLD AUTO: 0.5 % (ref 0–0.5)
LIPASE SERPL-CCNC: 83 U/L (ref 4–60)
LYMPHOCYTES # BLD AUTO: 1.3 K/UL (ref 1–4.8)
LYMPHOCYTES NFR BLD: 9 % (ref 18–48)
MAGNESIUM SERPL-MCNC: 2.1 MG/DL (ref 1.6–2.6)
MCH RBC QN AUTO: 30 PG (ref 27–31)
MCHC RBC AUTO-ENTMCNC: 33.9 G/DL (ref 32–36)
MCV RBC AUTO: 89 FL (ref 82–98)
MONOCYTES # BLD AUTO: 1.4 K/UL (ref 0.3–1)
MONOCYTES NFR BLD: 10.3 % (ref 4–15)
NEUTROPHILS # BLD AUTO: 11 K/UL (ref 1.8–7.7)
NEUTROPHILS NFR BLD: 79.3 % (ref 38–73)
NRBC BLD-RTO: 0 /100 WBC
PLATELET # BLD AUTO: 278 K/UL (ref 150–450)
PMV BLD AUTO: 10.6 FL (ref 9.2–12.9)
POTASSIUM SERPL-SCNC: 4.2 MMOL/L (ref 3.5–5.1)
PROT SERPL-MCNC: 7.7 G/DL (ref 6–8.4)
RBC # BLD AUTO: 5.3 M/UL (ref 4.6–6.2)
SODIUM SERPL-SCNC: 137 MMOL/L (ref 136–145)
WBC # BLD AUTO: 13.84 K/UL (ref 3.9–12.7)

## 2025-02-23 PROCEDURE — 80053 COMPREHEN METABOLIC PANEL: CPT | Performed by: EMERGENCY MEDICINE

## 2025-02-23 PROCEDURE — 83735 ASSAY OF MAGNESIUM: CPT | Performed by: EMERGENCY MEDICINE

## 2025-02-23 PROCEDURE — 96375 TX/PRO/DX INJ NEW DRUG ADDON: CPT

## 2025-02-23 PROCEDURE — 99285 EMERGENCY DEPT VISIT HI MDM: CPT | Mod: 25

## 2025-02-23 PROCEDURE — 85025 COMPLETE CBC W/AUTO DIFF WBC: CPT | Performed by: EMERGENCY MEDICINE

## 2025-02-23 PROCEDURE — 96361 HYDRATE IV INFUSION ADD-ON: CPT

## 2025-02-23 PROCEDURE — 93005 ELECTROCARDIOGRAM TRACING: CPT

## 2025-02-23 PROCEDURE — 93010 ELECTROCARDIOGRAM REPORT: CPT | Mod: ,,, | Performed by: STUDENT IN AN ORGANIZED HEALTH CARE EDUCATION/TRAINING PROGRAM

## 2025-02-23 PROCEDURE — 96374 THER/PROPH/DIAG INJ IV PUSH: CPT

## 2025-02-23 PROCEDURE — 25000003 PHARM REV CODE 250: Performed by: EMERGENCY MEDICINE

## 2025-02-23 PROCEDURE — 83690 ASSAY OF LIPASE: CPT | Performed by: EMERGENCY MEDICINE

## 2025-02-23 PROCEDURE — 63600175 PHARM REV CODE 636 W HCPCS: Performed by: EMERGENCY MEDICINE

## 2025-02-23 RX ORDER — IBUPROFEN 200 MG
16 TABLET ORAL
Status: DISCONTINUED | OUTPATIENT
Start: 2025-02-24 | End: 2025-02-26 | Stop reason: HOSPADM

## 2025-02-23 RX ORDER — MIDAZOLAM HYDROCHLORIDE 2 MG/2ML
2 INJECTION, SOLUTION INTRAMUSCULAR; INTRAVENOUS
Status: COMPLETED | OUTPATIENT
Start: 2025-02-23 | End: 2025-02-23

## 2025-02-23 RX ORDER — ONDANSETRON HYDROCHLORIDE 2 MG/ML
4 INJECTION, SOLUTION INTRAVENOUS EVERY 8 HOURS PRN
Status: DISCONTINUED | OUTPATIENT
Start: 2025-02-24 | End: 2025-02-26 | Stop reason: HOSPADM

## 2025-02-23 RX ORDER — SODIUM CHLORIDE 0.9 % (FLUSH) 0.9 %
5 SYRINGE (ML) INJECTION
Status: DISCONTINUED | OUTPATIENT
Start: 2025-02-24 | End: 2025-02-26 | Stop reason: HOSPADM

## 2025-02-23 RX ORDER — HYDRALAZINE HYDROCHLORIDE 20 MG/ML
10 INJECTION INTRAMUSCULAR; INTRAVENOUS
Status: COMPLETED | OUTPATIENT
Start: 2025-02-23 | End: 2025-02-23

## 2025-02-23 RX ORDER — ACETAMINOPHEN 325 MG/1
650 TABLET ORAL EVERY 4 HOURS PRN
Status: DISCONTINUED | OUTPATIENT
Start: 2025-02-24 | End: 2025-02-26 | Stop reason: HOSPADM

## 2025-02-23 RX ORDER — TALC
6 POWDER (GRAM) TOPICAL NIGHTLY PRN
Status: DISCONTINUED | OUTPATIENT
Start: 2025-02-24 | End: 2025-02-26 | Stop reason: HOSPADM

## 2025-02-23 RX ORDER — POLYETHYLENE GLYCOL 3350 17 G/17G
17 POWDER, FOR SOLUTION ORAL DAILY PRN
Status: DISCONTINUED | OUTPATIENT
Start: 2025-02-24 | End: 2025-02-24

## 2025-02-23 RX ORDER — GLUCAGON 1 MG
1 KIT INJECTION
Status: DISCONTINUED | OUTPATIENT
Start: 2025-02-24 | End: 2025-02-26 | Stop reason: HOSPADM

## 2025-02-23 RX ORDER — NALOXONE HCL 0.4 MG/ML
0.02 VIAL (ML) INJECTION
Status: DISCONTINUED | OUTPATIENT
Start: 2025-02-24 | End: 2025-02-26 | Stop reason: HOSPADM

## 2025-02-23 RX ORDER — IBUPROFEN 200 MG
24 TABLET ORAL
Status: DISCONTINUED | OUTPATIENT
Start: 2025-02-24 | End: 2025-02-26 | Stop reason: HOSPADM

## 2025-02-23 RX ORDER — AMOXICILLIN 250 MG
1 CAPSULE ORAL 2 TIMES DAILY
Status: DISCONTINUED | OUTPATIENT
Start: 2025-02-24 | End: 2025-02-24

## 2025-02-23 RX ORDER — INSULIN ASPART 100 [IU]/ML
0-5 INJECTION, SOLUTION INTRAVENOUS; SUBCUTANEOUS
Status: DISCONTINUED | OUTPATIENT
Start: 2025-02-24 | End: 2025-02-26 | Stop reason: HOSPADM

## 2025-02-23 RX ORDER — ENOXAPARIN SODIUM 100 MG/ML
40 INJECTION SUBCUTANEOUS EVERY 24 HOURS
Status: DISCONTINUED | OUTPATIENT
Start: 2025-02-24 | End: 2025-02-24

## 2025-02-23 RX ORDER — PROCHLORPERAZINE EDISYLATE 5 MG/ML
10 INJECTION INTRAMUSCULAR; INTRAVENOUS
Status: COMPLETED | OUTPATIENT
Start: 2025-02-23 | End: 2025-02-23

## 2025-02-23 RX ADMIN — HYDRALAZINE HYDROCHLORIDE 10 MG: 20 INJECTION, SOLUTION INTRAMUSCULAR; INTRAVENOUS at 11:02

## 2025-02-23 RX ADMIN — MIDAZOLAM HYDROCHLORIDE 2 MG: 1 INJECTION, SOLUTION INTRAMUSCULAR; INTRAVENOUS at 11:02

## 2025-02-23 RX ADMIN — SODIUM CHLORIDE 500 ML: 9 INJECTION, SOLUTION INTRAVENOUS at 10:02

## 2025-02-23 RX ADMIN — PROCHLORPERAZINE EDISYLATE 10 MG: 5 INJECTION INTRAMUSCULAR; INTRAVENOUS at 10:02

## 2025-02-24 PROBLEM — N17.9 AKI (ACUTE KIDNEY INJURY): Status: ACTIVE | Noted: 2025-02-24

## 2025-02-24 LAB
ALBUMIN SERPL BCP-MCNC: 3.8 G/DL (ref 3.5–5.2)
ALP SERPL-CCNC: 103 U/L (ref 40–150)
ALT SERPL W/O P-5'-P-CCNC: 37 U/L (ref 10–44)
ANION GAP SERPL CALC-SCNC: 15 MMOL/L (ref 8–16)
AST SERPL-CCNC: 31 U/L (ref 10–40)
BASOPHILS # BLD AUTO: 0.03 K/UL (ref 0–0.2)
BASOPHILS NFR BLD: 0.2 % (ref 0–1.9)
BILIRUB SERPL-MCNC: 1 MG/DL (ref 0.1–1)
BUN SERPL-MCNC: 38 MG/DL (ref 10–30)
CALCIUM SERPL-MCNC: 10.1 MG/DL (ref 8.7–10.5)
CHLORIDE SERPL-SCNC: 101 MMOL/L (ref 95–110)
CO2 SERPL-SCNC: 25 MMOL/L (ref 23–29)
CREAT SERPL-MCNC: 1.7 MG/DL (ref 0.5–1.4)
DIFFERENTIAL METHOD BLD: ABNORMAL
EOSINOPHIL # BLD AUTO: 0.1 K/UL (ref 0–0.5)
EOSINOPHIL NFR BLD: 0.6 % (ref 0–8)
ERYTHROCYTE [DISTWIDTH] IN BLOOD BY AUTOMATED COUNT: 13.2 % (ref 11.5–14.5)
EST. GFR  (NO RACE VARIABLE): 37 ML/MIN/1.73 M^2
GLUCOSE SERPL-MCNC: 252 MG/DL (ref 70–110)
GLUCOSE SERPL-MCNC: 254 MG/DL (ref 70–110)
GLUCOSE SERPL-MCNC: 256 MG/DL (ref 70–110)
HCT VFR BLD AUTO: 50.2 % (ref 40–54)
HGB BLD-MCNC: 16.8 G/DL (ref 14–18)
IMM GRANULOCYTES # BLD AUTO: 0.07 K/UL (ref 0–0.04)
IMM GRANULOCYTES NFR BLD AUTO: 0.4 % (ref 0–0.5)
LYMPHOCYTES # BLD AUTO: 1.3 K/UL (ref 1–4.8)
LYMPHOCYTES NFR BLD: 7.6 % (ref 18–48)
MAGNESIUM SERPL-MCNC: 2 MG/DL (ref 1.6–2.6)
MCH RBC QN AUTO: 29.5 PG (ref 27–31)
MCHC RBC AUTO-ENTMCNC: 33.5 G/DL (ref 32–36)
MCV RBC AUTO: 88 FL (ref 82–98)
MONOCYTES # BLD AUTO: 1.4 K/UL (ref 0.3–1)
MONOCYTES NFR BLD: 8.4 % (ref 4–15)
NEUTROPHILS # BLD AUTO: 14.2 K/UL (ref 1.8–7.7)
NEUTROPHILS NFR BLD: 82.8 % (ref 38–73)
NRBC BLD-RTO: 0 /100 WBC
OHS QRS DURATION: 102 MS
OHS QTC CALCULATION: 422 MS
PHOSPHATE SERPL-MCNC: 3.1 MG/DL (ref 2.7–4.5)
PLATELET # BLD AUTO: 327 K/UL (ref 150–450)
PMV BLD AUTO: 10.7 FL (ref 9.2–12.9)
POCT GLUCOSE: 242 MG/DL (ref 70–110)
POCT GLUCOSE: 246 MG/DL (ref 70–110)
POCT GLUCOSE: 256 MG/DL (ref 70–110)
POTASSIUM SERPL-SCNC: 4.3 MMOL/L (ref 3.5–5.1)
PROT SERPL-MCNC: 7.6 G/DL (ref 6–8.4)
RBC # BLD AUTO: 5.7 M/UL (ref 4.6–6.2)
SODIUM SERPL-SCNC: 141 MMOL/L (ref 136–145)
WBC # BLD AUTO: 17.12 K/UL (ref 3.9–12.7)

## 2025-02-24 PROCEDURE — 27000221 HC OXYGEN, UP TO 24 HOURS

## 2025-02-24 PROCEDURE — 80053 COMPREHEN METABOLIC PANEL: CPT

## 2025-02-24 PROCEDURE — 99900035 HC TECH TIME PER 15 MIN (STAT)

## 2025-02-24 PROCEDURE — 25000003 PHARM REV CODE 250

## 2025-02-24 PROCEDURE — 96361 HYDRATE IV INFUSION ADD-ON: CPT

## 2025-02-24 PROCEDURE — 94640 AIRWAY INHALATION TREATMENT: CPT

## 2025-02-24 PROCEDURE — 99223 1ST HOSP IP/OBS HIGH 75: CPT | Mod: ,,, | Performed by: STUDENT IN AN ORGANIZED HEALTH CARE EDUCATION/TRAINING PROGRAM

## 2025-02-24 PROCEDURE — 21400001 HC TELEMETRY ROOM

## 2025-02-24 PROCEDURE — 25000242 PHARM REV CODE 250 ALT 637 W/ HCPCS

## 2025-02-24 PROCEDURE — 94761 N-INVAS EAR/PLS OXIMETRY MLT: CPT

## 2025-02-24 PROCEDURE — 85025 COMPLETE CBC W/AUTO DIFF WBC: CPT

## 2025-02-24 PROCEDURE — 96372 THER/PROPH/DIAG INJ SC/IM: CPT

## 2025-02-24 PROCEDURE — 84100 ASSAY OF PHOSPHORUS: CPT

## 2025-02-24 PROCEDURE — 63600175 PHARM REV CODE 636 W HCPCS

## 2025-02-24 PROCEDURE — 0D9670Z DRAINAGE OF STOMACH WITH DRAINAGE DEVICE, VIA NATURAL OR ARTIFICIAL OPENING: ICD-10-PCS | Performed by: HOSPITALIST

## 2025-02-24 PROCEDURE — 83735 ASSAY OF MAGNESIUM: CPT

## 2025-02-24 RX ORDER — FINASTERIDE 5 MG/1
5 TABLET, FILM COATED ORAL DAILY
Status: DISCONTINUED | OUTPATIENT
Start: 2025-02-24 | End: 2025-02-26 | Stop reason: HOSPADM

## 2025-02-24 RX ORDER — TAMSULOSIN HYDROCHLORIDE 0.4 MG/1
0.4 CAPSULE ORAL DAILY
Status: DISCONTINUED | OUTPATIENT
Start: 2025-02-24 | End: 2025-02-26 | Stop reason: HOSPADM

## 2025-02-24 RX ORDER — AMOXICILLIN 250 MG
1 CAPSULE ORAL 2 TIMES DAILY PRN
Status: DISCONTINUED | OUTPATIENT
Start: 2025-02-24 | End: 2025-02-24

## 2025-02-24 RX ORDER — HYDRALAZINE HYDROCHLORIDE 25 MG/1
50 TABLET, FILM COATED ORAL 3 TIMES DAILY
Status: DISCONTINUED | OUTPATIENT
Start: 2025-02-24 | End: 2025-02-26 | Stop reason: HOSPADM

## 2025-02-24 RX ORDER — ALBUTEROL SULFATE 90 UG/1
2 INHALANT RESPIRATORY (INHALATION) EVERY 6 HOURS PRN
Status: DISCONTINUED | OUTPATIENT
Start: 2025-02-24 | End: 2025-02-26 | Stop reason: HOSPADM

## 2025-02-24 RX ORDER — FLUTICASONE FUROATE AND VILANTEROL 100; 25 UG/1; UG/1
1 POWDER RESPIRATORY (INHALATION) DAILY
Status: DISCONTINUED | OUTPATIENT
Start: 2025-02-24 | End: 2025-02-26 | Stop reason: HOSPADM

## 2025-02-24 RX ORDER — ATORVASTATIN CALCIUM 40 MG/1
40 TABLET, FILM COATED ORAL DAILY
Status: DISCONTINUED | OUTPATIENT
Start: 2025-02-24 | End: 2025-02-26 | Stop reason: HOSPADM

## 2025-02-24 RX ORDER — SODIUM CHLORIDE, SODIUM LACTATE, POTASSIUM CHLORIDE, CALCIUM CHLORIDE 600; 310; 30; 20 MG/100ML; MG/100ML; MG/100ML; MG/100ML
INJECTION, SOLUTION INTRAVENOUS CONTINUOUS
Status: DISCONTINUED | OUTPATIENT
Start: 2025-02-24 | End: 2025-02-25

## 2025-02-24 RX ORDER — LABETALOL HYDROCHLORIDE 5 MG/ML
10 INJECTION, SOLUTION INTRAVENOUS ONCE
Status: DISCONTINUED | OUTPATIENT
Start: 2025-02-24 | End: 2025-02-26 | Stop reason: HOSPADM

## 2025-02-24 RX ORDER — ENOXAPARIN SODIUM 100 MG/ML
30 INJECTION SUBCUTANEOUS EVERY 24 HOURS
Status: DISCONTINUED | OUTPATIENT
Start: 2025-02-24 | End: 2025-02-25

## 2025-02-24 RX ORDER — BALSAM PERU/CASTOR OIL
OINTMENT (GRAM) TOPICAL 2 TIMES DAILY
Status: DISCONTINUED | OUTPATIENT
Start: 2025-02-24 | End: 2025-02-26 | Stop reason: HOSPADM

## 2025-02-24 RX ORDER — LOSARTAN POTASSIUM 50 MG/1
100 TABLET ORAL DAILY
Status: DISCONTINUED | OUTPATIENT
Start: 2025-02-24 | End: 2025-02-26 | Stop reason: HOSPADM

## 2025-02-24 RX ORDER — ASPIRIN 81 MG/1
81 TABLET ORAL DAILY
Status: DISCONTINUED | OUTPATIENT
Start: 2025-02-24 | End: 2025-02-26 | Stop reason: HOSPADM

## 2025-02-24 RX ADMIN — TAMSULOSIN HYDROCHLORIDE 0.4 MG: 0.4 CAPSULE ORAL at 11:02

## 2025-02-24 RX ADMIN — SODIUM CHLORIDE, POTASSIUM CHLORIDE, SODIUM LACTATE AND CALCIUM CHLORIDE: 600; 310; 30; 20 INJECTION, SOLUTION INTRAVENOUS at 03:02

## 2025-02-24 RX ADMIN — HYDRALAZINE HYDROCHLORIDE 50 MG: 25 TABLET ORAL at 11:02

## 2025-02-24 RX ADMIN — ATORVASTATIN CALCIUM 40 MG: 40 TABLET, FILM COATED ORAL at 11:02

## 2025-02-24 RX ADMIN — ENOXAPARIN SODIUM 30 MG: 30 INJECTION SUBCUTANEOUS at 06:02

## 2025-02-24 RX ADMIN — LOSARTAN POTASSIUM 100 MG: 50 TABLET, FILM COATED ORAL at 11:02

## 2025-02-24 RX ADMIN — ASPIRIN 81 MG: 81 TABLET, COATED ORAL at 11:02

## 2025-02-24 RX ADMIN — HYDRALAZINE HYDROCHLORIDE 50 MG: 25 TABLET ORAL at 09:02

## 2025-02-24 RX ADMIN — FINASTERIDE 5 MG: 5 TABLET, FILM COATED ORAL at 11:02

## 2025-02-24 RX ADMIN — SODIUM CHLORIDE, POTASSIUM CHLORIDE, SODIUM LACTATE AND CALCIUM CHLORIDE: 600; 310; 30; 20 INJECTION, SOLUTION INTRAVENOUS at 04:02

## 2025-02-24 RX ADMIN — INSULIN ASPART 2 UNITS: 100 INJECTION, SOLUTION INTRAVENOUS; SUBCUTANEOUS at 12:02

## 2025-02-24 RX ADMIN — Medication: at 02:02

## 2025-02-24 RX ADMIN — FLUTICASONE FUROATE AND VILANTEROL TRIFENATATE 1 PUFF: 100; 25 POWDER RESPIRATORY (INHALATION) at 07:02

## 2025-02-24 RX ADMIN — HYDRALAZINE HYDROCHLORIDE 50 MG: 25 TABLET ORAL at 02:02

## 2025-02-24 RX ADMIN — Medication: at 09:02

## 2025-02-24 RX ADMIN — INSULIN ASPART 3 UNITS: 100 INJECTION, SOLUTION INTRAVENOUS; SUBCUTANEOUS at 06:02

## 2025-02-24 RX ADMIN — SODIUM CHLORIDE, POTASSIUM CHLORIDE, SODIUM LACTATE AND CALCIUM CHLORIDE: 600; 310; 30; 20 INJECTION, SOLUTION INTRAVENOUS at 09:02

## 2025-02-24 RX ADMIN — INSULIN ASPART 2 UNITS: 100 INJECTION, SOLUTION INTRAVENOUS; SUBCUTANEOUS at 04:02

## 2025-02-24 NOTE — HPI
Mr. Husam Connolly is a 89-year-old male with a PMHx  of BPH, diabetes, emphysema, high right bundle-branch block, small-bowel obstruction, CKD3, HLD, HTN who presented to the ED on 2/24/25 with complaints of nausea, diarrhea, and abdominal distention and discomfort. Patient states it feels like how it did when he previously had an SBO. Patient's daughter at bedside stated that patient has numerous episodes of SBO in the past and all have been medically managed. Patient had a couple of episodes of non-bloody emesis in the ED. Upon my assessment, patient already had NG tube placed. ED physician had already consulted General Surgery to stated to keep NG tube in place and he will be seen in the AM. Patient denies fevers, chills, abdominal pain, syncope/presyncope, chest pain, or SOB.     In the ED, initial vital signs /84, HR 95, Temp 98.2F, SpO2 95% on room air. Labs include CBC with stable H/H 15.9/46.9 and WBC elevated to 13.84. CMP with Na 137, K 4.2, Cl 102, CO2 21, and BUN/Cr 40/1.7 (baseline Cr 1.4-1.5). CT Abdomen and Pelvis: SBO with transition point in the right lower quadrant, may be due to adhesions. Moderate distension of the stomach and distal esophagus. Lipase elevated to 83. Initiated on Hydalazine 10 mg IV x1, Midazolam 2 mg IV x1, s/p 500 mL IV NS, Prochlorperazine  10 mg IV x1. [facility] consulted for evaluation for admission for Small bowel obstruction.

## 2025-02-24 NOTE — ASSESSMENT & PLAN NOTE
Patient's COPD is controlled currently.  Patient is currently off COPD Pathway. Continue scheduled inhalers Steroids and monitor respiratory status closely.     - Continue breo, home trelegy not on formulary

## 2025-02-24 NOTE — SUBJECTIVE & OBJECTIVE
Interval History: Patient seen today laying in bed comfortably with no acute concerns. Patient hemodynamically stable. Afebrile for the past 24hrs. No overnight events. Patient with poor NG output. Pending general surgery for recs. Patient still with episodes of nausea. Distention reduced per patient. Still no BM or passing gas. No other complaints.     Review of Systems   Constitutional:  Negative for chills and fever.   Respiratory:  Negative for shortness of breath.    Cardiovascular:  Negative for chest pain and leg swelling.   Gastrointestinal:  Positive for abdominal distention, diarrhea, nausea and vomiting. Negative for abdominal pain.   Genitourinary:  Negative for dysuria.   Neurological:  Negative for syncope, weakness and light-headedness.   Psychiatric/Behavioral:  Negative for agitation.      Objective:     Vital Signs (Most Recent):  Temp: 98.3 °F (36.8 °C) (02/24/25 1113)  Pulse: 99 (02/24/25 1113)  Resp: 19 (02/24/25 1113)  BP: (!) 146/71 (02/24/25 1113)  SpO2: (!) 88 % (02/24/25 1113) Vital Signs (24h Range):  Temp:  [97.9 °F (36.6 °C)-99.5 °F (37.5 °C)] 98.3 °F (36.8 °C)  Pulse:  [] 99  Resp:  [17-26] 19  SpO2:  [88 %-95 %] 88 %  BP: (118-220)/() 146/71     Weight: 72.8 kg (160 lb 7.9 oz)  Body mass index is 23.03 kg/m².    Intake/Output Summary (Last 24 hours) at 2/24/2025 1149  Last data filed at 2/24/2025 0604  Gross per 24 hour   Intake 589.95 ml   Output 1926 ml   Net -1336.05 ml         Physical Exam  Constitutional:       General: He is not in acute distress.     Appearance: Normal appearance. He is normal weight. He is not ill-appearing, toxic-appearing or diaphoretic.   HENT:      Head: Normocephalic and atraumatic.      Right Ear: External ear normal.      Left Ear: External ear normal.      Nose: Nose normal.      Mouth/Throat:      Mouth: Mucous membranes are moist.      Pharynx: Oropharynx is clear.   Eyes:      Extraocular Movements: Extraocular movements intact.    Cardiovascular:      Rate and Rhythm: Normal rate and regular rhythm.      Pulses: Normal pulses.      Heart sounds: Normal heart sounds. No murmur heard.  Pulmonary:      Effort: Pulmonary effort is normal. No respiratory distress.      Breath sounds: Normal breath sounds.   Abdominal:      General: There is distension.      Tenderness: There is no abdominal tenderness.      Comments: Positive bowel sounds only in RUQ and LUQ.   Musculoskeletal:         General: No swelling or tenderness.   Skin:     General: Skin is warm and dry.   Neurological:      Mental Status: He is alert and oriented to person, place, and time. Mental status is at baseline.             Significant Labs: All pertinent labs within the past 24 hours have been reviewed.    Significant Imaging: I have reviewed all pertinent imaging results/findings within the past 24 hours.

## 2025-02-24 NOTE — PLAN OF CARE
Problem: Adult Inpatient Plan of Care  Goal: Plan of Care Review  Outcome: Progressing  Flowsheets (Taken 2/24/2025 1464)  Plan of Care Reviewed With:   patient   child   POC and safety protocols reviewed w pt and daughter,stated understanding,NGT left nare connected to LIWS,yellowish brown drng returned,no current c/o n/v.Pain scale 2. States tolerable. Bed alarm set.

## 2025-02-24 NOTE — HPI
Mr. Husam Connolly is a 89-year-old male with a PMHx of BPH, diabetes, emphysema, PAH, high right bundle-branch block, CKD3, HLD, HTN who presented to the ED on 2/24/25 with complaints of nausea, abdominal distention and discomfort. 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 These included a partial sigmoid resection with end colostomy creation in 2019, followed by a colostomy takedown later that year, and an open cholecystectomy with Clem in 2022, all with Dr. Gardner.    His last BM was on 2/22. He has passed occasional flatus since then. Workup in the ED included a CT scan with a transition point in the RLQ and proximal distension. NGT was placed and 1700 mL of initial output was obtained in the ED. Overnight there has only been approximately 100 mL of output.

## 2025-02-24 NOTE — ASSESSMENT & PLAN NOTE
Patient with known history of multiple small bowel obstructions, now presenting with an SBO with transition point in RLQ.   NG Tube placed in ED with good placement. Patient started on intermittent suction and had put out 1.5L of fluid while in the ED.    Plan:  - General Surgery consulted in the ED, appreciate recommendations   - Stated to ED physician to start NGT and he will be seen in the AM  - Continue NGT on low intermittent suction  - NPO  - mIVF on LR at 100 ml/hr while NPO  - Zofran prn for nausea

## 2025-02-24 NOTE — PLAN OF CARE
AAOx4. C/o abdominal pain. Remains NPO. L NGT to LIWS. IVF infusing per MAR.  DTIP noted to sacrum, wound care orders placed and waffle mattress applied. Blood glucose and cardiac monitoring maintained. Bed locked in lowest position, bed alarm set and call bell within reach.     Problem: Adult Inpatient Plan of Care  Goal: Plan of Care Review  Outcome: Progressing

## 2025-02-24 NOTE — PLAN OF CARE
Problem: Adult Inpatient Plan of Care  Goal: Plan of Care Review  Outcome: Progressing     VIRTUAL NURSE:  Cued into patient's room.  Permission received per patient to turn camera to view patient.  Introduced as VN for night shift that will be working with floor nurse and nursing assistant.  Educated patient on VN's role in patient care and  [facility] model.  Plan of care reviewed with patient.  Education per flowsheet.   Informed patient that staff will round on them every 2 hours but to use call light for any other needs they may have; informed of fall risk and fall precautions.  Patient verbalized understanding.  Call light within reach; bed siderails up x3.  Opportunity given for questions and questions answered.  Admission assessment questions answered.  Patient denies complaints or any needs at this time. Instructed to call for assistance.  Will cont to monitor and intervene as needed.    Labs, notes, orders, and careplan initiated.       02/24/25 0241   Patient Request   Patient Requested no complaints or needs currently   Admission   Initial VN Admission Questions Complete   Communication Issues? Technical Issue   Shift   Virtual Nurse - Rounding Complete   Pain Management Interventions pain management plan reviewed with patient/caregiver   Virtual Nurse - Patient Verbalized Approval Of Camera Use;VN Rounding   Type of Frequent Check   Type Patient Rounds   Safety/Activity   Patient Rounds bed in low position;placement of personal items at bedside;bed wheels locked;call light in patient/parent reach;visualized patient;clutter free environment maintained   Safety Promotion/Fall Prevention assistive device/personal item within reach;commode/urinal/bedpan at bedside;high risk medications identified;Fall Risk reviewed with patient/family;diversional activities provided;medications reviewed;nonskid shoes/socks when out of bed;room near unit station;side rails raised x 3;supervised activity;Supervised toileting -  stay within arms reach;instructed to call staff for mobility   Safety Precautions emergency equipment at bedside   Positioning   Body Position neutral body alignment;neutral head position;position changed independently   Head of Bed (HOB) Positioning HOB at 60-90 degrees   Pain/Comfort/Sleep   Preferred Pain Scale number (Numeric Rating Pain Scale)   Comfort/Acceptable Pain Level 2   Pain Body Location abdomen   Pain Rating (0-10): Rest 2   Frequency occasional   Sleep/Rest/Relaxation no problem identified;awake

## 2025-02-24 NOTE — CONSULTS
[facility] Surg  General Surgery  Consult Note    Patient Name: Husam Connolly  MRN: [id number]  Code Status: Full Code  Admission Date: 2/23/2025  Hospital Length of Stay: 0 days  Attending Physician: Tadeo Page III, MD  Primary Care Provider: Williams Knight DO    Patient information was obtained from patient, past medical records, and ER records.     Inpatient consult to General surgery  Consult performed by: Alverto Fountain MD  Consult ordered by: Jose Oden MD        Subjective:     Principal Problem: SBO (small bowel obstruction)    History of Present Illness: Mr. Husam Connolly is a 89-year-old male with a PMHx of BPH, diabetes, emphysema, PAH, high right bundle-branch block, CKD3, HLD, HTN who presented to the ED on 2/24/25 with complaints of nausea, abdominal distention and discomfort. 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 These included a partial sigmoid resection with end colostomy creation in 2019, followed by a colostomy takedown later that year, and an open cholecystectomy with Clem in 2022, all with Dr. Gardner.    His last BM was on 2/22. He has passed occasional flatus since then. Workup in the ED included a CT scan with a transition point in the RLQ and proximal distension. NGT was placed and 1700 mL of initial output was obtained in the ED. Overnight there has only been approximately 100 mL of output.     No current facility-administered medications on file prior to encounter.     Current Outpatient Medications on File Prior to Encounter   Medication Sig    albuterol (VENTOLIN HFA) 90 mcg/actuation inhaler Inhale 2 puffs into the lungs every 4 (four) hours as needed for Wheezing. Rescue    aspirin (ECOTRIN) 81 MG EC tablet Take 81 mg by mouth once daily.    atorvastatin (LIPITOR) 40 MG tablet Take 1 tablet (40 mg total) by mouth once daily.    blood sugar diagnostic (ACCU-CHEK GUIDE TEST STRIPS) Strp 1 strip by In Vitro route  once daily.    blood-glucose meter kit Use as instructed    finasteride (PROSCAR) 5 mg tablet Take 1 tablet (5 mg total) by mouth once daily.    fluticasone propionate (FLONASE) 50 mcg/actuation nasal spray 2 sprays (100 mcg total) by Each Nostril route once daily.    fluticasone-umeclidin-vilanter (TRELEGY ELLIPTA) 100-62.5-25 mcg DsDv Inhale 1 puff into the lungs once daily.    GARLIC ORAL Take 1 capsule by mouth once daily.    hydrALAZINE (APRESOLINE) 50 MG tablet Take 1 tablet (50 mg total) by mouth 3 (three) times daily.    inhalation spacing device Use as directed for inhalation.    lancets Misc To check sugar daily, to use with insurance preferred meter    losartan (COZAAR) 100 MG tablet Take 1 tablet (100 mg total) by mouth once daily.    multivitamin capsule Take 1 capsule by mouth once daily.    mupirocin (BACTROBAN) 2 % ointment     omega-3 fatty acids/fish oil (FISH OIL-OMEGA-3 FATTY ACIDS) 300-1,000 mg capsule Take 1 capsule by mouth once daily.    tamsulosin (FLOMAX) 0.4 mg Cap Take 0.4 mg by mouth once daily.    vitamin D (VITAMIN D3) 1000 units Tab Take 1,000 Units by mouth once daily.       Review of patient's allergies indicates:   Allergen Reactions    Solarcaine [benzocaine-triclosan]     Sulfa (sulfonamide antibiotics)     Sulfamethoxazole     Trimethoprim     Empagliflozin Rash and Other (See Comments)     Yeast infection    Hytrin [terazosin] Rash    Smz-tmp ds [sulfamethoxazole-trimethoprim] Rash       Past Medical History:   Diagnosis Date    BPH (benign prostatic hyperplasia)     Diabetes mellitus (type 2)     Emphysema 03/12/2022    High cholesterol 08/26/2020    Hypertension     RBBB 11/29/2022    Small bowel obstruction 06/13/2019     Past Surgical History:   Procedure Laterality Date    ANASTOMOSIS OF ILEUM TO RECTUM  9/9/2019    Procedure: ANASTOMOSIS, COLO-RECTAL;  Surgeon: Fouzia Gadrner MD;  Location: [facility];  Service: General;;    CHOLECYSTECTOMY N/A 3/7/2022    Procedure:  CHOLECYSTECTOMY;  Surgeon: Fouzia Gardner MD;  Location: [facility] OR;  Service: General;  Laterality: N/A;    COLOSTOMY  2019    Procedure: CREATION, COLOSTOMY;  Surgeon: Fouzia Gardner MD;  Location: [facility] OR;  Service: General;;    DUPUYTREN CONTRACTURE RELEASE Left 2022    Procedure: RELEASE, DUPUYTREN CONTRACTURE;  Surgeon: Slick Baldwin Jr., MD;  Location: [facility] OR;  Service: Orthopedics;  Laterality: Left;    EYE SURGERY      GISELA PROCEDURE  2019    Procedure: GISELA PROCEDURE;  Surgeon: Fouzia Gardner MD;  Location: [facility] OR;  Service: General;;  Colon resection    LYSIS OF ADHESIONS N/A 3/7/2022    Procedure: LYSIS, ADHESIONS;  Surgeon: Fouzia Gardner MD;  Location: [facility] OR;  Service: General;  Laterality: N/A;    TONSILLECTOMY       Family History       Problem Relation (Age of Onset)    Cancer Mother    Diabetes Paternal Aunt    Heart disease Father    Hypertension Father          Tobacco Use    Smoking status: Former     Current packs/day: 0.00     Average packs/day: 1 pack/day for 25.0 years (25.0 ttl pk-yrs)     Types: Cigarettes     Start date:      Quit date:      Years since quittin.1     Passive exposure: Past    Smokeless tobacco: Never   Substance and Sexual Activity    Alcohol use: Not Currently     Alcohol/week: 5.0 standard drinks of alcohol     Types: 5 Cans of beer per week    Drug use: No    Sexual activity: Yes     Partners: Female     Review of Systems   Constitutional:  Negative for chills and fever.   Respiratory:  Negative for cough and shortness of breath.    Cardiovascular:  Negative for chest pain.   Gastrointestinal:  Positive for abdominal distention, abdominal pain, nausea and vomiting. Negative for constipation and diarrhea.   Genitourinary:  Negative for dysuria.   Neurological:  Negative for dizziness and light-headedness.     Objective:     Vital Signs (Most Recent):  Temp: 99.5 °F (37.5 °C) (25 0725)  Pulse: 107  (02/24/25 0725)  Resp: 18 (02/24/25 0725)  BP: (!) 118/58 (02/24/25 0725)  SpO2: (!) 93 % (02/24/25 0725) Vital Signs (24h Range):  Temp:  [97.9 °F (36.6 °C)-99.5 °F (37.5 °C)] 99.5 °F (37.5 °C)  Pulse:  [] 107  Resp:  [18-26] 18  SpO2:  [89 %-95 %] 93 %  BP: (118-220)/() 118/58     Weight: 72.8 kg (160 lb 7.9 oz)  Body mass index is 23.03 kg/m².     Physical Exam  Vitals reviewed.   Constitutional:       Appearance: Normal appearance.   Cardiovascular:      Rate and Rhythm: Normal rate and regular rhythm.   Pulmonary:      Effort: Pulmonary effort is normal. No respiratory distress.   Abdominal:      General: There is distension.      Palpations: Abdomen is soft.      Tenderness: There is no abdominal tenderness.   Skin:     General: Skin is warm.   Neurological:      General: No focal deficit present.      Mental Status: He is alert and oriented to person, place, and time.            I have reviewed all pertinent lab results within the past 24 hours.  CBC:   Recent Labs   Lab 02/24/25  0402   WBC 17.12*   RBC 5.70   HGB 16.8   HCT 50.2      MCV 88   MCH 29.5   MCHC 33.5     CMP:   Recent Labs   Lab 02/24/25  0402   *   CALCIUM 10.1   ALBUMIN 3.8   PROT 7.6      K 4.3   CO2 25      BUN 38*   CREATININE 1.7*   ALKPHOS 103   ALT 37   AST 31   BILITOT 1.0       Significant Diagnostics:  I have reviewed all pertinent imaging results/findings within the past 24 hours.    Assessment/Plan:     * SBO (small bowel obstruction)  89 yoM with SBO likely secondary to adhesive disease. NGT in place with good decompression in the ED, now with minimal output. Mr. Connolly is quite familiar with the treatment of SBO, but we reviewed the plan to treat conservatively with NGT decompression followed by a gastrografin challenge once decompressed. We also discussed the potential need for surgery if conservative management fails, and he would be amenable to pursuing surgery if necessary, despite the  significant surgical risk brought on by his co morbidities.    - Continue NGT to LIWS  - If output remains low today, we will plan to initiate the gastrografin challenge this afternoon      VTE Risk Mitigation (From admission, onward)           Ordered     enoxaparin injection 30 mg  Daily         02/24/25 0008     IP VTE HIGH RISK PATIENT  Once         02/23/25 2359     Place sequential compression device  Until discontinued         02/23/25 2359                    Thank you for your consult. I will follow-up with patient. Please contact us if you have any additional questions.    Alverto Fountain MD  General Surgery  [place] [facility]

## 2025-02-24 NOTE — ED PROVIDER NOTES
Encounter Date: 2/23/2025       History     Chief Complaint   Patient presents with    Abdominal Pain    Nausea    Weakness     Pt arrived to ED via POV c/o abdominal discomfort, weakness, and nausea x 2.5 hours PTA. Pt reports a history of diverticulosis; which requires an NG tube to assist with clearing of abdominal blockage.     89-year-old male presents emergency department complaining of abdominal distention, nausea, vomiting.  Patient states that this is a frequent occurrence for him and usually indicates that he is having a partial small-bowel obstruction.  States he usually manages to have these resolve with an NG tube.  Symptoms began several hours ago and have been gradually worsening.  Has not vomited but feels very nauseated and his stomach feels very tight.  Denies any constipation or diarrhea.  Denies any fever.  No other symptoms reported at this time.      Review of patient's allergies indicates:   Allergen Reactions    Solarcaine [benzocaine-triclosan]     Sulfa (sulfonamide antibiotics)     Sulfamethoxazole     Trimethoprim     Empagliflozin Rash and Other (See Comments)     Yeast infection    Hytrin [terazosin] Rash    Smz-tmp ds [sulfamethoxazole-trimethoprim] Rash     Past Medical History:   Diagnosis Date    BPH (benign prostatic hyperplasia)     Diabetes mellitus (type 2)     Emphysema 03/12/2022    High cholesterol 08/26/2020    Hypertension     RBBB 11/29/2022    Small bowel obstruction 06/13/2019     Past Surgical History:   Procedure Laterality Date    ANASTOMOSIS OF ILEUM TO RECTUM  9/9/2019    Procedure: ANASTOMOSIS, COLO-RECTAL;  Surgeon: Fouzia Gardner MD;  Location: [facility] OR;  Service: General;;    CHOLECYSTECTOMY N/A 3/7/2022    Procedure: CHOLECYSTECTOMY;  Surgeon: Fouzia Gardner MD;  Location: [facility] OR;  Service: General;  Laterality: N/A;    COLOSTOMY  6/16/2019    Procedure: CREATION, COLOSTOMY;  Surgeon: Fouzia Gardner MD;  Location: [facility] OR;  Service: General;;     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    Procedure: RELEASE, DUPUYTREN CONTRACTURE;  Surgeon: Slick Baldwin Jr., MD;  Location: [facility] OR;  Service: Orthopedics;  Laterality: Left;    EYE SURGERY      GISELA PROCEDURE  6/16/2019    Procedure: GISELA PROCEDURE;  Surgeon: Fouzia Gardner MD;  Location: [facility] OR;  Service: General;;  Colon resection    LYSIS OF ADHESIONS N/A 3/7/2022    Procedure: LYSIS, ADHESIONS;  Surgeon: Fouzia Gardner MD;  Location: [facility] OR;  Service: General;  Laterality: N/A;    TONSILLECTOMY       Family History   Problem Relation Name Age of Onset    Heart disease Father      Hypertension Father      Cancer Mother cancer breas varian     Diabetes Paternal Aunt       Social History[1]  Review of Systems   Constitutional:  Negative for chills and fever.   HENT:  Negative for congestion.    Respiratory:  Negative for cough and shortness of breath.    Cardiovascular:  Negative for chest pain.   Gastrointestinal:  Positive for abdominal pain.   Musculoskeletal:  Negative for back pain.   Neurological:  Negative for headaches.       Physical Exam     Initial Vitals [02/23/25 2203]   BP Pulse Resp Temp SpO2   (!) 190/84 95 18 98.2 °F (36.8 °C) 95 %      MAP       --         Physical Exam    Nursing note and vitals reviewed.  Constitutional: He appears well-developed and well-nourished. No distress.   HENT:   Head: Normocephalic and atraumatic.   Eyes: Conjunctivae and EOM are normal. Pupils are equal, round, and reactive to light.   Neck: Neck supple. No tracheal deviation present.   Normal range of motion.  Cardiovascular:  Normal rate and intact distal pulses.           Pulmonary/Chest: No respiratory distress.   Abdominal: Abdomen is soft. He exhibits distension (Somewhat distended abdomen). There is abdominal tenderness (Generalized). There is no rebound and no guarding.   Musculoskeletal:         General: No tenderness. Normal range of motion.      Cervical back: Normal  range of motion and neck supple.     Neurological: He is alert and oriented to person, place, and time. He has normal strength. No cranial nerve deficit. GCS score is 15. GCS eye subscore is 4. GCS verbal subscore is 5. GCS motor subscore is 6.   Skin: Skin is warm and dry.         ED Course   Procedures  Labs Reviewed   CBC W/ AUTO DIFFERENTIAL - Abnormal       Result Value    WBC 13.84 (*)     RBC 5.30      Hemoglobin 15.9      Hematocrit 46.9      MCV 89      MCH 30.0      MCHC 33.9      RDW 13.1      Platelets 278      MPV 10.6      Immature Granulocytes 0.5      Gran # (ANC) 11.0 (*)     Immature Grans (Abs) 0.07 (*)     Lymph # 1.3      Mono # 1.4 (*)     Eos # 0.1      Baso # 0.04      nRBC 0      Gran % 79.3 (*)     Lymph % 9.0 (*)     Mono % 10.3      Eosinophil % 0.6      Basophil % 0.3      Differential Method Automated     COMPREHENSIVE METABOLIC PANEL - Abnormal    Sodium 137      Potassium 4.2      Chloride 102      CO2 21 (*)     Glucose 292 (*)     BUN 40 (*)     Creatinine 1.7 (*)     Calcium 10.0      Total Protein 7.7      Albumin 3.8      Total Bilirubin 0.8      Alkaline Phosphatase 102      AST 42 (*)     ALT 41      eGFR 37 (*)     Anion Gap 14     LIPASE - Abnormal    Lipase 83 (*)    MAGNESIUM    Magnesium 2.1     POCT GLUCOSE MONITORING CONTINUOUS     EKG Readings: (Independently Interpreted)   Initial Reading: No STEMI. Previous EKG: Compared with most recent EKG Previous EKG Date: 2/15/2025 (Minimal change). Rhythm: Normal Sinus Rhythm. Heart Rate: 94. Ectopy: No Ectopy. ST Segments: Normal ST Segments. Axis: Normal.   EKG independently interpreted by me pending Cardiology review           X-Rays:   Independently Interpreted Readings:   Other Readings:  CT abdomen and pelvis independently interpreted by me pending radiology review:  Concerning for small bowel obstruction      XR KUB/NG tube placement independently interpreted by me pending radiology review:  NG tube appears to terminate  in the stomach    Imaging Results              XR NG/OG tube placement check, non-radiologist performed (In process)    Procedure changed from X-Ray Abdomen AP 1 View (KUB)                    CT Abdomen Pelvis  Without Contrast (Final result)  Result time 02/23/25 23:50:33      Final result by Jean Navarro DO (02/23/25 23:50:33)                   Impression:      1. Small bowel obstruction with transition point in the right lower quadrant, may be due to adhesions.  2. Moderate distension of the stomach and distal esophagus.  3.  Colonic diverticulosis without evidence of acute diverticulitis.  4. Prostatomegaly.      Electronically signed by: Jean Navarro  Date:    02/23/2025  Time:    23:50               Narrative:    EXAMINATION:  CT ABDOMEN PELVIS WITHOUT CONTRAST    CLINICAL HISTORY:  Nausea/vomiting;Bowel obstruction suspected;    TECHNIQUE:  Multiplanar images were obtained of the abdomen and pelvis from the hemidiaphragms through the symphysis pubis without intravenous contrast.    COMPARISON:  CT of the abdomen and pelvis from 07/04/2024.    FINDINGS:  There is motion and streak artifact.    Lung Bases: Clear.    Heart: Heart size is normal.  No pericardial effusion.    Liver: There is a simple cyst in the left hepatic lobe, stable.  The liver is otherwise unremarkable.    Biliary tract: No intrahepatic or extrahepatic biliary ductal dilatation.    Gallbladder: Surgically absent.    Pancreas: Normal. No pancreatic ductal dilatation.    Spleen: Nonspecific atrophy and nodular contour of the spleen, stable.  No focal splenic lesions.    Adrenals: Normal.    Kidneys and urinary collecting systems: There are multiple simple renal cysts bilaterally, stable.  There is a renal cyst in the right kidney which demonstrates peripheral calcification, stable.  The bilateral kidneys are atrophied.  There is no hydronephrosis or nephrolithiasis bilaterally.    Lymph nodes: None enlarged.    Stomach and bowel: There  is moderate distension of the stomach and the distal esophagus.  There is a small bowel obstruction with transition point in the right lower quadrant (series 2, image 111).  The transition point is similar in location to prior CT from 07/04/2024.  The bowel measures up to approximately 4.5 cm in maximal diameter.  There is fecalization of the luminal contents just prior to the transition point.  There is no bowel wall thickening or pneumatosis.  The terminal ileum is contracted.  There is mild-to-moderate stool in the colon.  There is extensive colonic diverticulosis without evidence of acute diverticulitis.  The appendix is not definitively identified.    Peritoneum and mesentery: No ascites or free intraperitoneal air. No abdominal fluid collection.    Vasculature: There is severe atherosclerosis.  There is no aneurysm.    Urinary bladder: Normal.    Reproductive organs: The prostate is enlarged.    Body wall: No abnormality.    Musculoskeletal: No aggressive osseous lesion.  There are multilevel degenerative changes of the spine.                                      Medications   sodium chloride 0.9% flush 5 mL (has no administration in time range)   insulin aspart U-100 pen 0-5 Units (has no administration in time range)   melatonin tablet 6 mg (has no administration in time range)   ondansetron injection 4 mg (has no administration in time range)   acetaminophen tablet 650 mg (has no administration in time range)   naloxone 0.4 mg/mL injection 0.02 mg (has no administration in time range)   glucose chewable tablet 16 g (has no administration in time range)   glucose chewable tablet 24 g (has no administration in time range)   dextrose 50% injection 12.5 g (has no administration in time range)   dextrose 50% injection 25 g (has no administration in time range)   glucagon (human recombinant) injection 1 mg (has no administration in time range)   enoxaparin injection 30 mg (has no administration in time range)    aspirin EC tablet 81 mg (has no administration in time range)   atorvastatin tablet 40 mg (has no administration in time range)   finasteride tablet 5 mg (has no administration in time range)   hydrALAZINE tablet 50 mg (has no administration in time range)   losartan tablet 100 mg (has no administration in time range)   tamsulosin 24 hr capsule 0.4 mg (has no administration in time range)   fluticasone furoate-vilanteroL 100-25 mcg/dose diskus inhaler 1 puff (has no administration in time range)   labetaloL injection 10 mg (has no administration in time range)   sodium chloride 0.9% bolus 500 mL 500 mL (0 mLs Intravenous Stopped 2/23/25 2314)   prochlorperazine injection Soln 10 mg (10 mg Intravenous Given 2/23/25 2236)   hydrALAZINE injection 10 mg (10 mg Intravenous Given 2/23/25 2312)   midazolam (PF) (VERSED) 1 mg/mL injection 2 mg (2 mg Intravenous Given 2/23/25 2351)     Medical Decision Making  89-year-old male presents emergency department complaining of abdominal pain and distention and nausea      Differential: Diverticulitis, cholecystitis, pancreatitis, appendicitis, obstruction, constipation UTI, pyelonephritis, kidney stone, gastroenteritis      Patient given some Compazine IV fluid.  CT concerning for obstruction.  I have ordered some Versed to help with NG tube placement.  Immediate gastric contents aspirated with NG tube placement.  I have ordered a KUB to confirm placement.  Patient given some hydralazine for his elevated blood pressure.  Discussed with General surgery team who will see patient as consultant.  Discussed with [facility] Family Medicine team who will see and admit the patient for further evaluation and management.  Patient and family comfortable with admission at this time.    Amount and/or Complexity of Data Reviewed  External Data Reviewed: ECG and notes.     Details: Reviewed most recent EKG for comparison     Reviewed most recent discharge summary documenting baseline medications and  past medical history  Labs: ordered.     Details: CBC with mild leukocytosis, normal H&H; CMP with mild worsening of CKD, borderline transaminitis, normal electrolytes; magnesium within normal limits; lipase minimally elevated  Radiology: ordered and independent interpretation performed. Decision-making details documented in ED Course.  ECG/medicine tests: ordered and independent interpretation performed. Decision-making details documented in ED Course.  Discussion of management or test interpretation with external provider(s): Discussed with General surgery team regarding patient's past medical history, labs, imaging, interventions, plan to admit for observation    Risk  OTC drugs.  Prescription drug management.  Parenteral controlled substances.  Decision regarding hospitalization.                                      Clinical Impression:  Final diagnoses:  [R10.9] Abdominal pain  [K56.609] Small bowel obstruction (Primary)  [Z01.89] Encounter for imaging study to confirm nasogastric (NG) tube placement          ED Disposition Condition    Observation                     [1]   Social History  Tobacco Use    Smoking status: Former     Current packs/day: 0.00     Average packs/day: 1 pack/day for 25.0 years (25.0 ttl pk-yrs)     Types: Cigarettes     Start date:      Quit date:      Years since quittin.1     Passive exposure: Past    Smokeless tobacco: Never   Substance Use Topics    Alcohol use: Not Currently     Alcohol/week: 5.0 standard drinks of alcohol     Types: 5 Cans of beer per week    Drug use: No        Manuel Gaines MD  25 0044

## 2025-02-24 NOTE — H&P
[facility]  History & Physical    Patient Name: Husam Connolly  MRN: [id number]  Patient Class: OP- Observation  Admission Date: 2/23/2025  Attending Physician: Tadeo Page III, MD   Primary Care Provider: Williams Knight DO         Patient information was obtained from patient, relative(s), past medical records, and ER records.     Subjective:     Principal Problem:SBO (small bowel obstruction)    Chief Complaint:   Chief Complaint   Patient presents with    Abdominal Pain    Nausea    Weakness     Pt arrived to ED via POV c/o abdominal discomfort, weakness, and nausea x 2.5 hours PTA. Pt reports a history of diverticulosis; which requires an NG tube to assist with clearing of abdominal blockage.        HPI: Mr. Husam Connolly is a 89-year-old male with a PMHx  of BPH, diabetes, emphysema, high right bundle-branch block, small-bowel obstruction, CKD3, HLD, HTN who presented to the ED on 2/24/25 with complaints of nausea, diarrhea, and abdominal distention and discomfort. Patient states it feels like how it did when he previously had an SBO. Patient's daughter at bedside stated that patient has numerous episodes of SBO in the past and all have been medically managed. Patient had a couple of episodes of non-bloody emesis in the ED. Upon my assessment, patient already had NG tube placed. ED physician had already consulted General Surgery to stated to keep NG tube in place and he will be seen in the AM. Patient denies fevers, chills, abdominal pain, syncope/presyncope, chest pain, or SOB.     In the ED, initial vital signs /84, HR 95, Temp 98.2F, SpO2 95% on room air. Labs include CBC with stable H/H 15.9/46.9 and WBC elevated to 13.84. CMP with Na 137, K 4.2, Cl 102, CO2 21, and BUN/Cr 40/1.7 (baseline Cr 1.4-1.5). CT Abdomen and Pelvis: SBO with transition point in the right lower quadrant, may be due to adhesions. Moderate distension of the stomach and distal esophagus. Lipase  elevated to 83. Initiated on Hydalazine 10 mg IV x1, Midazolam 2 mg IV x1, s/p 500 mL IV NS, Prochlorperazine  10 mg IV x1. [facility] consulted for evaluation for admission for Small bowel obstruction.      Past Medical History:   Diagnosis Date    BPH (benign prostatic hyperplasia)     Diabetes mellitus (type 2)     Emphysema 03/12/2022    High cholesterol 08/26/2020    Hypertension     RBBB 11/29/2022    Small bowel obstruction 06/13/2019       Past Surgical History:   Procedure Laterality Date    ANASTOMOSIS OF ILEUM TO RECTUM  9/9/2019    Procedure: ANASTOMOSIS, COLO-RECTAL;  Surgeon: Fouzia Gardner MD;  Location: [facility] OR;  Service: General;;    CHOLECYSTECTOMY N/A 3/7/2022    Procedure: CHOLECYSTECTOMY;  Surgeon: Fouzia Gardner MD;  Location: [facility] OR;  Service: General;  Laterality: N/A;    COLOSTOMY  6/16/2019    Procedure: CREATION, COLOSTOMY;  Surgeon: Fouzia Gardner MD;  Location: [facility] OR;  Service: General;;    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    Procedure: RELEASE, DUPUYTREN CONTRACTURE;  Surgeon: Slick Baldwin Jr., MD;  Location: [facility] OR;  Service: Orthopedics;  Laterality: Left;    EYE SURGERY      GISELA PROCEDURE  6/16/2019    Procedure: GISELA PROCEDURE;  Surgeon: Fouzia Gardner MD;  Location: [facility] OR;  Service: General;;  Colon resection    LYSIS OF ADHESIONS N/A 3/7/2022    Procedure: LYSIS, ADHESIONS;  Surgeon: Fouzia Gardner MD;  Location: [facility];  Service: General;  Laterality: N/A;    TONSILLECTOMY         Review of patient's allergies indicates:   Allergen Reactions    Solarcaine [benzocaine-triclosan]     Sulfa (sulfonamide antibiotics)     Sulfamethoxazole     Trimethoprim     Empagliflozin Rash and Other (See Comments)     Yeast infection    Hytrin [terazosin] Rash    Smz-tmp ds [sulfamethoxazole-trimethoprim] Rash       No current facility-administered medications on file prior to encounter.     Current Outpatient Medications on  File Prior to Encounter   Medication Sig    albuterol (VENTOLIN HFA) 90 mcg/actuation inhaler Inhale 2 puffs into the lungs every 4 (four) hours as needed for Wheezing. Rescue    aspirin (ECOTRIN) 81 MG EC tablet Take 81 mg by mouth once daily.    atorvastatin (LIPITOR) 40 MG tablet Take 1 tablet (40 mg total) by mouth once daily.    blood sugar diagnostic (ACCU-CHEK GUIDE TEST STRIPS) Strp 1 strip by In Vitro route once daily.    blood-glucose meter kit Use as instructed    finasteride (PROSCAR) 5 mg tablet Take 1 tablet (5 mg total) by mouth once daily.    fluticasone propionate (FLONASE) 50 mcg/actuation nasal spray 2 sprays (100 mcg total) by Each Nostril route once daily.    fluticasone-umeclidin-vilanter (TRELEGY ELLIPTA) 100-62.5-25 mcg DsDv Inhale 1 puff into the lungs once daily.    GARLIC ORAL Take 1 capsule by mouth once daily.    hydrALAZINE (APRESOLINE) 50 MG tablet Take 1 tablet (50 mg total) by mouth 3 (three) times daily.    inhalation spacing device Use as directed for inhalation.    lancets Misc To check sugar daily, to use with insurance preferred meter    losartan (COZAAR) 100 MG tablet Take 1 tablet (100 mg total) by mouth once daily.    multivitamin capsule Take 1 capsule by mouth once daily.    mupirocin (BACTROBAN) 2 % ointment     omega-3 fatty acids/fish oil (FISH OIL-OMEGA-3 FATTY ACIDS) 300-1,000 mg capsule Take 1 capsule by mouth once daily.    tamsulosin (FLOMAX) 0.4 mg Cap Take 0.4 mg by mouth once daily.    vitamin D (VITAMIN D3) 1000 units Tab Take 1,000 Units by mouth once daily.     Family History       Problem Relation (Age of Onset)    Cancer Mother    Diabetes Paternal Aunt    Heart disease Father    Hypertension Father          Tobacco Use    Smoking status: Former     Current packs/day: 0.00     Average packs/day: 1 pack/day for 25.0 years (25.0 ttl pk-yrs)     Types: Cigarettes     Start date:      Quit date:      Years since quittin.1     Passive exposure: Past     Smokeless tobacco: Never   Substance and Sexual Activity    Alcohol use: Not Currently     Alcohol/week: 5.0 standard drinks of alcohol     Types: 5 Cans of beer per week    Drug use: No    Sexual activity: Yes     Partners: Female     Review of Systems   Constitutional:  Negative for chills and fever.   Respiratory:  Negative for shortness of breath.    Cardiovascular:  Negative for chest pain and leg swelling.   Gastrointestinal:  Positive for abdominal distention, diarrhea, nausea and vomiting. Negative for abdominal pain.   Genitourinary:  Negative for dysuria.   Neurological:  Negative for syncope, weakness and light-headedness.   Psychiatric/Behavioral:  Negative for agitation.      Objective:     Vital Signs (Most Recent):  Temp: 97.9 °F (36.6 °C) (02/24/25 0232)  Pulse: 106 (02/24/25 0232)  Resp: (!) 21 (02/24/25 0232)  BP: (!) 165/78 (02/24/25 0232)  SpO2: (!) 93 % (02/24/25 0232) Vital Signs (24h Range):  Temp:  [97.9 °F (36.6 °C)-98.2 °F (36.8 °C)] 97.9 °F (36.6 °C)  Pulse:  [] 106  Resp:  [18-26] 21  SpO2:  [89 %-95 %] 93 %  BP: (165-220)/() 165/78     Weight: 72.8 kg (160 lb 7.9 oz)  Body mass index is 23.03 kg/m².     Physical Exam  Constitutional:       General: He is not in acute distress.     Appearance: Normal appearance. He is normal weight. He is not ill-appearing, toxic-appearing or diaphoretic.   HENT:      Head: Normocephalic and atraumatic.      Right Ear: External ear normal.      Left Ear: External ear normal.      Nose: Nose normal.      Mouth/Throat:      Mouth: Mucous membranes are moist.      Pharynx: Oropharynx is clear.   Eyes:      Extraocular Movements: Extraocular movements intact.   Cardiovascular:      Rate and Rhythm: Normal rate and regular rhythm.      Pulses: Normal pulses.      Heart sounds: Normal heart sounds. No murmur heard.  Pulmonary:      Effort: Pulmonary effort is normal. No respiratory distress.      Breath sounds: Normal breath sounds.   Abdominal:       General: There is distension.      Tenderness: There is no abdominal tenderness.      Comments: Positive bowel sounds only in RUQ and LUQ.   Musculoskeletal:         General: No swelling or tenderness.   Skin:     General: Skin is warm and dry.   Neurological:      Mental Status: He is alert and oriented to person, place, and time. Mental status is at baseline.                Significant Labs: All pertinent labs within the past 24 hours have been reviewed.    Significant Imaging: I have reviewed all pertinent imaging results/findings within the past 24 hours.  Assessment/Plan:     * SBO (small bowel obstruction)  Patient with known history of multiple small bowel obstructions, now presenting with an SBO with transition point in RLQ.   NG Tube placed in ED with good placement. Patient started on intermittent suction and had put out 1.5L of fluid while in the ED.    Plan:  - General Surgery consulted in the ED, appreciate recommendations   - Stated to ED physician to start NGT and he will be seen in the AM  - Continue NGT on low intermittent suction  - Consider gastrograffin challenge when appropriate  - NPO  - mIVF on LR at 100 ml/hr while NPO  - Zofran prn for nausea      MATEUSZ (acute kidney injury)  MATEUSZ is likely due to pre-renal azotemia due to intravascular volume depletion. Baseline creatinine is  1.4-1.5 . Most recent creatinine and eGFR are listed below.  Recent Labs     02/23/25  2234   CREATININE 1.7*   EGFRNORACEVR 37*     MATEUSZ on CKD3 suspect 2/2 to IVVD due to vomiting and diarrhea.     Plan  - MATEUSZ is worsening. Will adjust treatment as follows: initiated patient on mIVF while NPO  - Avoid nephrotoxins and renally dose meds for GFR listed above  - Monitor urine output, serial BMP, and adjust therapy as needed    Stage 3b chronic kidney disease  Creatine stable for now. BMP reviewed- noted Estimated Creatinine Clearance: 28 mL/min (A) (based on SCr of 1.7 mg/dL (H)). according to latest data. Based on  current GFR, CKD stage is stage 3 - GFR 30-59.  Monitor UOP and serial BMP and adjust therapy as needed. Renally dose meds. Avoid nephrotoxic medications and procedures.    BPH (benign prostatic hyperplasia)  Continue home tamsulosin      High cholesterol  Continue home atorvastatin      Type 2 diabetes mellitus, without long-term current use of insulin  - Accucheks AC and qhs  - LDSSI  - Blood Glucose goal levels between 140-180      Pulmonary emphysema  Patient's COPD is controlled currently.  Patient is currently off COPD Pathway. Continue scheduled inhalers Steroids and monitor respiratory status closely.     - Continue breo, home trelegy not on formulary    Resistant hypertension  Continue home hydralazine and losartan        VTE Risk Mitigation (From admission, onward)           Ordered     enoxaparin injection 30 mg  Daily         02/24/25 0008     IP VTE HIGH RISK PATIENT  Once         02/23/25 2359     Place sequential compression device  Until discontinued         02/23/25 2359                           On 02/24/2025, patient should be placed in hospital observation services under my care in collaboration with Dr. Page.       _________________________________________  Jose Oden MD, PGY-1  [facility] [place]

## 2025-02-24 NOTE — PLAN OF CARE
MOON Message     Medicare Outpatient and Observation Notification regarding financial responsibility Other (comments)Medicare Outpatient and Observation Notification regarding financial responsibility. Other (comments). Has comment. Taken on 2/24/25 9396   Date BRIGHT was signed 2/24/2025   Time BRIGHT was signed 1202

## 2025-02-24 NOTE — ASSESSMENT & PLAN NOTE
MATEUSZ is likely due to pre-renal azotemia due to intravascular volume depletion. Baseline creatinine is  1.4-1.5 . Most recent creatinine and eGFR are listed below.  Recent Labs     02/23/25  2234   CREATININE 1.7*   EGFRNORACEVR 37*     MATEUSZ on CKD3 suspect 2/2 to IVVD due to vomiting and diarrhea.     Plan  - MATEUSZ is worsening. Will adjust treatment as follows: initiated patient on mIVF while NPO  - Avoid nephrotoxins and renally dose meds for GFR listed above  - Monitor urine output, serial BMP, and adjust therapy as needed

## 2025-02-24 NOTE — SUBJECTIVE & OBJECTIVE
No current facility-administered medications on file prior to encounter.     Current Outpatient Medications on File Prior to Encounter   Medication Sig    albuterol (VENTOLIN HFA) 90 mcg/actuation inhaler Inhale 2 puffs into the lungs every 4 (four) hours as needed for Wheezing. Rescue    aspirin (ECOTRIN) 81 MG EC tablet Take 81 mg by mouth once daily.    atorvastatin (LIPITOR) 40 MG tablet Take 1 tablet (40 mg total) by mouth once daily.    blood sugar diagnostic (ACCU-CHEK GUIDE TEST STRIPS) Strp 1 strip by In Vitro route once daily.    blood-glucose meter kit Use as instructed    finasteride (PROSCAR) 5 mg tablet Take 1 tablet (5 mg total) by mouth once daily.    fluticasone propionate (FLONASE) 50 mcg/actuation nasal spray 2 sprays (100 mcg total) by Each Nostril route once daily.    fluticasone-umeclidin-vilanter (TRELEGY ELLIPTA) 100-62.5-25 mcg DsDv Inhale 1 puff into the lungs once daily.    GARLIC ORAL Take 1 capsule by mouth once daily.    hydrALAZINE (APRESOLINE) 50 MG tablet Take 1 tablet (50 mg total) by mouth 3 (three) times daily.    inhalation spacing device Use as directed for inhalation.    lancets Misc To check sugar daily, to use with insurance preferred meter    losartan (COZAAR) 100 MG tablet Take 1 tablet (100 mg total) by mouth once daily.    multivitamin capsule Take 1 capsule by mouth once daily.    mupirocin (BACTROBAN) 2 % ointment     omega-3 fatty acids/fish oil (FISH OIL-OMEGA-3 FATTY ACIDS) 300-1,000 mg capsule Take 1 capsule by mouth once daily.    tamsulosin (FLOMAX) 0.4 mg Cap Take 0.4 mg by mouth once daily.    vitamin D (VITAMIN D3) 1000 units Tab Take 1,000 Units by mouth once daily.       Review of patient's allergies indicates:   Allergen Reactions    Solarcaine [benzocaine-triclosan]     Sulfa (sulfonamide antibiotics)     Sulfamethoxazole     Trimethoprim     Empagliflozin Rash and Other (See Comments)     Yeast infection    Hytrin [terazosin] Rash    Smz-tmp ds  [sulfamethoxazole-trimethoprim] Rash       Past Medical History:   Diagnosis Date    BPH (benign prostatic hyperplasia)     Diabetes mellitus (type 2)     Emphysema 2022    High cholesterol 2020    Hypertension     RBBB 2022    Small bowel obstruction 2019     Past Surgical History:   Procedure Laterality Date    ANASTOMOSIS OF ILEUM TO RECTUM  2019    Procedure: ANASTOMOSIS, COLO-RECTAL;  Surgeon: Fouzia Gardner MD;  Location: [facility] OR;  Service: General;;    CHOLECYSTECTOMY N/A 3/7/2022    Procedure: CHOLECYSTECTOMY;  Surgeon: Fouzia Gardner MD;  Location: [facility] OR;  Service: General;  Laterality: N/A;    COLOSTOMY  2019    Procedure: CREATION, COLOSTOMY;  Surgeon: Fouzia Gardner MD;  Location: [facility];  Service: General;;    DUPUYTREN CONTRACTURE RELEASE Left 2022    Procedure: RELEASE, DUPUYTREN CONTRACTURE;  Surgeon: Slick Baldwin Jr., MD;  Location: [facility];  Service: Orthopedics;  Laterality: Left;    EYE SURGERY      GISELA PROCEDURE  2019    Procedure: GISELA PROCEDURE;  Surgeon: Fouzia Gardner MD;  Location: [facility] OR;  Service: General;;  Colon resection    LYSIS OF ADHESIONS N/A 3/7/2022    Procedure: LYSIS, ADHESIONS;  Surgeon: Fouzia Gardner MD;  Location: [facility];  Service: General;  Laterality: N/A;    TONSILLECTOMY       Family History       Problem Relation (Age of Onset)    Cancer Mother    Diabetes Paternal Aunt    Heart disease Father    Hypertension Father          Tobacco Use    Smoking status: Former     Current packs/day: 0.00     Average packs/day: 1 pack/day for 25.0 years (25.0 ttl pk-yrs)     Types: Cigarettes     Start date:      Quit date:      Years since quittin.1     Passive exposure: Past    Smokeless tobacco: Never   Substance and Sexual Activity    Alcohol use: Not Currently     Alcohol/week: 5.0 standard drinks of alcohol     Types: 5 Cans of beer per week    Drug use: No    Sexual activity:  Yes     Partners: Female     Review of Systems   Constitutional:  Negative for chills and fever.   Respiratory:  Negative for cough and shortness of breath.    Cardiovascular:  Negative for chest pain.   Gastrointestinal:  Positive for abdominal distention, abdominal pain, nausea and vomiting. Negative for constipation and diarrhea.   Genitourinary:  Negative for dysuria.   Neurological:  Negative for dizziness and light-headedness.     Objective:     Vital Signs (Most Recent):  Temp: 99.5 °F (37.5 °C) (02/24/25 0725)  Pulse: 107 (02/24/25 0725)  Resp: 18 (02/24/25 0725)  BP: (!) 118/58 (02/24/25 0725)  SpO2: (!) 93 % (02/24/25 0725) Vital Signs (24h Range):  Temp:  [97.9 °F (36.6 °C)-99.5 °F (37.5 °C)] 99.5 °F (37.5 °C)  Pulse:  [] 107  Resp:  [18-26] 18  SpO2:  [89 %-95 %] 93 %  BP: (118-220)/() 118/58     Weight: 72.8 kg (160 lb 7.9 oz)  Body mass index is 23.03 kg/m².     Physical Exam  Vitals reviewed.   Constitutional:       Appearance: Normal appearance.   Cardiovascular:      Rate and Rhythm: Normal rate and regular rhythm.   Pulmonary:      Effort: Pulmonary effort is normal. No respiratory distress.   Abdominal:      General: There is distension.      Palpations: Abdomen is soft.      Tenderness: There is no abdominal tenderness.   Skin:     General: Skin is warm.   Neurological:      General: No focal deficit present.      Mental Status: He is alert and oriented to person, place, and time.            I have reviewed all pertinent lab results within the past 24 hours.  CBC:   Recent Labs   Lab 02/24/25  0402   WBC 17.12*   RBC 5.70   HGB 16.8   HCT 50.2      MCV 88   MCH 29.5   MCHC 33.5     CMP:   Recent Labs   Lab 02/24/25  0402   *   CALCIUM 10.1   ALBUMIN 3.8   PROT 7.6      K 4.3   CO2 25      BUN 38*   CREATININE 1.7*   ALKPHOS 103   ALT 37   AST 31   BILITOT 1.0       Significant Diagnostics:  I have reviewed all pertinent imaging results/findings within the  past 24 hours.

## 2025-02-24 NOTE — ED NOTES
Ct tech reports the nurse that pt Vomited during ct. Pt not vomiting current. Pt bed in bed in Ed room with daughter at bedside

## 2025-02-24 NOTE — ASSESSMENT & PLAN NOTE
MATEUSZ is likely due to pre-renal azotemia due to intravascular volume depletion. Baseline creatinine is  1.4-1.5 . Most recent creatinine and eGFR are listed below.  Recent Labs     02/23/25  2234 02/24/25  0402   CREATININE 1.7* 1.7*   EGFRNORACEVR 37* 37*       MATEUSZ on CKD3 suspect 2/2 to IVVD due to vomiting and diarrhea.     Plan  - MATEUSZ is worsening. Will adjust treatment as follows: initiated patient on mIVF while NPO  - Avoid nephrotoxins and renally dose meds for GFR listed above  - Monitor urine output, serial BMP, and adjust therapy as needed

## 2025-02-24 NOTE — PROGRESS NOTES
[facility] Medicine  Progress Note    Patient Name: Husam Connolly  MRN: [id number]  Patient Class: IP- Inpatient   Admission Date: 2/23/2025  Length of Stay: 0 days  Attending Physician: Tadeo Page III, MD  Primary Care Provider: Williams Knight DO        Subjective     Principal Problem:SBO (small bowel obstruction)        HPI:  Mr. Husam Connolly is a 89-year-old male with a PMHx  of BPH, diabetes, emphysema, high right bundle-branch block, small-bowel obstruction, CKD3, HLD, HTN who presented to the ED on 2/24/25 with complaints of nausea, diarrhea, and abdominal distention and discomfort. Patient states it feels like how it did when he previously had an SBO. Patient's daughter at bedside stated that patient has numerous episodes of SBO in the past and all have been medically managed. Patient had a couple of episodes of non-bloody emesis in the ED. Upon my assessment, patient already had NG tube placed. ED physician had already consulted General Surgery to stated to keep NG tube in place and he will be seen in the AM. Patient denies fevers, chills, abdominal pain, syncope/presyncope, chest pain, or SOB.     In the ED, initial vital signs /84, HR 95, Temp 98.2F, SpO2 95% on room air. Labs include CBC with stable H/H 15.9/46.9 and WBC elevated to 13.84. CMP with Na 137, K 4.2, Cl 102, CO2 21, and BUN/Cr 40/1.7 (baseline Cr 1.4-1.5). CT Abdomen and Pelvis: SBO with transition point in the right lower quadrant, may be due to adhesions. Moderate distension of the stomach and distal esophagus. Lipase elevated to 83. Initiated on Hydalazine 10 mg IV x1, Midazolam 2 mg IV x1, s/p 500 mL IV NS, Prochlorperazine  10 mg IV x1. [facility] consulted for evaluation for admission for Small bowel obstruction.      Overview/Hospital Course:  89-year-old male admitted for evaluation of small bowel obstruction reviewed on CT Abdomen and Pelvis: SBO with transition point in the right lower  quadrant, may be due to adhesions. Moderate distension of the stomach and distal esophagus NG tube placed and total output 1,775 mL since admission. Continued nausea and have episodes of non-bloody emesis. General Surgery recommending possible gastrografin challenge today if no improvement on NG output. Patient will remain NPO and receive mIVF of LR at 100 mL/hr.     Interval History: Patient seen today laying in bed comfortably with no acute concerns. Patient hemodynamically stable. Afebrile for the past 24hrs. No overnight events. Patient with poor NG output. Pending general surgery for recs. Patient still with episodes of nausea. Distention reduced per patient. Still no BM or passing gas. No other complaints.     Review of Systems   Constitutional:  Negative for chills and fever.   Respiratory:  Negative for shortness of breath.    Cardiovascular:  Negative for chest pain and leg swelling.   Gastrointestinal:  Positive for abdominal distention, diarrhea, nausea and vomiting. Negative for abdominal pain.   Genitourinary:  Negative for dysuria.   Neurological:  Negative for syncope, weakness and light-headedness.   Psychiatric/Behavioral:  Negative for agitation.      Objective:     Vital Signs (Most Recent):  Temp: 98.3 °F (36.8 °C) (02/24/25 1113)  Pulse: 99 (02/24/25 1113)  Resp: 19 (02/24/25 1113)  BP: (!) 146/71 (02/24/25 1113)  SpO2: (!) 88 % (02/24/25 1113) Vital Signs (24h Range):  Temp:  [97.9 °F (36.6 °C)-99.5 °F (37.5 °C)] 98.3 °F (36.8 °C)  Pulse:  [] 99  Resp:  [17-26] 19  SpO2:  [88 %-95 %] 88 %  BP: (118-220)/() 146/71     Weight: 72.8 kg (160 lb 7.9 oz)  Body mass index is 23.03 kg/m².    Intake/Output Summary (Last 24 hours) at 2/24/2025 1149  Last data filed at 2/24/2025 0604  Gross per 24 hour   Intake 589.95 ml   Output 1926 ml   Net -1336.05 ml         Physical Exam  Constitutional:       General: He is not in acute distress.     Appearance: Normal appearance. He is normal weight.  He is not ill-appearing, toxic-appearing or diaphoretic.   HENT:      Head: Normocephalic and atraumatic.      Right Ear: External ear normal.      Left Ear: External ear normal.      Nose: Nose normal.      Mouth/Throat:      Mouth: Mucous membranes are moist.      Pharynx: Oropharynx is clear.   Eyes:      Extraocular Movements: Extraocular movements intact.   Cardiovascular:      Rate and Rhythm: Normal rate and regular rhythm.      Pulses: Normal pulses.      Heart sounds: Normal heart sounds. No murmur heard.  Pulmonary:      Effort: Pulmonary effort is normal. No respiratory distress.      Breath sounds: Normal breath sounds.   Abdominal:      General: There is distension.      Tenderness: There is no abdominal tenderness.      Comments: Positive bowel sounds only in RUQ and LUQ.   Musculoskeletal:         General: No swelling or tenderness.   Skin:     General: Skin is warm and dry.   Neurological:      Mental Status: He is alert and oriented to person, place, and time. Mental status is at baseline.             Significant Labs: All pertinent labs within the past 24 hours have been reviewed.    Significant Imaging: I have reviewed all pertinent imaging results/findings within the past 24 hours.    Assessment and Plan     * SBO (small bowel obstruction)  Patient with known history of multiple small bowel obstructions, now presenting with an SBO with transition point in RLQ.   NG Tube placed in ED with good placement. Patient started on intermittent suction and had put out 1.5L of fluid while in the ED.    Plan:  - General Surgery consulted in the ED, appreciate recommendations  - Continue NGT on low intermittent suction  - Consider gastrograffin challenge when appropriate  - NPO  - mIVF on LR at 100 ml/hr while NPO  - Zofran prn for nausea      MATEUSZ (acute kidney injury)  MATEUSZ is likely due to pre-renal azotemia due to intravascular volume depletion. Baseline creatinine is  1.4-1.5 . Most recent creatinine and  eGFR are listed below.  Recent Labs     02/23/25 2234 02/24/25  0402   CREATININE 1.7* 1.7*   EGFRNORACEVR 37* 37*       MATEUSZ on CKD3 suspect 2/2 to IVVD due to vomiting and diarrhea.     Plan  - MATEUSZ is worsening. Will adjust treatment as follows: initiated patient on mIVF while NPO  - Avoid nephrotoxins and renally dose meds for GFR listed above  - Monitor urine output, serial BMP, and adjust therapy as needed    Pulmonary emphysema  Patient's COPD is controlled currently.  Patient is currently off COPD Pathway. Continue scheduled inhalers Steroids and monitor respiratory status closely.     - Continue breo, home trelegy not on formulary    Type 2 diabetes mellitus, without long-term current use of insulin  - Accucheks AC and qhs  - LDSSI  - Blood Glucose goal levels between 140-180      High cholesterol  Continue home atorvastatin      Stage 3b chronic kidney disease  Creatine stable for now. BMP reviewed- noted Estimated Creatinine Clearance: 28 mL/min (A) (based on SCr of 1.7 mg/dL (H)). according to latest data. Based on current GFR, CKD stage is stage 3 - GFR 30-59.  Monitor UOP and serial BMP and adjust therapy as needed. Renally dose meds. Avoid nephrotoxic medications and procedures.    BPH (benign prostatic hyperplasia)  Continue home tamsulosin      Resistant hypertension  Continue home hydralazine and losartan        VTE Risk Mitigation (From admission, onward)           Ordered     enoxaparin injection 30 mg  Daily         02/24/25 0008     IP VTE HIGH RISK PATIENT  Once         02/23/25 2359     Place sequential compression device  Until discontinued         02/23/25 2359                    Discharge Planning   JOSE:      Code Status: Full Code   Medical Readiness for Discharge Date:                            Aries Villarreal MD  Department of Hospital Medicine   [facility] Surg

## 2025-02-24 NOTE — CONSULTS
[facility] Surg  Wound Care    Patient Name:  Husam Connolly   MRN:  [id number]  Date: 2025  Diagnosis: SBO (small bowel obstruction)    History:     Past Medical History:   Diagnosis Date    BPH (benign prostatic hyperplasia)     Diabetes mellitus (type 2)     Emphysema 2022    High cholesterol 2020    Hypertension     RBBB 2022    Small bowel obstruction 2019       Social History[1]    Precautions:     Allergies as of 2025 - Reviewed 2025   Allergen Reaction Noted    Solarcaine [benzocaine-triclosan]  2013    Sulfa (sulfonamide antibiotics)  2013    Sulfamethoxazole  2019    Trimethoprim  2019    Empagliflozin Rash and Other (See Comments) 2024    Hytrin [terazosin] Rash 2015    Smz-tmp ds [sulfamethoxazole-trimethoprim] Rash 2015       WOC Assessment Details/Treatment       R coccyx- maroon non-blanchable DTPI present on admit ~2x1cm        L knee- healing ulcer related to fall at home- no drainage        L shin- healing abrasion related to fall at home      RUE- healing skin tear      Recommendations discussed with nurse and Dr. Villarreal:  - Pressure injury prevention interventions - waffle overlay  - Skin tear orders for RUE and mepilex to L knee  - Venelex ointment to coccyx BID    2025         [1]   Social History  Socioeconomic History    Marital status:    Tobacco Use    Smoking status: Former     Current packs/day: 0.00     Average packs/day: 1 pack/day for 25.0 years (25.0 ttl pk-yrs)     Types: Cigarettes     Start date:      Quit date:      Years since quittin.1     Passive exposure: Past    Smokeless tobacco: Never   Substance and Sexual Activity    Alcohol use: Not Currently     Alcohol/week: 5.0 standard drinks of alcohol     Types: 5 Cans of beer per week    Drug use: No    Sexual activity: Yes     Partners: Female     Social Drivers of Health     Financial Resource Strain: Low Risk   (2/24/2025)    Overall Financial Resource Strain (CARDIA)     Difficulty of Paying Living Expenses: Not hard at all   Food Insecurity: No Food Insecurity (2/24/2025)    Hunger Vital Sign     Worried About Running Out of Food in the Last Year: Never true     Ran Out of Food in the Last Year: Never true   Transportation Needs: No Transportation Needs (12/23/2024)    TRANSPORTATION NEEDS     Transportation : No   Physical Activity: Unknown (7/5/2024)    Exercise Vital Sign     Minutes of Exercise per Session: Patient unable to answer   Stress: No Stress Concern Present (2/24/2025)    [employer] - Occupational Stress Questionnaire     Feeling of Stress : Not at all   Housing Stability: Low Risk  (2/24/2025)    Housing Stability Vital Sign     Unable to Pay for Housing in the Last Year: No     Homeless in the Last Year: No

## 2025-02-24 NOTE — ASSESSMENT & PLAN NOTE
Creatine stable for now. BMP reviewed- noted Estimated Creatinine Clearance: 28 mL/min (A) (based on SCr of 1.7 mg/dL (H)). according to latest data. Based on current GFR, CKD stage is stage 3 - GFR 30-59.  Monitor UOP and serial BMP and adjust therapy as needed. Renally dose meds. Avoid nephrotoxic medications and procedures.

## 2025-02-24 NOTE — ASSESSMENT & PLAN NOTE
89 yoM with SBO likely secondary to adhesive disease. NGT in place with good decompression in the ED, now with minimal output. Mr. Connolly is quite familiar with the treatment of SBO, but we reviewed the plan to treat conservatively with NGT decompression followed by a gastrografin challenge once decompressed. We also discussed the potential need for surgery if conservative management fails, and he would be amenable to pursuing surgery if necessary, despite the significant surgical risk brought on by his co morbidities.    - Continue NGT to LIWS  - If output remains low today, we will plan to initiate the gastrografin challenge this afternoon

## 2025-02-24 NOTE — ASSESSMENT & PLAN NOTE
Patient with known history of multiple small bowel obstructions, now presenting with an SBO with transition point in RLQ.   NG Tube placed in ED with good placement. Patient started on intermittent suction and had put out 1.5L of fluid while in the ED.    Plan:  - General Surgery consulted in the ED, appreciate recommendations  - Continue NGT on low intermittent suction  - Consider gastrograffin challenge when appropriate  - NPO  - mIVF on LR at 100 ml/hr while NPO  - Zofran prn for nausea

## 2025-02-24 NOTE — SUBJECTIVE & OBJECTIVE
Past Medical History:   Diagnosis Date    BPH (benign prostatic hyperplasia)     Diabetes mellitus (type 2)     Emphysema 03/12/2022    High cholesterol 08/26/2020    Hypertension     RBBB 11/29/2022    Small bowel obstruction 06/13/2019       Past Surgical History:   Procedure Laterality Date    ANASTOMOSIS OF ILEUM TO RECTUM  9/9/2019    Procedure: ANASTOMOSIS, COLO-RECTAL;  Surgeon: Fouzia Gardner MD;  Location: [facility] OR;  Service: General;;    CHOLECYSTECTOMY N/A 3/7/2022    Procedure: CHOLECYSTECTOMY;  Surgeon: Fouzia Gardner MD;  Location: [facility] OR;  Service: General;  Laterality: N/A;    COLOSTOMY  6/16/2019    Procedure: CREATION, COLOSTOMY;  Surgeon: Fouzia Gardner MD;  Location: [facility] OR;  Service: General;;    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    Procedure: RELEASE, DUPUYTREN CONTRACTURE;  Surgeon: Slick Baldwin Jr., MD;  Location: [facility] OR;  Service: Orthopedics;  Laterality: Left;    EYE SURGERY      GISELA PROCEDURE  6/16/2019    Procedure: GISELA PROCEDURE;  Surgeon: Fouzia Gardner MD;  Location: [facility] OR;  Service: General;;  Colon resection    LYSIS OF ADHESIONS N/A 3/7/2022    Procedure: LYSIS, ADHESIONS;  Surgeon: Fouzia Gardner MD;  Location: [facility];  Service: General;  Laterality: N/A;    TONSILLECTOMY         Review of patient's allergies indicates:   Allergen Reactions    Solarcaine [benzocaine-triclosan]     Sulfa (sulfonamide antibiotics)     Sulfamethoxazole     Trimethoprim     Empagliflozin Rash and Other (See Comments)     Yeast infection    Hytrin [terazosin] Rash    Smz-tmp ds [sulfamethoxazole-trimethoprim] Rash       No current facility-administered medications on file prior to encounter.     Current Outpatient Medications on File Prior to Encounter   Medication Sig    albuterol (VENTOLIN HFA) 90 mcg/actuation inhaler Inhale 2 puffs into the lungs every 4 (four) hours as needed for Wheezing. Rescue    aspirin (ECOTRIN) 81 MG EC tablet Take  81 mg by mouth once daily.    atorvastatin (LIPITOR) 40 MG tablet Take 1 tablet (40 mg total) by mouth once daily.    blood sugar diagnostic (ACCU-CHEK GUIDE TEST STRIPS) Strp 1 strip by In Vitro route once daily.    blood-glucose meter kit Use as instructed    finasteride (PROSCAR) 5 mg tablet Take 1 tablet (5 mg total) by mouth once daily.    fluticasone propionate (FLONASE) 50 mcg/actuation nasal spray 2 sprays (100 mcg total) by Each Nostril route once daily.    fluticasone-umeclidin-vilanter (TRELEGY ELLIPTA) 100-62.5-25 mcg DsDv Inhale 1 puff into the lungs once daily.    GARLIC ORAL Take 1 capsule by mouth once daily.    hydrALAZINE (APRESOLINE) 50 MG tablet Take 1 tablet (50 mg total) by mouth 3 (three) times daily.    inhalation spacing device Use as directed for inhalation.    lancets Misc To check sugar daily, to use with insurance preferred meter    losartan (COZAAR) 100 MG tablet Take 1 tablet (100 mg total) by mouth once daily.    multivitamin capsule Take 1 capsule by mouth once daily.    mupirocin (BACTROBAN) 2 % ointment     omega-3 fatty acids/fish oil (FISH OIL-OMEGA-3 FATTY ACIDS) 300-1,000 mg capsule Take 1 capsule by mouth once daily.    tamsulosin (FLOMAX) 0.4 mg Cap Take 0.4 mg by mouth once daily.    vitamin D (VITAMIN D3) 1000 units Tab Take 1,000 Units by mouth once daily.     Family History       Problem Relation (Age of Onset)    Cancer Mother    Diabetes Paternal Aunt    Heart disease Father    Hypertension Father          Tobacco Use    Smoking status: Former     Current packs/day: 0.00     Average packs/day: 1 pack/day for 25.0 years (25.0 ttl pk-yrs)     Types: Cigarettes     Start date:      Quit date:      Years since quittin.1     Passive exposure: Past    Smokeless tobacco: Never   Substance and Sexual Activity    Alcohol use: Not Currently     Alcohol/week: 5.0 standard drinks of alcohol     Types: 5 Cans of beer per week    Drug use: No    Sexual activity: Yes      Partners: Female     Review of Systems   Constitutional:  Negative for chills and fever.   Respiratory:  Negative for shortness of breath.    Cardiovascular:  Negative for chest pain and leg swelling.   Gastrointestinal:  Positive for abdominal distention, diarrhea, nausea and vomiting. Negative for abdominal pain.   Genitourinary:  Negative for dysuria.   Neurological:  Negative for syncope, weakness and light-headedness.   Psychiatric/Behavioral:  Negative for agitation.      Objective:     Vital Signs (Most Recent):  Temp: 97.9 °F (36.6 °C) (02/24/25 0232)  Pulse: 106 (02/24/25 0232)  Resp: (!) 21 (02/24/25 0232)  BP: (!) 165/78 (02/24/25 0232)  SpO2: (!) 93 % (02/24/25 0232) Vital Signs (24h Range):  Temp:  [97.9 °F (36.6 °C)-98.2 °F (36.8 °C)] 97.9 °F (36.6 °C)  Pulse:  [] 106  Resp:  [18-26] 21  SpO2:  [89 %-95 %] 93 %  BP: (165-220)/() 165/78     Weight: 72.8 kg (160 lb 7.9 oz)  Body mass index is 23.03 kg/m².     Physical Exam  Constitutional:       General: He is not in acute distress.     Appearance: Normal appearance. He is normal weight. He is not ill-appearing, toxic-appearing or diaphoretic.   HENT:      Head: Normocephalic and atraumatic.      Right Ear: External ear normal.      Left Ear: External ear normal.      Nose: Nose normal.      Mouth/Throat:      Mouth: Mucous membranes are moist.      Pharynx: Oropharynx is clear.   Eyes:      Extraocular Movements: Extraocular movements intact.   Cardiovascular:      Rate and Rhythm: Normal rate and regular rhythm.      Pulses: Normal pulses.      Heart sounds: Normal heart sounds. No murmur heard.  Pulmonary:      Effort: Pulmonary effort is normal. No respiratory distress.      Breath sounds: Normal breath sounds.   Abdominal:      General: There is distension.      Tenderness: There is no abdominal tenderness.      Comments: Positive bowel sounds only in RUQ and LUQ.   Musculoskeletal:         General: No swelling or tenderness.   Skin:      General: Skin is warm and dry.   Neurological:      Mental Status: He is alert and oriented to person, place, and time. Mental status is at baseline.                Significant Labs: All pertinent labs within the past 24 hours have been reviewed.    Significant Imaging: I have reviewed all pertinent imaging results/findings within the past 24 hours.

## 2025-02-24 NOTE — HOSPITAL COURSE
89-year-old male admitted for evaluation of small bowel obstruction reviewed on CT Abdomen and Pelvis: SBO with transition point in the right lower quadrant, may be due to adhesions. Moderate distension of the stomach and distal esophagus NG tube placed and total output 1,775 mL since admission. Continued nausea and have episodes of non-bloody emesis. General Surgery recommending possible gastrografin challenge today if no improvement on NG output. Patient was NPO and received mIVF of LR at 100 mL/hr. NG tube with 1,475 mL output. Plus 2 large watery BM and gas overnight; without nausea or vomiting since yesterday. General surgery recommending removing NG tube and transitioning to a CLD --> Full liquids --> Adult regular (cardiac diabetic) tolerated well. No abdominal pain. Passing gas and BM. Ok for discharge. On the day of discharge patient was back to baseline and hemodynamically stable. He was sent home to resume home meds (as below). No med changes.  Close outpatient f/u was advised with PCP. The importance of medication adherence was again stressed to the patient. Patient agreeable to discharge plan, expressed understanding, all questions answered, return precautions were discussed.

## 2025-02-24 NOTE — PLAN OF CARE
Pt and daughter known to  for 2/13 admission to [facility] - dx:  Elevated Troponin   Now admitted with SBO -   NGT in place; Surgery consult   daughter Nina Carson   at bedside -she lives near pt and is available to assist.      Independent prior to admit - lives alone   Pt owns but doesn't use a st cane and RW   Current with [facility]      Referrals sent per [facility] to [facility]        02/24/25 7218   Discharge Assessment   Assessment Type Discharge Planning Assessment   Confirmed/corrected address, phone number and insurance Yes   Confirmed Demographics Correct on Facesheet   Source of Information patient;family;health record   Communicated JOSE with patient/caregiver Date not available/Unable to determine   People in Home alone   Do you expect to return to your current living situation? Yes   Do you have help at home or someone to help you manage your care at home? Yes   Who are your caregiver(s) and their phone number(s)? kesha Quesada     Prior to hospitilization cognitive status: Alert/Oriented   Current cognitive status: Alert/Oriented   Equipment Currently Used at Home none  (owns but doesn't use - RW)   Do you currently have service(s) that help you manage your care at home? No   Do you take prescription medications? Yes   Who is going to help you get home at discharge? kesha Wood   How do you get to doctors appointments? family or friend will provide;car, drives self   Are you on dialysis? No   Do you take coumadin? No   Discharge Plan A Home;Home Health   Discharge Plan B Home;Home Health   DME Needed Upon Discharge    (tbd)   Discharge Plan discussed with: Patient;Adult children   Transition of Care Barriers None

## 2025-02-25 LAB
ALBUMIN SERPL BCP-MCNC: 2.8 G/DL (ref 3.5–5.2)
ALP SERPL-CCNC: 81 U/L (ref 40–150)
ALT SERPL W/O P-5'-P-CCNC: 17 U/L (ref 10–44)
ANION GAP SERPL CALC-SCNC: 11 MMOL/L (ref 8–16)
AST SERPL-CCNC: 17 U/L (ref 10–40)
BASOPHILS # BLD AUTO: 0.02 K/UL (ref 0–0.2)
BASOPHILS NFR BLD: 0.2 % (ref 0–1.9)
BILIRUB SERPL-MCNC: 1.2 MG/DL (ref 0.1–1)
BUN SERPL-MCNC: 36 MG/DL (ref 10–30)
CALCIUM SERPL-MCNC: 9.2 MG/DL (ref 8.7–10.5)
CHLORIDE SERPL-SCNC: 106 MMOL/L (ref 95–110)
CO2 SERPL-SCNC: 24 MMOL/L (ref 23–29)
CREAT SERPL-MCNC: 1.5 MG/DL (ref 0.5–1.4)
DIFFERENTIAL METHOD BLD: ABNORMAL
EOSINOPHIL # BLD AUTO: 0.1 K/UL (ref 0–0.5)
EOSINOPHIL NFR BLD: 0.6 % (ref 0–8)
ERYTHROCYTE [DISTWIDTH] IN BLOOD BY AUTOMATED COUNT: 13.2 % (ref 11.5–14.5)
EST. GFR  (NO RACE VARIABLE): 43 ML/MIN/1.73 M^2
GLUCOSE SERPL-MCNC: 200 MG/DL (ref 70–110)
GLUCOSE SERPL-MCNC: 240 MG/DL (ref 70–110)
HCT VFR BLD AUTO: 42.8 % (ref 40–54)
HGB BLD-MCNC: 14.1 G/DL (ref 14–18)
IMM GRANULOCYTES # BLD AUTO: 0.04 K/UL (ref 0–0.04)
IMM GRANULOCYTES NFR BLD AUTO: 0.3 % (ref 0–0.5)
LYMPHOCYTES # BLD AUTO: 1 K/UL (ref 1–4.8)
LYMPHOCYTES NFR BLD: 7.5 % (ref 18–48)
MAGNESIUM SERPL-MCNC: 2 MG/DL (ref 1.6–2.6)
MCH RBC QN AUTO: 29.3 PG (ref 27–31)
MCHC RBC AUTO-ENTMCNC: 32.9 G/DL (ref 32–36)
MCV RBC AUTO: 89 FL (ref 82–98)
MONOCYTES # BLD AUTO: 1.1 K/UL (ref 0.3–1)
MONOCYTES NFR BLD: 8.4 % (ref 4–15)
NEUTROPHILS # BLD AUTO: 10.6 K/UL (ref 1.8–7.7)
NEUTROPHILS NFR BLD: 83 % (ref 38–73)
NRBC BLD-RTO: 0 /100 WBC
PHOSPHATE SERPL-MCNC: 3.1 MG/DL (ref 2.7–4.5)
PLATELET # BLD AUTO: 263 K/UL (ref 150–450)
PMV BLD AUTO: 11.3 FL (ref 9.2–12.9)
POCT GLUCOSE: 193 MG/DL (ref 70–110)
POCT GLUCOSE: 220 MG/DL (ref 70–110)
POCT GLUCOSE: 236 MG/DL (ref 70–110)
POCT GLUCOSE: 237 MG/DL (ref 70–110)
POTASSIUM SERPL-SCNC: 3.7 MMOL/L (ref 3.5–5.1)
PROT SERPL-MCNC: 6 G/DL (ref 6–8.4)
RBC # BLD AUTO: 4.82 M/UL (ref 4.6–6.2)
SODIUM SERPL-SCNC: 141 MMOL/L (ref 136–145)
WBC # BLD AUTO: 12.82 K/UL (ref 3.9–12.7)

## 2025-02-25 PROCEDURE — 63600175 PHARM REV CODE 636 W HCPCS

## 2025-02-25 PROCEDURE — 99900035 HC TECH TIME PER 15 MIN (STAT)

## 2025-02-25 PROCEDURE — 25000003 PHARM REV CODE 250

## 2025-02-25 PROCEDURE — 84100 ASSAY OF PHOSPHORUS: CPT

## 2025-02-25 PROCEDURE — 80053 COMPREHEN METABOLIC PANEL: CPT

## 2025-02-25 PROCEDURE — 99232 SBSQ HOSP IP/OBS MODERATE 35: CPT | Mod: GC,,, | Performed by: STUDENT IN AN ORGANIZED HEALTH CARE EDUCATION/TRAINING PROGRAM

## 2025-02-25 PROCEDURE — 63600175 PHARM REV CODE 636 W HCPCS: Performed by: FAMILY MEDICINE

## 2025-02-25 PROCEDURE — 94640 AIRWAY INHALATION TREATMENT: CPT

## 2025-02-25 PROCEDURE — 94761 N-INVAS EAR/PLS OXIMETRY MLT: CPT

## 2025-02-25 PROCEDURE — 83735 ASSAY OF MAGNESIUM: CPT

## 2025-02-25 PROCEDURE — 85025 COMPLETE CBC W/AUTO DIFF WBC: CPT

## 2025-02-25 PROCEDURE — 21400001 HC TELEMETRY ROOM

## 2025-02-25 PROCEDURE — 36415 COLL VENOUS BLD VENIPUNCTURE: CPT

## 2025-02-25 RX ORDER — ELECTROLYTES/DEXTROSE
200 SOLUTION, ORAL ORAL
Status: DISCONTINUED | OUTPATIENT
Start: 2025-02-25 | End: 2025-02-26 | Stop reason: HOSPADM

## 2025-02-25 RX ORDER — ENOXAPARIN SODIUM 100 MG/ML
40 INJECTION SUBCUTANEOUS EVERY 24 HOURS
Status: CANCELLED | OUTPATIENT
Start: 2025-02-25

## 2025-02-25 RX ORDER — ENOXAPARIN SODIUM 100 MG/ML
40 INJECTION SUBCUTANEOUS EVERY 24 HOURS
Status: DISCONTINUED | OUTPATIENT
Start: 2025-02-25 | End: 2025-02-26 | Stop reason: HOSPADM

## 2025-02-25 RX ADMIN — INSULIN ASPART 2 UNITS: 100 INJECTION, SOLUTION INTRAVENOUS; SUBCUTANEOUS at 06:02

## 2025-02-25 RX ADMIN — Medication: at 09:02

## 2025-02-25 RX ADMIN — INSULIN ASPART 2 UNITS: 100 INJECTION, SOLUTION INTRAVENOUS; SUBCUTANEOUS at 11:02

## 2025-02-25 RX ADMIN — ATORVASTATIN CALCIUM 40 MG: 40 TABLET, FILM COATED ORAL at 09:02

## 2025-02-25 RX ADMIN — Medication 200 ML: at 10:02

## 2025-02-25 RX ADMIN — SODIUM CHLORIDE, POTASSIUM CHLORIDE, SODIUM LACTATE AND CALCIUM CHLORIDE: 600; 310; 30; 20 INJECTION, SOLUTION INTRAVENOUS at 09:02

## 2025-02-25 RX ADMIN — ENOXAPARIN SODIUM 40 MG: 40 INJECTION SUBCUTANEOUS at 04:02

## 2025-02-25 RX ADMIN — ASPIRIN 81 MG: 81 TABLET, COATED ORAL at 09:02

## 2025-02-25 RX ADMIN — TAMSULOSIN HYDROCHLORIDE 0.4 MG: 0.4 CAPSULE ORAL at 09:02

## 2025-02-25 RX ADMIN — FLUTICASONE FUROATE AND VILANTEROL TRIFENATATE 1 PUFF: 100; 25 POWDER RESPIRATORY (INHALATION) at 09:02

## 2025-02-25 RX ADMIN — HYDRALAZINE HYDROCHLORIDE 50 MG: 25 TABLET ORAL at 08:02

## 2025-02-25 RX ADMIN — LOSARTAN POTASSIUM 100 MG: 50 TABLET, FILM COATED ORAL at 09:02

## 2025-02-25 RX ADMIN — HYDRALAZINE HYDROCHLORIDE 50 MG: 25 TABLET ORAL at 02:02

## 2025-02-25 RX ADMIN — HYDRALAZINE HYDROCHLORIDE 50 MG: 25 TABLET ORAL at 09:02

## 2025-02-25 RX ADMIN — Medication 200 ML: at 02:02

## 2025-02-25 RX ADMIN — Medication 200 ML: at 06:02

## 2025-02-25 RX ADMIN — INSULIN ASPART 1 UNITS: 100 INJECTION, SOLUTION INTRAVENOUS; SUBCUTANEOUS at 12:02

## 2025-02-25 RX ADMIN — INSULIN ASPART 2 UNITS: 100 INJECTION, SOLUTION INTRAVENOUS; SUBCUTANEOUS at 04:02

## 2025-02-25 RX ADMIN — Medication 200 ML: at 11:02

## 2025-02-25 RX ADMIN — Medication: at 08:02

## 2025-02-25 RX ADMIN — FINASTERIDE 5 MG: 5 TABLET, FILM COATED ORAL at 09:02

## 2025-02-25 NOTE — PROGRESS NOTES
[facility]  Progress Note    Patient Name: Husam Connolly  MRN: [id number]  Patient Class: IP- Inpatient   Admission Date: 2/23/2025  Length of Stay: 1 days  Attending Physician: Tadeo Page III, MD  Primary Care Provider: Williams Knight DO        Subjective     Principal Problem:SBO (small bowel obstruction)        HPI:  Mr. Husam Connolly is a 89-year-old male with a PMHx  of BPH, diabetes, emphysema, high right bundle-branch block, small-bowel obstruction, CKD3, HLD, HTN who presented to the ED on 2/24/25 with complaints of nausea, diarrhea, and abdominal distention and discomfort. Patient states it feels like how it did when he previously had an SBO. Patient's daughter at bedside stated that patient has numerous episodes of SBO in the past and all have been medically managed. Patient had a couple of episodes of non-bloody emesis in the ED. Upon my assessment, patient already had NG tube placed. ED physician had already consulted General Surgery to stated to keep NG tube in place and he will be seen in the AM. Patient denies fevers, chills, abdominal pain, syncope/presyncope, chest pain, or SOB.     In the ED, initial vital signs /84, HR 95, Temp 98.2F, SpO2 95% on room air. Labs include CBC with stable H/H 15.9/46.9 and WBC elevated to 13.84. CMP with Na 137, K 4.2, Cl 102, CO2 21, and BUN/Cr 40/1.7 (baseline Cr 1.4-1.5). CT Abdomen and Pelvis: SBO with transition point in the right lower quadrant, may be due to adhesions. Moderate distension of the stomach and distal esophagus. Lipase elevated to 83. Initiated on Hydalazine 10 mg IV x1, Midazolam 2 mg IV x1, s/p 500 mL IV NS, Prochlorperazine  10 mg IV x1. [facility] consulted for evaluation for admission for Small bowel obstruction.      Overview/Hospital Course:  89-year-old male admitted for evaluation of small bowel obstruction reviewed on CT Abdomen and Pelvis: SBO with transition point in the right lower  quadrant, may be due to adhesions. Moderate distension of the stomach and distal esophagus NG tube placed and total output 1,775 mL since admission. Continued nausea and have episodes of non-bloody emesis. General Surgery recommending possible gastrografin challenge today if no improvement on NG output. Patient will remain NPO and receive mIVF of LR at 100 mL/hr.     NG tube with 1,475 mL output yesterday, 2/24. Plus 2 large watery BM and gas overnight; without nausea or vomiting since yesterday. General surgery recommending removing NG tube and transitioning to a CLD today, 2/25.          No new subjective & objective note has been filed under this hospital service since the last note was generated.      Assessment and Plan     * SBO (small bowel obstruction)  Patient with known history of multiple small bowel obstructions, now presenting with an SBO with transition point in RLQ.   NG Tube placed in ED with good placement. Patient started on intermittent suction and had put out 1.5L of fluid while in the ED.    Plan:  - General Surgery consulted in the ED, appreciate recommendations  - Discontinue NGT per Gen Sx recs  - Clear diet   - Zofran prn for nausea      MATEUSZ (acute kidney injury)   Acute kidney insufficiency. Improving.   MATEUSZ is likely due to pre-renal azotemia due to intravascular volume depletion. Baseline creatinine is  1.4-1.5 . Most recent creatinine and eGFR are listed below.  Recent Labs     02/23/25  2234 02/24/25  0402 02/25/25  0323   CREATININE 1.7* 1.7* 1.5*   EGFRNORACEVR 37* 37* 43*       MATEUSZ on CKD3 suspect 2/2 to IVVD due to vomiting and diarrhea.     Plan  - MATEUSZ is worsening. Will adjust treatment as follows: initiated patient on mIVF while NPO  - Avoid nephrotoxins and renally dose meds for GFR listed above  - Monitor urine output, serial BMP, and adjust therapy as needed    Pulmonary emphysema  Patient's COPD is controlled currently.  Patient is currently off COPD Pathway. Continue  scheduled inhalers Steroids and monitor respiratory status closely.     - Continue breo, home trelegy not on formulary    Type 2 diabetes mellitus, without long-term current use of insulin  - Accucheks AC and qhs  - LDSSI  - Blood Glucose goal levels between 140-180      High cholesterol  Continue home atorvastatin      Stage 3b chronic kidney disease  Creatine stable for now. BMP reviewed- noted Estimated Creatinine Clearance: 31.7 mL/min (A) (based on SCr of 1.5 mg/dL (H)). according to latest data. Based on current GFR, CKD stage is stage 3 - GFR 30-59.  Monitor UOP and serial BMP and adjust therapy as needed. Renally dose meds. Avoid nephrotoxic medications and procedures.    BPH (benign prostatic hyperplasia)  Continue home tamsulosin      Resistant hypertension  Continue home hydralazine and losartan        VTE Risk Mitigation (From admission, onward)           Ordered     enoxaparin injection 30 mg  Daily         02/24/25 0008     IP VTE HIGH RISK PATIENT  Once         02/23/25 2359     Place sequential compression device  Until discontinued         02/23/25 2359                    Discharge Planning   JOSE: 2/25/2025     Code Status: Full Code   Medical Readiness for Discharge Date:   Discharge Plan A: Home, Home Health                        Aries Villarreal MD  Department of Hospital Medicine   [facility]

## 2025-02-25 NOTE — PROGRESS NOTES
Pharmacist Renal Dose Adjustment Note    Husam Connolly is a 89 y.o. male being treated with the medication enoxaparin    Patient Data:    Vital Signs (Most Recent):  Temp: 98.5 °F (36.9 °C) (02/25/25 1527)  Pulse: 81 (02/25/25 1527)  Resp: 18 (02/25/25 1527)  BP: (!) 128/44 (02/25/25 1527)  SpO2: 95 % (02/25/25 1527) Vital Signs (72h Range):  Temp:  [97.7 °F (36.5 °C)-99.5 °F (37.5 °C)]   Pulse:  []   Resp:  [17-26]   BP: (106-220)/()   SpO2:  [88 %-95 %]      Recent Labs   Lab 02/23/25  2234 02/24/25  0402 02/25/25  0323   CREATININE 1.7* 1.7* 1.5*     Serum creatinine: 1.5 mg/dL (H) 02/25/25 0323  Estimated creatinine clearance: 31.7 mL/min (A)    Medication:enoxaparin dose: 30mg frequency q24h will be changed to medication:enoxaparin dose:40mg frequency:q24h    Pharmacist's Name: Farhad Marrufo  Pharmacist's Extension: [phone]

## 2025-02-25 NOTE — SUBJECTIVE & OBJECTIVE
Interval History: Patient seen today laying in bed comfortably with no acute concerns. Patient hemodynamically stable. Afebrile for the past 24hrs. No overnight events. Patient good NG output. Patient hungry and no longer with nausea. Distention reduced per patient and on examination. BM last night. Not passing gas. Gen Sx ok to DC NG and start clear liquid. No other complaints.     Review of Systems   Constitutional:  Negative for chills and fever.   Respiratory:  Negative for shortness of breath.    Cardiovascular:  Negative for chest pain and leg swelling.   Gastrointestinal:  Positive for abdominal distention and diarrhea. Negative for abdominal pain, nausea and vomiting.   Genitourinary:  Negative for dysuria.   Neurological:  Negative for syncope, weakness and light-headedness.   Psychiatric/Behavioral:  Negative for agitation.      Objective:     Vital Signs (Most Recent):  Temp: 98.6 °F (37 °C) (02/25/25 0724)  Pulse: 77 (02/25/25 0913)  Resp: 18 (02/25/25 0913)  BP: (!) 148/60 (02/25/25 0724)  SpO2: 95 % (02/25/25 0913) Vital Signs (24h Range):  Temp:  [97.7 °F (36.5 °C)-99.2 °F (37.3 °C)] 98.6 °F (37 °C)  Pulse:  [67-99] 77  Resp:  [17-19] 18  SpO2:  [88 %-95 %] 95 %  BP: (106-163)/(55-96) 148/60     Weight: 72.8 kg (160 lb 7.9 oz)  Body mass index is 23.03 kg/m².    Intake/Output Summary (Last 24 hours) at 2/25/2025 1010  Last data filed at 2/25/2025 0555  Gross per 24 hour   Intake --   Output 1475 ml   Net -1475 ml         Physical Exam  Constitutional:       General: He is not in acute distress.     Appearance: Normal appearance. He is normal weight. He is not ill-appearing, toxic-appearing or diaphoretic.   HENT:      Head: Normocephalic and atraumatic.      Right Ear: External ear normal.      Left Ear: External ear normal.      Nose: Nose normal.      Mouth/Throat:      Mouth: Mucous membranes are moist.      Pharynx: Oropharynx is clear.   Eyes:      Extraocular Movements: Extraocular movements  intact.   Cardiovascular:      Rate and Rhythm: Normal rate and regular rhythm.      Pulses: Normal pulses.      Heart sounds: Normal heart sounds. No murmur heard.  Pulmonary:      Effort: Pulmonary effort is normal. No respiratory distress.      Breath sounds: Normal breath sounds.   Abdominal:      General: There is distension.      Tenderness: There is no abdominal tenderness.      Comments: Positive bowel sounds only in RUQ and LUQ.   Musculoskeletal:         General: No swelling or tenderness.   Skin:     General: Skin is warm and dry.   Neurological:      Mental Status: He is alert and oriented to person, place, and time. Mental status is at baseline.             Significant Labs: All pertinent labs within the past 24 hours have been reviewed.    Significant Imaging: I have reviewed all pertinent imaging results/findings within the past 24 hours.

## 2025-02-25 NOTE — ASSESSMENT & PLAN NOTE
Creatine stable for now. BMP reviewed- noted Estimated Creatinine Clearance: 31.7 mL/min (A) (based on SCr of 1.5 mg/dL (H)). according to latest data. Based on current GFR, CKD stage is stage 3 - GFR 30-59.  Monitor UOP and serial BMP and adjust therapy as needed. Renally dose meds. Avoid nephrotoxic medications and procedures.

## 2025-02-25 NOTE — ASSESSMENT & PLAN NOTE
MATEUSZ is likely due to pre-renal azotemia due to intravascular volume depletion. Baseline creatinine is  1.4-1.5 . Most recent creatinine and eGFR are listed below.  Recent Labs     02/23/25  2234 02/24/25  0402 02/25/25  0323   CREATININE 1.7* 1.7* 1.5*   EGFRNORACEVR 37* 37* 43*       MATEUSZ on CKD3 suspect 2/2 to IVVD due to vomiting and diarrhea.     Plan  - MATEUSZ is worsening. Will adjust treatment as follows: initiated patient on mIVF while NPO  - Avoid nephrotoxins and renally dose meds for GFR listed above  - Monitor urine output, serial BMP, and adjust therapy as needed

## 2025-02-25 NOTE — PROGRESS NOTES
[facility]  General Surgery  Progress Note    Subjective:     History of Present Illness:  Mr. Husam Connolly is a 89-year-old male with a PMHx of BPH, diabetes, emphysema, PAH, high right bundle-branch block, CKD3, HLD, HTN who presented to the ED on 2/24/25 with complaints of nausea, abdominal distention and discomfort. 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 These included a partial sigmoid resection with end colostomy creation in 2019, followed by a colostomy takedown later that year, and an open cholecystectomy with Clem in 2022, all with Dr. Gardner.    His last BM was on 2/22. He has passed occasional flatus since then. Workup in the ED included a CT scan with a transition point in the RLQ and proximal distension. NGT was placed and 1700 mL of initial output was obtained in the ED. Overnight there has only been approximately 100 mL of output.     Post-Op Info:  * No surgery found *         Interval History: NAEON, 700/300 NGT output over AM/PM. Bowel movements x2 last night with improvement in symptoms.    Medications:  Continuous Infusions:   lactated ringers   Intravenous Continuous 100 mL/hr at 02/24/25 2102 New Bag at 02/24/25 2102     Scheduled Meds:   aspirin  81 mg Oral Daily    atorvastatin  40 mg Oral Daily    balsam peru-castor oiL   Topical (Top) BID    enoxparin  30 mg Subcutaneous Daily    finasteride  5 mg Oral Daily    fluticasone furoate-vilanteroL  1 puff Inhalation Daily    hydrALAZINE  50 mg Oral TID    labetalol  10 mg Intravenous Once    losartan  100 mg Oral Daily    tamsulosin  0.4 mg Oral Daily     PRN Meds:  Current Facility-Administered Medications:     acetaminophen, 650 mg, Oral, Q4H PRN    albuterol, 2 puff, Inhalation, Q6H PRN    dextrose 50%, 12.5 g, Intravenous, PRN    dextrose 50%, 25 g, Intravenous, PRN    glucagon (human recombinant), 1 mg, Intramuscular, PRN    glucose, 16 g, Oral, PRN    glucose, 24 g, Oral, PRN     insulin aspart U-100, 0-5 Units, Subcutaneous, QID (AC + HS) PRN    melatonin, 6 mg, Oral, Nightly PRN    naloxone, 0.02 mg, Intravenous, PRN    ondansetron, 4 mg, Intravenous, Q8H PRN    sodium chloride 0.9%, 5 mL, Intravenous, PRN     Review of patient's allergies indicates:   Allergen Reactions    Solarcaine [benzocaine-triclosan]     Sulfa (sulfonamide antibiotics)     Sulfamethoxazole     Trimethoprim     Empagliflozin Rash and Other (See Comments)     Yeast infection    Hytrin [terazosin] Rash    Smz-tmp ds [sulfamethoxazole-trimethoprim] Rash     Objective:     Vital Signs (Most Recent):  Temp: 98.6 °F (37 °C) (02/25/25 0724)  Pulse: 80 (02/25/25 0724)  Resp: 18 (02/25/25 0724)  BP: (!) 148/60 (02/25/25 0724)  SpO2: (!) 93 % (02/25/25 0724) Vital Signs (24h Range):  Temp:  [97.7 °F (36.5 °C)-99.2 °F (37.3 °C)] 98.6 °F (37 °C)  Pulse:  [67-99] 80  Resp:  [17-19] 18  SpO2:  [88 %-93 %] 93 %  BP: (106-163)/(55-96) 148/60     Weight: 72.8 kg (160 lb 7.9 oz)  Body mass index is 23.03 kg/m².    Intake/Output - Last 3 Shifts         02/23 0700 02/24 0659 02/24 0700 02/25 0659 02/25 0700 02/26 0659    P.O. 0      I.V. (mL/kg) 90 (1.2)      IV Piggyback 500      Total Intake(mL/kg) 590 (8.1)      Urine (mL/kg/hr) 150 400 (0.2)     Emesis/NG output 1      Drains 75 1075     Other 1700 0     Stool 0 0     Total Output 1926 1475     Net -1336.1 -1475            Urine Occurrence  1 x     Stool Occurrence 0 x 2 x     Emesis Occurrence 0 x               Physical Exam  Vitals reviewed.   Constitutional:       Appearance: Normal appearance.   HENT:      Nose:      Comments: NGT. Minimal output  Cardiovascular:      Rate and Rhythm: Normal rate and regular rhythm.   Pulmonary:      Effort: Pulmonary effort is normal. No respiratory distress.   Abdominal:      General: There is no distension.      Palpations: Abdomen is soft.      Tenderness: There is no abdominal tenderness.   Skin:     General: Skin is warm.    Neurological:      General: No focal deficit present.      Mental Status: He is alert and oriented to person, place, and time.          Significant Labs:  I have reviewed all pertinent lab results within the past 24 hours.    Significant Diagnostics:  I have reviewed all pertinent imaging results/findings within the past 24 hours.  Assessment/Plan:     * SBO (small bowel obstruction)  89 yoM with SBO likely secondary to adhesive disease. NGT in place with good decompression in the ED, now with minimal output. Mr. Connolly is quite familiar with the treatment of SBO, but we reviewed the plan to treat conservatively with NGT decompression. We also discussed the potential need for surgery if conservative management fails, and he would be amenable to pursuing surgery if necessary, despite the significant surgical risk brought on by his co morbidities.    Following decompression, he has had ROBF and resolution of symptoms    - D/c NGT  - Trial CLD  - Continue to monitor for symptoms including bloating and inability to stool/pass gas        Deedee Werner MD  General Surgery  [facility]

## 2025-02-25 NOTE — SUBJECTIVE & OBJECTIVE
Interval History: DAMIAN, 700/300 NGT output over AM/PM. Bowel movements x2 last night with improvement in symptoms.    Medications:  Continuous Infusions:   lactated ringers   Intravenous Continuous 100 mL/hr at 02/24/25 2102 New Bag at 02/24/25 2102     Scheduled Meds:   aspirin  81 mg Oral Daily    atorvastatin  40 mg Oral Daily    balsam peru-castor oiL   Topical (Top) BID    enoxparin  30 mg Subcutaneous Daily    finasteride  5 mg Oral Daily    fluticasone furoate-vilanteroL  1 puff Inhalation Daily    hydrALAZINE  50 mg Oral TID    labetalol  10 mg Intravenous Once    losartan  100 mg Oral Daily    tamsulosin  0.4 mg Oral Daily     PRN Meds:  Current Facility-Administered Medications:     acetaminophen, 650 mg, Oral, Q4H PRN    albuterol, 2 puff, Inhalation, Q6H PRN    dextrose 50%, 12.5 g, Intravenous, PRN    dextrose 50%, 25 g, Intravenous, PRN    glucagon (human recombinant), 1 mg, Intramuscular, PRN    glucose, 16 g, Oral, PRN    glucose, 24 g, Oral, PRN    insulin aspart U-100, 0-5 Units, Subcutaneous, QID (AC + HS) PRN    melatonin, 6 mg, Oral, Nightly PRN    naloxone, 0.02 mg, Intravenous, PRN    ondansetron, 4 mg, Intravenous, Q8H PRN    sodium chloride 0.9%, 5 mL, Intravenous, PRN     Review of patient's allergies indicates:   Allergen Reactions    Solarcaine [benzocaine-triclosan]     Sulfa (sulfonamide antibiotics)     Sulfamethoxazole     Trimethoprim     Empagliflozin Rash and Other (See Comments)     Yeast infection    Hytrin [terazosin] Rash    Smz-tmp ds [sulfamethoxazole-trimethoprim] Rash     Objective:     Vital Signs (Most Recent):  Temp: 98.6 °F (37 °C) (02/25/25 0724)  Pulse: 80 (02/25/25 0724)  Resp: 18 (02/25/25 0724)  BP: (!) 148/60 (02/25/25 0724)  SpO2: (!) 93 % (02/25/25 0724) Vital Signs (24h Range):  Temp:  [97.7 °F (36.5 °C)-99.2 °F (37.3 °C)] 98.6 °F (37 °C)  Pulse:  [67-99] 80  Resp:  [17-19] 18  SpO2:  [88 %-93 %] 93 %  BP: (106-163)/(55-96) 148/60     Weight: 72.8 kg (160 lb  7.9 oz)  Body mass index is 23.03 kg/m².    Intake/Output - Last 3 Shifts         02/23 0700  02/24 0659 02/24 0700 02/25 0659 02/25 0700 02/26 0659    P.O. 0      I.V. (mL/kg) 90 (1.2)      IV Piggyback 500      Total Intake(mL/kg) 590 (8.1)      Urine (mL/kg/hr) 150 400 (0.2)     Emesis/NG output 1      Drains 75 1075     Other 1700 0     Stool 0 0     Total Output 1926 1475     Net -1336.1 -1475            Urine Occurrence  1 x     Stool Occurrence 0 x 2 x     Emesis Occurrence 0 x               Physical Exam  Vitals reviewed.   Constitutional:       Appearance: Normal appearance.   HENT:      Nose:      Comments: NGT. Minimal output  Cardiovascular:      Rate and Rhythm: Normal rate and regular rhythm.   Pulmonary:      Effort: Pulmonary effort is normal. No respiratory distress.   Abdominal:      General: There is no distension.      Palpations: Abdomen is soft.      Tenderness: There is no abdominal tenderness.   Skin:     General: Skin is warm.   Neurological:      General: No focal deficit present.      Mental Status: He is alert and oriented to person, place, and time.          Significant Labs:  I have reviewed all pertinent lab results within the past 24 hours.    Significant Diagnostics:  I have reviewed all pertinent imaging results/findings within the past 24 hours.

## 2025-02-25 NOTE — PLAN OF CARE
Problem: Adult Inpatient Plan of Care  Goal: Plan of Care Review  Outcome: Progressing  Goal: Optimal Comfort and Wellbeing  Outcome: Progressing     Problem: Diabetes Comorbidity  Goal: Blood Glucose Level Within Targeted Range  Outcome: Progressing     Problem: Fall Injury Risk  Goal: Absence of Fall and Fall-Related Injury  Outcome: Progressing     Problem: Intestinal Obstruction  Goal: Optimal Bowel Function  Outcome: Progressing  Goal: Optimal Pain Control and Function  Outcome: Progressing     Pt safety maintained. Medication administered per MAR. NG tube unclamped and connected to intermittent low wall suction, output measured and recorded. IVF infusing. Pt on continuous cardiac monitoring. Pt had 2 large, lose BM overnight. Pt instructed to call w/any needs, verbalized understanding. Bed in lowest position, locked and bed alarms on. Call light w/in pt's reach.

## 2025-02-25 NOTE — ASSESSMENT & PLAN NOTE
89 yoM with SBO likely secondary to adhesive disease. NGT in place with good decompression in the ED, now with minimal output. Mr. Connolly is quite familiar with the treatment of SBO, but we reviewed the plan to treat conservatively with NGT decompression. We also discussed the potential need for surgery if conservative management fails, and he would be amenable to pursuing surgery if necessary, despite the significant surgical risk brought on by his co morbidities.    Following decompression, he has had ROBF and resolution of symptoms    - D/c NGT  - Trial CLD  - Continue to monitor for symptoms including bloating and inability to stool/pass gas

## 2025-02-25 NOTE — ASSESSMENT & PLAN NOTE
Acute kidney insufficiency. Improving. Resolved.   MATEUSZ is likely due to pre-renal azotemia due to intravascular volume depletion. Baseline creatinine is  1.4-1.5 . Most recent creatinine and eGFR are listed below.  Recent Labs     02/24/25  0402 02/25/25  0323 02/26/25  0327   CREATININE 1.7* 1.5* 1.2   EGFRNORACEVR 37* 43* 56*     MATEUSZ on CKD3 suspect 2/2 to IVVD due to vomiting and diarrhea.     Plan  - MATEUSZ is worsening. Will adjust treatment as follows: initiated patient on mIVF while NPO  - Avoid nephrotoxins and renally dose meds for GFR listed above  - Monitor urine output, serial BMP, and adjust therapy as needed

## 2025-02-25 NOTE — ASSESSMENT & PLAN NOTE
Patient with known history of multiple small bowel obstructions, now presenting with an SBO with transition point in RLQ.   NG Tube placed in ED with good placement. Patient started on intermittent suction and had put out 1.5L of fluid while in the ED.    Plan:  - General Surgery consulted in the ED, appreciate recommendations  - Discontinue NGT per Gen Sx recs  - Clear diet   - Zofran prn for nausea

## 2025-02-26 VITALS
RESPIRATION RATE: 18 BRPM | SYSTOLIC BLOOD PRESSURE: 174 MMHG | HEIGHT: 70 IN | TEMPERATURE: 98 F | WEIGHT: 160.5 LBS | OXYGEN SATURATION: 93 % | HEART RATE: 76 BPM | DIASTOLIC BLOOD PRESSURE: 74 MMHG | BODY MASS INDEX: 22.98 KG/M2

## 2025-02-26 LAB
ALBUMIN SERPL BCP-MCNC: 2.3 G/DL (ref 3.5–5.2)
ALP SERPL-CCNC: 71 U/L (ref 40–150)
ALT SERPL W/O P-5'-P-CCNC: 14 U/L (ref 10–44)
ANION GAP SERPL CALC-SCNC: 8 MMOL/L (ref 8–16)
AST SERPL-CCNC: 15 U/L (ref 10–40)
BASOPHILS # BLD AUTO: 0.01 K/UL (ref 0–0.2)
BASOPHILS NFR BLD: 0.1 % (ref 0–1.9)
BILIRUB SERPL-MCNC: 0.8 MG/DL (ref 0.1–1)
BUN SERPL-MCNC: 34 MG/DL (ref 10–30)
CALCIUM SERPL-MCNC: 8.2 MG/DL (ref 8.7–10.5)
CHLORIDE SERPL-SCNC: 105 MMOL/L (ref 95–110)
CO2 SERPL-SCNC: 25 MMOL/L (ref 23–29)
CREAT SERPL-MCNC: 1.2 MG/DL (ref 0.5–1.4)
DIFFERENTIAL METHOD BLD: ABNORMAL
EOSINOPHIL # BLD AUTO: 0.3 K/UL (ref 0–0.5)
EOSINOPHIL NFR BLD: 3.1 % (ref 0–8)
ERYTHROCYTE [DISTWIDTH] IN BLOOD BY AUTOMATED COUNT: 13.1 % (ref 11.5–14.5)
EST. GFR  (NO RACE VARIABLE): 56 ML/MIN/1.73 M^2
GLUCOSE SERPL-MCNC: 199 MG/DL (ref 70–110)
HCT VFR BLD AUTO: 37.9 % (ref 40–54)
HGB BLD-MCNC: 12.4 G/DL (ref 14–18)
IMM GRANULOCYTES # BLD AUTO: 0.04 K/UL (ref 0–0.04)
IMM GRANULOCYTES NFR BLD AUTO: 0.4 % (ref 0–0.5)
LYMPHOCYTES # BLD AUTO: 1.2 K/UL (ref 1–4.8)
LYMPHOCYTES NFR BLD: 12.6 % (ref 18–48)
MAGNESIUM SERPL-MCNC: 1.8 MG/DL (ref 1.6–2.6)
MCH RBC QN AUTO: 29 PG (ref 27–31)
MCHC RBC AUTO-ENTMCNC: 32.7 G/DL (ref 32–36)
MCV RBC AUTO: 89 FL (ref 82–98)
MONOCYTES # BLD AUTO: 0.9 K/UL (ref 0.3–1)
MONOCYTES NFR BLD: 9.4 % (ref 4–15)
NEUTROPHILS # BLD AUTO: 7.3 K/UL (ref 1.8–7.7)
NEUTROPHILS NFR BLD: 74.4 % (ref 38–73)
NRBC BLD-RTO: 0 /100 WBC
PHOSPHATE SERPL-MCNC: 2.4 MG/DL (ref 2.7–4.5)
PLATELET # BLD AUTO: 219 K/UL (ref 150–450)
PMV BLD AUTO: 10.9 FL (ref 9.2–12.9)
POCT GLUCOSE: 190 MG/DL (ref 70–110)
POTASSIUM SERPL-SCNC: 3.8 MMOL/L (ref 3.5–5.1)
PROT SERPL-MCNC: 5.1 G/DL (ref 6–8.4)
RBC # BLD AUTO: 4.27 M/UL (ref 4.6–6.2)
SODIUM SERPL-SCNC: 138 MMOL/L (ref 136–145)
WBC # BLD AUTO: 9.82 K/UL (ref 3.9–12.7)

## 2025-02-26 PROCEDURE — 63600175 PHARM REV CODE 636 W HCPCS

## 2025-02-26 PROCEDURE — 36415 COLL VENOUS BLD VENIPUNCTURE: CPT

## 2025-02-26 PROCEDURE — 99232 SBSQ HOSP IP/OBS MODERATE 35: CPT | Mod: GC,,, | Performed by: STUDENT IN AN ORGANIZED HEALTH CARE EDUCATION/TRAINING PROGRAM

## 2025-02-26 PROCEDURE — 85025 COMPLETE CBC W/AUTO DIFF WBC: CPT

## 2025-02-26 PROCEDURE — 94761 N-INVAS EAR/PLS OXIMETRY MLT: CPT

## 2025-02-26 PROCEDURE — 84100 ASSAY OF PHOSPHORUS: CPT

## 2025-02-26 PROCEDURE — 99900035 HC TECH TIME PER 15 MIN (STAT)

## 2025-02-26 PROCEDURE — 25000003 PHARM REV CODE 250

## 2025-02-26 PROCEDURE — 80053 COMPREHEN METABOLIC PANEL: CPT

## 2025-02-26 PROCEDURE — 83735 ASSAY OF MAGNESIUM: CPT

## 2025-02-26 PROCEDURE — 94640 AIRWAY INHALATION TREATMENT: CPT

## 2025-02-26 RX ORDER — SODIUM,POTASSIUM PHOSPHATES 280-250MG
1 POWDER IN PACKET (EA) ORAL ONCE
Status: COMPLETED | OUTPATIENT
Start: 2025-02-26 | End: 2025-02-26

## 2025-02-26 RX ORDER — MAGNESIUM SULFATE HEPTAHYDRATE 40 MG/ML
2 INJECTION, SOLUTION INTRAVENOUS ONCE
Status: COMPLETED | OUTPATIENT
Start: 2025-02-26 | End: 2025-02-26

## 2025-02-26 RX ADMIN — FINASTERIDE 5 MG: 5 TABLET, FILM COATED ORAL at 10:02

## 2025-02-26 RX ADMIN — ASPIRIN 81 MG: 81 TABLET, COATED ORAL at 10:02

## 2025-02-26 RX ADMIN — ATORVASTATIN CALCIUM 40 MG: 40 TABLET, FILM COATED ORAL at 10:02

## 2025-02-26 RX ADMIN — HYDRALAZINE HYDROCHLORIDE 50 MG: 25 TABLET ORAL at 10:02

## 2025-02-26 RX ADMIN — MAGNESIUM SULFATE HEPTAHYDRATE 2 G: 40 INJECTION, SOLUTION INTRAVENOUS at 06:02

## 2025-02-26 RX ADMIN — Medication 200 ML: at 07:02

## 2025-02-26 RX ADMIN — LOSARTAN POTASSIUM 100 MG: 50 TABLET, FILM COATED ORAL at 10:02

## 2025-02-26 RX ADMIN — TAMSULOSIN HYDROCHLORIDE 0.4 MG: 0.4 CAPSULE ORAL at 10:02

## 2025-02-26 RX ADMIN — POTASSIUM & SODIUM PHOSPHATES POWDER PACK 280-160-250 MG 1 PACKET: 280-160-250 PACK at 06:02

## 2025-02-26 RX ADMIN — FLUTICASONE FUROATE AND VILANTEROL TRIFENATATE 1 PUFF: 100; 25 POWDER RESPIRATORY (INHALATION) at 07:02

## 2025-02-26 NOTE — PLAN OF CARE
Assumed care of patient, patient NAD, VSS. Medications administered via MAR. NGT removed per MD orders. Call light within reach, bed alarm set, urinal within reach, safety precautions maintained.    Problem: Adult Inpatient Plan of Care  Goal: Plan of Care Review  Outcome: Progressing  Goal: Patient-Specific Goal (Individualized)  Outcome: Progressing  Goal: Absence of Hospital-Acquired Illness or Injury  Outcome: Progressing  Goal: Optimal Comfort and Wellbeing  Outcome: Progressing  Goal: Readiness for Transition of Care  Outcome: Progressing     Problem: Diabetes Comorbidity  Goal: Blood Glucose Level Within Targeted Range  Outcome: Progressing     Problem: Fall Injury Risk  Goal: Absence of Fall and Fall-Related Injury  Outcome: Progressing     Problem: Skin Injury Risk Increased  Goal: Skin Health and Integrity  Outcome: Progressing     Problem: Wound  Goal: Optimal Coping  Outcome: Progressing  Goal: Optimal Functional Ability  Outcome: Progressing  Goal: Absence of Infection Signs and Symptoms  Outcome: Progressing  Goal: Improved Oral Intake  Outcome: Progressing  Goal: Optimal Pain Control and Function  Outcome: Progressing  Goal: Skin Health and Integrity  Outcome: Progressing  Goal: Optimal Wound Healing  Outcome: Progressing     Problem: Comorbidity Management  Goal: Maintenance of COPD Symptom Control  Outcome: Progressing  Goal: Blood Pressure in Desired Range  Outcome: Progressing     Problem: Intestinal Obstruction  Goal: Optimal Bowel Function  Outcome: Progressing  Goal: Fluid and Electrolyte Balance  Outcome: Progressing  Goal: Absence of Infection Signs and Symptoms  Outcome: Progressing  Goal: Optimize Nutrition Status  Outcome: Progressing  Goal: Optimal Pain Control and Function  Outcome: Progressing     Problem: Acute Kidney Injury/Impairment  Goal: Fluid and Electrolyte Balance  Outcome: Progressing  Goal: Improved Oral Intake  Outcome: Progressing  Goal: Effective Renal Function  Outcome:  Progressing

## 2025-02-26 NOTE — PLAN OF CARE
Problem: Adult Inpatient Plan of Care  Goal: Plan of Care Review  Outcome: Progressing     Problem: Diabetes Comorbidity  Goal: Blood Glucose Level Within Targeted Range  Outcome: Progressing     Problem: Fall Injury Risk  Goal: Absence of Fall and Fall-Related Injury  Outcome: Progressing     Problem: Skin Injury Risk Increased  Goal: Skin Health and Integrity  Outcome: Progressing     Problem: Wound  Goal: Optimal Wound Healing  Outcome: Progressing

## 2025-02-26 NOTE — PLAN OF CARE
Pt for d/c to home today   Will continue [facility] at discharge  - referral sent per Epic -   orders are in   No dme ordered     Daughter to transport pt to home;   Discharging nurse to review all d/c meds/instructions         02/26/25 1050   Final Note   Assessment Type Final Discharge Note   Anticipated Discharge Disposition Home-Health  ([facility])   What phone number can be called within the next 1-3 days to see how you are doing after discharge? [phone]   Hospital Resources/Appts/Education Provided Appointments scheduled and added to AVS   Post-Acute Status   Post-Acute Authorization Home Health   Home Health Status Set-up Complete/Auth obtained   Discharge Delays None known at this time

## 2025-02-26 NOTE — PLAN OF CARE
VN note: VN completed AVS and attachments and notified bedside nurseUbaldo. Will cont to be available and intervene prn.

## 2025-02-26 NOTE — PROGRESS NOTES
Future Appointments   Date Time Provider Department Center   3/10/2025  7:00 AM SPECIMEN, SHAMAR TO SPECLAB [place]   3/10/2025  7:30 AM LAB, DEL TO LAB [place]   3/10/2025  3:00 PM Gina Renee MD [facility] IMPRI [place] [facility]   3/17/2025 11:00 AM Williams Knight DO [facility]   3/18/2025  3:00 PM Ammon Muñoz DPM [facility] PODIAT [place] [facility]   4/1/2025 11:20 AM Tommy Payton MD [facility] CARDIO [facility]   4/3/2025 11:20 AM Farhan Dumont MD [facility] PULMON [place]   4/29/2025  1:30 PM Fouzia Gardner MD [facility] [place]   5/19/2025  9:50 AM Slick Baldwin Jr., MD [facility] ORTHO [place] [facility]

## 2025-02-26 NOTE — PLAN OF CARE
AVS and educational attachments shared with patient and daughter via [employer]. Discussed thoroughly. Patient and daughter verbalized clear understanding using teach back method. Notified bedside nurse of completion of education. At present no distress noted. Patient to be discharged via w/c with escort service and family with all of patient's belonings. Will cont to be available to patient and family and intervene prn.

## 2025-02-26 NOTE — PROGRESS NOTES
[facility] - [facility] Surg  General Surgery  Progress Note    Subjective:     History of Present Illness:  Mr. Husam Connolly is a 89-year-old male with a PMHx of BPH, diabetes, emphysema, PAH, high right bundle-branch block, CKD3, HLD, HTN who presented to the ED on 2/24/25 with complaints of nausea, abdominal distention and discomfort. 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 These included a partial sigmoid resection with end colostomy creation in 2019, followed by a colostomy takedown later that year, and an open cholecystectomy with Clem in 2022, all with Dr. Gardner.    His last BM was on 2/22. He has passed occasional flatus since then. Workup in the ED included a CT scan with a transition point in the RLQ and proximal distension. NGT was placed and 1700 mL of initial output was obtained in the ED. Overnight there has only been approximately 100 mL of output.     Post-Op Info:  * No surgery found *         Interval History: Tolerating regular diet, asking for more food. Having regular bowel movements. Feels well.    Medications:  Continuous Infusions:  Scheduled Meds:   aspirin  81 mg Oral Daily    atorvastatin  40 mg Oral Daily    balsam peru-castor oiL   Topical (Top) BID    electrolytes-dextrose  200 mL Oral Q4H    enoxparin  40 mg Subcutaneous Daily    finasteride  5 mg Oral Daily    fluticasone furoate-vilanteroL  1 puff Inhalation Daily    hydrALAZINE  50 mg Oral TID    labetalol  10 mg Intravenous Once    losartan  100 mg Oral Daily    tamsulosin  0.4 mg Oral Daily     PRN Meds:  Current Facility-Administered Medications:     acetaminophen, 650 mg, Oral, Q4H PRN    albuterol, 2 puff, Inhalation, Q6H PRN    dextrose 50%, 12.5 g, Intravenous, PRN    dextrose 50%, 25 g, Intravenous, PRN    glucagon (human recombinant), 1 mg, Intramuscular, PRN    glucose, 16 g, Oral, PRN    glucose, 24 g, Oral, PRN    insulin aspart U-100, 0-5 Units, Subcutaneous, QID (AC + HS)  PRN    melatonin, 6 mg, Oral, Nightly PRN    naloxone, 0.02 mg, Intravenous, PRN    ondansetron, 4 mg, Intravenous, Q8H PRN    sodium chloride 0.9%, 5 mL, Intravenous, PRN     Review of patient's allergies indicates:   Allergen Reactions    Solarcaine [benzocaine-triclosan]     Sulfa (sulfonamide antibiotics)     Sulfamethoxazole     Trimethoprim     Empagliflozin Rash and Other (See Comments)     Yeast infection    Hytrin [terazosin] Rash    Smz-tmp ds [sulfamethoxazole-trimethoprim] Rash     Objective:     Vital Signs (Most Recent):  Temp: 97.9 °F (36.6 °C) (02/26/25 0813)  Pulse: 76 (02/26/25 1013)  Resp: 18 (02/26/25 0813)  BP: (!) 174/74 (02/26/25 0813)  SpO2: (!) 93 % (02/26/25 0813) Vital Signs (24h Range):  Temp:  [97.9 °F (36.6 °C)-98.5 °F (36.9 °C)] 97.9 °F (36.6 °C)  Pulse:  [60-82] 76  Resp:  [16-20] 18  SpO2:  [92 %-96 %] 93 %  BP: (125-174)/(44-74) 174/74     Weight: 72.8 kg (160 lb 7.9 oz)  Body mass index is 23.03 kg/m².    Intake/Output - Last 3 Shifts         02/24 0700  02/25 0659 02/25 0700 02/26 0659 02/26 0700  02/27 0659    P.O.  720 150    I.V. (mL/kg)       IV Piggyback       Total Intake(mL/kg)  720 (9.9) 150 (2.1)    Urine (mL/kg/hr) 400 (0.2) 225 (0.1)     Emesis/NG output       Drains 1075      Other 0      Stool 0      Total Output 1475 225     Net -1475 +495 +150           Urine Occurrence 1 x 1 x     Stool Occurrence 2 x               Physical Exam  Vitals reviewed.   Constitutional:       Appearance: Normal appearance.   Cardiovascular:      Rate and Rhythm: Normal rate and regular rhythm.   Pulmonary:      Effort: Pulmonary effort is normal. No respiratory distress.   Abdominal:      General: There is no distension.      Palpations: Abdomen is soft.      Tenderness: There is no abdominal tenderness.   Skin:     General: Skin is warm.   Neurological:      General: No focal deficit present.      Mental Status: He is alert and oriented to person, place, and time.          Significant  Labs:  I have reviewed all pertinent lab results within the past 24 hours.    Significant Diagnostics:  I have reviewed all pertinent imaging results/findings within the past 24 hours.  Assessment/Plan:     * SBO (small bowel obstruction)  89 yoM with SBO likely secondary to adhesive disease. NGT in place with good decompression in the ED, now with minimal output. Mr. Connolly is quite familiar with the treatment of SBO, but we reviewed the plan to treat conservatively with NGT decompression. We also discussed the potential need for surgery if conservative management fails, and he would be amenable to pursuing surgery if necessary, despite the significant surgical risk brought on by his co morbidities.    Following decompression, he has had ROBF and resolution of symptoms and is tolerating regular diet.    - Agree with d/c        Deedee Werner MD  General Surgery  [facility] S[facility]

## 2025-02-26 NOTE — ASSESSMENT & PLAN NOTE
Creatine stable for now. BMP reviewed- noted Estimated Creatinine Clearance: 39.6 mL/min (based on SCr of 1.2 mg/dL). according to latest data. Based on current GFR, CKD stage is stage 3 - GFR 30-59.  Monitor UOP and serial BMP and adjust therapy as needed. Renally dose meds. Avoid nephrotoxic medications and procedures.

## 2025-02-26 NOTE — ASSESSMENT & PLAN NOTE
89 yoM with SBO likely secondary to adhesive disease. NGT in place with good decompression in the ED, now with minimal output. Mr. Connolly is quite familiar with the treatment of SBO, but we reviewed the plan to treat conservatively with NGT decompression. We also discussed the potential need for surgery if conservative management fails, and he would be amenable to pursuing surgery if necessary, despite the significant surgical risk brought on by his co morbidities.    Following decompression, he has had ROBF and resolution of symptoms and is tolerating regular diet.    - Agree with d/c   no chest pain and no edema.

## 2025-02-26 NOTE — DISCHARGE SUMMARY
[facility] Medicine  Discharge Summary      Patient Name: Husam Connolly  MRN: [id number]  JAYLEN: [id number]  Patient Class: IP- Inpatient  Admission Date: 2/23/2025  Hospital Length of Stay: 2 days  Discharge Date and Time:  02/26/2025 8:25 AM  Attending Physician: Praful Banuelos MD   Discharging Provider: Aries Villarreal MD  Primary Care Provider: Williams Knight DO    Primary Care Team: Networked reference to record PCT     HPI:   Mr. Husam Connolly is a 89-year-old male with a PMHx  of BPH, diabetes, emphysema, high right bundle-branch block, small-bowel obstruction, CKD3, HLD, HTN who presented to the ED on 2/24/25 with complaints of nausea, diarrhea, and abdominal distention and discomfort. Patient states it feels like how it did when he previously had an SBO. Patient's daughter at bedside stated that patient has numerous episodes of SBO in the past and all have been medically managed. Patient had a couple of episodes of non-bloody emesis in the ED. Upon my assessment, patient already had NG tube placed. ED physician had already consulted General Surgery to stated to keep NG tube in place and he will be seen in the AM. Patient denies fevers, chills, abdominal pain, syncope/presyncope, chest pain, or SOB.     In the ED, initial vital signs /84, HR 95, Temp 98.2F, SpO2 95% on room air. Labs include CBC with stable H/H 15.9/46.9 and WBC elevated to 13.84. CMP with Na 137, K 4.2, Cl 102, CO2 21, and BUN/Cr 40/1.7 (baseline Cr 1.4-1.5). CT Abdomen and Pelvis: SBO with transition point in the right lower quadrant, may be due to adhesions. Moderate distension of the stomach and distal esophagus. Lipase elevated to 83. Initiated on Hydalazine 10 mg IV x1, Midazolam 2 mg IV x1, s/p 500 mL IV NS, Prochlorperazine  10 mg IV x1. [facility] consulted for evaluation for admission for Small bowel obstruction.      * No surgery found *      Hospital Course:   89-year-old male admitted  for evaluation of small bowel obstruction reviewed on CT Abdomen and Pelvis: SBO with transition point in the right lower quadrant, may be due to adhesions. Moderate distension of the stomach and distal esophagus NG tube placed and total output 1,775 mL since admission. Continued nausea and have episodes of non-bloody emesis. General Surgery recommending possible gastrografin challenge today if no improvement on NG output. Patient was NPO and received mIVF of LR at 100 mL/hr. NG tube with 1,475 mL output. Plus 2 large watery BM and gas overnight; without nausea or vomiting since yesterday. General surgery recommending removing NG tube and transitioning to a CLD --> Full liquids --> Adult regular (cardiac diabetic) tolerated well. No abdominal pain. Passing gas and BM. Ok for discharge. On the day of discharge patient was back to baseline and hemodynamically stable. He was sent home to resume home meds (as below). No med changes.  Close outpatient f/u was advised with PCP. The importance of medication adherence was again stressed to the patient. Patient agreeable to discharge plan, expressed understanding, all questions answered, return precautions were discussed.          Physical Exam  Constitutional:       General: He is not in acute distress.     Appearance: Normal appearance. He is normal weight. He is not ill-appearing, toxic-appearing or diaphoretic.   HENT:      Head: Normocephalic and atraumatic.      Right Ear: External ear normal.      Left Ear: External ear normal.      Nose: Nose normal.      Mouth/Throat:      Mouth: Mucous membranes are moist.      Pharynx: Oropharynx is clear.   Eyes:      Extraocular Movements: Extraocular movements intact.   Cardiovascular:      Rate and Rhythm: Normal rate and regular rhythm.      Pulses: Normal pulses.      Heart sounds: Normal heart sounds. No murmur heard.  Pulmonary:      Effort: Pulmonary effort is normal. No respiratory distress.      Breath sounds: Normal  breath sounds.   Abdominal:      General: There is distension (improving)     Tenderness: There is no abdominal tenderness.      Comments: Positive bowel sounds  Musculoskeletal:         General: No swelling or tenderness.   Skin:     General: Skin is warm and dry.   Neurological:      Mental Status: He is alert and oriented to person, place, and time. Mental status is at baseline.         Significant Labs: All pertinent labs within the past 24 hours have been reviewed.     Significant Imaging: I have reviewed all pertinent imaging results/findings within the past 24 hours.    Goals of Care Treatment Preferences:  Code Status: Full Code    Living Will: Yes              SDOH Screening:  The patient was screened for utility difficulties, food insecurity, transport difficulties, housing insecurity, and interpersonal safety and there were no concerns identified this admission.     Consults:   Consults (From admission, onward)          Status Ordering Provider     Inpatient consult to General surgery  Once        Provider:  Will Phillips MD    Completed JYOTI MONROY            * SBO (small bowel obstruction)  Patient with known history of multiple small bowel obstructions, now presenting with an SBO with transition point in RLQ.   NG Tube placed in ED with good placement. Patient started on intermittent suction and had put out 1.5L of fluid while in the ED.    BM: yesterday evening. Passing gas.     Plan:  - General Surgery consulted in the ED, appreciate recommendations  - Discontinue NGT per Gen Sx recs  - Advanced diet to diabetic cardiac diet, tolerated well. From medical standpoint ok for discharge.   - Zofran prn for nausea    MATEUSZ (acute kidney injury)   Acute kidney insufficiency. Improving. Resolved.   MATEUSZ is likely due to pre-renal azotemia due to intravascular volume depletion. Baseline creatinine is  1.4-1.5 . Most recent creatinine and eGFR are listed below.  Recent Labs     02/24/25  0402 02/25/25  0323  02/26/25  0327   CREATININE 1.7* 1.5* 1.2   EGFRNORACEVR 37* 43* 56*     MATEUSZ on CKD3 suspect 2/2 to IVVD due to vomiting and diarrhea.     Plan  - MATEUSZ is worsening. Will adjust treatment as follows: initiated patient on mIVF while NPO  - Avoid nephrotoxins and renally dose meds for GFR listed above  - Monitor urine output, serial BMP, and adjust therapy as needed    Pulmonary emphysema  Patient's COPD is controlled currently.  Patient is currently off COPD Pathway. Continue scheduled inhalers Steroids and monitor respiratory status closely.     - Continue breo, home trelegy not on formulary    Type 2 diabetes mellitus, without long-term current use of insulin  - Accucheks AC and qhs  - LDSSI  - Blood Glucose goal levels between 140-180      High cholesterol  Continue home atorvastatin      Stage 3b chronic kidney disease  Creatine stable for now. BMP reviewed- noted Estimated Creatinine Clearance: 39.6 mL/min (based on SCr of 1.2 mg/dL). according to latest data. Based on current GFR, CKD stage is stage 3 - GFR 30-59.  Monitor UOP and serial BMP and adjust therapy as needed. Renally dose meds. Avoid nephrotoxic medications and procedures.    BPH (benign prostatic hyperplasia)  Continue home tamsulosin      Resistant hypertension  Continue home hydralazine and losartan        Final Active Diagnoses:    Diagnosis Date Noted POA    PRINCIPAL PROBLEM:  SBO (small bowel obstruction) [K56.609] 05/31/2021 Yes    MATEUSZ (acute kidney injury) [N17.9] 02/24/2025 Yes    Pulmonary emphysema [J43.9] 02/13/2025 Yes    Type 2 diabetes mellitus, without long-term current use of insulin [E11.9] 02/13/2025 Yes    High cholesterol [E78.00] 08/26/2020 Yes     Chronic    Stage 3b chronic kidney disease [N18.32] 12/13/2018 Yes    BPH (benign prostatic hyperplasia) [N40.0]  Yes    Resistant hypertension [I1A.0]  Yes      Problems Resolved During this Admission:       Discharged Condition: good    Disposition: Home or Self Care    Follow  Up:    Patient Instructions:      Diet Adult Regular     Notify your health care provider if you experience any of the following:  persistent nausea and vomiting or diarrhea     Notify your health care provider if you experience any of the following:  severe uncontrolled pain     Notify your health care provider if you experience any of the following:  persistent dizziness, light-headedness, or visual disturbances     Activity as tolerated       Significant Diagnostic Studies: N/A    Pending Diagnostic Studies:       None           Medications:  Reconciled Home Medications:      Medication List        CONTINUE taking these medications      albuterol 90 mcg/actuation inhaler  Commonly known as: VENTOLIN HFA  Inhale 2 puffs into the lungs every 4 (four) hours as needed for Wheezing. Rescue     aspirin 81 MG EC tablet  Commonly known as: ECOTRIN  Take 81 mg by mouth once daily.     atorvastatin 40 MG tablet  Commonly known as: LIPITOR  Take 1 tablet (40 mg total) by mouth once daily.     blood sugar diagnostic Strp  Commonly known as: ACCU-CHEK GUIDE TEST STRIPS  1 strip by In Vitro route once daily.     blood-glucose meter kit  Use as instructed     finasteride 5 mg tablet  Commonly known as: PROSCAR  Take 1 tablet (5 mg total) by mouth once daily.     fish oil-omega-3 fatty acids 300-1,000 mg capsule  Take 1 capsule by mouth once daily.     fluticasone propionate 50 mcg/actuation nasal spray  Commonly known as: FLONASE  2 sprays (100 mcg total) by Each Nostril route once daily.     fluticasone-umeclidin-vilanter 100-62.5-25 mcg Dsdv  Commonly known as: TRELEGY ELLIPTA  Inhale 1 puff into the lungs once daily.     GARLIC ORAL  Take 1 capsule by mouth once daily.     hydrALAZINE 50 MG tablet  Commonly known as: APRESOLINE  Take 1 tablet (50 mg total) by mouth 3 (three) times daily.     lancets Misc  To check sugar daily, to use with insurance preferred meter     losartan 100 MG tablet  Commonly known as: COZAAR  Take 1  tablet (100 mg total) by mouth once daily.     multivitamin capsule  Take 1 capsule by mouth once daily.     OPTICHAMBER BASHIR [facility]  Generic drug: inhalation spacing device  Use as directed for inhalation.     tamsulosin 0.4 mg Cap  Commonly known as: FLOMAX  Take 0.4 mg by mouth once daily.     vitamin D 1000 units Tab  Commonly known as: VITAMIN D3  Take 1,000 Units by mouth once daily.              Indwelling Lines/Drains at time of discharge:   Lines/Drains/Airways       None                   Time spent on the discharge of patient: 45 minutes         Aries Villarreal MD  Department of Hospital Medicine  [facility] Surg

## 2025-02-26 NOTE — SUBJECTIVE & OBJECTIVE
Interval History: Tolerating regular diet, asking for more food. Having regular bowel movements. Feels well.    Medications:  Continuous Infusions:  Scheduled Meds:   aspirin  81 mg Oral Daily    atorvastatin  40 mg Oral Daily    balsam peru-castor oiL   Topical (Top) BID    electrolytes-dextrose  200 mL Oral Q4H    enoxparin  40 mg Subcutaneous Daily    finasteride  5 mg Oral Daily    fluticasone furoate-vilanteroL  1 puff Inhalation Daily    hydrALAZINE  50 mg Oral TID    labetalol  10 mg Intravenous Once    losartan  100 mg Oral Daily    tamsulosin  0.4 mg Oral Daily     PRN Meds:  Current Facility-Administered Medications:     acetaminophen, 650 mg, Oral, Q4H PRN    albuterol, 2 puff, Inhalation, Q6H PRN    dextrose 50%, 12.5 g, Intravenous, PRN    dextrose 50%, 25 g, Intravenous, PRN    glucagon (human recombinant), 1 mg, Intramuscular, PRN    glucose, 16 g, Oral, PRN    glucose, 24 g, Oral, PRN    insulin aspart U-100, 0-5 Units, Subcutaneous, QID (AC + HS) PRN    melatonin, 6 mg, Oral, Nightly PRN    naloxone, 0.02 mg, Intravenous, PRN    ondansetron, 4 mg, Intravenous, Q8H PRN    sodium chloride 0.9%, 5 mL, Intravenous, PRN     Review of patient's allergies indicates:   Allergen Reactions    Solarcaine [benzocaine-triclosan]     Sulfa (sulfonamide antibiotics)     Sulfamethoxazole     Trimethoprim     Empagliflozin Rash and Other (See Comments)     Yeast infection    Hytrin [terazosin] Rash    Smz-tmp ds [sulfamethoxazole-trimethoprim] Rash     Objective:     Vital Signs (Most Recent):  Temp: 97.9 °F (36.6 °C) (02/26/25 0813)  Pulse: 76 (02/26/25 1013)  Resp: 18 (02/26/25 0813)  BP: (!) 174/74 (02/26/25 0813)  SpO2: (!) 93 % (02/26/25 0813) Vital Signs (24h Range):  Temp:  [97.9 °F (36.6 °C)-98.5 °F (36.9 °C)] 97.9 °F (36.6 °C)  Pulse:  [60-82] 76  Resp:  [16-20] 18  SpO2:  [92 %-96 %] 93 %  BP: (125-174)/(44-74) 174/74     Weight: 72.8 kg (160 lb 7.9 oz)  Body mass index is 23.03 kg/m².    Intake/Output -  Last 3 Shifts         02/24 0700  02/25 0659 02/25 0700  02/26 0659 02/26 0700 02/27 0659    P.O.  720 150    I.V. (mL/kg)       IV Piggyback       Total Intake(mL/kg)  720 (9.9) 150 (2.1)    Urine (mL/kg/hr) 400 (0.2) 225 (0.1)     Emesis/NG output       Drains 1075      Other 0      Stool 0      Total Output 1475 225     Net -1475 +495 +150           Urine Occurrence 1 x 1 x     Stool Occurrence 2 x               Physical Exam  Vitals reviewed.   Constitutional:       Appearance: Normal appearance.   Cardiovascular:      Rate and Rhythm: Normal rate and regular rhythm.   Pulmonary:      Effort: Pulmonary effort is normal. No respiratory distress.   Abdominal:      General: There is no distension.      Palpations: Abdomen is soft.      Tenderness: There is no abdominal tenderness.   Skin:     General: Skin is warm.   Neurological:      General: No focal deficit present.      Mental Status: He is alert and oriented to person, place, and time.          Significant Labs:  I have reviewed all pertinent lab results within the past 24 hours.    Significant Diagnostics:  I have reviewed all pertinent imaging results/findings within the past 24 hours.

## 2025-02-26 NOTE — ASSESSMENT & PLAN NOTE
Patient with known history of multiple small bowel obstructions, now presenting with an SBO with transition point in RLQ.   NG Tube placed in ED with good placement. Patient started on intermittent suction and had put out 1.5L of fluid while in the ED.    BM: yesterday evening. Passing gas.     Plan:  - General Surgery consulted in the ED, appreciate recommendations  - Discontinue NGT per Gen Sx recs  - Advanced diet to diabetic cardiac diet, tolerated well. From medical standpoint ok for discharge.   - Zofran prn for nausea

## 2025-03-06 ENCOUNTER — EXTERNAL HOME HEALTH (OUTPATIENT)
Dept: HOME HEALTH SERVICES | Facility: HOSPITAL | Age: OVER 89
End: 2025-03-06
Payer: MEDICARE

## 2025-03-10 ENCOUNTER — OFFICE VISIT (OUTPATIENT)
Dept: PRIMARY CARE CLINIC | Facility: CLINIC | Age: OVER 89
End: 2025-03-10
Payer: MEDICARE

## 2025-03-10 ENCOUNTER — LAB VISIT (OUTPATIENT)
Dept: LAB | Facility: HOSPITAL | Age: OVER 89
End: 2025-03-10
Attending: STUDENT IN AN ORGANIZED HEALTH CARE EDUCATION/TRAINING PROGRAM
Payer: MEDICARE

## 2025-03-10 VITALS
TEMPERATURE: 97 F | OXYGEN SATURATION: 96 % | HEIGHT: 70 IN | SYSTOLIC BLOOD PRESSURE: 159 MMHG | WEIGHT: 158.06 LBS | HEART RATE: 71 BPM | DIASTOLIC BLOOD PRESSURE: 69 MMHG | BODY MASS INDEX: 22.63 KG/M2

## 2025-03-10 DIAGNOSIS — N18.32 TYPE 2 DIABETES MELLITUS WITH STAGE 3B CHRONIC KIDNEY DISEASE, WITHOUT LONG-TERM CURRENT USE OF INSULIN: ICD-10-CM

## 2025-03-10 DIAGNOSIS — E11.22 TYPE 2 DIABETES MELLITUS WITH STAGE 3B CHRONIC KIDNEY DISEASE, WITHOUT LONG-TERM CURRENT USE OF INSULIN: ICD-10-CM

## 2025-03-10 DIAGNOSIS — E11.65 TYPE 2 DIABETES MELLITUS WITH HYPERGLYCEMIA, WITHOUT LONG-TERM CURRENT USE OF INSULIN: ICD-10-CM

## 2025-03-10 DIAGNOSIS — K56.609 SBO (SMALL BOWEL OBSTRUCTION): Primary | ICD-10-CM

## 2025-03-10 DIAGNOSIS — N18.32 STAGE 3B CHRONIC KIDNEY DISEASE: ICD-10-CM

## 2025-03-10 LAB
ALBUMIN SERPL BCP-MCNC: 3 G/DL (ref 3.5–5.2)
ALP SERPL-CCNC: 114 U/L (ref 40–150)
ALT SERPL W/O P-5'-P-CCNC: 15 U/L (ref 10–44)
ANION GAP SERPL CALC-SCNC: 12 MMOL/L (ref 8–16)
AST SERPL-CCNC: 29 U/L (ref 10–40)
BILIRUB SERPL-MCNC: 0.6 MG/DL (ref 0.1–1)
BUN SERPL-MCNC: 36 MG/DL (ref 10–30)
CALCIUM SERPL-MCNC: 8.8 MG/DL (ref 8.7–10.5)
CHLORIDE SERPL-SCNC: 108 MMOL/L (ref 95–110)
CO2 SERPL-SCNC: 18 MMOL/L (ref 23–29)
CREAT SERPL-MCNC: 1.5 MG/DL (ref 0.5–1.4)
EST. GFR  (NO RACE VARIABLE): 43.1 ML/MIN/1.73 M^2
GLUCOSE SERPL-MCNC: 157 MG/DL (ref 70–110)
POTASSIUM SERPL-SCNC: 4.1 MMOL/L (ref 3.5–5.1)
PROT SERPL-MCNC: 6.1 G/DL (ref 6–8.4)
SODIUM SERPL-SCNC: 138 MMOL/L (ref 136–145)
TSH SERPL DL<=0.005 MIU/L-ACNC: 0.65 UIU/ML (ref 0.4–4)

## 2025-03-10 PROCEDURE — 80053 COMPREHEN METABOLIC PANEL: CPT | Performed by: STUDENT IN AN ORGANIZED HEALTH CARE EDUCATION/TRAINING PROGRAM

## 2025-03-10 PROCEDURE — 85025 COMPLETE CBC W/AUTO DIFF WBC: CPT | Performed by: STUDENT IN AN ORGANIZED HEALTH CARE EDUCATION/TRAINING PROGRAM

## 2025-03-10 PROCEDURE — 36415 COLL VENOUS BLD VENIPUNCTURE: CPT | Mod: PO | Performed by: STUDENT IN AN ORGANIZED HEALTH CARE EDUCATION/TRAINING PROGRAM

## 2025-03-10 PROCEDURE — 84443 ASSAY THYROID STIM HORMONE: CPT | Performed by: STUDENT IN AN ORGANIZED HEALTH CARE EDUCATION/TRAINING PROGRAM

## 2025-03-10 PROCEDURE — 99999 PR PBB SHADOW E&M-EST. PATIENT-LVL III: CPT | Mod: PBBFAC,,, | Performed by: INTERNAL MEDICINE

## 2025-03-10 PROCEDURE — 83036 HEMOGLOBIN GLYCOSYLATED A1C: CPT | Performed by: STUDENT IN AN ORGANIZED HEALTH CARE EDUCATION/TRAINING PROGRAM

## 2025-03-10 RX ORDER — GLIPIZIDE 5 MG/1
5 TABLET ORAL
Qty: 90 TABLET | Refills: 3 | Status: SHIPPED | OUTPATIENT
Start: 2025-03-10 | End: 2026-03-10

## 2025-03-10 NOTE — PROGRESS NOTES
[facility]   New Visit Progress Note   Recent Hospital Discharge     PRESENTING HISTORY     Chief Complaint/Reason for Admission:  Follow up Hospital Discharge   PCP: Williams Knight DO    History of Present Illness:  Mr. Husam Connolly is a 89 y.o. male who was recently admitted to the hospital.    [facility] Medicine  Discharge Summary        Patient Name: Husam Connolly  MRN: [id number]  JAYLEN: [id number]  Patient Class: IP- Inpatient  Admission Date: 2/23/2025  Hospital Length of Stay: 2 days  Discharge Date and Time:  02/26/2025 8:25 AM  Attending Physician: Praful Banuelos MD   Discharging Provider: Aries Villarreal MD  Primary Care Provider: Williams Knight DO  ___________________________________________________________________    Today:  [facility] follow up.  Recently hospitalized for management of small bowel obstruction.  [facility] with General Surgery consultation.  NG tube placed for decompression.  Patient responded favorably to medical management.  NG tube removed and diet advanced.  Discharged to home with [facility].     Patient accompanied today by family.  Ambulatory and independent with ADL's.  Lives alone, manages his own household and finances.  Brought medication bottles for review.  Re-started Glipizide due to hyperglycemia- I have added this to his Epic medication list.   Had one mechanical fall since hospital discharge-> got out of bed rapidly to turn off house alarm in middle of night and fell while doing this. Denies injury.       Review of Systems  General ROS: negative for chills, fever or weight loss  Psychological ROS: negative for hallucination, depression or suicidal ideation  Ophthalmic ROS: negative for blurry vision, photophobia or eye pain  ENT ROS: negative for epistaxis, sore throat or rhinorrhea  Respiratory ROS: no cough, shortness of breath, or  wheezing  Cardiovascular ROS: no chest pain or dyspnea on exertion  Gastrointestinal ROS: no abdominal pain, change in bowel habits, or black/ bloody stools  Genito-Urinary ROS: no dysuria, trouble voiding, or hematuria  Musculoskeletal ROS: negative for gait disturbance or muscular weakness  Neurological ROS: no syncope or seizures; no ataxia  Dermatological ROS: negative for pruritis, rash and jaundice      PAST HISTORY:     Past Medical History:   Diagnosis Date    BPH (benign prostatic hyperplasia)     Diabetes mellitus (type 2)     Emphysema 03/12/2022    High cholesterol 08/26/2020    Hypertension     RBBB 11/29/2022    Small bowel obstruction 06/13/2019       Past Surgical History:   Procedure Laterality Date    ANASTOMOSIS OF ILEUM TO RECTUM  9/9/2019    Procedure: ANASTOMOSIS, COLO-RECTAL;  Surgeon: Fouzia Gardner MD;  Location: [facility];  Service: General;;    CHOLECYSTECTOMY N/A 3/7/2022    Procedure: CHOLECYSTECTOMY;  Surgeon: Fouzia Gardner MD;  Location: [facility];  Service: General;  Laterality: N/A;    COLOSTOMY  6/16/2019    Procedure: CREATION, COLOSTOMY;  Surgeon: Fouzia Gardner MD;  Location: [facility] OR;  Service: General;;    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    Procedure: RELEASE, DUPUYTREN CONTRACTURE;  Surgeon: Slick Baldwin Jr., MD;  Location: [facility];  Service: Orthopedics;  Laterality: Left;    EYE SURGERY      GISELA PROCEDURE  6/16/2019    Procedure: GISELA PROCEDURE;  Surgeon: Fouzia Gardner MD;  Location: [facility];  Service: General;;  Colon resection    LYSIS OF ADHESIONS N/A 3/7/2022    Procedure: LYSIS, ADHESIONS;  Surgeon: Fouzia Gardner MD;  Location: [facility];  Service: General;  Laterality: N/A;    TONSILLECTOMY         Family History   Problem Relation Name Age of Onset    Heart disease Father      Hypertension Father      Cancer Mother cancer breas varian     Diabetes Paternal Aunt           MEDICATIONS & ALLERGIES:     Medications  "Ordered Prior to Encounter[1]     Review of patient's allergies indicates:   Allergen Reactions    Solarcaine [benzocaine-triclosan]     Sulfa (sulfonamide antibiotics)     Sulfamethoxazole     Trimethoprim     Empagliflozin Rash and Other (See Comments)     Yeast infection    Hytrin [terazosin] Rash    Smz-tmp ds [sulfamethoxazole-trimethoprim] Rash       OBJECTIVE:     Vital Signs:  BP (!) 159/69 (BP Location: Left arm, Patient Position: Sitting)   Pulse 71   Temp 96.6 °F (35.9 °C) (Oral)   Ht 5' 10" (1.778 m)   Wt 71.7 kg (158 lb 1.1 oz)   SpO2 96%   BMI 22.68 kg/m²   Wt Readings from Last 3 Encounters:   03/10/25 1421 71.7 kg (158 lb 1.1 oz)   02/24/25 0232 72.8 kg (160 lb 7.9 oz)   02/23/25 2203 74.8 kg (165 lb)   02/16/25 0559 74.9 kg (165 lb 2 oz)   02/14/25 1345 76.7 kg (169 lb)   02/14/25 0737 77.1 kg (169 lb 15.6 oz)   02/13/25 2240 77.1 kg (170 lb)     Body mass index is 22.68 kg/m².        Physical Exam:  BP (!) 159/69 (BP Location: Left arm, Patient Position: Sitting)   Pulse 71   Temp 96.6 °F (35.9 °C) (Oral)   Ht 5' 10" (1.778 m)   Wt 71.7 kg (158 lb 1.1 oz)   SpO2 96%   BMI 22.68 kg/m²   General appearance: alert, cooperative, no distress  Constitutional:Oriented to person, place, and time  + appears well-developed and well-nourished.   HEENT: Normocephalic, atraumatic, neck symmetrical, no nasal discharge   Eyes: conjunctivae/corneas clear, PERRL, EOM's intact  Lungs: clear to auscultation bilaterally, no dullness to percussion bilaterally  Heart: regular rate and rhythm without rub; no displacement of the PMI   Abdomen: soft, non-tender; bowel sounds normoactive; no organomegaly  Extremities: extremities symmetric; no clubbing, cyanosis, or edema  Integument: + scattered bruises and skin tears   Neurologic: Alert and oriented X 3, normal strength, normal coordination and gait  Psychiatric: no pressured speech; normal affect; no evidence of impaired cognition     Laboratory  Lab Results "   Component Value Date    WBC 9.82 02/26/2025    HGB 12.4 (L) 02/26/2025    HCT 37.9 (L) 02/26/2025    MCV 89 02/26/2025     02/26/2025     BMP  Lab Results   Component Value Date     02/26/2025    K 3.8 02/26/2025     02/26/2025    CO2 25 02/26/2025    BUN 34 (H) 02/26/2025    CREATININE 1.2 02/26/2025    CALCIUM 8.2 (L) 02/26/2025    ANIONGAP 8 02/26/2025    EGFRNORACEVR 56 (A) 02/26/2025     Lab Results   Component Value Date    ALT 14 02/26/2025    AST 15 02/26/2025    ALKPHOS 71 02/26/2025    BILITOT 0.8 02/26/2025     Lab Results   Component Value Date    INR 1.1 09/10/2019    INR 1.1 04/18/2015    INR 1.0 05/30/2013     Lab Results   Component Value Date    HGBA1C 5.8 (H) 02/14/2025       Diagnostic Results:    CT ABD Pelvis 2/23/25:  1. Small bowel obstruction with transition point in the right lower quadrant, may be due to adhesions.  2. Moderate distension of the stomach and distal esophagus.  3.  Colonic diverticulosis without evidence of acute diverticulitis.  4. Prostatomegaly.    ASSESSMENT & PLAN:       SBO (small bowel obstruction)  - recent hospitalization as above  - managed medically with NG tube decompression and supportive care  - currently tolerating oral intake, having regular bowel movements, no ABD pain     Type 2 diabetes mellitus with hyperglycemia, without long-term current use of insulin  -     glipiZIDE (GLUCOTROL) 5 MG tablet; Take 1 tablet (5 mg total) by mouth 2 (two) times daily before meals.  Dispense: 90 tablet; Refill: 3    Patient will be released from hospital follow up clinic.  Follow up as detailed below.     Instructions for the patient:      Scheduled Follow-up :  Future Appointments   Date Time Provider Department Center   3/17/2025 11:00 AM Williams Knight DO [facility]   3/18/2025  3:00 PM Ammon Muñoz, DPM [facility]   4/1/2025 11:20 AM Tommy Payton MD [facility] CARDIO [place] [facility]   4/3/2025 11:20 AM Tim,  Farhan MARTINI MD [facility] PULMON [place]   4/29/2025  1:30 PM Fouzia Gardner MD MSUL [facility] Fouzia   5/19/2025  9:50 AM Slick Baldwin Jr., MD [facility] DAYANARA [facility]       Post Visit Medication List:     Medication List            Accurate as of March 10, 2025 11:59 PM. If you have any questions, ask your nurse or doctor.                START taking these medications      glipiZIDE 5 MG tablet  Commonly known as: GLUCOTROL  Take 1 tablet (5 mg total) by mouth 2 (two) times daily before meals.  Started by: Gina Renee MD            CONTINUE taking these medications      albuterol 90 mcg/actuation inhaler  Commonly known as: VENTOLIN HFA  Inhale 2 puffs into the lungs every 4 (four) hours as needed for Wheezing. Rescue     aspirin 81 MG EC tablet  Commonly known as: ECOTRIN     atorvastatin 40 MG tablet  Commonly known as: LIPITOR  Take 1 tablet (40 mg total) by mouth once daily.     blood sugar diagnostic Strp  Commonly known as: ACCU-CHEK GUIDE TEST STRIPS  1 strip by In Vitro route once daily.     blood-glucose meter kit  Use as instructed     finasteride 5 mg tablet  Commonly known as: PROSCAR  Take 1 tablet (5 mg total) by mouth once daily.     fish oil-omega-3 fatty acids 300-1,000 mg capsule     fluticasone propionate 50 mcg/actuation nasal spray  Commonly known as: FLONASE  2 sprays (100 mcg total) by Each Nostril route once daily.     fluticasone-umeclidin-vilanter 100-62.5-25 mcg Dsdv  Commonly known as: TRELEGY ELLIPTA  Inhale 1 puff into the lungs once daily.     GARLIC ORAL     hydrALAZINE 50 MG tablet  Commonly known as: APRESOLINE  Take 1 tablet (50 mg total) by mouth 3 (three) times daily.     lancets Misc  To check sugar daily, to use with insurance preferred meter     losartan 100 MG tablet  Commonly known as: COZAAR  Take 1 tablet (100 mg total) by mouth once daily.     multivitamin capsule     OPTICHAMBER BASHIR [facility]  Generic drug: inhalation spacing device  Use as directed for  inhalation.     tamsulosin 0.4 mg Cap  Commonly known as: FLOMAX     vitamin D 1000 units Tab  Commonly known as: VITAMIN D3               Where to Get Your Medications        These medications were sent to [id number] - [place] - [address] AT [facility] [address]  [address] [place] [address]      Phone: [phone]   glipiZIDE 5 MG tablet         Signing Physician:  Gina Renee MD         [1]   Current Outpatient Medications on File Prior to Visit   Medication Sig Dispense Refill    albuterol (VENTOLIN HFA) 90 mcg/actuation inhaler Inhale 2 puffs into the lungs every 4 (four) hours as needed for Wheezing. Rescue 18 g 11    aspirin (ECOTRIN) 81 MG EC tablet Take 81 mg by mouth once daily.      atorvastatin (LIPITOR) 40 MG tablet Take 1 tablet (40 mg total) by mouth once daily. 90 tablet 3    blood sugar diagnostic (ACCU-CHEK GUIDE TEST STRIPS) Strp 1 strip by In Vitro route once daily. 100 strip 3    blood-glucose meter kit Use as instructed 1 each 0    finasteride (PROSCAR) 5 mg tablet Take 1 tablet (5 mg total) by mouth once daily. 90 tablet 3    fluticasone propionate (FLONASE) 50 mcg/actuation nasal spray 2 sprays (100 mcg total) by Each Nostril route once daily. 48 g 3    fluticasone-umeclidin-vilanter (TRELEGY ELLIPTA) 100-62.5-25 mcg DsDv Inhale 1 puff into the lungs once daily. 180 each 3    GARLIC ORAL Take 1 capsule by mouth once daily.      hydrALAZINE (APRESOLINE) 50 MG tablet Take 1 tablet (50 mg total) by mouth 3 (three) times daily. 270 tablet 3    inhalation spacing device Use as directed for inhalation. 1 each 0    lancets Misc To check sugar daily, to use with insurance preferred meter 200 each 2    losartan (COZAAR) 100 MG tablet Take 1 tablet (100 mg total) by mouth once daily. 90 tablet 3    multivitamin capsule Take 1 capsule by mouth once daily.      omega-3 fatty acids/fish oil (FISH OIL-OMEGA-3 FATTY ACIDS) 300-1,000 mg capsule  Take 1 capsule by mouth once daily.      tamsulosin (FLOMAX) 0.4 mg Cap Take 0.4 mg by mouth once daily.      vitamin D (VITAMIN D3) 1000 units Tab Take 1,000 Units by mouth once daily.       No current facility-administered medications on file prior to visit.

## 2025-03-11 ENCOUNTER — RESULTS FOLLOW-UP (OUTPATIENT)
Dept: FAMILY MEDICINE | Facility: CLINIC | Age: OVER 89
End: 2025-03-11

## 2025-03-11 LAB
BASOPHILS # BLD AUTO: 0.04 K/UL (ref 0–0.2)
BASOPHILS NFR BLD: 0.6 % (ref 0–1.9)
DIFFERENTIAL METHOD BLD: ABNORMAL
EOSINOPHIL # BLD AUTO: 0.2 K/UL (ref 0–0.5)
EOSINOPHIL NFR BLD: 2.3 % (ref 0–8)
ERYTHROCYTE [DISTWIDTH] IN BLOOD BY AUTOMATED COUNT: 13.8 % (ref 11.5–14.5)
ESTIMATED AVG GLUCOSE: 143 MG/DL (ref 68–131)
HBA1C MFR BLD: 6.6 % (ref 4–5.6)
HCT VFR BLD AUTO: 43.5 % (ref 40–54)
HGB BLD-MCNC: 13.4 G/DL (ref 14–18)
IMM GRANULOCYTES # BLD AUTO: 0.02 K/UL (ref 0–0.04)
IMM GRANULOCYTES NFR BLD AUTO: 0.3 % (ref 0–0.5)
LYMPHOCYTES # BLD AUTO: 1.6 K/UL (ref 1–4.8)
LYMPHOCYTES NFR BLD: 24.4 % (ref 18–48)
MCH RBC QN AUTO: 28.7 PG (ref 27–31)
MCHC RBC AUTO-ENTMCNC: 30.8 G/DL (ref 32–36)
MCV RBC AUTO: 93 FL (ref 82–98)
MONOCYTES # BLD AUTO: 0.7 K/UL (ref 0.3–1)
MONOCYTES NFR BLD: 9.8 % (ref 4–15)
NEUTROPHILS # BLD AUTO: 4.1 K/UL (ref 1.8–7.7)
NEUTROPHILS NFR BLD: 62.6 % (ref 38–73)
NRBC BLD-RTO: 0 /100 WBC
PLATELET # BLD AUTO: 213 K/UL (ref 150–450)
PMV BLD AUTO: 11.2 FL (ref 9.2–12.9)
RBC # BLD AUTO: 4.67 M/UL (ref 4.6–6.2)
WBC # BLD AUTO: 6.61 K/UL (ref 3.9–12.7)

## 2025-03-13 ENCOUNTER — TELEPHONE (OUTPATIENT)
Dept: PULMONOLOGY | Facility: CLINIC | Age: OVER 89
End: 2025-03-13
Payer: MEDICARE

## 2025-03-13 RX ORDER — FLUTICASONE FUROATE, UMECLIDINIUM BROMIDE AND VILANTEROL TRIFENATATE 100; 62.5; 25 UG/1; UG/1; UG/1
1 POWDER RESPIRATORY (INHALATION) DAILY
Qty: 180 EACH | Refills: 3 | Status: SHIPPED | OUTPATIENT
Start: 2025-03-13

## 2025-03-13 NOTE — TELEPHONE ENCOUNTER
----- Message from Natera sent at 3/13/2025  9:29 AM CDT -----  Regarding: Refill  Contact: Pt [phone]  Rx Refill/Request Is this a Refill or New Rx:Refill  Rx Name and Strength:fluticasone-umeclidin-vilanter (TRELEGY ELLIPTA) 100-62.5-25 mcg DsDv Preferred Pharmacy with phone number:[id number] - [place] - [address] AT [facility] [id number] [address]Tfmwn: [phone] Fax: [phone] Preference:Phone  Additional Information:Please call

## 2025-03-13 NOTE — TELEPHONE ENCOUNTER
----- Message from [employer] sent at 3/13/2025  9:29 AM CDT -----  Regarding: Refill  Contact: Pt [phone]  Rx Refill/Request Is this a Refill or New Rx:Refill  Rx Name and Strength:fluticasone-umeclidin-vilanter (TRELEGY ELLIPTA) 100-62.5-25 mcg DsDv Preferred Pharmacy with phone number:[facility] #[id number] - [place] - [address] AT [facility] [id number] [address]Poyiw: [phone] Fax: [phone] Preference:Phone  Additional Information:Please call

## 2025-03-17 ENCOUNTER — OFFICE VISIT (OUTPATIENT)
Dept: FAMILY MEDICINE | Facility: CLINIC | Age: OVER 89
End: 2025-03-17
Payer: MEDICARE

## 2025-03-17 VITALS
WEIGHT: 159.38 LBS | SYSTOLIC BLOOD PRESSURE: 122 MMHG | OXYGEN SATURATION: 96 % | HEART RATE: 95 BPM | BODY MASS INDEX: 22.82 KG/M2 | DIASTOLIC BLOOD PRESSURE: 68 MMHG | HEIGHT: 70 IN

## 2025-03-17 DIAGNOSIS — I27.20 PULMONARY HYPERTENSION: ICD-10-CM

## 2025-03-17 DIAGNOSIS — I48.0 PAROXYSMAL ATRIAL FIBRILLATION: ICD-10-CM

## 2025-03-17 DIAGNOSIS — K59.04 CHRONIC IDIOPATHIC CONSTIPATION: Primary | ICD-10-CM

## 2025-03-17 DIAGNOSIS — M54.50 ACUTE BILATERAL LOW BACK PAIN WITHOUT SCIATICA: ICD-10-CM

## 2025-03-17 DIAGNOSIS — Z93.3 S/P COLOSTOMY: ICD-10-CM

## 2025-03-17 DIAGNOSIS — E11.22 TYPE 2 DIABETES MELLITUS WITH STAGE 3B CHRONIC KIDNEY DISEASE, WITHOUT LONG-TERM CURRENT USE OF INSULIN: ICD-10-CM

## 2025-03-17 DIAGNOSIS — N18.32 TYPE 2 DIABETES MELLITUS WITH STAGE 3B CHRONIC KIDNEY DISEASE, WITHOUT LONG-TERM CURRENT USE OF INSULIN: ICD-10-CM

## 2025-03-17 DIAGNOSIS — I1A.0 RESISTANT HYPERTENSION: ICD-10-CM

## 2025-03-17 PROCEDURE — 99215 OFFICE O/P EST HI 40 MIN: CPT | Mod: S$GLB,,, | Performed by: STUDENT IN AN ORGANIZED HEALTH CARE EDUCATION/TRAINING PROGRAM

## 2025-03-17 PROCEDURE — 1126F AMNT PAIN NOTED NONE PRSNT: CPT | Mod: CPTII,S$GLB,, | Performed by: STUDENT IN AN ORGANIZED HEALTH CARE EDUCATION/TRAINING PROGRAM

## 2025-03-17 PROCEDURE — 1100F PTFALLS ASSESS-DOCD GE2>/YR: CPT | Mod: CPTII,S$GLB,, | Performed by: STUDENT IN AN ORGANIZED HEALTH CARE EDUCATION/TRAINING PROGRAM

## 2025-03-17 PROCEDURE — G2211 COMPLEX E/M VISIT ADD ON: HCPCS | Mod: S$GLB,,, | Performed by: STUDENT IN AN ORGANIZED HEALTH CARE EDUCATION/TRAINING PROGRAM

## 2025-03-17 PROCEDURE — 1159F MED LIST DOCD IN RCRD: CPT | Mod: CPTII,S$GLB,, | Performed by: STUDENT IN AN ORGANIZED HEALTH CARE EDUCATION/TRAINING PROGRAM

## 2025-03-17 PROCEDURE — 1160F RVW MEDS BY RX/DR IN RCRD: CPT | Mod: CPTII,S$GLB,, | Performed by: STUDENT IN AN ORGANIZED HEALTH CARE EDUCATION/TRAINING PROGRAM

## 2025-03-17 PROCEDURE — 99999 PR PBB SHADOW E&M-EST. PATIENT-LVL V: CPT | Mod: PBBFAC,,, | Performed by: STUDENT IN AN ORGANIZED HEALTH CARE EDUCATION/TRAINING PROGRAM

## 2025-03-17 PROCEDURE — 3288F FALL RISK ASSESSMENT DOCD: CPT | Mod: CPTII,S$GLB,, | Performed by: STUDENT IN AN ORGANIZED HEALTH CARE EDUCATION/TRAINING PROGRAM

## 2025-03-17 NOTE — PROGRESS NOTES
Progress Note  [facility]    Subjective:     Husam Connolly is a 89 y.o. year old male with current diagnoses of centrilobular emphysema, pulmonary nodules (stable on recent CT), HTN, HLD, aortic atherosclerosis, pulmonary HTN, CKD 3, BPH, T2DM, RBBB, s/p small bowel resection with re-anastimosis 2/2 adhesions who presents to clinic for f/u    Was hospitalized twice in February. Once for fall and once for SBO. Has had this intermittently since anasamosis after colostomy in 2019. Since being home has had some constipation, will take miralax as needed for this. Does not drink much water throughout the day.     Gresham shave some back discomfort worse with going from sitting to standing. Lidocaine and asparcream helps. Pain 2-3/10. Pain int he lower back b/l. Does not think fall triggered. Does not radiate. Has had sciatica in the past and gets massages to help. Seeing Scott YOUNG who is doing PT.    T2DM: In the hospital for the fall they discontinued glipizide and when he got home his sugar was 190 continuously. After most recent hospital discharge [facility] re-started the glipizide. Since re-starting sugar 136 this morning. Checks sugar regularly. Sugar at the lowest 100. Typically 120-130's.   Current regimen- glipizide 5mg BID  Last A1C- 6.6%  Last Urine Microalbumin- wnl      HTN/PAF: Seeing cardiology. Doing well. Has not had any more low BP since fall.         Patient Active Problem List    Diagnosis Date Noted    MATEUSZ (acute kidney injury) 02/24/2025    Syncope 02/13/2025    Type 2 diabetes mellitus, without long-term current use of insulin 02/13/2025    Pulmonary emphysema 02/13/2025    COPD exacerbation 12/24/2024    Elevated troponin 12/24/2024    Chronic left shoulder pain 07/10/2024    Central sleep apnea 06/10/2024    LC (obstructive sleep apnea) 06/06/2024    Senile purpura 06/05/2024    Muscle weakness 03/11/2024    Shoulder stiffness, left 03/11/2024     Pain in joint of left shoulder 03/11/2024    COPD, group B, by GOLD 2017 classification 08/23/2023    Pulmonary hypertension 07/24/2023    Shoulder arthritis 01/05/2023    RBBB 11/29/2022    Pulmonary nodules     Dupuytren's contracture of left hand 03/21/2022    SBO (small bowel obstruction) 05/31/2021    Aortic atherosclerosis 05/11/2021    Chronic rhinitis 03/01/2021    S/P colostomy 03/01/2021    Right foot drop 08/26/2020    High cholesterol 08/26/2020    Ventral hernia without obstruction or gangrene 02/27/2020    S/P cholecystectomy 07/01/2019    Stage 3b chronic kidney disease 12/13/2018    Resistant hypertension     BPH (benign prostatic hyperplasia)         Review of patient's allergies indicates:   Allergen Reactions    Solarcaine [benzocaine-triclosan]     Sulfa (sulfonamide antibiotics)     Sulfamethoxazole     Trimethoprim     Empagliflozin Rash and Other (See Comments)     Yeast infection    Hytrin [terazosin] Rash    Smz-tmp ds [sulfamethoxazole-trimethoprim] Rash        Past Medical History:   Diagnosis Date    BPH (benign prostatic hyperplasia)     Diabetes mellitus (type 2)     Emphysema 03/12/2022    High cholesterol 08/26/2020    Hypertension     RBBB 11/29/2022    Small bowel obstruction 06/13/2019      Past Surgical History:   Procedure Laterality Date    ANASTOMOSIS OF ILEUM TO RECTUM  9/9/2019    Procedure: ANASTOMOSIS, COLO-RECTAL;  Surgeon: Fouzia Gardner MD;  Location: [facility] OR;  Service: General;;    CHOLECYSTECTOMY N/A 3/7/2022    Procedure: CHOLECYSTECTOMY;  Surgeon: Fouzia Gardner MD;  Location: [facility] OR;  Service: General;  Laterality: N/A;    COLOSTOMY  6/16/2019    Procedure: CREATION, COLOSTOMY;  Surgeon: Fouzia Gardner MD;  Location: [facility] OR;  Service: General;;    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    Procedure: RELEASE, DUPUYTREN CONTRACTURE;  Surgeon: Slick Baldwin Jr., MD;  Location: [facility] OR;  Service: Orthopedics;  Laterality: Left;    EYE  "SURGERY      GISELA PROCEDURE  2019    Procedure: GISELA PROCEDURE;  Surgeon: Fouzia Gardner MD;  Location: [facility] OR;  Service: General;;  Colon resection    LYSIS OF ADHESIONS N/A 3/7/2022    Procedure: LYSIS, ADHESIONS;  Surgeon: Fouzia Gardner MD;  Location: [facility] OR;  Service: General;  Laterality: N/A;    TONSILLECTOMY        Family History   Problem Relation Name Age of Onset    Heart disease Father      Hypertension Father      Cancer Mother cancer breas varian     Diabetes Paternal Aunt        Social History     Tobacco Use    Smoking status: Former     Current packs/day: 0.00     Average packs/day: 1 pack/day for 25.0 years (25.0 ttl pk-yrs)     Types: Cigarettes     Start date:      Quit date:      Years since quittin.2     Passive exposure: Past    Smokeless tobacco: Never   Substance Use Topics    Alcohol use: Not Currently     Alcohol/week: 5.0 standard drinks of alcohol     Types: 5 Cans of beer per week        Objective:     Vitals:    25 1047   BP: 122/68   BP Location: Left arm   Patient Position: Sitting   Pulse: 95   SpO2: 96%   Weight: 72.3 kg (159 lb 6.3 oz)   Height: 5' 10" (1.778 m)     BMI: 22.87    Gen: No apparent distress, well nourished and developed, appears stated age  CV: Irregular rhythm, S1 and S2 present, no LE edema  Resp: CTAB, normal respiratory effort  Neuro: Hard of hearing, spontaneous movements with muscle strength 5/5 in LE b/l  MSK: Hypertonicity lower thoracic and lumbar paraspinal musculature b/l    Assessment/Plan:     Husam Connolly is a 89 y.o. year old male who presents to clinic for f/u    1. Chronic idiopathic constipation (Primary)  Discussed need to hydrate and recommend adding metamucil to miralax since miralax dependent on water intake. Should try to maintain normal BM for prevention of SBO after colostomy reversal. Pt verbalized understanding and was in agreement with the plan.      2. Acute bilateral low back pain " without sciatica  Already getting HH PT/OT. Should discuss adding therapy to back to therapy plan. If they need orders happy to send them. Anticipate improvement with conservative management.     3. Type 2 diabetes mellitus with stage 3b chronic kidney disease, without long-term current use of insulin  Well controlled on current regimen.  Discussed risks of falls with hypoglycemia. Doing well.     4. Resistant hypertension  Well controlled on current regimen.  Reassuring no recurrence of hypotension. Discussed need to continue to monitor with risk of falling with hypotension.     5. Paroxysmal atrial fibrillation  Stable, seeing cardiology, doing well    6. Pulmonary hypertension  Stable, seeing cardiology, doing well    7. S/P colostomy  Multiple episodes of SBO since re-anastimosis. See constipation above      Follow-up: 6 months for T2DM and routine healthcare    I spent a total of 40 minutes on the day of the visit.This includes face to face time and non-face to face time preparing to see the patient (eg, review of tests), obtaining and/or reviewing separately obtained history, documenting clinical information in the electronic or other health record, independently interpreting results and communicating results to the patient/family/caregiver, or care coordinator.      Williams Knight, DO  Family Medicine

## 2025-03-17 NOTE — PATIENT INSTRUCTIONS
I recommend you try a fiber supplement like metamucil to help with your bowel movements.     [facility]    Address: [address] [facility], [place] [address]    Phone: [phone]      Please ask your physical therapist with [facility] to start therapy on your back. If they need an order for this to be added please let me know.

## 2025-03-18 ENCOUNTER — DOCUMENT SCAN (OUTPATIENT)
Dept: HOME HEALTH SERVICES | Facility: HOSPITAL | Age: OVER 89
End: 2025-03-18
Payer: MEDICARE

## 2025-03-18 ENCOUNTER — OFFICE VISIT (OUTPATIENT)
Dept: PODIATRY | Facility: CLINIC | Age: OVER 89
End: 2025-03-18
Payer: MEDICARE

## 2025-03-18 VITALS — SYSTOLIC BLOOD PRESSURE: 166 MMHG | HEART RATE: 73 BPM | DIASTOLIC BLOOD PRESSURE: 79 MMHG

## 2025-03-18 DIAGNOSIS — E11.51 TYPE 2 DIABETES MELLITUS WITH PERIPHERAL VASCULAR DISEASE: Primary | ICD-10-CM

## 2025-03-18 DIAGNOSIS — Q66.89 CLAW TOE, UNSPECIFIED LATERALITY: ICD-10-CM

## 2025-03-18 PROCEDURE — 1101F PT FALLS ASSESS-DOCD LE1/YR: CPT | Mod: CPTII,S$GLB,, | Performed by: PODIATRIST

## 2025-03-18 PROCEDURE — 1160F RVW MEDS BY RX/DR IN RCRD: CPT | Mod: CPTII,S$GLB,, | Performed by: PODIATRIST

## 2025-03-18 PROCEDURE — 1126F AMNT PAIN NOTED NONE PRSNT: CPT | Mod: CPTII,S$GLB,, | Performed by: PODIATRIST

## 2025-03-18 PROCEDURE — 99213 OFFICE O/P EST LOW 20 MIN: CPT | Mod: S$GLB,,, | Performed by: PODIATRIST

## 2025-03-18 PROCEDURE — 1159F MED LIST DOCD IN RCRD: CPT | Mod: CPTII,S$GLB,, | Performed by: PODIATRIST

## 2025-03-18 PROCEDURE — 1111F DSCHRG MED/CURRENT MED MERGE: CPT | Mod: CPTII,S$GLB,, | Performed by: PODIATRIST

## 2025-03-18 PROCEDURE — 3288F FALL RISK ASSESSMENT DOCD: CPT | Mod: CPTII,S$GLB,, | Performed by: PODIATRIST

## 2025-03-18 PROCEDURE — 99999 PR PBB SHADOW E&M-EST. PATIENT-LVL III: CPT | Mod: PBBFAC,,, | Performed by: PODIATRIST

## 2025-03-18 NOTE — PROGRESS NOTES
Subjective:      Patient ID: Husam Connolly is a 89 y.o. male.    Chief Complaint: Diabetic Foot Exam (Routine Diabetic Foot Exam)    Husam is a 89 y.o. male who presents to the clinic upon referral from Dr. Teodora rosales. provider found  for evaluation and treatment of diabetic feet. Husam has a past medical history of BPH (benign prostatic hyperplasia), Diabetes mellitus (type 2), Emphysema (03/12/2022), High cholesterol (08/26/2020), Hypertension, RBBB (11/29/2022), and Small bowel obstruction (06/13/2019). Patient relates no major problem with feet.  Relates no pain to his feet.  Complains of intermittent cramping to his legs mostly at rest.    09/21/2023:  Returns for annual diabetic foot exam.  He relates to intermittent numbness around the 2nd and 3rd toe bilateral foot and notices progressive hammertoe deformity to the toes.  He is followed by Cardiology and has a history of peripheral vascular disease.    03/18/2025:  Returns for annual diabetic foot exam.  No new concerns.  He receives pedicures.  Wears slip-on shoes.  No pain reported.    PCP: Williams Knight, DO    Date Last Seen by PCP:      Current shoe gear: Slip-on shoes    Hemoglobin A1C   Date Value Ref Range Status   03/10/2025 6.6 (H) 4.0 - 5.6 % Final     Comment:     ADA Screening Guidelines:  5.7-6.4%  Consistent with prediabetes  >or=6.5%  Consistent with diabetes    High levels of fetal hemoglobin interfere with the HbA1C  assay. Heterozygous hemoglobin variants (HbS, HgC, etc)do  not significantly interfere with this assay.   However, presence of multiple variants may affect accuracy.     02/14/2025 5.8 (H) 4.0 - 5.6 % Final     Comment:     ADA Screening Guidelines:  5.7-6.4%  Consistent with prediabetes  >or=6.5%  Consistent with diabetes    High levels of fetal hemoglobin interfere with the HbA1C  assay. Heterozygous hemoglobin variants (HbS, HgC, etc)do  not significantly interfere with this assay.   However, presence of multiple variants  may affect accuracy.     12/23/2024 5.8 (H) 4.0 - 5.6 % Final     Comment:     ADA Screening Guidelines:  5.7-6.4%  Consistent with prediabetes  >or=6.5%  Consistent with diabetes    High levels of fetal hemoglobin interfere with the HbA1C  assay. Heterozygous hemoglobin variants (HbS, HgC, etc)do  not significantly interfere with this assay.   However, presence of multiple variants may affect accuracy.       Vitals:    03/18/25 1429   BP: (!) 166/79   Pulse: 73   PainSc: 0-No pain      Past Medical History:   Diagnosis Date    BPH (benign prostatic hyperplasia)     Diabetes mellitus (type 2)     Emphysema 03/12/2022    High cholesterol 08/26/2020    Hypertension     RBBB 11/29/2022    Small bowel obstruction 06/13/2019       Past Surgical History:   Procedure Laterality Date    ANASTOMOSIS OF ILEUM TO RECTUM  9/9/2019    Procedure: ANASTOMOSIS, COLO-RECTAL;  Surgeon: Fouzia Gardner MD;  Location: [facility] OR;  Service: General;;    CHOLECYSTECTOMY N/A 3/7/2022    Procedure: CHOLECYSTECTOMY;  Surgeon: Fouzia Gardner MD;  Location: [facility] OR;  Service: General;  Laterality: N/A;    COLOSTOMY  6/16/2019    Procedure: CREATION, COLOSTOMY;  Surgeon: Fouzia Gardner MD;  Location: [facility] OR;  Service: General;;    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    Procedure: RELEASE, DUPUYTREN CONTRACTURE;  Surgeon: Slick Baldwin Jr., MD;  Location: [facility] OR;  Service: Orthopedics;  Laterality: Left;    EYE SURGERY      GISELA PROCEDURE  6/16/2019    Procedure: GISELA PROCEDURE;  Surgeon: Fouzia Gardner MD;  Location: [facility] OR;  Service: General;;  Colon resection    LYSIS OF ADHESIONS N/A 3/7/2022    Procedure: LYSIS, ADHESIONS;  Surgeon: Fouzia Gardner MD;  Location: [facility] OR;  Service: General;  Laterality: N/A;    TONSILLECTOMY         Family History   Problem Relation Name Age of Onset    Heart disease Father      Hypertension Father      Cancer Mother cancer breas varian     Diabetes Paternal  Aunt         Social History     Socioeconomic History    Marital status:    Tobacco Use    Smoking status: Former     Current packs/day: 0.00     Average packs/day: 1 pack/day for 25.0 years (25.0 ttl pk-yrs)     Types: Cigarettes     Start date:      Quit date:      Years since quittin.2     Passive exposure: Past    Smokeless tobacco: Never   Substance and Sexual Activity    Alcohol use: Not Currently     Alcohol/week: 5.0 standard drinks of alcohol     Types: 5 Cans of beer per week    Drug use: No    Sexual activity: Yes     Partners: Female     Social Drivers of Health     Financial Resource Strain: Low Risk  (3/18/2025)    Overall Financial Resource Strain (CARDIA)     Difficulty of Paying Living Expenses: Not hard at all   Food Insecurity: No Food Insecurity (3/18/2025)    Hunger Vital Sign     Worried About Running Out of Food in the Last Year: Never true     Ran Out of Food in the Last Year: Never true   Transportation Needs: No Transportation Needs (3/18/2025)    PRAPARE - Transportation     Lack of Transportation (Medical): No     Lack of Transportation (Non-Medical): No   Physical Activity: Unknown (3/18/2025)    Exercise Vital Sign     Days of Exercise per Week: 0 days     Minutes of Exercise per Session: Patient unable to answer   Stress: No Stress Concern Present (3/18/2025)    [place] [employer] [employer] - Occupational Stress Questionnaire     Feeling of Stress : Not at all   Housing Stability: Low Risk  (3/18/2025)    Housing Stability Vital Sign     Unable to Pay for Housing in the Last Year: No     Homeless in the Last Year: No       Current Outpatient Medications   Medication Sig Dispense Refill    albuterol (VENTOLIN HFA) 90 mcg/actuation inhaler Inhale 2 puffs into the lungs every 4 (four) hours as needed for Wheezing. Rescue 18 g 11    aspirin (ECOTRIN) 81 MG EC tablet Take 81 mg by mouth once daily.      atorvastatin (LIPITOR) 40 MG tablet Take 1 tablet (40 mg  total) by mouth once daily. 90 tablet 3    blood sugar diagnostic (ACCU-CHEK GUIDE TEST STRIPS) Strp 1 strip by In Vitro route once daily. 100 strip 3    blood-glucose meter kit Use as instructed 1 each 0    finasteride (PROSCAR) 5 mg tablet Take 1 tablet (5 mg total) by mouth once daily. 90 tablet 3    fluticasone propionate (FLONASE) 50 mcg/actuation nasal spray 2 sprays (100 mcg total) by Each Nostril route once daily. 48 g 3    fluticasone-umeclidin-vilanter (TRELEGY ELLIPTA) 100-62.5-25 mcg DsDv Inhale 1 puff into the lungs once daily. 180 each 3    GARLIC ORAL Take 1 capsule by mouth once daily.      glipiZIDE (GLUCOTROL) 5 MG tablet Take 1 tablet (5 mg total) by mouth 2 (two) times daily before meals. 90 tablet 3    hydrALAZINE (APRESOLINE) 50 MG tablet Take 1 tablet (50 mg total) by mouth 3 (three) times daily. 270 tablet 3    inhalation spacing device Use as directed for inhalation. 1 each 0    lancets Misc To check sugar daily, to use with insurance preferred meter 200 each 2    losartan (COZAAR) 100 MG tablet Take 1 tablet (100 mg total) by mouth once daily. 90 tablet 3    multivitamin capsule Take 1 capsule by mouth once daily.      omega-3 fatty acids/fish oil (FISH OIL-OMEGA-3 FATTY ACIDS) 300-1,000 mg capsule Take 1 capsule by mouth once daily.      tamsulosin (FLOMAX) 0.4 mg Cap Take 0.4 mg by mouth once daily.      vitamin D (VITAMIN D3) 1000 units Tab Take 1,000 Units by mouth once daily.       No current facility-administered medications for this visit.       Review of patient's allergies indicates:   Allergen Reactions    Solarcaine [benzocaine-triclosan]     Sulfa (sulfonamide antibiotics)     Sulfamethoxazole     Trimethoprim     Empagliflozin Rash and Other (See Comments)     Yeast infection    Hytrin [terazosin] Rash    Smz-tmp ds [sulfamethoxazole-trimethoprim] Rash         Review of Systems   Constitutional: Negative for chills, fever and malaise/fatigue.   HENT:  Negative for congestion  and hearing loss.    Cardiovascular:  Negative for chest pain, claudication and leg swelling.   Respiratory:  Negative for cough and shortness of breath.    Skin:  Negative for itching, nail changes and poor wound healing.   Musculoskeletal:  Positive for muscle cramps. Negative for back pain, joint pain and muscle weakness.   Gastrointestinal:  Negative for nausea and vomiting.   Neurological:  Negative for numbness, paresthesias and weakness.   Psychiatric/Behavioral:  Negative for altered mental status.            Objective:      Physical Exam  Vitals reviewed.   Constitutional:       General: He is not in acute distress.     Appearance: Normal appearance. He is not ill-appearing.   Cardiovascular:      Pulses:           Dorsalis pedis pulses are detected w/ Doppler on the right side and detected w/ Doppler on the left side.        Posterior tibial pulses are detected w/ Doppler on the right side and detected w/ Doppler on the left side.      Comments: Biphasic PT/DP bilateral with Doppler.  There is some rubor noted on dependency.  Brawny edema to lower extremity bilateral with multiple telangiectasias.  No hair growth follow extremity.  There is some skin atrophy noted to lower extremity bilateral.  Musculoskeletal:      Right foot: Normal range of motion. No deformity, bunion, Charcot foot, foot drop or prominent metatarsal heads.      Left foot: Normal range of motion. No deformity, bunion, Charcot foot, foot drop or prominent metatarsal heads.      Comments: Semi-rigid claw toe deformities toes 2-5 bilateral foot.  No pain with range of motion or manual muscle strength testing of the foot and ankle bilateral.    Mild flexible pes planus bilateral foot.   Feet:      Right foot:      Protective Sensation: 10 sites tested.  10 sites sensed.      Skin integrity: No ulcer, blister, skin breakdown, erythema, warmth, callus, dry skin or fissure.      Toenail Condition: Right toenails are normal.      Left foot:       Protective Sensation: 10 sites tested.  10 sites sensed.      Skin integrity: No ulcer, blister, skin breakdown, erythema, warmth, callus, dry skin or fissure.      Toenail Condition: Left toenails are normal.      Comments: Mild fat pad atrophy noted to the foot bilateral.  Skin:     General: Skin is warm.      Capillary Refill: Capillary refill takes less than 2 seconds.      Findings: No ecchymosis or erythema.      Nails: There is no clubbing.      Comments: No open wound or maceration noted to foot bilateral.  No calluses.    Mild thickness with longitudinal striations of the nails 2-5 bilateral foot.   Neurological:      Mental Status: He is alert and oriented to person, place, and time.      Sensory: Sensation is intact.      Motor: Weakness present.      Comments: Decreased vibratory sensation bilateral foot.  Manual muscle strength 4/5 to lower extremity bilateral.               Assessment:       Encounter Diagnoses   Name Primary?    Type 2 diabetes mellitus with peripheral vascular disease Yes    Claw toe, unspecified laterality          Plan:       Husam was seen today for diabetic foot exam.    Diagnoses and all orders for this visit:    Type 2 diabetes mellitus with peripheral vascular disease  -     Foot Exam Performed    Claw toe, unspecified laterality      I counseled the patient on his conditions, their implications and medical management.    Shoe inspection. Diabetic Foot Education. Patient reminded of the importance of good nutrition and blood sugar control to help prevent podiatric complications of diabetes. Patient instructed on proper foot hygeine. We discussed wearing proper shoe gear, daily foot inspections, never walking without protective shoe gear, never putting sharp instruments to feet.    Comprehensive annual diabetic foot exam completed today.  Patient found to be intermediate risk for diabetic foot complication.      We discussed appropriate sizing shoes in order to prevent any  direct pressure to his toes.  We also discussed daily foot checks.      RTC annually or p.r.n. as discussed.    Assisted per Gopal Rodriguez DPM PGY 2    A portion of this note was generated by voice recognition software and may contain spelling and grammar errors.

## 2025-03-24 DIAGNOSIS — Z00.00 ENCOUNTER FOR MEDICARE ANNUAL WELLNESS EXAM: ICD-10-CM

## 2025-03-24 NOTE — PROGRESS NOTES
Subjective:   @Patient ID:  Husam Connolly is a 89 y.o. male who presents for follow-up of No chief complaint on file.      HPI:   Mr. Connolly is a pleasant 89 yo gentleman here for follow up. LV visit was seen by Dr. Looney. Pmhx of HFpEF, DM, emphysema/COPD, RBBB, HLD, CKD3, diabetic followed by podiatry here for follow up. Continue to voiced ARREDONDO with exertion and is barely function- minimal ambulation 2/2 sob. BP at home is between 130-150s. Tolerating meds s/o s/e.  Voiced no CV complaints. Denies cp, sob, palpitations, presyncope/dizziness, syncope, orthopnea, PND, bendopnea, decrease ET, NVDC, fever, chills.    2/23/25  Presents to [facility] for initial hospital follow up.  Recently hospitalized for management of small bowel obstruction.  Admitted to [facility] service with General Surgery consultation.  NG tube placed for decompression.      3/25/25  He voiced no CV complaints. Tolerating meds. Echo reviewed. Recent hospitalization. He denies cp, sob, palpitations, presyncope/dizziness, syncope, orthopnea, PND, bendopnea, decrease ET, NVDC, fever, chills.        2025    Left Ventricle: The left ventricle is normal in size. There is concentric remodeling. There is normal systolic function. Biplane (2D) method of discs ejection fraction is 64%.    Right Ventricle: Normal right ventricular cavity size. Wall thickness is normal. Systolic function is normal.    Aortic Valve: There is mild aortic valve sclerosis.    Tricuspid Valve: There is mild regurgitation.    Pulmonary Artery: The estimated pulmonary artery systolic pressure is 57 mmHg.    IVC/SVC: Normal venous pressure at 3 mmHg.       Echo 3/2022  · The left ventricle is normal in size with concentric remodeling and normal systolic function.  · The estimated ejection fraction is 55%.  · Grade I left ventricular diastolic dysfunction.  · There is abnormal septal wall motion.  · Normal right ventricular size with normal right ventricular  "systolic function.  · The estimated PA systolic pressure is 47 mmHg.  · Normal central venous pressure (3 mmHg).  · There is pulmonary hypertension.      Patient Active Problem List    Diagnosis Date Noted    Paroxysmal atrial fibrillation 03/17/2025    Chronic idiopathic constipation 03/17/2025    MATEUSZ (acute kidney injury) 02/24/2025    Syncope 02/13/2025    Type 2 diabetes mellitus, without long-term current use of insulin 02/13/2025    Pulmonary emphysema 02/13/2025    COPD exacerbation 12/24/2024    Elevated troponin 12/24/2024    Chronic left shoulder pain 07/10/2024    Central sleep apnea 06/10/2024    LC (obstructive sleep apnea) 06/06/2024    Senile purpura 06/05/2024    Muscle weakness 03/11/2024    Shoulder stiffness, left 03/11/2024    Pain in joint of left shoulder 03/11/2024    COPD, group B, by GOLD 2017 classification 08/23/2023    Pulmonary hypertension 07/24/2023    Shoulder arthritis 01/05/2023    RBBB 11/29/2022    Pulmonary nodules     Dupuytren's contracture of left hand 03/21/2022    SBO (small bowel obstruction) 05/31/2021    Aortic atherosclerosis 05/11/2021    Chronic rhinitis 03/01/2021    S/P colostomy 03/01/2021    Right foot drop 08/26/2020    High cholesterol 08/26/2020    Ventral hernia without obstruction or gangrene 02/27/2020    S/P cholecystectomy 07/01/2019    Stage 3b chronic kidney disease 12/13/2018    Resistant hypertension     BPH (benign prostatic hyperplasia)          LABS  CBC  @LABRCNTIP(WBC:3,RBC:3,HGB:3,HCT:3,PLT:3,MCV:3,MCH:3,MCHC:3)@  BMP  @LABRCNTIP(NA:3,K:3,CO2:3,CL:3,BUN:3,Creatinine:3,GLU:3,GFR:3)@    POCT-Glucose  No results found for: "POCTGLUCOSE"    @LABRCNTIP(Calcium:3,MG:3,PHOS:3)@  LFT  @LABRCNTIP(PROT:3,Albumin:3,,Bilitot:3,AST:3,Alkphos:3,ALT:3)@  GFR     COAGS  @LABRCNTIP(PT:3,INR:3,APTT:3)@  CE  @LABRCNTIP(troponini:3,cktotal:3,ckmb:3)@  ABGs  @DEOUHQGKA97(PH,PCO2,PO2,HCO3,POCSATURATED,BE)@  BNP  @LABRCNTIP(BNP:3)@    LAST HbA1c  Lab Results "   Component Value Date    HGBA1C 6.6 (H) 03/10/2025       Lipid panel  Lab Results   Component Value Date    CHOL 139 02/14/2025    CHOL 145 09/03/2024    CHOL 131 07/04/2024     Lab Results   Component Value Date    HDL 54 02/14/2025    HDL 57 09/03/2024    HDL 56 07/04/2024     Lab Results   Component Value Date    LDLCALC 68.6 02/14/2025    LDLCALC 62.6 (L) 09/03/2024    LDLCALC 50.6 (L) 07/04/2024     Lab Results   Component Value Date    TRIG 82 02/14/2025    TRIG 127 09/03/2024    TRIG 122 07/04/2024     Lab Results   Component Value Date    CHOLHDL 38.8 02/14/2025    CHOLHDL 39.3 09/03/2024    CHOLHDL 42.7 07/04/2024            Review of Systems   All other systems reviewed and are negative.      Objective:   General Appearance: Pleasant, NAD  Eyes: PERRL, EOMI, anicteric  ENT: MMM w/o erythema  Neck: Supple, JVP < 7cm w/o HJR, 2+ carotid pulses, no bruits.  Lungs: Normal inspiratory effort, clear to auscultation bilaterally.  Heart: Regular rate & rhythm, normal S1 and S2, no murmurs, rubs, or gallops.  Abdomen: Soft, non-tender, non-distended. Normal bowel sounds.   Skin/Extremities: No cyanosis, clubbing or edema. No rash or ecchymosis.  Peripheral pulses: 2+ radial, dorsalis pedis and posterior tibialis bilaterally.  Neurologic: No gross cranial nerve or focal neurologic deficits.  Psychiatric: Appropriate, normal affect.          Assessment:     1. Primary hypertension    2. Paroxysmal atrial fibrillation    3. White coat syndrome with diagnosis of hypertension    4. Stage 3b chronic kidney disease    5. SBO (small bowel obstruction)            Plan:     Primary hypertension-- continue current medical management. BP at goal for age. Home bp is at goal per patient.  Pulmonary hypertension/Centrilobular emphysema- continue to monitor.  Follows Dr. Dumont.   HLD-  continue diet controlled  Aortic atherosclerosis- continue asa  CKD avoid nephrotoxic agent.   DM2 per PCP. Establishing care with new PCP in  July.      Weight loss  Exercise    Continue with current medical plan and lifestyle changes.  Return sooner for concerns or questions. If symptoms persist go to the ED  I have reviewed all pertinent data on this patient       She expressed verbal understanding and agreed with the plan      Follow up as scheduled. Return sooner for concerns or questions      I have reviewed the patient's medical history in detail and updated the computerized patient record.    No orders of the defined types were placed in this encounter.      Follow up as scheduled. Return sooner for concerns or questions            He expressed verbal understanding and agreed with the plan        Patient's Medications   New Prescriptions    No medications on file   Previous Medications    ALBUTEROL (VENTOLIN HFA) 90 MCG/ACTUATION INHALER    Inhale 2 puffs into the lungs every 4 (four) hours as needed for Wheezing. Rescue    ASPIRIN (ECOTRIN) 81 MG EC TABLET    Take 81 mg by mouth once daily.    ATORVASTATIN (LIPITOR) 40 MG TABLET    Take 1 tablet (40 mg total) by mouth once daily.    BLOOD SUGAR DIAGNOSTIC (ACCU-CHEK GUIDE TEST STRIPS) STRP    1 strip by In Vitro route once daily.    BLOOD-GLUCOSE METER KIT    Use as instructed    FINASTERIDE (PROSCAR) 5 MG TABLET    Take 1 tablet (5 mg total) by mouth once daily.    FLUTICASONE PROPIONATE (FLONASE) 50 MCG/ACTUATION NASAL SPRAY    2 sprays (100 mcg total) by Each Nostril route once daily.    FLUTICASONE-UMECLIDIN-VILANTER (TRELEGY ELLIPTA) 100-62.5-25 MCG DSDV    Inhale 1 puff into the lungs once daily.    GARLIC ORAL    Take 1 capsule by mouth once daily.    GLIPIZIDE (GLUCOTROL) 5 MG TABLET    Take 1 tablet (5 mg total) by mouth 2 (two) times daily before meals.    HYDRALAZINE (APRESOLINE) 50 MG TABLET    Take 1 tablet (50 mg total) by mouth 3 (three) times daily.    INHALATION SPACING DEVICE    Use as directed for inhalation.    LANCETS MISC    To check sugar daily, to use with insurance  preferred meter    LOSARTAN (COZAAR) 100 MG TABLET    Take 1 tablet (100 mg total) by mouth once daily.    MULTIVITAMIN CAPSULE    Take 1 capsule by mouth once daily.    OMEGA-3 FATTY ACIDS/FISH OIL (FISH OIL-OMEGA-3 FATTY ACIDS) 300-1,000 MG CAPSULE    Take 1 capsule by mouth once daily.    TAMSULOSIN (FLOMAX) 0.4 MG CAP    Take 0.4 mg by mouth once daily.    VITAMIN D (VITAMIN D3) 1000 UNITS TAB    Take 1,000 Units by mouth once daily.   Modified Medications    No medications on file   Discontinued Medications    No medications on file        Tommy Elizabeth MD  Cardiovascular Disease [facility]

## 2025-03-25 ENCOUNTER — OFFICE VISIT (OUTPATIENT)
Dept: CARDIOLOGY | Facility: CLINIC | Age: OVER 89
End: 2025-03-25
Payer: MEDICARE

## 2025-03-25 VITALS
SYSTOLIC BLOOD PRESSURE: 162 MMHG | WEIGHT: 154 LBS | OXYGEN SATURATION: 93 % | DIASTOLIC BLOOD PRESSURE: 71 MMHG | BODY MASS INDEX: 22.05 KG/M2 | HEART RATE: 82 BPM | HEIGHT: 70 IN

## 2025-03-25 DIAGNOSIS — I10 PRIMARY HYPERTENSION: Primary | ICD-10-CM

## 2025-03-25 DIAGNOSIS — N18.32 STAGE 3B CHRONIC KIDNEY DISEASE: ICD-10-CM

## 2025-03-25 DIAGNOSIS — I10 WHITE COAT SYNDROME WITH DIAGNOSIS OF HYPERTENSION: ICD-10-CM

## 2025-03-25 DIAGNOSIS — K56.609 SBO (SMALL BOWEL OBSTRUCTION): ICD-10-CM

## 2025-03-25 DIAGNOSIS — I48.0 PAROXYSMAL ATRIAL FIBRILLATION: ICD-10-CM

## 2025-03-25 PROCEDURE — 99999 PR PBB SHADOW E&M-EST. PATIENT-LVL III: CPT | Mod: PBBFAC,,, | Performed by: STUDENT IN AN ORGANIZED HEALTH CARE EDUCATION/TRAINING PROGRAM

## 2025-04-03 ENCOUNTER — OFFICE VISIT (OUTPATIENT)
Dept: PULMONOLOGY | Facility: CLINIC | Age: OVER 89
End: 2025-04-03
Payer: MEDICARE

## 2025-04-03 VITALS
HEIGHT: 70 IN | SYSTOLIC BLOOD PRESSURE: 165 MMHG | BODY MASS INDEX: 22.41 KG/M2 | OXYGEN SATURATION: 94 % | DIASTOLIC BLOOD PRESSURE: 79 MMHG | WEIGHT: 156.5 LBS | HEART RATE: 82 BPM

## 2025-04-03 DIAGNOSIS — J44.9 COPD, GROUP B, BY GOLD 2017 CLASSIFICATION: Primary | ICD-10-CM

## 2025-04-03 DIAGNOSIS — I1A.0 RESISTANT HYPERTENSION: ICD-10-CM

## 2025-04-03 DIAGNOSIS — R91.1 PULMONARY NODULE: ICD-10-CM

## 2025-04-03 PROCEDURE — 3288F FALL RISK ASSESSMENT DOCD: CPT | Mod: CPTII,S$GLB,, | Performed by: INTERNAL MEDICINE

## 2025-04-03 PROCEDURE — 99214 OFFICE O/P EST MOD 30 MIN: CPT | Mod: S$GLB,,, | Performed by: INTERNAL MEDICINE

## 2025-04-03 PROCEDURE — 1101F PT FALLS ASSESS-DOCD LE1/YR: CPT | Mod: CPTII,S$GLB,, | Performed by: INTERNAL MEDICINE

## 2025-04-03 PROCEDURE — 1126F AMNT PAIN NOTED NONE PRSNT: CPT | Mod: CPTII,S$GLB,, | Performed by: INTERNAL MEDICINE

## 2025-04-03 PROCEDURE — G2211 COMPLEX E/M VISIT ADD ON: HCPCS | Mod: S$GLB,,, | Performed by: INTERNAL MEDICINE

## 2025-04-03 PROCEDURE — 99999 PR PBB SHADOW E&M-EST. PATIENT-LVL III: CPT | Mod: PBBFAC,,, | Performed by: INTERNAL MEDICINE

## 2025-04-03 PROCEDURE — 1159F MED LIST DOCD IN RCRD: CPT | Mod: CPTII,S$GLB,, | Performed by: INTERNAL MEDICINE

## 2025-04-03 NOTE — PROGRESS NOTES
Subjective:       Patient ID: Husam Connolly is a 89 y.o. male.  The patient's last visit with me was on 9/30/2024.     Had a fall and then a GI blockage both resulting in hospitalization. Since has been doing PT. He still notes dyspnea with activity but may have improved activity level after recent PT. No acute respiratory complaints and no exacerbations. Compliant with [employer]. Interested in wheeled walker to improve mobility with his lung disease. Not currently interested in pulmonary rehab.  Review of Systems    Objective:      Vitals:    04/03/25 1117   BP: (!) 165/79   Pulse: 82     Wt Readings from Last 3 Encounters:   04/03/25 71 kg (156 lb 8.4 oz)   03/25/25 69.9 kg (154 lb)   03/17/25 72.3 kg (159 lb 6.3 oz)     Temp Readings from Last 3 Encounters:   03/10/25 96.6 °F (35.9 °C) (Oral)   02/26/25 97.9 °F (36.6 °C) (Oral)   02/16/25 98.2 °F (36.8 °C) (Oral)     BP Readings from Last 3 Encounters:   04/03/25 (!) 165/79   03/25/25 (!) 162/71   03/18/25 (!) 166/79     Pulse Readings from Last 3 Encounters:   04/03/25 82   03/25/25 82   03/18/25 73       Physical Exam   Constitutional: He appears well-developed and well-nourished.   Pulmonary/Chest: Effort normal. He has no wheezes.   Vitals reviewed.    CBC  Lab Results   Component Value Date    WBC 6.61 03/10/2025    HGB 13.4 (L) 03/10/2025    HCT 43.5 03/10/2025    MCV 93 03/10/2025     03/10/2025         CMP  Sodium   Date Value Ref Range Status   03/10/2025 138 136 - 145 mmol/L Final     Potassium   Date Value Ref Range Status   03/10/2025 4.1 3.5 - 5.1 mmol/L Final     Chloride   Date Value Ref Range Status   03/10/2025 108 95 - 110 mmol/L Final     CO2   Date Value Ref Range Status   03/10/2025 18 (L) 23 - 29 mmol/L Final     Glucose   Date Value Ref Range Status   03/10/2025 157 (H) 70 - 110 mg/dL Final     BUN   Date Value Ref Range Status   03/10/2025 36 (H) 10 - 30 mg/dL Final     Creatinine   Date Value Ref Range Status   03/10/2025 1.5 (H)  "0.5 - 1.4 mg/dL Final     Calcium   Date Value Ref Range Status   03/10/2025 8.8 8.7 - 10.5 mg/dL Final     Total Protein   Date Value Ref Range Status   03/10/2025 6.1 6.0 - 8.4 g/dL Final     Albumin   Date Value Ref Range Status   03/10/2025 3.0 (L) 3.5 - 5.2 g/dL Final     Total Bilirubin   Date Value Ref Range Status   03/10/2025 0.6 0.1 - 1.0 mg/dL Final     Comment:     For infants and newborns, interpretation of results should be based  on gestational age, weight and in agreement with clinical  observations.    Premature Infant recommended reference ranges:  Up to 24 hours.............<8.0 mg/dL  Up to 48 hours............<12.0 mg/dL  3-5 days..................<15.0 mg/dL  6-29 days.................<15.0 mg/dL       Alkaline Phosphatase   Date Value Ref Range Status   03/10/2025 114 40 - 150 U/L Final     AST   Date Value Ref Range Status   03/10/2025 29 10 - 40 U/L Final     ALT   Date Value Ref Range Status   03/10/2025 15 10 - 44 U/L Final     Anion Gap   Date Value Ref Range Status   03/10/2025 12 8 - 16 mmol/L Final     eGFR   Date Value Ref Range Status   03/10/2025 43.1 (A) >60 mL/min/1.73 m^2 Final       ABG  POC PH   Date Value Ref Range Status   02/14/2025 7.357 7.350 - 7.450 Final     POC PO2   Date Value Ref Range Status   02/14/2025 18.8 (LL) 40.0 - 60.0 mmHg Final     Comment:      notified  at    read back  Value below critical limit       POC PCO2   Date Value Ref Range Status   02/14/2025 48.6 (H) 35.0 - 45.0 mmHg Final     Comment:     Value above reference range           Personal Diagnostic Review  I have personally reviewed the following data and added my own interpretation as below:  none pertinent      4/3/2025    11:17 AM 3/25/2025    10:18 AM 3/17/2025    10:47 AM 3/10/2025     2:21 PM 2/26/2025     8:13 AM 2/26/2025     7:37 AM 2/26/2025     4:28 AM   Pulmonary Function Tests   SpO2 94 % 93 % 96 % 96 % 93 % 96 % 95 %   Height 5' 10" (1.778 m) 5' 10" (1.778 m) 5' 10" (1.778 m) 5' 10" " (1.778 m)      Weight 71 kg (156 lb 8.4 oz) 69.9 kg (154 lb) 72.3 kg (159 lb 6.3 oz) 71.7 kg (158 lb 1.1 oz)      BMI (Calculated) 22.5 22.1 22.9 22.7            Assessment:       1. COPD, group B, by GOLD 2017 classification    2. Pulmonary nodules    3. Resistant hypertension        Encounter Medications[1]  1. COPD, group B, by GOLD 2017 classification  - WALKER FOR HOME USE    2. Pulmonary nodules    3. Resistant hypertension    Plan:     Problem List Items Addressed This Visit          Pulmonary    Pulmonary nodules    Current Assessment & Plan   Previously stable. Given advanced age, no fu needed         COPD, group B, by GOLD 2017 classification - Primary    Current Assessment & Plan   Significant chronic condition to be followed longitudinally with following long term treatment plan.     Significant obstructionon prior PFTs. 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  Trelegy 100, PRN albuterol and avoid ipratropium.  If persistent signfiicant symptoms, would benefit from pulmonary rehab as he is still very independent.  -currently not interested in this but will increase activity at home.    COPD severely limits his mobility  The mobility limitation cannot be sufficiently resolved by the use of a cane.   Patient's functional mobility deficit can be sufficiently resolved with the use of a Rollator with seat.  Patient's mobility limitation significantly impairs their ability to participate in one of more activities of daily living.  The use of a (Rollator with seat will significantly improve the patient's ability to participate in MRADLS and the patient will use it on regular basis in the home.            Relevant Orders    WALKER FOR HOME USE       Cardiac/Vascular    Resistant hypertension    Current Assessment & Plan   Avoid beta blockers if possible            Please note Overview Notes are historic documentation. Please review A/P for current updates.  Follow up in  about 6 months (around 10/3/2025).    Future Appointments   Date Time Provider Department Center   4/29/2025  1:30 PM Fouzia Gardner MD [facility]   5/19/2025  9:50 AM Slick Baldwin Jr., MD [facility] [place] [facility]   7/11/2025  2:00 PM Nina Morfin NP [facility]   7/18/2025  1:20 PM Sandra Rausch MD [facility]         Farhan Dumont MD             [1]  Outpatient Encounter Medications as of 4/3/2025   Medication Sig Dispense Refill    acetaminophen (TYLENOL EXTRA STRENGTH ORAL) Take by mouth.      albuterol (VENTOLIN HFA) 90 mcg/actuation inhaler Inhale 2 puffs into the lungs every 4 (four) hours as needed for Wheezing. Rescue 18 g 11    aspirin (ECOTRIN) 81 MG EC tablet Take 81 mg by mouth once daily.      atorvastatin (LIPITOR) 40 MG tablet Take 1 tablet (40 mg total) by mouth once daily. 90 tablet 3    blood sugar diagnostic (ACCU-CHEK GUIDE TEST STRIPS) Strp 1 strip by In Vitro route once daily. 100 strip 3    blood-glucose meter kit Use as instructed 1 each 0    finasteride (PROSCAR) 5 mg tablet Take 1 tablet (5 mg total) by mouth once daily. 90 tablet 3    fluticasone propionate (FLONASE) 50 mcg/actuation nasal spray 2 sprays (100 mcg total) by Each Nostril route once daily. 48 g 3    fluticasone-umeclidin-vilanter (TRELEGY ELLIPTA) 100-62.5-25 mcg DsDv Inhale 1 puff into the lungs once daily. 180 each 3    GARLIC ORAL Take 1 capsule by mouth once daily.      glipiZIDE (GLUCOTROL) 5 MG tablet Take 1 tablet (5 mg total) by mouth 2 (two) times daily before meals. 90 tablet 3    hydrALAZINE (APRESOLINE) 50 MG tablet Take 1 tablet (50 mg total) by mouth 3 (three) times daily. 270 tablet 3    inhalation spacing device Use as directed for inhalation. 1 each 0    lancets Misc To check sugar daily, to use with insurance preferred meter 200 each 2    losartan (COZAAR) 100 MG tablet Take 1 tablet (100 mg total) by mouth once daily. 90 tablet 3     multivitamin capsule Take 1 capsule by mouth once daily.      omega-3 fatty acids/fish oil (FISH OIL-OMEGA-3 FATTY ACIDS) 300-1,000 mg capsule Take 1 capsule by mouth once daily.      tamsulosin (FLOMAX) 0.4 mg Cap Take 0.4 mg by mouth once daily.      vitamin D (VITAMIN D3) 1000 units Tab Take 1,000 Units by mouth once daily.      [DISCONTINUED] fluticasone-umeclidin-vilanter (TRELEGY ELLIPTA) 100-62.5-25 mcg DsDv Inhale 1 puff into the lungs once daily. 180 each 3     No facility-administered encounter medications on file as of 4/3/2025.

## 2025-04-03 NOTE — ASSESSMENT & PLAN NOTE
Significant chronic condition to be followed longitudinally with following long term treatment plan.     Significant obstructionon prior PFTs. 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  Trelegy 100, PRN albuterol and avoid ipratropium.  If persistent signfiicant symptoms, would benefit from pulmonary rehab as he is still very independent.  -currently not interested in this but will increase activity at home.    COPD severely limits his mobility  The mobility limitation cannot be sufficiently resolved by the use of a cane.   Patient's functional mobility deficit can be sufficiently resolved with the use of a Rollator with seat.  Patient's mobility limitation significantly impairs their ability to participate in one of more activities of daily living.  The use of a (Rollator with seat will significantly improve the patient's ability to participate in MRADLS and the patient will use it on regular basis in the home.

## 2025-04-10 DIAGNOSIS — I48.0 PAROXYSMAL ATRIAL FIBRILLATION: ICD-10-CM

## 2025-04-10 DIAGNOSIS — E78.00 HIGH CHOLESTEROL: ICD-10-CM

## 2025-04-10 DIAGNOSIS — I70.0 AORTIC ATHEROSCLEROSIS: ICD-10-CM

## 2025-04-10 DIAGNOSIS — I10 PRIMARY HYPERTENSION: ICD-10-CM

## 2025-04-10 DIAGNOSIS — I10 WHITE COAT SYNDROME WITH DIAGNOSIS OF HYPERTENSION: ICD-10-CM

## 2025-04-10 DIAGNOSIS — R60.0 LOCALIZED EDEMA: ICD-10-CM

## 2025-04-10 RX ORDER — LOSARTAN POTASSIUM 100 MG/1
100 TABLET ORAL DAILY
Qty: 90 TABLET | Refills: 3 | Status: SHIPPED | OUTPATIENT
Start: 2025-04-10 | End: 2026-04-10

## 2025-04-22 DIAGNOSIS — E78.5 HLD (HYPERLIPIDEMIA): ICD-10-CM

## 2025-04-22 RX ORDER — ATORVASTATIN CALCIUM 40 MG/1
40 TABLET, FILM COATED ORAL DAILY
Qty: 90 TABLET | Refills: 3 | Status: SHIPPED | OUTPATIENT
Start: 2025-04-22

## 2025-04-22 NOTE — TELEPHONE ENCOUNTER
----- Message from Carlo sent at 4/22/2025  4:03 PM CDT -----  Contact: patient  Type:  Patient CallWho Called: patient Does the patient know what this is regarding?: Requesting a call back about having a refill on Rx / PA atorvastatin (LIPITOR) 40 MG tablet ; 90 day supply ; please advise Would the patient rather a call back or a response via [facility]?callBest Call Back Number:  [phone] Additional Information: [id number] - [place] - [address] AT [place] [address] [address] [address]Uuuhr: [phone] Fax: [phone]

## 2025-05-14 ENCOUNTER — DOCUMENT SCAN (OUTPATIENT)
Dept: HOME HEALTH SERVICES | Facility: HOSPITAL | Age: OVER 89
End: 2025-05-14
Payer: MEDICARE

## 2025-05-19 ENCOUNTER — OFFICE VISIT (OUTPATIENT)
Dept: ORTHOPEDICS | Facility: CLINIC | Age: OVER 89
End: 2025-05-19
Payer: MEDICARE

## 2025-05-19 VITALS — BODY MASS INDEX: 22.46 KG/M2 | HEIGHT: 70 IN

## 2025-05-19 DIAGNOSIS — M19.019 ARTHRITIS OF SHOULDER, UNSPECIFIED LATERALITY: Primary | ICD-10-CM

## 2025-05-19 PROCEDURE — 20610 DRAIN/INJ JOINT/BURSA W/O US: CPT | Mod: LT,S$GLB,, | Performed by: ORTHOPAEDIC SURGERY

## 2025-05-19 PROCEDURE — 99213 OFFICE O/P EST LOW 20 MIN: CPT | Mod: 25,S$GLB,, | Performed by: ORTHOPAEDIC SURGERY

## 2025-05-19 PROCEDURE — 1126F AMNT PAIN NOTED NONE PRSNT: CPT | Mod: CPTII,S$GLB,, | Performed by: ORTHOPAEDIC SURGERY

## 2025-05-19 PROCEDURE — 99999 PR PBB SHADOW E&M-EST. PATIENT-LVL III: CPT | Mod: PBBFAC,,, | Performed by: ORTHOPAEDIC SURGERY

## 2025-05-19 PROCEDURE — 3288F FALL RISK ASSESSMENT DOCD: CPT | Mod: CPTII,S$GLB,, | Performed by: ORTHOPAEDIC SURGERY

## 2025-05-19 PROCEDURE — 1159F MED LIST DOCD IN RCRD: CPT | Mod: CPTII,S$GLB,, | Performed by: ORTHOPAEDIC SURGERY

## 2025-05-19 PROCEDURE — 1101F PT FALLS ASSESS-DOCD LE1/YR: CPT | Mod: CPTII,S$GLB,, | Performed by: ORTHOPAEDIC SURGERY

## 2025-05-19 RX ORDER — TRIAMCINOLONE ACETONIDE 40 MG/ML
40 INJECTION, SUSPENSION INTRA-ARTICULAR; INTRAMUSCULAR
Status: COMPLETED | OUTPATIENT
Start: 2025-05-19 | End: 2025-05-19

## 2025-05-19 RX ADMIN — TRIAMCINOLONE ACETONIDE 40 MG: 40 INJECTION, SUSPENSION INTRA-ARTICULAR; INTRAMUSCULAR at 10:05

## 2025-05-19 NOTE — PROGRESS NOTES
Subjective:      Patient ID: Husam Connolly is a 89 y.o. male.  Chief Complaint: Follow-up of the Left Shoulder      HPI  Husam Connolly is a  89 y.o. male presenting today for follow up of left shoulder arthritis.  He reports that he is having a flare-up again   He would like injection   Not interested in surgery.    Review of patient's allergies indicates:   Allergen Reactions    Solarcaine [benzocaine-triclosan]     Sulfa (sulfonamide antibiotics)     Sulfamethoxazole     Trimethoprim     Empagliflozin Rash and Other (See Comments)     Yeast infection    Hytrin [terazosin] Rash    Smz-tmp ds [sulfamethoxazole-trimethoprim] Rash         Current Medications[1]    Past Medical History:   Diagnosis Date    BPH (benign prostatic hyperplasia)     Diabetes mellitus (type 2)     Emphysema 03/12/2022    High cholesterol 08/26/2020    Hypertension     RBBB 11/29/2022    Small bowel obstruction 06/13/2019       Past Surgical History:   Procedure Laterality Date    ANASTOMOSIS OF ILEUM TO RECTUM  9/9/2019    Procedure: ANASTOMOSIS, COLO-RECTAL;  Surgeon: Fouzia Gardner MD;  Location: [facility] OR;  Service: General;;    CHOLECYSTECTOMY N/A 3/7/2022    Procedure: CHOLECYSTECTOMY;  Surgeon: Fouzia Gardner MD;  Location: [facility] OR;  Service: General;  Laterality: N/A;    COLOSTOMY  6/16/2019    Procedure: CREATION, COLOSTOMY;  Surgeon: Fouzia Gardner MD;  Location: [facility] OR;  Service: General;;    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    Procedure: RELEASE, DUPUYTREN CONTRACTURE;  Surgeon: Slick Baldwin Jr., MD;  Location: [facility] OR;  Service: Orthopedics;  Laterality: Left;    EYE SURGERY      GISELA PROCEDURE  6/16/2019    Procedure: GISELA PROCEDURE;  Surgeon: Fouzia Gardner MD;  Location: [facility] OR;  Service: General;;  Colon resection    LYSIS OF ADHESIONS N/A 3/7/2022    Procedure: LYSIS, ADHESIONS;  Surgeon: Fouzia Gardner MD;  Location: [facility] OR;  Service: General;  Laterality: N/A;     "TONSILLECTOMY         OBJECTIVE:   PHYSICAL EXAM:  Height: 5' 10" (177.8 cm)    Vitals:    05/19/25 0940   Height: 5' 10" (1.778 m)   PainSc: 0-No pain   PainLoc: Shoulder     Ortho/SPM Exam  Examination left shoulder there is some tenderness posteriorly   Range of motion limited due to pain   Mild crepitation   No instability       RADIOGRAPHS:  None  Comments: I have personally reviewed the imaging and I agree with the above radiologist's report.    ASSESSMENT/PLAN:     IMPRESSION:  Left shoulder arthritis severe    PLAN:  Patient would like injection today   After pause for time-out identified the left shoulder injected with Kenalog 40 mg 2 cc xylocaine sterile technique  Tolerated the procedure well without complication   Continue current medications    FOLLOW UP:  3-4 months    Disclaimer: This note has been generated using voice-recognition software. There may be typographical errors that have been missed during proof-reading.          [1]   Current Outpatient Medications   Medication Sig Dispense Refill    acetaminophen (TYLENOL EXTRA STRENGTH ORAL) Take by mouth.      aspirin (ECOTRIN) 81 MG EC tablet Take 81 mg by mouth once daily.      atorvastatin (LIPITOR) 40 MG tablet Take 1 tablet (40 mg total) by mouth once daily. 90 tablet 3    blood sugar diagnostic (ACCU-CHEK GUIDE TEST STRIPS) Strp 1 strip by In Vitro route once daily. 100 strip 3    blood-glucose meter kit Use as instructed 1 each 0    finasteride (PROSCAR) 5 mg tablet Take 1 tablet (5 mg total) by mouth once daily. 90 tablet 3    fluticasone propionate (FLONASE) 50 mcg/actuation nasal spray 2 sprays (100 mcg total) by Each Nostril route once daily. 48 g 3    fluticasone-umeclidin-vilanter (TRELEGY ELLIPTA) 100-62.5-25 mcg DsDv Inhale 1 puff into the lungs once daily. 180 each 3    GARLIC ORAL Take 1 capsule by mouth once daily.      glipiZIDE (GLUCOTROL) 5 MG tablet Take 1 tablet (5 mg total) by mouth 2 (two) times daily before meals. 90 tablet " 3    hydrALAZINE (APRESOLINE) 50 MG tablet Take 1 tablet (50 mg total) by mouth 3 (three) times daily. 270 tablet 3    inhalation spacing device Use as directed for inhalation. 1 each 0    lancets Misc To check sugar daily, to use with insurance preferred meter 200 each 2    losartan (COZAAR) 100 MG tablet Take 1 tablet (100 mg total) by mouth once daily. 90 tablet 3    multivitamin capsule Take 1 capsule by mouth once daily.      omega-3 fatty acids/fish oil (FISH OIL-OMEGA-3 FATTY ACIDS) 300-1,000 mg capsule Take 1 capsule by mouth once daily.      tamsulosin (FLOMAX) 0.4 mg Cap Take 0.4 mg by mouth once daily.      vitamin D (VITAMIN D3) 1000 units Tab Take 1,000 Units by mouth once daily.      albuterol (VENTOLIN HFA) 90 mcg/actuation inhaler Inhale 2 puffs into the lungs every 4 (four) hours as needed for Wheezing. Rescue 18 g 11     No current facility-administered medications for this visit.

## 2025-05-27 ENCOUNTER — HOSPITAL ENCOUNTER (INPATIENT)
Facility: HOSPITAL | Age: OVER 89
LOS: 1 days | Discharge: HOME HEALTH | DRG: 390 | End: 2025-05-28
Attending: STUDENT IN AN ORGANIZED HEALTH CARE EDUCATION/TRAINING PROGRAM | Admitting: HOSPITALIST
Payer: MEDICARE

## 2025-05-27 DIAGNOSIS — R07.9 CHEST PAIN: ICD-10-CM

## 2025-05-27 DIAGNOSIS — I1A.0 RESISTANT HYPERTENSION: Primary | ICD-10-CM

## 2025-05-27 DIAGNOSIS — I27.20 PULMONARY HYPERTENSION: ICD-10-CM

## 2025-05-27 DIAGNOSIS — N18.32 TYPE 2 DIABETES MELLITUS WITH STAGE 3B CHRONIC KIDNEY DISEASE, WITH LONG-TERM CURRENT USE OF INSULIN: ICD-10-CM

## 2025-05-27 DIAGNOSIS — K59.04 CHRONIC IDIOPATHIC CONSTIPATION: ICD-10-CM

## 2025-05-27 DIAGNOSIS — Z79.4 TYPE 2 DIABETES MELLITUS WITH STAGE 3B CHRONIC KIDNEY DISEASE, WITH LONG-TERM CURRENT USE OF INSULIN: ICD-10-CM

## 2025-05-27 DIAGNOSIS — R10.9 ABDOMINAL PAIN: ICD-10-CM

## 2025-05-27 DIAGNOSIS — E11.22 TYPE 2 DIABETES MELLITUS WITH STAGE 3B CHRONIC KIDNEY DISEASE, WITH LONG-TERM CURRENT USE OF INSULIN: ICD-10-CM

## 2025-05-27 DIAGNOSIS — Z93.3 S/P COLOSTOMY: ICD-10-CM

## 2025-05-27 DIAGNOSIS — Z46.59 ENCOUNTER FOR NASOGASTRIC (NG) TUBE PLACEMENT: ICD-10-CM

## 2025-05-27 DIAGNOSIS — K56.609 SBO (SMALL BOWEL OBSTRUCTION): ICD-10-CM

## 2025-05-27 PROBLEM — N18.9 ACUTE KIDNEY INJURY SUPERIMPOSED ON CKD: Status: ACTIVE | Noted: 2025-02-24

## 2025-05-27 PROBLEM — I48.91 A-FIB: Status: ACTIVE | Noted: 2025-03-17

## 2025-05-27 LAB
ABSOLUTE EOSINOPHIL (OHS): 0.09 K/UL
ABSOLUTE MONOCYTE (OHS): 1.5 K/UL (ref 0.3–1)
ABSOLUTE NEUTROPHIL COUNT (OHS): 13.46 K/UL (ref 1.8–7.7)
ALBUMIN SERPL BCP-MCNC: 3.9 G/DL (ref 3.5–5.2)
ALP SERPL-CCNC: 97 UNIT/L (ref 40–150)
ALT SERPL W/O P-5'-P-CCNC: 17 UNIT/L (ref 10–44)
ANION GAP (OHS): 12 MMOL/L (ref 8–16)
AST SERPL-CCNC: 22 UNIT/L (ref 11–45)
BASOPHILS # BLD AUTO: 0.04 K/UL
BASOPHILS NFR BLD AUTO: 0.2 %
BILIRUB SERPL-MCNC: 1 MG/DL (ref 0.1–1)
BILIRUB UR QL STRIP.AUTO: NEGATIVE
BUN SERPL-MCNC: 37 MG/DL (ref 10–30)
CALCIUM SERPL-MCNC: 9.6 MG/DL (ref 8.7–10.5)
CHLORIDE SERPL-SCNC: 105 MMOL/L (ref 95–110)
CLARITY UR: CLEAR
CO2 SERPL-SCNC: 21 MMOL/L (ref 23–29)
COLOR UR AUTO: YELLOW
CREAT SERPL-MCNC: 1.7 MG/DL (ref 0.5–1.4)
CREAT SERPL-MCNC: 1.7 MG/DL (ref 0.5–1.4)
ERYTHROCYTE [DISTWIDTH] IN BLOOD BY AUTOMATED COUNT: 14.1 % (ref 11.5–14.5)
GFR SERPLBLD CREATININE-BSD FMLA CKD-EPI: 37 ML/MIN/1.73/M2
GLUCOSE SERPL-MCNC: 219 MG/DL (ref 70–110)
GLUCOSE UR QL STRIP: NEGATIVE
HCT VFR BLD AUTO: 47.6 % (ref 40–54)
HGB BLD-MCNC: 16.1 GM/DL (ref 14–18)
HGB UR QL STRIP: NEGATIVE
HOLD SPECIMEN: NORMAL
IMM GRANULOCYTES # BLD AUTO: 0.05 K/UL (ref 0–0.04)
IMM GRANULOCYTES NFR BLD AUTO: 0.3 % (ref 0–0.5)
INR PPP: 1.1 (ref 0.8–1.2)
KETONES UR QL STRIP: NEGATIVE
LEUKOCYTE ESTERASE UR QL STRIP: NEGATIVE
LIPASE SERPL-CCNC: 21 U/L (ref 4–60)
LYMPHOCYTES # BLD AUTO: 1.19 K/UL (ref 1–4.8)
MCH RBC QN AUTO: 29.8 PG (ref 27–31)
MCHC RBC AUTO-ENTMCNC: 33.8 G/DL (ref 32–36)
MCV RBC AUTO: 88 FL (ref 82–98)
NITRITE UR QL STRIP: NEGATIVE
NUCLEATED RBC (/100WBC) (OHS): 0 /100 WBC
PH UR STRIP: 6 [PH]
PLATELET # BLD AUTO: 215 K/UL (ref 150–450)
PMV BLD AUTO: 10.4 FL (ref 9.2–12.9)
POCT GLUCOSE: 136 MG/DL (ref 70–110)
POCT GLUCOSE: 160 MG/DL (ref 70–110)
POTASSIUM SERPL-SCNC: 4 MMOL/L (ref 3.5–5.1)
PROT SERPL-MCNC: 7.4 GM/DL (ref 6–8.4)
PROT UR QL STRIP: ABNORMAL
PROTHROMBIN TIME: 12 SECONDS (ref 9–12.5)
RBC # BLD AUTO: 5.4 M/UL (ref 4.6–6.2)
RELATIVE EOSINOPHIL (OHS): 0.6 %
RELATIVE LYMPHOCYTE (OHS): 7.3 % (ref 18–48)
RELATIVE MONOCYTE (OHS): 9.2 % (ref 4–15)
RELATIVE NEUTROPHIL (OHS): 82.4 % (ref 38–73)
SAMPLE: ABNORMAL
SODIUM SERPL-SCNC: 138 MMOL/L (ref 136–145)
SP GR UR STRIP: >=1.03
UROBILINOGEN UR STRIP-ACNC: NEGATIVE EU/DL
WBC # BLD AUTO: 16.33 K/UL (ref 3.9–12.7)

## 2025-05-27 PROCEDURE — 25500020 PHARM REV CODE 255: Performed by: STUDENT IN AN ORGANIZED HEALTH CARE EDUCATION/TRAINING PROGRAM

## 2025-05-27 PROCEDURE — 25000003 PHARM REV CODE 250: Performed by: STUDENT IN AN ORGANIZED HEALTH CARE EDUCATION/TRAINING PROGRAM

## 2025-05-27 PROCEDURE — 36415 COLL VENOUS BLD VENIPUNCTURE: CPT | Performed by: FAMILY MEDICINE

## 2025-05-27 PROCEDURE — 99285 EMERGENCY DEPT VISIT HI MDM: CPT | Mod: 25

## 2025-05-27 PROCEDURE — 94761 N-INVAS EAR/PLS OXIMETRY MLT: CPT

## 2025-05-27 PROCEDURE — 99900035 HC TECH TIME PER 15 MIN (STAT)

## 2025-05-27 PROCEDURE — 85025 COMPLETE CBC W/AUTO DIFF WBC: CPT | Performed by: STUDENT IN AN ORGANIZED HEALTH CARE EDUCATION/TRAINING PROGRAM

## 2025-05-27 PROCEDURE — 83690 ASSAY OF LIPASE: CPT | Performed by: STUDENT IN AN ORGANIZED HEALTH CARE EDUCATION/TRAINING PROGRAM

## 2025-05-27 PROCEDURE — 96374 THER/PROPH/DIAG INJ IV PUSH: CPT

## 2025-05-27 PROCEDURE — 81003 URINALYSIS AUTO W/O SCOPE: CPT | Performed by: STUDENT IN AN ORGANIZED HEALTH CARE EDUCATION/TRAINING PROGRAM

## 2025-05-27 PROCEDURE — 80053 COMPREHEN METABOLIC PANEL: CPT | Performed by: STUDENT IN AN ORGANIZED HEALTH CARE EDUCATION/TRAINING PROGRAM

## 2025-05-27 PROCEDURE — 25000003 PHARM REV CODE 250: Performed by: FAMILY MEDICINE

## 2025-05-27 PROCEDURE — 11000001 HC ACUTE MED/SURG PRIVATE ROOM

## 2025-05-27 PROCEDURE — 63600175 PHARM REV CODE 636 W HCPCS: Performed by: STUDENT IN AN ORGANIZED HEALTH CARE EDUCATION/TRAINING PROGRAM

## 2025-05-27 PROCEDURE — 96375 TX/PRO/DX INJ NEW DRUG ADDON: CPT

## 2025-05-27 PROCEDURE — 85610 PROTHROMBIN TIME: CPT | Performed by: FAMILY MEDICINE

## 2025-05-27 PROCEDURE — 27000221 HC OXYGEN, UP TO 24 HOURS

## 2025-05-27 RX ORDER — ACETAMINOPHEN 325 MG/1
650 TABLET ORAL EVERY 6 HOURS PRN
Status: DISCONTINUED | OUTPATIENT
Start: 2025-05-27 | End: 2025-05-28 | Stop reason: HOSPADM

## 2025-05-27 RX ORDER — IPRATROPIUM BROMIDE AND ALBUTEROL SULFATE 2.5; .5 MG/3ML; MG/3ML
3 SOLUTION RESPIRATORY (INHALATION) EVERY 6 HOURS PRN
Status: DISCONTINUED | OUTPATIENT
Start: 2025-05-27 | End: 2025-05-28 | Stop reason: HOSPADM

## 2025-05-27 RX ORDER — ATORVASTATIN CALCIUM 40 MG/1
40 TABLET, FILM COATED ORAL DAILY
Status: DISCONTINUED | OUTPATIENT
Start: 2025-05-27 | End: 2025-05-28 | Stop reason: HOSPADM

## 2025-05-27 RX ORDER — FLUTICASONE FUROATE AND VILANTEROL 100; 25 UG/1; UG/1
1 POWDER RESPIRATORY (INHALATION) DAILY
Status: DISCONTINUED | OUTPATIENT
Start: 2025-05-27 | End: 2025-05-28 | Stop reason: HOSPADM

## 2025-05-27 RX ORDER — GLUCAGON 1 MG
1 KIT INJECTION
Status: DISCONTINUED | OUTPATIENT
Start: 2025-05-27 | End: 2025-05-28 | Stop reason: HOSPADM

## 2025-05-27 RX ORDER — LIDOCAINE HYDROCHLORIDE 20 MG/ML
15 SOLUTION OROPHARYNGEAL
Status: COMPLETED | OUTPATIENT
Start: 2025-05-27 | End: 2025-05-27

## 2025-05-27 RX ORDER — IBUPROFEN 200 MG
16 TABLET ORAL
Status: DISCONTINUED | OUTPATIENT
Start: 2025-05-27 | End: 2025-05-28 | Stop reason: HOSPADM

## 2025-05-27 RX ORDER — SODIUM CHLORIDE 9 MG/ML
INJECTION, SOLUTION INTRAVENOUS ONCE
Status: COMPLETED | OUTPATIENT
Start: 2025-05-27 | End: 2025-05-27

## 2025-05-27 RX ORDER — NALOXONE HCL 0.4 MG/ML
0.02 VIAL (ML) INJECTION
Status: DISCONTINUED | OUTPATIENT
Start: 2025-05-27 | End: 2025-05-28 | Stop reason: HOSPADM

## 2025-05-27 RX ORDER — HYDROMORPHONE HYDROCHLORIDE 1 MG/ML
0.5 INJECTION, SOLUTION INTRAMUSCULAR; INTRAVENOUS; SUBCUTANEOUS
Refills: 0 | Status: COMPLETED | OUTPATIENT
Start: 2025-05-27 | End: 2025-05-27

## 2025-05-27 RX ORDER — LOSARTAN POTASSIUM 50 MG/1
100 TABLET ORAL DAILY
Status: DISCONTINUED | OUTPATIENT
Start: 2025-05-27 | End: 2025-05-28 | Stop reason: HOSPADM

## 2025-05-27 RX ORDER — HYDRALAZINE HYDROCHLORIDE 20 MG/ML
5 INJECTION INTRAMUSCULAR; INTRAVENOUS EVERY 8 HOURS PRN
Status: DISCONTINUED | OUTPATIENT
Start: 2025-05-27 | End: 2025-05-28 | Stop reason: HOSPADM

## 2025-05-27 RX ORDER — SODIUM CHLORIDE 0.9 % (FLUSH) 0.9 %
10 SYRINGE (ML) INJECTION EVERY 12 HOURS PRN
Status: DISCONTINUED | OUTPATIENT
Start: 2025-05-27 | End: 2025-05-28 | Stop reason: HOSPADM

## 2025-05-27 RX ORDER — HYDRALAZINE HYDROCHLORIDE 25 MG/1
50 TABLET, FILM COATED ORAL 3 TIMES DAILY
Status: DISCONTINUED | OUTPATIENT
Start: 2025-05-27 | End: 2025-05-28 | Stop reason: HOSPADM

## 2025-05-27 RX ORDER — INSULIN ASPART 100 [IU]/ML
0-5 INJECTION, SOLUTION INTRAVENOUS; SUBCUTANEOUS EVERY 6 HOURS PRN
Status: DISCONTINUED | OUTPATIENT
Start: 2025-05-27 | End: 2025-05-28 | Stop reason: HOSPADM

## 2025-05-27 RX ORDER — ONDANSETRON HYDROCHLORIDE 2 MG/ML
8 INJECTION, SOLUTION INTRAVENOUS
Status: COMPLETED | OUTPATIENT
Start: 2025-05-27 | End: 2025-05-27

## 2025-05-27 RX ORDER — IBUPROFEN 200 MG
24 TABLET ORAL
Status: DISCONTINUED | OUTPATIENT
Start: 2025-05-27 | End: 2025-05-28 | Stop reason: HOSPADM

## 2025-05-27 RX ORDER — ONDANSETRON HYDROCHLORIDE 2 MG/ML
4 INJECTION, SOLUTION INTRAVENOUS EVERY 8 HOURS PRN
Status: DISCONTINUED | OUTPATIENT
Start: 2025-05-27 | End: 2025-05-28 | Stop reason: HOSPADM

## 2025-05-27 RX ORDER — TAMSULOSIN HYDROCHLORIDE 0.4 MG/1
0.4 CAPSULE ORAL DAILY
Status: DISCONTINUED | OUTPATIENT
Start: 2025-05-27 | End: 2025-05-28 | Stop reason: HOSPADM

## 2025-05-27 RX ADMIN — ONDANSETRON 8 MG: 2 INJECTION INTRAMUSCULAR; INTRAVENOUS at 03:05

## 2025-05-27 RX ADMIN — SODIUM CHLORIDE: 9 INJECTION, SOLUTION INTRAVENOUS at 06:05

## 2025-05-27 RX ADMIN — HYDRALAZINE HYDROCHLORIDE 50 MG: 25 TABLET ORAL at 03:05

## 2025-05-27 RX ADMIN — HYDRALAZINE HYDROCHLORIDE 50 MG: 25 TABLET ORAL at 10:05

## 2025-05-27 RX ADMIN — HYDROMORPHONE HYDROCHLORIDE 0.5 MG: 1 INJECTION, SOLUTION INTRAMUSCULAR; INTRAVENOUS; SUBCUTANEOUS at 03:05

## 2025-05-27 RX ADMIN — TAMSULOSIN HYDROCHLORIDE 0.4 MG: 0.4 CAPSULE ORAL at 10:05

## 2025-05-27 RX ADMIN — LOSARTAN POTASSIUM 100 MG: 50 TABLET, FILM COATED ORAL at 10:05

## 2025-05-27 RX ADMIN — LIDOCAINE HYDROCHLORIDE 15 ML: 20 SOLUTION ORAL at 04:05

## 2025-05-27 RX ADMIN — HYDRALAZINE HYDROCHLORIDE 50 MG: 25 TABLET ORAL at 09:05

## 2025-05-27 RX ADMIN — ATORVASTATIN CALCIUM 40 MG: 40 TABLET, FILM COATED ORAL at 10:05

## 2025-05-27 RX ADMIN — IOHEXOL 100 ML: 350 INJECTION, SOLUTION INTRAVENOUS at 03:05

## 2025-05-27 NOTE — ASSESSMENT & PLAN NOTE
Patient's blood pressure range in the last 24 hours was: BP  Min: 143/87  Max: 187/87.The patient's inpatient anti-hypertensive regimen is listed below:  Current Antihypertensives  hydrALAZINE tablet 50 mg, 3 times daily, Oral  losartan tablet 100 mg, Daily, Oral    Plan  - BP is controlled, no changes needed to their regimen  - monitor blood pressure closely.

## 2025-05-27 NOTE — PLAN OF CARE
SW met with pt and pts daughter at bedside to discuss dc planning. All information on chart confirmed. Pt resides in home alone. Pt is independent in ADLs. Pt has no HH services at home. Pt has dme stated below. Pt utilizes dme as needed. Pt still drives. At time of dc pts daughter will provide transport home. SW will continue to follow pt throughout his transitions of care and assist with any dc needs.     SW requested PCC follow up.     Future Appointments   Date Time Provider Department Center   6/10/2025 10:00 AM Gina Renee MD [facility] [place] [facility]   7/11/2025  2:00 PM Nina Morfin NP [facility] MED [place]   7/18/2025  1:20 PM Sandra Rausch MD [facility] [place]   9/22/2025  8:40 AM Slick Baldwin Jr., MD [facility] [place] [facility]   10/30/2025  1:30 PM [facility]        05/27/25 0933   Discharge Assessment   Assessment Type Discharge Planning Assessment   Confirmed/corrected address, phone number and insurance Yes   Confirmed Demographics Correct on Facesheet   Source of Information family;patient   Communicated JOSE with patient/caregiver Yes   People in Home alone   Do you expect to return to your current living situation? Yes   Do you have help at home or someone to help you manage your care at home? Yes   Who are your caregiver(s) and their phone number(s)? Nina Carson (Daughter)  [phone]   Prior to hospitilization cognitive status: Alert/Oriented   Current cognitive status: Alert/Oriented   Walking or Climbing Stairs Difficulty no   Dressing/Bathing Difficulty no   Home Layout Able to live on 1st floor   Equipment Currently Used at Home wheelchair;cane, straight;rollator   Readmission within 30 days? No   Patient currently being followed by outpatient case management? No   Do you currently have service(s) that help you manage your care at home? No   Do you take prescription medications? Yes   Do you have prescription coverage? Yes   Do you  have any problems affording any of your prescribed medications? No   Is the patient taking medications as prescribed? yes   Who is going to help you get home at discharge? Nina Carson (Daughter)  [phone]   How do you get to doctors appointments? family or friend will provide;car, drives self   Are you on dialysis? No   Do you take coumadin? No   Discharge Plan A Home   Discharge Plan B Home Health   DME Needed Upon Discharge  none   Discharge Plan discussed with: Patient;Adult children   Transition of Care Barriers None   Financial Resource Strain   How hard is it for you to pay for the very basics like food, housing, medical care, and heating? Not hard   Housing Stability   In the last 12 months, was there a time when you were not able to pay the mortgage or rent on time? N   At any time in the past 12 months, were you homeless or living in a shelter (including now)? N   Transportation Needs   In the past 12 months, has lack of transportation kept you from medical appointments or from getting medications? no   In the past 12 months, has lack of transportation kept you from meetings, work, or from getting things needed for daily living? No   Food Insecurity   Within the past 12 months, you worried that your food would run out before you got the money to buy more. Never true   Within the past 12 months, the food you bought just didn't last and you didn't have money to get more. Never true   Stress   Do you feel stress - tense, restless, nervous, or anxious, or unable to sleep at night because your mind is troubled all the time - these days? Not at all   Social Isolation   How often do you feel lonely or isolated from those around you?  Never   Alcohol Use   Q1: How often do you have a drink containing alcohol? 4 or more ti   Q2: How many drinks containing alcohol do you have on a typical day when you are drinking? 1 or 2   Q3: How often do you have six or more drinks on one occasion? Daily   Utilities   In  the past 12 months has the electric, gas, oil, or water company threatened to shut off services in your home? No   Health Literacy   How often do you need to have someone help you when you read instructions, pamphlets, or other written material from your doctor or pharmacy? Never

## 2025-05-27 NOTE — ASSESSMENT & PLAN NOTE
Admit to medical floor with telemetry.  NPO.  NGT  IV fluids,antiemetics  Inpatient Cardiology consultation

## 2025-05-27 NOTE — SUBJECTIVE & OBJECTIVE
Past Medical History:   Diagnosis Date    BPH (benign prostatic hyperplasia)     Diabetes mellitus (type 2)     Emphysema 03/12/2022    High cholesterol 08/26/2020    Hypertension     RBBB 11/29/2022    Small bowel obstruction 06/13/2019       Past Surgical History:   Procedure Laterality Date    ANASTOMOSIS OF ILEUM TO RECTUM  9/9/2019    Procedure: ANASTOMOSIS, COLO-RECTAL;  Surgeon: Fouzia Gardner MD;  Location: [facility] OR;  Service: General;;    CHOLECYSTECTOMY N/A 3/7/2022    Procedure: CHOLECYSTECTOMY;  Surgeon: Fouzia Gardner MD;  Location: [facility] OR;  Service: General;  Laterality: N/A;    COLOSTOMY  6/16/2019    Procedure: CREATION, COLOSTOMY;  Surgeon: Fouzia Gardner MD;  Location: [facility] OR;  Service: General;;    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    Procedure: RELEASE, DUPUYTREN CONTRACTURE;  Surgeon: Slick Baldwin Jr., MD;  Location: [facility] OR;  Service: Orthopedics;  Laterality: Left;    EYE SURGERY      GISELA PROCEDURE  6/16/2019    Procedure: GISELA PROCEDURE;  Surgeon: Fouzia Gardner MD;  Location: [facility] OR;  Service: General;;  Colon resection    LYSIS OF ADHESIONS N/A 3/7/2022    Procedure: LYSIS, ADHESIONS;  Surgeon: Fouzia Gardner MD;  Location: [facility];  Service: General;  Laterality: N/A;    TONSILLECTOMY         Review of patient's allergies indicates:   Allergen Reactions    Solarcaine [benzocaine-triclosan]     Sulfa (sulfonamide antibiotics)     Sulfamethoxazole     Trimethoprim     Empagliflozin Rash and Other (See Comments)     Yeast infection    Hytrin [terazosin] Rash    Smz-tmp ds [sulfamethoxazole-trimethoprim] Rash       No current facility-administered medications on file prior to encounter.     Current Outpatient Medications on File Prior to Encounter   Medication Sig    acetaminophen (TYLENOL EXTRA STRENGTH ORAL) Take by mouth.    albuterol (VENTOLIN HFA) 90 mcg/actuation inhaler Inhale 2 puffs into the lungs every 4 (four) hours as needed  for Wheezing. Rescue    aspirin (ECOTRIN) 81 MG EC tablet Take 81 mg by mouth once daily.    atorvastatin (LIPITOR) 40 MG tablet Take 1 tablet (40 mg total) by mouth once daily.    blood sugar diagnostic (ACCU-CHEK GUIDE TEST STRIPS) Strp 1 strip by In Vitro route once daily.    blood-glucose meter kit Use as instructed    finasteride (PROSCAR) 5 mg tablet Take 1 tablet (5 mg total) by mouth once daily.    fluticasone propionate (FLONASE) 50 mcg/actuation nasal spray 2 sprays (100 mcg total) by Each Nostril route once daily.    fluticasone-umeclidin-vilanter (TRELEGY ELLIPTA) 100-62.5-25 mcg DsDv Inhale 1 puff into the lungs once daily.    GARLIC ORAL Take 1 capsule by mouth once daily.    glipiZIDE (GLUCOTROL) 5 MG tablet Take 1 tablet (5 mg total) by mouth 2 (two) times daily before meals.    hydrALAZINE (APRESOLINE) 50 MG tablet Take 1 tablet (50 mg total) by mouth 3 (three) times daily.    inhalation spacing device Use as directed for inhalation.    lancets Misc To check sugar daily, to use with insurance preferred meter    losartan (COZAAR) 100 MG tablet Take 1 tablet (100 mg total) by mouth once daily.    multivitamin capsule Take 1 capsule by mouth once daily.    omega-3 fatty acids/fish oil (FISH OIL-OMEGA-3 FATTY ACIDS) 300-1,000 mg capsule Take 1 capsule by mouth once daily.    tamsulosin (FLOMAX) 0.4 mg Cap Take 0.4 mg by mouth once daily.    vitamin D (VITAMIN D3) 1000 units Tab Take 1,000 Units by mouth once daily.     Family History       Problem Relation (Age of Onset)    Cancer Mother    Diabetes Paternal Aunt    Heart disease Father    Hypertension Father          Tobacco Use    Smoking status: Former     Current packs/day: 0.00     Average packs/day: 1 pack/day for 25.0 years (25.0 ttl pk-yrs)     Types: Cigarettes     Start date:      Quit date:      Years since quittin.4     Passive exposure: Past    Smokeless tobacco: Never   Substance and Sexual Activity    Alcohol use: Not  Currently     Alcohol/week: 5.0 standard drinks of alcohol     Types: 5 Cans of beer per week    Drug use: No    Sexual activity: Yes     Partners: Female     Review of Systems   Constitutional:  Positive for appetite change.   HENT: Negative.     Eyes: Negative.    Respiratory: Negative.     Cardiovascular: Negative.    Gastrointestinal:  Positive for abdominal pain, nausea and vomiting.   Genitourinary: Negative.    Musculoskeletal: Negative.    Hematological: Negative.    Psychiatric/Behavioral: Negative.       Objective:     Vital Signs (Most Recent):  Temp: 97.6 °F (36.4 °C) (05/27/25 0156)  Pulse: 82 (05/27/25 0402)  Resp: 12 (05/27/25 0402)  BP: (!) 165/73 (05/27/25 0402)  SpO2: 95 % (05/27/25 0321) Vital Signs (24h Range):  Temp:  [97.6 °F (36.4 °C)] 97.6 °F (36.4 °C)  Pulse:  [82-84] 82  Resp:  [12-20] 12  SpO2:  [95 %-96 %] 95 %  BP: (143-187)/(73-87) 165/73     Weight: 71.7 kg (158 lb)  Body mass index is 22.67 kg/m².     Physical Exam  HENT:      Head: Normocephalic and atraumatic.      Nose: Nose normal.      Mouth/Throat:      Mouth: Mucous membranes are moist.   Eyes:      Extraocular Movements: Extraocular movements intact.      Pupils: Pupils are equal, round, and reactive to light.   Cardiovascular:      Rate and Rhythm: Regular rhythm.      Heart sounds: Normal heart sounds.   Pulmonary:      Breath sounds: Normal breath sounds.   Abdominal:      Tenderness: There is abdominal tenderness.   Musculoskeletal:         General: Normal range of motion.      Cervical back: Neck supple.   Skin:     General: Skin is warm.   Neurological:      General: No focal deficit present.      Mental Status: He is alert.   Psychiatric:         Mood and Affect: Mood normal.              CRANIAL NERVES     CN III, IV, VI   Pupils are equal, round, and reactive to light.       Significant Labs: All pertinent labs within the past 24 hours have been reviewed.  Recent Lab Results         05/27/25  0251   05/27/25  0247         Albumin 3.9         ALP 97         ALT 17         Anion Gap 12         AST 22         Baso # 0.04         Basophil % 0.2         BILIRUBIN TOTAL 1.0  Comment: For infants and newborns, interpretation of results should be based   on gestational age, weight and in agreement with clinical   observations.    Premature Infant recommended reference ranges:   0-24 hours:  <8.0 mg/dL   24-48 hours: <12.0 mg/dL   3-5 days:    <15.0 mg/dL   6-29 days:   <15.0 mg/dL         BUN 37         Calcium 9.6         Chloride 105         CO2 21         Creatinine 1.7         eGFR 37  Comment: Estimated GFR calculated using the CKD-EPI creatinine (2021) equation.         Eos # 0.09         Eos % 0.6         Glucose 219         Gran # (ANC) 13.46         Hematocrit 47.6         Hemoglobin 16.1         Immature Grans (Abs) 0.05  Comment: Mild elevation in immature granulocytes is non specific and can be seen in a variety of conditions including stress response, acute inflammation, trauma and pregnancy. Correlation with other laboratory and clinical findings is essential.         Immature Granulocytes 0.3         Lipase 21         Lymph # 1.19         Lymph % 7.3         MCH 29.8         MCHC 33.8         MCV 88         Mono # 1.50         Mono % 9.2         MPV 10.4         Neut % 82.4         nRBC 0         Platelet Count 215         POC Creatinine   1.7       Potassium 4.0         PROTEIN TOTAL 7.4         RBC 5.40         RDW 14.1         Sample   VENOUS       Sodium 138         WBC 16.33                 Significant Imaging: I have reviewed all pertinent imaging results/findings within the past 24 hours.

## 2025-05-27 NOTE — HPI
Husam Connolly is a 89-year-old male with a past medical history of BPH, DM2, emphysema, high right bundle-branch block, small-bowel obstruction, CKD3, HLD, HTN who presented to the ED with complaints of abdominal pain.  Patient states this started last night and was worsening.  Patient was seen in ER and had NG tube placed.  In ER patient had CT scan of the abdomen/pelvis which showed small bowel obstruction with probable transition point in the right lower quadrant.  Due to patient's presenting symptoms he will require admission for further evaluation and management

## 2025-05-27 NOTE — NURSING
Pt tolerating sips of water well. Requested diet cranberry juice. Will continue to monitor for N/V.

## 2025-05-27 NOTE — CONSULTS
Today`s Date: 5/27/2025     Admit Date: 5/27/2025    Admitting Physician: Xuan Santos MD    Patient`s Name: Husam Connolly , 89 y.o. male    Reason for consultation  Admitted with abdominal pain associated with nausea and vomiting, has had bowel movement twice   Patient Active Problem List:     HTN (hypertension)     BPH (benign prostatic hyperplasia)     Stage 3b chronic kidney disease     S/P cholecystectomy     Ventral hernia without obstruction or gangrene     Right foot drop     High cholesterol     Chronic rhinitis     S/P colostomy     Aortic atherosclerosis     SBO (small bowel obstruction)     Dupuytren's contracture of left hand     Pulmonary nodules     RBBB     Shoulder arthritis     Pulmonary hypertension     COPD, group B, by GOLD 2017 classification     Muscle weakness     Shoulder stiffness, left     Pain in joint of left shoulder     Senile purpura     LC (obstructive sleep apnea)     Central sleep apnea     Chronic left shoulder pain     COPD exacerbation     Elevated troponin     Syncope     Type 2 diabetes mellitus     Pulmonary emphysema     Acute kidney injury superimposed on CKD     A-fib     Chronic idiopathic constipation      Past Medical History:  No date: BPH (benign prostatic hyperplasia)  No date: Diabetes mellitus (type 2)  03/12/2022: Emphysema  08/26/2020: High cholesterol  No date: Hypertension  11/29/2022: RBBB  06/13/2019: Small bowel obstruction    Past Surgical History:  9/9/2019: ANASTOMOSIS OF ILEUM TO RECTUM      Comment:  Procedure: ANASTOMOSIS, COLO-RECTAL;  Surgeon: Fouzia Gardner MD;  Location: [facility] OR;  Service: General;;  3/7/2022: CHOLECYSTECTOMY; N/A      Comment:  Procedure: CHOLECYSTECTOMY;  Surgeon: Fouzia Gardner MD;  Location: [facility] OR;  Service: General;                 Laterality: N/A;  6/16/2019: COLOSTOMY      Comment:  Procedure: CREATION, COLOSTOMY;  Surgeon: Fouzia Gardner MD;  Location:  [facility] OR;  Service: General;;  4/22/2022: DUPUYTREN CONTRACTURE RELEASE; Left      Comment:  Procedure: RELEASE, DUPUYTREN CONTRACTURE;  Surgeon:                Slick Baldwin Jr., MD;  Location: [facility];  Service:                Orthopedics;  Laterality: Left;  No date: EYE SURGERY  6/16/2019: GISELA PROCEDURE      Comment:  Procedure: GISELA PROCEDURE;  Surgeon: Fouzia Gardner MD;  Location: [facility] OR;  Service: General;;                 Colon resection  3/7/2022: LYSIS OF ADHESIONS; N/A      Comment:  Procedure: LYSIS, ADHESIONS;  Surgeon: Fouzia Gardner MD;  Location: [facility] OR;  Service: General;                 Laterality: N/A;  No date: TONSILLECTOMY    Prior to Admission medications :  Medication acetaminophen (TYLENOL EXTRA STRENGTH ORAL), Sig Take by mouth., Start Date , End Date , Taking? , Authorizing Provider Provider, Historical    Medication albuterol (VENTOLIN HFA) 90 mcg/actuation inhaler, Sig Inhale 2 puffs into the lungs every 4 (four) hours as needed for Wheezing. Rescue, Start Date 3/28/24, End Date 4/3/25, Taking? , Authorizing Provider Farhan Dumont MD    Medication aspirin (ECOTRIN) 81 MG EC tablet, Sig Take 81 mg by mouth once daily., Start Date , End Date , Taking? , Authorizing Provider Provider, Historical    Medication atorvastatin (LIPITOR) 40 MG tablet, Sig Take 1 tablet (40 mg total) by mouth once daily., Start Date 4/22/25, End Date , Taking? , Authorizing Provider Tommy Payton MD    Medication blood sugar diagnostic (ACCU-CHEK GUIDE TEST STRIPS) Strp, Sig 1 strip by In Vitro route once daily., Start Date 12/27/24, End Date , Taking? , Authorizing Provider Williams Knight, DO    Medication blood-glucose meter kit, Sig Use as instructed, Start Date 7/12/24, End Date 7/12/25, Taking? , Authorizing Provider Williams Knight, DO    Medication finasteride (PROSCAR) 5 mg tablet, Sig Take 1 tablet (5 mg total) by mouth once  daily., Start Date 4/23/24, End Date , Taking? , Authorizing Provider Daren Lassiter MD    Medication fluticasone propionate (FLONASE) 50 mcg/actuation nasal spray, Sig 2 sprays (100 mcg total) by Each Nostril route once daily., Start Date 3/1/21, End Date , Taking? , Authorizing Provider Alverto Green MD    Medication fluticasone-umeclidin-vilanter (TRELEGY ELLIPTA) 100-62.5-25 mcg DsDv, Sig Inhale 1 puff into the lungs once daily., Start Date 3/13/25, End Date , Taking? , Authorizing Provider Farhan Dumont MD    Medication GARLIC ORAL, Sig Take 1 capsule by mouth once daily., Start Date , End Date , Taking? , Authorizing Provider Provider, Historical    Medication glipiZIDE (GLUCOTROL) 5 MG tablet, Sig Take 1 tablet (5 mg total) by mouth 2 (two) times daily before meals., Start Date 3/10/25, End Date 3/10/26, Taking? , Authorizing Provider Gina Renee MD    Medication hydrALAZINE (APRESOLINE) 50 MG tablet, Sig Take 1 tablet (50 mg total) by mouth 3 (three) times daily., Start Date 2/10/25, End Date 2/10/26, Taking? , Authorizing Provider Tommy Payton MD    Medication inhalation spacing device, Sig Use as directed for inhalation., Start Date 3/28/24, End Date , Taking? , Authorizing Provider Farhan Dumont MD    Medication lancets Misc, Sig To check sugar daily, to use with insurance preferred meter, Start Date 6/5/24, End Date , Taking? , Authorizing Provider Williams Knight, DO    Medication losartan (COZAAR) 100 MG tablet, Sig Take 1 tablet (100 mg total) by mouth once daily., Start Date 4/10/25, End Date 4/10/26, Taking? , Authorizing Provider Tommy Payton MD    Medication multivitamin capsule, Sig Take 1 capsule by mouth once daily., Start Date , End Date , Taking? , Authorizing Provider Provider, Historical    Medication omega-3 fatty acids/fish oil (FISH OIL-OMEGA-3 FATTY ACIDS) 300-1,000 mg capsule, Sig Take 1 capsule by mouth once daily., Start  Date , End Date , Taking? , Authorizing Provider Provider, Historical    Medication tamsulosin (FLOMAX) 0.4 mg Cap, Sig Take 0.4 mg by mouth once daily., Start Date 5/17/24, End Date , Taking? , Authorizing Provider Provider, Historical    Medication vitamin D (VITAMIN D3) 1000 units Tab, Sig Take 1,000 Units by mouth once daily., Start Date , End Date , Taking? , Authorizing Provider Provider, Historical      No current facility-administered medications on file prior to encounter.  Current Outpatient Medications on File Prior to Encounter:  acetaminophen (TYLENOL EXTRA STRENGTH ORAL), Take by mouth., Disp: , Rfl:   albuterol (VENTOLIN HFA) 90 mcg/actuation inhaler, Inhale 2 puffs into the lungs every 4 (four) hours as needed for Wheezing. Rescue, Disp: 18 g, Rfl: 11  aspirin (ECOTRIN) 81 MG EC tablet, Take 81 mg by mouth once daily., Disp: , Rfl:   atorvastatin (LIPITOR) 40 MG tablet, Take 1 tablet (40 mg total) by mouth once daily., Disp: 90 tablet, Rfl: 3  blood sugar diagnostic (ACCU-CHEK GUIDE TEST STRIPS) Strp, 1 strip by In Vitro route once daily., Disp: 100 strip, Rfl: 3  blood-glucose meter kit, Use as instructed, Disp: 1 each, Rfl: 0  finasteride (PROSCAR) 5 mg tablet, Take 1 tablet (5 mg total) by mouth once daily., Disp: 90 tablet, Rfl: 3  fluticasone propionate (FLONASE) 50 mcg/actuation nasal spray, 2 sprays (100 mcg total) by Each Nostril route once daily., Disp: 48 g, Rfl: 3  fluticasone-umeclidin-vilanter (TRELEGY ELLIPTA) 100-62.5-25 mcg DsDv, Inhale 1 puff into the lungs once daily., Disp: 180 each, Rfl: 3  GARLIC ORAL, Take 1 capsule by mouth once daily., Disp: , Rfl:   glipiZIDE (GLUCOTROL) 5 MG tablet, Take 1 tablet (5 mg total) by mouth 2 (two) times daily before meals., Disp: 90 tablet, Rfl: 3  hydrALAZINE (APRESOLINE) 50 MG tablet, Take 1 tablet (50 mg total) by mouth 3 (three) times daily., Disp: 270 tablet, Rfl: 3  inhalation spacing device, Use as directed for inhalation., Disp: 1 each,  Rfl: 0  lancets Misc, To check sugar daily, to use with insurance preferred meter, Disp: 200 each, Rfl: 2  losartan (COZAAR) 100 MG tablet, Take 1 tablet (100 mg total) by mouth once daily., Disp: 90 tablet, Rfl: 3  multivitamin capsule, Take 1 capsule by mouth once daily., Disp: , Rfl:   omega-3 fatty acids/fish oil (FISH OIL-OMEGA-3 FATTY ACIDS) 300-1,000 mg capsule, Take 1 capsule by mouth once daily., Disp: , Rfl:   tamsulosin (FLOMAX) 0.4 mg Cap, Take 0.4 mg by mouth once daily., Disp: , Rfl:   vitamin D (VITAMIN D3) 1000 units Tab, Take 1,000 Units by mouth once daily., Disp: , Rfl:          Review of patient's allergies indicates:   -- Solarcaine [benzocaine-triclosan]    -- Sulfa (sulfonamide antibiotics)    -- Sulfamethoxazole    -- Trimethoprim    -- Empagliflozin -- Rash and Other (See Comments)    --  Yeast infection   -- Hytrin [terazosin] -- Rash   -- Smz-tmp ds [sulfamethoxazole-trimethoprim] -- Rash    Social History:   reports that he quit smoking about 54 years ago. His smoking use included cigarettes. He started smoking about 79 years ago. He has a 25 pack-year smoking history. He has been exposed to tobacco smoke. He has never used smokeless tobacco. He reports that he does not currently use alcohol after a past usage of about 5.0 standard drinks of alcohol per week. He reports that he does not use drugs.     Review of patient's family history indicates:  Problem: Heart disease      Relation: Father          Name:               Age of Onset: (Not Specified)  Problem: Hypertension      Relation: Father          Name:               Age of Onset: (Not Specified)  Problem: Cancer      Relation: Mother          Name: cancer jairon houser              Age of Onset: (Not Specified)  Problem: Diabetes      Relation: Paternal Aunt          Name:               Age of Onset: (Not Specified)      PHYSICAL EXAMINATION  Temp:  [97.6 °F (36.4 °C)-98.2 °F (36.8 °C)] 97.7 °F (36.5 °C)  Pulse:  [76-96] 76  Resp:   [12-26] 18  SpO2:  [92 %-98 %] 98 %  BP: (143-191)/(66-87) 145/66    General Condition:   Alert x 3     Head & Neck  Anemia: None  Jaundice: None  Neck vein: Not distended  Carotid Bruits: none  Lymph nodes: none palpable  Thyroid: normal    Chest: normal    Heart: normal    Rt. Breast: not examined  Lt. Breast: not examined  Axillary lymph nodes: none    Abdomen: Soft,  None tender with no palpable mass or organ, non distended, bowel sounds present  Hernia: none    Rectal: Defered    Extremities: normal    Vascular: normal    Specific focus Examination       Imp: partial sbo, chronic constipation , dm , htn, s/p multiple abdominal surgeries.    Plan: check KUB , may take clear liquids, follow cbc

## 2025-05-27 NOTE — ASSESSMENT & PLAN NOTE
"Patient's FSGs are controlled on current medication regimen.  Last A1c reviewed-   Lab Results   Component Value Date    HGBA1C 6.6 (H) 03/10/2025     Most recent fingerstick glucose reviewed- No results for input(s): "POCTGLUCOSE" in the last 24 hours.  Current correctional scale  Low  Maintain anti-hyperglycemic dose as follows-   Antihyperglycemics (From admission, onward)      None          Hold Oral hypoglycemics while patient is in the hospital.  "

## 2025-05-27 NOTE — H&P
[facility] Medicine  History & Physical    Patient Name: Husam Connolly  MRN: [id number]  Patient Class: IP- Inpatient  Admission Date: 5/27/2025  Attending Physician: Barry Estes MD   Primary Care Provider: Williams Knight DO         Patient information was obtained from patient, past medical records, and ER records.     Subjective:     Principal Problem:SBO (small bowel obstruction)    Chief Complaint:   Chief Complaint   Patient presents with    Abdominal Pain     Pt presents to the ED c/o abdominal pain and nausea. Pt reports belly being hard and distended         HPI: Husam Connolly is a 89-year-old male with a past medical history of BPH, DM2, emphysema, high right bundle-branch block, small-bowel obstruction, CKD3, HLD, HTN who presented to the ED with complaints of abdominal pain.  Patient states this started last night and was worsening.  Patient was seen in ER and had NG tube placed.  In ER patient had CT scan of the abdomen/pelvis which showed small bowel obstruction with probable transition point in the right lower quadrant.  Due to patient's presenting symptoms he will require admission for further evaluation and management    Past Medical History:   Diagnosis Date    BPH (benign prostatic hyperplasia)     Diabetes mellitus (type 2)     Emphysema 03/12/2022    High cholesterol 08/26/2020    Hypertension     RBBB 11/29/2022    Small bowel obstruction 06/13/2019       Past Surgical History:   Procedure Laterality Date    ANASTOMOSIS OF ILEUM TO RECTUM  9/9/2019    Procedure: ANASTOMOSIS, COLO-RECTAL;  Surgeon: Fouzia Gardner MD;  Location: [facility] OR;  Service: General;;    CHOLECYSTECTOMY N/A 3/7/2022    Procedure: CHOLECYSTECTOMY;  Surgeon: Fouzia Gardner MD;  Location: [facility] OR;  Service: General;  Laterality: N/A;    COLOSTOMY  6/16/2019    Procedure: CREATION, COLOSTOMY;  Surgeon: Fouzia Gardner MD;  Location: [facility] OR;  Service: General;;    DUPUYTREN  CONTRACTURE RELEASE Left 4/22/2022    Procedure: RELEASE, DUPUYTREN CONTRACTURE;  Surgeon: Slick Baldwin Jr., MD;  Location: [facility] OR;  Service: Orthopedics;  Laterality: Left;    EYE SURGERY      GISELA PROCEDURE  6/16/2019    Procedure: GISELA PROCEDURE;  Surgeon: Fouzia Gardner MD;  Location: [facility] OR;  Service: General;;  Colon resection    LYSIS OF ADHESIONS N/A 3/7/2022    Procedure: LYSIS, ADHESIONS;  Surgeon: Fouzia Gardner MD;  Location: [facility] OR;  Service: General;  Laterality: N/A;    TONSILLECTOMY         Review of patient's allergies indicates:   Allergen Reactions    Solarcaine [benzocaine-triclosan]     Sulfa (sulfonamide antibiotics)     Sulfamethoxazole     Trimethoprim     Empagliflozin Rash and Other (See Comments)     Yeast infection    Hytrin [terazosin] Rash    Smz-tmp ds [sulfamethoxazole-trimethoprim] Rash       No current facility-administered medications on file prior to encounter.     Current Outpatient Medications on File Prior to Encounter   Medication Sig    acetaminophen (TYLENOL EXTRA STRENGTH ORAL) Take by mouth.    albuterol (VENTOLIN HFA) 90 mcg/actuation inhaler Inhale 2 puffs into the lungs every 4 (four) hours as needed for Wheezing. Rescue    aspirin (ECOTRIN) 81 MG EC tablet Take 81 mg by mouth once daily.    atorvastatin (LIPITOR) 40 MG tablet Take 1 tablet (40 mg total) by mouth once daily.    blood sugar diagnostic (ACCU-CHEK GUIDE TEST STRIPS) Strp 1 strip by In Vitro route once daily.    blood-glucose meter kit Use as instructed    finasteride (PROSCAR) 5 mg tablet Take 1 tablet (5 mg total) by mouth once daily.    fluticasone propionate (FLONASE) 50 mcg/actuation nasal spray 2 sprays (100 mcg total) by Each Nostril route once daily.    fluticasone-umeclidin-vilanter (TRELEGY ELLIPTA) 100-62.5-25 mcg DsDv Inhale 1 puff into the lungs once daily.    GARLIC ORAL Take 1 capsule by mouth once daily.    glipiZIDE (GLUCOTROL) 5 MG tablet Take 1 tablet (5 mg  total) by mouth 2 (two) times daily before meals.    hydrALAZINE (APRESOLINE) 50 MG tablet Take 1 tablet (50 mg total) by mouth 3 (three) times daily.    inhalation spacing device Use as directed for inhalation.    lancets Misc To check sugar daily, to use with insurance preferred meter    losartan (COZAAR) 100 MG tablet Take 1 tablet (100 mg total) by mouth once daily.    multivitamin capsule Take 1 capsule by mouth once daily.    omega-3 fatty acids/fish oil (FISH OIL-OMEGA-3 FATTY ACIDS) 300-1,000 mg capsule Take 1 capsule by mouth once daily.    tamsulosin (FLOMAX) 0.4 mg Cap Take 0.4 mg by mouth once daily.    vitamin D (VITAMIN D3) 1000 units Tab Take 1,000 Units by mouth once daily.     Family History       Problem Relation (Age of Onset)    Cancer Mother    Diabetes Paternal Aunt    Heart disease Father    Hypertension Father          Tobacco Use    Smoking status: Former     Current packs/day: 0.00     Average packs/day: 1 pack/day for 25.0 years (25.0 ttl pk-yrs)     Types: Cigarettes     Start date:      Quit date:      Years since quittin.4     Passive exposure: Past    Smokeless tobacco: Never   Substance and Sexual Activity    Alcohol use: Not Currently     Alcohol/week: 5.0 standard drinks of alcohol     Types: 5 Cans of beer per week    Drug use: No    Sexual activity: Yes     Partners: Female     Review of Systems   Constitutional:  Positive for appetite change.   HENT: Negative.     Eyes: Negative.    Respiratory: Negative.     Cardiovascular: Negative.    Gastrointestinal:  Positive for abdominal pain, nausea and vomiting.   Genitourinary: Negative.    Musculoskeletal: Negative.    Hematological: Negative.    Psychiatric/Behavioral: Negative.       Objective:     Vital Signs (Most Recent):  Temp: 97.6 °F (36.4 °C) (25 0156)  Pulse: 82 (25 0402)  Resp: 12 (25 0402)  BP: (!) 165/73 (25 0402)  SpO2: 95 % (25 0321) Vital Signs (24h Range):  Temp:  [97.6 °F  (36.4 °C)] 97.6 °F (36.4 °C)  Pulse:  [82-84] 82  Resp:  [12-20] 12  SpO2:  [95 %-96 %] 95 %  BP: (143-187)/(73-87) 165/73     Weight: 71.7 kg (158 lb)  Body mass index is 22.67 kg/m².     Physical Exam  HENT:      Head: Normocephalic and atraumatic.      Nose: Nose normal.      Mouth/Throat:      Mouth: Mucous membranes are moist.   Eyes:      Extraocular Movements: Extraocular movements intact.      Pupils: Pupils are equal, round, and reactive to light.   Cardiovascular:      Rate and Rhythm: Regular rhythm.      Heart sounds: Normal heart sounds.   Pulmonary:      Breath sounds: Normal breath sounds.   Abdominal:      Tenderness: There is abdominal tenderness.   Musculoskeletal:         General: Normal range of motion.      Cervical back: Neck supple.   Skin:     General: Skin is warm.   Neurological:      General: No focal deficit present.      Mental Status: He is alert.   Psychiatric:         Mood and Affect: Mood normal.              CRANIAL NERVES     CN III, IV, VI   Pupils are equal, round, and reactive to light.       Significant Labs: All pertinent labs within the past 24 hours have been reviewed.  Recent Lab Results         05/27/25  0251   05/27/25  0249        Albumin 3.9         ALP 97         ALT 17         Anion Gap 12         AST 22         Baso # 0.04         Basophil % 0.2         BILIRUBIN TOTAL 1.0  Comment: For infants and newborns, interpretation of results should be based   on gestational age, weight and in agreement with clinical   observations.    Premature Infant recommended reference ranges:   0-24 hours:  <8.0 mg/dL   24-48 hours: <12.0 mg/dL   3-5 days:    <15.0 mg/dL   6-29 days:   <15.0 mg/dL         BUN 37         Calcium 9.6         Chloride 105         CO2 21         Creatinine 1.7         eGFR 37  Comment: Estimated GFR calculated using the CKD-EPI creatinine (2021) equation.         Eos # 0.09         Eos % 0.6         Glucose 219         Gran # (ANC) 13.46         Hematocrit  47.6         Hemoglobin 16.1         Immature Grans (Abs) 0.05  Comment: Mild elevation in immature granulocytes is non specific and can be seen in a variety of conditions including stress response, acute inflammation, trauma and pregnancy. Correlation with other laboratory and clinical findings is essential.         Immature Granulocytes 0.3         Lipase 21         Lymph # 1.19         Lymph % 7.3         MCH 29.8         MCHC 33.8         MCV 88         Mono # 1.50         Mono % 9.2         MPV 10.4         Neut % 82.4         nRBC 0         Platelet Count 215         POC Creatinine   1.7       Potassium 4.0         PROTEIN TOTAL 7.4         RBC 5.40         RDW 14.1         Sample   VENOUS       Sodium 138         WBC 16.33                 Significant Imaging: I have reviewed all pertinent imaging results/findings within the past 24 hours.  Assessment/Plan:     Assessment & Plan  SBO (small bowel obstruction)  Admit to medical floor with telemetry.  NPO.  NGT  IV fluids,antiemetics  Inpatient Cardiology consultation    Acute kidney injury superimposed on CKD  MATEUSZ is likely due to pre-renal azotemia due to intravascular volume depletion. Baseline creatinine is 1.5. Most recent creatinine and eGFR are listed below.  Recent Labs     05/27/25  0251   CREATININE 1.7*   EGFRNORACEVR 37*      Plan  - MATEUSZ is stable  - Avoid nephrotoxins and renally dose meds for GFR listed above  - Monitor urine output, serial BMP, and adjust therapy as needed  - monitor renal function closely.  HTN (hypertension)  Patient's blood pressure range in the last 24 hours was: BP  Min: 143/87  Max: 187/87.The patient's inpatient anti-hypertensive regimen is listed below:  Current Antihypertensives  hydrALAZINE tablet 50 mg, 3 times daily, Oral  losartan tablet 100 mg, Daily, Oral    Plan  - BP is controlled, no changes needed to their regimen  - monitor blood pressure closely.  Type 2 diabetes mellitus  Patient's FSGs are controlled on  "current medication regimen.  Last A1c reviewed-   Lab Results   Component Value Date    HGBA1C 6.6 (H) 03/10/2025     Most recent fingerstick glucose reviewed- No results for input(s): "POCTGLUCOSE" in the last 24 hours.  Current correctional scale  Low  Maintain anti-hyperglycemic dose as follows-   Antihyperglycemics (From admission, onward)      None          Hold Oral hypoglycemics while patient is in the hospital.  A-fib  Patient has paroxysmal (<7 days) atrial fibrillation. Patient is currently in sinus rhythm. [id number] Score: 3. The patients heart rate in the last 24 hours is as follows:  Pulse  Min: 82  Max: 84     Antiarrhythmics       Anticoagulants       Plan  - Replete lytes with a goal of K>4, Mg >2  - Patient is not anticoagulated due advanced age.   - Patient's afib is currently controlled  - monitor on telemetry        VTE Risk Mitigation (From admission, onward)           Ordered     IP VTE HIGH RISK PATIENT  Once         05/27/25 0518     Place sequential compression device  Until discontinued         05/27/25 0518                     Further recommendations, diagnosis and orders will be made by day team hospitalist who will assume care in the  morning               Mike Page MD  Department of Hospital Medicine  [place] - Emergency Dept          " General Sunscreen Counseling: I recommended a broad spectrum sunscreen with a SPF of 30 or higher.  I explained that SPF 30 sunscreens block approximately 97 percent of the sun's harmful rays.  Sunscreens should be applied at least 15 minutes prior to expected sun exposure and then every 2 hours after that as long as sun exposure continues. If swimming or exercising sunscreen should be reapplied every 45 minutes to an hour after getting wet or sweating.  One ounce, or the equivalent of a shot glass full of sunscreen, is adequate to protect the skin not covered by a bathing suit. I also recommended a lip balm with a sunscreen as well. Sun protective clothing can be used in lieu of sunscreen but must be worn the entire time you are exposed to the sun's rays. \\n\\nI counseled the patient regarding the following:\\nThe ABCDEs of melanoma were reviewed with the patient, and the importance of monthly self-examination of moles was emphasized.  Should any moles change in shape or color, or itch, bleed or burn, pt will contact our office for evaluation sooner then their interval appointment.\\nPatient with multiple nevi. Recommend self exam and for patient to return if any mole changes or symptomatic. FBSE on annual basis recommend. Detail Level: Detailed

## 2025-05-27 NOTE — ASSESSMENT & PLAN NOTE
Patient has paroxysmal (<7 days) atrial fibrillation. Patient is currently in sinus rhythm. [id number] Score: 3. The patients heart rate in the last 24 hours is as follows:  Pulse  Min: 82  Max: 84     Antiarrhythmics       Anticoagulants       Plan  - Replete lytes with a goal of K>4, Mg >2  - Patient is not anticoagulated due advanced age.   - Patient's afib is currently controlled  - monitor on telemetry

## 2025-05-27 NOTE — ED NOTES
Report received per FIDELIA Duarte. Pt found sitting up In bed. AAOx4. Resp even and unlabored. Abdomen distended, soft, nontender. Pt denies any N/V or pain. NG tube in place to Rt nostril connected to low intermittent suction. VSS and pt in no visible distress.

## 2025-05-27 NOTE — ASSESSMENT & PLAN NOTE
MATEUSZ is likely due to pre-renal azotemia due to intravascular volume depletion. Baseline creatinine is 1.5. Most recent creatinine and eGFR are listed below.  Recent Labs     05/27/25  0251   CREATININE 1.7*   EGFRNORACEVR 37*      Plan  - MATEUSZ is stable  - Avoid nephrotoxins and renally dose meds for GFR listed above  - Monitor urine output, serial BMP, and adjust therapy as needed  - monitor renal function closely.

## 2025-05-27 NOTE — ED NOTES
Patient arrived in the ED c/o abd distention, nausea and intermittent cramping that started last night. He has a hx of bowel obstructions and states his symptoms are similar. He denies any vomiting as of yet. Denies diarrhea, constipation, fever, urinary symptoms.

## 2025-05-28 VITALS
RESPIRATION RATE: 18 BRPM | HEART RATE: 83 BPM | BODY MASS INDEX: 22.25 KG/M2 | HEIGHT: 70 IN | TEMPERATURE: 98 F | DIASTOLIC BLOOD PRESSURE: 72 MMHG | SYSTOLIC BLOOD PRESSURE: 144 MMHG | WEIGHT: 155.44 LBS | OXYGEN SATURATION: 90 %

## 2025-05-28 LAB
ABSOLUTE EOSINOPHIL (OHS): 0.29 K/UL
ABSOLUTE MONOCYTE (OHS): 0.99 K/UL (ref 0.3–1)
ABSOLUTE NEUTROPHIL COUNT (OHS): 5.89 K/UL (ref 1.8–7.7)
ANION GAP (OHS): 10 MMOL/L (ref 8–16)
BASOPHILS # BLD AUTO: 0.02 K/UL
BASOPHILS NFR BLD AUTO: 0.2 %
BUN SERPL-MCNC: 24 MG/DL (ref 10–30)
CALCIUM SERPL-MCNC: 8.4 MG/DL (ref 8.7–10.5)
CHLORIDE SERPL-SCNC: 107 MMOL/L (ref 95–110)
CO2 SERPL-SCNC: 23 MMOL/L (ref 23–29)
CREAT SERPL-MCNC: 1.2 MG/DL (ref 0.5–1.4)
ERYTHROCYTE [DISTWIDTH] IN BLOOD BY AUTOMATED COUNT: 14.2 % (ref 11.5–14.5)
GFR SERPLBLD CREATININE-BSD FMLA CKD-EPI: 56 ML/MIN/1.73/M2
GLUCOSE SERPL-MCNC: 125 MG/DL (ref 70–110)
HCT VFR BLD AUTO: 41.2 % (ref 40–54)
HGB BLD-MCNC: 13.4 GM/DL (ref 14–18)
IMM GRANULOCYTES # BLD AUTO: 0.02 K/UL (ref 0–0.04)
IMM GRANULOCYTES NFR BLD AUTO: 0.2 % (ref 0–0.5)
LYMPHOCYTES # BLD AUTO: 1.25 K/UL (ref 1–4.8)
MCH RBC QN AUTO: 29.6 PG (ref 27–31)
MCHC RBC AUTO-ENTMCNC: 32.5 G/DL (ref 32–36)
MCV RBC AUTO: 91 FL (ref 82–98)
NUCLEATED RBC (/100WBC) (OHS): 0 /100 WBC
PLATELET # BLD AUTO: 169 K/UL (ref 150–450)
PMV BLD AUTO: 10.8 FL (ref 9.2–12.9)
POCT GLUCOSE: 135 MG/DL (ref 70–110)
POCT GLUCOSE: 197 MG/DL (ref 70–110)
POTASSIUM SERPL-SCNC: 4.1 MMOL/L (ref 3.5–5.1)
RBC # BLD AUTO: 4.53 M/UL (ref 4.6–6.2)
RELATIVE EOSINOPHIL (OHS): 3.4 %
RELATIVE LYMPHOCYTE (OHS): 14.8 % (ref 18–48)
RELATIVE MONOCYTE (OHS): 11.7 % (ref 4–15)
RELATIVE NEUTROPHIL (OHS): 69.7 % (ref 38–73)
SODIUM SERPL-SCNC: 140 MMOL/L (ref 136–145)
WBC # BLD AUTO: 8.46 K/UL (ref 3.9–12.7)

## 2025-05-28 PROCEDURE — 94640 AIRWAY INHALATION TREATMENT: CPT

## 2025-05-28 PROCEDURE — 36415 COLL VENOUS BLD VENIPUNCTURE: CPT | Performed by: FAMILY MEDICINE

## 2025-05-28 PROCEDURE — 94761 N-INVAS EAR/PLS OXIMETRY MLT: CPT

## 2025-05-28 PROCEDURE — 25000003 PHARM REV CODE 250: Performed by: FAMILY MEDICINE

## 2025-05-28 PROCEDURE — 99900035 HC TECH TIME PER 15 MIN (STAT)

## 2025-05-28 PROCEDURE — 25000242 PHARM REV CODE 250 ALT 637 W/ HCPCS: Performed by: STUDENT IN AN ORGANIZED HEALTH CARE EDUCATION/TRAINING PROGRAM

## 2025-05-28 PROCEDURE — 27000221 HC OXYGEN, UP TO 24 HOURS

## 2025-05-28 PROCEDURE — 85025 COMPLETE CBC W/AUTO DIFF WBC: CPT | Performed by: FAMILY MEDICINE

## 2025-05-28 PROCEDURE — 82310 ASSAY OF CALCIUM: CPT | Performed by: FAMILY MEDICINE

## 2025-05-28 RX ORDER — POLYETHYLENE GLYCOL 3350 17 G/17G
17 POWDER, FOR SOLUTION ORAL DAILY
Status: DISCONTINUED | OUTPATIENT
Start: 2025-05-28 | End: 2025-05-28 | Stop reason: HOSPADM

## 2025-05-28 RX ADMIN — HYDRALAZINE HYDROCHLORIDE 50 MG: 25 TABLET ORAL at 08:05

## 2025-05-28 RX ADMIN — LOSARTAN POTASSIUM 100 MG: 50 TABLET, FILM COATED ORAL at 08:05

## 2025-05-28 RX ADMIN — ATORVASTATIN CALCIUM 40 MG: 40 TABLET, FILM COATED ORAL at 08:05

## 2025-05-28 RX ADMIN — TIOTROPIUM BROMIDE INHALATION SPRAY 2 PUFF: 3.12 SPRAY, METERED RESPIRATORY (INHALATION) at 07:05

## 2025-05-28 RX ADMIN — FLUTICASONE FUROATE AND VILANTEROL TRIFENATATE 1 PUFF: 100; 25 POWDER RESPIRATORY (INHALATION) at 07:05

## 2025-05-28 RX ADMIN — HYDRALAZINE HYDROCHLORIDE 50 MG: 25 TABLET ORAL at 02:05

## 2025-05-28 RX ADMIN — TAMSULOSIN HYDROCHLORIDE 0.4 MG: 0.4 CAPSULE ORAL at 08:05

## 2025-05-28 NOTE — PLAN OF CARE
Pt will dc with  services. HH agency will schedule first appointment time/date. Pts daughter is at bedside and will provide transport home. SW will continue to follow pt throughout his transitions of care and assist with any dc needs.     [facility]     Cleared from  . Bedside Nurse and VN notified.    Future Appointments   Date Time Provider Department Center   6/10/2025 10:00 AM Gina Renee MD [facility]   7/11/2025  2:00 PM Nina Morfin NP [facility] MED [place]   7/18/2025  1:20 PM Sandra Rausch MD [facility] [place]   9/22/2025  8:40 AM Slick Baldwin Jr., MD [facility]THO [facility]   10/30/2025  1:30 PM Fouzia Gardner MD [facility] [place]        05/28/25 1519   Final Note   Assessment Type Final Discharge Note   Anticipated Discharge Disposition [facility] Resources/Appts/Education Provided Appointments scheduled and added to AVS   Post-Acute Status   Discharge Delays None known at this time

## 2025-05-28 NOTE — PLAN OF CARE
AVS and educational attachments shared with patient and daughter via [employer]. Discussed thoroughly. Patient and daughter verbalized clear understanding using teach back method. Notified bedside nurse of completion of education. At present no distress noted. Patient to be discharged via w/c with escort service and family with all of patient's belonings. Will cont to be available to patient and family and intervene prn.

## 2025-05-28 NOTE — PLAN OF CARE
Problem: Adult Inpatient Plan of Care  Goal: Plan of Care Review  Outcome: Progressing  Goal: Patient-Specific Goal (Individualized)  Outcome: Progressing     Problem: Fall Injury Risk  Goal: Absence of Fall and Fall-Related Injury  Outcome: Progressing

## 2025-05-28 NOTE — PLAN OF CARE
VN note: VN completed AVS and attachments and notified bedside nurseEnma. Will cont to be available and intervene prn.

## 2025-05-28 NOTE — PLAN OF CARE
Problem: Adult Inpatient Plan of Care  Goal: Optimal Comfort and Wellbeing  Outcome: Progressing  Goal: Readiness for Transition of Care  Outcome: Progressing     Problem: Diabetes Comorbidity  Goal: Blood Glucose Level Within Targeted Range  Outcome: Progressing     Problem: Fall Injury Risk  Goal: Absence of Fall and Fall-Related Injury  Outcome: Progressing     Problem: Wound  Goal: Improved Oral Intake  Outcome: Progressing     Problem: Intestinal Obstruction  Goal: Optimal Bowel Function  Outcome: Progressing  Goal: Fluid and Electrolyte Balance  Outcome: Progressing  Goal: Absence of Infection Signs and Symptoms  Outcome: Progressing  Goal: Optimize Nutrition Status  Outcome: Progressing  Goal: Optimal Pain Control and Function  Outcome: Progressing

## 2025-05-28 NOTE — PLAN OF CARE
05/27/25 1939   Patient Assessment/Suction   Level of Consciousness (AVPU) alert   Respiratory Effort Normal;Unlabored   Expansion/Accessory Muscles/Retractions no retractions;no use of accessory muscles   Skin Integrity   $ Wound Care Tech Time 15 min   Area Observed Behind ear;Cheek   Skin Appearance without discoloration   PRE-TX-O2   Device (Oxygen Therapy) nasal cannula   $ Is the patient on Low Flow Oxygen? Yes   Flow (L/min) (Oxygen Therapy) 2   SpO2   (patient refused, rn aware)   Aerosol Therapy   $ Aerosol Therapy Charges PRN treatment not required   Respiratory Treatment Status (SVN) PRN treatment not required   Respiratory Evaluation   $ Care Plan Tech Time 15 min

## 2025-05-28 NOTE — PROGRESS NOTES
"Surgery follow up  BP (!) 174/71 (BP Location: Right arm, Patient Position: Lying)   Pulse 78   Temp 98.1 °F (36.7 °C) (Oral)   Resp 18   Ht 5' 10" (1.778 m)   Wt 70.5 kg (155 lb 6.8 oz)   SpO2 (!) 94%   BMI 22.30 kg/m²   I/O last 3 completed shifts:  In: -   Out: 250 [Urine:250]  No intake/output data recorded.  Recent Results (from the past 2 weeks)   CBC with Differential    Collection Time: 05/28/25  5:50 AM   Result Value Ref Range    WBC 8.46 3.90 - 12.70 K/uL    HGB 13.4 (L) 14.0 - 18.0 gm/dL    HCT 41.2 40.0 - 54.0 %    Platelet Count 169 150 - 450 K/uL   CBC with Differential    Collection Time: 05/27/25  2:51 AM   Result Value Ref Range    WBC 16.33 (H) 3.90 - 12.70 K/uL    HGB 16.1 14.0 - 18.0 gm/dL    HCT 47.6 40.0 - 54.0 %    Platelet Count 215 150 - 450 K/uL     Recent Results (from the past 2 weeks)   Basic Metabolic Panel (BMP)    Collection Time: 05/28/25  5:50 AM   Result Value Ref Range    Sodium 140 136 - 145 mmol/L    Potassium 4.1 3.5 - 5.1 mmol/L    Chloride 107 95 - 110 mmol/L    CO2 23 23 - 29 mmol/L    BUN 24 10 - 30 mg/dL    Creatinine 1.2 0.5 - 1.4 mg/dL    Calcium 8.4 (L) 8.7 - 10.5 mg/dL    Anion Gap 10 8 - 16 mmol/L     Doing  well abdomen soft minimal drainage , passing gas tolerating diet    Plan: d/c donnell smithe advance diet.  "

## 2025-05-28 NOTE — PLAN OF CARE
Tom - Telemetry      HOME HEALTH ORDERS  FACE TO FACE ENCOUNTER    Patient Name: Husam Connolly  YOB: 1931    PCP: Williams Knight DO   PCP Address: [address] / [place] [address]  PCP Phone Number: [phone]  PCP Fax: [phone]    Encounter Date: 5/27/25    Admit to Home Health    Diagnoses:  Active Hospital Problems    Diagnosis  POA    *SBO (small bowel obstruction) [K56.609]  Yes    A-fib [I48.91]  Yes    Acute kidney injury superimposed on CKD [N17.9, N18.9]  Yes    Type 2 diabetes mellitus [E11.9]  Yes    HTN (hypertension) [I10]  Yes      Resolved Hospital Problems   No resolved problems to display.       Follow Up Appointments:  Future Appointments   Date Time Provider Department Center   6/10/2025 10:00 AM Gina Renee MD [facility] IMPRI [place] [facility]   7/11/2025  2:00 PM Nina Morfin NP [facility]   7/18/2025  1:20 PM Sandra Rausch MD [facility]   9/22/2025  8:40 AM Slick Baldwin Jr., MD [facility] ORTHO [place] [facility]   10/30/2025  1:30 PM Fouzia Gardner MD [facility]       Allergies:  Review of patient's allergies indicates:   Allergen Reactions    Solarcaine [benzocaine-triclosan]     Sulfa (sulfonamide antibiotics)     Sulfamethoxazole     Trimethoprim     Empagliflozin Rash and Other (See Comments)     Yeast infection    Hytrin [terazosin] Rash    Smz-tmp ds [sulfamethoxazole-trimethoprim] Rash       Medications: Review discharge medications with patient and family and provide education.    Current Medications[1]     Medication List        CONTINUE taking these medications      albuterol 90 mcg/actuation inhaler  Commonly known as: VENTOLIN HFA  Inhale 2 puffs into the lungs every 4 (four) hours as needed for Wheezing. Rescue     aspirin 81 MG EC tablet  Commonly known as: ECOTRIN  Take 81 mg by mouth once daily.     atorvastatin 40 MG tablet  Commonly known as: LIPITOR  Take 1 tablet (40 mg total) by mouth once daily.      blood sugar diagnostic Strp  Commonly known as: ACCU-CHEK GUIDE TEST STRIPS  1 strip by In Vitro route once daily.     blood-glucose meter kit  Use as instructed     finasteride 5 mg tablet  Commonly known as: PROSCAR  Take 1 tablet (5 mg total) by mouth once daily.     fish oil-omega-3 fatty acids 300-1,000 mg capsule  Take 1 capsule by mouth once daily.     fluticasone propionate 50 mcg/actuation nasal spray  Commonly known as: FLONASE  2 sprays (100 mcg total) by Each Nostril route once daily.     GARLIC ORAL  Take 1 capsule by mouth once daily.     glipiZIDE 5 MG tablet  Commonly known as: GLUCOTROL  Take 1 tablet (5 mg total) by mouth 2 (two) times daily before meals.     hydrALAZINE 50 MG tablet  Commonly known as: APRESOLINE  Take 1 tablet (50 mg total) by mouth 3 (three) times daily.     lancets Misc  To check sugar daily, to use with insurance preferred meter     losartan 100 MG tablet  Commonly known as: COZAAR  Take 1 tablet (100 mg total) by mouth once daily.     multivitamin capsule  Take 1 capsule by mouth once daily.     OPTICHAMBER BASHIR [facility]  Generic drug: inhalation spacing device  Use as directed for inhalation.     tamsulosin 0.4 mg Cap  Commonly known as: FLOMAX  Take 0.4 mg by mouth once daily.     TRELEGY ELLIPTA 100-62.5-25 mcg Dsdv  Generic drug: fluticasone-umeclidin-vilanter  Inhale 1 puff into the lungs once daily.     TYLENOL EXTRA STRENGTH ORAL  Take by mouth.     vitamin D 1000 units Tab  Commonly known as: VITAMIN D3  Take 1,000 Units by mouth once daily.                I have seen and examined this patient within the last 30 days. My clinical findings that support the need for the home health skilled services and home bound status are the following:no   Weakness/numbness causing balance and gait disturbance due to Weakness/Debility making it taxing to leave home.  Requiring assistive device to leave home due to unsteady gait caused by  Weakness/Debility.     Diet:   cardiac  diet    Referrals/ Consults  Physical Therapy to evaluate and treat. Evaluate for home safety and equipment needs; Establish/upgrade home exercise program. Perform / instruct on therapeutic exercises, gait training, transfer training, and Range of Motion.  Occupational Therapy to evaluate and treat. Evaluate home environment for safety and equipment needs. Perform/Instruct on transfers, ADL training, ROM, and therapeutic exercises.    Activities:   activity as tolerated    Nursing:   Agency to admit patient within 24 hours of hospital discharge unless specified on physician order or at patient request    SN to complete comprehensive assessment including routine vital signs. Instruct on disease process and s/s of complications to report to MD. Review/verify medication list sent home with the patient at time of discharge  and instruct patient/caregiver as needed. Frequency may be adjusted depending on start of care date.     Skilled nurse to perform up to 3 visits PRN for symptoms related to diagnosis    Notify MD if SBP > 160 or < 90; DBP > 90 or < 50; HR > 120 or < 50; Temp > 101; O2 < 88%    Ok to schedule additional visits based on staff availability and patient request on consecutive days within the home health episode.    When multiple disciplines ordered:    Start of Care occurs on Sunday - Wednesday schedule remaining discipline evaluations as ordered on separate consecutive days following the start of care.    Thursday SOC -schedule subsequent evaluations Friday and Monday the following week.     Friday - Saturday SOC - schedule subsequent discipline evaluations on consecutive days starting Monday of the following week.    For all post-discharge communication and subsequent orders please contact patient's primary care physician.       Home Health Aide:  Nursing Twice weekly, Physical Therapy Twice weekly, and Occupational Therapy Twice weekly    Wound Care Orders  no    I certify that this patient is confined  to his home and needs intermittent skilled nursing care, physical therapy, and occupational therapy.              [1]   Current Facility-Administered Medications   Medication Dose Route Frequency Provider Last Rate Last Admin    acetaminophen tablet 650 mg  650 mg Oral Q6H PRN Mike Page MD        albuterol-ipratropium 2.5 mg-0.5 mg/3 mL nebulizer solution 3 mL  3 mL Nebulization Q6H PRN Mike Page MD        atorvastatin tablet 40 mg  40 mg Oral Daily Mike Page MD   40 mg at 05/28/25 0826    dextrose 50% injection 12.5 g  12.5 g Intravenous PRN Xuan Santos MD        fluticasone furoate-vilanteroL 100-25 mcg/dose diskus inhaler 1 puff  1 puff Inhalation Daily Xuan Santos MD   1 puff at 05/28/25 0723    glucagon (human recombinant) injection 1 mg  1 mg Intramuscular PRN Mike Page MD        glucagon (human recombinant) injection 1 mg  1 mg Intramuscular PRN Xuan Santos MD        glucose chewable tablet 16 g  16 g Oral PRN Mike Page MD        glucose chewable tablet 24 g  24 g Oral PRN Mike Page MD        hydrALAZINE injection 5 mg  5 mg Intravenous Q8H PRN Xuan Santos MD        hydrALAZINE tablet 50 mg  50 mg Oral TID Mike Page MD   50 mg at 05/28/25 1452    insulin aspart U-100 pen 0-5 Units  0-5 Units Subcutaneous Q6H PRN Xuan Santos MD        losartan tablet 100 mg  100 mg Oral Daily Mike Page MD   100 mg at 05/28/25 0826    naloxone 0.4 mg/mL injection 0.02 mg  0.02 mg Intravenous PRN Mike Page MD        ondansetron injection 4 mg  4 mg Intravenous Q8H PRN Mike Page MD        polyethylene glycol packet 17 g  17 g Oral Daily Fouzia Gardner MD        sodium chloride 0.9% flush 10 mL  10 mL Intravenous Q12H PRN Mike Page MD        tamsulosin 24 hr capsule 0.4 mg  0.4 mg Oral Daily Mike Page MD   0.4 mg at 05/28/25 0826    tiotropium bromide 2.5 mcg/actuation inhaler 2 puff  2 puff Inhalation Daily Xuan Santos MD   2 puff at 05/28/25  [id number]

## 2025-05-29 ENCOUNTER — PATIENT OUTREACH (OUTPATIENT)
Dept: ADMINISTRATIVE | Facility: CLINIC | Age: OVER 89
End: 2025-05-29
Payer: MEDICARE

## 2025-05-29 ENCOUNTER — TELEPHONE (OUTPATIENT)
Dept: PULMONOLOGY | Facility: CLINIC | Age: OVER 89
End: 2025-05-29
Payer: MEDICARE

## 2025-05-29 RX ORDER — ALBUTEROL SULFATE 90 UG/1
2 INHALANT RESPIRATORY (INHALATION) EVERY 4 HOURS PRN
Qty: 18 G | Refills: 11 | Status: SHIPPED | OUTPATIENT
Start: 2025-05-29 | End: 2026-05-29

## 2025-05-29 NOTE — PROGRESS NOTES
C3 nurse attempted to contact Husam Connolly  for a TCC post hospital discharge follow up call. NO, LVM . The patient has a scheduled HOSFU appointment with DONG Renee on 06/10/2025 @ 1000.

## 2025-05-29 NOTE — TELEPHONE ENCOUNTER
----- Message from [employer] sent at 5/29/2025  9:52 AM CDT -----  Consult/AdvisoryName Of Caller:Husam Contact Preference: [phone] ContactTelephone [phone] Address and HoNature of call: Pt called regarding a refill on albuterol suf he stated he is almost out

## 2025-06-02 NOTE — ED PROVIDER NOTES
Encounter Date: 5/27/2025       History     Chief Complaint   Patient presents with    Abdominal Pain     Pt presents to the ED c/o abdominal pain and nausea. Pt reports belly being hard and distended      HPI  This is a late entry although I remember this patient well.  This is a 89-year-old male with a history of recurrent small-bowel obstructions, multiple abdominal surgeries that were done by Dr. Gardner including anastomosis of the bowel, who presents brought in by self through triage for abdominal pain and vomiting, he has reports unprompted that he believes he has a another SBO.  Review of patient's allergies indicates:   Allergen Reactions    Solarcaine [benzocaine-triclosan]     Sulfa (sulfonamide antibiotics)     Sulfamethoxazole     Trimethoprim     Empagliflozin Rash and Other (See Comments)     Yeast infection    Hytrin [terazosin] Rash    Smz-tmp ds [sulfamethoxazole-trimethoprim] Rash     Past Medical History:   Diagnosis Date    BPH (benign prostatic hyperplasia)     Diabetes mellitus (type 2)     Emphysema 03/12/2022    High cholesterol 08/26/2020    Hypertension     RBBB 11/29/2022    Small bowel obstruction 06/13/2019     Past Surgical History:   Procedure Laterality Date    ANASTOMOSIS OF ILEUM TO RECTUM  9/9/2019    Procedure: ANASTOMOSIS, COLO-RECTAL;  Surgeon: Fouzia Gardner MD;  Location: [facility] OR;  Service: General;;    CHOLECYSTECTOMY N/A 3/7/2022    Procedure: CHOLECYSTECTOMY;  Surgeon: Fouzia Gardner MD;  Location: [facility] OR;  Service: General;  Laterality: N/A;    COLOSTOMY  6/16/2019    Procedure: CREATION, COLOSTOMY;  Surgeon: Fouzia aGrdner MD;  Location: [facility] OR;  Service: General;;    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    Procedure: RELEASE, DUPUYTREN CONTRACTURE;  Surgeon: Slick Baldwin Jr., MD;  Location: [facility] OR;  Service: Orthopedics;  Laterality: Left;    EYE SURGERY      GISELA PROCEDURE  6/16/2019    Procedure: GISELA PROCEDURE;  Surgeon:  Fouzia Gardner MD;  Location: [facility] OR;  Service: General;;  Colon resection    LYSIS OF ADHESIONS N/A 3/7/2022    Procedure: LYSIS, ADHESIONS;  Surgeon: Fouzia Gardner MD;  Location: [facility] OR;  Service: General;  Laterality: N/A;    TONSILLECTOMY       Family History   Problem Relation Name Age of Onset    Heart disease Father      Hypertension Father      Cancer Mother cancer breas varian     Diabetes Paternal Aunt       Social History[1]  Medical surgical family social history reviewed  Review of Systems  Complete review of systems was conducted and was negative except as per HPI and as marked  Physical Exam     Initial Vitals [05/27/25 0156]   BP Pulse Resp Temp SpO2   (!) 143/87 84 20 97.6 °F (36.4 °C) 96 %      MAP       --         Physical Exam  Physical  General: Awake, alert in obvious discomfort and nauseated,   Head: Normocephalic, atraumatic  Neck: Trachea midline, full range of motion, no meningismus  ENT: Atraumatic, Airway Patent  Cardio: Regular Rate, Regular Rhythm, Heart Sounds Normal, Capillary refill normal  Chest: Atraumatic, No respiratory distress no use of accessory muscles to breath, normal rate, sounds even and clear to auscultation  Abdomen: Soft, abdomen distended and tympanic, diffuse tenderness without peritonitis, no involuntary guarding, rigidity, or rebound.  Upper Ext: Atraumatic, Inspection normal, no swelling  Lower Ext: Atraumatic, Inspection normal, no swelling  Neuro: No gross cranial nerve abnormality, no lateralization, no gross sensory or motor deficits  ED Course   Procedures  Labs Reviewed   COMPREHENSIVE METABOLIC PANEL - Abnormal       Result Value    Sodium 138      Potassium 4.0      Chloride 105      CO2 21 (*)     Glucose 219 (*)     BUN 37 (*)     Creatinine 1.7 (*)     Calcium 9.6      Protein Total 7.4      Albumin 3.9      Bilirubin Total 1.0      ALP 97      AST 22      ALT 17      Anion Gap 12      eGFR 37 (*)    URINALYSIS, REFLEX TO URINE CULTURE -  Abnormal    Color, UA Yellow      Appearance, UA Clear      pH, UA 6.0      Spec Grav UA >=1.030 (*)     Protein, UA Trace (*)     Glucose, UA Negative      Ketones, UA Negative      Bilirubin, UA Negative      Blood, UA Negative      Nitrites, UA Negative      Urobilinogen, UA Negative      Leukocyte Esterase, UA Negative     CBC WITH DIFFERENTIAL - Abnormal    WBC 16.33 (*)     RBC 5.40      HGB 16.1      HCT 47.6      MCV 88      MCH 29.8      MCHC 33.8      RDW 14.1      Platelet Count 215      MPV 10.4      Nucleated RBC 0      Neut % 82.4 (*)     Lymph % 7.3 (*)     Mono % 9.2      Eos % 0.6      Basophil % 0.2      Imm Grans % 0.3      Neut # 13.46 (*)     Lymph # 1.19      Mono # 1.50 (*)     Eos # 0.09      Baso # 0.04      Imm Grans # 0.05 (*)    ISTAT CREATININE - Abnormal    POC Creatinine 1.7 (*)     Sample VENOUS     POCT GLUCOSE - Abnormal    POCT Glucose 160 (*)    LIPASE - Normal    Lipase Level 21     PROTIME-INR - Normal    PT 12.0      INR 1.1     CBC W/ AUTO DIFFERENTIAL    Narrative:     The following orders were created for panel order CBC auto differential.  Procedure                               Abnormality         Status                     ---------                               -----------         ------                     CBC with Differential[[id number]]       Abnormal            Final result                 Please view results for these tests on the individual orders.   GREY TOP URINE HOLD    Extra Tube Hold for add-ons.            Imaging Results              XR NG/OG tube placement check, non-radiologist performed (Final result)  Result time 05/27/25 07:37:11   Procedure changed from X-Ray Chest AP Portable     Final result by Isaiah Belcher MD (05/27/25 07:37:11)                   Impression:      As above      Electronically signed by: Isaiah Belcher MD  Date:    05/27/2025  Time:    07:37               Narrative:    EXAMINATION:  XR NG/OG TUBE PLACEMENT CHECK, NON-RADIOLOGIST  PERFORMED    CLINICAL HISTORY:  ngt;    TECHNIQUE:  One view    COMPARISON:  Comparison is made to 05/27/2025 at 02:39, as well as to a subsequent abdominal/pelvic CT examination of the same date.    FINDINGS:  Enteric tube is now seen.  The tip of this tube is in the body of the stomach, with the distal side port well distal to the GE junction.  No worsening gaseous distention of large or small bowel loops or other detrimental change in the conventional radiographic appearance of the abdomen since 05/27/2025 at 02:39 appreciated, note being made that the CT examination demonstrated findings consistent with a small-bowel obstruction.  Right upper quadrant surgical clips again incidentally observed.                                        CT Abdomen Pelvis With IV Contrast NO Oral Contrast (Final result)  Result time 05/27/25 04:33:07      Final result by Sun Felix MD (05/27/25 04:33:07)                   Impression:      1. Findings in keeping with small bowel obstruction with probable transition point in the right lower quadrant.  These findings appear similar to prior exam of 02/23/2025 emergent surgical consultation advised.  2. Marked distension of the stomach noting which could relate to aforementioned small-bowel obstruction although there is wall thickening of the gastric antrum and pylorus which can be seen with gastritis.  Clinical correlation advised.  3. Liquid and formed stool throughout the colon.  Postoperative change of the descending colon, diverticulosis without compelling evidence of acute diverticulitis at this time.  4. Hypoattenuating pancreatic lesion in the body of the pancreas measuring 1.7 cm.  Further assessment with dedicated pancreatic protocol MRI can be performed if there are no clinical contraindications and if/when clinically appropriate.  5. Multiple additional findings as above.      Electronically signed by: Sun Felix MD  Date:    05/27/2025  Time:    04:33                Narrative:    EXAMINATION:  CT ABDOMEN PELVIS WITH IV CONTRAST    CLINICAL HISTORY:  abd pain, bowel obstruction suspected;    TECHNIQUE:  Low dose axial images, sagittal and coronal reformations were obtained from the lung bases to the pubic symphysis following the IV administration of 100 mL of Omnipaque 350 .  No oral contrast.    COMPARISON:  CT abdomen and pelvis 02/23/2025    FINDINGS:  The visualized lung bases are free of pleural fluid or focal consolidation.  The visualized portions of the heart and pericardium demonstrate calcification of the aortic valve.    The liver demonstrates a 1.6 cm left hepatic hypodensity suggestive of a cyst.  Gallbladder is surgically absent.  The main portal vein and splenic vein are patent.  No significant intra or extrahepatic biliary ductal dilatation.  The pancreas demonstrates a 1.7 cm hypodensity in the region of the pancreatic body.  No significant peripancreatic inflammatory change or fat stranding.  The spleen and adrenal glands are unremarkable.    The kidneys demonstrate multiple cortical and exophytic hypodensities of varying sizes some of which demonstrate peripheral calcification.  Larger of these are suggestive of cyst and several are too small to definitively characterize.  No hydronephrosis.  The urinary bladder is within normal limits for its given degree of distention.  The prostate is mildly prominent in size.    The abdominal aorta is nonaneurysmal with atherosclerosis.  Shotty periaortic lymph nodes are present.    The stomach is distended with fluid and air.  There is a small hiatal hernia containing fluid.  There is wall thickening of the gastric antrum/pylorus.  There are multiple dilated loops of small bowel within the abdomen concerning for small bowel obstruction with probable transition point in the right lower quadrant noting decompressed loops of distal small bowel in this region.  There is liquid and formed stool throughout the colon.  There is  diverticulosis without evidence to suggest acute diverticulitis at this time.  There is postoperative change of the descending colon.  No pneumatosis, free intraperitoneal air portal venous gas identified.    Visualized osseous structures are intact with multilevel degenerative change.    Findings were communicated to Dr. Barry Estes MD at 04:30 on 05/27/2025 via epic secure chat messenger.  Electronic receipt of results was confirmed.  This report was flagged in Epic as abnormal.                                       X-Ray Abdomen AP 1 View (KUB) (Final result)  Result time 05/27/25 04:21:24      Final result by Sun Felix MD (05/27/25 04:21:24)                   Impression:      Please see above.      Electronically signed by: Sun Felix MD  Date:    05/27/2025  Time:    04:21               Narrative:    EXAMINATION:  XR ABDOMEN AP 1 VIEW    CLINICAL HISTORY:  Unspecified abdominal pain    TECHNIQUE:  AP View(s) of the abdomen was performed.    COMPARISON:  02/24/2025    FINDINGS:  There are several dilated loops of small bowel can be seen with underlying small bowel obstruction.  Further assessment with CT is advised.  No compelling evidence of free intraperitoneal air.  Surgical clips project over the right upper quadrant.                                       Medications   HYDROmorphone injection 0.5 mg (0.5 mg Intravenous Given 5/27/25 0321)   ondansetron injection 8 mg (8 mg Intravenous Given 5/27/25 0320)   iohexoL (OMNIPAQUE 350) injection 75 mL (100 mLs Intravenous Given 5/27/25 0335)   LIDOcaine viscous HCl 2% oral solution 15 mL (15 mLs Oral Given 5/27/25 7065)   0.9% NaCl infusion ( Intravenous New Bag 5/27/25 5754)     Medical Decision Making  Amount and/or Complexity of Data Reviewed  Labs: ordered.  Radiology: ordered.    Risk  Prescription drug management.  Decision regarding hospitalization.    This is a 89-year-old male with a history of recurrent small-bowel obstructions, multiple  abdominal surgeries that were done by Dr. Gardner including anastomosis of the bowel, who presents brought in by self through triage for abdominal pain and vomiting, he has reports unprompted that he believes he has a another SBO.    CT personally reviewed by me, shows wilfredo SBO with gastric distention.  NG tube was ordered.  It was successfully placed, and follow up x-ray shows good placement.           ED Course as of 06/02/25 0601   Tue May 27, 2025   0531 Ng tube in good place   [DS]      ED Course User Index  [DS] Barry Estes MD          Patient to be admitted to medical service all elements of care discussed with the admitting team will be continuing care..  Consult placed to General surgery who agrees to see patient in consultation.         Patient seen in the Emergency department, which included consideration and evaluation for life and limb threatening medical conditions including the history, exam, timely review and interpretation of studies.   All labs and imaging ordered for this encountered were reviewed and included in medical decision making.   Additional information for this case was obtained from discussion with:  Family present at bedside      Clinical Impression:  Final diagnoses:  [R10.9] Abdominal pain  [Z46.59] Encounter for nasogastric (NG) tube placement  [K56.609] SBO (small bowel obstruction)          ED Disposition Condition    Admit                       [1]   Social History  Tobacco Use    Smoking status: Former     Current packs/day: 0.00     Average packs/day: 1 pack/day for 25.0 years (25.0 ttl pk-yrs)     Types: Cigarettes     Start date:      Quit date:      Years since quittin.4     Passive exposure: Past    Smokeless tobacco: Never   Substance Use Topics    Alcohol use: Not Currently     Alcohol/week: 5.0 standard drinks of alcohol     Types: 5 Cans of beer per week    Drug use: No        Barry Estes MD  25

## 2025-06-04 DIAGNOSIS — N18.32 TYPE 2 DIABETES MELLITUS WITH STAGE 3B CHRONIC KIDNEY DISEASE, WITHOUT LONG-TERM CURRENT USE OF INSULIN: ICD-10-CM

## 2025-06-04 DIAGNOSIS — E11.22 TYPE 2 DIABETES MELLITUS WITH STAGE 3B CHRONIC KIDNEY DISEASE, WITHOUT LONG-TERM CURRENT USE OF INSULIN: ICD-10-CM

## 2025-06-10 ENCOUNTER — OFFICE VISIT (OUTPATIENT)
Dept: PRIMARY CARE CLINIC | Facility: CLINIC | Age: OVER 89
End: 2025-06-10
Payer: MEDICARE

## 2025-06-10 VITALS
WEIGHT: 157.19 LBS | BODY MASS INDEX: 22.5 KG/M2 | HEIGHT: 70 IN | SYSTOLIC BLOOD PRESSURE: 132 MMHG | DIASTOLIC BLOOD PRESSURE: 63 MMHG | HEART RATE: 80 BPM | OXYGEN SATURATION: 96 %

## 2025-06-10 DIAGNOSIS — K86.89 PANCREATIC MASS: ICD-10-CM

## 2025-06-10 DIAGNOSIS — N17.9 AKI (ACUTE KIDNEY INJURY): ICD-10-CM

## 2025-06-10 DIAGNOSIS — K56.609 SBO (SMALL BOWEL OBSTRUCTION): Primary | ICD-10-CM

## 2025-06-10 DIAGNOSIS — R19.00 INTRA-ABDOMINAL AND PELVIC SWELLING, MASS AND LUMP, UNSPECIFIED SITE: ICD-10-CM

## 2025-06-10 PROCEDURE — 1125F AMNT PAIN NOTED PAIN PRSNT: CPT | Mod: CPTII,S$GLB,, | Performed by: INTERNAL MEDICINE

## 2025-06-10 PROCEDURE — 3288F FALL RISK ASSESSMENT DOCD: CPT | Mod: CPTII,S$GLB,, | Performed by: INTERNAL MEDICINE

## 2025-06-10 PROCEDURE — 1101F PT FALLS ASSESS-DOCD LE1/YR: CPT | Mod: CPTII,S$GLB,, | Performed by: INTERNAL MEDICINE

## 2025-06-10 PROCEDURE — 99999 PR PBB SHADOW E&M-EST. PATIENT-LVL III: CPT | Mod: PBBFAC,,, | Performed by: INTERNAL MEDICINE

## 2025-06-10 PROCEDURE — 99495 TRANSJ CARE MGMT MOD F2F 14D: CPT | Mod: S$GLB,,, | Performed by: INTERNAL MEDICINE

## 2025-06-10 NOTE — PROGRESS NOTES
"[facility]   New Visit Progress Note   Recent Hospital Discharge     PRESENTING HISTORY     Chief Complaint/Reason for Admission:  Follow up Hospital Discharge   PCP: Sandra Rausch MD    History of Present Illness:  Mr. Husam Connolly is a 89 y.o. male who was recently admitted to the hospital.    [facility]       Patient Name: Husam Connolly  MRN: [id number]  Patient Class: IP- Inpatient  Admission Date: 5/27/2025  Discharge Date: 5/28/25   Attending Physician: Barry Estes MD   Primary Care Provider: Williams Knight, DO  ___________________________________________________________________    Today:  [facility] for initial hospital follow up.  No hospital discharge summary available for reference.   Admitted for management of SBO.   Admitted to [facility] service with General Surgery consultation.  NG tube placed for decompression.   IV fluids and anti emetics administered.  Patient responded well to above interventions and supportive care.  NG tube discontinued and diet advanced.  Patient discharged to home with [facility].     Patient unaccompanied today.  Ambulatory and independent with ADL's.  Has rolling walker at home which he uses for long distances.   He drove to this appt today.   Brought medication bottles for review.  Tolerating oral intake, no NV or ABD pain.  Having regular, formed bowel movements.   Patient tells me he has "[facility]" services, although I do not see any documentation from this service line.     Review of Systems  General ROS: negative for chills, fever or weight loss  Psychological ROS: negative for hallucination, depression or suicidal ideation  Ophthalmic ROS: negative for blurry vision, photophobia or eye pain  ENT ROS: negative for epistaxis, sore throat or rhinorrhea  Respiratory ROS: no cough, shortness of breath, or wheezing  Cardiovascular ROS: no chest pain or dyspnea on " exertion  Gastrointestinal ROS: no abdominal pain, change in bowel habits, or black/ bloody stools  Genito-Urinary ROS: no dysuria, trouble voiding, or hematuria  Musculoskeletal ROS: negative for gait disturbance or muscular weakness  Neurological ROS: no syncope or seizures; no ataxia  Dermatological ROS: negative for pruritis, rash and jaundice      PAST HISTORY:     Past Medical History:   Diagnosis Date    BPH (benign prostatic hyperplasia)     Diabetes mellitus (type 2)     Emphysema 03/12/2022    High cholesterol 08/26/2020    Hypertension     RBBB 11/29/2022    Small bowel obstruction 06/13/2019       Past Surgical History:   Procedure Laterality Date    ANASTOMOSIS OF ILEUM TO RECTUM  9/9/2019    Procedure: ANASTOMOSIS, COLO-RECTAL;  Surgeon: Fouzia Gardner MD;  Location: [facility] OR;  Service: General;;    CHOLECYSTECTOMY N/A 3/7/2022    Procedure: CHOLECYSTECTOMY;  Surgeon: Fouzia Gardner MD;  Location: [facility] OR;  Service: General;  Laterality: N/A;    COLOSTOMY  6/16/2019    Procedure: CREATION, COLOSTOMY;  Surgeon: Fouzia Gardner MD;  Location: [facility] OR;  Service: General;;    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    Procedure: RELEASE, DUPUYTREN CONTRACTURE;  Surgeon: Slick Baldwin Jr., MD;  Location: [facility] OR;  Service: Orthopedics;  Laterality: Left;    EYE SURGERY      GISELA PROCEDURE  6/16/2019    Procedure: GISELA PROCEDURE;  Surgeon: Fouzia Gardner MD;  Location: [facility] OR;  Service: General;;  Colon resection    LYSIS OF ADHESIONS N/A 3/7/2022    Procedure: LYSIS, ADHESIONS;  Surgeon: Fouzia Gardner MD;  Location: [facility];  Service: General;  Laterality: N/A;    TONSILLECTOMY         Family History   Problem Relation Name Age of Onset    Heart disease Father      Hypertension Father      Cancer Mother cancer breas varian     Diabetes Paternal Aunt           MEDICATIONS & ALLERGIES:     Medications Ordered Prior to Encounter[1]     Review of patient's allergies  "indicates:   Allergen Reactions    Solarcaine [benzocaine-triclosan]     Sulfa (sulfonamide antibiotics)     Sulfamethoxazole     Trimethoprim     Empagliflozin Rash and Other (See Comments)     Yeast infection    Hytrin [terazosin] Rash    Smz-tmp ds [sulfamethoxazole-trimethoprim] Rash       OBJECTIVE:     Vital Signs:  /63 (BP Location: Left arm, Patient Position: Sitting)   Pulse 80   Ht 5' 10" (1.778 m)   Wt 71.3 kg (157 lb 3 oz)   SpO2 96%   BMI 22.55 kg/m²   Wt Readings from Last 3 Encounters:   06/10/25 1017 71.3 kg (157 lb 3 oz)   05/27/25 0831 70.5 kg (155 lb 6.8 oz)   05/27/25 0156 71.7 kg (158 lb)   04/29/25 1501 71 kg (156 lb 8.4 oz)     Body mass index is 22.55 kg/m².   96%    Physical Exam:  /63 (BP Location: Left arm, Patient Position: Sitting)   Pulse 80   Ht 5' 10" (1.778 m)   Wt 71.3 kg (157 lb 3 oz)   SpO2 96%   BMI 22.55 kg/m²   General appearance: alert, cooperative, no distress  Constitutional:Oriented to person, place, and time  + appears well-developed and well-nourished.   HEENT: Normocephalic, atraumatic, neck symmetrical, no nasal discharge   Eyes: conjunctivae/corneas clear, PERRL, EOM's intact  Lungs: clear to auscultation bilaterally, no dullness to percussion bilaterally  Heart: regular rate and rhythm without rub; no displacement of the PMI   Abdomen: soft, non-tender; bowel sounds normoactive; no organomegaly  Extremities: extremities symmetric; no clubbing, cyanosis, or edema  Integument: Skin color, texture, turgor normal; no rashes; hair distrubution normal  Neurologic: Alert and oriented X 3, normal strength, normal coordination and gait  Psychiatric: no pressured speech; normal affect; no evidence of impaired cognition     Laboratory  Lab Results   Component Value Date    WBC 8.46 05/28/2025    HGB 13.4 (L) 05/28/2025    HCT 41.2 05/28/2025    MCV 91 05/28/2025     05/28/2025     BMP  Lab Results   Component Value Date     05/28/2025    K 4.1 " 05/28/2025     05/28/2025    CO2 23 05/28/2025    BUN 24 05/28/2025    CREATININE 1.2 05/28/2025    CALCIUM 8.4 (L) 05/28/2025    ANIONGAP 10 05/28/2025    EGFRNORACEVR 56 (L) 05/28/2025     Lab Results   Component Value Date    ALT 17 05/27/2025    AST 22 05/27/2025    ALKPHOS 97 05/27/2025    BILITOT 1.0 05/27/2025     Lab Results   Component Value Date    INR 1.1 05/27/2025    INR 1.1 09/10/2019    INR 1.1 04/18/2015    PROTIME 12.0 05/27/2025     Lab Results   Component Value Date    HGBA1C 6.6 (H) 03/10/2025       Diagnostic Results:    CT ABD Pelvis 5/27/25:  1. Findings in keeping with small bowel obstruction with probable transition point in the right lower quadrant.  These findings appear similar to prior exam of 02/23/2025 emergent surgical consultation advised.  2. Marked distension of the stomach noting which could relate to aforementioned small-bowel obstruction although there is wall thickening of the gastric antrum and pylorus which can be seen with gastritis.  Clinical correlation advised.  3. Liquid and formed stool throughout the colon.  Postoperative change of the descending colon, diverticulosis without compelling evidence of acute diverticulitis at this time.  4. Hypoattenuating pancreatic lesion in the body of the pancreas measuring 1.7 cm.  Further assessment with dedicated pancreatic protocol MRI can be performed if there are no clinical contraindications and if/when clinically appropriate.  5. Multiple additional findings as above.    TRANSITION OF CARE:     [facility] Contact Note: 5/30/35     Family and/or Caretaker present at visit?  No.  Diagnostic tests reviewed/disposition: I have reviewed all completed as well as pending diagnostic tests at the time of discharge.  Disease/illness education: Yes  Home health/community services discussion/referrals: Patient has home health established at [facility] .   Establishment or re-establishment of referral orders for  community resources: No other necessary community resources.   Discussion with other health care providers: No discussion with other health care providers necessary.     ASSESSMENT & PLAN:       SBO (small bowel obstruction)  - recent hospitalization as above  - resolved with NG tube decompression and supportive care    Pancreatic mass  - incidental finding on hospital CT   - will follow up with MRI pancreatic protocol on 6/18/25   -     MRI Abdomen W WO Contrast; Future; Expected date: 06/10/2025    MATEUSZ (acute kidney injury)  - resolved on repeat labs    Patient will be released from hospital follow up clinic.  Follow up as detailed below.     Instructions for the patient:      Scheduled Follow-up :  Future Appointments   Date Time Provider Department Center   6/18/2025 12:00 PM [facility] MRI1 500 LB LIMIT [facility] [place]   7/11/2025  2:00 PM Nina Morfin NP [facility] [place]   7/18/2025  1:20 PM Sandra Rausch MD [facility] [place]   9/22/2025  8:40 AM Slick Baldwin Jr., MD [facility]   10/30/2025  1:30 PM Fouzia Gardner MD [facility]       Post Visit Medication List:     Medication List            Accurate as of Sofy 10, 2025 10:23 AM. If you have any questions, ask your nurse or doctor.                CONTINUE taking these medications      albuterol 90 mcg/actuation inhaler  Commonly known as: VENTOLIN HFA  Inhale 2 puffs into the lungs every 4 (four) hours as needed for Wheezing. Rescue     aspirin 81 MG EC tablet  Commonly known as: ECOTRIN     atorvastatin 40 MG tablet  Commonly known as: LIPITOR  Take 1 tablet (40 mg total) by mouth once daily.     blood sugar diagnostic Strp  Commonly known as: ACCU-CHEK GUIDE TEST STRIPS  1 strip by In Vitro route once daily.     blood-glucose meter kit  Use as instructed     finasteride 5 mg tablet  Commonly known as: PROSCAR  Take 1 tablet (5 mg total) by mouth once daily.     fish oil-omega-3 fatty acids 300-1,000 mg  capsule     fluticasone propionate 50 mcg/actuation nasal spray  Commonly known as: FLONASE  2 sprays (100 mcg total) by Each Nostril route once daily.     GARLIC ORAL     glipiZIDE 5 MG tablet  Commonly known as: GLUCOTROL  Take 1 tablet (5 mg total) by mouth 2 (two) times daily before meals.     hydrALAZINE 50 MG tablet  Commonly known as: APRESOLINE  Take 1 tablet (50 mg total) by mouth 3 (three) times daily.     lancets Misc  To check sugar daily, to use with insurance preferred meter     losartan 100 MG tablet  Commonly known as: COZAAR  Take 1 tablet (100 mg total) by mouth once daily.     multivitamin capsule     OPTICHAMBER BASHIR [facility]  Generic drug: inhalation spacing device  Use as directed for inhalation.     tamsulosin 0.4 mg Cap  Commonly known as: FLOMAX     TRELEGY ELLIPTA 100-62.5-25 mcg Dsdv  Generic drug: fluticasone-umeclidin-vilanter  Inhale 1 puff into the lungs once daily.     TYLENOL EXTRA STRENGTH ORAL     vitamin D 1000 units Tab  Commonly known as: VITAMIN D3              Signing Physician:  Gina Renee MD         [1]   Current Outpatient Medications on File Prior to Visit   Medication Sig Dispense Refill    acetaminophen (TYLENOL EXTRA STRENGTH ORAL) Take by mouth.      albuterol (VENTOLIN HFA) 90 mcg/actuation inhaler Inhale 2 puffs into the lungs every 4 (four) hours as needed for Wheezing. Rescue 18 g 11    aspirin (ECOTRIN) 81 MG EC tablet Take 81 mg by mouth once daily.      atorvastatin (LIPITOR) 40 MG tablet Take 1 tablet (40 mg total) by mouth once daily. 90 tablet 3    finasteride (PROSCAR) 5 mg tablet Take 1 tablet (5 mg total) by mouth once daily. 90 tablet 3    fluticasone propionate (FLONASE) 50 mcg/actuation nasal spray 2 sprays (100 mcg total) by Each Nostril route once daily. 48 g 3    fluticasone-umeclidin-vilanter (TRELEGY ELLIPTA) 100-62.5-25 mcg DsDv Inhale 1 puff into the lungs once daily. 180 each 3    GARLIC ORAL Take 1 capsule by mouth once daily.       glipiZIDE (GLUCOTROL) 5 MG tablet Take 1 tablet (5 mg total) by mouth 2 (two) times daily before meals. 90 tablet 3    hydrALAZINE (APRESOLINE) 50 MG tablet Take 1 tablet (50 mg total) by mouth 3 (three) times daily. 270 tablet 3    losartan (COZAAR) 100 MG tablet Take 1 tablet (100 mg total) by mouth once daily. 90 tablet 3    multivitamin capsule Take 1 capsule by mouth once daily.      omega-3 fatty acids/fish oil (FISH OIL-OMEGA-3 FATTY ACIDS) 300-1,000 mg capsule Take 1 capsule by mouth once daily.      tamsulosin (FLOMAX) 0.4 mg Cap Take 0.4 mg by mouth once daily.      vitamin D (VITAMIN D3) 1000 units Tab Take 1,000 Units by mouth once daily.      blood sugar diagnostic (ACCU-CHEK GUIDE TEST STRIPS) Strp 1 strip by In Vitro route once daily. 100 strip 3    blood-glucose meter kit Use as instructed 1 each 0    inhalation spacing device Use as directed for inhalation. 1 each 0    lancets Misc To check sugar daily, to use with insurance preferred meter 200 each 2     No current facility-administered medications on file prior to visit.

## 2025-06-18 ENCOUNTER — HOSPITAL ENCOUNTER (OUTPATIENT)
Dept: RADIOLOGY | Facility: HOSPITAL | Age: OVER 89
Discharge: HOME | End: 2025-06-18
Attending: INTERNAL MEDICINE
Payer: MEDICARE

## 2025-06-18 DIAGNOSIS — R19.00 INTRA-ABDOMINAL AND PELVIC SWELLING, MASS AND LUMP, UNSPECIFIED SITE: ICD-10-CM

## 2025-06-18 PROCEDURE — 25500020 PHARM REV CODE 255: Performed by: INTERNAL MEDICINE

## 2025-06-18 PROCEDURE — 74183 MRI ABD W/O CNTR FLWD CNTR: CPT | Mod: TC

## 2025-06-18 PROCEDURE — 74183 MRI ABD W/O CNTR FLWD CNTR: CPT | Mod: 26,,, | Performed by: RADIOLOGY

## 2025-06-18 PROCEDURE — A9585 GADOBUTROL INJECTION: HCPCS | Performed by: INTERNAL MEDICINE

## 2025-06-18 RX ORDER — GADOBUTROL 604.72 MG/ML
7 INJECTION INTRAVENOUS
Status: COMPLETED | OUTPATIENT
Start: 2025-06-18 | End: 2025-06-18

## 2025-06-18 RX ADMIN — GADOBUTROL 7 ML: 604.72 INJECTION INTRAVENOUS at 12:06

## 2025-06-18 NOTE — PHYSICIAN QUERY
"Provider, due to the conflicting clinical picture, please clinically validate the diagnosis of "MATEUSZ." If validated, please provide additional clinical support for the diagnosis.   Other clarification (please specify):  Patient came in with presumptive diagnosis of MATEUSZ with a creatinine of 1.7.  Patient does have baseline CKD of creatinine of 1.5.  "

## 2025-06-23 ENCOUNTER — PATIENT MESSAGE (OUTPATIENT)
Dept: GASTROENTEROLOGY | Facility: CLINIC | Age: OVER 89
End: 2025-06-23
Payer: MEDICARE

## 2025-06-23 ENCOUNTER — TELEPHONE (OUTPATIENT)
Dept: PRIMARY CARE CLINIC | Facility: CLINIC | Age: OVER 89
End: 2025-06-23
Payer: MEDICARE

## 2025-06-23 NOTE — TELEPHONE ENCOUNTER
Copied from CRM #[id number]. Topic: General Inquiry - Patient Advice  >> Jun 23, 2025  1:54 PM David wrote:  .Type:  Test Results    Who Called: pt  Name of Test (Lab/Mammo/Etc): MRI  Date of Test: 6/18/25  Ordering Provider: Thelma  Where the test was performed: [place]  Would the patient rather a call back or a response via [facility]? Call [facility]   Best Call Back Number: [phone]   Additional Information:

## 2025-07-08 DIAGNOSIS — E11.22 TYPE 2 DIABETES MELLITUS WITH STAGE 3B CHRONIC KIDNEY DISEASE, WITHOUT LONG-TERM CURRENT USE OF INSULIN: ICD-10-CM

## 2025-07-08 DIAGNOSIS — N18.32 TYPE 2 DIABETES MELLITUS WITH STAGE 3B CHRONIC KIDNEY DISEASE, WITHOUT LONG-TERM CURRENT USE OF INSULIN: ICD-10-CM

## 2025-07-09 RX ORDER — LANCETS
EACH MISCELLANEOUS
Qty: 200 EACH | Refills: 2 | Status: SHIPPED | OUTPATIENT
Start: 2025-07-09

## 2025-07-15 ENCOUNTER — OFFICE VISIT (OUTPATIENT)
Dept: GASTROENTEROLOGY | Facility: CLINIC | Age: OVER 89
End: 2025-07-15
Payer: MEDICARE

## 2025-07-15 DIAGNOSIS — K86.2 PANCREAS CYST: Primary | ICD-10-CM

## 2025-07-15 PROCEDURE — 98006 SYNCH AUDIO-VIDEO EST MOD 30: CPT | Mod: 95,,,

## 2025-07-15 NOTE — PROGRESS NOTES
[facility]  The patient location is: [place]  The chief complaint leading to consultation is: Pancreas cyst  Visit type: Virtual visit with synchronous audio and video  Total time spent with patient: 20 mins    Each patient to whom he or she provides medical services by telemedicine is:  (1) informed of the relationship between the physician and patient and the respective role of any other health care provider with respect to management of the patient; and (2) notified that he or she may decline to receive medical services by telemedicine and may withdraw from such care at any time.       Reason for Visit:  There were no encounter diagnoses.    PCP:   Sandra Rausch   [address] [facility] / [place] [address]      Initial HPI   This is a 89 y.o. male with history of COPD, HTN, afib, CKD, T2DM, SBO presenting for pancreas cyst. Patient had a recent admission in late may for small bowel obstruction where CT performed incidentally noted a 1.7 cm hypodensity in the region of the pancreatic body. No significant peripancreatic inflammatory change or fat stranding. He was treated successfully with supportive care and subsequently discharged a few days later. Outpatient MRI Abdomen was performed 6/28 which demonstrated a few pancreatic cystic foci. Largest at the mid body measuring 1.8 cm and potentially in continuity with the main pancreatic duct. No suspicious internal enhancement or main pancreatic ductal dilatation.     Patient is asymptomatic at this time. Denies abdominal pain, NV, significant weight loss, jaundice or other alarm symptoms. Having regular bowel movements since discharge. Denies hx of pancreatitis. He is diabetic- mostly well controlled with A1C 6.6. Denies family hx of pancreatic cancer. Former smoker quit many years ago. Denies hx of alcohol abuse            ROS:  Review of Systems   Constitutional:  Negative for chills, fever and weight loss.   Gastrointestinal:  Negative for  abdominal pain, blood in stool, constipation, diarrhea, heartburn, melena, nausea and vomiting.   Skin:  Negative for itching and rash.        Medical History:  has a past medical history of BPH (benign prostatic hyperplasia), Diabetes mellitus (type 2), Emphysema (03/12/2022), High cholesterol (08/26/2020), Hypertension, RBBB (11/29/2022), and Small bowel obstruction (06/13/2019).    Surgical History:  has a past surgical history that includes Tonsillectomy; Eye surgery; David procedure (6/16/2019); 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    Family History: family history includes Cancer in his mother; Diabetes in his paternal aunt; Heart disease in his father; Hypertension in his father..       Review of patient's allergies indicates:   Allergen Reactions    Solarcaine [benzocaine-triclosan]     Sulfa (sulfonamide antibiotics)     Sulfamethoxazole     Trimethoprim     Empagliflozin Rash and Other (See Comments)     Yeast infection    Hytrin [terazosin] Rash    Smz-tmp ds [sulfamethoxazole-trimethoprim] Rash       Medications Ordered Prior to Encounter[1]      Objective Findings: (Limited due to virtual nature of encounter)    Physical Exam:  Physical Exam  Constitutional:       Appearance: Normal appearance.   Eyes:      General: No scleral icterus.  Skin:     Coloration: Skin is not jaundiced or pale.   Neurological:      Mental Status: He is alert and oriented to person, place, and time. Mental status is at baseline.             Labs:  Lab Results   Component Value Date    WBC 8.46 05/28/2025    HGB 13.4 (L) 05/28/2025    HCT 41.2 05/28/2025     05/28/2025    CRP 40.94 (H) 10/10/2011    CHOL 139 02/14/2025    TRIG 82 02/14/2025    HDL 54 02/14/2025    ALKPHOS 97 05/27/2025    LIPASE 21 05/27/2025    ALT 17 05/27/2025    AST 22 05/27/2025      05/28/2025    K 4.1 05/28/2025     05/28/2025    CREATININE 1.2 05/28/2025    BUN 24 05/28/2025    CO2 23 05/28/2025    TSH 0.647 03/10/2025    PSA 2.2 10/12/2011    INR 1.1 05/27/2025    HGBA1C 6.6 (H) 03/10/2025    MICROALBUR 0.7 08/25/2021       Imaging reviewed:    CT AP 5/27/25  FINDINGS:  The visualized lung bases are free of pleural fluid or focal consolidation.  The visualized portions of the heart and pericardium demonstrate calcification of the aortic valve.     The liver demonstrates a 1.6 cm left hepatic hypodensity suggestive of a cyst.  Gallbladder is surgically absent.  The main portal vein and splenic vein are patent.  No significant intra or extrahepatic biliary ductal dilatation.  The pancreas demonstrates a 1.7 cm hypodensity in the region of the pancreatic body.  No significant peripancreatic inflammatory change or fat stranding.  The spleen and adrenal glands are unremarkable.     The kidneys demonstrate multiple cortical and exophytic hypodensities of varying sizes some of which demonstrate peripheral calcification.  Larger of these are suggestive of cyst and several are too small to definitively characterize.  No hydronephrosis.  The urinary bladder is within normal limits for its given degree of distention.  The prostate is mildly prominent in size.     The abdominal aorta is nonaneurysmal with atherosclerosis.  Shotty periaortic lymph nodes are present.     The stomach is distended with fluid and air.  There is a small hiatal hernia containing fluid.  There is wall thickening of the gastric antrum/pylorus.  There are multiple dilated loops of small bowel within the abdomen concerning for small bowel obstruction with probable transition point in the right lower quadrant noting decompressed loops of distal small bowel in this region.  There is liquid and formed stool throughout the colon.  There is diverticulosis without evidence to suggest acute diverticulitis at this time.  There is  postoperative change of the descending colon.  No pneumatosis, free intraperitoneal air portal venous gas identified.     Visualized osseous structures are intact with multilevel degenerative change.     Findings were communicated to Dr. Barry Estes MD at 04:30 on 05/27/2025 via epic secure chat messenger.  Electronic receipt of results was confirmed.  This report was flagged in Epic as abnormal.        MRI Abdomen 6/18/25  FINDINGS:  Pulmonary Bases: No pleural effusion     Liver: Nonenlarged with smooth contour.  Slight lobular nonenhancing T2 hyperintense cyst at the left lobe measuring 1.7 cm.  Additional T2 hyperintense focus at the left lateral segment with enhancement, potentially on the basis of flash filling hemangioma measuring 1.8 cm (series 3, image 18).  Otherwise, no abnormal focus of diffusion restriction.  Intrahepatic vasculature is patent.     Bile Ducts: No abnormal biliary dilatation.     Gallbladder: Absent     Pancreas: Few pancreatic cystic foci.  Largest at the mid body measuring 1.8 cm (series 2, image 29) and potentially in continuity with the main pancreatic duct.  No suspicious internal enhancement or main pancreatic ductal dilatation.     Spleen: normal.     Proximal Gastrointestinal tract: Normal caliber upper GI tract.  Colonic diverticulosis.     Mesentery: No discrete mesenteric mass     Adrenal Glands: Unremarkable.     Kidneys: Multiple bilateral renal cysts of varying size.  No hydronephrosis.     Aorta and Abdominal Vasculature: Aortic atherosclerosis.  IVC is patent.  The main portal vein, splenic vein, and SMV are patent.     Lymph nodes: None pathologically enlarged.     Body wall: Prior surgical change at the anterior abdominal wall.     Other: No significant free fluid.  Height loss with heterogeneous signal and enhancement at the T12 vertebral body.     Impression:     Few pancreatic cystic foci (largest at the mid-body up to 1.8 cm), nonspecific with differential  consideration to include IPMNs.  Of note, no suspicious internal enhancement or main pancreatic ductal dilatation.     Left hepatic lobe liver cyst with additional 1.8 cm lesion at the left lateral segment, possible flash filling hemangioma.     Height loss with heterogeneous signal and enhancement at the T12 vertebral body, incompletely assessed and potentially on the basis of recent or subacute compression fracture.  To correlate with point tenderness and subsequent assessment including dedicated MRI as warranted.     Bilateral renal cysts, colonic diverticulosis, and additional findings as above.     This report was flagged in Epic as containing an incidental finding.          Endoscopy reviewed:  N/a    Assessment:  No diagnosis found.     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 Largest at the body measuring 18 mm in size,  unchanged  Most likely etiology at this point is a indeterminate cystic lesions, possible IPMNs    We discussed in detail the natural history of pancreatic cysts, the therapy involved, and the benefits of multimodality surveillance imaging including Ct, MRI, and EUS with FNA    Recommendations:  Few pancreatic cysts noted incidentally on recent CT/MRI measuring up to 18 mm with no worrisome features.  We discussed that overall these have a low potential for turning malignant in his lifetime, however patient is overall healthy for his age and would like to continue surveillance.  Discussed options for surveillance including EUS versus continued noninvasive surveillance with MRI.  Patient would like to avoid EUS and continue surveillance with MRI given his age/comorbidities, which I believe is very reasonable.  [place] pancreatic cyst protocol would recommend repeat MRI in 6 months  Ordered MRI in 6 months to assess pancreatic cysts for any growth/changes.  If no change, can tentatively plan for repeat MRI surveillance in 6-12 months        Thank you so much for allowing me to participate in  the care of Husam Hobson PA-C  Advanced Endoscopy Physician Assitant  [facility]                 [1]   Current Outpatient Medications on File Prior to Visit   Medication Sig Dispense Refill    acetaminophen (TYLENOL EXTRA STRENGTH ORAL) Take by mouth.      albuterol (VENTOLIN HFA) 90 mcg/actuation inhaler Inhale 2 puffs into the lungs every 4 (four) hours as needed for Wheezing. Rescue 18 g 11    aspirin (ECOTRIN) 81 MG EC tablet Take 81 mg by mouth once daily.      atorvastatin (LIPITOR) 40 MG tablet Take 1 tablet (40 mg total) by mouth once daily. 90 tablet 3    blood sugar diagnostic (ACCU-CHEK GUIDE TEST STRIPS) Strp 1 strip by In Vitro route once daily. 100 strip 3    blood-glucose meter kit Use as instructed 1 each 0    finasteride (PROSCAR) 5 mg tablet Take 1 tablet (5 mg total) by mouth once daily. 90 tablet 3    fluticasone propionate (FLONASE) 50 mcg/actuation nasal spray 2 sprays (100 mcg total) by Each Nostril route once daily. 48 g 3    fluticasone-umeclidin-vilanter (TRELEGY ELLIPTA) 100-62.5-25 mcg DsDv Inhale 1 puff into the lungs once daily. 180 each 3    GARLIC ORAL Take 1 capsule by mouth once daily.      glipiZIDE (GLUCOTROL) 5 MG tablet Take 1 tablet (5 mg total) by mouth 2 (two) times daily before meals. 90 tablet 3    hydrALAZINE (APRESOLINE) 50 MG tablet Take 1 tablet (50 mg total) by mouth 3 (three) times daily. 270 tablet 3    inhalation spacing device Use as directed for inhalation. 1 each 0    lancets (ACCU-CHEK SOFTCLIX LANCETS) Misc TO CHECK BLOOD SUGAR DAILY 200 each 2    losartan (COZAAR) 100 MG tablet Take 1 tablet (100 mg total) by mouth once daily. 90 tablet 3    multivitamin capsule Take 1 capsule by mouth once daily.      omega-3 fatty acids/fish oil (FISH OIL-OMEGA-3 FATTY ACIDS) 300-1,000 mg capsule Take 1 capsule by mouth once daily.      tamsulosin (FLOMAX) 0.4 mg Cap Take 0.4 mg by mouth once daily.      vitamin D (VITAMIN  D3) 1000 units Tab Take 1,000 Units by mouth once daily.       No current facility-administered medications on file prior to visit.

## 2025-07-18 ENCOUNTER — OFFICE VISIT (OUTPATIENT)
Dept: FAMILY MEDICINE | Facility: CLINIC | Age: OVER 89
End: 2025-07-18
Payer: MEDICARE

## 2025-07-18 VITALS
SYSTOLIC BLOOD PRESSURE: 130 MMHG | BODY MASS INDEX: 22.28 KG/M2 | HEIGHT: 70 IN | DIASTOLIC BLOOD PRESSURE: 62 MMHG | OXYGEN SATURATION: 96 % | WEIGHT: 155.63 LBS | HEART RATE: 83 BPM

## 2025-07-18 DIAGNOSIS — Z97.4 USES HEARING AID: ICD-10-CM

## 2025-07-18 DIAGNOSIS — E11.59 HYPERTENSION ASSOCIATED WITH DIABETES: ICD-10-CM

## 2025-07-18 DIAGNOSIS — E11.22 TYPE 2 DIABETES MELLITUS WITH STAGE 3B CHRONIC KIDNEY DISEASE, WITHOUT LONG-TERM CURRENT USE OF INSULIN: Primary | ICD-10-CM

## 2025-07-18 DIAGNOSIS — N40.1 BENIGN PROSTATIC HYPERPLASIA WITH URINARY FREQUENCY: ICD-10-CM

## 2025-07-18 DIAGNOSIS — N18.32 TYPE 2 DIABETES MELLITUS WITH STAGE 3B CHRONIC KIDNEY DISEASE, WITHOUT LONG-TERM CURRENT USE OF INSULIN: Primary | ICD-10-CM

## 2025-07-18 DIAGNOSIS — E11.69 HYPERLIPIDEMIA ASSOCIATED WITH TYPE 2 DIABETES MELLITUS: ICD-10-CM

## 2025-07-18 DIAGNOSIS — D63.8 ANEMIA OF CHRONIC DISEASE: ICD-10-CM

## 2025-07-18 DIAGNOSIS — M19.012 PRIMARY OSTEOARTHRITIS OF LEFT SHOULDER: ICD-10-CM

## 2025-07-18 DIAGNOSIS — K86.2 PANCREATIC CYST: ICD-10-CM

## 2025-07-18 DIAGNOSIS — J44.9 COPD, GROUP B, BY GOLD 2017 CLASSIFICATION: ICD-10-CM

## 2025-07-18 DIAGNOSIS — I15.2 HYPERTENSION ASSOCIATED WITH DIABETES: ICD-10-CM

## 2025-07-18 DIAGNOSIS — D53.9 NUTRITIONAL ANEMIA, UNSPECIFIED: ICD-10-CM

## 2025-07-18 DIAGNOSIS — R35.0 BENIGN PROSTATIC HYPERPLASIA WITH URINARY FREQUENCY: ICD-10-CM

## 2025-07-18 DIAGNOSIS — E78.5 HYPERLIPIDEMIA ASSOCIATED WITH TYPE 2 DIABETES MELLITUS: ICD-10-CM

## 2025-07-18 PROBLEM — Z85.89 HISTORY OF SQUAMOUS CELL CARCINOMA: Status: ACTIVE | Noted: 2025-07-18

## 2025-07-18 PROBLEM — M62.81 MUSCLE WEAKNESS: Status: RESOLVED | Noted: 2024-03-11 | Resolved: 2025-07-18

## 2025-07-18 PROBLEM — N17.9 AKI (ACUTE KIDNEY INJURY): Status: RESOLVED | Noted: 2025-02-24 | Resolved: 2025-07-18

## 2025-07-18 PROCEDURE — 99999 PR PBB SHADOW E&M-EST. PATIENT-LVL IV: CPT | Mod: PBBFAC,,, | Performed by: FAMILY MEDICINE

## 2025-07-18 PROCEDURE — 1101F PT FALLS ASSESS-DOCD LE1/YR: CPT | Mod: CPTII,S$GLB,, | Performed by: FAMILY MEDICINE

## 2025-07-18 PROCEDURE — G2211 COMPLEX E/M VISIT ADD ON: HCPCS | Mod: S$GLB,,, | Performed by: FAMILY MEDICINE

## 2025-07-18 PROCEDURE — 99215 OFFICE O/P EST HI 40 MIN: CPT | Mod: S$GLB,,, | Performed by: FAMILY MEDICINE

## 2025-07-18 PROCEDURE — 1126F AMNT PAIN NOTED NONE PRSNT: CPT | Mod: CPTII,S$GLB,, | Performed by: FAMILY MEDICINE

## 2025-07-18 PROCEDURE — 1159F MED LIST DOCD IN RCRD: CPT | Mod: CPTII,S$GLB,, | Performed by: FAMILY MEDICINE

## 2025-07-18 PROCEDURE — 3288F FALL RISK ASSESSMENT DOCD: CPT | Mod: CPTII,S$GLB,, | Performed by: FAMILY MEDICINE

## 2025-07-18 PROCEDURE — 1160F RVW MEDS BY RX/DR IN RCRD: CPT | Mod: CPTII,S$GLB,, | Performed by: FAMILY MEDICINE

## 2025-07-18 RX ORDER — GLIPIZIDE 5 MG/1
5 TABLET ORAL
Qty: 180 TABLET | Refills: 3 | Status: SHIPPED | OUTPATIENT
Start: 2025-07-18

## 2025-07-18 NOTE — PROGRESS NOTES
Subjective:         Patient ID: Husam Connolly is a 89 y.o. male.    Chief Complaint: Establish Care    Patient Active Problem List   Diagnosis    Hypertension associated with diabetes    BPH (benign prostatic hyperplasia)    Stage 3b chronic kidney disease    S/P cholecystectomy    Ventral hernia without obstruction or gangrene    Right foot drop    Hyperlipidemia associated with type 2 diabetes mellitus    Chronic rhinitis    S/P colostomy    Aortic atherosclerosis    SBO (small bowel obstruction)    Dupuytren's contracture of left hand    Pulmonary nodules    RBBB    Shoulder arthritis    Pulmonary hypertension    COPD, group B, by GOLD 2017 classification    Shoulder stiffness, left    Pain in joint of left shoulder    Senile purpura    LC (obstructive sleep apnea)    Central sleep apnea    Chronic left shoulder pain    COPD exacerbation    Elevated troponin    Syncope    Type 2 diabetes mellitus with stage 3b chronic kidney disease, without long-term current use of insulin    Pulmonary emphysema    A-fib    Chronic idiopathic constipation    Pancreatic mass    Uses hearing aid    Anemia of chronic disease    History of squamous cell carcinoma      BECKIE Wenier is a 89 y.o. male    History of Present Illness    CHIEF COMPLAINT:  Husam presents today for follow up.    DIABETES:  He monitors fasting glucose daily with levels typically ranging in the 120s-130s, occasionally rising to 140s-160s. He denies experiencing low blood sugars, with the lowest recorded glucose level being 109. He has tried multiple diabetes medications including metformin 500-1,000 mg and Jardiance, which was discontinued due to a yeast infection. He currently takes glipizide twice daily, which he reports has been most effective for glucose control. He continues to self-monitor glucose levels each morning before eating.    COPD:  He has a diagnosis of emphysema and COPD. He experiences shortness of breath with physical activity,  specifically when walking 50-75 yards. He denies any breathing difficulty at rest and can carry on conversations without respiratory distress. He uses a rollator for mobility and reports that weather conditions like rain and extreme heat limit his outdoor walking.    LEFT SHOULDER:  He reports left shoulder pain with limited range of motion. Pain occurs specifically with shoulder elevation, which he avoids due to discomfort. He can use the left hand for basic functions but experiences restricted movement compared to the right shoulder. He is right-hand dominant and is able to feed himself despite shoulder limitations. He denies constant pain, but experiences pain only when attempting to elevate the shoulder.    GENITOURINARY:  He reports frequent urination with nocturia, getting up at least once per night to urinate. He keeps a urine collection jug by the bed. He describes his urine as consistently clear and light lemon yellow, without cloudiness. He notes frequent trips between the couch and bathroom throughout the day.    GASTROINTESTINAL:  He reports daily bowel movements. He takes [employer] fiber gummy daily to maintain regularity. When experiencing hard stools, he increases fiber supplement intake by doubling the daily dose, which helps return bowel movements to normal consistency.    SURGICAL HISTORY:  He has undergone multiple surgical procedures including colostomy and ileostomy performed on Father's Day and Sept in 2016, with subsequent reconnection after three months. He received Dupuytren's closure surgery on the ring finger by Dr. Baldwin. He has undergone cataract surgery on both eyes.    FAMILY HISTORY:  Mother had metastatic ovarian cancer that progressed from ovaries to breast to bone and was ultimately fatal. Maternal uncle had stomach cancer diagnosed in his 50s-60s. Brother had fatal bone cancer in leg. Paternal grandmother possibly had breast cancer in 1930s and reportedly survived. Father had  "heart failure. Paternal uncle  from heart attack. He reports being highly aware of potential cancer and heart condition risks due to extensive family history.    SOCIAL HISTORY:    He lives independently and maintains most daily activities. He performs his own laundry, prepares meals primarily by microwave, and conducts his own grocery shopping. He drives himself to medical appointments. He receives housekeeping assistance monthly. He has regular family interaction with his daughter who lives in town. He visits her house on  and , where she prepares meals and provides care packages for , , and weekends. He occasionally cooks meals himself, preparing various cuisines including Mexican, Chinese, and seafood. Physical activity is primarily limited to walking while grocery shopping. He reports having eight grandchildren, with two more expected and two granddaughters getting  this year.       Objective:     Vitals:    25 1325   BP: 130/62   BP Location: Left arm   Patient Position: Sitting   Pulse: 83   SpO2: 96%   Weight: 70.6 kg (155 lb 10.3 oz)   Height: 5' 10" (1.778 m)         Physical Exam  Vitals and nursing note reviewed.   Constitutional:       General: He is not in acute distress.     Appearance: Normal appearance. He is not ill-appearing, toxic-appearing or diaphoretic.   HENT:      Head: Normocephalic and atraumatic.   Eyes:      General: No scleral icterus.     Conjunctiva/sclera: Conjunctivae normal.   Cardiovascular:      Rate and Rhythm: Normal rate.      Heart sounds: Normal heart sounds. No murmur heard.  Pulmonary:      Effort: Pulmonary effort is normal. No respiratory distress.      Breath sounds: Normal breath sounds.   Skin:     Coloration: Skin is not pale.   Neurological:      Mental Status: He is alert. Mental status is at baseline.   Psychiatric:         Attention and Perception: Attention and perception normal.         Mood and Affect: Mood " and affect normal.         Speech: Speech normal.         Behavior: Behavior normal.         Cognition and Memory: Cognition and memory normal.         Judgment: Judgment normal.       Assessment:       1. Type 2 diabetes mellitus with stage 3b chronic kidney disease, without long-term current use of insulin    2. Hyperlipidemia associated with type 2 diabetes mellitus    3. Hypertension associated with diabetes    4. COPD, group B, by GOLD 2017 classification    5. Benign prostatic hyperplasia with urinary frequency    6. Anemia of chronic disease    7. Nutritional anemia, unspecified    8. Pancreatic cyst    9. Primary osteoarthritis of left shoulder    10. Uses hearing aid          Plan:   Recent relevant labs results reviewed with patient.         Assessment & Plan    Glucose monitoring results reviewed. Fasting levels mostly in 120-140 range with occasional spikes to 160. A1C remains below 7, appropriate for age group.  Assessed renal function. History of CKD Stage III. Will continue monitoring for progression and symptoms.  Weight stable  Reviewed recent cardiology workup, including February EKG and heart ultrasound. No concerning changes shown.  Assessed pancreatic cysts found on recent MRI. Largest cyst 1.8 cm, appearance suggests benign nature.  Considered physical therapy for left shoulder, but determined minimal benefit expected at this stage of arthritis. Current range of motion adequate for daily activities. He declined PT if limited gains expected. Discussed preserving current ROM. He opts for HEP at this time.   Noted history of possible skin cancer. Recent biopsy showed non-specific results. Following up regularly with dermatologist.    TYPE 2 DIABETES MELLITUS WITH HYPERGLYCEMIA:  - Monitor glucose levels daily.  - Fasting levels are mostly in high 120's to low 130's, occasionally reaching 140-160.  - A1C is less than 7, which is considered good for patient's age group.  - Continue glipizide twice  daily for glucose control, which has been effective.  - Previously tried metformin and Jardiance, but the latter caused a yeast infection.  - Complete labs 1 week before the September appointment.    CHRONIC OBSTRUCTIVE PULMONARY DISEASE AND EMPHYSEMA:  - Husam experiences dyspnea when walking 50-75 yards, requiring use of a rollator for ambulation.  - No respiratory distress or nasal passage restriction when sitting or at rest.  - Prescribed Albuterol inhaler, 2 puffs nightly before bed.  - Recommend RSV vaccine to be administered at the pharmacy.  Continue controller inhaler - Trelegy    - Follow up at the end of September.  - Transition to every 6 months follow-up schedule after September visit.  - Husam to get tetanus vaccine at the pharmacy.         1. Type 2 diabetes mellitus with stage 3b chronic kidney disease, without long-term current use of insulin  -     glipiZIDE (GLUCOTROL) 5 MG tablet; Take 1 tablet (5 mg total) by mouth 2 (two) times daily before meals.  Dispense: 180 tablet; Refill: 3  -     Comprehensive Metabolic Panel; Future; Expected date: 07/18/2025  -     Hemoglobin A1C; Future; Expected date: 07/18/2025  - Chronic health condition is stable and controlled. Continue current medication regimen and relevant lifestyle modifications. Necessary medication refills addressed. Routine ongoing surveillance monitoring.     2. Hyperlipidemia associated with type 2 diabetes mellitus  -     Lipid Panel; Future; Expected date: 07/18/2025  - Chronic health condition is stable and controlled. Continue current medication regimen and relevant lifestyle modifications. Necessary medication refills addressed. Routine ongoing surveillance monitoring.     3. Hypertension associated with diabetes  - Chronic health condition is stable and controlled. Continue current medication regimen and relevant lifestyle modifications. Necessary medication refills addressed. Routine ongoing surveillance monitoring.     4. COPD,  group B, by GOLD 2017 classification  Per pulm    5. Benign prostatic hyperplasia with urinary frequency  Chronic, stable    6. Anemia of chronic disease  -     CBC Auto Differential; Future; Expected date: 07/18/2025  -     Ferritin; Future; Expected date: 07/18/2025  -     Iron and TIBC; Future; Expected date: 07/18/2025  -     Vitamin B12; Future; Expected date: 07/18/2025  -     Folate; Future; Expected date: 07/18/2025    7. Nutritional anemia, unspecified  -     Vitamin B12; Future; Expected date: 07/18/2025  -     Folate; Future; Expected date: 07/18/2025    8. Pancreatic cyst  Comments:  Scheduled MRI for 12/2025    9. Primary osteoarthritis of left shoulder  Chronic, progressive, stable    10. Uses hearing aid      Patient's questions answered. Plan reviewed with patient at the end of visit. Relevant precautions to chief complaint and reasons to seek further medical care or to contact the office sooner reviewed with patient.     Follow up in about 10 weeks (around 9/26/2025) for Diabetes Follow-up, Hypertension Follow-up, (prelabs).      I spent a total of 40 minutes on the day of the visit.  This includes face to face time and non-face to face time preparing to see the patient (eg, review of tests), obtaining and/or reviewing separately obtained history, documenting clinical information in the electronic or other health record, independently interpreting results and communicating results to the patient/family/caregiver, or care coordinator.    Part of this note was dictated using voice recognition software. Please excuse any typographical errors.     This note was generated with the assistance of ambient listening technology. Verbal consent was obtained by the patient and accompanying visitor(s) for the recording of patient appointment to facilitate this note. I attest to having reviewed and edited the generated note for accuracy, though some syntax or spelling errors may persist. Please contact the author  of this note for any clarification.

## (undated) DEVICE — GLOVE BIOGEL ECLIPSE SZ 7.5

## (undated) DEVICE — SPONGE LAP 18X18 PREWASHED

## (undated) DEVICE — SEE MEDLINE ITEM 157148

## (undated) DEVICE — SEE MEDLINE ITEM 156955

## (undated) DEVICE — RELOAD PROXIMATE CUT BLUE 75MM

## (undated) DEVICE — PAD CAST 2 IN X 4YDS STERILE

## (undated) DEVICE — DRESSING XEROFORM FOIL PK 1X8

## (undated) DEVICE — CUTTER PROXIMATE BLUE 75MM

## (undated) DEVICE — MANIFOLD 4 PORT

## (undated) DEVICE — SEALER LIGASURE IMPACT 18CM

## (undated) DEVICE — SEE MEDLINE ITEM 157117

## (undated) DEVICE — BELLOW CANN HEMOBLAST 1.65GR

## (undated) DEVICE — SUT CTD VICRYL 3-0 CR/SH

## (undated) DEVICE — HEMOSTAT SURGICEL 4X8IN

## (undated) DEVICE — GAUZE SPONGE 4X4 12PLY

## (undated) DEVICE — BANDAGE ESMARK ELASTIC ST 4X9

## (undated) DEVICE — POUCH SQUIBB ACTIVE LIF

## (undated) DEVICE — SEE MEDLINE ITEM 157116

## (undated) DEVICE — APPLICATOR CHLORAPREP ORN 26ML

## (undated) DEVICE — SUT SILK 0 SH 30IN BLK BR

## (undated) DEVICE — SUT VICRYL 3-0 27 SH

## (undated) DEVICE — SUT 2-0 12-18IN SILK

## (undated) DEVICE — BLADE SCALP OPHTL BEVEL STR

## (undated) DEVICE — STOCKINET 4INX48

## (undated) DEVICE — DRESSING TRANS 4X4 3/4

## (undated) DEVICE — PAD ABDOMINAL 5X9 STERILE

## (undated) DEVICE — SUPPORT ULNA NERVE PROTECTOR

## (undated) DEVICE — PACK BASIC

## (undated) DEVICE — ELECTRODE REM PLYHSV RETURN 9

## (undated) DEVICE — COVER OVERHEAD SURG LT BLUE

## (undated) DEVICE — PAD PREP 50/CA

## (undated) DEVICE — TOWEL OR DISP STRL BLUE 4/PK

## (undated) DEVICE — BANDAGE MATRIX HK LOOP 2IN 5YD

## (undated) DEVICE — NDL 22GA X1 1/2 REG BEVEL

## (undated) DEVICE — ELECTRODE EXTENDED BLADE

## (undated) DEVICE — DRESSING TEGADERM 2 3/8 X 2.75

## (undated) DEVICE — SEE L#120831

## (undated) DEVICE — NDL HYPO REG 25G X 1 1/2

## (undated) DEVICE — SPONGE DERMA 8PLY 2X2

## (undated) DEVICE — CLIP LIGATION MEDIUM CLIPS 1

## (undated) DEVICE — SUT SILK 3-0 SH DETACH 30IN

## (undated) DEVICE — BLADE SURG #15 CARBON STEEL

## (undated) DEVICE — BANDAGE MATRIX HK LOOP 3IN 5YD

## (undated) DEVICE — SUT 3-0 12-18IN SILK

## (undated) DEVICE — SUT CHROMIC 2-0 SH 27IN BRN

## (undated) DEVICE — STAPLER SKIN ROTATING HEAD

## (undated) DEVICE — SEE MEDLINE ITEM 157173

## (undated) DEVICE — SYR B-D DISP CONTROL 10CC100/C

## (undated) DEVICE — SUT PROLENE 1 CTX 30IN BLUE

## (undated) DEVICE — SUT VICRYL CTD 2-0 GI 27 SH

## (undated) DEVICE — SUT PRLENE 1CT 1 BL MONO 30

## (undated) DEVICE — INSTRUMENT SUCTION FRAZIER 12F

## (undated) DEVICE — SEE MEDLINE ITEM 157131

## (undated) DEVICE — PACKING STRIP IDOFORM 1X5YD

## (undated) DEVICE — CLAMP DRAINAGE POUCH

## (undated) DEVICE — DRESSING XEROFORM GAUZE 5X9

## (undated) DEVICE — ALCOHOL 70% ISOP W/GREEN 16OZ

## (undated) DEVICE — SUT MONOCRYL 3-0 KS UND MON

## (undated) DEVICE — SUT 1 48IN PDS II VIO MONO

## (undated) DEVICE — PAD CAST SPECIALIST 2X4

## (undated) DEVICE — SYR 10CC LUER LOCK

## (undated) DEVICE — DRESSING AQUACEL SACRAL 9 X 9

## (undated) DEVICE — TAPE ADH MEDIPORE 4 X 10YDS

## (undated) DEVICE — GLOVE SURG BIOGEL LATEX SZ 7.5

## (undated) DEVICE — STAPLER INT PROX TX 60X3.5MM

## (undated) DEVICE — GAUZE AVANT SPNG 4PLY STRL 4X4

## (undated) DEVICE — TRAY FOLEY 16FR INFECTION CONT

## (undated) DEVICE — DRAPE STERI INSTRUMENT 1018

## (undated) DEVICE — DRAPE ABDOMINAL TIBURON 14X11

## (undated) DEVICE — TOURNIQUET SB QC DP 18X4IN

## (undated) DEVICE — DRAIN JACKSON PRATT 10MM 20/CA

## (undated) DEVICE — SUT ETHILON 2-0 FSLX 30 BLK

## (undated) DEVICE — DRESSING AQUACEL AG ADV 3.5X12

## (undated) DEVICE — SUT ETHILON 5-0 PS-2 18IN

## (undated) DEVICE — CONTAINER PATHOLOGY W/LID 32OZ

## (undated) DEVICE — KIT FIBRIN SEALANT EVICEL 5 ML

## (undated) DEVICE — PADDING CAST SPECIALIST 3X4YD

## (undated) DEVICE — PAD CAST SPECIALIST STRL 3

## (undated) DEVICE — PADDING CAST SOF-ROL 2X4YD

## (undated) DEVICE — SEALER BIPOLAR TISSUE 6.0